# Patient Record
Sex: MALE | Race: ASIAN | NOT HISPANIC OR LATINO | Employment: OTHER
[De-identification: names, ages, dates, MRNs, and addresses within clinical notes are randomized per-mention and may not be internally consistent; named-entity substitution may affect disease eponyms.]

---

## 2018-10-10 ENCOUNTER — RECORDS - HEALTHEAST (OUTPATIENT)
Dept: ADMINISTRATIVE | Facility: OTHER | Age: 53
End: 2018-10-10

## 2018-10-11 ENCOUNTER — RECORDS - HEALTHEAST (OUTPATIENT)
Dept: ADMINISTRATIVE | Facility: OTHER | Age: 53
End: 2018-10-11

## 2018-10-18 ENCOUNTER — HOSPITAL ENCOUNTER (OUTPATIENT)
Dept: ULTRASOUND IMAGING | Facility: HOSPITAL | Age: 53
Discharge: HOME | End: 2018-10-18
Attending: INTERNAL MEDICINE

## 2018-10-18 DIAGNOSIS — N18.30 CHRONIC KIDNEY DISEASE, STAGE III (MODERATE) (H): ICD-10-CM

## 2018-11-19 ENCOUNTER — RECORDS - HEALTHEAST (OUTPATIENT)
Dept: ADMINISTRATIVE | Facility: OTHER | Age: 53
End: 2018-11-19

## 2018-11-27 ENCOUNTER — AMBULATORY - HEALTHEAST (OUTPATIENT)
Dept: ADMINISTRATIVE | Facility: CLINIC | Age: 53
End: 2018-11-27

## 2018-11-27 DIAGNOSIS — L30.9 DERMATITIS: ICD-10-CM

## 2018-12-04 ENCOUNTER — OFFICE VISIT - HEALTHEAST (OUTPATIENT)
Dept: ALLERGY | Facility: CLINIC | Age: 53
End: 2018-12-04

## 2018-12-04 DIAGNOSIS — L30.9 DERMATITIS: ICD-10-CM

## 2018-12-04 RX ORDER — AMLODIPINE BESYLATE 10 MG/1
10 TABLET ORAL DAILY
Status: SHIPPED | COMMUNITY
Start: 2018-12-04 | End: 2022-11-16

## 2018-12-04 RX ORDER — ATORVASTATIN CALCIUM 10 MG/1
10 TABLET, FILM COATED ORAL DAILY
Status: ON HOLD | COMMUNITY
Start: 2018-12-04 | End: 2021-12-06

## 2018-12-04 ASSESSMENT — MIFFLIN-ST. JEOR: SCORE: 1382.84

## 2018-12-06 ENCOUNTER — OFFICE VISIT - HEALTHEAST (OUTPATIENT)
Dept: ALLERGY | Facility: CLINIC | Age: 53
End: 2018-12-06

## 2018-12-10 ENCOUNTER — OFFICE VISIT - HEALTHEAST (OUTPATIENT)
Dept: ALLERGY | Facility: CLINIC | Age: 53
End: 2018-12-10

## 2018-12-10 DIAGNOSIS — L30.9 DERMATITIS: ICD-10-CM

## 2020-03-02 ENCOUNTER — TRANSFERRED RECORDS (OUTPATIENT)
Dept: HEALTH INFORMATION MANAGEMENT | Facility: CLINIC | Age: 55
End: 2020-03-02

## 2020-04-29 LAB
CHOLESTEROL (EXTERNAL): 117 MG/DL
CREATININE (EXTERNAL): 3.22 MG/DL (ref 0.7–1.33)
GFR ESTIMATED (EXTERNAL): 21 ML/MIN/1.73M2
GFR ESTIMATED (IF AFRICAN AMERICAN) (EXTERNAL): 24 ML/MIN/1.73M2
GLUCOSE (EXTERNAL): 104 MG/DL (ref 65–99)
HBA1C MFR BLD: 5.3 %
HDLC SERPL-MCNC: 37 MG/DL
LDL CHOLESTEROL CALCULATED (EXTERNAL): 61 MG/DL
NON HDL CHOLESTEROL (EXTERNAL): 80 MG/DL
POTASSIUM (EXTERNAL): 4.5 MMOL/L (ref 3.5–5.3)
TRIGLYCERIDES (EXTERNAL): 108 MG/DL

## 2020-06-18 LAB
CREATININE (EXTERNAL): 2.56 MG/DL (ref 0.7–1.33)
GFR ESTIMATED (EXTERNAL): 27 ML/MIN/1.73M2
GFR ESTIMATED (IF AFRICAN AMERICAN) (EXTERNAL): 31 ML/MIN/1.73M2
GLUCOSE (EXTERNAL): 128 MG/DL (ref 65–99)
POTASSIUM (EXTERNAL): 4.8 MMOL/L (ref 3.5–5.3)

## 2020-08-03 LAB
ALT SERPL-CCNC: 10 U/L (ref 9–46)
AST SERPL-CCNC: 16 U/L (ref 10–35)
CREATININE (EXTERNAL): 2.08 MG/DL (ref 0.7–1.33)
GFR ESTIMATED (EXTERNAL): 35 ML/MIN/1.73M2
GFR ESTIMATED (IF AFRICAN AMERICAN) (EXTERNAL): 40 ML/MIN/1.73M2
GLUCOSE (EXTERNAL): 157 MG/DL (ref 65–99)
POTASSIUM (EXTERNAL): 6.1 MMOL/L (ref 3.5–5.3)

## 2021-03-22 ENCOUNTER — RECORDS - HEALTHEAST (OUTPATIENT)
Dept: ADMINISTRATIVE | Facility: OTHER | Age: 56
End: 2021-03-22

## 2021-03-31 LAB
ALT SERPL-CCNC: 28 U/L (ref 9–46)
AST SERPL-CCNC: 27 U/L (ref 10–35)
CHOLESTEROL (EXTERNAL): 92 MG/DL
CREATININE (EXTERNAL): 2.95 MG/DL (ref 0.7–1.33)
GFR ESTIMATED (EXTERNAL): 23 ML/MIN/1.73M2
GFR ESTIMATED (IF AFRICAN AMERICAN) (EXTERNAL): 26 ML/MIN/1.73M2
GLUCOSE (EXTERNAL): 92 MG/DL (ref 65–99)
HBA1C MFR BLD: 5.5 %
HDLC SERPL-MCNC: 39 MG/DL
LDL CHOLESTEROL CALCULATED (EXTERNAL): 38 MG/DL
NON HDL CHOLESTEROL (EXTERNAL): 53 MG/DL
POTASSIUM (EXTERNAL): 6.5 MMOL/L (ref 3.5–5.3)
TRIGLYCERIDES (EXTERNAL): 69 MG/DL

## 2021-05-27 ENCOUNTER — MEDICAL CORRESPONDENCE (OUTPATIENT)
Dept: HEALTH INFORMATION MANAGEMENT | Facility: CLINIC | Age: 56
End: 2021-05-27

## 2021-06-02 VITALS — HEIGHT: 64 IN | WEIGHT: 141.5 LBS | BODY MASS INDEX: 24.16 KG/M2

## 2021-06-11 ENCOUNTER — COMMUNICATION - HEALTHEAST (OUTPATIENT)
Dept: SCHEDULING | Facility: CLINIC | Age: 56
End: 2021-06-11

## 2021-06-14 ENCOUNTER — AMBULATORY - HEALTHEAST (OUTPATIENT)
Dept: LAB | Facility: CLINIC | Age: 56
End: 2021-06-14

## 2021-06-14 ENCOUNTER — REFERRAL (OUTPATIENT)
Dept: TRANSPLANT | Facility: CLINIC | Age: 56
End: 2021-06-14

## 2021-06-14 DIAGNOSIS — Z01.818 PRE-TRANSPLANT EVALUATION FOR KIDNEY TRANSPLANT: ICD-10-CM

## 2021-06-14 DIAGNOSIS — E11.9 DIABETES MELLITUS, TYPE 2 (H): ICD-10-CM

## 2021-06-14 DIAGNOSIS — I10 ESSENTIAL HYPERTENSION: ICD-10-CM

## 2021-06-14 DIAGNOSIS — N18.9 CHRONIC KIDNEY DISEASE: Primary | ICD-10-CM

## 2021-06-14 DIAGNOSIS — N18.5 CHRONIC KIDNEY DISEASE, STAGE V (H): ICD-10-CM

## 2021-06-14 DIAGNOSIS — N18.6 END STAGE RENAL DISEASE (H): ICD-10-CM

## 2021-06-14 LAB
ANION GAP SERPL CALCULATED.3IONS-SCNC: 12 MMOL/L (ref 5–18)
BUN SERPL-MCNC: 62 MG/DL (ref 8–22)
CALCIUM SERPL-MCNC: 8 MG/DL (ref 8.5–10.5)
CHLORIDE BLD-SCNC: 94 MMOL/L (ref 98–107)
CO2 SERPL-SCNC: 26 MMOL/L (ref 22–31)
CREAT SERPL-MCNC: 4.24 MG/DL (ref 0.7–1.3)
GFR SERPL CREATININE-BSD FRML MDRD: 15 ML/MIN/1.73M2
GLUCOSE BLD-MCNC: 100 MG/DL (ref 70–125)
POTASSIUM BLD-SCNC: 4.4 MMOL/L (ref 3.5–5)
SODIUM SERPL-SCNC: 132 MMOL/L (ref 136–145)

## 2021-06-14 NOTE — LETTER
June 21, 2021    Modesto Barbosa  [address] [address]    Dear Modesto,    Thank you for your interest in the Transplant Center at [facility]. We look forward to being a part of your care team and assisting you through the transplant process.    As we discussed, your transplant coordinator is Lita Tirado (Kidney).  You may call your coordinator at any time with questions or concerns.  Your first scheduled call will be on June 28, 2021.  If this needs to change, call [phone].    Please complete the following.    1. Fill out and return the enclosed forms    Authorization for Electronic Communication    Authorization to Discuss Protected Health Information    Authorization for Release of Protected Health Information    Authorization for Care Everywhere Release of Information    2. Sign up for:    [employer], access to your electronic medical record (see enclosed pamphlet)    [employer], a transplant education website    You can use these tools to learn more about your transplant, communicate with your care team, and track your medical details      Sincerely,    Solid Organ Transplant  [facility]    cc: Referring Physician PCP

## 2021-06-16 VITALS — HEIGHT: 64 IN | BODY MASS INDEX: 23.56 KG/M2 | WEIGHT: 138 LBS

## 2021-06-16 PROBLEM — I10 ESSENTIAL HYPERTENSION: Status: ACTIVE | Noted: 2020-02-10

## 2021-06-16 PROBLEM — N17.9 ACUTE KIDNEY INJURY (H): Status: ACTIVE | Noted: 2021-06-10

## 2021-06-16 PROBLEM — E11.9 TYPE 2 DIABETES MELLITUS (H): Status: ACTIVE | Noted: 2020-02-10

## 2021-06-16 PROBLEM — R11.10 VOMITING: Status: ACTIVE | Noted: 2021-06-10

## 2021-06-16 PROBLEM — N18.4 STAGE 4 CHRONIC KIDNEY DISEASE (H): Status: ACTIVE | Noted: 2020-02-10

## 2021-06-16 SDOH — HEALTH STABILITY: MENTAL HEALTH: HOW OFTEN DO YOU HAVE A DRINK CONTAINING ALCOHOL?: NOT ASKED

## 2021-06-16 SDOH — HEALTH STABILITY: MENTAL HEALTH: HOW MANY STANDARD DRINKS CONTAINING ALCOHOL DO YOU HAVE ON A TYPICAL DAY?: NOT ASKED

## 2021-06-16 SDOH — HEALTH STABILITY: MENTAL HEALTH: HOW OFTEN DO YOU HAVE 6 OR MORE DRINKS ON ONE OCCASION?: NOT ASKED

## 2021-06-16 ASSESSMENT — MIFFLIN-ST. JEOR: SCORE: 1366.96

## 2021-06-16 NOTE — TELEPHONE ENCOUNTER
PCP: Santos Howard MD   Referring Provider: Russell Choe MD  Referring Diagnosis: CKD    Is patient under the age of 65? Y  Is patient diabetic? Y  Is patient on insulin? N  Was patient offered a pancreas transplant referral? N    Is patient in a group home/assisted living? N  Does patient have a guardian? N    Referral intake process completed.  Patient is aware that after financial approval is received, medical records will be requested.   Patient confirmed for a callback from transplant coordinator on June 28, 2021. (within 2 weeks)  Tentative evaluation date August 23, 2021. (within 4 weeks)    Confirmed coordinator will discuss evaluation process in more detail at the time of their call.   Patient is aware of the need to arrange age appropriate cancer screening, vaccinations, and dental care.  Reminded patient to complete questionnaire, complete medical records release, and review packet prior to evaluation visit .  Assessed patient for special needs (ie--wheelchair, assistance, guardian, and ):  wheel chair, Hmong    Patient instructed to call [phone] with questions.     Patient gave verbal consent during intake call to obtain medical records and documents outside of [facility]:  yes

## 2021-06-22 NOTE — PROGRESS NOTES
Assessment:    History of recurrent dermatitis.  Possible allergic contact component.    Plan:    We will do patch testing.  ____________________________________________________________________________     Patient comes in today for evaluation of itching rash.  It comes and goes over the past several years.  Recently has been flaring and he is having daily symptoms over the past 2 and half weeks.  Is often on his scalp, shoulders, back and legs.  It is itchy red bumps.  No desquamation.  No blistering.  No bruising.  Comes and goes slowly.  There is dry associated flaking with it.  No obvious trigger.  He does recall the onset having poison ivy but has persisted since then.  No previous history of similar rash.  He was seen by dermatology.  No medical records available today.  He was prescribed a topical steroid cream which did help out but has used it all.  Rash returns when not using it.    Review of symptoms:  As above, otherwise negative    Past medical history: Diabetes.  Diagnosed 2000.  Hypertension.  No other medical conditions noted.    Allergies: No known allergies to medications, latex, foods or hymenoptera venom    Family history: No known member of the family with allergy or asthma.    Social history: Currently lives in house with forced air heat and central air conditioning.  There is a basement present.  No cigarette smoking history.  Currently unemployed.  No recent changes in products or contact exposures known.    Medications: reviewed in chart    Physical Exam:  General:  Alert and in no apparent distress.  Eyes:  Sclera clear.  Ears: TMs translucent grey with bony landmarks visible. Nose: Pale, boggy mucosal membranes.  Throat: Pink, moist.  No lesions.  Neck: Supple.  No lymphadenopathy.  Lungs: CTA.  CV: Regular rate and rhythm. Extremities: Well perfused.  No clubbing or cyanosis. Skin: Diffusely dry skin.  Erythematous papular rash scalp extending onto shoulders and chest.

## 2021-06-22 NOTE — PROGRESS NOTES
Assessment:     History of recurrent dermatitis.  Possible allergic contact component.  Also consider prurigo nodularis     Plan:     Initial reading of patch testing is negative.  Follow-up in 4 days for final reading of test and counseling regarding results.  ____________________________________________________________________________     Patient comes in today for initial reading of patch testing.  He reports no significant change in his skin rash or itching.    Exam: Skin: Diffusely dry skin.  Erythema and scale seen patches throughout but especially on scalp shoulders and face.  Some nodules present.

## 2021-06-22 NOTE — PROGRESS NOTES
Assessment:     Negative patch testing.  Contact allergy is not likely.  Consider Prurigo Nodularis.  Consider onset of poison ivy followed by chronic itching.    Plan:      Return to dermatology.  Topical Triamcinolone 0.1% daily.    ____________________________________________________________________________     Patient comes in today for final reading of patch testing.  He has had a history of chronic itchy dermatitis.  Concern of possible contact allergy.  Since last seen no changes in his rash.  No significant itching from the patch test itself.    Physical Exam:  General:  Alert and Oriented.  Eyes:  Sclera clear.  Skin: Diffusely dry skin.  Erythematous patches with scale.  Some nodules present.  Rash is on scalp face trunk.    Clinic course: True test patch testing was completed.  All tests were negative.  15 min spent in direct contact with the patient apart from testing.  More than 50% in counseling and coordination of care.

## 2021-06-28 NOTE — TELEPHONE ENCOUNTER
"Reviewed pt's chart for pre-kidney transplant evaluation planning. Pt lives in [place], [place]. Referred to our center by Dr. Russell Choe, his nephrologist. Pt has CKD stage 3 due to hypertension and diabetes. GFR most recently 14 on 6/21/21.  Biopsy not completed. Pt is not on dialysis. Pt a type 2 diabetic. Other hx includes recent lyme's disease, hypertension, diabetes.  Heart hx: none per patient report.  Lung status: no history of infections or oxygen use. Surgical hx: no history of abdominal surgeries.  BMI 23 on 6/10/21 using height 5'3\" and weight 133#.  Discussed BMI requirement for transplant with patient: yes, appropriate for transplant. Health maintenance was discussed with patient, he plans to make appts for dental and colonoscopy Pt is not a smoker, does very occasionally consume alcohol, and refrains from recreational drugs. Pt is independent w/ ADLs.  Pt lives w/ his children and has their support following transplant. His children have offered to be living donors for him, but he is not interested in having them register.    I also introduced [employer] and asked pt to create an account and view pre-kidney transplant videos for review with me following evaluation. Confirmed STD 8/23. Informed pt they will hear from scheduling to arrange the evaluation. Smartset orders entered, chart routed to scheduling pool.       "

## 2021-07-05 PROBLEM — I10 ESSENTIAL HYPERTENSION: Status: ACTIVE | Noted: 2020-02-10

## 2021-07-08 ENCOUNTER — TELEPHONE (OUTPATIENT)
Dept: TRANSPLANT | Facility: CLINIC | Age: 56
End: 2021-07-08

## 2021-07-08 NOTE — TELEPHONE ENCOUNTER
Patient Call: General  Route to LPN    Reason for call: connect with pts daughter regarding evaluation     Call back needed? Yes    Return Call Needed  Same as documented in contacts section  When to return call?: Greater than one day: Route standard priority

## 2021-07-13 NOTE — TELEPHONE ENCOUNTER
Spoke with Yanick about health maintenance items and explained transplant evaluation. Encouraged patient to connect with PCP to schedule colonoscopy, or whichever clinic will take his insurance. She is in agreement with the plan and will call with further questions.

## 2021-08-17 ENCOUNTER — TRANSFERRED RECORDS (OUTPATIENT)
Dept: HEALTH INFORMATION MANAGEMENT | Facility: CLINIC | Age: 56
End: 2021-08-17

## 2021-08-19 ENCOUNTER — TELEPHONE (OUTPATIENT)
Dept: TRANSPLANT | Facility: CLINIC | Age: 56
End: 2021-08-19

## 2021-08-19 NOTE — TELEPHONE ENCOUNTER
Spoke with patient with assistance of [employer] .  Confirmed upcoming PKE appointments at [facility] on 8/23/21 starting at 0730. Instructed patient may eat breakfast, take regularly scheduled medications and be accompanied by 1 adult visitor. Patient states no Covid-19 symptoms and/or exposure within 14 days and will call to reschedule if anything changes before appointments.  Patient states his daughter has contact information for any further questions/concerns/need to reschedule.

## 2021-08-20 NOTE — PROGRESS NOTES
[facility] Solid Organ Transplant  Outpatient MNT: Kidney Transplant Evaluation    Current BMI: 25.68 (HT 64 in,  lbs/68 kg)  BMI is within recommendation of <35 for kidney transplant    Frailty Assessment -- Not Frail (1/5 points) scored for reduced        Time Spent: 30 minutes  Visit Type: Initial   Referring Physician: Corinna  Pt accompanied by: Eugenio  and his daughter     History of previous txp: none   Dialysis: not yet, but pt reports soon    Nutrition Assessment   advised pt to avoid potatoes, tomatoes, banana, and other veggies (cabbage, broccoli). Wife cooks at home. No added salt/salty sauces.     Appetite: good/baseline     Vitamins, Supplements, Pertinent Meds: calcium with vit D   Herbal Medicines/Supplements: none     Edema: yes DAMIEN    Weight hx: stable     Food Security: any concerns about having enough money to buy food or access to grocery stores? Worried about finances d/t being sick; cannot drive d/t poor eyesight. Wife and youngest son drive to get groceries. Has not applied for food stamps or assistance yet. Encouraged pt to ask SW for assistance resources.    Diet Recall  Breakfast Rice w/ beef/fish    Lunch    Dinner Similar to B   Snacks Depends on hunger level- rice eli    Beverages Water    Alcohol None    Dining out Once every 2-3 months      Physical Activity  Daily- elliptical for 30-40 min     Labs  6/14 K 4.4   Phos 5.7    Nutrition Diagnosis  No nutrition diagnosis identified at this time     Nutrition Intervention  Nutrition education provided:  Discussed sodium intake (low sodium foods and drinks, seasoning food without salt and tips for low sodium diet).  Reviewed K/Phos labs. Reviewed how protein needs will change when he starts dialysis.     Reviewed post txp diet guidelines in brief (will review in further detail post txp):  (1) Review of proper food safety measures d/t immunosuppressant therapy post-op and increased risk for food-borne  illness    (2) Avoid the following post txp d/t risk for rejection, unknown effects on the organs, and/or potential interactions with immunosuppressants:  - Herbal, Chinese, holistic, chiropractic, natural, alternative medicines and supplements  - Detoxes and cleanses  - Weight loss pills  - Protein powders or other products with extracts or herbs (ie green tea extract)    (3) Med regimen and possible side effects    Patient Understanding: Pt verbalized understanding of education provided.  Expected Engagement: Good  Follow-Up Plans: PRN     Nutrition Goals  No nutrition goals identified at this time     Claire Burns, RD, LD, CCTD

## 2021-08-23 ENCOUNTER — ANCILLARY PROCEDURE (OUTPATIENT)
Dept: CARDIOLOGY | Facility: CLINIC | Age: 56
End: 2021-08-23
Attending: PHYSICIAN ASSISTANT
Payer: MEDICARE

## 2021-08-23 ENCOUNTER — OFFICE VISIT (OUTPATIENT)
Dept: TRANSPLANT | Facility: CLINIC | Age: 56
End: 2021-08-23
Attending: PHYSICIAN ASSISTANT
Payer: MEDICARE

## 2021-08-23 ENCOUNTER — DOCUMENTATION ONLY (OUTPATIENT)
Dept: TRANSPLANT | Facility: CLINIC | Age: 56
End: 2021-08-23

## 2021-08-23 ENCOUNTER — LAB (OUTPATIENT)
Dept: LAB | Facility: CLINIC | Age: 56
End: 2021-08-23
Attending: PHYSICIAN ASSISTANT

## 2021-08-23 ENCOUNTER — ANCILLARY PROCEDURE (OUTPATIENT)
Dept: GENERAL RADIOLOGY | Facility: CLINIC | Age: 56
End: 2021-08-23
Attending: PHYSICIAN ASSISTANT
Payer: MEDICARE

## 2021-08-23 VITALS
HEART RATE: 63 BPM | OXYGEN SATURATION: 98 % | DIASTOLIC BLOOD PRESSURE: 66 MMHG | WEIGHT: 149.6 LBS | SYSTOLIC BLOOD PRESSURE: 154 MMHG | BODY MASS INDEX: 25.54 KG/M2 | HEIGHT: 64 IN

## 2021-08-23 DIAGNOSIS — E11.9 DIABETES MELLITUS, TYPE 2 (H): ICD-10-CM

## 2021-08-23 DIAGNOSIS — Z01.818 PRE-TRANSPLANT EVALUATION FOR KIDNEY TRANSPLANT: ICD-10-CM

## 2021-08-23 DIAGNOSIS — N18.9 CHRONIC KIDNEY DISEASE: ICD-10-CM

## 2021-08-23 DIAGNOSIS — N18.5 STAGE 5 CHRONIC KIDNEY DISEASE NOT ON CHRONIC DIALYSIS (H): Primary | ICD-10-CM

## 2021-08-23 DIAGNOSIS — I10 ESSENTIAL HYPERTENSION: ICD-10-CM

## 2021-08-23 DIAGNOSIS — N18.5 CHRONIC KIDNEY DISEASE, STAGE V (H): ICD-10-CM

## 2021-08-23 DIAGNOSIS — Z12.5 ENCOUNTER FOR SCREENING FOR MALIGNANT NEOPLASM OF PROSTATE: ICD-10-CM

## 2021-08-23 DIAGNOSIS — E87.20 METABOLIC ACIDOSIS: ICD-10-CM

## 2021-08-23 DIAGNOSIS — Z76.82 ORGAN TRANSPLANT CANDIDATE: Primary | ICD-10-CM

## 2021-08-23 DIAGNOSIS — D69.6 THROMBOCYTOPENIA (H): ICD-10-CM

## 2021-08-23 DIAGNOSIS — E11.69 TYPE 2 DIABETES MELLITUS WITH OTHER SPECIFIED COMPLICATION, WITHOUT LONG-TERM CURRENT USE OF INSULIN (H): ICD-10-CM

## 2021-08-23 DIAGNOSIS — Z01.818 ENCOUNTER FOR PRE-TRANSPLANT EVALUATION FOR KIDNEY TRANSPLANT: Primary | ICD-10-CM

## 2021-08-23 DIAGNOSIS — E11.21 TYPE 2 DIABETES MELLITUS WITH DIABETIC NEPHROPATHY, WITHOUT LONG-TERM CURRENT USE OF INSULIN (H): ICD-10-CM

## 2021-08-23 DIAGNOSIS — Z11.59 ENCOUNTER FOR SCREENING FOR OTHER VIRAL DISEASES: ICD-10-CM

## 2021-08-23 LAB
ABO/RH(D): NORMAL
ABO/RH(D): NORMAL
ALBUMIN SERPL-MCNC: 3.6 G/DL (ref 3.4–5)
ALBUMIN UR-MCNC: 100 MG/DL
ALP SERPL-CCNC: 45 U/L (ref 40–150)
ALT SERPL W P-5'-P-CCNC: 26 U/L (ref 0–70)
ANION GAP SERPL CALCULATED.3IONS-SCNC: 10 MMOL/L (ref 3–14)
ANTIBODY SCREEN: NEGATIVE
APPEARANCE UR: CLEAR
APTT PPP: 41 SECONDS (ref 22–38)
AST SERPL W P-5'-P-CCNC: 18 U/L (ref 0–45)
BASOPHILS # BLD AUTO: 0 10E3/UL (ref 0–0.2)
BASOPHILS NFR BLD AUTO: 0 %
BILIRUB SERPL-MCNC: 0.4 MG/DL (ref 0.2–1.3)
BILIRUB UR QL STRIP: NEGATIVE
BUN SERPL-MCNC: 81 MG/DL (ref 7–30)
CALCIUM SERPL-MCNC: 8.7 MG/DL (ref 8.5–10.1)
CHLORIDE BLD-SCNC: 108 MMOL/L (ref 94–109)
CO2 SERPL-SCNC: 19 MMOL/L (ref 20–32)
COLOR UR AUTO: ABNORMAL
CREAT SERPL-MCNC: 5.38 MG/DL (ref 0.66–1.25)
EOSINOPHIL # BLD AUTO: 0.2 10E3/UL (ref 0–0.7)
EOSINOPHIL NFR BLD AUTO: 4 %
ERYTHROCYTE [DISTWIDTH] IN BLOOD BY AUTOMATED COUNT: 14.6 % (ref 10–15)
FACTOR 2 INTERPRETATION: NORMAL
FACTOR V INTERPRETATION: NORMAL
GFR SERPL CREATININE-BSD FRML MDRD: 11 ML/MIN/1.73M2
GLUCOSE BLD-MCNC: 186 MG/DL (ref 70–99)
GLUCOSE UR STRIP-MCNC: 50 MG/DL
HBA1C MFR BLD: 5.6 % (ref 0–5.6)
HBV SURFACE AB SERPL IA-ACNC: 5.68 M[IU]/ML
HBV SURFACE AG SERPL QL IA: NONREACTIVE
HCT VFR BLD AUTO: 27.5 % (ref 40–53)
HCV AB SERPL QL IA: NONREACTIVE
HGB BLD-MCNC: 9.3 G/DL (ref 13.3–17.7)
HGB UR QL STRIP: NEGATIVE
HIV 1+2 AB+HIV1 P24 AG SERPL QL IA: NONREACTIVE
IMM GRANULOCYTES # BLD: 0 10E3/UL
IMM GRANULOCYTES NFR BLD: 0 %
INR PPP: 1.25 (ref 0.85–1.15)
KETONES UR STRIP-MCNC: NEGATIVE MG/DL
LAB DIRECTOR COMMENTS: NORMAL
LAB DIRECTOR DISCLAIMER: NORMAL
LAB DIRECTOR INTERPRETATION: NORMAL
LAB DIRECTOR METHODOLOGY: NORMAL
LAB DIRECTOR RESULTS: NORMAL
LEUKOCYTE ESTERASE UR QL STRIP: NEGATIVE
LVEF ECHO: NORMAL
LYMPHOCYTES # BLD AUTO: 0.4 10E3/UL (ref 0.8–5.3)
LYMPHOCYTES NFR BLD AUTO: 12 %
MCH RBC QN AUTO: 28.6 PG (ref 26.5–33)
MCHC RBC AUTO-ENTMCNC: 33.8 G/DL (ref 31.5–36.5)
MCV RBC AUTO: 85 FL (ref 78–100)
MDL NUMBER: NORMAL
MONOCYTES # BLD AUTO: 0.3 10E3/UL (ref 0–1.3)
MONOCYTES NFR BLD AUTO: 8 %
NEUTROPHILS # BLD AUTO: 2.7 10E3/UL (ref 1.6–8.3)
NEUTROPHILS NFR BLD AUTO: 76 %
NITRATE UR QL: NEGATIVE
NRBC # BLD AUTO: 0 10E3/UL
NRBC BLD AUTO-RTO: 0 /100
PH UR STRIP: 5 [PH] (ref 5–7)
PHOSPHATE SERPL-MCNC: 6.4 MG/DL (ref 2.5–4.5)
PLATELET # BLD AUTO: 59 10E3/UL (ref 150–450)
POTASSIUM BLD-SCNC: 4.9 MMOL/L (ref 3.4–5.3)
PROT SERPL-MCNC: 6.8 G/DL (ref 6.8–8.8)
PSA SERPL-MCNC: 0.66 UG/L (ref 0–4)
RBC # BLD AUTO: 3.25 10E6/UL (ref 4.4–5.9)
RBC URINE: 2 /HPF
SODIUM SERPL-SCNC: 137 MMOL/L (ref 133–144)
SP GR UR STRIP: 1.01 (ref 1–1.03)
SPECIMEN DESCRIPTION: NORMAL
SPECIMEN EXPIRATION DATE: NORMAL
SPECIMEN EXPIRATION DATE: NORMAL
T PALLIDUM AB SER QL: NONREACTIVE
UROBILINOGEN UR STRIP-MCNC: NORMAL MG/DL
WBC # BLD AUTO: 3.6 10E3/UL (ref 4–11)
WBC URINE: <1 /HPF

## 2021-08-23 PROCEDURE — 86481 TB AG RESPONSE T-CELL SUSP: CPT

## 2021-08-23 PROCEDURE — 86706 HEP B SURFACE ANTIBODY: CPT

## 2021-08-23 PROCEDURE — 85025 COMPLETE CBC W/AUTO DIFF WBC: CPT

## 2021-08-23 PROCEDURE — 81240 F2 GENE: CPT

## 2021-08-23 PROCEDURE — 85613 RUSSELL VIPER VENOM DILUTED: CPT

## 2021-08-23 PROCEDURE — 81001 URINALYSIS AUTO W/SCOPE: CPT

## 2021-08-23 PROCEDURE — 86833 HLA CLASS II HIGH DEFIN QUAL: CPT

## 2021-08-23 PROCEDURE — 85610 PROTHROMBIN TIME: CPT

## 2021-08-23 PROCEDURE — 86886 COOMBS TEST INDIRECT TITER: CPT

## 2021-08-23 PROCEDURE — 81378 HLA I & II TYPING HR: CPT

## 2021-08-23 PROCEDURE — 36415 COLL VENOUS BLD VENIPUNCTURE: CPT

## 2021-08-23 PROCEDURE — 99205 OFFICE O/P NEW HI 60 MIN: CPT

## 2021-08-23 PROCEDURE — 86665 EPSTEIN-BARR CAPSID VCA: CPT

## 2021-08-23 PROCEDURE — 86704 HEP B CORE ANTIBODY TOTAL: CPT

## 2021-08-23 PROCEDURE — 86832 HLA CLASS I HIGH DEFIN QUAL: CPT

## 2021-08-23 PROCEDURE — 86901 BLOOD TYPING SEROLOGIC RH(D): CPT

## 2021-08-23 PROCEDURE — 99204 OFFICE O/P NEW MOD 45 MIN: CPT | Performed by: TRANSPLANT SURGERY

## 2021-08-23 PROCEDURE — 85670 THROMBIN TIME PLASMA: CPT

## 2021-08-23 PROCEDURE — 87340 HEPATITIS B SURFACE AG IA: CPT

## 2021-08-23 PROCEDURE — G0103 PSA SCREENING: HCPCS

## 2021-08-23 PROCEDURE — 85730 THROMBOPLASTIN TIME PARTIAL: CPT

## 2021-08-23 PROCEDURE — 86787 VARICELLA-ZOSTER ANTIBODY: CPT

## 2021-08-23 PROCEDURE — 71046 X-RAY EXAM CHEST 2 VIEWS: CPT | Performed by: RADIOLOGY

## 2021-08-23 PROCEDURE — 83036 HEMOGLOBIN GLYCOSYLATED A1C: CPT

## 2021-08-23 PROCEDURE — 86900 BLOOD TYPING SEROLOGIC ABO: CPT | Mod: 91

## 2021-08-23 PROCEDURE — 86780 TREPONEMA PALLIDUM: CPT | Mod: GZ

## 2021-08-23 PROCEDURE — 93306 TTE W/DOPPLER COMPLETE: CPT | Performed by: INTERNAL MEDICINE

## 2021-08-23 PROCEDURE — 80053 COMPREHEN METABOLIC PANEL: CPT

## 2021-08-23 PROCEDURE — 86803 HEPATITIS C AB TEST: CPT

## 2021-08-23 PROCEDURE — 86147 CARDIOLIPIN ANTIBODY EA IG: CPT

## 2021-08-23 PROCEDURE — 81241 F5 GENE: CPT

## 2021-08-23 PROCEDURE — 86644 CMV ANTIBODY: CPT

## 2021-08-23 PROCEDURE — 84100 ASSAY OF PHOSPHORUS: CPT

## 2021-08-23 RX ORDER — CALCIUM CARBONATE 500(1250)
500 TABLET,CHEWABLE ORAL 4 TIMES DAILY
Status: ON HOLD | COMMUNITY
Start: 2021-08-18 | End: 2023-02-25

## 2021-08-23 RX ORDER — MINOXIDIL 2.5 MG/1
TABLET ORAL
COMMUNITY
Start: 2021-08-05 | End: 2021-12-02

## 2021-08-23 RX ORDER — SODIUM ZIRCONIUM CYCLOSILICATE 5 G/5G
POWDER, FOR SUSPENSION ORAL
COMMUNITY
Start: 2021-08-08 | End: 2021-12-02

## 2021-08-23 RX ORDER — CARVEDILOL 25 MG/1
25 TABLET ORAL
Status: ON HOLD | COMMUNITY
Start: 2020-05-11 | End: 2021-12-06

## 2021-08-23 RX ORDER — FUROSEMIDE 20 MG
20 TABLET ORAL DAILY
COMMUNITY
Start: 2021-08-05 | End: 2022-06-01

## 2021-08-23 ASSESSMENT — MIFFLIN-ST. JEOR: SCORE: 1419.58

## 2021-08-23 NOTE — PROGRESS NOTES
Kidney Transplant Referral - 6/14/2021   Patient completed AM appointments with all PKE providers.  Time and location of PM appointments reviewed with patient and daughter.   Instructed patient will next be contacted by Transplant Coordinator after Selection Committee Meeting.  Patient stated understanding.        Summary    Team s concerns/comments:   1) Cardiac risk assessment  2) PAD assessment  3) BPH  4) Elevated coags  5) Small pleural effusion  6) Health maintenance    Candidacy category: Yellow    Action/Plan:   1) EKG, Echo today. Cardiology consult  2) A/P CT non contrast, Ab/Il US  3) PVR today-0  4) Repeat tests, if elevated again refer to Hematology  5) PHOEBE to forward to PCP  6) 8/21 Colonoscopy pathology results pending,   Dental UTD    Expected Selection Meeting Discussion: 9/1/21

## 2021-08-23 NOTE — PROGRESS NOTES
TRANSPLANT NEPHROLOGY RECIPIENT EVALUATION NOTE    Assessment and Plan:  # Kidney Transplant Evaluation: Patient is a good candidate overall. Benefits of a living donor transplant were discussed.    # CKD from presumed diabetes mellitus type 2: GFRs 14-16. AVF placed with no overt uremic symptoms. When ready, he may benefit from a kidney transplant.  He is ABO-A and has no potential donors.    # DM Type 2: diet controlled since June 2021.    # Cardiac Risk: will need risk assessment given findings on ECHO today that included severe LVH with significant asymmetric septal hypertrophy (1.7 cm)   that may require cardiac MRI. He will also need coronary angiogram given 20+ years of diabetes.     # Abnormal coags: including slightly elevated INR and PPT. Will repeat and if elevated again will send to hematology.     # PAD Screening: will need non-con CT abdomen/pelvis and iliac US.     # Small pleural effusions bilaterally: will forward to primary nephrology.     # Health Maintenance: 8/2021 colonoscopy (path pending on one polyp): Up to date and Dental: Up to date (next appt scheduled).     Discussed the risks and benefits of a transplant, including the risk of surgery and immunosuppression medications.  Patient's overall evaluation will be discussed in the Transplant Program's regular meeting with a final recommendation on the patients suitability for transplant to be made at that time.    Pending completion of the full evaluation, patient presently appears to be enough of an acceptable kidney transplant recipient candidate to have any potential kidney donors start the evaluation process.  Patient was seen in conjunction with Dr. Patrick Phan as part of a shared visit.    PHYSICIAN ATTESTATION AND EVALUATION  Patient was seen by myself, Dr. Patrick Phan, in conjunction with STEPHEN Almonte CNP as part of a shared visit.    I personally reviewed the patient's past medical and surgical history, vital signs,  medications and pertinent laboratory results and radiology findings.  Present and past medical history, along with significant physical exam findings were all reviewed with PHOEBE.    Ayers findings:  Modesto Barbosa is a 55 year old year old male with CKD from diabetes mellitus type 2, who presents for kidney transplant evaluation.  Patient reports feeling okay overall with some medical complaints.    Key management decisions made by me and discussed with PHOEBE include the following, with full Assessment and Plan noted above by PHOEBE:  # Kidney Transplant Evaluation: Patient is a good candidate overall. Benefits of a living donor transplant were discussed.    # CKD from diabetes mellitus type 2: Patient has had progressive decline in kidney function, nearing need for renal replacement therapy and would benefit from a kidney transplant.  Preferably, a preemptive living donor kidney transplant.    # DM Type 2: Patient is diet controlled only, but does have end organ damage.    # Cardiac Risk: No known cardiac disease, but several cardiac risk factors.  Patient will require Cardiology evaluation prior to transplant and likely require a coronary angiogram due to long-standing diabetes.    # PAD Screening: Will obtain updated imaging for Transplant Surgery to review.    # Abnormal Coags: Slightly prolonged INR and PTT for unclear reason.  Recommend repeating and if still abnormal, would refer to Hematology for further evaluation.    # Small Pleural Effusions: Patient may need improved diuresis and will defer to primary Nephrologist to manage.    Patrick Phan MD    Evaluation:  Modesto Barbosa was seen in consultation at the request of Dr. Talha Weinstein for evaluation as a potential kidney transplant recipient.    Reason for Visit:  Modesto Barbosa is a 55 year old male with CKD from diabetes mellitus type 2, who presents for kidney transplant evaluation.    History of Present Illness:         Kidney Disease Hx:      Modesto DIAZ  Chelsey is a 55-year-old [facility] gentleman with history of CKD secondary to presumed type 2 diabetes. Long standing CKD that was reportedly followed by a PCP.  In mid 2018 serum creatinine levels were in the 2 range, progressed to greater than 3 in 4/2020 and for the past few months have been between 3.5-4.3 with correlating GFRs of 14-18.  Prior negative serological work-up including SPEP, UPEP and FLC . He denies uremic sxs but does complain of LE swelling. He has no potential donors.        Primary Nephrologist: Dr. Choe   H/o Kidney Stones: No       H/o Recurrent/Frequent UTI: No         Diabetic Hx: Type 2        Diagnosis Date: since 2001.        Medication History: started taking oral medications in 2005, which was stopped in June 2021. No prior insulin use.         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       Other: None         Viral Serology Status       CMV IgG Antibody: Unknown       EBV IgG Antibody: Unknown         Volume Status/Weight:        Volume status: Moderately hypervolemic       Weight:  Acceptable BMI       BMI: There is no height or weight on file to calculate BMI.         Functional Capacity/Frailty:        Stays active gardening and has no limitations to how far he can walk. Lives with wife and son and no services are coming to the house at this time.       Fatigue/Decreased Energy: [x] No [] Yes    Chest Pain or SOB with Exertion: [x] No [] Yes    Significant Weight Change: [x] No [] Yes    Nausea, Vomiting or Diarrhea: [x] No [] Yes     Fever, Sweats or Chills:  [x] No [] Yes    Leg Swelling [x] No [] Yes        History of Cancer: None    Other Significant Medical Issues:   -Mild bladder wall thickening: on 2018 renal US, resolved on 2021 US, no hematuria on recent UA.   - BPH: PVR 0 ml.   - Recent lyme's disease: completed treatment.        Potential Living Kidney Donors: No    Review of Systems:  A comprehensive review of systems was obtained and negative, except as noted in the HPI or PMH.    Past Medical History:   Medical record was reviewed and PMH was discussed with patient and noted below.  Past Medical History:   Diagnosis Date     Diabetes (H)     managed by PCP       Past Social History:   No past surgical history on file.  Personal history of bleeding or anesthesia problems: No    Family History:  No family history on file.    Personal History:   Social History     Socioeconomic History     Marital status: Legally      Spouse name: Not on file     Number of children: Not on file     Years of education: Not on file     Highest education level: Not on file   Occupational History     Not on file   Tobacco Use     Smoking status: Never Smoker     Smokeless tobacco: Never Used   Substance and Sexual Activity     Alcohol use: Not Currently     Drug use: Never     Sexual activity: Not on file   Other Topics Concern     Parent/sibling w/ CABG, MI or angioplasty before 65F 55M? Not Asked   Social History Narrative     Not on file     Social Determinants of Health     Financial Resource Strain:      Difficulty of Paying Living Expenses:    Food Insecurity:      Worried About Running Out of Food in the Last Year:      Ran Out of Food in the Last Year:    Transportation Needs:      Lack of Transportation (Medical):      Lack of Transportation (Non-Medical):    Physical Activity:      Days of Exercise per Week:      Minutes of Exercise per Session:    Stress:      Feeling of Stress :    Social Connections:      Frequency of Communication  with Friends and Family:      Frequency of Social Gatherings with Friends and Family:      Attends Jew Services:      Active Member of Clubs or Organizations:      Attends Club or Organization Meetings:      Marital Status:    Intimate Partner Violence:      Fear of Current or Ex-Partner:      Emotionally Abused:      Physically Abused:      Sexually Abused:        Allergies:  No Known Allergies    Medications:  Current Outpatient Medications   Medication Sig     amLODIPine (NORVASC) 10 MG tablet [AMLODIPINE (NORVASC) 10 MG TABLET] Take 10 mg by mouth daily.      aspirin 81 MG EC tablet [ASPIRIN 81 MG EC TABLET] Take 81 mg by mouth daily.     atorvastatin (LIPITOR) 10 MG tablet [ATORVASTATIN (LIPITOR) 10 MG TABLET] Take 10 mg by mouth daily.      calcitrioL (ROCALTROL) 0.25 MCG capsule [CALCITRIOL (ROCALTROL) 0.25 MCG CAPSULE] Take 0.25 mcg by mouth daily.      cloNIDine (CATAPRES-TTS) 0.3 mg/24 hr [CLONIDINE (CATAPRES-TTS) 0.3 MG/24 HR] Place 1 patch on the skin once a week.     hydrALAZINE (APRESOLINE) 100 MG tablet [HYDRALAZINE (APRESOLINE) 100 MG TABLET] Take 100 mg by mouth 3 (three) times a day.     metoprolol succinate (TOPROL-XL) 100 MG 24 hr tablet [METOPROLOL SUCCINATE (TOPROL-XL) 100 MG 24 HR TABLET] Take 100 mg by mouth daily.      sodium bicarbonate 650 MG tablet [SODIUM BICARBONATE 650 MG TABLET] Take 1 tablet (650 mg total) by mouth 3 (three) times a day. OTC product     No current facility-administered medications for this visit.       Vitals:  There were no vitals taken for this visit.    Exam:  GENERAL APPEARANCE: alert and no distress  HENT: mouth without ulcers or lesions. Fair dentition.   LYMPHATICS: no cervical or supraclavicular nodes  RESP: lungs clear to auscultation - no rales, rhonchi or wheezes  CV: regular rhythm, normal rate, no rub, no murmur  FEMORAL PULSES: 2+ bilaterally.   EDEMA: no LE edema bilaterally  ABDOMEN: soft, nondistended, nontender, bowel sounds normal  MS:  extremities normal - no gross deformities noted, no evidence of inflammation in joints, no muscle tenderness  SKIN: no rash    Results:   No results found for this or any previous visit (from the past 336 hour(s)).

## 2021-08-23 NOTE — LETTER
8/23/2021         RE: Modesto Barbosa  [address] [place] [address]        Dear Colleague,    Thank you for referring your patient, Modesto Barbosa, to the [facility]. Please see a copy of my visit note below.    1. No surgical contraindications for kidney transplantation.  With long-term history of DM and HTN, would look closely at/for PVD: doppler of carotid and iliac, non con CT of pelvis.  2. Cardiac eval due to severe HTN.  Query any need to assess for secondary HTN?   3. Routine infection/tumor eval  4. Platelets have been consistently low/normal. Get HBV/HCV serologies.  If goes way back, look at imaging of liver/fibroscan.  I spent 45+ min with chart review, with patient and daugther, discussing with nephrology    54 yo Hmong man (speaks little English) : daughter.  Not yet on dialysis.  On 6 drugs for hypertension.  Carries a long-term dx of type 2 DM (on oral agents)  Has had eye surg for retinopathy.    No smoking  NKA  Only surg: diabetic eye.     Kidney/surg  No prior kidney problems  Bladder: empties ok.  No infx, hematuria, stones, tumor.  PVR in clinic: empty bladder.  Arterial: no claudication, ulcers or infections  Venous: no DVT, unilateral edema.    There were no vitals taken for this visit.   Alert, responsive.    Abd: soft, nontender.  No incisions, no hernias.  Groin 2+ bilat pulses.  Ext: min edema.  Sym face and ext mobility    Current Outpatient Medications   Medication     amLODIPine (NORVASC) 10 MG tablet     aspirin 81 MG EC tablet     atorvastatin (LIPITOR) 10 MG tablet     calcitrioL (ROCALTROL) 0.25 MCG capsule     calcium carbonate 1250 (500 Ca) MG CHEW     carvedilol (COREG) 25 MG tablet     cloNIDine (CATAPRES-TTS) 0.3 mg/24 hr     furosemide (LASIX) 20 MG tablet     hydrALAZINE (APRESOLINE) 100 MG tablet     LOKELMA 5 g PACK packet     metoprolol succinate (TOPROL-XL) 100 MG 24 hr tablet     minoxidil (LONITEN) 2.5 MG tablet     sodium  bicarbonate 650 MG tablet     No current facility-administered medications for this visit.     Results for MARICRUZ CHAVES (MRN [id number]) as of 8/23/2021 12:30   Ref. Range 6/14/2021 10:40   Sodium Latest Ref Range: 136 - 145 mmol/L 132 (L)   Potassium Latest Ref Range: 3.5 - 5.0 mmol/L 4.4   Chloride Latest Ref Range: 98 - 107 mmol/L 94 (L)   Carbon Dioxide Latest Ref Range: 22 - 31 mmol/L 26   Urea Nitrogen Latest Ref Range: 8 - 22 mg/dL 62 (H)   Creatinine Latest Ref Range: 0.70 - 1.30 mg/dL 4.24 (H)   Results for MARICRUZ CHAVES (MRN [id number]) as of 8/23/2021 12:30   Ref. Range 6/10/2021 14:06   WBC Latest Ref Range: 4.0 - 11.0 thou/uL 11.0   Hemoglobin Latest Ref Range: 14.0 - 18.0 g/dL 10.6 (L)   Hematocrit Latest Ref Range: 40.0 - 54.0 % 29.4 (L)   Platelet Count Latest Ref Range: 140 - 440 thou/uL 100 (L)   Results for MARICRUZ CHAVES (MRN [id number]) as of 8/23/2021 12:30   Ref. Range 6/11/2021 07:28   Hemoglobin A1C Latest Ref Range: <=5.6 % 5.7 (H)       I had a long discussion with the patient regarding kidney transplantation which included but was not limited to the following points:    Kidney transplant evaluation process to assess whether (s)he can safely undergo a kidney transplant and take long-term immunosuppression.    The selection committee process was discussed.  The federal rules for cadaveric waiting list and blood type matching of the organ. The availability of living-related donor transplantation.  The types of donors: brain death donors, non-heart beating donors and living donor grafts  Extended criteria  Donors and the increased  risk of primary non-function using these extended criteria donors  [facility] high risk donors, risk of donor transmitted infections and donor transmitted malignancy  The kidney transplant operation and the associated risks and technical complications which can include intraoperative death, post operative death, primary non-function, bleeding requiring re-operations,  vascular and ureteral complications, bowel perforations, and intra abdominal abscess. Some of these complications may require a second operation.  The postoperative course and risk of postoperative complications which can include sepsis, MI, stroke, brain injury, pneumonia, pleural effusions, and renal dysfunction.  The current 1 year and 5 year graft and patient survivals.  The need for life long immunosuppressive therapy and the side effects of these medications, including the possibility of toxicity, opportunistic infections, risk of cancer including lymphoma, and the possibility of rejection even if the patient is taking the medication exactly as prescribed.  The need for compliance with medications and follow-up visits in the clinic and thereafter.  The patient and family understand these risks and wish to proceed to transplantation        Again, thank you for allowing me to participate in the care of your patient.        Sincerely,        VIDEO,

## 2021-08-23 NOTE — PROGRESS NOTES
1. No surgical contraindications for kidney transplantation.  With long-term history of DM and HTN, would look closely at/for PVD: doppler of carotid and iliac, non con CT of pelvis.  2. Cardiac eval due to severe HTN.  Query any need to assess for secondary HTN?   3. Routine infection/tumor eval  4. Platelets have been consistently low/normal. Get HBV/HCV serologies.  If goes way back, look at imaging of liver/fibroscan.  I spent 45+ min with chart review, with patient and daugther, discussing with nephrology    56 yo Eugenio man (speaks little English) : daughter.  Not yet on dialysis.  On 6 drugs for hypertension.  Carries a long-term dx of type 2 DM (on oral agents)  Has had eye surg for retinopathy.    No smoking  NKA  Only surg: diabetic eye.     Kidney/surg  No prior kidney problems  Bladder: empties ok.  No infx, hematuria, stones, tumor.  PVR in clinic: empty bladder.  Arterial: no claudication, ulcers or infections  Venous: no DVT, unilateral edema.    There were no vitals taken for this visit.   Alert, responsive.    Abd: soft, nontender.  No incisions, no hernias.  Groin 2+ bilat pulses.  Ext: min edema.  Sym face and ext mobility    Current Outpatient Medications   Medication     amLODIPine (NORVASC) 10 MG tablet     aspirin 81 MG EC tablet     atorvastatin (LIPITOR) 10 MG tablet     calcitrioL (ROCALTROL) 0.25 MCG capsule     calcium carbonate 1250 (500 Ca) MG CHEW     carvedilol (COREG) 25 MG tablet     cloNIDine (CATAPRES-TTS) 0.3 mg/24 hr     furosemide (LASIX) 20 MG tablet     hydrALAZINE (APRESOLINE) 100 MG tablet     LOKELMA 5 g PACK packet     metoprolol succinate (TOPROL-XL) 100 MG 24 hr tablet     minoxidil (LONITEN) 2.5 MG tablet     sodium bicarbonate 650 MG tablet     No current facility-administered medications for this visit.     Results for MARICRUZ CHAVES (MRN [id number]) as of 8/23/2021 12:30   Ref. Range 6/14/2021 10:40   Sodium Latest Ref Range: 136 - 145 mmol/L 132 (L)    Potassium Latest Ref Range: 3.5 - 5.0 mmol/L 4.4   Chloride Latest Ref Range: 98 - 107 mmol/L 94 (L)   Carbon Dioxide Latest Ref Range: 22 - 31 mmol/L 26   Urea Nitrogen Latest Ref Range: 8 - 22 mg/dL 62 (H)   Creatinine Latest Ref Range: 0.70 - 1.30 mg/dL 4.24 (H)   Results for MARICRUZ CHAVES (MRN [id number]) as of 8/23/2021 12:30   Ref. Range 6/10/2021 14:06   WBC Latest Ref Range: 4.0 - 11.0 thou/uL 11.0   Hemoglobin Latest Ref Range: 14.0 - 18.0 g/dL 10.6 (L)   Hematocrit Latest Ref Range: 40.0 - 54.0 % 29.4 (L)   Platelet Count Latest Ref Range: 140 - 440 thou/uL 100 (L)   Results for MARICRUZ CHAVES (MRN [id number]) as of 8/23/2021 12:30   Ref. Range 6/11/2021 07:28   Hemoglobin A1C Latest Ref Range: <=5.6 % 5.7 (H)       I had a long discussion with the patient regarding kidney transplantation which included but was not limited to the following points:    Kidney transplant evaluation process to assess whether (s)he can safely undergo a kidney transplant and take long-term immunosuppression.    The selection committee process was discussed.  The federal rules for cadaveric waiting list and blood type matching of the organ. The availability of living-related donor transplantation.  The types of donors: brain death donors, non-heart beating donors and living donor grafts  Extended criteria  Donors and the increased  risk of primary non-function using these extended criteria donors  The CDC high risk donors, risk of donor transmitted infections and donor transmitted malignancy  The kidney transplant operation and the associated risks and technical complications which can include intraoperative death, post operative death, primary non-function, bleeding requiring re-operations, vascular and ureteral complications, bowel perforations, and intra abdominal abscess. Some of these complications may require a second operation.  The postoperative course and risk of postoperative complications which can include sepsis, MI,  stroke, brain injury, pneumonia, pleural effusions, and renal dysfunction.  The current 1 year and 5 year graft and patient survivals.  The need for life long immunosuppressive therapy and the side effects of these medications, including the possibility of toxicity, opportunistic infections, risk of cancer including lymphoma, and the possibility of rejection even if the patient is taking the medication exactly as prescribed.  The need for compliance with medications and follow-up visits in the clinic and thereafter.  The patient and family understand these risks and wish to proceed to transplantation

## 2021-08-23 NOTE — PROGRESS NOTES
Psychosocial Assessment  Patient Name/ Age: Modesto Barbosa 55 year old   Medical Record #: [id number]  Duration of Interview:     40  min  Process:   Face-to-Face Interview                (counseling < 50%)   Present at Appointment: Modesto, daughter Yaakov and Eugenio  via [employer]OT        :ЕКАТЕРИНА Romero, [facility] Date:  August 23, 2021        Type of transplant: Kidney    Donor type:   Modesto indicated he does not know of any potential donors at this time.   Cadaver   Prior Transplants:    No Status of Transplant:       Current Living Situation    Location:   [address] [place] [address]  With Whom: Wife Khloe and son Marco (25)       Family/ Social Support:    Yaakov (37) lives in [place], [place].  Modesto and Khloe have four additional children: Edy (33) lives in [place], [place], Kaitlynn (31) [place], [place], Fay Howard (20) [place], [place] and Fay Hanley (27) [place], [place].  Modesto's mother, one brother and three sisters live in [place], [place].   Available, helpful   Committed relationship:  Modesto and Khloe have been  for 38 years.   Stable/supportive   Other supports:   Friends Available, helpful       Activities/ Functional Ability    Current level: Ambulatory, visually impaired and independent with ADL's     Transportation Other:  Modesto indicated he relies on his son and wife for all of his transportation needs.       Vocational/Employment/Financial     Employment   Disabled   Job Description      Income  Modesto indicated he has been on disability for two years due to vision issues.  He indicated his children set up his medication but remembers to take his medications on his own.   SSI   Insurance    At this time, patient can afford medication costs:  Yes  MA       Medical Status    Current mode of treatment for ESRD None   Complications - Diabetes was on oral medications prior to June but now is diet controlled. Eyes       Behavioral    Tobacco Use No Chemical Dependency No    Due to diabetes does not drink.      Psychiatric Impairment No    Reading ability Poor  Due to vision issues family assists with reading.  Education Level: GED Recent Legal History No    Coping Style/Strategies: Modesto indicated when under stress he will walk or find something to do.     Ability to Adhere to Complex Medical Regime: Yes     Adherence History:  Modesto indicated she will usually follow his physician's recommendations.        Education  _X_ Medicare  _X_ Rehabilitation  _X_ Donor issues  _X_ Community resources  _X_ Post discharge housing  _X_ Financial resources  _X_ Medical insurance options  _X_ Psych adjustment  _X_ Family adjustment  _X_ Health Care Directive - Provided Education and Declined Completing at this time.  Modesto indicated he is in agreement with his wife making his medical decisions for him if he is unable. Psychosocial Risks of Transplant Reviewed and Discussed:  _X_ Increased stress related to emotional,            family, social, employment or financial           situation  _X_ Affect on work and/or disability benefits  _X_ Affect on future life insurance  _X_ Transplant outcome expectations may           not be met  _X_ Mental Health Risks: anxiety,           depression, PTSD, guilt, grief and           chronic fatigue     Notable Items:   Unsure if Modesto will qualify for Medicare as he has not had employed since 2005.      Final Evaluation/Assessment   Patient seemed to process information well. Appeared well informed, motivated and able to follow post transplant requirements. Behavior was appropriate during interview. Has adequate income and insurance coverage. Adequate social support. No major contraindications noted for transplant.  At this time patient appears to understand the risks and benefits of transplant.      Recommendation  Acceptable    Selection Criteria Met:  Plan for support Yes   Chemical Dependence Yes   Smoking Yes   Mental Health Yes   Adequate Finances Yes    Signature: ЕКАТЕРИНА Romero, BARBIESW    Title: Clinical

## 2021-08-23 NOTE — LETTER
8/23/2021         RE: Modesto Barbosa  [address] [place] [address]        Dear Colleague,    Thank you for referring your patient, Modesto Barbosa, to the [facility]. Please see a copy of my visit note below.    TRANSPLANT NEPHROLOGY RECIPIENT EVALUATION NOTE    Assessment and Plan:  # Kidney Transplant Evaluation: Patient is a good candidate overall. Benefits of a living donor transplant were discussed.    # CKD from presumed diabetes mellitus type 2: GFRs 14-16. AVF placed with no overt uremic symptoms. When ready, he may benefit from a kidney transplant.  He is ABO-A and has no potential donors.    # DM Type 2: diet controlled since June 2021.    # Cardiac Risk: will need risk assessment given findings on ECHO today that included severe LVH with significant asymmetric septal hypertrophy (1.7 cm)   that may require cardiac MRI. He will also need coronary angiogram given 20+ years of diabetes.     # Abnormal coags: including slightly elevated INR and PPT. Will repeat and if elevated again will send to hematology.     # PAD Screening: will need non-con CT abdomen/pelvis and iliac US.     # Small pleural effusions bilaterally: will forward to primary nephrology.     # Health Maintenance: 8/2021 colonoscopy (path pending on one polyp): Up to date and Dental: Up to date (next appt scheduled).     Discussed the risks and benefits of a transplant, including the risk of surgery and immunosuppression medications.  Patient's overall evaluation will be discussed in the Transplant Program's regular meeting with a final recommendation on the patients suitability for transplant to be made at that time.    Pending completion of the full evaluation, patient presently appears to be enough of an acceptable kidney transplant recipient candidate to have any potential kidney donors start the evaluation process.  Patient was seen in conjunction with Dr. Patrick Phan as part of a shared visit.    PHYSICIAN  ATTESTATION AND EVALUATION  Patient was seen by myself, Dr. Patrick Phan, in conjunction with STEPHEN Almonte CNP as part of a shared visit.    I personally reviewed the patient's past medical and surgical history, vital signs, medications and pertinent laboratory results and radiology findings.  Present and past medical history, along with significant physical exam findings were all reviewed with PHOEBE.    Ayers findings:  Modesto Barbosa is a 55 year old year old male with CKD from diabetes mellitus type 2, who presents for kidney transplant evaluation.  Patient reports feeling okay overall with some medical complaints.    Key management decisions made by me and discussed with PHOEBE include the following, with full Assessment and Plan noted above by PHOEBE:  # Kidney Transplant Evaluation: Patient is a good candidate overall. Benefits of a living donor transplant were discussed.    # CKD from diabetes mellitus type 2: Patient has had progressive decline in kidney function, nearing need for renal replacement therapy and would benefit from a kidney transplant.  Preferably, a preemptive living donor kidney transplant.    # DM Type 2: Patient is diet controlled only, but does have end organ damage.    # Cardiac Risk: No known cardiac disease, but several cardiac risk factors.  Patient will require Cardiology evaluation prior to transplant and likely require a coronary angiogram due to long-standing diabetes.    # PAD Screening: Will obtain updated imaging for Transplant Surgery to review.    # Abnormal Coags: Slightly prolonged INR and PTT for unclear reason.  Recommend repeating and if still abnormal, would refer to Hematology for further evaluation.    # Small Pleural Effusions: Patient may need improved diuresis and will defer to primary Nephrologist to manage.    Patrick Phan MD    Evaluation:  Modesto Barbosa was seen in consultation at the request of Dr. Talha Weinstein for evaluation as a potential kidney  transplant recipient.    Reason for Visit:  Modesto Barbosa is a 55 year old male with CKD from diabetes mellitus type 2, who presents for kidney transplant evaluation.    History of Present Illness:         Kidney Disease Hx:      Modesto Barbosa is a 55-year-old ong gentleman with history of CKD secondary to presumed type 2 diabetes. Long standing CKD that was reportedly followed by a PCP.  In mid 2018 serum creatinine levels were in the 2 range, progressed to greater than 3 in 4/2020 and for the past few months have been between 3.5-4.3 with correlating GFRs of 14-18.  Prior negative serological work-up including SPEP, UPEP and FLC . He denies uremic sxs but does complain of LE swelling. He has no potential donors.        Primary Nephrologist: Dr. Choe   H/o Kidney Stones: No       H/o Recurrent/Frequent UTI: No         Diabetic Hx: Type 2        Diagnosis Date: since 2001.        Medication History: started taking oral medications in 2005, which was stopped in June 2021. No prior insulin use.         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       Other: None         Viral Serology Status       CMV IgG Antibody: Unknown       EBV IgG Antibody: Unknown         Volume Status/Weight:        Volume status: Moderately hypervolemic       Weight:  Acceptable BMI       BMI: There is no height or weight on file to calculate BMI.         Functional Capacity/Frailty:        Stays active gardening and has no limitations to how far he can walk. Lives with wife and son and no  services are coming to the house at this time.       Fatigue/Decreased Energy: [x] No [] Yes    Chest Pain or SOB with Exertion: [x] No [] Yes    Significant Weight Change: [x] No [] Yes    Nausea, Vomiting or Diarrhea: [x] No [] Yes    Fever, Sweats or Chills:  [x] No [] Yes    Leg Swelling [x] No [] Yes        History of Cancer: None    Other Significant Medical Issues:   -Mild bladder wall thickening: on 2018 renal US, resolved on 2021 US, no hematuria on recent UA.   - BPH: PVR 0 ml.   - Recent lyme's disease: completed treatment.        Potential Living Kidney Donors: No    Review of Systems:  A comprehensive review of systems was obtained and negative, except as noted in the HPI or PMH.    Past Medical History:   Medical record was reviewed and PMH was discussed with patient and noted below.  Past Medical History:   Diagnosis Date     Diabetes (H)     managed by PCP       Past Social History:   No past surgical history on file.  Personal history of bleeding or anesthesia problems: No    Family History:  No family history on file.    Personal History:   Social History     Socioeconomic History     Marital status: Legally      Spouse name: Not on file     Number of children: Not on file     Years of education: Not on file     Highest education level: Not on file   Occupational History     Not on file   Tobacco Use     Smoking status: Never Smoker     Smokeless tobacco: Never Used   Substance and Sexual Activity     Alcohol use: Not Currently     Drug use: Never     Sexual activity: Not on file   Other Topics Concern     Parent/sibling w/ CABG, MI or angioplasty before 65F 55M? Not Asked   Social History Narrative     Not on file     Social Determinants of Health     Financial Resource Strain:      Difficulty of Paying Living Expenses:    Food Insecurity:      Worried About Running Out of Food in the Last Year:      Ran Out of Food in the Last Year:    Transportation Needs:      Lack of Transportation  (Medical):      Lack of Transportation (Non-Medical):    Physical Activity:      Days of Exercise per Week:      Minutes of Exercise per Session:    Stress:      Feeling of Stress :    Social Connections:      Frequency of Communication with Friends and Family:      Frequency of Social Gatherings with Friends and Family:      Attends [employer] Services:      Active Member of Clubs or Organizations:      Attends Club or Organization Meetings:      Marital Status:    Intimate Partner Violence:      Fear of Current or Ex-Partner:      Emotionally Abused:      Physically Abused:      Sexually Abused:        Allergies:  No Known Allergies    Medications:  Current Outpatient Medications   Medication Sig     amLODIPine (NORVASC) 10 MG tablet [AMLODIPINE (NORVASC) 10 MG TABLET] Take 10 mg by mouth daily.      aspirin 81 MG EC tablet [ASPIRIN 81 MG EC TABLET] Take 81 mg by mouth daily.     atorvastatin (LIPITOR) 10 MG tablet [ATORVASTATIN (LIPITOR) 10 MG TABLET] Take 10 mg by mouth daily.      calcitrioL (ROCALTROL) 0.25 MCG capsule [CALCITRIOL (ROCALTROL) 0.25 MCG CAPSULE] Take 0.25 mcg by mouth daily.      cloNIDine (CATAPRES-TTS) 0.3 mg/24 hr [CLONIDINE (CATAPRES-TTS) 0.3 MG/24 HR] Place 1 patch on the skin once a week.     hydrALAZINE (APRESOLINE) 100 MG tablet [HYDRALAZINE (APRESOLINE) 100 MG TABLET] Take 100 mg by mouth 3 (three) times a day.     metoprolol succinate (TOPROL-XL) 100 MG 24 hr tablet [METOPROLOL SUCCINATE (TOPROL-XL) 100 MG 24 HR TABLET] Take 100 mg by mouth daily.      sodium bicarbonate 650 MG tablet [SODIUM BICARBONATE 650 MG TABLET] Take 1 tablet (650 mg total) by mouth 3 (three) times a day. OTC product     No current facility-administered medications for this visit.       Vitals:  There were no vitals taken for this visit.    Exam:  GENERAL APPEARANCE: alert and no distress  HENT: mouth without ulcers or lesions. Fair dentition.   LYMPHATICS: no cervical or supraclavicular nodes  RESP: lungs clear  to auscultation - no rales, rhonchi or wheezes  CV: regular rhythm, normal rate, no rub, no murmur  FEMORAL PULSES: 2+ bilaterally.   EDEMA: no LE edema bilaterally  ABDOMEN: soft, nondistended, nontender, bowel sounds normal  MS: extremities normal - no gross deformities noted, no evidence of inflammation in joints, no muscle tenderness  SKIN: no rash    Results:   No results found for this or any previous visit (from the past 336 hour(s)).          Again, thank you for allowing me to participate in the care of your patient.        Sincerely,        Patrick Phan MD

## 2021-08-24 LAB
CARDIOLIPIN IGG SER IA-ACNC: <2 GPL-U/ML
CARDIOLIPIN IGG SER IA-ACNC: NEGATIVE
CARDIOLIPIN IGM SER IA-ACNC: 5 MPL-U/ML
CARDIOLIPIN IGM SER IA-ACNC: NEGATIVE
CMV IGG SERPL IA-ACNC: 7 U/ML
CMV IGG SERPL IA-ACNC: ABNORMAL
DRVVT SCREEN RATIO: 1.17
EBV VCA IGG SER IA-ACNC: 153 U/ML
EBV VCA IGG SER IA-ACNC: POSITIVE
HBV CORE AB SERPL QL IA: REACTIVE
LA PPP-IMP: NEGATIVE
LUPUS INTERPRETATION: ABNORMAL
PLATELET NEUTRALIZATION: -12 SECONDS
PTT 1:2 MIX: 44 SECONDS (ref 31–45)
PTT RATIO: 1.53
THROMBIN TIME: 14.5 SECONDS (ref 13–19)
VZV IGG SER QL IA: 2206 INDEX
VZV IGG SER QL IA: POSITIVE
XXX BLOOD GROUP AB TITR SERPL: NORMAL {TITER}

## 2021-08-24 PROCEDURE — 85390 FIBRINOLYSINS SCREEN I&R: CPT | Mod: 26 | Performed by: PATHOLOGY

## 2021-08-25 LAB
ATRIAL RATE - MUSE: 63 BPM
DIASTOLIC BLOOD PRESSURE - MUSE: NORMAL MMHG
INTERPRETATION ECG - MUSE: NORMAL
P AXIS - MUSE: 1 DEGREES
PR INTERVAL - MUSE: 188 MS
QRS DURATION - MUSE: 74 MS
QT - MUSE: 422 MS
QTC - MUSE: 431 MS
QUANTIFERON MITOGEN: 9.99 IU/ML
QUANTIFERON NIL TUBE: 0.03 IU/ML
QUANTIFERON TB1 TUBE: 0.03 IU/ML
QUANTIFERON TB2 TUBE: 0.04
R AXIS - MUSE: -7 DEGREES
SYSTOLIC BLOOD PRESSURE - MUSE: NORMAL MMHG
T AXIS - MUSE: 46 DEGREES
VENTRICULAR RATE- MUSE: 63 BPM

## 2021-08-26 LAB
A*: NORMAL
A*LOCUS NMDP: NORMAL
A*LOCUS SEROLOGIC EQUIVALENT: 23
A*LOCUS: NORMAL
A*NMDP: NORMAL
A*SEROLOGIC EQUIVALENT: 24
ABTEST METHOD: NORMAL
B*: NORMAL
B*LOCUS SEROLOGIC EQUIVALENT: 13
B*LOCUS: NORMAL
B*SEROLOGIC EQUIVALENT: 50
BW-1: NORMAL
BW-2: NORMAL
C*: NORMAL
C*LOCUS SEROLOGIC EQUIVALENT: 10
C*LOCUS: NORMAL
C*SEROLOGIC EQUIVALENT: 6
DPA1*: NORMAL
DPA1*LOCUS: NORMAL
DPB1*: NORMAL
DPB1*LOCUS NMDP: NORMAL
DPB1*LOCUS: NORMAL
DPB1*NMDP: NORMAL
DQA1*: NORMAL
DQA1*LOCUS: NORMAL
DQB1*: NORMAL
DQB1*LOCUS SEROLOGIC EQUIVALENT: 2
DQB1*LOCUS: NORMAL
DQB1*SEROLOGIC EQUIVALENT: 6
DRB1*: NORMAL
DRB1*LOCUS SEROLOGIC EQUIVALENT: 17
DRB1*LOCUS: NORMAL
DRB1*SEROLOGIC EQUIVALENT: 15
DRB3*LOCUS SEROLOGIC EQUIVALENT: 52
DRB3*LOCUS: NORMAL
DRB5*: NORMAL
DRB5*SEROLOGIC EQUIVALENT: 51
DRSSO TEST METHOD: NORMAL
GAMMA INTERFERON BACKGROUND BLD IA-ACNC: 0.03 IU/ML
M TB IFN-G BLD-IMP: NEGATIVE
M TB IFN-G CD4+ BCKGRND COR BLD-ACNC: 9.96 IU/ML
MITOGEN IGNF BCKGRD COR BLD-ACNC: 0 IU/ML
MITOGEN IGNF BCKGRD COR BLD-ACNC: 0.01 IU/ML
PROTOCOL CUTOFF: NORMAL
SA 1 CELL: NORMAL
SA 1 TEST METHOD: NORMAL
SA 2 CELL: NORMAL
SA 2 TEST METHOD: NORMAL
SA1 HI RISK ABY: NORMAL
SA1 MOD RISK ABY: NORMAL
SA2 HI RISK ABY: NORMAL
SA2 MOD RISK ABY: NORMAL
UNACCEPTABLE ANTIGENS: NORMAL
UNOS CPRA: 0
ZZZSA 1  COMMENTS: NORMAL
ZZZSA 2 COMMENTS: NORMAL

## 2021-08-27 PROCEDURE — G0452 MOLECULAR PATHOLOGY INTERPR: HCPCS | Mod: 26 | Performed by: PATHOLOGY

## 2021-09-01 ENCOUNTER — COMMITTEE REVIEW (OUTPATIENT)
Dept: TRANSPLANT | Facility: CLINIC | Age: 56
End: 2021-09-01

## 2021-09-01 PROBLEM — R11.10 VOMITING: Status: RESOLVED | Noted: 2021-06-10 | Resolved: 2021-09-01

## 2021-09-01 NOTE — COMMITTEE REVIEW
Abdominal Committee Review Note     Evaluation Date:   Committee Review Date: 9/1/2021    Organ being evaluated for: Kidney    Transplant Phase: Referral  Transplant Status: Active    Transplant Coordinator: Lita Tirado  Transplant Surgeon:       Referring Physician: Russell Choe    Primary Diagnosis:   Secondary Diagnosis:     Committee Review Members:  Ruby Belle RD   Nephrology Rome Kim MD, Silas Merlos, APRN [facility], Patrick Phan MD   Pharmacy Guy Meza, [facility]    - Clinical Christie Ayala MSW, Deedee Linton, [facility]   Transplant Helen Jin, RN, Jania Joya, RN, Drake Barrow, RN, Grisel Calle, RN, Lita Tirado, RN, Gali Santiago RN   Transplant Surgery Jacqueline Moe MD, MD       Transplant Eligibility: Irreversible chronic kidney disease treated w/dialysis or expected need for dialysis    Committee Review Decision: Approved    Relative Contraindications:     Absolute Contraindications: None    Committee Chair Jacqueline Moe MD verbally attested to the committee's decision.    Committee Discussion Details: Reviewed pt's medical status and evaluation results to date.  Pt is determined to be a good candidate for kidney transplant. Dr. Phan recommends the following items be completed as part of the pt's evaluation:   1. Cardiac risk assessment with angiogram and cardiac MRI due to severe LVH asymmetrical hypertrophy found on ECHO  2. Repeat abnormal coagulation labs  3. Non-contrast abdominal/pelvic CT  4. Iliac and carotid ultrasound  5. List inactive    Will list the pt inactive status at this time.  Pt will be re-discussed once the above is complete to be considered for active status on the kidney transplant wait list.

## 2021-09-02 ENCOUNTER — TELEPHONE (OUTPATIENT)
Dept: TRANSPLANT | Facility: CLINIC | Age: 56
End: 2021-09-02

## 2021-09-02 NOTE — TELEPHONE ENCOUNTER
Left message with assistance of a [facility]  with direct line to call back.

## 2021-09-02 NOTE — LETTER
09/02/21    Nhbridger Marianoong  [address] [place] [address]    Dear [facility],    It was a pleasure to see you recently for consideration of kidney transplantation. Your pre-transplant evaluation results were reviewed at our Multidisciplinary Selection Committee. The committee is requesting the following items are completed before determining your candidacy:    1. Cardiology consult for risk assessment due to findings from echocardiogram in evaluation. The transplant team requests that you undergo an angiogram and a cardiac MRI.  2. Repeat INR and PTT labs drawn on evaluation day as they were abnormal. If they are abnormal after the recheck, you should see the hematology team.  3. Non-contrast abdominal/pelvic CT, iliac ultrasound and carotid ultrasound to assess your body habitus and plan where to put a kidney transplant in your body.    You have been approved to be listed INACTIVE on the transplant wait list for kidney transplant. This means you are gaining wait time but are not available for organ offers. Once the above are completed, your evaluation will be discussed in our  Multidisciplinary Selection Committee for consideration of ACTIVE status on the wait list. For any questions, please contact the Transplant Office at [phone], or call my direct line at [phone].      Sincerely,    Lita Tirado RN, BSN, CCTN  Solid Organ Transplant  [facility], [facility]  CC: Dr. Santos Howard-PCP; Dr. Russell Choe-Nephrology

## 2021-09-03 ENCOUNTER — DOCUMENTATION ONLY (OUTPATIENT)
Dept: TRANSPLANT | Facility: CLINIC | Age: 56
End: 2021-09-03

## 2021-09-23 LAB
ALT SERPL-CCNC: 16 U/L (ref 9–46)
AST SERPL-CCNC: 19 U/L (ref 10–35)
CHOLESTEROL (EXTERNAL): 115 MG/DL
CREATININE (EXTERNAL): 5.55 MG/DL (ref 0.7–1.33)
GFR ESTIMATED (EXTERNAL): 11 ML/MIN/1.73M2
GFR ESTIMATED (IF AFRICAN AMERICAN) (EXTERNAL): 12 ML/MIN/1.73M2
GLUCOSE (EXTERNAL): 133 MG/DL (ref 65–99)
HBA1C MFR BLD: 4.9 %
HDLC SERPL-MCNC: 46 MG/DL
LDL CHOLESTEROL CALCULATED (EXTERNAL): 54 MG/DL
NON HDL CHOLESTEROL (EXTERNAL): 69 MG/DL
POTASSIUM (EXTERNAL): 5.3 MMOL/L (ref 3.5–5.3)
TRIGLYCERIDES (EXTERNAL): 70 MG/DL

## 2021-10-01 ENCOUNTER — TELEPHONE (OUTPATIENT)
Dept: TRANSPLANT | Facility: CLINIC | Age: 56
End: 2021-10-01

## 2021-10-01 DIAGNOSIS — Z01.818 PRE-TRANSPLANT EVALUATION FOR KIDNEY TRANSPLANT: ICD-10-CM

## 2021-10-01 DIAGNOSIS — N18.4 STAGE 4 CHRONIC KIDNEY DISEASE (H): Primary | ICD-10-CM

## 2021-10-01 NOTE — TELEPHONE ENCOUNTER
Discussed transplant evaluation with patient's daughter. He has updated his colonoscopy and dental work. He has scheduled his imaging and cardiology appointments. Yanick attempted to make a lab appt but the labs were not ordered. RNCC entered lab orders and Yanick will call to schedule. Requested results from [place] and letter from dental office. We will connect after appts are finished on 10/11. Yanick is agreeable to this plan.

## 2021-10-03 ENCOUNTER — HOSPITAL ENCOUNTER (OUTPATIENT)
Dept: ULTRASOUND IMAGING | Facility: HOSPITAL | Age: 56
End: 2021-10-03
Attending: NURSE PRACTITIONER
Payer: MEDICARE

## 2021-10-03 ENCOUNTER — HOSPITAL ENCOUNTER (OUTPATIENT)
Dept: CT IMAGING | Facility: HOSPITAL | Age: 56
End: 2021-10-03
Attending: NURSE PRACTITIONER
Payer: MEDICARE

## 2021-10-03 DIAGNOSIS — I10 ESSENTIAL HYPERTENSION: ICD-10-CM

## 2021-10-03 DIAGNOSIS — E11.21 TYPE 2 DIABETES MELLITUS WITH DIABETIC NEPHROPATHY, WITHOUT LONG-TERM CURRENT USE OF INSULIN (H): ICD-10-CM

## 2021-10-03 DIAGNOSIS — Z01.818 PRE-TRANSPLANT EVALUATION FOR KIDNEY TRANSPLANT: ICD-10-CM

## 2021-10-03 DIAGNOSIS — N18.5 CHRONIC KIDNEY DISEASE, STAGE V (H): ICD-10-CM

## 2021-10-03 PROCEDURE — 74176 CT ABD & PELVIS W/O CONTRAST: CPT | Mod: MG

## 2021-10-03 PROCEDURE — 93978 VASCULAR STUDY: CPT

## 2021-10-05 ENCOUNTER — TEAM CONFERENCE (OUTPATIENT)
Dept: TRANSPLANT | Facility: CLINIC | Age: 56
End: 2021-10-05

## 2021-10-05 DIAGNOSIS — K76.89 LIVER DYSFUNCTION: ICD-10-CM

## 2021-10-05 DIAGNOSIS — Z01.818 PRE-TRANSPLANT EVALUATION FOR KIDNEY TRANSPLANT: ICD-10-CM

## 2021-10-05 DIAGNOSIS — N18.9 CHRONIC RENAL FAILURE: ICD-10-CM

## 2021-10-05 NOTE — TELEPHONE ENCOUNTER
Image Review Meeting    ATTENDEES: Dr. Zhou    IMAGES REVIEWED: CT scan from 10/3/21    DECISION: Targets are fine for transplant but with low platelets, splenomegaly, and INR elevation, patient should be cleared by hepatology.     INCIDENTALS: Yes: see above

## 2021-10-06 ENCOUNTER — LAB (OUTPATIENT)
Dept: LAB | Facility: CLINIC | Age: 56
End: 2021-10-06
Payer: MEDICARE

## 2021-10-06 DIAGNOSIS — Z01.818 PRE-TRANSPLANT EVALUATION FOR KIDNEY TRANSPLANT: ICD-10-CM

## 2021-10-06 DIAGNOSIS — N18.4 STAGE 4 CHRONIC KIDNEY DISEASE (H): ICD-10-CM

## 2021-10-06 LAB
APTT PPP: 35 SECONDS (ref 22–38)
INR PPP: 1.12 (ref 0.9–1.15)

## 2021-10-06 PROCEDURE — 85610 PROTHROMBIN TIME: CPT

## 2021-10-06 PROCEDURE — 36415 COLL VENOUS BLD VENIPUNCTURE: CPT

## 2021-10-06 PROCEDURE — 85730 THROMBOPLASTIN TIME PARTIAL: CPT

## 2021-10-11 ENCOUNTER — OFFICE VISIT (OUTPATIENT)
Dept: CARDIOLOGY | Facility: CLINIC | Age: 56
End: 2021-10-11
Attending: NURSE PRACTITIONER
Payer: MEDICARE

## 2021-10-11 VITALS
HEART RATE: 60 BPM | WEIGHT: 132.2 LBS | RESPIRATION RATE: 12 BRPM | HEIGHT: 64 IN | DIASTOLIC BLOOD PRESSURE: 60 MMHG | BODY MASS INDEX: 22.57 KG/M2 | SYSTOLIC BLOOD PRESSURE: 120 MMHG

## 2021-10-11 DIAGNOSIS — I42.2 HYPERTROPHIC CARDIOMYOPATHY (H): Primary | ICD-10-CM

## 2021-10-11 DIAGNOSIS — N18.5 CHRONIC KIDNEY DISEASE, STAGE V (H): ICD-10-CM

## 2021-10-11 DIAGNOSIS — E11.21 TYPE 2 DIABETES MELLITUS WITH DIABETIC NEPHROPATHY, WITHOUT LONG-TERM CURRENT USE OF INSULIN (H): ICD-10-CM

## 2021-10-11 PROCEDURE — 99205 OFFICE O/P NEW HI 60 MIN: CPT | Performed by: INTERNAL MEDICINE

## 2021-10-11 ASSESSMENT — MIFFLIN-ST. JEOR: SCORE: 1340.66

## 2021-10-11 NOTE — LETTER
10/11/2021    Santos Howard MD  [address] [address]    RE: Modesto Barbosa       Dear Colleague,    I had the pleasure of seeing Modesto Barbosa in the [facility].      HEART CARE ENCOUNTER CONSULTATON NOTE      [facility]  [phone]      Assessment/Recommendations   Assessment/Plan:  1.  Patient with end-stage renal disease who recently began peritoneal dialysis who was asked to see cardiology for a few reasons.  The first reason being a recent echocardiogram suggesting the possibility of hypertrophic cardiomyopathy.  I have reviewed the echocardiogram results and we discussed further evaluation.  We are going to plan a cardiac MRI without gadolinium underlying renal failure.  I will comment once the MRI results are available for review.  In addition I did have a brief email conversation with the transplant nurse practitioner who suggested that he will need coronary angiography as part of the work-up for the renal transplantation.  We will be in touch pending the results of the MRI.    2.  Hypertension.  Blood pressure today appears to be under good control.  He states that typically his blood pressures are in the 140-150 systolic range.  Did ask his son to clarify the current combination of medicines as both metoprolol and carvedilol are listed as chronic medications.  By report through review of the renal notes he did not tolerate ACE inhibitor in the past.    3.  Risk modification.  He is diabetic.  He is on atorvastatin 10 mg daily.  I cannot locate in the recent lipid panel in the chart records that I have available for me.  If this has not been completed he should have a follow-up lipid panel.  Last medical records to request this through his primary care provider.    1.  Cardiac MRI without gadolinium  2.  Holter monitor  3.  Pending the above results further discussion regarding coronary  angiography is needed for splint evaluation  4.  We will request a fasting lipids to his primary care provider if this has been completed.  5.  Follow-up in 3 to 4 months with additional recommendations pending the above.       History of Present Illness/Subjective    HPI: Modesto Barbosa is a 55 year old male who is referred after an abnormal echocardiogram as well as further cardiovascular evaluation related to renal transplantation.  He is seen with the help of the online .  He has a history of of chronic kidney disease secondary to presumptive diabetes or hypertension based on chart review.  I have reviewed notes both from the hospitalization a few months ago and the renal transplant service.  He tells me that he recently initiated peritoneal dialysis at home.  He specifically denies chest pain, shortness of breath, dizziness, palpitation, orthopnea or PND.  He denies a family history of hypertrophic cardiomyopathy or sudden death.  Echocardiogram was read through the [facility] which describes severe LVH with significant asymmetric septal hypertrophy measuring up to 1.7 cm with systolic anterior motion of the mitral valve without septal contact and without detectable resting LV outflow tract obstruction.  It was felt that the degree of LVH was felt to be out of proportion to the history of end-stage renal disease and hypertension and hypertrophic cardiomyopathy was suggested as a potential etiology.  Cardiac MRI was recommended without gadolinium.  Denies any knowledge of prior cardiac history or issues.    Cardiac risk factors are pertinent for hypertension, pertinent for diabetes, or lipidemia.  No history of echo or excessive alcohol.  He reports no sudden death in his family.  Father  in [facility] of uncertain reasons.  Mother is alive, sisters and brothers are alive without known cardiovascular issues or history.    Recent Echocardiogram Results:    Reading Physician Reading Date Result  Priority   Familia Monk MD  [phone] 2021         Narrative & Impression  [id number]  [id number]  [id number]  [id number]^KAYLEEN^MECHELLE^TRICIA     [facility]  Diagnostic and Treamtent-3rd Floor  [address] [place] [address]     Name: MARICRUZ CHAVES  MRN: [id number]  : 1965  Study Date: 2021 01:49 PM  Age: 55 yrs  Gender: Male  Patient Location: [facility]  Reason For Study: Chronic kidney disease, Chronic kidney disease, stage V (H)  Ordering Physician: Carson Michel MD  Referring Physician: MECHELLE BEYER  Performed By: MARIAH Alvarado     BSA: 1.7 m2  Height: 64 in  Weight: 149 lb  BP: 154/66 mmHg  ______________________________________________________________________________  Procedure  Echocardiogram with two-dimensional, color and spectral Doppler performed.  ______________________________________________________________________________  Interpretation Summary  Severe LVH with significant asymmetric septal hypertrophy, measuring up to 1.7  cm at the level of the basal anteroseptum and 1.5 cm at the level of the mid  mid-inferoseptum. There is systolic anterior motion of the mitral valve  without septal contact and without detectable resting LVOT obstruction.  The extent and degree of asymmetry of hypertrophy are somewhat atypical for  LVH related to chronic afterload from ESRD/hypertension and hypertrophic  cardiomyopathy should be considered. Recommend cardiac MRI (this can be done  without gadolinium if necessary.)  Mildly dilated aortic root and proximal ascending aorta when indexed to body  surface area, measuring 3.7 cm (2.2 cm/m2) and 3.7 cm (2.2 cm/m2)  respectively.  ______________________________________________________________________________  Left Ventricle  Global and regional left ventricular function is normal with an EF of 60-65%.  Left ventricular size is normal. Severe LVH with significant asymmetric  "septal  hypertrophy, measuring up to 1.7 cm at the level of the basal anteroseptum and  1.5 cm at the level of the mid mid-inferoseptum. There is systolic anterior  motion of the mitral valve without septal contact and without detectable  resting LVOT obstruction. Grade III or advanced diastolic dysfunction.     Right Ventricle  Right ventricular function, chamber size, wall motion, and thickness are  normal.     Atria  The right atria appears normal. Mild to moderate left atrial enlargement is  present.     Mitral Valve  Mild mitral insufficiency is present.     Aortic Valve  The aortic valve is tricuspid. Mild aortic valve calcification is present.     Tricuspid Valve  Trace tricuspid insufficiency is present. The right ventricular systolic  pressure is approximated at 33.9 mmHg plus the right atrial pressure.     Pulmonic Valve  The pulmonic valve is normal. Trace pulmonic insufficiency is present.     Vessels  The inferior vena cava was normal in size with preserved respiratory  variability. The pulmonary artery cannot be assessed. Mildly dilated aortic  root and proximal ascending aorta when indexed to body surface area, measuring  3.7 cm (2.2 cm/m2) and 3.7 cm (2.2 cm/m2) respectively.     Pericardium  No pericardial effusion is present.     Compared to Previous Study  There is no prior study for direct comparison.    August 23 March 2021 sinus rhythm essentially normal-appearing EKG.  2021 sinus rhythm with a QTC longer than the more recent EKG.       Physical Examination  Review of Systems   Vitals: /60 (BP Location: Right arm, Patient Position: Sitting, Cuff Size: Adult Small)   Pulse 60   Resp 12   Ht 1.626 m (5' 4\")   Wt 60 kg (132 lb 3.2 oz)   BMI 22.69 kg/m    BMI= Body mass index is 22.69 kg/m .  Wt Readings from Last 3 Encounters:   10/11/21 60 kg (132 lb 3.2 oz)   08/23/21 67.9 kg (149 lb 9.6 oz)   06/16/21 62.6 kg (138 lb)       General Appearance:   no distress, normal body habitus "   ENT/Mouth: membranes moist, no oral lesions or bleeding gums.      EYES:  no scleral icterus, normal conjunctivae   Neck: no carotid bruits or thyromegaly   Chest/Lungs:   lungs are clear to auscultation, no rales or wheezing,, equal chest wall expansion    Cardiovascular:   Regular. Normal first and second heart sounds with 3/6 systolic murmur, rubs, or gallops; the carotid, radial and posterior tibial pulses are intact, Jugular venous pressure within normal limits, mild edema bilaterally    Abdomen:  no organomegaly, masses, bruits, or tenderness; bowel sounds are present   Extremities: no cyanosis or clubbing   Skin: no xanthelasma, warm.    Neurologic: normal  bilateral, no tremors     Psychiatric: alert and oriented x3, calm        Please refer above for cardiac ROS details.        Medical History  Surgical History Family History Social History   Past Medical History:   Diagnosis Date     CKD (chronic kidney disease) stage 5, GFR less than 15 ml/min (H)      Dyslipidemia      Metabolic acidosis      Type 2 diabetes mellitus (H)      No past surgical history on file.  Family History   Problem Relation Age of Onset     Diabetes Type 2  Mother      Other - See Comments Daughter         granular cell tumor of thigh     Heart Disease Other         Social History     Socioeconomic History     Marital status: Patient Declined     Spouse name: Not on file     Number of children: Not on file     Years of education: Not on file     Highest education level: Not on file   Occupational History     Not on file   Tobacco Use     Smoking status: Never Smoker     Smokeless tobacco: Never Used   Substance and Sexual Activity     Alcohol use: Not Currently     Drug use: Never     Sexual activity: Not on file   Other Topics Concern     Parent/sibling w/ CABG, MI or angioplasty before 65F 55M? Not Asked   Social History Narrative     Not on file     Social Determinants of Health     Financial Resource Strain:      Difficulty  of Paying Living Expenses:    Food Insecurity:      Worried About Running Out of Food in the Last Year:      Ran Out of Food in the Last Year:    Transportation Needs:      Lack of Transportation (Medical):      Lack of Transportation (Non-Medical):    Physical Activity:      Days of Exercise per Week:      Minutes of Exercise per Session:    Stress:      Feeling of Stress :    Social Connections:      Frequency of Communication with Friends and Family:      Frequency of Social Gatherings with Friends and Family:      Attends Orthodoxy Services:      Active Member of Clubs or Organizations:      Attends Club or Organization Meetings:      Marital Status:    Intimate Partner Violence:      Fear of Current or Ex-Partner:      Emotionally Abused:      Physically Abused:      Sexually Abused:            Medications  Allergies   Current Outpatient Medications   Medication Sig Dispense Refill     amLODIPine (NORVASC) 10 MG tablet [AMLODIPINE (NORVASC) 10 MG TABLET] Take 10 mg by mouth daily.        aspirin 81 MG EC tablet [ASPIRIN 81 MG EC TABLET] Take 81 mg by mouth daily.  2     atorvastatin (LIPITOR) 10 MG tablet [ATORVASTATIN (LIPITOR) 10 MG TABLET] Take 10 mg by mouth daily.        cloNIDine (CATAPRES-TTS) 0.3 mg/24 hr [CLONIDINE (CATAPRES-TTS) 0.3 MG/24 HR] Place 1 patch on the skin once a week.       furosemide (LASIX) 20 MG tablet Take 20 mg by mouth daily       hydrALAZINE (APRESOLINE) 100 MG tablet [HYDRALAZINE (APRESOLINE) 100 MG TABLET] Take 100 mg by mouth 3 (three) times a day.       metoprolol succinate (TOPROL-XL) 100 MG 24 hr tablet [METOPROLOL SUCCINATE (TOPROL-XL) 100 MG 24 HR TABLET] Take 100 mg by mouth daily.        minoxidil (LONITEN) 2.5 MG tablet TAKE 1 TABLET BY MOUTH 1 TIME EACH DAY       calcitrioL (ROCALTROL) 0.25 MCG capsule [CALCITRIOL (ROCALTROL) 0.25 MCG CAPSULE] Take 0.25 mcg by mouth daily.        calcium carbonate 1250 (500 Ca) MG CHEW Take 500 mg by mouth       carvedilol (COREG) 25 MG  tablet Take 25 mg by mouth       LOKELMA 5 g PACK packet TAKE 5 G BY MOUTH 1 (ONE) TIME EACH DAY       sodium bicarbonate 650 MG tablet [SODIUM BICARBONATE 650 MG TABLET] Take 1 tablet (650 mg total) by mouth 3 (three) times a day. OTC product 90 tablet 1     No Known Allergies       Lab Results    Chemistry/lipid CBC Cardiac Enzymes/BNP/TSH/INR   No results for input(s): CHOL, HDL, LDL, TRIG, CHOLHDLRATIO in the last 81294 hours.  No results for input(s): LDL in the last 38761 hours.  Recent Labs   Lab Test 08/23/21  1243      POTASSIUM 4.9   CHLORIDE 108   CO2 19*   *   BUN 81*   CR 5.38*   GFRESTIMATED 11*   REYMUNDO 8.7     Recent Labs   Lab Test 08/23/21  1243 06/14/21  1040 06/11/21  1252   CR 5.38* 4.24* 3.54*     Recent Labs   Lab Test 08/23/21  1243 06/11/21  0728   A1C 5.6 5.7*          Recent Labs   Lab Test 08/23/21  1243   WBC 3.6*   HGB 9.3*   HCT 27.5*   MCV 85   PLT 59*     Recent Labs   Lab Test 08/23/21  1243 06/10/21  1406 08/12/20  1426   HGB 9.3* 10.6* 10.6*    Recent Labs   Lab Test 06/10/21  1406   TROPONINI 0.04     No results for input(s): BNP, NTBNPI, NTBNP in the last 04819 hours.  No results for input(s): TSH in the last 65168 hours.  Recent Labs   Lab Test 10/06/21  1508 08/23/21  1243 06/11/21  0728   INR 1.12 1.25* 1.27*        Alfred Katz MD                                        Thank you for allowing me to participate in the care of your patient.      Sincerely,     Alfred Katz MD     [facility]  cc:   Silas Merlos, APRN CNP  [address] [place] [address]

## 2021-10-11 NOTE — PROGRESS NOTES
HEART CARE ENCOUNTER CONSULTATON NOTE      [facility]  [phone]      Assessment/Recommendations   Assessment/Plan:  1.  Patient with end-stage renal disease who recently began peritoneal dialysis who was asked to see cardiology for a few reasons.  The first reason being a recent echocardiogram suggesting the possibility of hypertrophic cardiomyopathy.  I have reviewed the echocardiogram results and we discussed further evaluation.  We are going to plan a cardiac MRI without gadolinium underlying renal failure.  I will comment once the MRI results are available for review.  In addition I did have a brief email conversation with the transplant nurse practitioner who suggested that he will need coronary angiography as part of the work-up for the renal transplantation.  We will be in touch pending the results of the MRI.    2.  Hypertension.  Blood pressure today appears to be under good control.  He states that typically his blood pressures are in the 140-150 systolic range.  Did ask his son to clarify the current combination of medicines as both metoprolol and carvedilol are listed as chronic medications.  By report through review of the renal notes he did not tolerate ACE inhibitor in the past.    3.  Risk modification.  He is diabetic.  He is on atorvastatin 10 mg daily.  I cannot locate in the recent lipid panel in the chart records that I have available for me.  If this has not been completed he should have a follow-up lipid panel.  Last medical records to request this through his primary care provider.    1.  Cardiac MRI without gadolinium  2.  Holter monitor  3.  Pending the above results further discussion regarding coronary angiography is needed for splint evaluation  4.  We will request a fasting lipids to his primary care provider if this has been completed.  5.  Follow-up in 3 to 4 months with additional recommendations pending the above.       History of Present Illness/Subjective     HPI: Modesto Barbosa is a 55 year old male who is referred after an abnormal echocardiogram as well as further cardiovascular evaluation related to renal transplantation.  He is seen with the help of the online .  He has a history of of chronic kidney disease secondary to presumptive diabetes or hypertension based on chart review.  I have reviewed notes both from the hospitalization a few months ago and the renal transplant service.  He tells me that he recently initiated peritoneal dialysis at home.  He specifically denies chest pain, shortness of breath, dizziness, palpitation, orthopnea or PND.  He denies a family history of hypertrophic cardiomyopathy or sudden death.  Echocardiogram was read through the [facility] which describes severe LVH with significant asymmetric septal hypertrophy measuring up to 1.7 cm with systolic anterior motion of the mitral valve without septal contact and without detectable resting LV outflow tract obstruction.  It was felt that the degree of LVH was felt to be out of proportion to the history of end-stage renal disease and hypertension and hypertrophic cardiomyopathy was suggested as a potential etiology.  Cardiac MRI was recommended without gadolinium.  Denies any knowledge of prior cardiac history or issues.    Cardiac risk factors are pertinent for hypertension, pertinent for diabetes, or lipidemia.  No history of echo or excessive alcohol.  He reports no sudden death in his family.  Father  in [facility] of uncertain reasons.  Mother is alive, sisters and brothers are alive without known cardiovascular issues or history.    Recent Echocardiogram Results:    Reading Physician Reading Date Result Priority   Familia Monk MD  [phone] 2021         Narrative & Impression  [id number]  [id number]  [id number]  [id number]^KAYLEEN^MECHELLE^TRICIA     [facility]  Diagnostic and Treamtent-3rd Floor  [address] [place] [address]     Name: MARICRUZ CHAVES  MRN: [id number]  : 1965  Study Date: 2021 01:49 PM  Age: 55 yrs  Gender: Male  Patient Location: [facility]  Reason For Study: Chronic kidney disease, Chronic kidney disease, stage V (H)  Ordering Physician: Carson Michel MD  Referring Physician: MECHELLE BEYER  Performed By: MARIAH Alvarado     BSA: 1.7 m2  Height: 64 in  Weight: 149 lb  BP: 154/66 mmHg  ______________________________________________________________________________  Procedure  Echocardiogram with two-dimensional, color and spectral Doppler performed.  ______________________________________________________________________________  Interpretation Summary  Severe LVH with significant asymmetric septal hypertrophy, measuring up to 1.7  cm at the level of the basal anteroseptum and 1.5 cm at the level of the mid  mid-inferoseptum. There is systolic anterior motion of the mitral valve  without septal contact and without detectable resting LVOT obstruction.  The extent and degree of asymmetry of hypertrophy are somewhat atypical for  LVH related to chronic afterload from ESRD/hypertension and hypertrophic  cardiomyopathy should be considered. Recommend cardiac MRI (this can be done  without gadolinium if necessary.)  Mildly dilated aortic root and proximal ascending aorta when indexed to body  surface area, measuring 3.7 cm (2.2 cm/m2) and 3.7 cm (2.2 cm/m2)  respectively.  ______________________________________________________________________________  Left Ventricle  Global and regional left ventricular function is normal with an EF of 60-65%.  Left ventricular size is normal. Severe LVH with significant asymmetric septal  hypertrophy, measuring up to 1.7 cm at the level of the basal anteroseptum and  1.5 cm at the level of the mid mid-inferoseptum. There is systolic anterior  motion of the mitral valve without septal contact and without detectable  resting LVOT obstruction. Grade III  "or advanced diastolic dysfunction.     Right Ventricle  Right ventricular function, chamber size, wall motion, and thickness are  normal.     Atria  The right atria appears normal. Mild to moderate left atrial enlargement is  present.     Mitral Valve  Mild mitral insufficiency is present.     Aortic Valve  The aortic valve is tricuspid. Mild aortic valve calcification is present.     Tricuspid Valve  Trace tricuspid insufficiency is present. The right ventricular systolic  pressure is approximated at 33.9 mmHg plus the right atrial pressure.     Pulmonic Valve  The pulmonic valve is normal. Trace pulmonic insufficiency is present.     Vessels  The inferior vena cava was normal in size with preserved respiratory  variability. The pulmonary artery cannot be assessed. Mildly dilated aortic  root and proximal ascending aorta when indexed to body surface area, measuring  3.7 cm (2.2 cm/m2) and 3.7 cm (2.2 cm/m2) respectively.     Pericardium  No pericardial effusion is present.     Compared to Previous Study  There is no prior study for direct comparison.    August 23 March 2021 sinus rhythm essentially normal-appearing EKG.  2021 sinus rhythm with a QTC longer than the more recent EKG.       Physical Examination  Review of Systems   Vitals: /60 (BP Location: Right arm, Patient Position: Sitting, Cuff Size: Adult Small)   Pulse 60   Resp 12   Ht 1.626 m (5' 4\")   Wt 60 kg (132 lb 3.2 oz)   BMI 22.69 kg/m    BMI= Body mass index is 22.69 kg/m .  Wt Readings from Last 3 Encounters:   10/11/21 60 kg (132 lb 3.2 oz)   08/23/21 67.9 kg (149 lb 9.6 oz)   06/16/21 62.6 kg (138 lb)       General Appearance:   no distress, normal body habitus   ENT/Mouth: membranes moist, no oral lesions or bleeding gums.      EYES:  no scleral icterus, normal conjunctivae   Neck: no carotid bruits or thyromegaly   Chest/Lungs:   lungs are clear to auscultation, no rales or wheezing,, equal chest wall expansion    Cardiovascular:   " Regular. Normal first and second heart sounds with 3/6 systolic murmur, rubs, or gallops; the carotid, radial and posterior tibial pulses are intact, Jugular venous pressure within normal limits, mild edema bilaterally    Abdomen:  no organomegaly, masses, bruits, or tenderness; bowel sounds are present   Extremities: no cyanosis or clubbing   Skin: no xanthelasma, warm.    Neurologic: normal  bilateral, no tremors     Psychiatric: alert and oriented x3, calm        Please refer above for cardiac ROS details.        Medical History  Surgical History Family History Social History   Past Medical History:   Diagnosis Date     CKD (chronic kidney disease) stage 5, GFR less than 15 ml/min (H)      Dyslipidemia      Metabolic acidosis      Type 2 diabetes mellitus (H)      No past surgical history on file.  Family History   Problem Relation Age of Onset     Diabetes Type 2  Mother      Other - See Comments Daughter         granular cell tumor of thigh     Heart Disease Other         Social History     Socioeconomic History     Marital status: Patient Declined     Spouse name: Not on file     Number of children: Not on file     Years of education: Not on file     Highest education level: Not on file   Occupational History     Not on file   Tobacco Use     Smoking status: Never Smoker     Smokeless tobacco: Never Used   Substance and Sexual Activity     Alcohol use: Not Currently     Drug use: Never     Sexual activity: Not on file   Other Topics Concern     Parent/sibling w/ CABG, MI or angioplasty before 65F 55M? Not Asked   Social History Narrative     Not on file     Social Determinants of Health     Financial Resource Strain:      Difficulty of Paying Living Expenses:    Food Insecurity:      Worried About Running Out of Food in the Last Year:      Ran Out of Food in the Last Year:    Transportation Needs:      Lack of Transportation (Medical):      Lack of Transportation (Non-Medical):    Physical Activity:       Days of Exercise per Week:      Minutes of Exercise per Session:    Stress:      Feeling of Stress :    Social Connections:      Frequency of Communication with Friends and Family:      Frequency of Social Gatherings with Friends and Family:      Attends Synagogue Services:      Active Member of Clubs or Organizations:      Attends Club or Organization Meetings:      Marital Status:    Intimate Partner Violence:      Fear of Current or Ex-Partner:      Emotionally Abused:      Physically Abused:      Sexually Abused:            Medications  Allergies   Current Outpatient Medications   Medication Sig Dispense Refill     amLODIPine (NORVASC) 10 MG tablet [AMLODIPINE (NORVASC) 10 MG TABLET] Take 10 mg by mouth daily.        aspirin 81 MG EC tablet [ASPIRIN 81 MG EC TABLET] Take 81 mg by mouth daily.  2     atorvastatin (LIPITOR) 10 MG tablet [ATORVASTATIN (LIPITOR) 10 MG TABLET] Take 10 mg by mouth daily.        cloNIDine (CATAPRES-TTS) 0.3 mg/24 hr [CLONIDINE (CATAPRES-TTS) 0.3 MG/24 HR] Place 1 patch on the skin once a week.       furosemide (LASIX) 20 MG tablet Take 20 mg by mouth daily       hydrALAZINE (APRESOLINE) 100 MG tablet [HYDRALAZINE (APRESOLINE) 100 MG TABLET] Take 100 mg by mouth 3 (three) times a day.       metoprolol succinate (TOPROL-XL) 100 MG 24 hr tablet [METOPROLOL SUCCINATE (TOPROL-XL) 100 MG 24 HR TABLET] Take 100 mg by mouth daily.        minoxidil (LONITEN) 2.5 MG tablet TAKE 1 TABLET BY MOUTH 1 TIME EACH DAY       calcitrioL (ROCALTROL) 0.25 MCG capsule [CALCITRIOL (ROCALTROL) 0.25 MCG CAPSULE] Take 0.25 mcg by mouth daily.        calcium carbonate 1250 (500 Ca) MG CHEW Take 500 mg by mouth       carvedilol (COREG) 25 MG tablet Take 25 mg by mouth       LOKELMA 5 g PACK packet TAKE 5 G BY MOUTH 1 (ONE) TIME EACH DAY       sodium bicarbonate 650 MG tablet [SODIUM BICARBONATE 650 MG TABLET] Take 1 tablet (650 mg total) by mouth 3 (three) times a day. OTC product 90 tablet 1     No Known  Allergies       Lab Results    Chemistry/lipid CBC Cardiac Enzymes/BNP/TSH/INR   No results for input(s): CHOL, HDL, LDL, TRIG, CHOLHDLRATIO in the last 64798 hours.  No results for input(s): LDL in the last 29052 hours.  Recent Labs   Lab Test 08/23/21  1243      POTASSIUM 4.9   CHLORIDE 108   CO2 19*   *   BUN 81*   CR 5.38*   GFRESTIMATED 11*   REYMUNDO 8.7     Recent Labs   Lab Test 08/23/21  1243 06/14/21  1040 06/11/21  1252   CR 5.38* 4.24* 3.54*     Recent Labs   Lab Test 08/23/21  1243 06/11/21  0728   A1C 5.6 5.7*          Recent Labs   Lab Test 08/23/21  1243   WBC 3.6*   HGB 9.3*   HCT 27.5*   MCV 85   PLT 59*     Recent Labs   Lab Test 08/23/21  1243 06/10/21  1406 08/12/20  1426   HGB 9.3* 10.6* 10.6*    Recent Labs   Lab Test 06/10/21  1406   TROPONINI 0.04     No results for input(s): BNP, NTBNPI, NTBNP in the last 23545 hours.  No results for input(s): TSH in the last 89540 hours.  Recent Labs   Lab Test 10/06/21  1508 08/23/21  1243 06/11/21  0728   INR 1.12 1.25* 1.27*        Alfred Katz MD

## 2021-10-11 NOTE — PATIENT INSTRUCTIONS
Please check to see if you are taking both the Carvedilol and metoprolol and call my nurse Gloria at [phone].We are going to plan a heart MRI and a 24 hour monitor and will be in touch with results.

## 2021-10-12 ENCOUNTER — TELEPHONE (OUTPATIENT)
Dept: CARDIOLOGY | Facility: CLINIC | Age: 56
End: 2021-10-12

## 2021-10-12 ENCOUNTER — HOSPITAL ENCOUNTER (OUTPATIENT)
Dept: CARDIOLOGY | Facility: CLINIC | Age: 56
Discharge: HOME | End: 2021-10-12
Attending: INTERNAL MEDICINE | Admitting: INTERNAL MEDICINE
Payer: MEDICARE

## 2021-10-12 DIAGNOSIS — I42.2 HYPERTROPHIC CARDIOMYOPATHY (H): ICD-10-CM

## 2021-10-12 PROCEDURE — 93227 XTRNL ECG REC<48 HR R&I: CPT | Performed by: INTERNAL MEDICINE

## 2021-10-12 PROCEDURE — 93225 XTRNL ECG REC<48 HRS REC: CPT

## 2021-10-12 NOTE — TELEPHONE ENCOUNTER
Daughter Yanick called, LVM reporting pt has both carvedilol 25 mg bid, and metoprolol succinate 100 mg daily.    Upon further chart review - carvedilol was filled 6/15/21 for 30 day supply, and hasn't been filled since.  Metoprolol recently filled 9/14/21 for 90 day supply.    Dr Katz - any recommendations?  -[facility]

## 2021-10-12 NOTE — TELEPHONE ENCOUNTER
Can we investigate who prescribed both of them so that we can go to the source.  Thank you  Alfred Katz MD

## 2021-10-12 NOTE — TELEPHONE ENCOUNTER
I was just called to see if this was an intentional decision or an unintentional decision    Alfred Katz MD

## 2021-10-12 NOTE — TELEPHONE ENCOUNTER
Called [facility] - pt is only taking metoprolol succinate, 100 mg daily.

## 2021-10-17 ENCOUNTER — HEALTH MAINTENANCE LETTER (OUTPATIENT)
Age: 56
End: 2021-10-17

## 2021-10-29 ENCOUNTER — HOSPITAL ENCOUNTER (OUTPATIENT)
Dept: MRI IMAGING | Facility: HOSPITAL | Age: 56
End: 2021-10-29
Attending: INTERNAL MEDICINE
Payer: MEDICARE

## 2021-10-29 DIAGNOSIS — I42.2 HYPERTROPHIC CARDIOMYOPATHY (H): ICD-10-CM

## 2021-10-29 PROCEDURE — 75557 CARDIAC MRI FOR MORPH: CPT | Mod: 26 | Performed by: INTERNAL MEDICINE

## 2021-10-29 PROCEDURE — 999N000122 MR MYOCARDIUM  OVERREAD

## 2021-10-29 PROCEDURE — 75557 CARDIAC MRI FOR MORPH: CPT

## 2021-11-16 ENCOUNTER — TELEPHONE (OUTPATIENT)
Dept: CARDIOLOGY | Facility: CLINIC | Age: 56
End: 2021-11-16
Payer: MEDICARE

## 2021-11-16 NOTE — TELEPHONE ENCOUNTER
Daughter Yaakovjosr called - returning Dr Katz's phone call about MRI results and plan of care.  Best time to reach Yanick is in the morning, until noon.  Informed Yanick that Dr Katz is out of the office this week, but will be notified.  -heidi

## 2021-11-22 ENCOUNTER — TELEPHONE (OUTPATIENT)
Dept: CARDIOLOGY | Facility: CLINIC | Age: 56
End: 2021-11-22
Payer: MEDICARE

## 2021-11-22 DIAGNOSIS — I42.2 HYPERTROPHIC CARDIOMYOPATHY (H): Primary | ICD-10-CM

## 2021-11-22 NOTE — TELEPHONE ENCOUNTER
----- Message from Alfred Katz MD sent at 11/21/2021  8:31 PM CST -----  Gloria  Please let her daughter know that I been in touch with the kidney transplant team and they would like to proceed to coronary angiography.  We discussed this at length and I told her I be back in touch with her pending the discussion.  They therefore were aware of the plan.  Would like to proceed to coronary angiography with possible coronary intervention.  Thank you mdg

## 2021-11-24 DIAGNOSIS — Z11.59 ENCOUNTER FOR SCREENING FOR OTHER VIRAL DISEASES: ICD-10-CM

## 2021-11-24 NOTE — TELEPHONE ENCOUNTER
Spoke with daughter and she agrees that father/pt would go forward due to needing it for transplant list. Will place case request.

## 2021-11-26 ENCOUNTER — TELEPHONE (OUTPATIENT)
Dept: CARDIOLOGY | Facility: CLINIC | Age: 56
End: 2021-11-26
Payer: MEDICARE

## 2021-11-26 RX ORDER — GENTAMICIN SULFATE 1 MG/G
CREAM TOPICAL
COMMUNITY
Start: 2021-10-26 | End: 2022-06-01

## 2021-11-26 NOTE — TELEPHONE ENCOUNTER
----- Message from Kyler Cooper sent at 11/24/2021  1:07 PM CST -----  Regarding: RE: case request  [facility] POSS PCI WITH [facility]     7AM ADMIT ON 12/6    H&P ON 12/2 WITH [facility] - PT'S DTR PILOLINH SAID TO KEEP THIS Follow-up WITH [facility], SINCE THEY WANT TO USE IT FOR PT'S PRE-OP.    ALERTS: RENAL PROTOCOL,  PT AN DIABETIC     COVID TESTING ON 12/2 IN [facility]     THANKS!   -KYLER  ----- Message -----  From: Lizzy Duke RN  Sent: 11/24/2021  10:43 AM CST  To: Gloria Dunn, RN, Kyler Cooper  Subject: case request                                     Sent request but KALIN is a Patient with end-stage renal disease who recently began peritoneal dialysis going for transplant and is diabetic. He is not on any anticoag med. Gloria should pick this up Friday 11/26.

## 2021-12-02 ENCOUNTER — LAB (OUTPATIENT)
Dept: LAB | Facility: CLINIC | Age: 56
End: 2021-12-02
Attending: INTERNAL MEDICINE
Payer: MEDICARE

## 2021-12-02 ENCOUNTER — OFFICE VISIT (OUTPATIENT)
Dept: CARDIOLOGY | Facility: CLINIC | Age: 56
End: 2021-12-02
Payer: MEDICARE

## 2021-12-02 ENCOUNTER — PREP FOR PROCEDURE (OUTPATIENT)
Dept: CARDIOLOGY | Facility: CLINIC | Age: 56
End: 2021-12-02

## 2021-12-02 VITALS
HEART RATE: 64 BPM | RESPIRATION RATE: 16 BRPM | DIASTOLIC BLOOD PRESSURE: 60 MMHG | BODY MASS INDEX: 23.52 KG/M2 | WEIGHT: 137 LBS | SYSTOLIC BLOOD PRESSURE: 120 MMHG

## 2021-12-02 DIAGNOSIS — Z01.818 PRE-TRANSPLANT EVALUATION FOR KIDNEY TRANSPLANT: ICD-10-CM

## 2021-12-02 DIAGNOSIS — Z11.59 ENCOUNTER FOR SCREENING FOR OTHER VIRAL DISEASES: ICD-10-CM

## 2021-12-02 DIAGNOSIS — I42.1 IHSS (IDIOPATHIC HYPERTROPHIC SUBAORTIC STENOSIS) (H): Primary | ICD-10-CM

## 2021-12-02 DIAGNOSIS — N18.4 STAGE 4 CHRONIC KIDNEY DISEASE (H): Primary | ICD-10-CM

## 2021-12-02 PROCEDURE — U0003 INFECTIOUS AGENT DETECTION BY NUCLEIC ACID (DNA OR RNA); SEVERE ACUTE RESPIRATORY SYNDROME CORONAVIRUS 2 (SARS-COV-2) (CORONAVIRUS DISEASE [COVID-19]), AMPLIFIED PROBE TECHNIQUE, MAKING USE OF HIGH THROUGHPUT TECHNOLOGIES AS DESCRIBED BY CMS-2020-01-R: HCPCS

## 2021-12-02 PROCEDURE — U0005 INFEC AGEN DETEC AMPLI PROBE: HCPCS

## 2021-12-02 PROCEDURE — 99214 OFFICE O/P EST MOD 30 MIN: CPT | Performed by: INTERNAL MEDICINE

## 2021-12-02 RX ORDER — DIAZEPAM 5 MG
10 TABLET ORAL
Status: CANCELLED | OUTPATIENT
Start: 2021-12-02

## 2021-12-02 RX ORDER — DEXTROSE MONOHYDRATE 25 G/50ML
25-50 INJECTION, SOLUTION INTRAVENOUS
Status: CANCELLED | OUTPATIENT
Start: 2021-12-02

## 2021-12-02 RX ORDER — FENTANYL CITRATE 50 UG/ML
25 INJECTION, SOLUTION INTRAMUSCULAR; INTRAVENOUS
Status: CANCELLED | OUTPATIENT
Start: 2021-12-02

## 2021-12-02 RX ORDER — ASPIRIN 81 MG/1
243 TABLET, CHEWABLE ORAL ONCE
Status: CANCELLED | OUTPATIENT
Start: 2021-12-02

## 2021-12-02 RX ORDER — ASPIRIN 325 MG
325 TABLET ORAL ONCE
Status: CANCELLED | OUTPATIENT
Start: 2021-12-02 | End: 2021-12-02

## 2021-12-02 RX ORDER — NICOTINE POLACRILEX 4 MG
15-30 LOZENGE BUCCAL
Status: CANCELLED | OUTPATIENT
Start: 2021-12-02

## 2021-12-02 RX ORDER — LIDOCAINE 40 MG/G
CREAM TOPICAL
Status: CANCELLED | OUTPATIENT
Start: 2021-12-02

## 2021-12-02 RX ORDER — SODIUM CHLORIDE 9 MG/ML
INJECTION, SOLUTION INTRAVENOUS CONTINUOUS
Status: CANCELLED | OUTPATIENT
Start: 2021-12-02

## 2021-12-02 NOTE — PROGRESS NOTES
HEART CARE ENCOUNTER CONSULTATON NOTE      [facility]  [phone]      Assessment/Recommendations   Assessment/Plan:  1.  Asymmetric left ventricular septal hypertrophy.  The septum on MRI measures approximately 1.9 cm.  He does not report significant high risk features specifically no syncope or near syncope no history.  No family history of sudden death.  No high risk arrhythmia findings on Holter monitor.  Gadolinium was not utilized because of renal failure and therefore gadolinium assessment on MRI was not completed.  We discussed the importance of monitoring for symptoms.  Ideally would like to prevent dehydration but he is currently on peritoneal dialysis.  He is requiring a number of antihypertensive medications 1 of which includes carvedilol.  He is asked to monitor for symptoms and to update us.  He is asked to review with his family members that they should be screened for this condition.  I wrote this all down in the chart notes for him to take home and his son was aware of the recommendations as well.    2.  Coronary angiogram for screening prior to renal transplantation.  No specific cardiovascular symptoms.  As outlined below I did review the angiographic procedure in detail including the potential risks, alternatives and benefits.  He understands these recommendations and wishes to proceed.  This is tentatively scheduled for December 6, 2021.    3.  Risk modification.  He is on 10 mg of atorvastatin.  He is diabetic.  I cannot locate any lipid panel in the chart record.  I will request a lipid panel when he comes for angiography.    4.  Peritoneal dialysis follows with renal.  He does have some increased lower extremity edema but no shortness of breath orthopnea or PND.  His lungs are clear to auscultation today.  He reports that he has been in touch with nephrology and peritoneal dialysis parameters were reportedly adjusted.    1.  As outlined above.  2 coronary angiogram  with possible coronary intervention as requested by the renal transplant service.  3.  Monitor for increasing shortness of breath, syncope or near syncope.  4.  It is recommended that family members be screened for asymmetric septal hypertrophy.  I did discuss this and wrote this down for the patient and his son.  5.  Follow-up in 6 months or sooner if specific issues were to arise.       History of Present Illness/Subjective    HPI: Modesto Barbosa is a 55 year old male who is seen in follow-up.  We met in consultation October 2021 after he was referred from the renal transplant group for an abnormal echocardiogram.  His son provided help with interpretation.  As noted he has a history of chronic kidney disease presumptively related to diabetes and hypertension and has been on peritoneal dialysis.  He was noted to have a thickened heart at [facility] with severe LVH that was described as asymmetric measuring upwards of 1.7 cm at the septal level with systolic anterior motion of the mitral valve.  He denies chest discomfort, shortness of breath, orthopnea, PND, dizziness, palpitation, syncope or near syncope.  He denies a family history of sudden death or syncope.  I reviewed the cardiac MRI which we completed October 29 that demonstrated small LV cavity with hyperdynamic left ventricular function with moderate chordal Lalit.  Asymmetric anterior septal hypertrophy with a maximal diameter of 19 mm.  He is on carvedilol but also on other antihypertensive medications as it relates to his renal failure.  We talked about how typically we try to avoid being dehydrated with this condition.  I reviewed the Holter monitor results as well that we completed October 2021 with normal sinus rhythm with normal electrocardiographic intervals with an average heart rate of 75 bpm with rare ectopy.  Only 1 PVC was observed.    We also discussed the request for coronary angiography and possible coronary intervention per the renal  transplant team.  They state that because he has been diabetic for a longer period of time that they are recommending coronary angiogram before renal transplantation.  Today I reviewed the coronary angiogram in detail with him.  We discussed the risks benefits and alternatives.  He is in agreement and wishes to proceed.Discussed with the patient the risks and benefits of left heat catheterization with coronary angiogram including possible PCI. The risks include but are not limited to death, myocardial infarction, stroke, kidney dysfunction, vessel trauma, hemorrhage, need for emergency corrective surgery, allergy, and dysrhythmia.    ECG August 2021 normal sinus rhythm normal QTC improved from the prior EKG in 2021 where the heart rate was faster and the QTC was 482 ms.  Recent Echocardiogram Results:     EXAM: ULTRASOUND AORTA/IVC/ILIAC DUPLEX COMPLETE  LOCATION: [facility]  DATE/TIME: 10/03/2021, 9:05 AM     INDICATION: Chronic kidney disease, stage V (H), Pre-transplant evaluation for kidney transplant, Type 2 diabetes mellitus with diabetic nephropathy, without long-term current use of insulin (H), Essential hypertension  COMPARISON: CT from 10/03/2021.  TECHNIQUE: Transverse and longitudinal images of the aorta. Color flow and spectral Doppler with waveform analysis.     FINDINGS:     MEASUREMENTS:  Abdominal Aorta:   Distal: 1.3 cm AP   Right Common Iliac Artery: 0.7 cm AP  Left Common Iliac Artery: 0.7 cm AP      VELOCITIES:  Right common iliac artery proximally: 281  Right common iliac artery distally: 246-319  Right external iliac artery: 165-227  Left common iliac artery proximally: 132  Left common iliac artery distally: 242  Left external iliac artery : 65-90                                                                      CONCLUSION:  1.  No abdominal aorta or common iliac artery aneurysm.   2.  Brisk triphasic waveforms within the common iliac and external iliac arteries  bilaterally without evidence of a hemodynamically significant lesion.     [facility]  Diagnostic and Treamtent-3rd Floor  [address] [place] [address]     Name: MARICRUZ CHAVES  MRN: [id number]  : 1965  Study Date: 2021 01:49 PM  Age: 55 yrs  Gender: Male  Patient Location: [facility]  Reason For Study: Chronic kidney disease, Chronic kidney disease, stage V (H)  Ordering Physician: Carson Michel MD  Referring Physician: MECHELLE BEYER  Performed By: MARIAH Alvarado     BSA: 1.7 m2  Height: 64 in  Weight: 149 lb  BP: 154/66 mmHg  ______________________________________________________________________________  Procedure  Echocardiogram with two-dimensional, color and spectral Doppler performed.  ______________________________________________________________________________  Interpretation Summary  Severe LVH with significant asymmetric septal hypertrophy, measuring up to 1.7  cm at the level of the basal anteroseptum and 1.5 cm at the level of the mid  mid-inferoseptum. There is systolic anterior motion of the mitral valve  without septal contact and without detectable resting LVOT obstruction.  The extent and degree of asymmetry of hypertrophy are somewhat atypical for  LVH related to chronic afterload from ESRD/hypertension and hypertrophic  cardiomyopathy should be considered. Recommend cardiac MRI (this can be done  without gadolinium if necessary.)  Mildly dilated aortic root and proximal ascending aorta when indexed to body  surface area, measuring 3.7 cm (2.2 cm/m2) and 3.7 cm (2.2 cm/m2)  respectively.  ______________________________________________________________________________  Left Ventricle  Global and regional left ventricular function is normal with an EF of 60-65%.  Left ventricular size is normal. Severe LVH with significant asymmetric septal  hypertrophy, measuring up to 1.7 cm at the level of the basal anteroseptum and  1.5  cm at the level of the mid mid-inferoseptum. There is systolic anterior  motion of the mitral valve without septal contact and without detectable  resting LVOT obstruction. Grade III or advanced diastolic dysfunction.     Right Ventricle  Right ventricular function, chamber size, wall motion, and thickness are  normal.     Atria  The right atria appears normal. Mild to moderate left atrial enlargement is  present.     Mitral Valve  Mild mitral insufficiency is present.     Aortic Valve  The aortic valve is tricuspid. Mild aortic valve calcification is present.     Tricuspid Valve  Trace tricuspid insufficiency is present. The right ventricular systolic  pressure is approximated at 33.9 mmHg plus the right atrial pressure.     Pulmonic Valve  The pulmonic valve is normal. Trace pulmonic insufficiency is present.     Vessels  The inferior vena cava was normal in size with preserved respiratory  variability. The pulmonary artery cannot be assessed. Mildly dilated aortic  root and proximal ascending aorta when indexed to body surface area, measuring  3.7 cm (2.2 cm/m2) and 3.7 cm (2.2 cm/m2) respectively.     Pericardium  No pericardial effusion is present.     Compared to Previous Study  There is no prior study for direct comparison.    Recent Coronary Angiogram Results:  Indications    Hypertrophic cardiomyopathy (H) [I42.2 (ICD-10-CM)]       ECG Link     Holter Monitor Scan - Scan on 10/14/21 11:11 AM by       Scans on Order [id number]    Scan on 10/14/2021 11:11 AM          Conclusion    [facility]     HOLTER REPORT     Results:    Indication for study: Evaluate for cardiomyopathy    A 24-hour Holter monitor was applied October 12, 2021 with date of interpretation October 14, 2021    Baseline tracing showed sinus rhythm with normal electrocardiographic intervals.  Average heart rate 75 bpm range between 59-98 bpm    No bradycardia, no pauses    Very rare ectopy with only 1 PVC, 1 PAC    Patient activity  diary not returned    Conclusion    Normal monitor     stable heart rhythm pattern        Comment: The recording was for 23 hrs and 59 min.  The recording quality was adequate        Asymmetric anteroseptal hypertrophy with moderate LVOT outflow tract obstruction.      SUMMARY   ==========================================================================================================     Non-contrast Cardiac MRI     1.  Small left ventricular cavity related to hypertrophy with hyperdynamic left ventricular systolic  function.  There is asymmetric anteroseptal hypertrophy with a maximal diameter of 19 mm with evidence of  left ventricular outflow tract obstruction.  There is moderate chordal MINA. The quantified left ventricular  ejection fraction is 80.7%.    2.  Normal right ventricular size and systolic function.    3.  Aortic valve sclerosis without stenosis.  Mild aortic regurgitation.    4.  Mild mitral regurgitation.           5. Moderate left atrial enlargement.    ==========================================================================================================  REPORT FINDINGS:     LEFT VENTRICLE: LV wall thickness is normal. LV cavity size is normal. LV systolic function is normal. There is no LV  mass/thrombus. LV systolic function is normal. LV systolic function is normal. Quantitative LVEF 80.7 %.     RIGHT VENTRICLE: RV wall thickness is normal. RV cavity size is normal. RV systolic function is normal. There is no RV  mass/thrombus.     LA/RA SEPTUM: The atrial septum is intact. The atrial septum is intact. The atrial septum is intact.     LEFT ATRIUM: LA is moderately enlarged.     RIGHT ATRIUM: RA cavity size is normal.     PERICARDIUM: Pericardium is normal. There is a trivial pericardial effusion.     PLEURAL EFFUSION: There is no pleural effusion.     AORTIC VALVE: The aortic valve annulus is normal in size. Aortic valve is trileaflet. There is mild aortic regurgitation.  There is no aortic  stenosis. Aortic valve is mildly thickened. Peak aortic valve velocity 149.5 cm/sec.  Aortic regurgitant volume 4.9 ml. Aortic regurgitant fraction 6.6 %.     MITRAL VALVE: The mitral valve annulus is normal in size. Mitral valve leaflets are normal. There is mild mitral  regurgitation. There is no mitral stenosis.      TRICUSPID VALVE: The tricuspid valve annulus is normal in size. Tricuspid valve leaflets are normal. There is mild tricuspid  regurgitation. There is no tricuspid stenosis.      PULMONIC VALVE: The pulmonic valve annulus is normal in size. Pulmonic valve leaflets are normal. There is no pulmonic  regurgitation. There is no pulmonic stenosis.      AORTIC ROOT: The aortic root is normal.     CHEST:  Normal thoracic aortic size and its major branches.      ABDOMEN: Abdominal Ascites noted     Physical Examination  Review of Systems   Vitals: There were no vitals taken for this visit.  BMI= There is no height or weight on file to calculate BMI.  Wt Readings from Last 3 Encounters:   10/11/21 60 kg (132 lb 3.2 oz)   08/23/21 67.9 kg (149 lb 9.6 oz)   06/16/21 62.6 kg (138 lb)       General Appearance:   no distress, normal body habitus   ENT/Mouth: membranes moist, no oral lesions or bleeding gums.      EYES:  no scleral icterus, normal conjunctivae   Neck: no carotid bruits or thyromegaly   Chest/Lungs:   lungs are clear to auscultation, no rales or wheezing, , equal chest wall expansion    Cardiovascular:   Regular. Normal first and second heart sounds with 2/6 systolic murmur in the sash-like distribution.  The carotid, radial and posterior tibial pulses are intact, Jugular venous pressure does not appear obviously increased.,  1-2+ edema at the shins bilaterally right greater than left.    Abdomen:  no organomegaly, masses, bruits, or tenderness; bowel sounds are present   Extremities: no cyanosis or clubbing   Skin: no xanthelasma, warm.    Neurologic: normal  bilateral, no tremors      Psychiatric: alert and oriented x3, calm        Please refer above for cardiac ROS details.        Medical History  Surgical History Family History Social History   Past Medical History:   Diagnosis Date     CKD (chronic kidney disease) stage 5, GFR less than 15 ml/min (H)      Dyslipidemia      Metabolic acidosis      Type 2 diabetes mellitus (H)      No past surgical history on file.  Family History   Problem Relation Age of Onset     Diabetes Type 2  Mother      Other - See Comments Daughter         granular cell tumor of thigh     Heart Disease Other         Social History     Socioeconomic History     Marital status: Patient Declined     Spouse name: Not on file     Number of children: Not on file     Years of education: Not on file     Highest education level: Not on file   Occupational History     Not on file   Tobacco Use     Smoking status: Never Smoker     Smokeless tobacco: Never Used   Substance and Sexual Activity     Alcohol use: Not Currently     Drug use: Never     Sexual activity: Not on file   Other Topics Concern     Parent/sibling w/ CABG, MI or angioplasty before 65F 55M? Not Asked   Social History Narrative     Not on file     Social Determinants of Health     Financial Resource Strain: Not on file   Food Insecurity: Not on file   Transportation Needs: Not on file   Physical Activity: Not on file   Stress: Not on file   Social Connections: Not on file   Intimate Partner Violence: Not on file   Housing Stability: Not on file           Medications  Allergies   Current Outpatient Medications   Medication Sig Dispense Refill     amLODIPine (NORVASC) 10 MG tablet [AMLODIPINE (NORVASC) 10 MG TABLET] Take 10 mg by mouth daily.        aspirin 81 MG EC tablet [ASPIRIN 81 MG EC TABLET] Take 81 mg by mouth daily.  2     atorvastatin (LIPITOR) 10 MG tablet [ATORVASTATIN (LIPITOR) 10 MG TABLET] Take 10 mg by mouth daily.        calcitrioL (ROCALTROL) 0.25 MCG capsule [CALCITRIOL (ROCALTROL) 0.25 MCG  CAPSULE] Take 0.25 mcg by mouth daily.        calcium carbonate 1250 (500 Ca) MG CHEW Take 500 mg by mouth       carvedilol (COREG) 25 MG tablet Take 25 mg by mouth       cloNIDine (CATAPRES-TTS) 0.3 mg/24 hr [CLONIDINE (CATAPRES-TTS) 0.3 MG/24 HR] Place 1 patch on the skin once a week.       furosemide (LASIX) 20 MG tablet Take 20 mg by mouth daily       gentamicin (GARAMYCIN) 0.1 % external cream APPLY TO EXIT SITE ONCE A DAY       hydrALAZINE (APRESOLINE) 100 MG tablet [HYDRALAZINE (APRESOLINE) 100 MG TABLET] Take 100 mg by mouth 3 (three) times a day.       LOKELMA 5 g PACK packet TAKE 5 G BY MOUTH 1 (ONE) TIME EACH DAY       metoprolol succinate (TOPROL-XL) 100 MG 24 hr tablet [METOPROLOL SUCCINATE (TOPROL-XL) 100 MG 24 HR TABLET] Take 100 mg by mouth daily.        minoxidil (LONITEN) 2.5 MG tablet TAKE 1 TABLET BY MOUTH 1 TIME EACH DAY       sodium bicarbonate 650 MG tablet [SODIUM BICARBONATE 650 MG TABLET] Take 1 tablet (650 mg total) by mouth 3 (three) times a day. OTC product 90 tablet 1     No Known Allergies       Lab Results    Chemistry/lipid CBC Cardiac Enzymes/BNP/TSH/INR   No results for input(s): CHOL, HDL, LDL, TRIG, CHOLHDLRATIO in the last 81379 hours.  No results for input(s): LDL in the last 39599 hours.  Recent Labs   Lab Test 08/23/21  1243      POTASSIUM 4.9   CHLORIDE 108   CO2 19*   *   BUN 81*   CR 5.38*   GFRESTIMATED 11*   REYMUNDO 8.7     Recent Labs   Lab Test 08/23/21  1243 06/14/21  1040 06/11/21  1252   CR 5.38* 4.24* 3.54*     Recent Labs   Lab Test 08/23/21  1243 06/11/21  0728   A1C 5.6 5.7*          Recent Labs   Lab Test 08/23/21  1243   WBC 3.6*   HGB 9.3*   HCT 27.5*   MCV 85   PLT 59*     Recent Labs   Lab Test 08/23/21  1243 06/10/21  1406 08/12/20  1426   HGB 9.3* 10.6* 10.6*    Recent Labs   Lab Test 06/10/21  1406   TROPONINI 0.04     No results for input(s): BNP, NTBNPI, NTBNP in the last 55941 hours.  No results for input(s): TSH in the last 81767  hours.  Recent Labs   Lab Test 10/06/21  1508 08/23/21  1243 06/11/21  0728   INR 1.12 1.25* 1.27*        Alfred Katz MD

## 2021-12-02 NOTE — LETTER
12/2/2021    Santos Howard MD  [address] [address]    RE: Modesto Barbosa       Dear Colleague,    I had the pleasure of seeing Modesto Barbosa in the [facility].    HEART CARE ENCOUNTER CONSULTATON NOTE      [facility]  [phone]      Assessment/Recommendations   Assessment/Plan:  1.  Asymmetric left ventricular septal hypertrophy.  The septum on MRI measures approximately 1.9 cm.  He does not report significant high risk features specifically no syncope or near syncope no history.  No family history of sudden death.  No high risk arrhythmia findings on Holter monitor.  Gadolinium was not utilized because of renal failure and therefore gadolinium assessment on MRI was not completed.  We discussed the importance of monitoring for symptoms.  Ideally would like to prevent dehydration but he is currently on peritoneal dialysis.  He is requiring a number of antihypertensive medications 1 of which includes carvedilol.  He is asked to monitor for symptoms and to update us.  He is asked to review with his family members that they should be screened for this condition.  I wrote this all down in the chart notes for him to take home and his son was aware of the recommendations as well.    2.  Coronary angiogram for screening prior to renal transplantation.  No specific cardiovascular symptoms.  As outlined below I did review the angiographic procedure in detail including the potential risks, alternatives and benefits.  He understands these recommendations and wishes to proceed.  This is tentatively scheduled for December 6, 2021.    3.  Risk modification.  He is on 10 mg of atorvastatin.  He is diabetic.  I cannot locate any lipid panel in the chart record.  I will request a lipid panel when he comes for angiography.    4.  Peritoneal dialysis follows with renal.  He does have some increased lower extremity  edema but no shortness of breath orthopnea or PND.  His lungs are clear to auscultation today.  He reports that he has been in touch with nephrology and peritoneal dialysis parameters were reportedly adjusted.    1.  As outlined above.  2 coronary angiogram with possible coronary intervention as requested by the renal transplant service.  3.  Monitor for increasing shortness of breath, syncope or near syncope.  4.  It is recommended that family members be screened for asymmetric septal hypertrophy.  I did discuss this and wrote this down for the patient and his son.  5.  Follow-up in 6 months or sooner if specific issues were to arise.       History of Present Illness/Subjective    HPI: Modesto Barbosa is a 55 year old male who is seen in follow-up.  We met in consultation October 2021 after he was referred from the renal transplant group for an abnormal echocardiogram.  His son provided help with interpretation.  As noted he has a history of chronic kidney disease presumptively related to diabetes and hypertension and has been on peritoneal dialysis.  He was noted to have a thickened heart at [facility] with severe LVH that was described as asymmetric measuring upwards of 1.7 cm at the septal level with systolic anterior motion of the mitral valve.  He denies chest discomfort, shortness of breath, orthopnea, PND, dizziness, palpitation, syncope or near syncope.  He denies a family history of sudden death or syncope.  I reviewed the cardiac MRI which we completed October 29 that demonstrated small LV cavity with hyperdynamic left ventricular function with moderate chordal Lalit.  Asymmetric anterior septal hypertrophy with a maximal diameter of 19 mm.  He is on carvedilol but also on other antihypertensive medications as it relates to his renal failure.  We talked about how typically we try to avoid being dehydrated with this condition.  I reviewed the Holter monitor results as well that we completed October  2021 with normal sinus rhythm with normal electrocardiographic intervals with an average heart rate of 75 bpm with rare ectopy.  Only 1 PVC was observed.    We also discussed the request for coronary angiography and possible coronary intervention per the renal transplant team.  They state that because he has been diabetic for a longer period of time that they are recommending coronary angiogram before renal transplantation.  Today I reviewed the coronary angiogram in detail with him.  We discussed the risks benefits and alternatives.  He is in agreement and wishes to proceed.Discussed with the patient the risks and benefits of left heat catheterization with coronary angiogram including possible PCI. The risks include but are not limited to death, myocardial infarction, stroke, kidney dysfunction, vessel trauma, hemorrhage, need for emergency corrective surgery, allergy, and dysrhythmia.    ECG August 2021 normal sinus rhythm normal QTC improved from the prior EKG in 2021 where the heart rate was faster and the QTC was 482 ms.  Recent Echocardiogram Results:     EXAM: ULTRASOUND AORTA/IVC/ILIAC DUPLEX COMPLETE  LOCATION: [facility]  DATE/TIME: 10/03/2021, 9:05 AM     INDICATION: Chronic kidney disease, stage V (H), Pre-transplant evaluation for kidney transplant, Type 2 diabetes mellitus with diabetic nephropathy, without long-term current use of insulin (H), Essential hypertension  COMPARISON: CT from 10/03/2021.  TECHNIQUE: Transverse and longitudinal images of the aorta. Color flow and spectral Doppler with waveform analysis.     FINDINGS:     MEASUREMENTS:  Abdominal Aorta:   Distal: 1.3 cm AP   Right Common Iliac Artery: 0.7 cm AP  Left Common Iliac Artery: 0.7 cm AP      VELOCITIES:  Right common iliac artery proximally: 281  Right common iliac artery distally: 246-319  Right external iliac artery: 165-227  Left common iliac artery proximally: 132  Left common iliac artery distally:  242  Left external iliac artery : 65-90                                                                      CONCLUSION:  1.  No abdominal aorta or common iliac artery aneurysm.   2.  Brisk triphasic waveforms within the common iliac and external iliac arteries bilaterally without evidence of a hemodynamically significant lesion.     [facility]  Diagnostic and Treamtent-3rd Floor  [address] [place] [address]     Name: MARICRUZ CHAVES  MRN: [id number]  : 1965  Study Date: 2021 01:49 PM  Age: 55 yrs  Gender: Male  Patient Location: [facility]  Reason For Study: Chronic kidney disease, Chronic kidney disease, stage V (H)  Ordering Physician: Carson Michel MD  Referring Physician: MECHELLE BEYER  Performed By: MARIAH Alvarado     BSA: 1.7 m2  Height: 64 in  Weight: 149 lb  BP: 154/66 mmHg  ______________________________________________________________________________  Procedure  Echocardiogram with two-dimensional, color and spectral Doppler performed.  ______________________________________________________________________________  Interpretation Summary  Severe LVH with significant asymmetric septal hypertrophy, measuring up to 1.7  cm at the level of the basal anteroseptum and 1.5 cm at the level of the mid  mid-inferoseptum. There is systolic anterior motion of the mitral valve  without septal contact and without detectable resting LVOT obstruction.  The extent and degree of asymmetry of hypertrophy are somewhat atypical for  LVH related to chronic afterload from ESRD/hypertension and hypertrophic  cardiomyopathy should be considered. Recommend cardiac MRI (this can be done  without gadolinium if necessary.)  Mildly dilated aortic root and proximal ascending aorta when indexed to body  surface area, measuring 3.7 cm (2.2 cm/m2) and 3.7 cm (2.2  cm/m2)  respectively.  ______________________________________________________________________________  Left Ventricle  Global and regional left ventricular function is normal with an EF of 60-65%.  Left ventricular size is normal. Severe LVH with significant asymmetric septal  hypertrophy, measuring up to 1.7 cm at the level of the basal anteroseptum and  1.5 cm at the level of the mid mid-inferoseptum. There is systolic anterior  motion of the mitral valve without septal contact and without detectable  resting LVOT obstruction. Grade III or advanced diastolic dysfunction.     Right Ventricle  Right ventricular function, chamber size, wall motion, and thickness are  normal.     Atria  The right atria appears normal. Mild to moderate left atrial enlargement is  present.     Mitral Valve  Mild mitral insufficiency is present.     Aortic Valve  The aortic valve is tricuspid. Mild aortic valve calcification is present.     Tricuspid Valve  Trace tricuspid insufficiency is present. The right ventricular systolic  pressure is approximated at 33.9 mmHg plus the right atrial pressure.     Pulmonic Valve  The pulmonic valve is normal. Trace pulmonic insufficiency is present.     Vessels  The inferior vena cava was normal in size with preserved respiratory  variability. The pulmonary artery cannot be assessed. Mildly dilated aortic  root and proximal ascending aorta when indexed to body surface area, measuring  3.7 cm (2.2 cm/m2) and 3.7 cm (2.2 cm/m2) respectively.     Pericardium  No pericardial effusion is present.     Compared to Previous Study  There is no prior study for direct comparison.    Recent Coronary Angiogram Results:  Indications    Hypertrophic cardiomyopathy (H) [I42.2 (ICD-10-CM)]       ECG Link     Holter Monitor Scan - Scan on 10/14/21 11:11 AM by       Scans on Order [id number]    Scan on 10/14/2021 11:11 AM          Conclusion    [facility]     HOLTER REPORT     Results:    Indication for  study: Evaluate for cardiomyopathy    A 24-hour Holter monitor was applied October 12, 2021 with date of interpretation October 14, 2021    Baseline tracing showed sinus rhythm with normal electrocardiographic intervals.  Average heart rate 75 bpm range between 59-98 bpm    No bradycardia, no pauses    Very rare ectopy with only 1 PVC, 1 PAC    Patient activity diary not returned    Conclusion    Normal monitor     stable heart rhythm pattern        Comment: The recording was for 23 hrs and 59 min.  The recording quality was adequate        Asymmetric anteroseptal hypertrophy with moderate LVOT outflow tract obstruction.      SUMMARY   ==========================================================================================================     Non-contrast Cardiac MRI     1.  Small left ventricular cavity related to hypertrophy with hyperdynamic left ventricular systolic  function.  There is asymmetric anteroseptal hypertrophy with a maximal diameter of 19 mm with evidence of  left ventricular outflow tract obstruction.  There is moderate chordal MINA. The quantified left ventricular  ejection fraction is 80.7%.    2.  Normal right ventricular size and systolic function.    3.  Aortic valve sclerosis without stenosis.  Mild aortic regurgitation.    4.  Mild mitral regurgitation.           5. Moderate left atrial enlargement.    ==========================================================================================================  REPORT FINDINGS:     LEFT VENTRICLE: LV wall thickness is normal. LV cavity size is normal. LV systolic function is normal. There is no LV  mass/thrombus. LV systolic function is normal. LV systolic function is normal. Quantitative LVEF 80.7 %.     RIGHT VENTRICLE: RV wall thickness is normal. RV cavity size is normal. RV systolic function is normal. There is no RV  mass/thrombus.     LA/RA SEPTUM: The atrial septum is intact. The atrial septum is intact. The atrial septum is  intact.     LEFT ATRIUM: LA is moderately enlarged.     RIGHT ATRIUM: RA cavity size is normal.     PERICARDIUM: Pericardium is normal. There is a trivial pericardial effusion.     PLEURAL EFFUSION: There is no pleural effusion.     AORTIC VALVE: The aortic valve annulus is normal in size. Aortic valve is trileaflet. There is mild aortic regurgitation.  There is no aortic stenosis. Aortic valve is mildly thickened. Peak aortic valve velocity 149.5 cm/sec.  Aortic regurgitant volume 4.9 ml. Aortic regurgitant fraction 6.6 %.     MITRAL VALVE: The mitral valve annulus is normal in size. Mitral valve leaflets are normal. There is mild mitral  regurgitation. There is no mitral stenosis.      TRICUSPID VALVE: The tricuspid valve annulus is normal in size. Tricuspid valve leaflets are normal. There is mild tricuspid  regurgitation. There is no tricuspid stenosis.      PULMONIC VALVE: The pulmonic valve annulus is normal in size. Pulmonic valve leaflets are normal. There is no pulmonic  regurgitation. There is no pulmonic stenosis.      AORTIC ROOT: The aortic root is normal.     CHEST:  Normal thoracic aortic size and its major branches.      ABDOMEN: Abdominal Ascites noted     Physical Examination  Review of Systems   Vitals: There were no vitals taken for this visit.  BMI= There is no height or weight on file to calculate BMI.  Wt Readings from Last 3 Encounters:   10/11/21 60 kg (132 lb 3.2 oz)   08/23/21 67.9 kg (149 lb 9.6 oz)   06/16/21 62.6 kg (138 lb)       General Appearance:   no distress, normal body habitus   ENT/Mouth: membranes moist, no oral lesions or bleeding gums.      EYES:  no scleral icterus, normal conjunctivae   Neck: no carotid bruits or thyromegaly   Chest/Lungs:   lungs are clear to auscultation, no rales or wheezing, , equal chest wall expansion    Cardiovascular:   Regular. Normal first and second heart sounds with 2/6 systolic murmur in the sash-like distribution.  The carotid, radial and  posterior tibial pulses are intact, Jugular venous pressure does not appear obviously increased.,  1-2+ edema at the shins bilaterally right greater than left.    Abdomen:  no organomegaly, masses, bruits, or tenderness; bowel sounds are present   Extremities: no cyanosis or clubbing   Skin: no xanthelasma, warm.    Neurologic: normal  bilateral, no tremors     Psychiatric: alert and oriented x3, calm        Please refer above for cardiac ROS details.        Medical History  Surgical History Family History Social History   Past Medical History:   Diagnosis Date     CKD (chronic kidney disease) stage 5, GFR less than 15 ml/min (H)      Dyslipidemia      Metabolic acidosis      Type 2 diabetes mellitus (H)      No past surgical history on file.  Family History   Problem Relation Age of Onset     Diabetes Type 2  Mother      Other - See Comments Daughter         granular cell tumor of thigh     Heart Disease Other         Social History     Socioeconomic History     Marital status: Patient Declined     Spouse name: Not on file     Number of children: Not on file     Years of education: Not on file     Highest education level: Not on file   Occupational History     Not on file   Tobacco Use     Smoking status: Never Smoker     Smokeless tobacco: Never Used   Substance and Sexual Activity     Alcohol use: Not Currently     Drug use: Never     Sexual activity: Not on file   Other Topics Concern     Parent/sibling w/ CABG, MI or angioplasty before 65F 55M? Not Asked   Social History Narrative     Not on file     Social Determinants of Health     Financial Resource Strain: Not on file   Food Insecurity: Not on file   Transportation Needs: Not on file   Physical Activity: Not on file   Stress: Not on file   Social Connections: Not on file   Intimate Partner Violence: Not on file   Housing Stability: Not on file           Medications  Allergies   Current Outpatient Medications   Medication Sig Dispense Refill      amLODIPine (NORVASC) 10 MG tablet [AMLODIPINE (NORVASC) 10 MG TABLET] Take 10 mg by mouth daily.        aspirin 81 MG EC tablet [ASPIRIN 81 MG EC TABLET] Take 81 mg by mouth daily.  2     atorvastatin (LIPITOR) 10 MG tablet [ATORVASTATIN (LIPITOR) 10 MG TABLET] Take 10 mg by mouth daily.        calcitrioL (ROCALTROL) 0.25 MCG capsule [CALCITRIOL (ROCALTROL) 0.25 MCG CAPSULE] Take 0.25 mcg by mouth daily.        calcium carbonate 1250 (500 Ca) MG CHEW Take 500 mg by mouth       carvedilol (COREG) 25 MG tablet Take 25 mg by mouth       cloNIDine (CATAPRES-TTS) 0.3 mg/24 hr [CLONIDINE (CATAPRES-TTS) 0.3 MG/24 HR] Place 1 patch on the skin once a week.       furosemide (LASIX) 20 MG tablet Take 20 mg by mouth daily       gentamicin (GARAMYCIN) 0.1 % external cream APPLY TO EXIT SITE ONCE A DAY       hydrALAZINE (APRESOLINE) 100 MG tablet [HYDRALAZINE (APRESOLINE) 100 MG TABLET] Take 100 mg by mouth 3 (three) times a day.       LOKELMA 5 g PACK packet TAKE 5 G BY MOUTH 1 (ONE) TIME EACH DAY       metoprolol succinate (TOPROL-XL) 100 MG 24 hr tablet [METOPROLOL SUCCINATE (TOPROL-XL) 100 MG 24 HR TABLET] Take 100 mg by mouth daily.        minoxidil (LONITEN) 2.5 MG tablet TAKE 1 TABLET BY MOUTH 1 TIME EACH DAY       sodium bicarbonate 650 MG tablet [SODIUM BICARBONATE 650 MG TABLET] Take 1 tablet (650 mg total) by mouth 3 (three) times a day. OTC product 90 tablet 1     No Known Allergies       Lab Results    Chemistry/lipid CBC Cardiac Enzymes/BNP/TSH/INR   No results for input(s): CHOL, HDL, LDL, TRIG, CHOLHDLRATIO in the last 62798 hours.  No results for input(s): LDL in the last 07295 hours.  Recent Labs   Lab Test 08/23/21  1243      POTASSIUM 4.9   CHLORIDE 108   CO2 19*   *   BUN 81*   CR 5.38*   GFRESTIMATED 11*   REYMUNDO 8.7     Recent Labs   Lab Test 08/23/21  1243 06/14/21  1040 06/11/21  1252   CR 5.38* 4.24* 3.54*     Recent Labs   Lab Test 08/23/21  1243 06/11/21  0728   A1C 5.6 5.7*          Recent  Labs   Lab Test 08/23/21  1243   WBC 3.6*   HGB 9.3*   HCT 27.5*   MCV 85   PLT 59*     Recent Labs   Lab Test 08/23/21  1243 06/10/21  1406 08/12/20  1426   HGB 9.3* 10.6* 10.6*    Recent Labs   Lab Test 06/10/21  1406   TROPONINI 0.04     No results for input(s): BNP, NTBNPI, NTBNP in the last 37157 hours.  No results for input(s): TSH in the last 34255 hours.  Recent Labs   Lab Test 10/06/21  1508 08/23/21  1243 06/11/21  0728   INR 1.12 1.25* 1.27*            Alfred Katz MD   [facility]

## 2021-12-02 NOTE — TELEPHONE ENCOUNTER
Nhia C Chelsey  [address] [place] [address]  [phone] (home)     PCP:  Santos Howard  H&P completed by:  Dr Katz 12/2/21  Admit date 12/06/2021 Arrival time:  7:00 AM  Anticoagulation:  NA  Previous PCI: No  Bypass Grafts: No  Renal Issues: Yes - angio being done pre-transplant  Diabetic?: Yes - diet controlled  Device?: No    Ambulation status: independent    Reason for Visit:  Telephone call to discuss pre-procedure education in preparation for: Coronary Angiogram with Possible PCI    Procedure Prep:  EKG results obtained, dated: To be completed on day of cath lab procedure  Hemogram results obtained: To be completed on day of cath lab procedure  Basic Metabolic Panel results obtained: To be completed on day of cath lab procedure  Pertinent cardiac test results: 10/29/21 Cardiac MR  COVID-19 test results obtained: Completed within [place] 12/02/21 at [facility]    Patient Education    1. Your arrival time is 7:00 AM.  [place] [address] - Main Entrance of the Hospital  2. Please plan on being at the hospital all day.  3. At any time, emergencies and/or urgent cases may come up which could delay the start of your procedure.    COVID Testing Instructions  *Mandatory COVID Testing:   ALL Patients will need to complete a COVID test no sooner than 4 days prior to their procedure (regardless of vaccination status).      To schedule COVID testing Please call [phone]    If you want to complete this at an outside facility please call them to find out if they will have the results within the appropriate time frame and their fax number.  You will need to provide us with that information so we can send the order.    The facility completing the test will need to fax the results to [phone]    If you are running into and issues please call us.     Pre-procedure instructions  Patient instructed to not Eat or Drink after midnight.  Patient  instructed to shower the evening before or the morning of the procedure.  Patient instructed to arrange for transportation home following procedure from a responsible family member or friend. No driving for at least 24 hours.  Patient instructed to have a responsible adult with them for 24 hours post-procedure.  Post-procedure follow up process.  Conscious sedation discussed.    Pre-procedure medication instructions.  Continue medications as scheduled, with a small amount of water on the day of the procedure unless indicated. (NO Diabetic Medications or Blood Thinners)  Pt instructed not to consume Alcohol, Tobacco, Caffeine, or Carbonated beverages 12 hours prior to procedure.  Patient instructed to take 324 mg of Aspirin the morning of procedure: Yes  Other medication: instructed to only take amlodipine, carvedilol, and hydralazine a.m. of the procedure.              Patient states understanding of procedure and agrees to proceed.    *PATIENTS RECORDS AVAILABLE IN [place] UNLESS OTHERWISE INDICATED*      Patient Active Problem List   Diagnosis     Type 2 Diabetes Mellitus     Hyperlipidemia     Hypertension     Stage 4 chronic kidney disease (H)     Acute kidney injury (H)     Metabolic acidosis       Current Outpatient Medications   Medication Sig Dispense Refill     amLODIPine (NORVASC) 10 MG tablet [AMLODIPINE (NORVASC) 10 MG TABLET] Take 10 mg by mouth daily.        aspirin 81 MG EC tablet [ASPIRIN 81 MG EC TABLET] Take 81 mg by mouth daily.  2     atorvastatin (LIPITOR) 10 MG tablet [ATORVASTATIN (LIPITOR) 10 MG TABLET] Take 10 mg by mouth daily.        calcitrioL (ROCALTROL) 0.25 MCG capsule [CALCITRIOL (ROCALTROL) 0.25 MCG CAPSULE] Take 0.25 mcg by mouth daily.        calcium carbonate 1250 (500 Ca) MG CHEW Take 500 mg by mouth       carvedilol (COREG) 25 MG tablet Take 25 mg by mouth       cloNIDine (CATAPRES-TTS) 0.3 mg/24 hr [CLONIDINE (CATAPRES-TTS) 0.3 MG/24 HR] Place 1 patch on the skin once a week.        furosemide (LASIX) 20 MG tablet Take 20 mg by mouth daily       gentamicin (GARAMYCIN) 0.1 % external cream APPLY TO EXIT SITE ONCE A DAY       hydrALAZINE (APRESOLINE) 100 MG tablet [HYDRALAZINE (APRESOLINE) 100 MG TABLET] Take 100 mg by mouth 3 (three) times a day.         No Known Allergies    Gloria Dunn RN on 12/2/2021 at 4:19 PM

## 2021-12-02 NOTE — TELEPHONE ENCOUNTER
Education sent via [employer] message.  Daughter Yanick was update and will review.  -heidi

## 2021-12-02 NOTE — PATIENT INSTRUCTIONS
We are going to plan a heart angiogram.We discussed that the kidney transplant folks are requesting this procedure before listing you for the transplant.    We also discussed your heart muscle that is abnormally thick.The name of the condition is asymmetric septal hypertrophy.Please discuss with your family that they should be screened for this condition and should discuss with their doctor.Please call my nurse with any questions.Her name is Gloria and her number is [phone]    We will plan to follow up in 6 months.Please let me know about chest discomfort, dizziness, shortness of breath or fainting.

## 2021-12-03 LAB — SARS-COV-2 RNA RESP QL NAA+PROBE: NEGATIVE

## 2021-12-06 ENCOUNTER — HOSPITAL ENCOUNTER (OUTPATIENT)
Facility: HOSPITAL | Age: 56
Discharge: HOME | End: 2021-12-06
Attending: INTERNAL MEDICINE | Admitting: INTERNAL MEDICINE
Payer: MEDICARE

## 2021-12-06 VITALS
SYSTOLIC BLOOD PRESSURE: 137 MMHG | OXYGEN SATURATION: 99 % | TEMPERATURE: 97.5 F | WEIGHT: 129.1 LBS | HEIGHT: 64 IN | DIASTOLIC BLOOD PRESSURE: 69 MMHG | RESPIRATION RATE: 16 BRPM | HEART RATE: 77 BPM | BODY MASS INDEX: 22.04 KG/M2

## 2021-12-06 DIAGNOSIS — Z01.818 PRE-TRANSPLANT EVALUATION FOR KIDNEY TRANSPLANT: ICD-10-CM

## 2021-12-06 DIAGNOSIS — I42.2 HYPERTROPHIC CARDIOMYOPATHY (H): ICD-10-CM

## 2021-12-06 DIAGNOSIS — N18.4 STAGE 4 CHRONIC KIDNEY DISEASE (H): ICD-10-CM

## 2021-12-06 DIAGNOSIS — I10 ESSENTIAL HYPERTENSION: Primary | ICD-10-CM

## 2021-12-06 LAB
ABO/RH(D): NORMAL
ACT BLD: 377 SECONDS (ref 74–150)
ANION GAP SERPL CALCULATED.3IONS-SCNC: 11 MMOL/L (ref 5–18)
ANTIBODY SCREEN: NEGATIVE
ATRIAL RATE - MUSE: 77 BPM
ATRIAL RATE - MUSE: 81 BPM
BUN SERPL-MCNC: 54 MG/DL (ref 8–22)
CALCIUM SERPL-MCNC: 8.7 MG/DL (ref 8.5–10.5)
CHLORIDE BLD-SCNC: 97 MMOL/L (ref 98–107)
CHOLEST SERPL-MCNC: 181 MG/DL
CO2 SERPL-SCNC: 27 MMOL/L (ref 22–31)
CREAT SERPL-MCNC: 5.68 MG/DL (ref 0.7–1.3)
DIASTOLIC BLOOD PRESSURE - MUSE: NORMAL MMHG
DIASTOLIC BLOOD PRESSURE - MUSE: NORMAL MMHG
ERYTHROCYTE [DISTWIDTH] IN BLOOD BY AUTOMATED COUNT: 14.3 % (ref 10–15)
FASTING STATUS PATIENT QL REPORTED: YES
GFR SERPL CREATININE-BSD FRML MDRD: 10 ML/MIN/1.73M2
GLUCOSE BLD-MCNC: 293 MG/DL (ref 70–125)
HCT VFR BLD AUTO: 32.1 % (ref 40–53)
HDLC SERPL-MCNC: 53 MG/DL
HGB BLD-MCNC: 11.3 G/DL (ref 13.3–17.7)
INR PPP: 1.01 (ref 0.85–1.15)
INTERPRETATION ECG - MUSE: NORMAL
INTERPRETATION ECG - MUSE: NORMAL
LDLC SERPL CALC-MCNC: 93 MG/DL
MCH RBC QN AUTO: 28.9 PG (ref 26.5–33)
MCHC RBC AUTO-ENTMCNC: 35.2 G/DL (ref 31.5–36.5)
MCV RBC AUTO: 82 FL (ref 78–100)
P AXIS - MUSE: 29 DEGREES
P AXIS - MUSE: 29 DEGREES
PLATELET # BLD AUTO: 75 10E3/UL (ref 150–450)
POTASSIUM BLD-SCNC: 3.9 MMOL/L (ref 3.5–5)
PR INTERVAL - MUSE: 168 MS
PR INTERVAL - MUSE: 176 MS
QRS DURATION - MUSE: 84 MS
QRS DURATION - MUSE: 92 MS
QT - MUSE: 386 MS
QT - MUSE: 400 MS
QTC - MUSE: 448 MS
QTC - MUSE: 452 MS
R AXIS - MUSE: -10 DEGREES
R AXIS - MUSE: -12 DEGREES
RBC # BLD AUTO: 3.91 10E6/UL (ref 4.4–5.9)
SODIUM SERPL-SCNC: 135 MMOL/L (ref 136–145)
SPECIMEN EXPIRATION DATE: NORMAL
SYSTOLIC BLOOD PRESSURE - MUSE: NORMAL MMHG
SYSTOLIC BLOOD PRESSURE - MUSE: NORMAL MMHG
T AXIS - MUSE: 52 DEGREES
T AXIS - MUSE: 61 DEGREES
TRIGL SERPL-MCNC: 176 MG/DL
VENTRICULAR RATE- MUSE: 77 BPM
VENTRICULAR RATE- MUSE: 81 BPM
WBC # BLD AUTO: 8.2 10E3/UL (ref 4–11)

## 2021-12-06 PROCEDURE — 250N000013 HC RX MED GY IP 250 OP 250 PS 637: Performed by: INTERNAL MEDICINE

## 2021-12-06 PROCEDURE — 85610 PROTHROMBIN TIME: CPT | Performed by: INTERNAL MEDICINE

## 2021-12-06 PROCEDURE — C1769 GUIDE WIRE: HCPCS | Performed by: INTERNAL MEDICINE

## 2021-12-06 PROCEDURE — 36415 COLL VENOUS BLD VENIPUNCTURE: CPT | Performed by: INTERNAL MEDICINE

## 2021-12-06 PROCEDURE — 272N000001 HC OR GENERAL SUPPLY STERILE: Performed by: INTERNAL MEDICINE

## 2021-12-06 PROCEDURE — C1874 STENT, COATED/COV W/DEL SYS: HCPCS | Performed by: INTERNAL MEDICINE

## 2021-12-06 PROCEDURE — 255N000002 HC RX 255 OP 636: Performed by: INTERNAL MEDICINE

## 2021-12-06 PROCEDURE — 93010 ELECTROCARDIOGRAM REPORT: CPT | Mod: HOP | Performed by: INTERNAL MEDICINE

## 2021-12-06 PROCEDURE — C1887 CATHETER, GUIDING: HCPCS | Performed by: INTERNAL MEDICINE

## 2021-12-06 PROCEDURE — C1725 CATH, TRANSLUMIN NON-LASER: HCPCS | Performed by: INTERNAL MEDICINE

## 2021-12-06 PROCEDURE — 80061 LIPID PANEL: CPT | Performed by: INTERNAL MEDICINE

## 2021-12-06 PROCEDURE — 93458 L HRT ARTERY/VENTRICLE ANGIO: CPT | Mod: 26 | Performed by: INTERNAL MEDICINE

## 2021-12-06 PROCEDURE — 99152 MOD SED SAME PHYS/QHP 5/>YRS: CPT | Performed by: INTERNAL MEDICINE

## 2021-12-06 PROCEDURE — 93005 ELECTROCARDIOGRAM TRACING: CPT

## 2021-12-06 PROCEDURE — 250N000011 HC RX IP 250 OP 636: Performed by: INTERNAL MEDICINE

## 2021-12-06 PROCEDURE — 92928 PRQ TCAT PLMT NTRAC ST 1 LES: CPT | Mod: LC | Performed by: INTERNAL MEDICINE

## 2021-12-06 PROCEDURE — 80048 BASIC METABOLIC PNL TOTAL CA: CPT | Performed by: INTERNAL MEDICINE

## 2021-12-06 PROCEDURE — 93458 L HRT ARTERY/VENTRICLE ANGIO: CPT | Mod: XU | Performed by: INTERNAL MEDICINE

## 2021-12-06 PROCEDURE — 85347 COAGULATION TIME ACTIVATED: CPT

## 2021-12-06 PROCEDURE — C9600 PERC DRUG-EL COR STENT SING: HCPCS | Performed by: INTERNAL MEDICINE

## 2021-12-06 PROCEDURE — 999N000054 HC STATISTIC EKG NON-CHARGEABLE

## 2021-12-06 PROCEDURE — 258N000003 HC RX IP 258 OP 636: Performed by: INTERNAL MEDICINE

## 2021-12-06 PROCEDURE — 250N000009 HC RX 250: Performed by: INTERNAL MEDICINE

## 2021-12-06 PROCEDURE — 86900 BLOOD TYPING SEROLOGIC ABO: CPT | Performed by: INTERNAL MEDICINE

## 2021-12-06 PROCEDURE — C1894 INTRO/SHEATH, NON-LASER: HCPCS | Performed by: INTERNAL MEDICINE

## 2021-12-06 PROCEDURE — 93010 ELECTROCARDIOGRAM REPORT: CPT | Mod: HOP | Performed by: GENERAL ACUTE CARE HOSPITAL

## 2021-12-06 PROCEDURE — 85027 COMPLETE CBC AUTOMATED: CPT | Performed by: INTERNAL MEDICINE

## 2021-12-06 DEVICE — STENT CORONARY DES SYNERGY XD MR US 2.50X12MM H7493941812250: Type: IMPLANTABLE DEVICE | Status: FUNCTIONAL

## 2021-12-06 RX ORDER — NALOXONE HYDROCHLORIDE 0.4 MG/ML
0.4 INJECTION, SOLUTION INTRAMUSCULAR; INTRAVENOUS; SUBCUTANEOUS
Status: DISCONTINUED | OUTPATIENT
Start: 2021-12-06 | End: 2021-12-06 | Stop reason: HOSPADM

## 2021-12-06 RX ORDER — ASPIRIN 81 MG/1
81 TABLET, CHEWABLE ORAL ONCE
Status: DISCONTINUED | OUTPATIENT
Start: 2021-12-06 | End: 2021-12-06 | Stop reason: HOSPADM

## 2021-12-06 RX ORDER — HYDRALAZINE HYDROCHLORIDE 20 MG/ML
10 INJECTION INTRAMUSCULAR; INTRAVENOUS EVERY 4 HOURS PRN
Status: DISCONTINUED | OUTPATIENT
Start: 2021-12-06 | End: 2021-12-06 | Stop reason: HOSPADM

## 2021-12-06 RX ORDER — DEXTROSE MONOHYDRATE 25 G/50ML
25-50 INJECTION, SOLUTION INTRAVENOUS
Status: DISCONTINUED | OUTPATIENT
Start: 2021-12-06 | End: 2021-12-06 | Stop reason: HOSPADM

## 2021-12-06 RX ORDER — OXYCODONE HYDROCHLORIDE 5 MG/1
10 TABLET ORAL EVERY 4 HOURS PRN
Status: DISCONTINUED | OUTPATIENT
Start: 2021-12-06 | End: 2021-12-06 | Stop reason: HOSPADM

## 2021-12-06 RX ORDER — NITROGLYCERIN 0.4 MG/1
0.4 TABLET SUBLINGUAL EVERY 5 MIN PRN
Status: DISCONTINUED | OUTPATIENT
Start: 2021-12-06 | End: 2021-12-06 | Stop reason: HOSPADM

## 2021-12-06 RX ORDER — SODIUM CHLORIDE 9 MG/ML
INJECTION, SOLUTION INTRAVENOUS CONTINUOUS
Status: ACTIVE | OUTPATIENT
Start: 2021-12-06 | End: 2021-12-06

## 2021-12-06 RX ORDER — FLUMAZENIL 0.1 MG/ML
0.2 INJECTION, SOLUTION INTRAVENOUS
Status: DISCONTINUED | OUTPATIENT
Start: 2021-12-06 | End: 2021-12-06 | Stop reason: HOSPADM

## 2021-12-06 RX ORDER — ATROPINE SULFATE 0.1 MG/ML
0.5 INJECTION INTRAVENOUS
Status: DISCONTINUED | OUTPATIENT
Start: 2021-12-06 | End: 2021-12-06 | Stop reason: HOSPADM

## 2021-12-06 RX ORDER — NITROGLYCERIN 0.4 MG/1
TABLET SUBLINGUAL
Status: DISCONTINUED | OUTPATIENT
Start: 2021-12-06 | End: 2021-12-06 | Stop reason: HOSPADM

## 2021-12-06 RX ORDER — LIDOCAINE 40 MG/G
CREAM TOPICAL
Status: DISCONTINUED | OUTPATIENT
Start: 2021-12-06 | End: 2021-12-06 | Stop reason: HOSPADM

## 2021-12-06 RX ORDER — SODIUM CHLORIDE 9 MG/ML
INJECTION, SOLUTION INTRAVENOUS CONTINUOUS
Status: DISCONTINUED | OUTPATIENT
Start: 2021-12-06 | End: 2021-12-06 | Stop reason: HOSPADM

## 2021-12-06 RX ORDER — ATORVASTATIN CALCIUM 40 MG/1
40 TABLET, FILM COATED ORAL DAILY
Qty: 90 TABLET | Refills: 3 | Status: SHIPPED | OUTPATIENT
Start: 2021-12-06 | End: 2023-01-27

## 2021-12-06 RX ORDER — FENTANYL CITRATE 50 UG/ML
INJECTION, SOLUTION INTRAMUSCULAR; INTRAVENOUS
Status: DISCONTINUED | OUTPATIENT
Start: 2021-12-06 | End: 2021-12-06 | Stop reason: HOSPADM

## 2021-12-06 RX ORDER — CARVEDILOL 25 MG/1
37.5 TABLET ORAL 2 TIMES DAILY WITH MEALS
Qty: 270 TABLET | Refills: 3 | Status: SHIPPED | OUTPATIENT
Start: 2021-12-06 | End: 2021-12-16

## 2021-12-06 RX ORDER — OXYCODONE HYDROCHLORIDE 5 MG/1
5 TABLET ORAL EVERY 4 HOURS PRN
Status: DISCONTINUED | OUTPATIENT
Start: 2021-12-06 | End: 2021-12-06 | Stop reason: HOSPADM

## 2021-12-06 RX ORDER — NITROGLYCERIN 0.4 MG/1
TABLET SUBLINGUAL
Qty: 25 TABLET | Refills: 3 | Status: SHIPPED | OUTPATIENT
Start: 2021-12-06

## 2021-12-06 RX ORDER — ASPIRIN 81 MG/1
81 TABLET ORAL DAILY
Status: DISCONTINUED | OUTPATIENT
Start: 2021-12-07 | End: 2021-12-06 | Stop reason: HOSPADM

## 2021-12-06 RX ORDER — ASPIRIN 325 MG
325 TABLET ORAL ONCE
Status: COMPLETED | OUTPATIENT
Start: 2021-12-06 | End: 2021-12-06

## 2021-12-06 RX ORDER — ONDANSETRON 4 MG/1
4 TABLET, ORALLY DISINTEGRATING ORAL EVERY 6 HOURS PRN
Status: DISCONTINUED | OUTPATIENT
Start: 2021-12-06 | End: 2021-12-06 | Stop reason: HOSPADM

## 2021-12-06 RX ORDER — ASPIRIN 81 MG/1
243 TABLET, CHEWABLE ORAL ONCE
Status: COMPLETED | OUTPATIENT
Start: 2021-12-06 | End: 2021-12-06

## 2021-12-06 RX ORDER — FENTANYL CITRATE 50 UG/ML
25 INJECTION, SOLUTION INTRAMUSCULAR; INTRAVENOUS
Status: DISCONTINUED | OUTPATIENT
Start: 2021-12-06 | End: 2021-12-06 | Stop reason: HOSPADM

## 2021-12-06 RX ORDER — NALOXONE HYDROCHLORIDE 0.4 MG/ML
0.2 INJECTION, SOLUTION INTRAMUSCULAR; INTRAVENOUS; SUBCUTANEOUS
Status: DISCONTINUED | OUTPATIENT
Start: 2021-12-06 | End: 2021-12-06 | Stop reason: HOSPADM

## 2021-12-06 RX ORDER — IODIXANOL 320 MG/ML
INJECTION, SOLUTION INTRAVASCULAR
Status: DISCONTINUED | OUTPATIENT
Start: 2021-12-06 | End: 2021-12-06 | Stop reason: HOSPADM

## 2021-12-06 RX ORDER — DIAZEPAM 5 MG
10 TABLET ORAL
Status: COMPLETED | OUTPATIENT
Start: 2021-12-06 | End: 2021-12-06

## 2021-12-06 RX ORDER — NICOTINE POLACRILEX 4 MG
15-30 LOZENGE BUCCAL
Status: DISCONTINUED | OUTPATIENT
Start: 2021-12-06 | End: 2021-12-06 | Stop reason: HOSPADM

## 2021-12-06 RX ORDER — ACETAMINOPHEN 325 MG/1
650 TABLET ORAL EVERY 4 HOURS PRN
Status: DISCONTINUED | OUTPATIENT
Start: 2021-12-06 | End: 2021-12-06 | Stop reason: HOSPADM

## 2021-12-06 RX ORDER — HEPARIN SODIUM 1000 [USP'U]/ML
INJECTION, SOLUTION INTRAVENOUS; SUBCUTANEOUS
Status: DISCONTINUED | OUTPATIENT
Start: 2021-12-06 | End: 2021-12-06 | Stop reason: HOSPADM

## 2021-12-06 RX ORDER — METOPROLOL TARTRATE 1 MG/ML
5 INJECTION, SOLUTION INTRAVENOUS
Status: DISCONTINUED | OUTPATIENT
Start: 2021-12-06 | End: 2021-12-06 | Stop reason: HOSPADM

## 2021-12-06 RX ORDER — ONDANSETRON 2 MG/ML
4 INJECTION INTRAMUSCULAR; INTRAVENOUS EVERY 6 HOURS PRN
Status: DISCONTINUED | OUTPATIENT
Start: 2021-12-06 | End: 2021-12-06 | Stop reason: HOSPADM

## 2021-12-06 RX ADMIN — SODIUM CHLORIDE: 9 INJECTION, SOLUTION INTRAVENOUS at 07:59

## 2021-12-06 RX ADMIN — DIAZEPAM 10 MG: 5 TABLET ORAL at 09:52

## 2021-12-06 RX ADMIN — ASPIRIN 81 MG CHEWABLE TABLET 243 MG: 81 TABLET CHEWABLE at 08:27

## 2021-12-06 ASSESSMENT — EJECTION FRACTION: EF_VALUE: .26

## 2021-12-06 ASSESSMENT — MIFFLIN-ST. JEOR: SCORE: 1326.59

## 2021-12-06 NOTE — DISCHARGE INSTRUCTIONS

## 2021-12-06 NOTE — Clinical Note
The first balloon was inserted into the circumflex and marginal circumflex.Max pressure = 12 danilea. Total duration = 20 seconds.

## 2021-12-06 NOTE — Clinical Note
The first balloon was inserted into the circumflex and marginal circumflex.Max pressure = 12 daniela. Total duration = 31 seconds.

## 2021-12-06 NOTE — PLAN OF CARE
Pt's son brought in supplies for pts peritoneal dialysis.  Pt doing peritoneal dialysis with assistance as needed.       Laura Inman RN

## 2021-12-07 NOTE — PLAN OF CARE
Pts TR band removed. Reverse barbeau normal.  Area around site bruised. Instructed pt and his daughter on restrictions and what to di if he has bleeding. Pt up ambulated and discharged home with daughter.    Laura Inman RN

## 2021-12-12 ENCOUNTER — HEALTH MAINTENANCE LETTER (OUTPATIENT)
Age: 56
End: 2021-12-12

## 2021-12-16 ENCOUNTER — OFFICE VISIT (OUTPATIENT)
Dept: CARDIOLOGY | Facility: CLINIC | Age: 56
End: 2021-12-16
Payer: MEDICARE

## 2021-12-16 VITALS
SYSTOLIC BLOOD PRESSURE: 80 MMHG | BODY MASS INDEX: 21.85 KG/M2 | DIASTOLIC BLOOD PRESSURE: 40 MMHG | HEART RATE: 76 BPM | HEIGHT: 64 IN | WEIGHT: 128 LBS | RESPIRATION RATE: 12 BRPM

## 2021-12-16 DIAGNOSIS — N18.4 STAGE 4 CHRONIC KIDNEY DISEASE (H): ICD-10-CM

## 2021-12-16 DIAGNOSIS — E78.5 DYSLIPIDEMIA, GOAL LDL BELOW 70: ICD-10-CM

## 2021-12-16 DIAGNOSIS — I10 ESSENTIAL HYPERTENSION: ICD-10-CM

## 2021-12-16 DIAGNOSIS — I25.10 CORONARY ARTERY DISEASE INVOLVING NATIVE CORONARY ARTERY OF NATIVE HEART WITHOUT ANGINA PECTORIS: Primary | ICD-10-CM

## 2021-12-16 PROBLEM — N17.9 ACUTE KIDNEY INJURY (H): Status: RESOLVED | Noted: 2021-06-10 | Resolved: 2021-12-16

## 2021-12-16 PROCEDURE — 99215 OFFICE O/P EST HI 40 MIN: CPT | Performed by: NURSE PRACTITIONER

## 2021-12-16 RX ORDER — CARVEDILOL 25 MG/1
25 TABLET ORAL 2 TIMES DAILY WITH MEALS
Qty: 270 TABLET | Refills: 3 | Status: SHIPPED | OUTPATIENT
Start: 2021-12-16 | End: 2023-01-27

## 2021-12-16 ASSESSMENT — MIFFLIN-ST. JEOR: SCORE: 1321.6

## 2021-12-16 NOTE — PATIENT INSTRUCTIONS
Modesto Barbosa,    It was a pleasure to see you today at the [facility].     My recommendations after this visit include:    - Decrease Carvedilol 25 mg from 1.5 tablet twice a day to 1 tablet twice a day due to low BP    - Please make sure to take Carvedilol with food for slow absorption and prevent low BP    - Call us if your BP continues stay >130/80 pr <100/60.     - Please seek medical attention if you develop new onset of  chest pain or shortness of breath or similar symptoms you experienced prior to recent cardiac event    - Please get your lipid level test in 4 weeks - order placed. Make sure to fast for 8-12 hours prior to lab work. Okay to have plain water, black coffee or tea without creamer and sugar    - Cardiac rehab as scheduled    - Follow up with Dr. Katz as scheduled on 1/17/22    - Please call ELIDIA Herzog on [phone], if you have any questions or concerns    Melissa Reid CNP    Medication     o Take all your medications as prescribed  o Do not stop any medications without talking with a healthcare provider    Exercise      o Physical activity is important for overall health  o Set a goal of 150 minutes of exercise each week  o For example, 30 minutes of exercise 5 days each week.    o These 30 minutes can be broken into shorter periods of 15 minutes twice daily or 10 minutes three times daily  o Start any exercise program slowly and work towards the goal of 150 minutes each week  o For example, you may start with 10 minutes and plan to add a few minutes each week as you get stronger   o Examples of exercise include walking, swimming, or biking  o Remember to stretch and stay hydrated with exercise    Diet     o A heart healthy diet includes:  o A variety of fruits and vegetables  o Whole grains  o Low-fat dairy (fat-free, 1% fat, and low-fat)  o Lean meats and poultry without skin   o Fish (eat fish 2 times each week)  o Nuts  o Limit saturated fat to about 13 grams each  day (based on a 2000 calorie diet)  o Limit red meat  o Limit sugars (sweets and sugary beverages)  o Limit your portion sizes  o Do not add salt to your food when cooking or at the table  o Limit alcohol intake (no more than 1 drink each day for women or 2 drinks each day for men)    Weight Loss     o Work on losing weight with diet and exercise  o You BMI (body mass index) should be between 18.5-24.9  o This is a calculation of your weight and height  o Please ask your healthcare provider for your BMI    Manage Other Chronic Health Conditions     o Control cholesterol  o Eat a diet low in saturated fat  o Exercise   o Take a statin medication as prescribed  o Manage blood pressure  o Eat a diet low in sodium  o Exercise  o Reduce stress  o Lose weight   o Take blood pressure medications as prescribed  o Control blood sugars if diabetic  o Monitor sugars and carbohydrates in your diet  o Lose weight   o Take diabetes medications as prescribed  o Follow-up with your primary care provider to make sure your blood sugars are well controlled    Stress Reduction     o Find time each day to relax  o Reading, listening to music, yoga, meditation, exercise, spending time with friends and family, volunteering   o Get 6-8 hours of sleep each night    Smoking Cessation     o Smoking causes numerous health problems including coronary artery disease  o It is never too late to quit  o Set realistic goals for quitting  o Decrease the number of cigarettes used each week  o Use nicotine gum or patches to help you quit    Information from the American Heart Association.  Please visit their website at www.heart.org    Patient Education     Eating Heart-Healthy Foods  Eating has a big impact on your heart health. In fact, eating healthier can improve several of your heart risks at once. For instance, it helps you manage weight, cholesterol, and blood pressure. Here are ideas to help you make heart-healthy changes without giving up  all the foods and flavors you love.   Getting started    Talk with your healthcare provider about eating plans, such as the DASH or Mediterranean diet. You may also be referred to a dietitian.    Change a few things at a time. Give yourself time to get used to a few eating changes before adding more.    Work to create a tasty, healthy eating plan that you can stick to for the rest of your life.    Goals for healthy eating  Below are some tips to improve your eating habits:     Limit saturated fats and trans fats. Saturated fats raise your levels of cholesterol, so keep these fats to a minimum. They are found in foods such as fatty meats, whole milk, cheese, and palm and coconut oils. Avoid trans fats because they lower good cholesterol as well as raise bad cholesterol. Trans fats are most often found in processed foods, such as pastries, cookies, pies, muffins, fried foods, stick margarines, and shortening.    Reduce how much sodium (salt) you have. Eating too much salt may increase your blood pressure. Limit your sodium intake to 2,300 milligrams (mg) per day (the amount in 1 teaspoon of salt), or less if your healthcare provider recommends it. Dining out less often and eating fewer processed foods are two great ways to decrease the amount of salt you consume. At home, flavor your foods with other spices and herbs instead of salt.    Managing calories. A calorie is a unit of energy. Your body burns calories for fuel, but if you eat more calories than your body burns, the extras are stored as fat. Your healthcare provider can help you create a diet plan to manage your calories. This will likely include eating healthier foods and getting regular exercise. To help you track your progress, keep a diary to record what you eat and how often you exercise.  Choose the right foods  Aim to make these foods staples of your diet. If you have diabetes, you may have different recommendations than what is listed here:     Fruits  and vegetables provide plenty of nutrients without a lot of calories. At meals, fill half your plate with these foods. Choose between fresh, frozen, canned, or dried without added sauces, salt, or sugars. Split the other half of your plate between whole grains and lean protein.    Whole grains are high in fiber and rich in vitamins and nutrients. Good choices include whole wheat bread, pasta, oats, and brown rice. Make at least half of your grains whole grains.    Lean proteins give you nutrition with less fat. Good choices include fish, skinless chicken and turkey, and beans. Draining the fat from cooked ground meat is another way to reduce the amount of fat you eat.    Low-fat and nonfat dairy provide nutrients without a lot of fat. Try low-fat or nonfat milk, cheese, or yogurt.    Healthy fats can be good for you in small amounts. These are unsaturated fats, such as olive oil, nuts, and fish. Try to have at least 2 servings per week of fatty fish, such as salmon, sardines, mackerel, rainbow trout, and albacore tuna. These contain omega-3 fatty acids, which are good for your heart. Flaxseed and walnuts are other sources of heart-healthy fats.  More on heart-healthy eating  Read food labels  Healthy eating starts at the grocery store. Be sure to pay attention to food labels on packaged foods. Look for products that are high in fiber and protein, and low in saturated fat, added sugars, and sodium. Avoid products that contain trans fat. And pay close attention to serving size. For instance, if you plan to eat two servings, double all the numbers on the label.   Prepare food right  A key part of healthy cooking is cutting down on added fat, sugar and salt. Look on the internet for lower-fat, lower-sodium recipes without a lot of added sugars. Also try these tips:     Remove fat from meat and skin from poultry before cooking.    Skim fat from the surface of soups and sauces.    Broil, roast, boil, bake, steam, grill,  or microwave food without added fats.    Choose ingredients that spice up your food without adding calories, fat, sugar, or sodium. Try these items: horseradish, hot sauce, lemon, mustard, nonfat salad dressings, and vinegar. Small amounts of olive oil-based vinaigrettes are OK, too. For salt-free herbs and spices, try basil, cilantro, cinnamon, cumin, paprika, pepper, and rosemary.  [employer] last reviewed this educational content on 7/1/2020 2000-2021 [employer]. All rights reserved. This information is not intended as a substitute for professional medical care. Always follow your healthcare professional's instructions.

## 2021-12-16 NOTE — PROGRESS NOTES
Assessment/Recommendations   Assessment:    1.  Coronary artery disease: Coronary Angiogram on 12/6/21 showed 90% lesion in OM 3 which was successfully treated with SHAVONNE with no complications. Otherwise noted mild to moderate disease in LAD, RCA and  disease in LAD, and RCA and mild disease in circumflex.    On dual antiplatelet therapy with ASA 81 mg indefinitely and Ticagrelor (Brilinta) 90 mg twice a day for 12 months.  Patient denies any chest pain or angina except he has noted some shortness of breath every once in a while where he has to catch his breath.  This is likely due to Brilinta.  He states this is mild and he can tolerate.    Cardiac rehab has been scheduled.    2.  Dyslipidemia with LDL goal <70/Obesity with a BMI of: Modesto Barbosa is on high intensity statin therapy with Atorvastatin 40 mg daily. Most recent LDL is 93 and TGL is 176 in December, 2021.  Most recent AST/ALT are stable.    3.  Hypertension: His blood pressure is today is 108/50.  Recheck was 80/40.  He has been noticing some lightheadedness since recent stent placement.  His carvedilol dose was increased recently.  He has been taking carvedilol in empty stomach.    Currently on Amlodipine 10 mg daily, Clonidine 0.3 mg/24 hour patch once weekly, Hydralazine 100 mg 3 times a day, metoprolol succinate 100 mg daily, furosemide, .    4.  CKD stage IV: On peritoneal dialysis. Cr 5.68 .  He follows up with nephrology every month.    Plan:  - We discussed the importance of antiplatelet therapy and talking with his cardiologist prior to stopping these medications for any reason.      -Decrease carvedilol from 37.5 mg twice a day to 25 mg twice a day.    -Instructed to take carvedilol with food    - Encouraged to seek medical attention if recurrent chest pain or shortness of breath.    - Risk factor modification and lifestyle management topics were discussed including managing comorbidities, heart healthy diet, exercise and stress  reduction.      - Fasting lipid profile check in 4.  patient would like to have it done on the day he has an appointment with Dr. Katz.  Order placed.    - Cardiac rehab as scheduled on 12/22    - We discussed a diet low in saturated fat, weight loss, and exercise along with medication for better control of cholesterol.      -Patient was encouraged to call if worsening swelling in the right wrist.    Follow up with Dr. Katz as scheduled on 1/19 .      History of Present Illness/Subjective    Mr. Modesto Barbosa is a 55 year old male with a past medical history of hypertension, hyperlipidemia, DM II, CKD Stage IV, and new diagnosis of coronary artery disease  who is seen at [facility] for post coronary intervention follow up.     Patient saw Dr. Katz recently and was recommended coronary angiogram screening prior to renal transplant. His statin therapy dose was increased.  Patient essentially had normal holter study.    Today, Modesto  is here accompanied by his son.  They declined  service.  Son helped as an .  Patient also speaks little English.  He denies fatigue, shortness of breath, orthopnea, PND, palpitations, chest pain, abdominal fullness/bloating and lower extremity edema.  Every once in a while he has to catch his breath and has to breathe fast .  This is new to him since recent stent placement.  He also has been feeling some lightheadedness and dizziness.  His blood pressures running on the low side today.  He also states that he has been taking his carvedilol in empty stomach.    Holter Study done on 10/12/21   Conclusion  [facility]   HOLTER REPORT   Results:    Indication for study: Evaluate for cardiomyopathy    A 24-hour Holter monitor was applied October 12, 2021 with date of interpretation October 14, 2021    Baseline tracing showed sinus rhythm with normal electrocardiographic intervals.  Average heart rate 75 bpm range between 59-98  bpm    No bradycardia, no pauses    Very rare ectopy with only 1 PVC, 1 PAC    Patient activity diary not returned    Conclusion    Normal monitor    stable heart rhythm pattern    Coronary Angiogram done on 12/6/21: reviewed  Conclusion    Patient with ESRD on peritoneal dialysis, undergoing a transplant work up. He also has diabetes, uncontrolled hypertension and hyperlipidemia.    Normal left main.    The LAD has diffuse mild calcification with a mild proximal disease and moderate mid LAD disease. There is no significant diagonal disease.    The circumflex has a very small OM-2, a small OM-2 and a large branching OM-3 with mild disease proximally and a severe stenosis in the larger, more anterior branch. This was stented with a Synergy SHAVONNE with good angiographic results.    Moderate sized RCA with mild proximal disease and moderate disease in both the proximal PDA and right AV groove branch prior to the PL branches.    LV EDP 13 mmhg. LV-Ao pullback gradient 17 mmHg.   Recommendations  General Recommendations:  - Follow up visit with Nurse Practitioner in 1-2 weeks.   - Recommended follow up with doctor Dr. Katz in 6 weeks.   - Arterial sheath removed from radial artery with TR Band placement.   - Recommend cardiac rehabilitation.      Cardiac Stress test MRI done on 10/29/21: Reviewed  SUMMARY   Non-contrast Cardiac MRI  1.  Small left ventricular cavity related to hypertrophy with hyperdynamic left ventricular systolic  function.  There is asymmetric anteroseptal hypertrophy with a maximal diameter of 19 mm with evidence of  left ventricular outflow tract obstruction.  There is moderate chordal MINA. The quantified left ventricular  ejection fraction is 80.7%.    2.  Normal right ventricular size and systolic function.    3.  Aortic valve sclerosis without stenosis.  Mild aortic regurgitation.    4.  Mild mitral regurgitation.           5. Moderate left atrial enlargement.      Physical Examination Review of  "Systems   BP (!) 80/40   Pulse 76   Resp 12   Ht 1.626 m (5' 4\")   Wt 58.1 kg (128 lb)   BMI 21.97 kg/m    Body mass index is 21.97 kg/m .  Wt Readings from Last 3 Encounters:   12/16/21 58.1 kg (128 lb)   12/06/21 58.6 kg (129 lb 1.6 oz)   12/02/21 62.1 kg (137 lb)     General Appearance:   no distress, normal body habitus   ENT/Mouth: membranes moist, no oral lesions or bleeding gums.      EYES:  no scleral icterus, normal conjunctivae   Neck: no carotid bruits or thyromegaly   Chest/Lungs:   lungs are clear to auscultation, no rales or wheezing, equal chest wall expansion    Cardiovascular:   Heart rateRegular. Normal first and second heart sounds with no murmurs, rubs, or gallops; the carotid, radial and posterior tibial pulses are intact, Jugular venous pressure flat, no edema bilaterally    Abdomen:  no organomegaly, masses, bruits, or tenderness; bowel sounds are present   Extremities  Puncture Site: no cyanosis or clubbing  Right radial site is soft with intact except noted pea-sized lump-no pain.  Pulse palpable. Radial pulses and Pedal pulses intact and symmetrical.  CMS intact.   Skin: no xanthelasma, warm.    Neurologic: normal  bilateral, no tremors     Psychiatric: alert and oriented x3, calm                Negative unless noted in HPI     Medical History  Surgical History Family History Social History   Past Medical History:   Diagnosis Date     Acute kidney injury (H) 6/10/2021     CKD (chronic kidney disease) stage 5, GFR less than 15 ml/min (H)      Dyslipidemia      Metabolic acidosis      Type 2 diabetes mellitus (H)     Past Surgical History:   Procedure Laterality Date     CV CORONARY ANGIOGRAM N/A 12/6/2021    Procedure: Coronary Angiogram;  Surgeon: Cristela Banks MD;  Location: [facility] CATH LAB CV     CV LEFT HEART CATH N/A 12/6/2021    Procedure: Left Heart Cath;  Surgeon: Cristela Banks MD;  Location: [facility] CATH LAB CV     CV PCI N/A 12/6/2021    Procedure: Percutaneous " Coronary Intervention;  Surgeon: Cristela Banks MD;  Location: [facility] CV    Family History   Problem Relation Age of Onset     Diabetes Type 2  Mother      Other - See Comments Daughter         granular cell tumor of thigh     Heart Disease Other     Social History     Socioeconomic History     Marital status: Patient Declined     Spouse name: Not on file     Number of children: Not on file     Years of education: Not on file     Highest education level: Not on file   Occupational History     Not on file   Tobacco Use     Smoking status: Never Smoker     Smokeless tobacco: Never Used   Vaping Use     Vaping Use: Not on file   Substance and Sexual Activity     Alcohol use: Not Currently     Drug use: Never     Sexual activity: Not on file   Other Topics Concern     Parent/sibling w/ CABG, MI or angioplasty before 65F 55M? Not Asked   Social History Narrative     Not on file     Social Determinants of Health     Financial Resource Strain: Not on file   Food Insecurity: Not on file   Transportation Needs: Not on file   Physical Activity: Not on file   Stress: Not on file   Social Connections: Not on file   Intimate Partner Violence: Not on file   Housing Stability: Not on file          Medications  Allergies   Current Outpatient Medications   Medication Sig Dispense Refill     amLODIPine (NORVASC) 10 MG tablet [AMLODIPINE (NORVASC) 10 MG TABLET] Take 10 mg by mouth daily.        aspirin (ASA) 81 MG EC tablet Take 1 tablet (81 mg) by mouth daily Start tomorrow. 30 tablet 3     atorvastatin (LIPITOR) 40 MG tablet Take 1 tablet (40 mg) by mouth daily 90 tablet 3     calcitrioL (ROCALTROL) 0.25 MCG capsule [CALCITRIOL (ROCALTROL) 0.25 MCG CAPSULE] Take 0.25 mcg by mouth daily.        calcium carbonate 1250 (500 Ca) MG CHEW Take 500 mg by mouth       carvedilol (COREG) 25 MG tablet Take 1 tablet (25 mg) by mouth 2 times daily (with meals) 270 tablet 3     cloNIDine (CATAPRES-TTS) 0.3 mg/24 hr [CLONIDINE  "(CATAPRES-TTS) 0.3 MG/24 HR] Place 1 patch on the skin once a week.       furosemide (LASIX) 20 MG tablet Take 20 mg by mouth daily       hydrALAZINE (APRESOLINE) 100 MG tablet [HYDRALAZINE (APRESOLINE) 100 MG TABLET] Take 100 mg by mouth 3 (three) times a day.       ticagrelor (BRILINTA) 90 MG tablet Take 1 tablet (90 mg) by mouth 2 times daily Start tomorrow morning. 180 tablet 3     gentamicin (GARAMYCIN) 0.1 % external cream APPLY TO EXIT SITE ONCE A DAY (Patient not taking: Reported on 12/16/2021)       nitroGLYcerin (NITROSTAT) 0.4 MG sublingual tablet One tablet under the tongue every 5 minutes if needed for chest pain. May repeat every 5 minutes for a maximum of 3 doses in 15 minutes\" (Patient not taking: Reported on 12/16/2021) 25 tablet 3    No Known Allergies      Lab Results    Chemistry/lipid CBC Cardiac Enzymes/BNP/TSH/INR   Lab Results   Component Value Date    CHOL 181 12/06/2021    HDL 53 12/06/2021    TRIG 176 (H) 12/06/2021    BUN 54 (H) 12/06/2021     (L) 12/06/2021    CO2 27 12/06/2021    Lab Results   Component Value Date    WBC 8.2 12/06/2021    HGB 11.3 (L) 12/06/2021    HCT 32.1 (L) 12/06/2021    MCV 82 12/06/2021    PLT 75 (L) 12/06/2021    Lab Results   Component Value Date    TROPONINI 0.04 06/10/2021    INR 1.01 12/06/2021        40 minutes spent on the date of encounter doing chart review, review of test results, interpretation with above tests, patient visit, documentation and discussion with family.        This note has been dictated using voice recognition software. Any grammatical, typographical, or context distortions are unintentional and inherent to the software      "

## 2021-12-16 NOTE — LETTER
12/16/2021    Santos Howard MD  [address] [address]    RE: Modesto Barbosa       Dear Colleague,     I had the pleasure of seeing Modesto Barbosa in the [facility].          Assessment/Recommendations   Assessment:    1.  Coronary artery disease: Coronary Angiogram on 12/6/21 showed 90% lesion in OM 3 which was successfully treated with SHAVONNE with no complications. Otherwise noted mild to moderate disease in LAD, RCA and  disease in LAD, and RCA and mild disease in circumflex.    On dual antiplatelet therapy with ASA 81 mg indefinitely and Ticagrelor (Brilinta) 90 mg twice a day for 12 months.  Patient denies any chest pain or angina except he has noted some shortness of breath every once in a while where he has to catch his breath.  This is likely due to Brilinta.  He states this is mild and he can tolerate.    Cardiac rehab has been scheduled.    2.  Dyslipidemia with LDL goal <70/Obesity with a BMI of: Modesto Barbosa is on high intensity statin therapy with Atorvastatin 40 mg daily. Most recent LDL is 93 and TGL is 176 in December, 2021.  Most recent AST/ALT are stable.    3.  Hypertension: His blood pressure is today is 108/50.  Recheck was 80/40.  He has been noticing some lightheadedness since recent stent placement.  His carvedilol dose was increased recently.  He has been taking carvedilol in empty stomach.    Currently on Amlodipine 10 mg daily, Clonidine 0.3 mg/24 hour patch once weekly, Hydralazine 100 mg 3 times a day, metoprolol succinate 100 mg daily, furosemide, .    4.  CKD stage IV: On peritoneal dialysis. Cr 5.68 .  He follows up with nephrology every month.    Plan:  - We discussed the importance of antiplatelet therapy and talking with his cardiologist prior to stopping these medications for any reason.      -Decrease carvedilol from 37.5 mg twice a day to 25 mg twice a day.    -Instructed to take carvedilol with food    - Encouraged to seek  medical attention if recurrent chest pain or shortness of breath.    - Risk factor modification and lifestyle management topics were discussed including managing comorbidities, heart healthy diet, exercise and stress reduction.      - Fasting lipid profile check in 4.  patient would like to have it done on the day he has an appointment with Dr. Katz.  Order placed.    - Cardiac rehab as scheduled on 12/22    - We discussed a diet low in saturated fat, weight loss, and exercise along with medication for better control of cholesterol.      -Patient was encouraged to call if worsening swelling in the right wrist.    Follow up with Dr. Katz as scheduled on 1/19 .      History of Present Illness/Subjective    Mr. Modesto Barbosa is a 55 year old male with a past medical history of hypertension, hyperlipidemia, DM II, CKD Stage IV, and new diagnosis of coronary artery disease  who is seen at [facility] for post coronary intervention follow up.     Patient saw Dr. Katz recently and was recommended coronary angiogram screening prior to renal transplant. His statin therapy dose was increased.  Patient essentially had normal holter study.    Today, Modesto  is here accompanied by his son.  They declined  service.  Son helped as an .  Patient also speaks little English.  He denies fatigue, shortness of breath, orthopnea, PND, palpitations, chest pain, abdominal fullness/bloating and lower extremity edema.  Every once in a while he has to catch his breath and has to breathe fast .  This is new to him since recent stent placement.  He also has been feeling some lightheadedness and dizziness.  His blood pressures running on the low side today.  He also states that he has been taking his carvedilol in empty stomach.    Holter Study done on 10/12/21   Conclusion  [facility]   HOLTER REPORT   Results:    Indication for study: Evaluate for cardiomyopathy    A 24-hour  Holter monitor was applied October 12, 2021 with date of interpretation October 14, 2021    Baseline tracing showed sinus rhythm with normal electrocardiographic intervals.  Average heart rate 75 bpm range between 59-98 bpm    No bradycardia, no pauses    Very rare ectopy with only 1 PVC, 1 PAC    Patient activity diary not returned    Conclusion    Normal monitor    stable heart rhythm pattern    Coronary Angiogram done on 12/6/21: reviewed  Conclusion    Patient with ESRD on peritoneal dialysis, undergoing a transplant work up. He also has diabetes, uncontrolled hypertension and hyperlipidemia.    Normal left main.    The LAD has diffuse mild calcification with a mild proximal disease and moderate mid LAD disease. There is no significant diagonal disease.    The circumflex has a very small OM-2, a small OM-2 and a large branching OM-3 with mild disease proximally and a severe stenosis in the larger, more anterior branch. This was stented with a Synergy SHAVONNE with good angiographic results.    Moderate sized RCA with mild proximal disease and moderate disease in both the proximal PDA and right AV groove branch prior to the PL branches.    LV EDP 13 mmhg. LV-Ao pullback gradient 17 mmHg.   Recommendations  General Recommendations:  - Follow up visit with Nurse Practitioner in 1-2 weeks.   - Recommended follow up with doctor Dr. Katz in 6 weeks.   - Arterial sheath removed from radial artery with TR Band placement.   - Recommend cardiac rehabilitation.      Cardiac Stress test MRI done on 10/29/21: Reviewed  SUMMARY   Non-contrast Cardiac MRI  1.  Small left ventricular cavity related to hypertrophy with hyperdynamic left ventricular systolic  function.  There is asymmetric anteroseptal hypertrophy with a maximal diameter of 19 mm with evidence of  left ventricular outflow tract obstruction.  There is moderate chordal MINA. The quantified left ventricular  ejection fraction is 80.7%.    2.  Normal right ventricular size  "and systolic function.    3.  Aortic valve sclerosis without stenosis.  Mild aortic regurgitation.    4.  Mild mitral regurgitation.           5. Moderate left atrial enlargement.      Physical Examination Review of Systems   BP (!) 80/40   Pulse 76   Resp 12   Ht 1.626 m (5' 4\")   Wt 58.1 kg (128 lb)   BMI 21.97 kg/m    Body mass index is 21.97 kg/m .  Wt Readings from Last 3 Encounters:   12/16/21 58.1 kg (128 lb)   12/06/21 58.6 kg (129 lb 1.6 oz)   12/02/21 62.1 kg (137 lb)     General Appearance:   no distress, normal body habitus   ENT/Mouth: membranes moist, no oral lesions or bleeding gums.      EYES:  no scleral icterus, normal conjunctivae   Neck: no carotid bruits or thyromegaly   Chest/Lungs:   lungs are clear to auscultation, no rales or wheezing, equal chest wall expansion    Cardiovascular:   Heart rateRegular. Normal first and second heart sounds with no murmurs, rubs, or gallops; the carotid, radial and posterior tibial pulses are intact, Jugular venous pressure flat, no edema bilaterally    Abdomen:  no organomegaly, masses, bruits, or tenderness; bowel sounds are present   Extremities  Puncture Site: no cyanosis or clubbing  Right radial site is soft with intact except noted pea-sized lump-no pain.  Pulse palpable. Radial pulses and Pedal pulses intact and symmetrical.  CMS intact.   Skin: no xanthelasma, warm.    Neurologic: normal  bilateral, no tremors     Psychiatric: alert and oriented x3, calm                Negative unless noted in HPI     Medical History  Surgical History Family History Social History   Past Medical History:   Diagnosis Date     Acute kidney injury (H) 6/10/2021     CKD (chronic kidney disease) stage 5, GFR less than 15 ml/min (H)      Dyslipidemia      Metabolic acidosis      Type 2 diabetes mellitus (H)     Past Surgical History:   Procedure Laterality Date     CV CORONARY ANGIOGRAM N/A 12/6/2021    Procedure: Coronary Angiogram;  Surgeon: Cristela Banks MD;  " Location: [facility] CATH LAB CV     CV LEFT HEART CATH N/A 12/6/2021    Procedure: Left Heart Cath;  Surgeon: Cristela Banks MD;  Location: [facility] CATH LAB CV     CV PCI N/A 12/6/2021    Procedure: Percutaneous Coronary Intervention;  Surgeon: Cristela Banks MD;  Location: [facility] CATH LAB CV    Family History   Problem Relation Age of Onset     Diabetes Type 2  Mother      Other - See Comments Daughter         granular cell tumor of thigh     Heart Disease Other     Social History     Socioeconomic History     Marital status: Patient Declined     Spouse name: Not on file     Number of children: Not on file     Years of education: Not on file     Highest education level: Not on file   Occupational History     Not on file   Tobacco Use     Smoking status: Never Smoker     Smokeless tobacco: Never Used   Vaping Use     Vaping Use: Not on file   Substance and Sexual Activity     Alcohol use: Not Currently     Drug use: Never     Sexual activity: Not on file   Other Topics Concern     Parent/sibling w/ CABG, MI or angioplasty before 65F 55M? Not Asked   Social History Narrative     Not on file     Social Determinants of Health     Financial Resource Strain: Not on file   Food Insecurity: Not on file   Transportation Needs: Not on file   Physical Activity: Not on file   Stress: Not on file   Social Connections: Not on file   Intimate Partner Violence: Not on file   Housing Stability: Not on file          Medications  Allergies   Current Outpatient Medications   Medication Sig Dispense Refill     amLODIPine (NORVASC) 10 MG tablet [AMLODIPINE (NORVASC) 10 MG TABLET] Take 10 mg by mouth daily.        aspirin (ASA) 81 MG EC tablet Take 1 tablet (81 mg) by mouth daily Start tomorrow. 30 tablet 3     atorvastatin (LIPITOR) 40 MG tablet Take 1 tablet (40 mg) by mouth daily 90 tablet 3     calcitrioL (ROCALTROL) 0.25 MCG capsule [CALCITRIOL (ROCALTROL) 0.25 MCG CAPSULE] Take 0.25 mcg by mouth daily.        calcium  "carbonate 1250 (500 Ca) MG CHEW Take 500 mg by mouth       carvedilol (COREG) 25 MG tablet Take 1 tablet (25 mg) by mouth 2 times daily (with meals) 270 tablet 3     cloNIDine (CATAPRES-TTS) 0.3 mg/24 hr [CLONIDINE (CATAPRES-TTS) 0.3 MG/24 HR] Place 1 patch on the skin once a week.       furosemide (LASIX) 20 MG tablet Take 20 mg by mouth daily       hydrALAZINE (APRESOLINE) 100 MG tablet [HYDRALAZINE (APRESOLINE) 100 MG TABLET] Take 100 mg by mouth 3 (three) times a day.       ticagrelor (BRILINTA) 90 MG tablet Take 1 tablet (90 mg) by mouth 2 times daily Start tomorrow morning. 180 tablet 3     gentamicin (GARAMYCIN) 0.1 % external cream APPLY TO EXIT SITE ONCE A DAY (Patient not taking: Reported on 12/16/2021)       nitroGLYcerin (NITROSTAT) 0.4 MG sublingual tablet One tablet under the tongue every 5 minutes if needed for chest pain. May repeat every 5 minutes for a maximum of 3 doses in 15 minutes\" (Patient not taking: Reported on 12/16/2021) 25 tablet 3    No Known Allergies      Lab Results    Chemistry/lipid CBC Cardiac Enzymes/BNP/TSH/INR   Lab Results   Component Value Date    CHOL 181 12/06/2021    HDL 53 12/06/2021    TRIG 176 (H) 12/06/2021    BUN 54 (H) 12/06/2021     (L) 12/06/2021    CO2 27 12/06/2021    Lab Results   Component Value Date    WBC 8.2 12/06/2021    HGB 11.3 (L) 12/06/2021    HCT 32.1 (L) 12/06/2021    MCV 82 12/06/2021    PLT 75 (L) 12/06/2021    Lab Results   Component Value Date    TROPONINI 0.04 06/10/2021    INR 1.01 12/06/2021        40 minutes spent on the date of encounter doing chart review, review of test results, interpretation with above tests, patient visit, documentation and discussion with family.        This note has been dictated using voice recognition software. Any grammatical, typographical, or context distortions are unintentional and inherent to the software          Thank you for allowing me to participate in the care of your patient.      Sincerely, "     STEPHEN Yuan CNP     [facility]  cc:   No referring provider defined for this encounter.

## 2021-12-22 ENCOUNTER — HOSPITAL ENCOUNTER (OUTPATIENT)
Dept: CARDIAC REHAB | Facility: HOSPITAL | Age: 56
End: 2021-12-22
Attending: INTERNAL MEDICINE
Payer: MEDICARE

## 2021-12-22 DIAGNOSIS — Z01.818 PRE-TRANSPLANT EVALUATION FOR KIDNEY TRANSPLANT: ICD-10-CM

## 2021-12-22 DIAGNOSIS — I10 ESSENTIAL HYPERTENSION: ICD-10-CM

## 2021-12-22 PROCEDURE — 93798 PHYS/QHP OP CAR RHAB W/ECG: CPT

## 2021-12-22 PROCEDURE — 93797 PHYS/QHP OP CAR RHAB WO ECG: CPT | Mod: XU

## 2021-12-28 ENCOUNTER — HOSPITAL ENCOUNTER (OUTPATIENT)
Dept: CARDIAC REHAB | Facility: HOSPITAL | Age: 56
End: 2021-12-28
Attending: INTERNAL MEDICINE
Payer: MEDICARE

## 2021-12-28 PROCEDURE — 93798 PHYS/QHP OP CAR RHAB W/ECG: CPT

## 2021-12-30 ENCOUNTER — HOSPITAL ENCOUNTER (OUTPATIENT)
Dept: CARDIAC REHAB | Facility: HOSPITAL | Age: 56
End: 2021-12-30
Attending: INTERNAL MEDICINE
Payer: MEDICARE

## 2021-12-30 PROCEDURE — 93798 PHYS/QHP OP CAR RHAB W/ECG: CPT

## 2022-01-04 ENCOUNTER — HOSPITAL ENCOUNTER (OUTPATIENT)
Dept: CARDIAC REHAB | Facility: HOSPITAL | Age: 57
End: 2022-01-04
Attending: INTERNAL MEDICINE
Payer: MEDICARE

## 2022-01-04 PROCEDURE — 93798 PHYS/QHP OP CAR RHAB W/ECG: CPT

## 2022-01-06 ENCOUNTER — HOSPITAL ENCOUNTER (OUTPATIENT)
Dept: CARDIAC REHAB | Facility: HOSPITAL | Age: 57
End: 2022-01-06
Attending: INTERNAL MEDICINE
Payer: MEDICARE

## 2022-01-11 ENCOUNTER — HOSPITAL ENCOUNTER (OUTPATIENT)
Dept: CARDIAC REHAB | Facility: HOSPITAL | Age: 57
End: 2022-01-11
Attending: INTERNAL MEDICINE
Payer: MEDICARE

## 2022-01-11 PROCEDURE — 93798 PHYS/QHP OP CAR RHAB W/ECG: CPT

## 2022-01-19 ENCOUNTER — OFFICE VISIT (OUTPATIENT)
Dept: CARDIOLOGY | Facility: CLINIC | Age: 57
End: 2022-01-19
Attending: INTERNAL MEDICINE
Payer: MEDICARE

## 2022-01-19 VITALS
RESPIRATION RATE: 16 BRPM | WEIGHT: 133 LBS | SYSTOLIC BLOOD PRESSURE: 120 MMHG | OXYGEN SATURATION: 99 % | BODY MASS INDEX: 22.83 KG/M2 | HEART RATE: 69 BPM | DIASTOLIC BLOOD PRESSURE: 48 MMHG

## 2022-01-19 DIAGNOSIS — I25.10 CORONARY ARTERY DISEASE INVOLVING NATIVE CORONARY ARTERY OF NATIVE HEART WITHOUT ANGINA PECTORIS: Primary | ICD-10-CM

## 2022-01-19 DIAGNOSIS — I42.1 IHSS (IDIOPATHIC HYPERTROPHIC SUBAORTIC STENOSIS) (H): ICD-10-CM

## 2022-01-19 LAB
ALT SERPL W P-5'-P-CCNC: 34 U/L (ref 0–45)
AST SERPL W P-5'-P-CCNC: 23 U/L (ref 0–40)
CHOLEST SERPL-MCNC: 138 MG/DL
FASTING STATUS PATIENT QL REPORTED: YES
HDLC SERPL-MCNC: 37 MG/DL
LDLC SERPL CALC-MCNC: 56 MG/DL
TRIGL SERPL-MCNC: 224 MG/DL

## 2022-01-19 PROCEDURE — 84460 ALANINE AMINO (ALT) (SGPT): CPT | Performed by: INTERNAL MEDICINE

## 2022-01-19 PROCEDURE — 99214 OFFICE O/P EST MOD 30 MIN: CPT | Performed by: INTERNAL MEDICINE

## 2022-01-19 PROCEDURE — 84450 TRANSFERASE (AST) (SGOT): CPT | Performed by: INTERNAL MEDICINE

## 2022-01-19 PROCEDURE — 80061 LIPID PANEL: CPT | Performed by: INTERNAL MEDICINE

## 2022-01-19 PROCEDURE — 36415 COLL VENOUS BLD VENIPUNCTURE: CPT | Performed by: INTERNAL MEDICINE

## 2022-01-19 NOTE — PROGRESS NOTES
HEART CARE ENCOUNTER CONSULTATON NOTE      [facility]  [phone]      Assessment/Recommendations   Assessment/Plan:  1.  Asymmetric left ventricular septal hypertrophy.  No specific cardiovascular symptoms of syncope or near syncope or significant shortness of breath.  I again made he and his son aware of recommendations for screening of all first-degree family members.  He does not have any major risk factors for sudden cardiac death.  He has had no prior or sustained ventricular arrhythmias with a recent Holter monitor as described.  He has no family history of close relatives with sudden cardiac death due to cardiomyopathy.  He denies syncope or near syncope.  His left ventricular septum measures 19 mm underneath the criteria for massive LV hypertrophy of 30 mmHg.  LV function reported to be normal and no apical aneurysm.  Ongoing medical management and close monitoring is suggested.  He is on carvedilol 25 mg twice daily.  He is also on amlodipine, clonidine patch and hydralazine for blood pressure control.  Ideally would try to taper the amlodipine given the hypertrophic cardiomyopathy.  Today's blood pressure is 120/48.  Clinically doing well and therefore did not make any changes to the medication regimen.  Would be cautious with use of nitroglycerin and has not had any anginal chest discomfort.    2.  Coronary artery disease.  No complaints of anginal chest discomfort either prior to procedure or postprocedure.  This was requested as part of the renal transplant work-up.  We discussed the findings today intervention to an obtuse marginal branch.  We discussed the recommendations for aspirin and ticagrelor.  He has not noted any significant shortness of breath but will monitor.  I have reviewed notes from cardiac rehab.    3.  Risk modification.  He is now on atorvastatin 40 mg daily with the dose increased.  Repeat lipid panel today demonstrates an LDL down to 56 from 93  previous.    1.  We will continue with the current combination of medications avoiding hypotension.  2.  Patient to follow-up in 3 to 4 months and to notify us with any symptoms of dizziness, syncope or near syncope.  3.  We will request most recent nephrology notes and further correspondence with nephrology as well.       History of Present Illness/Subjective    HPI: Modesto Barbosa is a 56 year old male who is seen in follow-up.  He reports that he has been feeling well.  He denies specific complaints of chest pain, shortness of breath, dizziness or lightheadedness.  Coronary angiography was requested prerenal transplantation at the request of the renal transplant service.  He was initially evaluated for hypertrophic changes on his echocardiogram.  He underwent cardiac MRI that does suggest asymmetric anterior septal hypertrophy with a maximal diameter of 19 mmHg with chordal Lalit and ejection fraction of 80%.  Normal left ventricular cavity with hyperdynamic left ventricular systolic function.  He does not endorse any significant complaints of exercise intolerance, dizziness, syncope or near syncope.  He is on peritoneal dialysis at home.    Recent Echocardiogram Results:    Electronically signed by Steven Argueta 2021-Oct-29 16:56:36     VITALS   ==========================================================================================================     HEIGHT: 64.02 in    (162.60 cm)  WEIGHT: 132.21 lbs    (59.97 kgs)  BSA: 1.64 m^2     FINAL IMPRESSION   ==========================================================================================================     Asymmetric anteroseptal hypertrophy with moderate LVOT outflow tract obstruction.      SUMMARY   ==========================================================================================================     Non-contrast Cardiac MRI     1.  Small left ventricular cavity related to hypertrophy with hyperdynamic left ventricular systolic  function.   There is asymmetric anteroseptal hypertrophy with a maximal diameter of 19 mm with evidence of  left ventricular outflow tract obstruction.  There is moderate chordal MINA. The quantified left ventricular  ejection fraction is 80.7%.    2.  Normal right ventricular size and systolic function.    3.  Aortic valve sclerosis without stenosis.  Mild aortic regurgitation.    4.  Mild mitral regurgitation.           5. Moderate left atrial enlargement.    ==========================================================================================================  REPORT FINDINGS:     LEFT VENTRICLE: LV wall thickness is normal. LV cavity size is normal. LV systolic function is normal. There is no LV  mass/thrombus. LV systolic function is normal. LV systolic function is normal. Quantitative LVEF 80.7 %.     RIGHT VENTRICLE: RV wall thickness is normal. RV cavity size is normal. RV systolic function is normal. There is no RV  mass/thrombus.     LA/RA SEPTUM: The atrial septum is intact. The atrial septum is intact. The atrial septum is intact.     LEFT ATRIUM: LA is moderately enlarged.     RIGHT ATRIUM: RA cavity size is normal.     PERICARDIUM: Pericardium is normal. There is a trivial pericardial effusion.     PLEURAL EFFUSION: There is no pleural effusion.     AORTIC VALVE: The aortic valve annulus is normal in size. Aortic valve is trileaflet. There is mild aortic regurgitation.  There is no aortic stenosis. Aortic valve is mildly thickened. Peak aortic valve velocity 149.5 cm/sec.  Aortic regurgitant volume 4.9 ml. Aortic regurgitant fraction 6.6 %.     MITRAL VALVE: The mitral valve annulus is normal in size. Mitral valve leaflets are normal. There is mild mitral  regurgitation. There is no mitral stenosis.      TRICUSPID VALVE: The tricuspid valve annulus is normal in size. Tricuspid valve leaflets are normal. There is mild tricuspid  regurgitation. There is no tricuspid stenosis.      PULMONIC VALVE: The pulmonic  valve annulus is normal in size. Pulmonic valve leaflets are normal. There is no pulmonic  regurgitation. There is no pulmonic stenosis.      AORTIC ROOT: The aortic root is normal.     CHEST:  Normal thoracic aortic size and its major branches.      ABDOMEN: Abdominal Ascites noted    Holter Monitor Scan - Scan on 10/14/21 11:11 AM by       Scans on Order [id number]    Scan on 10/14/2021 11:11 AM        Conclusion    [facility]     HOLTER REPORT     Results:    Indication for study: Evaluate for cardiomyopathy    A 24-hour Holter monitor was applied October 12, 2021 with date of interpretation October 14, 2021    Baseline tracing showed sinus rhythm with normal electrocardiographic intervals.  Average heart rate 75 bpm range between 59-98 bpm    No bradycardia, no pauses    Very rare ectopy with only 1 PVC, 1 PAC    Patient activity diary not returned    Conclusion    Normal monitor     stable heart rhythm pattern        Comment: The recording was for 23 hrs and 59 min.  The recording quality was adequate.       Recent Coronary Angiogram Results:    Procedures     Coronary Angiogram  Percutaneous Coronary Intervention  Left Heart Cath     Indications     Hypertrophic cardiomyopathy (H) [I42.2 (ICD-10-CM)]  ESRD (end stage renal disease) on dialysis (H) [N18.6, Z99.2 (ICD-10-CM)]  Type 2 diabetes mellitus with other circulatory complication, unspecified whether long term insulin use (H) [E11.59 (ICD-10-CM)]  Benign essential hypertension [I10 (ICD-10-CM)]  Mixed hyperlipidemia [E78.2 (ICD-10-CM)]     Pre Procedure Diagnosis        suspected CADpre-operative evaluation       Conclusion       Patient with ESRD on peritoneal dialysis, undergoing a transplant work up. He also has diabetes, uncontrolled hypertension and hyperlipidemia.    Normal left main.    The LAD has diffuse mild calcification with a mild proximal disease and moderate mid LAD disease. There is no significant diagonal disease.    The  circumflex has a very small OM-2, a small OM-2 and a large branching OM-3 with mild disease proximally and a severe stenosis in the larger, more anterior branch. This was stented with a Synergy SHAVONNE with good angiographic results.    Moderate sized RCA with mild proximal disease and moderate disease in both the proximal PDA and right AV groove branch prior to the PL branches.    LV EDP 13 mmhg. LV-Ao pullback gradient 17 mmHg.             Recommendations     General Recommendations:  - Follow up visit with Nurse Practitioner in 1-2 weeks.   - Recommended follow up with doctor Dr. Katz in 6 weeks.   - Arterial sheath removed from radial artery with TR Band placement.   - Recommend cardiac rehabilitation.     Hypertrophic cardiomyopathy (H) [I42.2 (ICD-10-CM)]       ECG Link     Holter Monitor Scan - Scan on 10/14/21 11:11 AM by       Scans on Order [id number]    Scan on 10/14/2021 11:11 AM          Conclusion    [facility]     HOLTER REPORT     Results:    Indication for study: Evaluate for cardiomyopathy    A 24-hour Holter monitor was applied October 12, 2021 with date of interpretation October 14, 2021    Baseline tracing showed sinus rhythm with normal electrocardiographic intervals.  Average heart rate 75 bpm range between 59-98 bpm    No bradycardia, no pauses    Very rare ectopy with only 1 PVC, 1 PAC    Patient activity diary not returned    Conclusion    Normal monitor     stable heart rhythm pattern        Comment: The recording was for 23 hrs and 59 min.  The recording quality was adequate.              Physical Examination  Review of Systems   Vitals: There were no vitals taken for this visit.  BMI= There is no height or weight on file to calculate BMI.  Wt Readings from Last 3 Encounters:   12/16/21 58.1 kg (128 lb)   12/06/21 58.6 kg (129 lb 1.6 oz)   12/02/21 62.1 kg (137 lb)       General Appearance:   no distress, normal body habitus   ENT/Mouth: membranes moist, no oral lesions or bleeding  gums.      EYES:  no scleral icterus, normal conjunctivae   Neck: no carotid bruits or thyromegaly   Chest/Lungs:   lungs are clear to auscultation, no rales or wheezing, , equal chest wall expansion    Cardiovascular:   Regular. Normal first and second heart sounds with 2/6 systolic murmur, rubs, or gallops; the carotid, radial and posterior tibial pulses are intact, Jugular venous pressure within normal limits, no significant edema bilaterally    Abdomen:  no organomegaly, masses, bruits, or tenderness; bowel sounds are present   Extremities: no cyanosis or clubbing   Skin: no xanthelasma, warm.    Neurologic: normal  bilateral, no tremors     Psychiatric: alert and oriented x3, calm        Please refer above for cardiac ROS details.        Medical History  Surgical History Family History Social History   Past Medical History:   Diagnosis Date     Acute kidney injury (H) 6/10/2021     CKD (chronic kidney disease) stage 5, GFR less than 15 ml/min (H)      Dyslipidemia      Metabolic acidosis      Type 2 diabetes mellitus (H)      Past Surgical History:   Procedure Laterality Date     CV CORONARY ANGIOGRAM N/A 12/6/2021    Procedure: Coronary Angiogram;  Surgeon: Cristela Banks MD;  Location: [facility] CV     CV LEFT HEART CATH N/A 12/6/2021    Procedure: Left Heart Cath;  Surgeon: Cristela Banks MD;  Location: [facility] CV     CV PCI N/A 12/6/2021    Procedure: Percutaneous Coronary Intervention;  Surgeon: Cristela Banks MD;  Location: [facility] CV     Family History   Problem Relation Age of Onset     Diabetes Type 2  Mother      Other - See Comments Daughter         granular cell tumor of thigh     Heart Disease Other         Social History     Socioeconomic History     Marital status: Patient Declined     Spouse name: Not on file     Number of children: Not on file     Years of education: Not on file     Highest education level: Not on file   Occupational History     Not on file    Tobacco Use     Smoking status: Never Smoker     Smokeless tobacco: Never Used   Vaping Use     Vaping Use: Not on file   Substance and Sexual Activity     Alcohol use: Not Currently     Drug use: Never     Sexual activity: Not on file   Other Topics Concern     Parent/sibling w/ CABG, MI or angioplasty before 65F 55M? Not Asked   Social History Narrative     Not on file     Social Determinants of Health     Financial Resource Strain: Not on file   Food Insecurity: Not on file   Transportation Needs: Not on file   Physical Activity: Not on file   Stress: Not on file   Social Connections: Not on file   Intimate Partner Violence: Not on file   Housing Stability: Not on file           Medications  Allergies   Current Outpatient Medications   Medication Sig Dispense Refill     amLODIPine (NORVASC) 10 MG tablet [AMLODIPINE (NORVASC) 10 MG TABLET] Take 10 mg by mouth daily.        aspirin (ASA) 81 MG EC tablet Take 1 tablet (81 mg) by mouth daily Start tomorrow. 30 tablet 3     atorvastatin (LIPITOR) 40 MG tablet Take 1 tablet (40 mg) by mouth daily 90 tablet 3     calcitrioL (ROCALTROL) 0.25 MCG capsule [CALCITRIOL (ROCALTROL) 0.25 MCG CAPSULE] Take 0.25 mcg by mouth daily.        calcium carbonate 1250 (500 Ca) MG CHEW Take 500 mg by mouth       carvedilol (COREG) 25 MG tablet Take 1 tablet (25 mg) by mouth 2 times daily (with meals) 270 tablet 3     cloNIDine (CATAPRES-TTS) 0.3 mg/24 hr [CLONIDINE (CATAPRES-TTS) 0.3 MG/24 HR] Place 1 patch on the skin once a week.       furosemide (LASIX) 20 MG tablet Take 20 mg by mouth daily       gentamicin (GARAMYCIN) 0.1 % external cream APPLY TO EXIT SITE ONCE A DAY (Patient not taking: Reported on 12/16/2021)       hydrALAZINE (APRESOLINE) 100 MG tablet [HYDRALAZINE (APRESOLINE) 100 MG TABLET] Take 100 mg by mouth 3 (three) times a day.       nitroGLYcerin (NITROSTAT) 0.4 MG sublingual tablet One tablet under the tongue every 5 minutes if needed for chest pain. May repeat  "every 5 minutes for a maximum of 3 doses in 15 minutes\" (Patient not taking: Reported on 12/16/2021) 25 tablet 3     ticagrelor (BRILINTA) 90 MG tablet Take 1 tablet (90 mg) by mouth 2 times daily Start tomorrow morning. 180 tablet 3     No Known Allergies       Lab Results    Chemistry/lipid CBC Cardiac Enzymes/BNP/TSH/INR   Recent Labs   Lab Test 12/06/21  0734   CHOL 181   HDL 53   LDL 93   TRIG 176*     Recent Labs   Lab Test 12/06/21  0734   LDL 93     Recent Labs   Lab Test 12/06/21  0734   *   POTASSIUM 3.9   CHLORIDE 97*   CO2 27   *   BUN 54*   CR 5.68*   GFRESTIMATED 10*   REYMUNDO 8.7     Recent Labs   Lab Test 12/06/21  0734 08/23/21  1243 06/14/21  1040   CR 5.68* 5.38* 4.24*     Recent Labs   Lab Test 08/23/21  1243 06/11/21  0728   A1C 5.6 5.7*          Recent Labs   Lab Test 12/06/21  0734   WBC 8.2   HGB 11.3*   HCT 32.1*   MCV 82   PLT 75*     Recent Labs   Lab Test 12/06/21  0734 08/23/21  1243 06/10/21  1406   HGB 11.3* 9.3* 10.6*    Recent Labs   Lab Test 06/10/21  1406   TROPONINI 0.04     No results for input(s): BNP, NTBNPI, NTBNP in the last 30013 hours.  No results for input(s): TSH in the last 72665 hours.  Recent Labs   Lab Test 12/06/21  0734 10/06/21  1508 08/23/21  1243   INR 1.01 1.12 1.25*        Alfred Katz MD                                    "

## 2022-01-19 NOTE — PATIENT INSTRUCTIONS
Please call my nurse Gloria with any heart related questions.Her number is [phone].Please return to see the kidney transplant physicians.Please dont stop any medications without calling us.If you feel more short of breath or dizzy.

## 2022-01-19 NOTE — LETTER
1/19/2022    Santos Howard MD  [address] [address]    RE: Nhbridger Barbosa       Dear Colleague,     I had the pleasure of seeing Nhbridger Barbosa in the [facility].    HEART CARE ENCOUNTER CONSULTATON NOTE      [facility]NA [facility]  [phone]      Assessment/Recommendations   Assessment/Plan:  1.  Asymmetric left ventricular septal hypertrophy.  No specific cardiovascular symptoms of syncope or near syncope or significant shortness of breath.  I again made he and his son aware of recommendations for screening of all first-degree family members.  He does not have any major risk factors for sudden cardiac death.  He has had no prior or sustained ventricular arrhythmias with a recent Holter monitor as described.  He has no family history of close relatives with sudden cardiac death due to cardiomyopathy.  He denies syncope or near syncope.  His left ventricular septum measures 19 mm underneath the criteria for massive LV hypertrophy of 30 mmHg.  LV function reported to be normal and no apical aneurysm.  Ongoing medical management and close monitoring is suggested.  He is on carvedilol 25 mg twice daily.  He is also on amlodipine, clonidine patch and hydralazine for blood pressure control.  Ideally would try to taper the amlodipine given the hypertrophic cardiomyopathy.  Today's blood pressure is 120/48.  Clinically doing well and therefore did not make any changes to the medication regimen.  Would be cautious with use of nitroglycerin and has not had any anginal chest discomfort.    2.  Coronary artery disease.  No complaints of anginal chest discomfort either prior to procedure or postprocedure.  This was requested as part of the renal transplant work-up.  We discussed the findings today intervention to an obtuse marginal branch.  We discussed the recommendations for aspirin and ticagrelor.  He has not noted any significant shortness of breath but  will monitor.  I have reviewed notes from cardiac rehab.    3.  Risk modification.  He is now on atorvastatin 40 mg daily with the dose increased.  Repeat lipid panel today demonstrates an LDL down to 56 from 93 previous.    1.  We will continue with the current combination of medications avoiding hypotension.  2.  Patient to follow-up in 3 to 4 months and to notify us with any symptoms of dizziness, syncope or near syncope.  3.  We will request most recent nephrology notes and further correspondence with nephrology as well.       History of Present Illness/Subjective    HPI: Modesto Barbosa is a 56 year old male who is seen in follow-up.  He reports that he has been feeling well.  He denies specific complaints of chest pain, shortness of breath, dizziness or lightheadedness.  Coronary angiography was requested prerenal transplantation at the request of the renal transplant service.  He was initially evaluated for hypertrophic changes on his echocardiogram.  He underwent cardiac MRI that does suggest asymmetric anterior septal hypertrophy with a maximal diameter of 19 mmHg with chordal Lalit and ejection fraction of 80%.  Normal left ventricular cavity with hyperdynamic left ventricular systolic function.  He does not endorse any significant complaints of exercise intolerance, dizziness, syncope or near syncope.  He is on peritoneal dialysis at home.    Recent Echocardiogram Results:    Electronically signed by Steven Argueta 2021-Oct-29 16:56:36     VITALS   ==========================================================================================================     HEIGHT: 64.02 in    (162.60 cm)  WEIGHT: 132.21 lbs    (59.97 kgs)  BSA: 1.64 m^2     FINAL IMPRESSION   ==========================================================================================================     Asymmetric anteroseptal hypertrophy with moderate LVOT outflow tract obstruction.      SUMMARY    ==========================================================================================================     Non-contrast Cardiac MRI     1.  Small left ventricular cavity related to hypertrophy with hyperdynamic left ventricular systolic  function.  There is asymmetric anteroseptal hypertrophy with a maximal diameter of 19 mm with evidence of  left ventricular outflow tract obstruction.  There is moderate chordal MINA. The quantified left ventricular  ejection fraction is 80.7%.    2.  Normal right ventricular size and systolic function.    3.  Aortic valve sclerosis without stenosis.  Mild aortic regurgitation.    4.  Mild mitral regurgitation.           5. Moderate left atrial enlargement.    ==========================================================================================================  REPORT FINDINGS:     LEFT VENTRICLE: LV wall thickness is normal. LV cavity size is normal. LV systolic function is normal. There is no LV  mass/thrombus. LV systolic function is normal. LV systolic function is normal. Quantitative LVEF 80.7 %.     RIGHT VENTRICLE: RV wall thickness is normal. RV cavity size is normal. RV systolic function is normal. There is no RV  mass/thrombus.     LA/RA SEPTUM: The atrial septum is intact. The atrial septum is intact. The atrial septum is intact.     LEFT ATRIUM: LA is moderately enlarged.     RIGHT ATRIUM: RA cavity size is normal.     PERICARDIUM: Pericardium is normal. There is a trivial pericardial effusion.     PLEURAL EFFUSION: There is no pleural effusion.     AORTIC VALVE: The aortic valve annulus is normal in size. Aortic valve is trileaflet. There is mild aortic regurgitation.  There is no aortic stenosis. Aortic valve is mildly thickened. Peak aortic valve velocity 149.5 cm/sec.  Aortic regurgitant volume 4.9 ml. Aortic regurgitant fraction 6.6 %.     MITRAL VALVE: The mitral valve annulus is normal in size. Mitral valve leaflets are normal. There is mild  mitral  regurgitation. There is no mitral stenosis.      TRICUSPID VALVE: The tricuspid valve annulus is normal in size. Tricuspid valve leaflets are normal. There is mild tricuspid  regurgitation. There is no tricuspid stenosis.      PULMONIC VALVE: The pulmonic valve annulus is normal in size. Pulmonic valve leaflets are normal. There is no pulmonic  regurgitation. There is no pulmonic stenosis.      AORTIC ROOT: The aortic root is normal.     CHEST:  Normal thoracic aortic size and its major branches.      ABDOMEN: Abdominal Ascites noted    Holter Monitor Scan - Scan on 10/14/21 11:11 AM by       Scans on Order [id number]    Scan on 10/14/2021 11:11 AM        Conclusion    [facility], later [facility]     HOLTER REPORT     Results:    Indication for study: Evaluate for cardiomyopathy    A 24-hour Holter monitor was applied October 12, 2021 with date of interpretation October 14, 2021    Baseline tracing showed sinus rhythm with normal electrocardiographic intervals.  Average heart rate 75 bpm range between 59-98 bpm    No bradycardia, no pauses    Very rare ectopy with only 1 PVC, 1 PAC    Patient activity diary not returned    Conclusion    Normal monitor     stable heart rhythm pattern        Comment: The recording was for 23 hrs and 59 min.  The recording quality was adequate.       Recent Coronary Angiogram Results:    Procedures     Coronary Angiogram  Percutaneous Coronary Intervention  Left Heart Cath     Indications     Hypertrophic cardiomyopathy (H) [I42.2 (ICD-10-CM)]  ESRD (end stage renal disease) on dialysis (H) [N18.6, Z99.2 (ICD-10-CM)]  Type 2 diabetes mellitus with other circulatory complication, unspecified whether long term insulin use (H) [E11.59 (ICD-10-CM)]  Benign essential hypertension [I10 (ICD-10-CM)]  Mixed hyperlipidemia [E78.2 (ICD-10-CM)]     Pre Procedure Diagnosis        suspected CADpre-operative evaluation       Conclusion       Patient with ESRD on peritoneal dialysis, undergoing a  transplant work up. He also has diabetes, uncontrolled hypertension and hyperlipidemia.    Normal left main.    The LAD has diffuse mild calcification with a mild proximal disease and moderate mid LAD disease. There is no significant diagonal disease.    The circumflex has a very small OM-2, a small OM-2 and a large branching OM-3 with mild disease proximally and a severe stenosis in the larger, more anterior branch. This was stented with a Synergy SHAVONNE with good angiographic results.    Moderate sized RCA with mild proximal disease and moderate disease in both the proximal PDA and right AV groove branch prior to the PL branches.    LV EDP 13 mmhg. LV-Ao pullback gradient 17 mmHg.             Recommendations     General Recommendations:  - Follow up visit with Nurse Practitioner in 1-2 weeks.   - Recommended follow up with doctor Dr. Katz in 6 weeks.   - Arterial sheath removed from radial artery with TR Band placement.   - Recommend cardiac rehabilitation.     Hypertrophic cardiomyopathy (H) [I42.2 (ICD-10-CM)]       ECG Link     Holter Monitor Scan - Scan on 10/14/21 11:11 AM by       Scans on Order [id number]    Scan on 10/14/2021 11:11 AM          Conclusion    Former[facility], later [facility]     HOLTER REPORT     Results:    Indication for study: Evaluate for cardiomyopathy    A 24-hour Holter monitor was applied October 12, 2021 with date of interpretation October 14, 2021    Baseline tracing showed sinus rhythm with normal electrocardiographic intervals.  Average heart rate 75 bpm range between 59-98 bpm    No bradycardia, no pauses    Very rare ectopy with only 1 PVC, 1 PAC    Patient activity diary not returned    Conclusion    Normal monitor     stable heart rhythm pattern        Comment: The recording was for 23 hrs and 59 min.  The recording quality was adequate.              Physical Examination  Review of Systems   Vitals: There were no vitals taken for this visit.  BMI= There is no height or weight on file to  calculate BMI.  Wt Readings from Last 3 Encounters:   12/16/21 58.1 kg (128 lb)   12/06/21 58.6 kg (129 lb 1.6 oz)   12/02/21 62.1 kg (137 lb)       General Appearance:   no distress, normal body habitus   ENT/Mouth: membranes moist, no oral lesions or bleeding gums.      EYES:  no scleral icterus, normal conjunctivae   Neck: no carotid bruits or thyromegaly   Chest/Lungs:   lungs are clear to auscultation, no rales or wheezing, , equal chest wall expansion    Cardiovascular:   Regular. Normal first and second heart sounds with 2/6 systolic murmur, rubs, or gallops; the carotid, radial and posterior tibial pulses are intact, Jugular venous pressure within normal limits, no significant edema bilaterally    Abdomen:  no organomegaly, masses, bruits, or tenderness; bowel sounds are present   Extremities: no cyanosis or clubbing   Skin: no xanthelasma, warm.    Neurologic: normal  bilateral, no tremors     Psychiatric: alert and oriented x3, calm        Please refer above for cardiac ROS details.        Medical History  Surgical History Family History Social History   Past Medical History:   Diagnosis Date     Acute kidney injury (H) 6/10/2021     CKD (chronic kidney disease) stage 5, GFR less than 15 ml/min (H)      Dyslipidemia      Metabolic acidosis      Type 2 diabetes mellitus (H)      Past Surgical History:   Procedure Laterality Date     CV CORONARY ANGIOGRAM N/A 12/6/2021    Procedure: Coronary Angiogram;  Surgeon: Cristela Banks MD;  Location: [facility] CV     CV LEFT HEART CATH N/A 12/6/2021    Procedure: Left Heart Cath;  Surgeon: Cristela Banks MD;  Location: [facility] CV     CV PCI N/A 12/6/2021    Procedure: Percutaneous Coronary Intervention;  Surgeon: Cristela Banks MD;  Location: [facility] CATH LAB CV     Family History   Problem Relation Age of Onset     Diabetes Type 2  Mother      Other - See Comments Daughter         granular cell tumor of thigh     Heart Disease Other          Social History     Socioeconomic History     Marital status: Patient Declined     Spouse name: Not on file     Number of children: Not on file     Years of education: Not on file     Highest education level: Not on file   Occupational History     Not on file   Tobacco Use     Smoking status: Never Smoker     Smokeless tobacco: Never Used   Vaping Use     Vaping Use: Not on file   Substance and Sexual Activity     Alcohol use: Not Currently     Drug use: Never     Sexual activity: Not on file   Other Topics Concern     Parent/sibling w/ CABG, MI or angioplasty before 65F 55M? Not Asked   Social History Narrative     Not on file     Social Determinants of Health     Financial Resource Strain: Not on file   Food Insecurity: Not on file   Transportation Needs: Not on file   Physical Activity: Not on file   Stress: Not on file   Social Connections: Not on file   Intimate Partner Violence: Not on file   Housing Stability: Not on file           Medications  Allergies   Current Outpatient Medications   Medication Sig Dispense Refill     amLODIPine (NORVASC) 10 MG tablet [AMLODIPINE (NORVASC) 10 MG TABLET] Take 10 mg by mouth daily.        aspirin (ASA) 81 MG EC tablet Take 1 tablet (81 mg) by mouth daily Start tomorrow. 30 tablet 3     atorvastatin (LIPITOR) 40 MG tablet Take 1 tablet (40 mg) by mouth daily 90 tablet 3     calcitrioL (ROCALTROL) 0.25 MCG capsule [CALCITRIOL (ROCALTROL) 0.25 MCG CAPSULE] Take 0.25 mcg by mouth daily.        calcium carbonate 1250 (500 Ca) MG CHEW Take 500 mg by mouth       carvedilol (COREG) 25 MG tablet Take 1 tablet (25 mg) by mouth 2 times daily (with meals) 270 tablet 3     cloNIDine (CATAPRES-TTS) 0.3 mg/24 hr [CLONIDINE (CATAPRES-TTS) 0.3 MG/24 HR] Place 1 patch on the skin once a week.       furosemide (LASIX) 20 MG tablet Take 20 mg by mouth daily       gentamicin (GARAMYCIN) 0.1 % external cream APPLY TO EXIT SITE ONCE A DAY (Patient not taking: Reported on 12/16/2021)        "hydrALAZINE (APRESOLINE) 100 MG tablet [HYDRALAZINE (APRESOLINE) 100 MG TABLET] Take 100 mg by mouth 3 (three) times a day.       nitroGLYcerin (NITROSTAT) 0.4 MG sublingual tablet One tablet under the tongue every 5 minutes if needed for chest pain. May repeat every 5 minutes for a maximum of 3 doses in 15 minutes\" (Patient not taking: Reported on 12/16/2021) 25 tablet 3     ticagrelor (BRILINTA) 90 MG tablet Take 1 tablet (90 mg) by mouth 2 times daily Start tomorrow morning. 180 tablet 3     No Known Allergies       Lab Results    Chemistry/lipid CBC Cardiac Enzymes/BNP/TSH/INR   Recent Labs   Lab Test 12/06/21  0734   CHOL 181   HDL 53   LDL 93   TRIG 176*     Recent Labs   Lab Test 12/06/21  0734   LDL 93     Recent Labs   Lab Test 12/06/21  0734   *   POTASSIUM 3.9   CHLORIDE 97*   CO2 27   *   BUN 54*   CR 5.68*   GFRESTIMATED 10*   REYMUNDO 8.7     Recent Labs   Lab Test 12/06/21  0734 08/23/21  1243 06/14/21  1040   CR 5.68* 5.38* 4.24*     Recent Labs   Lab Test 08/23/21  1243 06/11/21  0728   A1C 5.6 5.7*          Recent Labs   Lab Test 12/06/21  0734   WBC 8.2   HGB 11.3*   HCT 32.1*   MCV 82   PLT 75*     Recent Labs   Lab Test 12/06/21  0734 08/23/21  1243 06/10/21  1406   HGB 11.3* 9.3* 10.6*    Recent Labs   Lab Test 06/10/21  1406   TROPONINI 0.04     No results for input(s): BNP, NTBNPI, NTBNP in the last 86260 hours.  No results for input(s): TSH in the last 52816 hours.  Recent Labs   Lab Test 12/06/21  0734 10/06/21  1508 08/23/21  1243   INR 1.01 1.12 1.25*        Alfred Katz MD    Thank you for allowing me to participate in the care of your patient.    Sincerely,   Alfred Katz MD   [facility]  cc:   Alfred Katz MD  [address] [place] [address] [address]  [place] [address]        "

## 2022-02-04 ENCOUNTER — TELEPHONE (OUTPATIENT)
Dept: CARDIOLOGY | Facility: CLINIC | Age: 57
End: 2022-02-04
Payer: MEDICARE

## 2022-02-04 NOTE — TELEPHONE ENCOUNTER
Daughter LVM - asking what to do with Brilinta if pt needs teeth extracted.  Return call to daughter - no connection.     Post PCI  12/6/21  Medications:  - Continue dual antiplatelet therapy for 12 month(s).   - Continue high dose statin therapy indefinitely.   - Risk factor management for atherosclerosis.   - Recommend other therapy increased dosage of coreg for hypertension    Will reach out again Monday.  -heidi

## 2022-02-07 NOTE — TELEPHONE ENCOUNTER
Discussed with daughter Luz Marina - informed Luz Marina our preference is for pt to continue on Brilinta, but they should discuss with dental team doing extraction.  Luz Marina verbalized understanding and said it sounds like pt will continue Brilinta with no concerns from dentist.  -heidi

## 2022-04-03 ENCOUNTER — HEALTH MAINTENANCE LETTER (OUTPATIENT)
Age: 57
End: 2022-04-03

## 2022-04-14 ENCOUNTER — TELEPHONE (OUTPATIENT)
Dept: TRANSPLANT | Facility: CLINIC | Age: 57
End: 2022-04-14
Payer: MEDICARE

## 2022-05-06 ENCOUNTER — TRANSFERRED RECORDS (OUTPATIENT)
Dept: HEALTH INFORMATION MANAGEMENT | Facility: CLINIC | Age: 57
End: 2022-05-06
Payer: MEDICARE

## 2022-05-09 ENCOUNTER — MEDICAL CORRESPONDENCE (OUTPATIENT)
Dept: HEALTH INFORMATION MANAGEMENT | Facility: CLINIC | Age: 57
End: 2022-05-09
Payer: MEDICARE

## 2022-05-17 ENCOUNTER — DOCUMENTATION ONLY (OUTPATIENT)
Dept: ONCOLOGY | Facility: CLINIC | Age: 57
End: 2022-05-17
Payer: MEDICARE

## 2022-05-17 ENCOUNTER — TRANSCRIBE ORDERS (OUTPATIENT)
Dept: OTHER | Age: 57
End: 2022-05-17
Payer: MEDICARE

## 2022-05-17 DIAGNOSIS — D69.6 THROMBOCYTOPENIA (H): Primary | ICD-10-CM

## 2022-05-17 DIAGNOSIS — R16.1 SPLENOMEGALY: ICD-10-CM

## 2022-05-17 NOTE — PROGRESS NOTES
Action May 17, 2022 10:55 AM ABT   Action Taken Records request sent to [place]    1:11 PM  Records from [facility] received and sent to HIM for upload

## 2022-05-20 ENCOUNTER — PATIENT OUTREACH (OUTPATIENT)
Dept: ONCOLOGY | Facility: CLINIC | Age: 57
End: 2022-05-20
Payer: MEDICARE

## 2022-05-20 NOTE — PROGRESS NOTES
Referral received for benign heme services, see below.    Referral reason: thrombocytopenia, anemia    Current abnormal labs: Labs available in Media tab    Preferred location per patient or referral: N/A    Outreach: Call not placed to patient regarding referral.    Plan: Internal Referral: No additional work-up needed, scheduling instructions updated, referral transferred to NPS for completion.

## 2022-05-26 NOTE — PROGRESS NOTES
RECORDS STATUS - ALL OTHER DIAGNOSIS      RECORDS RECEIVED FROM: [facility]   DATE RECEIVED: 6/2/2022   NOTES STATUS DETAILS   OFFICE NOTE from referring provider Complete Epic   Thuy Ribeiro APRN CNP   [facility]  Complete Records are in EPIC   DISCHARGE SUMMARY from hospital     DISCHARGE REPORT from the ER     OPERATIVE REPORT Complete Colonoscopy 8/17/2021   MEDICATION LIST Complete [facility]   CLINICAL TRIAL TREATMENTS TO DATE     LABS     PATHOLOGY REPORTS     ANYTHING RELATED TO DIAGNOSIS Complete Labs last updated on 5/6/2022    GENONOMIC TESTING     TYPE:     IMAGING (NEED IMAGES & REPORT)     CT SCANS Complete CT Abdomen Pelvis 10/3/2021   MRI     MAMMO     ULTRASOUND Complete US Renal Complete 6/10/2021   PET

## 2022-06-01 ENCOUNTER — OFFICE VISIT (OUTPATIENT)
Dept: CARDIOLOGY | Facility: CLINIC | Age: 57
End: 2022-06-01
Payer: MEDICARE

## 2022-06-01 VITALS
DIASTOLIC BLOOD PRESSURE: 64 MMHG | HEIGHT: 64 IN | RESPIRATION RATE: 16 BRPM | HEART RATE: 66 BPM | BODY MASS INDEX: 23.22 KG/M2 | SYSTOLIC BLOOD PRESSURE: 116 MMHG | WEIGHT: 136 LBS

## 2022-06-01 DIAGNOSIS — I42.1 IHSS (IDIOPATHIC HYPERTROPHIC SUBAORTIC STENOSIS) (H): Primary | ICD-10-CM

## 2022-06-01 PROCEDURE — 99214 OFFICE O/P EST MOD 30 MIN: CPT | Performed by: INTERNAL MEDICINE

## 2022-06-01 NOTE — LETTER
6/1/2022    Santos Howard MD  [address] [address]    RE: Modesto Barbosa       Dear Colleague,     I had the pleasure of seeing Nhbridger Barbosa in the [facility].    HEART CARE ENCOUNTER CONSULTATON NOTE      JOHANNA [facility]  [phone]      Assessment/Recommendations   Assessment/Plan:  1.  Asymmetric left ventricular hypertrophy.  Clinically stable.  We have discussed in the past and again today the importance of screening all first-degree relatives.  He does not appear to have major risk factors for sudden cardiac death.  No prior history of sustained ventricular arrhythmias with a recent Holter monitor as outlined below.  No family history of close relatives with sudden cardiac death due to cardiomyopathy.  His left ventricular septum measures 19 mmHg per MRI criteria.  LV function reported to be normal without apical aneurysm.  He is on multiple agents for blood pressure control but no previous documented resting left ventricular outflow tract gradient.  Holter demonstrated very rare ectopy.    2.  Coronary artery disease.  Coronary angiogram as part of renal transplant work-up as outlined.  He underwent intervention to the larger obtuse marginal branch with successful stenting with synergy stent.  Moderate disease in the LAD and moderate disease in the right coronary artery and major branches.  He is tolerating the combination of Brilinta and aspirin.  He does have a history of thrombocytopenia with recent evaluation at [facility].  I have requested a repeat report from laboratory studies either from nephrology or [facility] or primary care physician to determine whether there is been any recent CBC with platelets.  As noted he is on Brilinta.  Ideally would keep him on the combination of aspirin and Brilinta for 1 year but want to determine the most recent laboratory studies.    3.  Dyslipidemia.  On 40 mg of atorvastatin.  Most  recent lipids are from January 2022 at which time total cholesterol is 138, triglycerides 224 with an LDL of 56.    4.  Hypertension.  End-stage renal disease on peritoneal dialysis.  Blood pressure today is good.  I have requested notes from Dr. Boland from nephrology.  Reportedly he has recently come off the clonidine patch.  Patient does monitor his blood pressure and will monitor and update us if blood pressure significantly increase and I asked to be in touch with me if blood pressures are consistently higher than 140/90.    Plan as outlined above  Echocardiogram per the request from [facility] to follow-up on RV systolic function which was normal based on MRI October 2021  Follow-up 4 to 5 months.       History of Present Illness/Subjective    HPI: Modesto Barbosa is a 56 year old male who is seen in follow up.  Coronary angiography was requested prerenal transplantation at the request of the renal transplant service.  He was initially evaluated for hypertrophic changes on his echocardiogram.  He underwent cardiac MRI that does suggest asymmetric anterior septal hypertrophy with a maximal diameter of 19 mmHg with chordal juju and ejection fraction of 80% without detectable outflow tract obstruction.    He is accompanied by his son today who acts as the .  He reports that from a cardiovascular standpoint he feels well.  He specifically denies chest pain, orthopnea, or PND.  He has been exercising with an elliptical on a regular basis.  He does endorse some mild lightheadedness on occasion upon standing but no significant history of syncope or near syncope or palpitations.  He reports that last Friday he saw his renal physician and clonidine patch was discontinued.  I have requested these notes but do not yet have.  In addition he recently saw a provider at [facility] who requested a follow-up echocardiogram.  The question was related to RV function.  He had an MRI of his heart back in October that  demonstrated normal RV systolic function.  He was being evaluated for splenomegaly and thrombocytopenia.  I do not have the blood work that was obtained during that visit.  In December 2021 his hemoglobin was 11.3 and platelet count 75,000.  He is on Brilinta.    Recent Echocardiogram Results:  Gender: Male  Patient Location: [facility]  Reason For Study: Chronic kidney disease, Chronic kidney disease, stage V (H)  Ordering Physician: Carson Michel MD  Referring Physician: MECHELLE BEYER  Performed By: MARIAH Alvarado     BSA: 1.7 m2  Height: 64 in  Weight: 149 lb  BP: 154/66 mmHg  ______________________________________________________________________________  Procedure  Echocardiogram with two-dimensional, color and spectral Doppler performed.  ______________________________________________________________________________  Interpretation Summary  Severe LVH with significant asymmetric septal hypertrophy, measuring up to 1.7  cm at the level of the basal anteroseptum and 1.5 cm at the level of the mid  mid-inferoseptum. There is systolic anterior motion of the mitral valve  without septal contact and without detectable resting LVOT obstruction.  The extent and degree of asymmetry of hypertrophy are somewhat atypical for  LVH related to chronic afterload from ESRD/hypertension and hypertrophic  cardiomyopathy should be considered. Recommend cardiac MRI (this can be done  without gadolinium if necessary.)  Mildly dilated aortic root and proximal ascending aorta when indexed to body  surface area, measuring 3.7 cm (2.2 cm/m2) and 3.7 cm (2.2 cm/m2)  respectively.  ______________________________________________________________________________  Left Ventricle  Global and regional left ventricular function is normal with an EF of 60-65%.  Left ventricular size is normal. Severe LVH with significant asymmetric septal  hypertrophy, measuring up to 1.7 cm at the level of the basal anteroseptum and  1.5 cm at the level of  the mid mid-inferoseptum. There is systolic anterior  motion of the mitral valve without septal contact and without detectable  resting LVOT obstruction. Grade III or advanced diastolic dysfunction.     Right Ventricle  Right ventricular function, chamber size, wall motion, and thickness are  normal.     Atria  The right atria appears normal. Mild to moderate left atrial enlargement is  present.     Mitral Valve  Mild mitral insufficiency is present.     Aortic Valve  The aortic valve is tricuspid. Mild aortic valve calcification is present.     Tricuspid Valve  Trace tricuspid insufficiency is present. The right ventricular systolic  pressure is approximated at 33.9 mmHg plus the right atrial pressure.     Pulmonic Valve  The pulmonic valve is normal. Trace pulmonic insufficiency is present.     Vessels  The inferior vena cava was normal in size with preserved respiratory  variability. The pulmonary artery cannot be assessed. Mildly dilated aortic  root and proximal ascending aorta when indexed to body surface area, measuring  3.7 cm (2.2 cm/m2) and 3.7 cm (2.2 cm/m2) respectively.     Pericardium  No pericardial effusion is present.   ==========================================================================================================     HEIGHT: 64.02 in    (162.60 cm)  WEIGHT: 132.21 lbs    (59.97 kgs)  BSA: 1.64 m^2     FINAL IMPRESSION   ==========================================================================================================     Asymmetric anteroseptal hypertrophy with moderate LVOT outflow tract obstruction.      SUMMARY   ==========================================================================================================     Non-contrast Cardiac MRI     1.  Small left ventricular cavity related to hypertrophy with hyperdynamic left ventricular systolic  function.  There is asymmetric anteroseptal hypertrophy with a maximal diameter of 19 mm with evidence of  left ventricular  outflow tract obstruction.  There is moderate chordal MINA. The quantified left ventricular  ejection fraction is 80.7%.    2.  Normal right ventricular size and systolic function.    3.  Aortic valve sclerosis without stenosis.  Mild aortic regurgitation.    4.  Mild mitral regurgitation.           5. Moderate left atrial enlargement.    ==========================================================================================================  REPORT FINDINGS:     LEFT VENTRICLE: LV wall thickness is normal. LV cavity size is normal. LV systolic function is normal. There is no LV  mass/thrombus. LV systolic function is normal. LV systolic function is normal. Quantitative LVEF 80.7 %.     RIGHT VENTRICLE: RV wall thickness is normal. RV cavity size is normal. RV systolic function is normal. There is no RV  mass/thrombus.     LA/RA SEPTUM: The atrial septum is intact. The atrial septum is intact. The atrial septum is intact.     LEFT ATRIUM: LA is moderately enlarged.     RIGHT ATRIUM: RA cavity size is normal.     PERICARDIUM: Pericardium is normal. There is a trivial pericardial effusion.     PLEURAL EFFUSION: There is no pleural effusion.     AORTIC VALVE: The aortic valve annulus is normal in size. Aortic valve is trileaflet. There is mild aortic regurgitation.  There is no aortic stenosis. Aortic valve is mildly thickened. Peak aortic valve velocity 149.5 cm/sec.  Aortic regurgitant volume 4.9 ml. Aortic regurgitant fraction 6.6 %.     MITRAL VALVE: The mitral valve annulus is normal in size. Mitral valve leaflets are normal. There is mild mitral  regurgitation. There is no mitral stenosis.      TRICUSPID VALVE: The tricuspid valve annulus is normal in size. Tricuspid valve leaflets are normal. There is mild tricuspid  regurgitation. There is no tricuspid stenosis.      PULMONIC VALVE: The pulmonic valve annulus is normal in size. Pulmonic valve leaflets are normal. There is no pulmonic  regurgitation. There is  no pulmonic stenosis.      AORTIC ROOT: The aortic root is normal.     CHEST:  Normal thoracic aortic size and its major branches.      ABDOMEN: Abdominal Ascites noted     Recent Coronary Angiogram Results:    Procedures     Coronary Angiogram  Percutaneous Coronary Intervention  Left Heart Cath     Indications     Hypertrophic cardiomyopathy (H) [I42.2 (ICD-10-CM)]  ESRD (end stage renal disease) on dialysis (H) [N18.6, Z99.2 (ICD-10-CM)]  Type 2 diabetes mellitus with other circulatory complication, unspecified whether long term insulin use (H) [E11.59 (ICD-10-CM)]  Benign essential hypertension [I10 (ICD-10-CM)]  Mixed hyperlipidemia [E78.2 (ICD-10-CM)]     Pre Procedure Diagnosis        suspected CADpre-operative evaluation       Conclusion       Patient with ESRD on peritoneal dialysis, undergoing a transplant work up. He also has diabetes, uncontrolled hypertension and hyperlipidemia.    Normal left main.    The LAD has diffuse mild calcification with a mild proximal disease and moderate mid LAD disease. There is no significant diagonal disease.    The circumflex has a very small OM-2, a small OM-2 and a large branching OM-3 with mild disease proximally and a severe stenosis in the larger, more anterior branch. This was stented with a Synergy SHAVONNE with good angiographic results.    Moderate sized RCA with mild proximal disease and moderate disease in both the proximal PDA and right AV groove branch prior to the PL branches.    LV EDP 13 mmhg. LV-Ao pullback gradient 17 mmHg.        Physical Examination  Review of Systems   Vitals: 116/64, weight 136 pounds, heart rate of 60s and regular  Wt Readings from Last 3 Encounters:   01/19/22 60.3 kg (133 lb)   12/16/21 58.1 kg (128 lb)   12/06/21 58.6 kg (129 lb 1.6 oz)       General Appearance:   no distress, normal body habitus   ENT/Mouth: membranes moist, no oral lesions or bleeding gums.      EYES:  no scleral icterus, normal conjunctivae   Neck: no carotid  bruits or thyromegaly   Chest/Lungs:   lungs are clear to auscultation, no rales or wheezing, , equal chest wall expansion    Cardiovascular:   Regular. Normal first and second heart sounds with soft systolic  murmur, rubs, S4; the carotid, radial and posterior tibial pulses are intact, Jugular venous pressure within normal limits, trace edema bilaterally    Abdomen:  no organomegaly, masses, bruits, or tenderness; bowel sounds are present   Extremities: no cyanosis or clubbing   Skin: no xanthelasma, warm.    Neurologic: , no tremors     Psychiatric: alert and oriented x3, calm        Please refer above for cardiac ROS details.        Medical History  Surgical History Family History Social History   Past Medical History:   Diagnosis Date     Acute kidney injury (H) 6/10/2021     CKD (chronic kidney disease) stage 5, GFR less than 15 ml/min (H)      Dyslipidemia      Metabolic acidosis      Type 2 diabetes mellitus (H)      Past Surgical History:   Procedure Laterality Date     CV CORONARY ANGIOGRAM N/A 12/6/2021    Procedure: Coronary Angiogram;  Surgeon: Cristela Banks MD;  Location: [facility] LAB CV     CV LEFT HEART CATH N/A 12/6/2021    Procedure: Left Heart Cath;  Surgeon: Cristela Banks MD;  Location: [facility] CV     CV PCI N/A 12/6/2021    Procedure: Percutaneous Coronary Intervention;  Surgeon: Cristela Banks MD;  Location: [facility] CATH LAB CV     Family History   Problem Relation Age of Onset     Diabetes Type 2  Mother      Other - See Comments Daughter         granular cell tumor of thigh     Heart Disease Other         Social History     Socioeconomic History     Marital status: Patient Declined     Spouse name: Not on file     Number of children: Not on file     Years of education: Not on file     Highest education level: Not on file   Occupational History     Not on file   Tobacco Use     Smoking status: Never Smoker     Smokeless tobacco: Never Used   Vaping Use     Vaping Use:  "Not on file   Substance and Sexual Activity     Alcohol use: Not Currently     Drug use: Never     Sexual activity: Not on file   Other Topics Concern     Parent/sibling w/ CABG, MI or angioplasty before 65F 55M? Not Asked   Social History Narrative     Not on file     Social Determinants of Health     Financial Resource Strain: Not on file   Food Insecurity: Not on file   Transportation Needs: Not on file   Physical Activity: Not on file   Stress: Not on file   Social Connections: Not on file   Intimate Partner Violence: Not on file   Housing Stability: Not on file           Medications  Allergies   Current Outpatient Medications   Medication Sig Dispense Refill     amLODIPine (NORVASC) 10 MG tablet [AMLODIPINE (NORVASC) 10 MG TABLET] Take 10 mg by mouth daily.        aspirin (ASA) 81 MG EC tablet Take 1 tablet (81 mg) by mouth daily Start tomorrow. 30 tablet 3     atorvastatin (LIPITOR) 40 MG tablet Take 1 tablet (40 mg) by mouth daily 90 tablet 3     calcitrioL (ROCALTROL) 0.25 MCG capsule [CALCITRIOL (ROCALTROL) 0.25 MCG CAPSULE] Take 0.25 mcg by mouth daily.        calcium carbonate 1250 (500 Ca) MG CHEW Take 500 mg by mouth       carvedilol (COREG) 25 MG tablet Take 1 tablet (25 mg) by mouth 2 times daily (with meals) 270 tablet 3     cloNIDine (CATAPRES-TTS) 0.3 mg/24 hr [CLONIDINE (CATAPRES-TTS) 0.3 MG/24 HR] Place 1 patch on the skin once a week.       furosemide (LASIX) 20 MG tablet Take 20 mg by mouth daily       gentamicin (GARAMYCIN) 0.1 % external cream APPLY TO EXIT SITE ONCE A DAY       hydrALAZINE (APRESOLINE) 100 MG tablet [HYDRALAZINE (APRESOLINE) 100 MG TABLET] Take 100 mg by mouth 3 (three) times a day.       nitroGLYcerin (NITROSTAT) 0.4 MG sublingual tablet One tablet under the tongue every 5 minutes if needed for chest pain. May repeat every 5 minutes for a maximum of 3 doses in 15 minutes\" 25 tablet 3     ticagrelor (BRILINTA) 90 MG tablet Take 1 tablet (90 mg) by mouth 2 times daily Start " tomorrow morning. 180 tablet 3     No Known Allergies       Lab Results    Chemistry/lipid CBC Cardiac Enzymes/BNP/TSH/INR   Recent Labs   Lab Test 01/19/22  1144   CHOL 138   HDL 37*   LDL 56   TRIG 224*     Recent Labs   Lab Test 01/19/22  1144 12/06/21  0734   LDL 56 93     Recent Labs   Lab Test 12/06/21  0734   *   POTASSIUM 3.9   CHLORIDE 97*   CO2 27   *   BUN 54*   CR 5.68*   GFRESTIMATED 10*   REYMUNDO 8.7     Recent Labs   Lab Test 12/06/21  0734 08/23/21  1243 06/14/21  1040   CR 5.68* 5.38* 4.24*     Recent Labs   Lab Test 08/23/21  1243 06/11/21  0728   A1C 5.6 5.7*          Recent Labs   Lab Test 12/06/21  0734   WBC 8.2   HGB 11.3*   HCT 32.1*   MCV 82   PLT 75*     Recent Labs   Lab Test 12/06/21  0734 08/23/21  1243 06/10/21  1406   HGB 11.3* 9.3* 10.6*    Recent Labs   Lab Test 06/10/21  1406   TROPONINI 0.04     No results for input(s): BNP, NTBNPI, NTBNP in the last 58264 hours.  No results for input(s): TSH in the last 16721 hours.  Recent Labs   Lab Test 12/06/21  0734 10/06/21  1508 08/23/21  1243   INR 1.01 1.12 1.25*        Alfred Katz MD                Thank you for allowing me to participate in the care of your patient.      Sincerely,     Alfred Katz MD     [facility]  cc:   Alfred Katz MD  [address]  [address]  [place] [address]

## 2022-06-01 NOTE — PROGRESS NOTES
HEART CARE ENCOUNTER CONSULTATON NOTE      [facility]  [phone]      Assessment/Recommendations   Assessment/Plan:  1.  Asymmetric left ventricular hypertrophy.  Clinically stable.  We have discussed in the past and again today the importance of screening all first-degree relatives.  He does not appear to have major risk factors for sudden cardiac death.  No prior history of sustained ventricular arrhythmias with a recent Holter monitor as outlined below.  No family history of close relatives with sudden cardiac death due to cardiomyopathy.  His left ventricular septum measures 19 mmHg per MRI criteria.  LV function reported to be normal without apical aneurysm.  He is on multiple agents for blood pressure control but no previous documented resting left ventricular outflow tract gradient.  Holter demonstrated very rare ectopy.    2.  Coronary artery disease.  Coronary angiogram as part of renal transplant work-up as outlined.  He underwent intervention to the larger obtuse marginal branch with successful stenting with synergy stent.  Moderate disease in the LAD and moderate disease in the right coronary artery and major branches.  He is tolerating the combination of Brilinta and aspirin.  He does have a history of thrombocytopenia with recent evaluation at [facility].  I have requested a repeat report from laboratory studies either from nephrology or [facility] or primary care physician to determine whether there is been any recent CBC with platelets.  As noted he is on Brilinta.  Ideally would keep him on the combination of aspirin and Brilinta for 1 year but want to determine the most recent laboratory studies.    3.  Dyslipidemia.  On 40 mg of atorvastatin.  Most recent lipids are from January 2022 at which time total cholesterol is 138, triglycerides 224 with an LDL of 56.    4.  Hypertension.  End-stage renal disease on peritoneal dialysis.  Blood pressure today is good.  I have  requested notes from Dr. Boland from nephrology.  Reportedly he has recently come off the clonidine patch.  Patient does monitor his blood pressure and will monitor and update us if blood pressure significantly increase and I asked to be in touch with me if blood pressures are consistently higher than 140/90.    Plan as outlined above  Echocardiogram per the request from [facility] to follow-up on RV systolic function which was normal based on MRI October 2021  Follow-up 4 to 5 months.       History of Present Illness/Subjective    HPI: Modesto Barbosa is a 56 year old male who is seen in follow up.  Coronary angiography was requested prerenal transplantation at the request of the renal transplant service.  He was initially evaluated for hypertrophic changes on his echocardiogram.  He underwent cardiac MRI that does suggest asymmetric anterior septal hypertrophy with a maximal diameter of 19 mmHg with chordal juju and ejection fraction of 80% without detectable outflow tract obstruction.    He is accompanied by his son today who acts as the .  He reports that from a cardiovascular standpoint he feels well.  He specifically denies chest pain, orthopnea, or PND.  He has been exercising with an elliptical on a regular basis.  He does endorse some mild lightheadedness on occasion upon standing but no significant history of syncope or near syncope or palpitations.  He reports that last Friday he saw his renal physician and clonidine patch was discontinued.  I have requested these notes but do not yet have.  In addition he recently saw a provider at [facility] who requested a follow-up echocardiogram.  The question was related to RV function.  He had an MRI of his heart back in October that demonstrated normal RV systolic function.  He was being evaluated for splenomegaly and thrombocytopenia.  I do not have the blood work that was obtained during that visit.  In December 2021 his hemoglobin was 11.3 and  platelet count 75,000.  He is on Brilinta.    Recent Echocardiogram Results:  Gender: Male  Patient Location: [facility]  Reason For Study: Chronic kidney disease, Chronic kidney disease, stage V (H)  Ordering Physician: Carson Michel MD  Referring Physician: MECHELLE BEYER  Performed By: MARIAH Alvarado     BSA: 1.7 m2  Height: 64 in  Weight: 149 lb  BP: 154/66 mmHg  ______________________________________________________________________________  Procedure  Echocardiogram with two-dimensional, color and spectral Doppler performed.  ______________________________________________________________________________  Interpretation Summary  Severe LVH with significant asymmetric septal hypertrophy, measuring up to 1.7  cm at the level of the basal anteroseptum and 1.5 cm at the level of the mid  mid-inferoseptum. There is systolic anterior motion of the mitral valve  without septal contact and without detectable resting LVOT obstruction.  The extent and degree of asymmetry of hypertrophy are somewhat atypical for  LVH related to chronic afterload from ESRD/hypertension and hypertrophic  cardiomyopathy should be considered. Recommend cardiac MRI (this can be done  without gadolinium if necessary.)  Mildly dilated aortic root and proximal ascending aorta when indexed to body  surface area, measuring 3.7 cm (2.2 cm/m2) and 3.7 cm (2.2 cm/m2)  respectively.  ______________________________________________________________________________  Left Ventricle  Global and regional left ventricular function is normal with an EF of 60-65%.  Left ventricular size is normal. Severe LVH with significant asymmetric septal  hypertrophy, measuring up to 1.7 cm at the level of the basal anteroseptum and  1.5 cm at the level of the mid mid-inferoseptum. There is systolic anterior  motion of the mitral valve without septal contact and without detectable  resting LVOT obstruction. Grade III or advanced diastolic dysfunction.     Right  Ventricle  Right ventricular function, chamber size, wall motion, and thickness are  normal.     Atria  The right atria appears normal. Mild to moderate left atrial enlargement is  present.     Mitral Valve  Mild mitral insufficiency is present.     Aortic Valve  The aortic valve is tricuspid. Mild aortic valve calcification is present.     Tricuspid Valve  Trace tricuspid insufficiency is present. The right ventricular systolic  pressure is approximated at 33.9 mmHg plus the right atrial pressure.     Pulmonic Valve  The pulmonic valve is normal. Trace pulmonic insufficiency is present.     Vessels  The inferior vena cava was normal in size with preserved respiratory  variability. The pulmonary artery cannot be assessed. Mildly dilated aortic  root and proximal ascending aorta when indexed to body surface area, measuring  3.7 cm (2.2 cm/m2) and 3.7 cm (2.2 cm/m2) respectively.     Pericardium  No pericardial effusion is present.   ==========================================================================================================     HEIGHT: 64.02 in    (162.60 cm)  WEIGHT: 132.21 lbs    (59.97 kgs)  BSA: 1.64 m^2     FINAL IMPRESSION   ==========================================================================================================     Asymmetric anteroseptal hypertrophy with moderate LVOT outflow tract obstruction.      SUMMARY   ==========================================================================================================     Non-contrast Cardiac MRI     1.  Small left ventricular cavity related to hypertrophy with hyperdynamic left ventricular systolic  function.  There is asymmetric anteroseptal hypertrophy with a maximal diameter of 19 mm with evidence of  left ventricular outflow tract obstruction.  There is moderate chordal MINA. The quantified left ventricular  ejection fraction is 80.7%.    2.  Normal right ventricular size and systolic function.    3.  Aortic valve sclerosis  without stenosis.  Mild aortic regurgitation.    4.  Mild mitral regurgitation.           5. Moderate left atrial enlargement.    ==========================================================================================================  REPORT FINDINGS:     LEFT VENTRICLE: LV wall thickness is normal. LV cavity size is normal. LV systolic function is normal. There is no LV  mass/thrombus. LV systolic function is normal. LV systolic function is normal. Quantitative LVEF 80.7 %.     RIGHT VENTRICLE: RV wall thickness is normal. RV cavity size is normal. RV systolic function is normal. There is no RV  mass/thrombus.     LA/RA SEPTUM: The atrial septum is intact. The atrial septum is intact. The atrial septum is intact.     LEFT ATRIUM: LA is moderately enlarged.     RIGHT ATRIUM: RA cavity size is normal.     PERICARDIUM: Pericardium is normal. There is a trivial pericardial effusion.     PLEURAL EFFUSION: There is no pleural effusion.     AORTIC VALVE: The aortic valve annulus is normal in size. Aortic valve is trileaflet. There is mild aortic regurgitation.  There is no aortic stenosis. Aortic valve is mildly thickened. Peak aortic valve velocity 149.5 cm/sec.  Aortic regurgitant volume 4.9 ml. Aortic regurgitant fraction 6.6 %.     MITRAL VALVE: The mitral valve annulus is normal in size. Mitral valve leaflets are normal. There is mild mitral  regurgitation. There is no mitral stenosis.      TRICUSPID VALVE: The tricuspid valve annulus is normal in size. Tricuspid valve leaflets are normal. There is mild tricuspid  regurgitation. There is no tricuspid stenosis.      PULMONIC VALVE: The pulmonic valve annulus is normal in size. Pulmonic valve leaflets are normal. There is no pulmonic  regurgitation. There is no pulmonic stenosis.      AORTIC ROOT: The aortic root is normal.     CHEST:  Normal thoracic aortic size and its major branches.      ABDOMEN: Abdominal Ascites noted     Recent Coronary Angiogram  Results:    Procedures     Coronary Angiogram  Percutaneous Coronary Intervention  Left Heart Cath     Indications     Hypertrophic cardiomyopathy (H) [I42.2 (ICD-10-CM)]  ESRD (end stage renal disease) on dialysis (H) [N18.6, Z99.2 (ICD-10-CM)]  Type 2 diabetes mellitus with other circulatory complication, unspecified whether long term insulin use (H) [E11.59 (ICD-10-CM)]  Benign essential hypertension [I10 (ICD-10-CM)]  Mixed hyperlipidemia [E78.2 (ICD-10-CM)]     Pre Procedure Diagnosis        suspected CADpre-operative evaluation       Conclusion       Patient with ESRD on peritoneal dialysis, undergoing a transplant work up. He also has diabetes, uncontrolled hypertension and hyperlipidemia.    Normal left main.    The LAD has diffuse mild calcification with a mild proximal disease and moderate mid LAD disease. There is no significant diagonal disease.    The circumflex has a very small OM-2, a small OM-2 and a large branching OM-3 with mild disease proximally and a severe stenosis in the larger, more anterior branch. This was stented with a Synergy SHAVONNE with good angiographic results.    Moderate sized RCA with mild proximal disease and moderate disease in both the proximal PDA and right AV groove branch prior to the PL branches.    LV EDP 13 mmhg. LV-Ao pullback gradient 17 mmHg.        Physical Examination  Review of Systems   Vitals: 116/64, weight 136 pounds, heart rate of 60s and regular  Wt Readings from Last 3 Encounters:   01/19/22 60.3 kg (133 lb)   12/16/21 58.1 kg (128 lb)   12/06/21 58.6 kg (129 lb 1.6 oz)       General Appearance:   no distress, normal body habitus   ENT/Mouth: membranes moist, no oral lesions or bleeding gums.      EYES:  no scleral icterus, normal conjunctivae   Neck: no carotid bruits or thyromegaly   Chest/Lungs:   lungs are clear to auscultation, no rales or wheezing, , equal chest wall expansion    Cardiovascular:   Regular. Normal first and second heart sounds with soft  systolic  murmur, rubs, S4; the carotid, radial and posterior tibial pulses are intact, Jugular venous pressure within normal limits, trace edema bilaterally    Abdomen:  no organomegaly, masses, bruits, or tenderness; bowel sounds are present   Extremities: no cyanosis or clubbing   Skin: no xanthelasma, warm.    Neurologic: , no tremors     Psychiatric: alert and oriented x3, calm        Please refer above for cardiac ROS details.        Medical History  Surgical History Family History Social History   Past Medical History:   Diagnosis Date     Acute kidney injury (H) 6/10/2021     CKD (chronic kidney disease) stage 5, GFR less than 15 ml/min (H)      Dyslipidemia      Metabolic acidosis      Type 2 diabetes mellitus (H)      Past Surgical History:   Procedure Laterality Date     CV CORONARY ANGIOGRAM N/A 12/6/2021    Procedure: Coronary Angiogram;  Surgeon: Cristela Banks MD;  Location: [facility]     CV LEFT HEART CATH N/A 12/6/2021    Procedure: Left Heart Cath;  Surgeon: Cristela Banks MD;  Location: [facility]     CV PCI N/A 12/6/2021    Procedure: Percutaneous Coronary Intervention;  Surgeon: Cristela Banks MD;  Location: [facility]     Family History   Problem Relation Age of Onset     Diabetes Type 2  Mother      Other - See Comments Daughter         granular cell tumor of thigh     Heart Disease Other         Social History     Socioeconomic History     Marital status: Patient Declined     Spouse name: Not on file     Number of children: Not on file     Years of education: Not on file     Highest education level: Not on file   Occupational History     Not on file   Tobacco Use     Smoking status: Never Smoker     Smokeless tobacco: Never Used   Vaping Use     Vaping Use: Not on file   Substance and Sexual Activity     Alcohol use: Not Currently     Drug use: Never     Sexual activity: Not on file   Other Topics Concern     Parent/sibling w/ CABG, MI or angioplasty  "before 65F 55M? Not Asked   Social History Narrative     Not on file     Social Determinants of Health     Financial Resource Strain: Not on file   Food Insecurity: Not on file   Transportation Needs: Not on file   Physical Activity: Not on file   Stress: Not on file   Social Connections: Not on file   Intimate Partner Violence: Not on file   Housing Stability: Not on file           Medications  Allergies   Current Outpatient Medications   Medication Sig Dispense Refill     amLODIPine (NORVASC) 10 MG tablet [AMLODIPINE (NORVASC) 10 MG TABLET] Take 10 mg by mouth daily.        aspirin (ASA) 81 MG EC tablet Take 1 tablet (81 mg) by mouth daily Start tomorrow. 30 tablet 3     atorvastatin (LIPITOR) 40 MG tablet Take 1 tablet (40 mg) by mouth daily 90 tablet 3     calcitrioL (ROCALTROL) 0.25 MCG capsule [CALCITRIOL (ROCALTROL) 0.25 MCG CAPSULE] Take 0.25 mcg by mouth daily.        calcium carbonate 1250 (500 Ca) MG CHEW Take 500 mg by mouth       carvedilol (COREG) 25 MG tablet Take 1 tablet (25 mg) by mouth 2 times daily (with meals) 270 tablet 3     cloNIDine (CATAPRES-TTS) 0.3 mg/24 hr [CLONIDINE (CATAPRES-TTS) 0.3 MG/24 HR] Place 1 patch on the skin once a week.       furosemide (LASIX) 20 MG tablet Take 20 mg by mouth daily       gentamicin (GARAMYCIN) 0.1 % external cream APPLY TO EXIT SITE ONCE A DAY       hydrALAZINE (APRESOLINE) 100 MG tablet [HYDRALAZINE (APRESOLINE) 100 MG TABLET] Take 100 mg by mouth 3 (three) times a day.       nitroGLYcerin (NITROSTAT) 0.4 MG sublingual tablet One tablet under the tongue every 5 minutes if needed for chest pain. May repeat every 5 minutes for a maximum of 3 doses in 15 minutes\" 25 tablet 3     ticagrelor (BRILINTA) 90 MG tablet Take 1 tablet (90 mg) by mouth 2 times daily Start tomorrow morning. 180 tablet 3     No Known Allergies       Lab Results    Chemistry/lipid CBC Cardiac Enzymes/BNP/TSH/INR   Recent Labs   Lab Test 01/19/22  1144   CHOL 138   HDL 37*   LDL 56 "   TRIG 224*     Recent Labs   Lab Test 01/19/22  1144 12/06/21  0734   LDL 56 93     Recent Labs   Lab Test 12/06/21  0734   *   POTASSIUM 3.9   CHLORIDE 97*   CO2 27   *   BUN 54*   CR 5.68*   GFRESTIMATED 10*   REYMUNDO 8.7     Recent Labs   Lab Test 12/06/21  0734 08/23/21  1243 06/14/21  1040   CR 5.68* 5.38* 4.24*     Recent Labs   Lab Test 08/23/21  1243 06/11/21  0728   A1C 5.6 5.7*          Recent Labs   Lab Test 12/06/21  0734   WBC 8.2   HGB 11.3*   HCT 32.1*   MCV 82   PLT 75*     Recent Labs   Lab Test 12/06/21  0734 08/23/21  1243 06/10/21  1406   HGB 11.3* 9.3* 10.6*    Recent Labs   Lab Test 06/10/21  1406   TROPONINI 0.04     No results for input(s): BNP, NTBNPI, NTBNP in the last 32419 hours.  No results for input(s): TSH in the last 15934 hours.  Recent Labs   Lab Test 12/06/21  0734 10/06/21  1508 08/23/21  1243   INR 1.01 1.12 1.25*        Alfred Katz MD

## 2022-06-01 NOTE — PATIENT INSTRUCTIONS
We are going to plan a heart ultrasound.Please monitor and report blood pressure consistently higher than 140/90.I will be in touch with the test results.My nurse is Gloria and her number is [phone].Please remember that all your relatives including children and grandchildren should be screened for a thickened heart muscle.If questions please have them call my nurse.Please call with any bleeding concerns or any heart questions.

## 2022-06-02 ENCOUNTER — LAB (OUTPATIENT)
Dept: INFUSION THERAPY | Facility: HOSPITAL | Age: 57
End: 2022-06-02
Attending: NURSE PRACTITIONER
Payer: MEDICARE

## 2022-06-02 ENCOUNTER — PRE VISIT (OUTPATIENT)
Dept: ONCOLOGY | Facility: HOSPITAL | Age: 57
End: 2022-06-02

## 2022-06-02 ENCOUNTER — ONCOLOGY VISIT (OUTPATIENT)
Dept: ONCOLOGY | Facility: HOSPITAL | Age: 57
End: 2022-06-02
Attending: NURSE PRACTITIONER
Payer: MEDICARE

## 2022-06-02 VITALS
OXYGEN SATURATION: 97 % | WEIGHT: 137.6 LBS | HEART RATE: 75 BPM | SYSTOLIC BLOOD PRESSURE: 149 MMHG | DIASTOLIC BLOOD PRESSURE: 66 MMHG | RESPIRATION RATE: 24 BRPM | BODY MASS INDEX: 23.49 KG/M2 | TEMPERATURE: 98.7 F | HEIGHT: 64 IN

## 2022-06-02 DIAGNOSIS — R16.1 SPLENOMEGALY: ICD-10-CM

## 2022-06-02 DIAGNOSIS — D69.6 THROMBOCYTOPENIA (H): ICD-10-CM

## 2022-06-02 LAB
ERYTHROCYTE [DISTWIDTH] IN BLOOD BY AUTOMATED COUNT: 14.2 % (ref 10–15)
HCT VFR BLD AUTO: 28.5 % (ref 40–53)
HGB BLD-MCNC: 9.9 G/DL (ref 13.3–17.7)
MCH RBC QN AUTO: 29.9 PG (ref 26.5–33)
MCHC RBC AUTO-ENTMCNC: 34.7 G/DL (ref 31.5–36.5)
MCV RBC AUTO: 86 FL (ref 78–100)
PLATELET # BLD AUTO: 49 10E3/UL (ref 150–450)
RBC # BLD AUTO: 3.31 10E6/UL (ref 4.4–5.9)
WBC # BLD AUTO: 6 10E3/UL (ref 4–11)

## 2022-06-02 PROCEDURE — G0463 HOSPITAL OUTPT CLINIC VISIT: HCPCS

## 2022-06-02 PROCEDURE — 99205 OFFICE O/P NEW HI 60 MIN: CPT | Performed by: INTERNAL MEDICINE

## 2022-06-02 PROCEDURE — 36415 COLL VENOUS BLD VENIPUNCTURE: CPT | Performed by: INTERNAL MEDICINE

## 2022-06-02 PROCEDURE — 85027 COMPLETE CBC AUTOMATED: CPT | Performed by: INTERNAL MEDICINE

## 2022-06-02 RX ORDER — DOCUSATE SODIUM 100 MG/1
100 CAPSULE, LIQUID FILLED ORAL 2 TIMES DAILY PRN
Status: ON HOLD | COMMUNITY
End: 2023-02-25

## 2022-06-02 RX ORDER — ACETAMINOPHEN 500 MG
500 TABLET ORAL EVERY 6 HOURS PRN
Status: ON HOLD | COMMUNITY
End: 2023-02-25

## 2022-06-02 ASSESSMENT — PAIN SCALES - GENERAL: PAINLEVEL: NO PAIN (0)

## 2022-06-02 NOTE — LETTER
"    6/2/2022         RE: Modesto Barbosa  [address] [place] [address]        Dear Colleague,    Thank you for referring your patient, Modesto Barbosa, to the [facility] [place]. Please see a copy of my visit note below.    Oncology Rooming Note    June 2, 2022 1:03 PM   Modesto Barbosa is a 56 year old male who presents for:    Chief Complaint   Patient presents with     Hematology     New Patient - Thrombocytopenia, Splenomegaly      Initial Vitals: BP (!) 149/66 (BP Location: Left arm, Patient Position: Sitting, Cuff Size: Adult Regular)   Pulse 75   Temp 98.7  F (37.1  C) (Oral)   Resp 24   Ht 1.619 m (5' 3.75\")   Wt 62.4 kg (137 lb 9.6 oz)   SpO2 97%   BMI 23.80 kg/m   Estimated body mass index is 23.8 kg/m  as calculated from the following:    Height as of this encounter: 1.619 m (5' 3.75\").    Weight as of this encounter: 62.4 kg (137 lb 9.6 oz). Body surface area is 1.68 meters squared.  No Pain (0) Comment: Data Unavailable   No LMP for male patient.  Allergies reviewed: Yes  Medications reviewed: Yes    Medications: Medication refills not needed today.  Pharmacy name entered into [employer]: [id number] - [place], [place] - [address]    Clinical concerns: New Patient - Thrombocytopenia, Splenomegaly. Daughter Yanick is here today.    ZHEN Artis    DATE:  6/2/2022   TIME OF RECEIPT FROM LAB:  1420  LAB TEST:  Plt  LAB VALUE:  49  RESULTS GIVEN WITH READ-BACK TO (PROVIDER):     LADARIUS ZAPATA MD  VIDEO,   TIME LAB VALUE REPORTED TO PROVIDER:   1421 - pt was seen in clinic today by Dr Milana Mckeon, EL[facility]       [facility] Hematology and Oncology Consult Note    Patient: Modesto Barbosa  MRN: [id number]  Date of Service: Jun 2, 2022           Reason for consultation      Problem List Items Addressed This Visit        Immune    Splenomegaly    Relevant Orders    CBC with platelets (Completed)       Hematologic    Thrombocytopenia (H) "    Relevant Orders    CBC with platelets (Completed)            Assessment / number of problems addressed      1.  A very pleasant 56 year old gentleman with thrombocytopenia.  This thrombocytopenia appears to be secondary to peripheral consumption.  This could be ITP as well.  This is at least 2-1/2 3 years of duration.  Really has not changed a whole lot in that period of time.  His platelet count waxes and wanes between 40,000-90,000.  No other dysplasia seen on the peripheral blood count.  2.  Anemia which is normocytic normochromic in nature.  This is most likely anemia of chronic disease secondary to his renal insufficiency and diabetes.  3.  Kidney failure on peritoneal dialysis.  4.  History of coronary disease status post stent.  He is currently on aspirin and Brilinta.  5.  History of hepatitis B infection evidenced by positive hepatitis core antibodies.  6.  Splenomegaly.  Most likely secondary to past hepatitis infections from which he may have recovered completely by now.  He also has known positive test for Lyme's disease as well as Wale-Barr virus.  That can also result in some degree of splenomegaly and that can stay that way for a while.  7.  Language barrier.  Needed independent historian his daughter for help with his history.    Plan and medical decision making      1.  At this time I do not see any contraindication for him to proceed with renal transplant.  The immunosuppression that he will get during that process will most likely improve his platelet count.  2.  Splenomegaly is chronic has not changed much in fact has gotten better in size since 2012 so again not a contraindication to his undergoing renal transplant.  3.  Follow-up with me on as-needed basis.    Clinical/pathological stage      Cancer Staging  No matching staging information was found for the patient.    History of present illness      Mr. Modesto Barbosa is a 56 year old gentleman of [facility] origin who has been referred to me  for evaluation of his thrombocytopenia as well as splenomegaly.  Patient is getting considered for renal transplant.  As a part of the work-up he was noted to have thrombocytopenia as well as splenomegaly.  Therefore he was asked to come and see me..  He is also been seen by hepatology.  Hepatology has done work-up and have not found anything wrong with his liver.    He does have a history of hepatitis B however.  His lab work-up did reveal that he had positive hepatitis core antibodies.  This indicates past infection with hepatitis B.  That could have caused some degree of splenomegaly.  I also reviewed his clinical data that did show that his spleen was enlarged even more back in 2012.    His platelet count is relatively newer issue diagnosed only back in 2019.  He never had any blood testing before that so he does not know if he had thrombocytopenia prior to that or not.  This has never been a problem however.  He is currently on Brilinta and aspirin and has not had any bleeding issues.  More than likely this indicates some sort of consumptive thrombocytopenia issue.  He does not have any coagulopathy per se.    He also has a history of renal insufficiency secondary to diabetes and hypertension.  He is now on peritoneal dialysis since last fall.  He seems to be managing it well.    Detailed review of systems      A 14 point review of systems was obtained.  Positive findings noted in the history.  Rest of the review of system is otherwise negative.      Past medical/surgical/social/family history        Past Medical History:   Diagnosis Date     Acute kidney injury (H) 6/10/2021     CKD (chronic kidney disease) stage 5, GFR less than 15 ml/min (H)      Dyslipidemia      Metabolic acidosis      Type 2 diabetes mellitus (H)      Past Surgical History:   Procedure Laterality Date     CV CORONARY ANGIOGRAM N/A 12/6/2021    Procedure: Coronary Angiogram;  Surgeon: Cristela Banks MD;  Location: [facility]  "    CV LEFT HEART CATH N/A 12/6/2021    Procedure: Left Heart Cath;  Surgeon: Cristela Banks MD;  Location: [facility] CATH LAB CV     CV PCI N/A 12/6/2021    Procedure: Percutaneous Coronary Intervention;  Surgeon: Cristela Banks MD;  Location: [facility] CATH LAB CV     Family History   Problem Relation Age of Onset     Diabetes Type 2  Mother      Other - See Comments Daughter         granular cell tumor of thigh     Heart Disease Other      Social History     Socioeconomic History     Marital status: Patient Declined     Spouse name: None     Number of children: None     Years of education: None     Highest education level: None   Tobacco Use     Smoking status: Never Smoker     Smokeless tobacco: Never Used   Substance and Sexual Activity     Alcohol use: Not Currently     Drug use: Never     Allergies      No Known Allergies      Physical exam        BP (!) 149/66 (BP Location: Left arm, Patient Position: Sitting, Cuff Size: Adult Regular)   Pulse 75   Temp 98.7  F (37.1  C) (Oral)   Resp 24   Ht 1.619 m (5' 3.75\")   Wt 62.4 kg (137 lb 9.6 oz)   SpO2 97%   BMI 23.80 kg/m        GENERAL: Alert and oriented to time place and person. Seated comfortably. In no distress.    HEAD: Atraumatic and normocephalic.    EYES: DANIELA, EOMI. No pallor. No icterus.    Oral cavity: no mucosal lesion or tonsillar enlargement.    NECK: supple. JVP normal.No thyroid enlargement.    LYMPH NODES: No palpable, cervical, axillary or inguinal lymphadenopathy.    CHEST: clear to auscultation bilaterally. Symmetrical breath movements bilaterally.    CVS: S1 and S2 are Regular rate and rhythm. No murmur or gallop or rub heard. No peripheral edema.    ABDOMEN: Soft. Not tender. Not distended. No palpable hepatomegaly or splenomegaly. No other mass palpable. Bowel sounds heard.  He does have a peritoneal catheter in place.    EXTREMITIES: Warm.  Minimal arthritic changes.    NEUROLOGICAL: Alert awake oriented.  Otherwise intact.  " Cranial nerves appears to be preserved.    SKIN: no rash, or bruising or purpura.      Laboratory data      Recent Results (from the past 168 hour(s))   CBC with platelets   Result Value Ref Range    WBC Count 6.0 4.0 - 11.0 10e3/uL    RBC Count 3.31 (L) 4.40 - 5.90 10e6/uL    Hemoglobin 9.9 (L) 13.3 - 17.7 g/dL    Hematocrit 28.5 (L) 40.0 - 53.0 %    MCV 86 78 - 100 fL    MCH 29.9 26.5 - 33.0 pg    MCHC 34.7 31.5 - 36.5 g/dL    RDW 14.2 10.0 - 15.0 %    Platelet Count 49 (LL) 150 - 450 10e3/uL       Imaging results        No results found.    This note has been dictated using voice recognition software. Any grammatical or context distortions are unintentional and inherent to the software    Total time spent was 60 minutes on the date of service.    Signed by: Darryl Cortés MD, MD        Again, thank you for allowing me to participate in the care of your patient.        Sincerely,        Darryl Cortés MD, MD

## 2022-06-02 NOTE — PROGRESS NOTES
"Oncology Rooming Note    June 2, 2022 1:03 PM   Modesto Barbosa is a 56 year old male who presents for:    Chief Complaint   Patient presents with     Hematology     New Patient - Thrombocytopenia, Splenomegaly      Initial Vitals: BP (!) 149/66 (BP Location: Left arm, Patient Position: Sitting, Cuff Size: Adult Regular)   Pulse 75   Temp 98.7  F (37.1  C) (Oral)   Resp 24   Ht 1.619 m (5' 3.75\")   Wt 62.4 kg (137 lb 9.6 oz)   SpO2 97%   BMI 23.80 kg/m   Estimated body mass index is 23.8 kg/m  as calculated from the following:    Height as of this encounter: 1.619 m (5' 3.75\").    Weight as of this encounter: 62.4 kg (137 lb 9.6 oz). Body surface area is 1.68 meters squared.  No Pain (0) Comment: Data Unavailable   No LMP for male patient.  Allergies reviewed: Yes  Medications reviewed: Yes    Medications: Medication refills not needed today.  Pharmacy name entered into [employer]: [id number] - [place], [place] - [address] [place] [address] [facility]    Clinical concerns: New Patient - Thrombocytopenia, Splenomegaly. Daughter Yanick is here today.    Martha Marlow, CMA          '  "

## 2022-06-03 NOTE — PROGRESS NOTES
[facility] Hematology and Oncology Consult Note    Patient: Modesto Barbosa  MRN: [id number]  Date of Service: Jun 2, 2022           Reason for consultation      Problem List Items Addressed This Visit        Immune    Splenomegaly    Relevant Orders    CBC with platelets (Completed)       Hematologic    Thrombocytopenia (H)    Relevant Orders    CBC with platelets (Completed)            Assessment / number of problems addressed      1.  A very pleasant 56 year old gentleman with thrombocytopenia.  This thrombocytopenia appears to be secondary to peripheral consumption.  This could be ITP as well.  This is at least 2-1/2 3 years of duration.  Really has not changed a whole lot in that period of time.  His platelet count waxes and wanes between 40,000-90,000.  No other dysplasia seen on the peripheral blood count.  2.  Anemia which is normocytic normochromic in nature.  This is most likely anemia of chronic disease secondary to his renal insufficiency and diabetes.  3.  Kidney failure on peritoneal dialysis.  4.  History of coronary disease status post stent.  He is currently on aspirin and Brilinta.  5.  History of hepatitis B infection evidenced by positive hepatitis core antibodies.  6.  Splenomegaly.  Most likely secondary to past hepatitis infections from which he may have recovered completely by now.  He also has known positive test for Lyme's disease as well as Wale-Barr virus.  That can also result in some degree of splenomegaly and that can stay that way for a while.  7.  Language barrier.  Needed independent historian his daughter for help with his history.    Plan and medical decision making      1.  At this time I do not see any contraindication for him to proceed with renal transplant.  The immunosuppression that he will get during that process will most likely improve his platelet count.  2.  Splenomegaly is chronic has not changed much in fact has gotten better in size since 2012 so again not a  contraindication to his undergoing renal transplant.  3.  Follow-up with me on as-needed basis.    Clinical/pathological stage      Cancer Staging  No matching staging information was found for the patient.    History of present illness      Mr. Modesto Barbosa is a 56 year old gentleman of [facility] origin who has been referred to me for evaluation of his thrombocytopenia as well as splenomegaly.  Patient is getting considered for renal transplant.  As a part of the work-up he was noted to have thrombocytopenia as well as splenomegaly.  Therefore he was asked to come and see me..  He is also been seen by hepatology.  Hepatology has done work-up and have not found anything wrong with his liver.    He does have a history of hepatitis B however.  His lab work-up did reveal that he had positive hepatitis core antibodies.  This indicates past infection with hepatitis B.  That could have caused some degree of splenomegaly.  I also reviewed his clinical data that did show that his spleen was enlarged even more back in 2012.    His platelet count is relatively newer issue diagnosed only back in 2019.  He never had any blood testing before that so he does not know if he had thrombocytopenia prior to that or not.  This has never been a problem however.  He is currently on Brilinta and aspirin and has not had any bleeding issues.  More than likely this indicates some sort of consumptive thrombocytopenia issue.  He does not have any coagulopathy per se.    He also has a history of renal insufficiency secondary to diabetes and hypertension.  He is now on peritoneal dialysis since last fall.  He seems to be managing it well.    Detailed review of systems      A 14 point review of systems was obtained.  Positive findings noted in the history.  Rest of the review of system is otherwise negative.      Past medical/surgical/social/family history        Past Medical History:   Diagnosis Date     Acute kidney injury (H) 6/10/2021     CKD  "(chronic kidney disease) stage 5, GFR less than 15 ml/min (H)      Dyslipidemia      Metabolic acidosis      Type 2 diabetes mellitus (H)      Past Surgical History:   Procedure Laterality Date     CV CORONARY ANGIOGRAM N/A 12/6/2021    Procedure: Coronary Angiogram;  Surgeon: Cristela Banks MD;  Location: [facility] CATH LAB CV     CV LEFT HEART CATH N/A 12/6/2021    Procedure: Left Heart Cath;  Surgeon: Cristela Banks MD;  Location: [facility] CATH LAB CV     CV PCI N/A 12/6/2021    Procedure: Percutaneous Coronary Intervention;  Surgeon: Cristela Banks MD;  Location: [facility] CATH LAB CV     Family History   Problem Relation Age of Onset     Diabetes Type 2  Mother      Other - See Comments Daughter         granular cell tumor of thigh     Heart Disease Other      Social History     Socioeconomic History     Marital status: Patient Declined     Spouse name: None     Number of children: None     Years of education: None     Highest education level: None   Tobacco Use     Smoking status: Never Smoker     Smokeless tobacco: Never Used   Substance and Sexual Activity     Alcohol use: Not Currently     Drug use: Never     Allergies      No Known Allergies      Physical exam        BP (!) 149/66 (BP Location: Left arm, Patient Position: Sitting, Cuff Size: Adult Regular)   Pulse 75   Temp 98.7  F (37.1  C) (Oral)   Resp 24   Ht 1.619 m (5' 3.75\")   Wt 62.4 kg (137 lb 9.6 oz)   SpO2 97%   BMI 23.80 kg/m        GENERAL: Alert and oriented to time place and person. Seated comfortably. In no distress.    HEAD: Atraumatic and normocephalic.    EYES: DANIELA, EOMI. No pallor. No icterus.    Oral cavity: no mucosal lesion or tonsillar enlargement.    NECK: supple. JVP normal.No thyroid enlargement.    LYMPH NODES: No palpable, cervical, axillary or inguinal lymphadenopathy.    CHEST: clear to auscultation bilaterally. Symmetrical breath movements bilaterally.    CVS: S1 and S2 are Regular rate and rhythm. No murmur or " gallop or rub heard. No peripheral edema.    ABDOMEN: Soft. Not tender. Not distended. No palpable hepatomegaly or splenomegaly. No other mass palpable. Bowel sounds heard.  He does have a peritoneal catheter in place.    EXTREMITIES: Warm.  Minimal arthritic changes.    NEUROLOGICAL: Alert awake oriented.  Otherwise intact.  Cranial nerves appears to be preserved.    SKIN: no rash, or bruising or purpura.      Laboratory data      Recent Results (from the past 168 hour(s))   CBC with platelets   Result Value Ref Range    WBC Count 6.0 4.0 - 11.0 10e3/uL    RBC Count 3.31 (L) 4.40 - 5.90 10e6/uL    Hemoglobin 9.9 (L) 13.3 - 17.7 g/dL    Hematocrit 28.5 (L) 40.0 - 53.0 %    MCV 86 78 - 100 fL    MCH 29.9 26.5 - 33.0 pg    MCHC 34.7 31.5 - 36.5 g/dL    RDW 14.2 10.0 - 15.0 %    Platelet Count 49 (LL) 150 - 450 10e3/uL       Imaging results        No results found.    This note has been dictated using voice recognition software. Any grammatical or context distortions are unintentional and inherent to the software    Total time spent was 60 minutes on the date of service.    Signed by: Darryl Cortés MD, MD

## 2022-06-27 ENCOUNTER — HOSPITAL ENCOUNTER (OUTPATIENT)
Dept: CARDIOLOGY | Facility: HOSPITAL | Age: 57
Discharge: HOME | End: 2022-06-27
Attending: INTERNAL MEDICINE | Admitting: INTERNAL MEDICINE
Payer: MEDICARE

## 2022-06-27 DIAGNOSIS — I42.1 IHSS (IDIOPATHIC HYPERTROPHIC SUBAORTIC STENOSIS) (H): ICD-10-CM

## 2022-06-27 LAB — LVEF ECHO: NORMAL

## 2022-06-27 PROCEDURE — 93306 TTE W/DOPPLER COMPLETE: CPT

## 2022-06-27 PROCEDURE — 93306 TTE W/DOPPLER COMPLETE: CPT | Mod: 26 | Performed by: INTERNAL MEDICINE

## 2022-07-24 ENCOUNTER — HEALTH MAINTENANCE LETTER (OUTPATIENT)
Age: 57
End: 2022-07-24

## 2022-08-11 ENCOUNTER — TRANSFERRED RECORDS (OUTPATIENT)
Dept: HEALTH INFORMATION MANAGEMENT | Facility: CLINIC | Age: 57
End: 2022-08-11

## 2022-08-11 LAB
ALT SERPL-CCNC: 36 U/L (ref 9–46)
AST SERPL-CCNC: 23 U/L (ref 10–35)
CHOLESTEROL (EXTERNAL): 128 MG/DL
CREATININE (EXTERNAL): 4.67 MG/DL (ref 0.7–1.3)
GFR ESTIMATED (EXTERNAL): 14 ML/MIN/1.73M2
GLUCOSE (EXTERNAL): 340 MG/DL (ref 65–99)
HBA1C MFR BLD: 12 %
HDLC SERPL-MCNC: 47 MG/DL
LDL CHOLESTEROL CALCULATED (EXTERNAL): 53 MG/DL
NON HDL CHOLESTEROL (EXTERNAL): 81 MG/DL
POTASSIUM (EXTERNAL): 4.6 MMOL/L (ref 3.5–5.3)
TRIGLYCERIDES (EXTERNAL): 221 MG/DL

## 2022-10-02 ENCOUNTER — HEALTH MAINTENANCE LETTER (OUTPATIENT)
Age: 57
End: 2022-10-02

## 2022-10-03 ENCOUNTER — TRANSFERRED RECORDS (OUTPATIENT)
Dept: HEALTH INFORMATION MANAGEMENT | Facility: CLINIC | Age: 57
End: 2022-10-03

## 2022-11-16 ENCOUNTER — HOSPITAL ENCOUNTER (OUTPATIENT)
Facility: HOSPITAL | Age: 57
Setting detail: OBSERVATION
Discharge: HOME | End: 2022-11-18
Attending: EMERGENCY MEDICINE | Admitting: STUDENT IN AN ORGANIZED HEALTH CARE EDUCATION/TRAINING PROGRAM
Payer: MEDICARE

## 2022-11-16 ENCOUNTER — APPOINTMENT (OUTPATIENT)
Dept: RADIOLOGY | Facility: HOSPITAL | Age: 57
End: 2022-11-16
Attending: EMERGENCY MEDICINE
Payer: MEDICARE

## 2022-11-16 DIAGNOSIS — I21.4 NSTEMI (NON-ST ELEVATED MYOCARDIAL INFARCTION) (H): ICD-10-CM

## 2022-11-16 DIAGNOSIS — A41.9 SYSTEMIC INFECTION (H): ICD-10-CM

## 2022-11-16 DIAGNOSIS — E87.6 HYPOKALEMIA: ICD-10-CM

## 2022-11-16 DIAGNOSIS — E87.1 HYPONATREMIA: ICD-10-CM

## 2022-11-16 DIAGNOSIS — I95.9 HYPOTENSION, UNSPECIFIED HYPOTENSION TYPE: ICD-10-CM

## 2022-11-16 DIAGNOSIS — I10 ESSENTIAL HYPERTENSION: Primary | ICD-10-CM

## 2022-11-16 DIAGNOSIS — N19 RENAL FAILURE, UNSPECIFIED CHRONICITY: ICD-10-CM

## 2022-11-16 LAB
ALBUMIN SERPL BCG-MCNC: 3.4 G/DL (ref 3.5–5.2)
ALP SERPL-CCNC: 68 U/L (ref 40–129)
ALT SERPL W P-5'-P-CCNC: 16 U/L (ref 10–50)
ANION GAP SERPL CALCULATED.3IONS-SCNC: 19 MMOL/L (ref 7–15)
AST SERPL W P-5'-P-CCNC: 16 U/L (ref 10–50)
BASOPHILS # BLD MANUAL: 0.1 10E3/UL (ref 0–0.2)
BASOPHILS NFR BLD MANUAL: 1 %
BILIRUB DIRECT SERPL-MCNC: <0.2 MG/DL (ref 0–0.3)
BILIRUB SERPL-MCNC: 0.6 MG/DL
BUN SERPL-MCNC: 49.8 MG/DL (ref 6–20)
CALCIUM SERPL-MCNC: 7.8 MG/DL (ref 8.6–10)
CHLORIDE SERPL-SCNC: 85 MMOL/L (ref 98–107)
CREAT SERPL-MCNC: 9.44 MG/DL (ref 0.67–1.17)
CRP SERPL-MCNC: 72.8 MG/L
DACRYOCYTES BLD QL SMEAR: SLIGHT
DEPRECATED HCO3 PLAS-SCNC: 23 MMOL/L (ref 22–29)
ELLIPTOCYTES BLD QL SMEAR: SLIGHT
EOSINOPHIL # BLD MANUAL: 0.1 10E3/UL (ref 0–0.7)
EOSINOPHIL NFR BLD MANUAL: 1 %
ERYTHROCYTE [DISTWIDTH] IN BLOOD BY AUTOMATED COUNT: 14.6 % (ref 10–15)
FLUAV RNA SPEC QL NAA+PROBE: NEGATIVE
FLUBV RNA RESP QL NAA+PROBE: NEGATIVE
GFR SERPL CREATININE-BSD FRML MDRD: 6 ML/MIN/1.73M2
GLUCOSE SERPL-MCNC: 242 MG/DL (ref 70–99)
HCT VFR BLD AUTO: 36.9 % (ref 40–53)
HGB BLD-MCNC: 13 G/DL (ref 13.3–17.7)
HOLD SPECIMEN: NORMAL
LACTATE SERPL-SCNC: 2 MMOL/L (ref 0.7–2)
LYMPHOCYTES # BLD MANUAL: 1 10E3/UL (ref 0.8–5.3)
LYMPHOCYTES NFR BLD MANUAL: 10 %
MAGNESIUM SERPL-MCNC: 2 MG/DL (ref 1.7–2.3)
MCH RBC QN AUTO: 29.2 PG (ref 26.5–33)
MCHC RBC AUTO-ENTMCNC: 35.2 G/DL (ref 31.5–36.5)
MCV RBC AUTO: 83 FL (ref 78–100)
METAMYELOCYTES # BLD MANUAL: 0.2 10E3/UL
METAMYELOCYTES NFR BLD MANUAL: 2 %
MONOCYTES # BLD MANUAL: 1 10E3/UL (ref 0–1.3)
MONOCYTES NFR BLD MANUAL: 10 %
NEUTROPHILS # BLD MANUAL: 7.6 10E3/UL (ref 1.6–8.3)
NEUTROPHILS NFR BLD MANUAL: 76 %
PLAT MORPH BLD: ABNORMAL
PLATELET # BLD AUTO: 120 10E3/UL (ref 150–450)
POLYCHROMASIA BLD QL SMEAR: SLIGHT
POTASSIUM SERPL-SCNC: 3.1 MMOL/L (ref 3.4–5.3)
PROT SERPL-MCNC: 6.5 G/DL (ref 6.4–8.3)
RBC # BLD AUTO: 4.45 10E6/UL (ref 4.4–5.9)
RBC MORPH BLD: ABNORMAL
RSV RNA SPEC NAA+PROBE: NEGATIVE
SARS-COV-2 RNA RESP QL NAA+PROBE: NEGATIVE
SODIUM SERPL-SCNC: 127 MMOL/L (ref 136–145)
TROPONIN T SERPL HS-MCNC: 149 NG/L
TROPONIN T SERPL HS-MCNC: 171 NG/L
WBC # BLD AUTO: 10 10E3/UL (ref 4–11)

## 2022-11-16 PROCEDURE — 258N000003 HC RX IP 258 OP 636: Performed by: EMERGENCY MEDICINE

## 2022-11-16 PROCEDURE — 85041 AUTOMATED RBC COUNT: CPT | Performed by: EMERGENCY MEDICINE

## 2022-11-16 PROCEDURE — 85007 BL SMEAR W/DIFF WBC COUNT: CPT | Performed by: EMERGENCY MEDICINE

## 2022-11-16 PROCEDURE — 120N000001 HC R&B MED SURG/OB

## 2022-11-16 PROCEDURE — 36415 COLL VENOUS BLD VENIPUNCTURE: CPT | Performed by: INTERNAL MEDICINE

## 2022-11-16 PROCEDURE — C9803 HOPD COVID-19 SPEC COLLECT: HCPCS

## 2022-11-16 PROCEDURE — 93005 ELECTROCARDIOGRAM TRACING: CPT | Performed by: EMERGENCY MEDICINE

## 2022-11-16 PROCEDURE — 84484 ASSAY OF TROPONIN QUANT: CPT | Performed by: EMERGENCY MEDICINE

## 2022-11-16 PROCEDURE — 96361 HYDRATE IV INFUSION ADD-ON: CPT

## 2022-11-16 PROCEDURE — 84484 ASSAY OF TROPONIN QUANT: CPT | Performed by: INTERNAL MEDICINE

## 2022-11-16 PROCEDURE — 80053 COMPREHEN METABOLIC PANEL: CPT | Performed by: EMERGENCY MEDICINE

## 2022-11-16 PROCEDURE — 86140 C-REACTIVE PROTEIN: CPT | Performed by: EMERGENCY MEDICINE

## 2022-11-16 PROCEDURE — 83735 ASSAY OF MAGNESIUM: CPT | Performed by: EMERGENCY MEDICINE

## 2022-11-16 PROCEDURE — 96360 HYDRATION IV INFUSION INIT: CPT

## 2022-11-16 PROCEDURE — 99285 EMERGENCY DEPT VISIT HI MDM: CPT | Mod: 25

## 2022-11-16 PROCEDURE — 87637 SARSCOV2&INF A&B&RSV AMP PRB: CPT | Performed by: EMERGENCY MEDICINE

## 2022-11-16 PROCEDURE — 82248 BILIRUBIN DIRECT: CPT | Performed by: EMERGENCY MEDICINE

## 2022-11-16 PROCEDURE — 83605 ASSAY OF LACTIC ACID: CPT | Performed by: EMERGENCY MEDICINE

## 2022-11-16 PROCEDURE — 36415 COLL VENOUS BLD VENIPUNCTURE: CPT | Performed by: EMERGENCY MEDICINE

## 2022-11-16 PROCEDURE — 99222 1ST HOSP IP/OBS MODERATE 55: CPT | Mod: AI | Performed by: INTERNAL MEDICINE

## 2022-11-16 PROCEDURE — 85027 COMPLETE CBC AUTOMATED: CPT | Performed by: EMERGENCY MEDICINE

## 2022-11-16 PROCEDURE — 71045 X-RAY EXAM CHEST 1 VIEW: CPT

## 2022-11-16 RX ORDER — DOCUSATE SODIUM 100 MG/1
100 CAPSULE, LIQUID FILLED ORAL 2 TIMES DAILY PRN
Status: DISCONTINUED | OUTPATIENT
Start: 2022-11-16 | End: 2022-11-18 | Stop reason: HOSPADM

## 2022-11-16 RX ORDER — ACETAMINOPHEN 500 MG
500 TABLET ORAL EVERY 6 HOURS PRN
Status: DISCONTINUED | OUTPATIENT
Start: 2022-11-16 | End: 2022-11-18 | Stop reason: HOSPADM

## 2022-11-16 RX ORDER — CALCITRIOL 0.25 UG/1
0.5 CAPSULE, LIQUID FILLED ORAL DAILY
Status: DISCONTINUED | OUTPATIENT
Start: 2022-11-17 | End: 2022-11-18 | Stop reason: HOSPADM

## 2022-11-16 RX ORDER — CARVEDILOL 12.5 MG/1
25 TABLET ORAL 2 TIMES DAILY WITH MEALS
Status: DISCONTINUED | OUTPATIENT
Start: 2022-11-16 | End: 2022-11-16

## 2022-11-16 RX ORDER — NITROGLYCERIN 0.4 MG/1
0.4 TABLET SUBLINGUAL EVERY 5 MIN PRN
Status: DISCONTINUED | OUTPATIENT
Start: 2022-11-16 | End: 2022-11-18 | Stop reason: HOSPADM

## 2022-11-16 RX ORDER — TRAZODONE HYDROCHLORIDE 50 MG/1
50 TABLET, FILM COATED ORAL
COMMUNITY
End: 2023-02-10

## 2022-11-16 RX ORDER — CALCIUM CARBONATE 500(1250)
500 TABLET ORAL DAILY
Status: DISCONTINUED | OUTPATIENT
Start: 2022-11-17 | End: 2022-11-18 | Stop reason: HOSPADM

## 2022-11-16 RX ORDER — CARVEDILOL 12.5 MG/1
25 TABLET ORAL 2 TIMES DAILY WITH MEALS
Status: DISCONTINUED | OUTPATIENT
Start: 2022-11-17 | End: 2022-11-18 | Stop reason: HOSPADM

## 2022-11-16 RX ORDER — INSULIN ASPART 100 [IU]/ML
10 INJECTION, SOLUTION INTRAVENOUS; SUBCUTANEOUS
COMMUNITY
End: 2023-02-10

## 2022-11-16 RX ORDER — HEPARIN SODIUM 5000 [USP'U]/.5ML
5000 INJECTION, SOLUTION INTRAVENOUS; SUBCUTANEOUS EVERY 12 HOURS
Status: DISCONTINUED | OUTPATIENT
Start: 2022-11-17 | End: 2022-11-18 | Stop reason: HOSPADM

## 2022-11-16 RX ORDER — NICOTINE POLACRILEX 4 MG
15-30 LOZENGE BUCCAL
Status: DISCONTINUED | OUTPATIENT
Start: 2022-11-16 | End: 2022-11-18

## 2022-11-16 RX ORDER — TRAZODONE HYDROCHLORIDE 50 MG/1
50 TABLET, FILM COATED ORAL AT BEDTIME
Status: DISCONTINUED | OUTPATIENT
Start: 2022-11-16 | End: 2022-11-18 | Stop reason: HOSPADM

## 2022-11-16 RX ORDER — DEXTROSE MONOHYDRATE 25 G/50ML
25-50 INJECTION, SOLUTION INTRAVENOUS
Status: DISCONTINUED | OUTPATIENT
Start: 2022-11-16 | End: 2022-11-18

## 2022-11-16 RX ORDER — ATORVASTATIN CALCIUM 40 MG/1
40 TABLET, FILM COATED ORAL DAILY
Status: DISCONTINUED | OUTPATIENT
Start: 2022-11-16 | End: 2022-11-18 | Stop reason: HOSPADM

## 2022-11-16 RX ORDER — ASPIRIN 81 MG/1
81 TABLET, CHEWABLE ORAL DAILY
Status: DISCONTINUED | OUTPATIENT
Start: 2022-11-17 | End: 2022-11-18

## 2022-11-16 RX ORDER — CALCITRIOL 0.25 UG/1
0.5 CAPSULE, LIQUID FILLED ORAL DAILY
Status: ON HOLD | COMMUNITY
End: 2023-02-25

## 2022-11-16 RX ORDER — PROCHLORPERAZINE MALEATE 10 MG
10 TABLET ORAL EVERY 6 HOURS PRN
Status: DISCONTINUED | OUTPATIENT
Start: 2022-11-16 | End: 2022-11-18 | Stop reason: HOSPADM

## 2022-11-16 RX ORDER — PROCHLORPERAZINE 25 MG
25 SUPPOSITORY, RECTAL RECTAL EVERY 12 HOURS PRN
Status: DISCONTINUED | OUTPATIENT
Start: 2022-11-16 | End: 2022-11-18 | Stop reason: HOSPADM

## 2022-11-16 RX ORDER — LIDOCAINE 40 MG/G
CREAM TOPICAL
Status: DISCONTINUED | OUTPATIENT
Start: 2022-11-16 | End: 2022-11-18 | Stop reason: HOSPADM

## 2022-11-16 RX ORDER — HYDRALAZINE HYDROCHLORIDE 50 MG/1
50 TABLET, FILM COATED ORAL 3 TIMES DAILY
Status: DISCONTINUED | OUTPATIENT
Start: 2022-11-17 | End: 2022-11-18 | Stop reason: HOSPADM

## 2022-11-16 RX ADMIN — SODIUM CHLORIDE 500 ML: 9 INJECTION, SOLUTION INTRAVENOUS at 19:12

## 2022-11-16 RX ADMIN — SODIUM CHLORIDE 500 ML: 9 INJECTION, SOLUTION INTRAVENOUS at 21:27

## 2022-11-16 ASSESSMENT — ENCOUNTER SYMPTOMS
FEVER: 0
APPETITE CHANGE: 1
VOMITING: 1
LIGHT-HEADEDNESS: 1
NAUSEA: 1
UNEXPECTED WEIGHT CHANGE: 1
COUGH: 1

## 2022-11-16 ASSESSMENT — ACTIVITIES OF DAILY LIVING (ADL)
ADLS_ACUITY_SCORE: 35
DEPENDENT_IADLS:: TRANSPORTATION;CLEANING;COOKING;LAUNDRY;SHOPPING;MEAL PREPARATION
ADLS_ACUITY_SCORE: 35
ADLS_ACUITY_SCORE: 35

## 2022-11-16 NOTE — ED TRIAGE NOTES
Pt arrives with lightheadedness, cough, weakness. Dialysis patient - last run today. Hypotensive in triage. Has lost at least 10 pounds in the last five days.

## 2022-11-17 ENCOUNTER — APPOINTMENT (OUTPATIENT)
Dept: CARDIOLOGY | Facility: HOSPITAL | Age: 57
End: 2022-11-17
Attending: INTERNAL MEDICINE
Payer: MEDICARE

## 2022-11-17 LAB
ALBUMIN SERPL BCG-MCNC: 2.6 G/DL (ref 3.5–5.2)
ANION GAP SERPL CALCULATED.3IONS-SCNC: 15 MMOL/L (ref 7–15)
APTT PPP: 42 SECONDS (ref 22–38)
BASOPHILS # BLD MANUAL: 0 10E3/UL (ref 0–0.2)
BASOPHILS NFR BLD MANUAL: 0 %
BUN SERPL-MCNC: 53.1 MG/DL (ref 6–20)
CALCIUM SERPL-MCNC: 7 MG/DL (ref 8.6–10)
CHLORIDE SERPL-SCNC: 91 MMOL/L (ref 98–107)
CHOLEST SERPL-MCNC: 122 MG/DL
CREAT SERPL-MCNC: 9.2 MG/DL (ref 0.67–1.17)
DEPRECATED HCO3 PLAS-SCNC: 23 MMOL/L (ref 22–29)
EOSINOPHIL # BLD MANUAL: 0.2 10E3/UL (ref 0–0.7)
EOSINOPHIL NFR BLD MANUAL: 3 %
ERYTHROCYTE [DISTWIDTH] IN BLOOD BY AUTOMATED COUNT: 14.5 % (ref 10–15)
GFR SERPL CREATININE-BSD FRML MDRD: 6 ML/MIN/1.73M2
GLUCOSE BLDC GLUCOMTR-MCNC: 122 MG/DL (ref 70–99)
GLUCOSE BLDC GLUCOMTR-MCNC: 123 MG/DL (ref 70–99)
GLUCOSE BLDC GLUCOMTR-MCNC: 126 MG/DL (ref 70–99)
GLUCOSE BLDC GLUCOMTR-MCNC: 141 MG/DL (ref 70–99)
GLUCOSE BLDC GLUCOMTR-MCNC: 160 MG/DL (ref 70–99)
GLUCOSE BLDC GLUCOMTR-MCNC: 200 MG/DL (ref 70–99)
GLUCOSE SERPL-MCNC: 113 MG/DL (ref 70–99)
HCT VFR BLD AUTO: 29.3 % (ref 40–53)
HDLC SERPL-MCNC: 22 MG/DL
HGB BLD-MCNC: 10.4 G/DL (ref 13.3–17.7)
INR PPP: 1.16 (ref 0.85–1.15)
LDLC SERPL CALC-MCNC: 53 MG/DL
LVEF ECHO: NORMAL
LYMPHOCYTES # BLD MANUAL: 1.2 10E3/UL (ref 0.8–5.3)
LYMPHOCYTES NFR BLD MANUAL: 17 %
MCH RBC QN AUTO: 29.7 PG (ref 26.5–33)
MCHC RBC AUTO-ENTMCNC: 35.5 G/DL (ref 31.5–36.5)
MCV RBC AUTO: 84 FL (ref 78–100)
MONOCYTES # BLD MANUAL: 0.1 10E3/UL (ref 0–1.3)
MONOCYTES NFR BLD MANUAL: 1 %
NEUTROPHILS # BLD MANUAL: 5.6 10E3/UL (ref 1.6–8.3)
NEUTROPHILS NFR BLD MANUAL: 79 %
NONHDLC SERPL-MCNC: 100 MG/DL
PHOSPHATE SERPL-MCNC: 7.9 MG/DL (ref 2.5–4.5)
PLAT MORPH BLD: NORMAL
PLATELET # BLD AUTO: 85 10E3/UL (ref 150–450)
POTASSIUM SERPL-SCNC: 3.3 MMOL/L (ref 3.4–5.3)
RBC # BLD AUTO: 3.5 10E6/UL (ref 4.4–5.9)
RBC MORPH BLD: NORMAL
SODIUM SERPL-SCNC: 129 MMOL/L (ref 136–145)
TRIGL SERPL-MCNC: 234 MG/DL
WBC # BLD AUTO: 7.1 10E3/UL (ref 4–11)

## 2022-11-17 PROCEDURE — 96372 THER/PROPH/DIAG INJ SC/IM: CPT | Mod: XU | Performed by: INTERNAL MEDICINE

## 2022-11-17 PROCEDURE — G0378 HOSPITAL OBSERVATION PER HR: HCPCS

## 2022-11-17 PROCEDURE — 85007 BL SMEAR W/DIFF WBC COUNT: CPT | Performed by: INTERNAL MEDICINE

## 2022-11-17 PROCEDURE — 99222 1ST HOSP IP/OBS MODERATE 55: CPT | Mod: 25 | Performed by: GENERAL ACUTE CARE HOSPITAL

## 2022-11-17 PROCEDURE — 80061 LIPID PANEL: CPT | Performed by: INTERNAL MEDICINE

## 2022-11-17 PROCEDURE — 250N000013 HC RX MED GY IP 250 OP 250 PS 637: Performed by: INTERNAL MEDICINE

## 2022-11-17 PROCEDURE — 85610 PROTHROMBIN TIME: CPT | Performed by: INTERNAL MEDICINE

## 2022-11-17 PROCEDURE — 93306 TTE W/DOPPLER COMPLETE: CPT | Mod: 26 | Performed by: INTERNAL MEDICINE

## 2022-11-17 PROCEDURE — 99225 PR SUBSEQUENT OBSERVATION CARE,LEVEL II: CPT | Performed by: STUDENT IN AN ORGANIZED HEALTH CARE EDUCATION/TRAINING PROGRAM

## 2022-11-17 PROCEDURE — 250N000012 HC RX MED GY IP 250 OP 636 PS 637: Performed by: INTERNAL MEDICINE

## 2022-11-17 PROCEDURE — 255N000002 HC RX 255 OP 636: Performed by: STUDENT IN AN ORGANIZED HEALTH CARE EDUCATION/TRAINING PROGRAM

## 2022-11-17 PROCEDURE — 85730 THROMBOPLASTIN TIME PARTIAL: CPT | Performed by: INTERNAL MEDICINE

## 2022-11-17 PROCEDURE — 85027 COMPLETE CBC AUTOMATED: CPT | Performed by: INTERNAL MEDICINE

## 2022-11-17 PROCEDURE — 36415 COLL VENOUS BLD VENIPUNCTURE: CPT | Performed by: INTERNAL MEDICINE

## 2022-11-17 PROCEDURE — 250N000011 HC RX IP 250 OP 636: Performed by: INTERNAL MEDICINE

## 2022-11-17 PROCEDURE — 82962 GLUCOSE BLOOD TEST: CPT

## 2022-11-17 PROCEDURE — 80069 RENAL FUNCTION PANEL: CPT | Performed by: INTERNAL MEDICINE

## 2022-11-17 PROCEDURE — 258N000003 HC RX IP 258 OP 636: Performed by: STUDENT IN AN ORGANIZED HEALTH CARE EDUCATION/TRAINING PROGRAM

## 2022-11-17 PROCEDURE — 250N000013 HC RX MED GY IP 250 OP 250 PS 637: Performed by: STUDENT IN AN ORGANIZED HEALTH CARE EDUCATION/TRAINING PROGRAM

## 2022-11-17 RX ORDER — POTASSIUM CHLORIDE 1500 MG/1
40 TABLET, EXTENDED RELEASE ORAL ONCE
Status: COMPLETED | OUTPATIENT
Start: 2022-11-17 | End: 2022-11-17

## 2022-11-17 RX ORDER — GENTAMICIN SULFATE 1 MG/G
CREAM TOPICAL DAILY PRN
Status: DISCONTINUED | OUTPATIENT
Start: 2022-11-17 | End: 2022-11-18 | Stop reason: HOSPADM

## 2022-11-17 RX ADMIN — TRAZODONE HYDROCHLORIDE 50 MG: 50 TABLET ORAL at 01:01

## 2022-11-17 RX ADMIN — POTASSIUM CHLORIDE 40 MEQ: 1500 TABLET, EXTENDED RELEASE ORAL at 13:29

## 2022-11-17 RX ADMIN — ASPIRIN 81 MG CHEWABLE TABLET 81 MG: 81 TABLET CHEWABLE at 08:06

## 2022-11-17 RX ADMIN — ATORVASTATIN CALCIUM 40 MG: 40 TABLET, FILM COATED ORAL at 08:06

## 2022-11-17 RX ADMIN — HEPARIN SODIUM 5000 UNITS: 5000 INJECTION, SOLUTION INTRAVENOUS; SUBCUTANEOUS at 08:05

## 2022-11-17 RX ADMIN — HYDRALAZINE HYDROCHLORIDE 50 MG: 50 TABLET, FILM COATED ORAL at 08:06

## 2022-11-17 RX ADMIN — TICAGRELOR 90 MG: 90 TABLET ORAL at 00:59

## 2022-11-17 RX ADMIN — TICAGRELOR 90 MG: 90 TABLET ORAL at 08:49

## 2022-11-17 RX ADMIN — HYDRALAZINE HYDROCHLORIDE 50 MG: 50 TABLET, FILM COATED ORAL at 22:08

## 2022-11-17 RX ADMIN — HYDRALAZINE HYDROCHLORIDE 50 MG: 50 TABLET, FILM COATED ORAL at 13:03

## 2022-11-17 RX ADMIN — CALCITRIOL CAPSULES 0.25 MCG 0.5 MCG: 0.25 CAPSULE ORAL at 08:49

## 2022-11-17 RX ADMIN — TRAZODONE HYDROCHLORIDE 50 MG: 50 TABLET ORAL at 20:41

## 2022-11-17 RX ADMIN — CARVEDILOL 25 MG: 12.5 TABLET, FILM COATED ORAL at 20:41

## 2022-11-17 RX ADMIN — Medication 500 MG: at 08:49

## 2022-11-17 RX ADMIN — CARVEDILOL 25 MG: 12.5 TABLET, FILM COATED ORAL at 01:02

## 2022-11-17 RX ADMIN — INSULIN ASPART 1 UNITS: 100 INJECTION, SOLUTION INTRAVENOUS; SUBCUTANEOUS at 01:11

## 2022-11-17 RX ADMIN — ATORVASTATIN CALCIUM 40 MG: 40 TABLET, FILM COATED ORAL at 00:58

## 2022-11-17 RX ADMIN — PERFLUTREN 3 ML: 6.52 INJECTION, SUSPENSION INTRAVENOUS at 12:50

## 2022-11-17 RX ADMIN — HEPARIN SODIUM 5000 UNITS: 5000 INJECTION, SOLUTION INTRAVENOUS; SUBCUTANEOUS at 21:34

## 2022-11-17 RX ADMIN — TICAGRELOR 90 MG: 90 TABLET ORAL at 20:47

## 2022-11-17 RX ADMIN — SODIUM CHLORIDE 500 ML: 9 INJECTION, SOLUTION INTRAVENOUS at 08:05

## 2022-11-17 ASSESSMENT — ACTIVITIES OF DAILY LIVING (ADL)
ADLS_ACUITY_SCORE: 35

## 2022-11-17 NOTE — PROGRESS NOTES
Care Management Follow Up    Length of Stay (days): 1    Expected Discharge Date: 11/18/2022       Concerns to be Addressed:   Monitoring labs (CRP 72.8, Troponin 149, sodium 127). NPO for cardiology workup.   Patient plan of care discussed at interdisciplinary rounds: Yes    Anticipated Discharge Disposition:  Discharge goal is home pending response to treatment, medical needs and mobility closer to discharge.      Anticipated Discharge Services:  To be determined.   Anticipated Discharge DME:  No new DME anticipated    Patient/family educated on Medicare website which has current facility and service quality ratings:  NA  Education Provided on the Discharge Plan:  Per team  Patient/Family in Agreement with the Plan:  yes    Referrals Placed by CM/SW:  None  Private pay costs discussed: Not applicable     Additional Information:  Patient's primary language is Hmong. He lives in a house with his family and is independent with most activities of daily living at baseline with the support of his family. He does not drive but family provides any necessary assist with all transportation. He does Home Peritoneal Dialysis 4x/day under the supervision of [facility]. Family to transport at discharge.    Lita Castro RN

## 2022-11-17 NOTE — PHARMACY-ADMISSION MEDICATION HISTORY
"Pharmacy Note - Admission Medication History    Pertinent Provider Information: None     ______________________________________________________________________    Prior To Admission (PTA) med list completed and updated in EMR.       PTA Med List   Medication Sig Last Dose     acetaminophen (TYLENOL) 500 MG tablet Take 500 mg by mouth every 6 hours as needed for mild pain      aspirin (ASA) 81 MG EC tablet Take 1 tablet (81 mg) by mouth daily Start tomorrow. 11/15/2022     atorvastatin (LIPITOR) 40 MG tablet Take 1 tablet (40 mg) by mouth daily 11/15/2022     calcitRIOL (ROCALTROL) 0.25 MCG capsule Take 0.5 mcg by mouth daily 11/15/2022     calcium carbonate 1250 (500 Ca) MG CHEW Take 500 mg by mouth      carvedilol (COREG) 25 MG tablet Take 1 tablet (25 mg) by mouth 2 times daily (with meals) 11/15/2022     docusate sodium (COLACE) 100 MG capsule Take 100 mg by mouth 2 times daily as needed      hydrALAZINE (APRESOLINE) 100 MG tablet Take  mg by mouth 3 times daily 11/15/2022     insulin aspart (NOVOLOG FLEXPEN) 100 UNIT/ML pen Inject 10 Units Subcutaneous 3 times daily (with meals) Past Week     insulin detemir (LEVEMIR PEN) 100 UNIT/ML pen Inject 20 Units Subcutaneous At Bedtime Past Week     nitroGLYcerin (NITROSTAT) 0.4 MG sublingual tablet One tablet under the tongue every 5 minutes if needed for chest pain. May repeat every 5 minutes for a maximum of 3 doses in 15 minutes\"      ticagrelor (BRILINTA) 90 MG tablet Take 1 tablet (90 mg) by mouth 2 times daily Start tomorrow morning. 11/15/2022     traZODone (DESYREL) 50 MG tablet Take 50 mg by mouth At Bedtime 11/15/2022       Information source(s): Patient, Family member and CareEverywhere/SureScripts  Method of interview communication: in-person    Summary of Changes to PTA Med List  New: Trazodone, Novolog, Levemir, calcitriol  Discontinued: Amlodipine  Changed: None    Patient was asked about OTC/herbal products specifically.  PTA med list reflects " this.    In the past week, patient estimated taking medication this percent of the time:  greater than 90%.    Allergies were reviewed, assessed, and updated with the patient.      Patient does not use any multi-dose medications prior to admission.    The information provided in this note is only as accurate as the sources available at the time of the update(s).    Thank you for the opportunity to participate in the care of this patient.    Jeanine Calderon formerly [facility]  11/16/2022 10:17 PM

## 2022-11-17 NOTE — UTILIZATION REVIEW
"Admission Status; Secondary Review Determination     Under the authority of the Utilization Management Committee, the utilization review process indicated a secondary review on Modesto Barbosa.  The review outcome is based on review of the medical records, discussions with staff, and applying clinical experience noted on the date of the review.     (x) Observation Status Appropriate - This patient does not meet hospital inpatient criteria and is placed in observation status. If this patient's primary payer is Medicare and was admitted as an inpatient, Condition Code 44 should be used and patient status changed to \"observation\".     RATIONALE FOR DETERMINATION   54 yr old male with Stage V renal disease on PD, DM2, HTN, CAD who presented with weakness.  Evaluation for infection negative.  Troponin elevation but on cardiology consultation, unclear if related to demand from hypotension in setting of ESRD.  Did have a drop in BP and med adjustments appropriate.  Echo pending however at this time cardiology and nephology ok with discharge.  If ECHO has marked abnormalities with then recommended inpatient evaluation further by cardiology, consider back to inpatient.    The severity of illness, intensity of service provided, expected LOS and risk for adverse outcome make the care appropriate for further observation; however, doesn't meet criteria for hospital inpatient admission. Dr Crespo notified of this determination and agrees with downgrade of status.      The information on this document is developed by the utilization review team in order for the business office to ensure compliance.  This only denotes the appropriateness of proper admission status and does not reflect the quality of care rendered.         The definitions of Inpatient Status and Observation Status used in making the determination above are those provided in the CMS Coverage Manual, Chapter 1 and Chapter 6, section 70.4.      Sincerely,  Amarilys MCGILL" MD Kaitlin  Utilization Review  Physician Advisor  [facility]

## 2022-11-17 NOTE — PLAN OF CARE
"  Problem: Plan of Care - These are the overarching goals to be used throughout the patient stay.    Goal: Plan of Care Review  Description: The Plan of Care Review/Shift note should be completed every shift.  The Outcome Evaluation is a brief statement about your assessment that the patient is improving, declining, or no change.  This information will be displayed automatically on your shift note.  Outcome: Progressing  Goal: Patient-Specific Goal (Individualized)  Description: You can add care plan individualizations to a care plan. Examples of Individualization might be:  \"Parent requests to be called daily at 9am for status\", \"I have a hard time hearing out of my right ear\", or \"Do not touch me to wake me up as it startles me\".  Outcome: Progressing  Goal: Absence of Hospital-Acquired Illness or Injury  Outcome: Progressing  Intervention: Identify and Manage Fall Risk  Recent Flowsheet Documentation  Taken 11/17/2022 0800 by Steffany Che RN  Safety Promotion/Fall Prevention: activity supervised  Intervention: Prevent Skin Injury  Recent Flowsheet Documentation  Taken 11/17/2022 0800 by Steffany Che RN  Body Position: position changed independently  Goal: Optimal Comfort and Wellbeing  Outcome: Progressing  Goal: Readiness for Transition of Care  Outcome: Progressing   Goal Outcome Evaluation:               Pt is alert and oriented x4. Pt is med complaint.pt denies pain. Pt's v/s is table. Pt received iv bolus for the low bp this morning. No complain. Continue to monitor pt.         "

## 2022-11-17 NOTE — CONSULTS
We have been asked to see Modesto Barbosa at [facility] by Dr. Carrie Nunes for evaluation of elevated troponin.    Impression and Plan     Assessment:  1. Elevated troponin. This may be chronically elevated in the setting of end stage renal disease with a possible demand-mediated component from hypotension. Clinical presentation is not consistent with an acute coronary syndrome.  2. Coronary artery disease status post drug-eluting stenting to the 3rd obtuse marginal artery on 12/6/2021. He denies angina.  3. Hypertrophic cardiomyopathy with obstruction. He seems asymptomatic from this and overall has a low-risk phenotype, with no evidence of ventricular arrhythmias and a maximum septal wall thickness of on 19 mm.  4. End stage renal disease suspected to be due to diabetic nephropathy currently on peritoneal dialysis.  5. Essential hypertension.  6. Hyperlipidemia.    Plan:    Limited transthoracic echocardiogram to assess for any new regional wall motion abnormalities    Otherwise no additional inpatient cardiac workup is needed    Continue current medications including dual antiplatelet therapy with aspirin 81 mg daily and ticagrelor 90 mg twice daily through 12/6/2022    Carvedilol 25 mg twice daily, but this can be reduced if needed if he has further hypotension    We will sign off at this time. Please do not hesitate to contact us with additional questions or concerns.    Primary Cardiologist: Dr. Alfred Katz    Clinically Significant Risk Factors Present on Admission     # Hypokalemia: Lowest K = 3.1 mmol/L (Ref range: 3.4-5.3) in last 2 days, will replace as needed  # Hyponatremia: Lowest Na = 127 mmol/L (Ref range: 136-145) in last 2 days, will monitor as appropriate     # Anion Gap Metabolic Acidosis: Highest Anion Gap = 19 mmol/L (Ref range: 7-15) in last 2 days, will monitor and treat as appropriate  # Hypoalbuminemia: Lowest albumin = 2.6 g/dL (Ref range: 3.5-5.2) at 11/17/2022  9:16 AM,  will monitor as appropriate   # Coagulation Defect: INR = 1.16 (Ref range: 0.85 - 1.15) and/or PTT = 42 Seconds (Ref range: 22 - 38 Seconds), will monitor for bleeding  # Drug Induced Platelet Defect: home medication list includes an antiplatelet medication     Nephrology: ESRD on dialysis      History of Present Illness      Mr. Modesto Barbosa is a 56 year old male with ESRD presumed due to diabetic nephropathy on PD, CAD s/p SHAVONNE to the 3 on 12/6/2021, HOCM, IDDM2, HTN, and HLD admitted with weakness and hypotension. He says he contracted influenza about a week ago and has not felt right since    He denies any chest pain/pressure/tightness, shortness of breath at rest or with exertion, light headedness/dizziness, pre-syncope, syncope, lower extremity swelling, palpitations, paroxysmal nocturnal dyspnea (PND), or orthopnea.    High-sensitivity troponin was mildly elevated 171 -> 149. ECG showed LVH. CXR showed no acute pathology.    Review of Systems:  Further review of systems is otherwise negative/noncontributory (based on review of medical record (admission H&P) and 13 point review of systems reviewed. Pertinent positives noted).    Cardiac Diagnostics     ECG 11/16/2022 (personally reviewed and interpreted): SR, LVH with repolarization abnormality    Telemetry (personally reviewed): SR, no arrhythmias    Holter monitor 10/12/2021 (report reviewed):    Indication for study: Evaluate for cardiomyopathy    A 24-hour Holter monitor was applied October 12, 2021 with date of interpretation October 14, 2021    Baseline tracing showed sinus rhythm with normal electrocardiographic intervals.  Average heart rate 75 bpm range between 59-98 bpm    No bradycardia, no pauses    Very rare ectopy with only 1 PVC, 1 PAC    Patient activity diary not returned    Conclusion    Normal monitor    Stable heart rhythm pattern    Echocardiogram 6/27/2022 (results reviewed):  Left ventricular function is normal.The ejection fraction is  60-65%.  The echo findings are consistent with left ventricular outflow obstruction.  Peak rest LVOT gradient 40 mm Hg , increased to 51 with Valsalva.  There is systolic anterior motion of the mitral valve.  There is mild (1+) mitral regurgitation.  The left atrium is mild to moderately dilated.  Right ventricle systolic pressure estimate normal  Asymetrical LV hypertrophy suggestive of HC.    Cardiac MRI 10/29/2021 (report reviewed):  1.  Small left ventricular cavity related to hypertrophy with hyperdynamic left ventricular systolic function.  There is asymmetric anteroseptal hypertrophy with a maximal diameter of 19 mm with evidence of left ventricular outflow tract obstruction.  There is moderate chordal MINA. The quantified left ventricular ejection fraction is 80.7%.    2.  Normal right ventricular size and systolic function.    3.  Aortic valve sclerosis without stenosis.  Mild aortic regurgitation.    4.  Mild mitral regurgitation.           5.  Moderate left atrial enlargement.     Cardiac Cath 12/6/2021 (results reviewed):    Normal left main.    The LAD has diffuse mild calcification with a mild proximal disease and moderate mid LAD disease. There is no significant diagonal disease.    The circumflex has a very small OM-2, a small OM-2 and a large branching OM-3 with mild disease proximally and a severe stenosis in the larger, more anterior branch. This was stented with a Synergy SHAVONNE with good angiographic results.    Moderate sized RCA with mild proximal disease and moderate disease in both the proximal PDA and right AV groove branch prior to the PL branches.    LV EDP 13 mmhg. LV-Ao pullback gradient 17 mmHg.    Medical History  Surgical History Family History Social History   Past Medical History:   Diagnosis Date     Acute kidney injury (H) 6/10/2021     CKD (chronic kidney disease) stage 5, GFR less than 15 ml/min (H)      Dyslipidemia      Metabolic acidosis      Type 2 diabetes mellitus (H)       Past Surgical History:   Procedure Laterality Date     CV CORONARY ANGIOGRAM N/A 12/6/2021    Procedure: Coronary Angiogram;  Surgeon: Cristela Banks MD;  Location: [facility] CATH LAB CV     CV LEFT HEART CATH N/A 12/6/2021    Procedure: Left Heart Cath;  Surgeon: Cristela Banks MD;  Location: [facility] LAB CV     CV PCI N/A 12/6/2021    Procedure: Percutaneous Coronary Intervention;  Surgeon: Cristela Banks MD;  Location: [facility] CATH LAB CV     Family History   Problem Relation Age of Onset     Diabetes Type 2  Mother      Other - See Comments Daughter         granular cell tumor of thigh     Heart Disease Other            Social History     Socioeconomic History     Marital status: Patient Declined     Spouse name: Not on file     Number of children: Not on file     Years of education: Not on file     Highest education level: Not on file   Occupational History     Not on file   Tobacco Use     Smoking status: Never     Smokeless tobacco: Never   Vaping Use     Vaping Use: Not on file   Substance and Sexual Activity     Alcohol use: Not Currently     Drug use: Never     Sexual activity: Not on file   Other Topics Concern     Parent/sibling w/ CABG, MI or angioplasty before 65F 55M? Not Asked   Social History Narrative     Not on file     Social Determinants of Health     Financial Resource Strain: Not on file   Food Insecurity: Not on file   Transportation Needs: Not on file   Physical Activity: Not on file   Stress: Not on file   Social Connections: Not on file   Intimate Partner Violence: Not on file   Housing Stability: Not on file             Physical Examination   VITALS: /55   Pulse 78   Temp 98.1  F (36.7  C) (Oral)   Resp 20   Wt 56.2 kg (124 lb)   SpO2 95%   BMI 21.45 kg/m    BMI: Body mass index is 21.45 kg/m .  Wt Readings from Last 3 Encounters:   11/16/22 56.2 kg (124 lb)   06/02/22 62.4 kg (137 lb 9.6 oz)   06/01/22 61.7 kg (136 lb)     Intake/Output Summary (Last 24 hours) at  11/17/2022 1228  Last data filed at 11/17/2022 0300  Gross per 24 hour   Intake 0 ml   Output --   Net 0 ml       General Appearance:  Alert, cooperative, no distress, appears stated age    Head:  Normocephalic, without obvious abnormality, atraumatic   Eyes:  PERRL, conjunctiva/corneas clear, EOM's intact   Ears:  Normal external ear canals bilaterally   Nose: Nares normal, septum midline, no drainage   Throat: Lips, mucosa, and tongue normal; teeth and gums normal   Neck: Supple, symmetrical, trachea midline, no adenopathy, no carotid bruit or JVD   Back:   Symmetric, no abnormal curvature, ROM normal   Lungs:   Clear to auscultation bilaterally, respirations unlabored   Chest Wall:  No tenderness or deformity   Heart:  Regular rate and rhythm, S1, S2 normal,no murmur, rub or gallop   Abdomen:   Soft, non-tender, bowel sounds active all four quadrants,  no masses, no organomegaly   Extremities: Extremities normal, atraumatic, no cyanosis or edema   Skin: Skin color, texture, turgor normal, no rashes or lesions   Psychiatric: Normal affect, pleasant and cooperative   Neurologic: Alert and oriented X 3, Moves all 4 extremities            Imaging      CXR 11/17/2021 (report reviewed):  No pneumothorax or pleural effusion. No focal consolidation. Low lung volumes. Cardiac silhouette within normal limits. Chronic right rib fractures.    Abdominal ultrasound 10/3/2021 (report reviewed):  1.  No abdominal aorta or common iliac artery aneurysm.   2.  Brisk triphasic waveforms within the common iliac and external iliac arteries bilaterally without evidence of a hemodynamically significant lesion.     Lab Results    Chemistry/lipid CBC Cardiac Enzymes/BNP/TSH/INR   Recent Labs   Lab Test 01/19/22  1144   CHOL 138   HDL 37*   LDL 56   TRIG 224*     Recent Labs   Lab Test 01/19/22  1144 12/06/21  0734   LDL 56 93     Recent Labs   Lab Test 11/17/22  1113 11/17/22  0916   NA  --  129*   POTASSIUM  --  3.3*   CHLORIDE  --  91*    CO2  --  23   * 113*   BUN  --  53.1*   CR  --  9.20*   GFRESTIMATED  --  6*   REYMUNDO  --  7.0*     Recent Labs   Lab Test 11/17/22  0916 11/16/22  1908 12/06/21  0734   CR 9.20* 9.44* 5.68*     Recent Labs   Lab Test 08/23/21  1243 06/11/21  0728   A1C 5.6 5.7*          Recent Labs   Lab Test 11/17/22  0916   WBC 7.1   HGB 10.4*   HCT 29.3*   MCV 84   PLT 85*     Recent Labs   Lab Test 11/17/22  0916 11/16/22  1908 06/02/22  1337   HGB 10.4* 13.0* 9.9*    Recent Labs   Lab Test 06/10/21  1406   TROPONINI 0.04       Recent Labs   Lab Test 11/17/22  0916 12/06/21  0734 10/06/21  1508   INR 1.16* 1.01 1.12           Current Inpatient Scheduled Medications   Scheduled Meds:    aspirin  81 mg Oral Daily     atorvastatin  40 mg Oral Daily     calcitRIOL  0.5 mcg Oral Daily     calcium carbonate 500 mg (elemental)  500 mg Oral Daily     carvedilol  25 mg Oral BID w/meals     heparin ANTICOAGULANT  5,000 Units Subcutaneous Q12H     hydrALAZINE  50 mg Oral TID     insulin aspart  1-4 Units Subcutaneous Q4H KELSEY     sodium chloride (PF)  3 mL Intracatheter Q8H     ticagrelor  90 mg Oral BID     traZODone  50 mg Oral At Bedtime       Current Outpatient Medications   Medication Sig Dispense Refill     acetaminophen (TYLENOL) 500 MG tablet Take 500 mg by mouth every 6 hours as needed for mild pain       aspirin (ASA) 81 MG EC tablet Take 1 tablet (81 mg) by mouth daily Start tomorrow. 30 tablet 3     atorvastatin (LIPITOR) 40 MG tablet Take 1 tablet (40 mg) by mouth daily 90 tablet 3     calcitRIOL (ROCALTROL) 0.25 MCG capsule Take 0.5 mcg by mouth daily       calcium carbonate 1250 (500 Ca) MG CHEW Take 500 mg by mouth       carvedilol (COREG) 25 MG tablet Take 1 tablet (25 mg) by mouth 2 times daily (with meals) 270 tablet 3     docusate sodium (COLACE) 100 MG capsule Take 100 mg by mouth 2 times daily as needed       hydrALAZINE (APRESOLINE) 100 MG tablet Take  mg by mouth 3 times daily       insulin aspart  "(NOVOLOG FLEXPEN) 100 UNIT/ML pen Inject 10 Units Subcutaneous 3 times daily (with meals)       insulin detemir (LEVEMIR PEN) 100 UNIT/ML pen Inject 20 Units Subcutaneous At Bedtime       nitroGLYcerin (NITROSTAT) 0.4 MG sublingual tablet One tablet under the tongue every 5 minutes if needed for chest pain. May repeat every 5 minutes for a maximum of 3 doses in 15 minutes\" 25 tablet 3     ticagrelor (BRILINTA) 90 MG tablet Take 1 tablet (90 mg) by mouth 2 times daily Start tomorrow morning. 180 tablet 3     traZODone (DESYREL) 50 MG tablet Take 50 mg by mouth At Bedtime            Medications Prior to Admission   Prior to Admission medications    Medication Sig Start Date End Date Taking? Authorizing Provider   acetaminophen (TYLENOL) 500 MG tablet Take 500 mg by mouth every 6 hours as needed for mild pain   Yes Reported, Patient   aspirin (ASA) 81 MG EC tablet Take 1 tablet (81 mg) by mouth daily Start tomorrow. 12/7/21  Yes Cristela Banks MD   atorvastatin (LIPITOR) 40 MG tablet Take 1 tablet (40 mg) by mouth daily 12/6/21 12/6/22 Yes Cristela Banks MD   calcitRIOL (ROCALTROL) 0.25 MCG capsule Take 0.5 mcg by mouth daily   Yes Unknown, Entered By History   calcium carbonate 1250 (500 Ca) MG CHEW Take 500 mg by mouth 8/18/21  Yes Reported, Patient   carvedilol (COREG) 25 MG tablet Take 1 tablet (25 mg) by mouth 2 times daily (with meals) 12/16/21  Yes Melissa Reid APRN CNP   docusate sodium (COLACE) 100 MG capsule Take 100 mg by mouth 2 times daily as needed   Yes Reported, Patient   hydrALAZINE (APRESOLINE) 100 MG tablet Take  mg by mouth 3 times daily 5/27/21  Yes Provider, Historical   insulin aspart (NOVOLOG FLEXPEN) 100 UNIT/ML pen Inject 10 Units Subcutaneous 3 times daily (with meals)   Yes Unknown, Entered By History   insulin detemir (LEVEMIR PEN) 100 UNIT/ML pen Inject 20 Units Subcutaneous At Bedtime   Yes Unknown, Entered By History   nitroGLYcerin (NITROSTAT) 0.4 MG sublingual tablet One " "tablet under the tongue every 5 minutes if needed for chest pain. May repeat every 5 minutes for a maximum of 3 doses in 15 minutes\" 12/6/21  Yes Cristela Banks MD   ticagrelor (BRILINTA) 90 MG tablet Take 1 tablet (90 mg) by mouth 2 times daily Start tomorrow morning. 12/7/21  Yes Cristela Banks MD   traZODone (DESYREL) 50 MG tablet Take 50 mg by mouth At Bedtime   Yes Unknown, Entered By History          Pepe Phillips MD [facility]  Non-Invasive Cardiologist  [facility]  Pager [phone]  "

## 2022-11-17 NOTE — H&P
[facility]    History and Physical - Hospitalist Service       Date of Admission:  11/16/2022    Assessment & Plan      Modesto Brabosa is admitted on 11/16/2022. He is a 54-year-old man with PMH of CKD stage V on peritoneal dialysis, DMII, hypertension and CAD who presented with generalized body weakness.  Initial investigation for possible infectious etiology grossly negative however initial troponin showed elevated troponin level with nonspecific changes on the EKG [T wave abnormality].    Patient is being admitted for further evaluation of elevated troponin level to rule out etiology-demand ischemia versus CKD versus NSTEMI.    #Elevated troponin to rule out etiology + CAD  Complete serial cardiac enzymes and cardiac monitoring  DAPT resumed + Nitroglycerin as needed for ischemic chest pain  Resume other home CAD medications   TTE to evaluate LVEF   NPO after MN + Cardiology evaluation for further recommendations     #Generalized body weakness to rule out etiology  Initial sepsis work-up/infectious etiology results appreciated  Follow-up urinalysis and possible urine culture to rule out possible UTI  Fall precautions + serial blood pressure monitoring in view of reported hypotension  Monitor for signs of evolving infection    # CKD stage V on peritoneal dialysis + DMII    Nephrology evaluation for resumption of peritoneal hemodialysis  Electrolyte monitoring and replacement  Monitor I/O   Long-acting insulin presently on hold in view of poor appetite   SSI regimen for glycemic control     # Hypertension   Cautious reintroduction of antihypertensive medication with reported hypotensive episodes  Will resume home PTA antihypertensive medications as BP tolerates    #Prolonged QT interval  Continue cardiac monitoring as above  Electrolyte monitoring and replacement       Diet: NPO for Medical/Clinical Reasons Except for: Meds, Ice ChipsCardiac diabetic diet  DVT Prophylaxis: Heparin SQ  [place]  Catheter: Not present  Central Lines: None  Cardiac Monitoring: ACTIVE order. Indication: AMI (NSTEMI/ STEMI) (48 hours)  Code Status: Full Code  Full     Clinically Significant Risk Factors Present on Admission        # Hypokalemia: Lowest K = 3.1 mmol/L (Ref range: 3.4-5.3) in last 2 days, will replace as needed  # Hyponatremia: Lowest Na = 127 mmol/L (Ref range: 136-145) in last 2 days, will monitor as appropriate  # Hypocalcemia: Lowest Ca = 7.8 mg/dL (Ref range: 8.5 - 10.1 mg/dL) in last 2 days, will monitor and replace as appropriate    # Anion Gap Metabolic Acidosis: Highest Anion Gap = 19 mmol/L (Ref range: 7-15) in last 2 days, will monitor and treat as appropriate  # Hypoalbuminemia: Lowest albumin = 3.4 g/dL (Ref range: 3.5-5.2) at 11/16/2022  7:08 PM, will monitor as appropriate   # Drug Induced Platelet Defect: home medication list includes an antiplatelet medication   # Hypertension: home medication list includes antihypertensive(s)             Disposition Plan      Expected Discharge Date: 11/18/2022      Destination: home with family          The patient's care was discussed with the Bedside Nurse and EDMD.    Carrie Nunes MD  Hospitalist Service  [facility]  Securely message with the Vocera Web Console (learn more here)  Text page via [facility] Paging/Directory         ______________________________________________________________________    Chief Complaint   Generalized weakness    History is obtained from the patient    History of Present Illness   Modesto Barbosa is a 56 year old man with PMH of CKD stage V on peritoneal dialysis, DMII, hypertension and CAD who presented with generalized body weakness of several days duration.  Generalized body weakness started 4 days ago.  It is associated with nausea and 2 episodes of vomiting.  Patient also endorses associated dry cough with no fever, chest pain, palpitation, shortness of breath, neck pain, jaw pain, or abdominal pain.   Family at home noted associated low blood pressures with systolic blood pressure running between 60s and 90 with generalized body weakness.  Patient also endorses associated decrease in urination and weight loss of 10 pounds in the last week.    Family members are noted to have had nonspecific flu symptoms.  He also denies associated abdominal pain, increased abdominal girth, prior history of peritonitis or difficulty performing his peritoneal dialysis.  Last peritoneal dialysis was this afternoon prior to presentation.  Review of other system grossly at baseline.      Review of Systems    The 10 point Review of Systems is negative other than noted in the HPI or here.      Past Medical History    I have reviewed this patient's medical history and updated it with pertinent information if needed.   Past Medical History:   Diagnosis Date     Acute kidney injury (H) 6/10/2021     CKD (chronic kidney disease) stage 5, GFR less than 15 ml/min (H)      Dyslipidemia      Metabolic acidosis      Type 2 diabetes mellitus (H)        Past Surgical History   I have reviewed this patient's surgical history and updated it with pertinent information if needed.  Past Surgical History:   Procedure Laterality Date     CV CORONARY ANGIOGRAM N/A 12/6/2021    Procedure: Coronary Angiogram;  Surgeon: Cristela Banks MD;  Location: [facility] CV     CV LEFT HEART CATH N/A 12/6/2021    Procedure: Left Heart Cath;  Surgeon: Cristela Banks MD;  Location: [facility] CV     CV PCI N/A 12/6/2021    Procedure: Percutaneous Coronary Intervention;  Surgeon: Cristela Banks MD;  Location: [facility] CV       Social History   I have reviewed this patient's social history and updated it with pertinent information if needed.  Social History     Tobacco Use     Smoking status: Never     Smokeless tobacco: Never   Substance Use Topics     Alcohol use: Not Currently     Drug use: Never       Family History   I have reviewed this  "patient's family history and updated it with pertinent information if needed.  Family History   Problem Relation Age of Onset     Diabetes Type 2  Mother      Other - See Comments Daughter         granular cell tumor of thigh     Heart Disease Other        Prior to Admission Medications   Prior to Admission Medications   Prescriptions Last Dose Informant Patient Reported? Taking?   acetaminophen (TYLENOL) 500 MG tablet   Yes Yes   Sig: Take 500 mg by mouth every 6 hours as needed for mild pain   aspirin (ASA) 81 MG EC tablet 11/15/2022  No Yes   Sig: Take 1 tablet (81 mg) by mouth daily Start tomorrow.   atorvastatin (LIPITOR) 40 MG tablet 11/15/2022  No Yes   Sig: Take 1 tablet (40 mg) by mouth daily   calcitRIOL (ROCALTROL) 0.25 MCG capsule 11/15/2022  Yes Yes   Sig: Take 0.5 mcg by mouth daily   calcium carbonate 1250 (500 Ca) MG CHEW   Yes Yes   Sig: Take 500 mg by mouth   carvedilol (COREG) 25 MG tablet 11/15/2022  No Yes   Sig: Take 1 tablet (25 mg) by mouth 2 times daily (with meals)   docusate sodium (COLACE) 100 MG capsule   Yes Yes   Sig: Take 100 mg by mouth 2 times daily as needed   hydrALAZINE (APRESOLINE) 100 MG tablet 11/15/2022  Yes Yes   Sig: Take  mg by mouth 3 times daily   insulin aspart (NOVOLOG FLEXPEN) 100 UNIT/ML pen Past Week  Yes Yes   Sig: Inject 10 Units Subcutaneous 3 times daily (with meals)   insulin detemir (LEVEMIR PEN) 100 UNIT/ML pen Past Week  Yes Yes   Sig: Inject 20 Units Subcutaneous At Bedtime   nitroGLYcerin (NITROSTAT) 0.4 MG sublingual tablet   No Yes   Sig: One tablet under the tongue every 5 minutes if needed for chest pain. May repeat every 5 minutes for a maximum of 3 doses in 15 minutes\"   ticagrelor (BRILINTA) 90 MG tablet 11/15/2022  No Yes   Sig: Take 1 tablet (90 mg) by mouth 2 times daily Start tomorrow morning.   traZODone (DESYREL) 50 MG tablet 11/15/2022  Yes Yes   Sig: Take 50 mg by mouth At Bedtime      Facility-Administered Medications: None "     Allergies   No Known Allergies    Physical Exam   Vital Signs: Temp: 98.2  F (36.8  C) Temp src: Temporal BP: (!) 149/87 Pulse: 80   Resp: 17 SpO2: 100 % O2 Device: None (Room air)    Weight: 124 lbs 0 oz    Constitutional: awake, alert, cooperative, no apparent distress, and appears stated age and mildly dehydrated, anicteric, afebrile, acyanotic, not pale  Respiratory: Adequate air entry in most lung zones with no increased work of breathing  Cardiovascular: S1-S2 heard  GI: Soft, nontender, BS +  Skin: no bruising or bleeding and normal skin color, texture, turgor  Musculoskeletal: There is no redness, warmth, or swelling of the joints.  Full range of motion noted.  Motor strength is 5 out of 5 all extremities bilaterally.  Tone is normal.  Neurologic: Awake, alert, oriented to name, place and time.  Cranial nerves II-XII are grossly intact; no new focal neurological deficits appreciated  Neuropsychiatric: General: normal, calm and normal eye contact  Level of consciousness: alert / normal  Affect: normal    Data   Data reviewed today: I reviewed all medications, new labs and imaging results over the last 24 hours. I personally reviewed the EKG tracing showing Sinus rhythm with nonspecific T wave abnormalities.    Recent Labs   Lab 11/16/22  1908   WBC 10.0   HGB 13.0*   MCV 83   *   *   POTASSIUM 3.1*   CHLORIDE 85*   CO2 23   BUN 49.8*   CR 9.44*   ANIONGAP 19*   REYMUNDO 7.8*   *   ALBUMIN 3.4*   PROTTOTAL 6.5   BILITOTAL 0.6   ALKPHOS 68   ALT 16   AST 16     10.0    \    13.0 (L)    /    120 (L)   N 76    L N/A    127 (L)    85 (L)    49.8 (H) /   ------------------------------------ 242 (H)   ALT 16   AST 16   AP 68   ALB 3.4 (L)   Ca 7.8 (L)  3.1 (L)    23    9.44 (H) \    % RETIC N/A    LDH N/A  Troponin N/A    BNP N/A    CK N/A  INR N/A   PTT N/A    D-dimer N/A    Fibrinogen N/A    Antithrombin N/A  Ferritin N/A  CRP 72.80 (H)    IL-6 N/A  Recent Results (from the past 24 hour(s))    XR Chest Port 1 View    Narrative    EXAM: XR CHEST PORT 1 VIEW  LOCATION: [facility]  DATE/TIME: 11/16/2022 8:01 PM    INDICATION: cough, recent low blood pressure episode  COMPARISON: 6/10/2021      Impression    IMPRESSION: No pneumothorax or pleural effusion. No focal consolidation. Low lung volumes. Cardiac silhouette within normal limits. Chronic right rib fractures.

## 2022-11-17 NOTE — CONSULTS
"Care Management Initial Consult    General Information  Assessment completed with: Patient, Nhia  Type of CM/SW Visit: Initial Assessment    Primary Care Provider verified and updated as needed: Yes   Readmission within the last 30 days: no previous admission in last 30 days      Reason for Consult: discharge planning  Advance Care Planning: Advance Care Planning Reviewed: no concerns identified          Communication Assessment  Patient's communication style: spoken language (non-English) (speaks Hmong)             Cognitive  Cognitive/Neuro/Behavioral:                        Living Environment:   People in home: child(gilbert), adult     Current living Arrangements: house      Able to return to prior arrangements: yes       Family/Social Support:  Care provided by: self  Provides care for: no one     Children          Description of Support System: Supportive, Involved    Support Assessment: Adequate family and caregiver support, Adequate social supports, Patient communicates needs well met    Current Resources:   Patient receiving home care services: No     Community Resources: Dialysis Services, DME (\"Peritoneal Dialysis 4 runs a day\")  Equipment currently used at home: none  Supplies currently used at home: Other (\"Peritoneal Dialysis supplies, Blood Pressure cuff, glasses\")    Employment/Financial:  Employment Status: disabled        Financial Concerns:     Referral to Financial Worker: No       Lifestyle & Psychosocial Needs:  Social Determinants of Health     Tobacco Use: Low Risk      Smoking Tobacco Use: Never     Smokeless Tobacco Use: Never     Passive Exposure: Not on file   Alcohol Use: Not on file   Financial Resource Strain: Not on file   Food Insecurity: Not on file   Transportation Needs: Not on file   Physical Activity: Not on file   Stress: Not on file   Social Connections: Not on file   Intimate Partner Violence: Not on file   Depression: Not at risk     PHQ-2 Score: 0   Housing Stability: Not on " "file       Functional Status:  Prior to admission patient needed assistance:   Dependent ADLs:: Independent, Ambulation-no assistive device  Dependent IADLs:: Transportation, Cleaning, Cooking, Laundry, Shopping, Meal Preparation (\"family helps with needs\")       Mental Health Status:          Chemical Dependency Status:                Values/Beliefs:  Spiritual, Cultural Beliefs, Oriental orthodox Practices, Values that affect care:                 Additional Information:  Modesto lives in a house with family. He is independent with most ADLs at baseline and family can help PRN. He does not drive and family helps with all transportation.     He does Home Peritoneal Dialysis 4x/day.    Family to transport at discharge.    Rachele Corley RN      "

## 2022-11-17 NOTE — ED PROVIDER NOTES
----- Message from Yue Saucedo sent at 3/27/2019  2:54 PM CDT -----  Contact: SELF  PT HAS APPT ON 4/16/19 WHICH WAS SET UP TO DISCUSS HAVING AN EGD.  PLUS,SHE WANTS TO KNOW ABOUT THE COLONOSCOPY THAT DR BLANCHARD WANTS HER TO HAVE.  SHE STARTED HER HCV MEDS ON 3/25/19 AND HAS A F/U APPT ON 4/25/19.  SHOULD SHE KEEP THE 4/16/19 APPT?  WHAT IS DR WOO RECOMMENDATION?   Emergency Department Encounter      NAME: Modesto Barbosa  AGE: 56 year old male  YOB: 1965  MRN: [id number]  EVALUATION DATE & TIME: 2022  6:31 PM    PCP: No Ref-Primary, Physician    ED PROVIDER: Logan Vilchis M.D.      Chief Complaint   Patient presents with     Dizziness         FINAL IMPRESSION:  1. Hypotension, unspecified hypotension type    2. NSTEMI (non-ST elevated myocardial infarction) (H)        MEDICAL DECISION MAKIN:15 PM I met with the patient, obtained history, performed an initial exam, and discussed options and plan for diagnostics and treatment here in the ED.   8:15 PM Troponin 174.  8:36 PM I rechecked and updated the patient with results and plan for admission.   8:53 PM I discussed the patient with Dr. Nunes from the hospitalist service who agrees to admit the patient.     This patient has a 56-year-old  male who has chronic renal failure on peritoneal dialysis, diabetes, hypertension and coronary artery disease who presents with weakness.  The patient says that he has been doing the peritoneal dialysis for the the past year.  He says that he does it 4 times a day.  Over the past week he has developed some upper respiratory illness symptoms with a dry cough, weakness, lightheadedness, decreased appetite as well as nausea and vomiting.  He threw up twice yesterday.  He monitors his blood pressure when he does his peritoneal dialysis and on  his systolic blood pressure was 70 and then earlier today it was in the 60s.  In between there were there were multiple readings where his systolic blood pressure was in the 100s.  He does produce urine and typically goes 3 or 4 times a day but lately he is only gone once a day.  His urine looks otherwise unremarkable.  He says that his wife is dealing with influenza now and that 2 grandchildren visited and both of them have RSV.  He denied having any abdominal pain, fever or diarrhea.  The patient's lab work  was significant for the elevated BUN and creatinine, low sodium and low potassium.  An EKG was done which showed some inverted T waves laterally.  Patient denied any chest pain or shortness of breath.  I was called by the lab for his elevated troponin of 174.  I paged cardiology but they have not called back.  I admitted the patient to the hospitalist service for continued treatment and evaluation for his NSTEMI.  As he did not have any chest pain I have held the nitroglycerin.  I have also not started any heparin as I wanted to discuss this with cardiology before beginning it.  The hospitalist service was comfortable taking over at this point and will make a decision regarding the heparin.    Pertinent Labs & Imaging studies reviewed. (See chart for details)    Medical Decision Making    History:    Supplemental history from: Family Member    External Record(s) reviewed: Outpatient Record (Details documented in chart).    Work Up:    Chart documentation includes differential considered and any EKGs or imaging interpreted by provider.    In additional to work up documented, I considered the following work up: See chart documentation, if applicable.    External consultation:    Discussion of management with another provider: See chart documentation, if applicable    Discussed with radiology regarding test interpretation: N/A    Complicating factors:    Care impacted by chronic illness: Chronic Kidney Disease and Diabetes, Hypertension,     Care affected by social determinants of health: N/A    Disposition considerations: Admit.        The importance of close follow up was discussed. We reviewed warning signs and symptoms, and I instructed Mr. Barbosa to return to the emergency department immediately if he develops any new or worsening symptoms. I provided additional verbal discharge instructions. Mr. Barbosa expressed understanding and agreement with this plan of care, his questions were answered, and he was discharged  in stable condition.       Critical Care     Performed by: Dr Logan Vilchis  Authorized by: Dr Logan Vilchis  Total critical care time: 35 minutes  Critical care was necessary to treat or prevent imminent or life-threatening deterioration of the following conditions: NSTEMI, elevated troponin  Critical care was time spent personally by me on the following activities: development of treatment plan with patient or surrogate, discussions with consultants, examination of patient, evaluation of patient's response to treatment, obtaining history from patient or surrogate, ordering and performing treatments and interventions, ordering and review of laboratory studies, ordering and review of radiographic studies, re-evaluation of patient's condition and monitoring for potential decompensation.  Critical care time was exclusive of separately billable procedures and treating other patients.          MEDICATIONS GIVEN IN THE EMERGENCY:  Medications   lidocaine 1 % 0.1-1 mL (has no administration in time range)   lidocaine (LMX4) cream (has no administration in time range)   sodium chloride (PF) 0.9% PF flush 3 mL (has no administration in time range)   sodium chloride (PF) 0.9% PF flush 3 mL (has no administration in time range)   melatonin tablet 1 mg (has no administration in time range)   medication instruction (has no administration in time range)   aspirin (ASA) chewable tablet 81 mg (has no administration in time range)   HOLD: nitroGLYcerin IF (has no administration in time range)   heparin ANTICOAGULANT injection 5,000 Units (has no administration in time range)   prochlorperazine (COMPAZINE) injection 10 mg (has no administration in time range)     Or   prochlorperazine (COMPAZINE) tablet 10 mg (has no administration in time range)     Or   prochlorperazine (COMPAZINE) suppository 25 mg (has no administration in time range)   nitroGLYcerin (NITROSTAT) sublingual tablet 0.4 mg (has no administration in time range)    Continuing beta blocker from home medication list OR beta blocker order already placed during this visit (has no administration in time range)   Reason ACE/ARB/ARNI order not selected (has no administration in time range)   atorvastatin (LIPITOR) tablet 40 mg (has no administration in time range)   acetaminophen (TYLENOL) tablet 500 mg (has no administration in time range)   calcitRIOL (ROCALTROL) capsule 0.5 mcg (has no administration in time range)   calcium carbonate 500 mg (elemental) (OSCAL) tablet 500 mg (has no administration in time range)   carvedilol (COREG) tablet 25 mg (has no administration in time range)   docusate sodium (COLACE) capsule 100 mg (has no administration in time range)   hydrALAZINE (APRESOLINE) tablet 50 mg (has no administration in time range)   ticagrelor (BRILINTA) tablet 90 mg (90 mg Oral Not Given 11/16/22 2336)   traZODone (DESYREL) tablet 50 mg (has no administration in time range)   glucose gel 15-30 g (has no administration in time range)     Or   dextrose 50 % injection 25-50 mL (has no administration in time range)     Or   glucagon injection 1 mg (has no administration in time range)   insulin aspart (NovoLOG) injection (RAPID ACTING) (has no administration in time range)   0.9% sodium chloride BOLUS (0 mLs Intravenous Stopped 11/16/22 2104)   0.9% sodium chloride BOLUS (0 mLs Intravenous Stopped 11/16/22 2210)       NEW PRESCRIPTIONS STARTED AT TODAY'S ER VISIT:  New Prescriptions    No medications on file          =================================================================    HPI    Patient information was obtained from: patient     Use of :  Yes (In Person - Family) - Language: Eugenio Barbosa is a 56 year old male with a past medical history of CKD5 on peritoneal dialysis, DM2, HTN, CAD who presents for evaluation of generalized weakness.    Patient presents with one week of ongoing generalized weakness, decreased appetites, nausea, vomiting.  Had 2 episodes of vomiting yesterday. He gets lightheaded when he stands up so he has been laying around most of the days. Patient checks his blood pressures 4 times daily and note they have been ranging from 60's - 90's systolic. He had a read of 67/50's yesterday and 69/50's today. These blood pressure measurements are all taken while sitting upright.     Patient is on peritoneal dialysis and does 4 runs daily. He manages all of his dialysis runs at home. He still makes urine and typically urinates 3-4 times a day but for the last few days he has only been urinating ~once per day. Also notes 10 lb weight loss in the past week and a dry cough. Denies fever and leg swelling. No other complaints or concerns expressed at this time.      REVIEW OF SYSTEMS   Review of Systems   Constitutional: Positive for appetite change and unexpected weight change. Negative for fever.        Positive for generalized weakness.   Respiratory: Positive for cough.    Cardiovascular: Negative for leg swelling.   Gastrointestinal: Positive for nausea and vomiting.   Neurological: Positive for light-headedness.   All other systems reviewed and are negative.       PAST MEDICAL HISTORY:  Past Medical History:   Diagnosis Date     Acute kidney injury (H) 6/10/2021     CKD (chronic kidney disease) stage 5, GFR less than 15 ml/min (H)      Dyslipidemia      Metabolic acidosis      Type 2 diabetes mellitus (H)        PAST SURGICAL HISTORY:  Past Surgical History:   Procedure Laterality Date     CV CORONARY ANGIOGRAM N/A 12/6/2021    Procedure: Coronary Angiogram;  Surgeon: Cristela Banks MD;  Location: [facility] CV     CV LEFT HEART CATH N/A 12/6/2021    Procedure: Left Heart Cath;  Surgeon: Cristela Banks MD;  Location: [facility] CV     CV PCI N/A 12/6/2021    Procedure: Percutaneous Coronary Intervention;  Surgeon: Cristela Banks MD;  Location: [facility] CV       CURRENT MEDICATIONS:      Current  Facility-Administered Medications:      acetaminophen (TYLENOL) tablet 500 mg, 500 mg, Oral, Q6H PRN, Carrie Nunes MD     aspirin (ASA) chewable tablet 81 mg, 81 mg, Oral, Daily, Carrie Nunes MD     atorvastatin (LIPITOR) tablet 40 mg, 40 mg, Oral, Daily, Carrie Nunes MD     calcitRIOL (ROCALTROL) capsule 0.5 mcg, 0.5 mcg, Oral, Daily, Carrie Nunes MD     calcium carbonate 500 mg (elemental) (OSCAL) tablet 500 mg, 500 mg, Oral, Daily, Carrie Nunes MD     carvedilol (COREG) tablet 25 mg, 25 mg, Oral, BID w/meals, Carrie Nunes MD     Continuing beta blocker from home medication list OR beta blocker order already placed during this visit, , Does not apply, DOES NOT GO TO MAR, Carrie Nunes MD     glucose gel 15-30 g, 15-30 g, Oral, Q15 Min PRN **OR** dextrose 50 % injection 25-50 mL, 25-50 mL, Intravenous, Q15 Min PRN **OR** glucagon injection 1 mg, 1 mg, Subcutaneous, Q15 Min PRN, Carrie Nunes MD     docusate sodium (COLACE) capsule 100 mg, 100 mg, Oral, BID PRN, Carrie Nunes MD     heparin ANTICOAGULANT injection 5,000 Units, 5,000 Units, Subcutaneous, Q12H, Carrie Nunes MD     HOLD: nitroGLYcerin IF, , Does not apply, HOLD, Carrie Nunes MD     hydrALAZINE (APRESOLINE) tablet 50 mg, 50 mg, Oral, TID, Carrie Nunes MD     insulin aspart (NovoLOG) injection (RAPID ACTING), 1-4 Units, Subcutaneous, Q4H KELSEY, Carrie Nnues MD     lidocaine (LMX4) cream, , Topical, Q1H PRN, Carrie Nunes MD     lidocaine 1 % 0.1-1 mL, 0.1-1 mL, Other, Q1H PRN, Carrie Nunes MD     medication instruction, , Does not apply, Continuous PRN, Carrie Nunes MD     melatonin tablet 1 mg, 1 mg, Oral, At Bedtime PRN, Carrie Nunes MD     nitroGLYcerin (NITROSTAT) sublingual tablet 0.4 mg, 0.4 mg, Sublingual, Q5 Min PRN, Carrie Nunes MD     prochlorperazine (COMPAZINE) injection 10 mg, 10 mg, Intravenous, Q6H PRN **OR**  "prochlorperazine (COMPAZINE) tablet 10 mg, 10 mg, Oral, Q6H PRN **OR** prochlorperazine (COMPAZINE) suppository 25 mg, 25 mg, Rectal, Q12H PRN, Carrie Nunes MD     Reason ACE/ARB/ARNI order not selected, , Other, DOES NOT GO TO MAR, Carrie Nunes MD     sodium chloride (PF) 0.9% PF flush 3 mL, 3 mL, Intracatheter, Q8H, Carrie Nunes MD     sodium chloride (PF) 0.9% PF flush 3 mL, 3 mL, Intracatheter, q1 min prn, Carrie Nunes MD     ticagrelor (BRILINTA) tablet 90 mg, 90 mg, Oral, BID, Carrie Nunes MD     traZODone (DESYREL) tablet 50 mg, 50 mg, Oral, At Bedtime, Carrie Nunes MD    Current Outpatient Medications:      acetaminophen (TYLENOL) 500 MG tablet, Take 500 mg by mouth every 6 hours as needed for mild pain, Disp: , Rfl:      aspirin (ASA) 81 MG EC tablet, Take 1 tablet (81 mg) by mouth daily Start tomorrow., Disp: 30 tablet, Rfl: 3     atorvastatin (LIPITOR) 40 MG tablet, Take 1 tablet (40 mg) by mouth daily, Disp: 90 tablet, Rfl: 3     calcitRIOL (ROCALTROL) 0.25 MCG capsule, Take 0.5 mcg by mouth daily, Disp: , Rfl:      calcium carbonate 1250 (500 Ca) MG CHEW, Take 500 mg by mouth, Disp: , Rfl:      carvedilol (COREG) 25 MG tablet, Take 1 tablet (25 mg) by mouth 2 times daily (with meals), Disp: 270 tablet, Rfl: 3     docusate sodium (COLACE) 100 MG capsule, Take 100 mg by mouth 2 times daily as needed, Disp: , Rfl:      hydrALAZINE (APRESOLINE) 100 MG tablet, Take  mg by mouth 3 times daily, Disp: , Rfl:      insulin aspart (NOVOLOG FLEXPEN) 100 UNIT/ML pen, Inject 10 Units Subcutaneous 3 times daily (with meals), Disp: , Rfl:      insulin detemir (LEVEMIR PEN) 100 UNIT/ML pen, Inject 20 Units Subcutaneous At Bedtime, Disp: , Rfl:      nitroGLYcerin (NITROSTAT) 0.4 MG sublingual tablet, One tablet under the tongue every 5 minutes if needed for chest pain. May repeat every 5 minutes for a maximum of 3 doses in 15 minutes\", Disp: 25 tablet, Rfl: 3     ticagrelor " (BRILINTA) 90 MG tablet, Take 1 tablet (90 mg) by mouth 2 times daily Start tomorrow morning., Disp: 180 tablet, Rfl: 3     traZODone (DESYREL) 50 MG tablet, Take 50 mg by mouth At Bedtime, Disp: , Rfl:     ALLERGIES:  No Known Allergies    FAMILY HISTORY:  Family History   Problem Relation Age of Onset     Diabetes Type 2  Mother      Other - See Comments Daughter         granular cell tumor of thigh     Heart Disease Other        SOCIAL HISTORY:   Social History     Socioeconomic History     Marital status: Patient Declined   Tobacco Use     Smoking status: Never     Smokeless tobacco: Never   Substance and Sexual Activity     Alcohol use: Not Currently     Drug use: Never       PHYSICAL EXAM:    Vitals: BP (!) 149/87   Pulse 80   Temp 98.2  F (36.8  C) (Temporal)   Resp 17   Wt 56.2 kg (124 lb)   SpO2 100%   BMI 21.45 kg/m     Constitutional: Well developed, well nourished. Fatigued appearing  male.   HEAD:Normocephalic, atraumatic,   Eyes: PERRLA, EOM intact, conjunctiva clear, no discharge  ENT: Tacky mucous membranes, nose normal.   Neck- Supple, gross ROM intact.  No JVD.  No palpable nodes.  Pulmonary: Clear to auscultation bilaterally, no respiratory distress, no wheezing, speaks full sentences easily.  Chest: No chest wall tenderness  Cardiovascular: Normal heart rate, regular rhythm, no murmurs. No lower extremity edema, 2+ DP pulses.   GI: Soft, no tenderness to deep palpation in all quadrants, not distended, no masses.  No hepatosplenomegaly. Peritoneal dialysis catheter in place  Musculoskeletal: Moving all 4 extremities intentionally and without pain. No obvious deformity. No calf tenderness or edema.   Back: No CVA tenderness  Skin: Warm, dry, no rash. Poor skin turgor.   Neurologic: Alert & oriented x 3, speech clear, moving all extremities spontaneously   Psychiatric: Affect normal, cooperative.     LAB:  All pertinent labs reviewed and interpreted.  Labs Ordered and Resulted from Time  of ED Arrival to Time of ED Departure   CRP INFLAMMATION - Abnormal       Result Value    CRP Inflammation 72.80 (*)    BASIC METABOLIC PANEL - Abnormal    Sodium 127 (*)     Potassium 3.1 (*)     Chloride 85 (*)     Carbon Dioxide (CO2) 23      Anion Gap 19 (*)     Urea Nitrogen 49.8 (*)     Creatinine 9.44 (*)     Calcium 7.8 (*)     Glucose 242 (*)     GFR Estimate 6 (*)    TROPONIN T, HIGH SENSITIVITY - Abnormal    Troponin T, High Sensitivity 171 (*)    CBC WITH PLATELETS AND DIFFERENTIAL - Abnormal    WBC Count 10.0      RBC Count 4.45      Hemoglobin 13.0 (*)     Hematocrit 36.9 (*)     MCV 83      MCH 29.2      MCHC 35.2      RDW 14.6      Platelet Count 120 (*)    HEPATIC FUNCTION PANEL - Abnormal    Protein Total 6.5      Albumin 3.4 (*)     Bilirubin Total 0.6      Alkaline Phosphatase 68      AST 16      ALT 16      Bilirubin Direct <0.20     DIFFERENTIAL - Abnormal    % Neutrophils 76      % Lymphocytes 10      % Monocytes 10      % Eosinophils 1      % Basophils 1      % Metamyelocytes 2      Absolute Neutrophils 7.6      Absolute Lymphocytes 1.0      Absolute Monocytes 1.0      Absolute Eosinophils 0.1      Absolute Basophils 0.1      Absolute Metamyelocytes 0.2 (*)     RBC Morphology Confirmed RBC Indices      Platelet Assessment        Value: Automated Count Confirmed. Platelet morphology is normal.    Elliptocytes Slight (*)     Polychromasia Slight (*)     Teardrop Cells Slight (*)    TROPONIN T, HIGH SENSITIVITY - Abnormal    Troponin T, High Sensitivity 149 (*)    INFLUENZA A/B & SARS-COV2 PCR MULTIPLEX - Normal    Influenza A PCR Negative      Influenza B PCR Negative      RSV PCR Negative      SARS CoV2 PCR Negative     LACTIC ACID WHOLE BLOOD - Normal    Lactic Acid 2.0     MAGNESIUM - Normal    Magnesium 2.0     ROUTINE UA WITH MICROSCOPIC REFLEX TO CULTURE   GLUCOSE MONITOR NURSING POCT   RENAL PANEL   PARTIAL THROMBOPLASTIN TIME   INR   LIPID REFLEX TO DIRECT LDL PANEL   GLUCOSE MONITOR  NURSING POCT       RADIOLOGY:  XR Chest Port 1 View   Final Result   IMPRESSION: No pneumothorax or pleural effusion. No focal consolidation. Low lung volumes. Cardiac silhouette within normal limits. Chronic right rib fractures.      Echocardiogram Complete    (Results Pending)       EKG:   Performed at: 18:59 on 11/16/2022   Impression: Sinus rhythm, T wave abnormality (consider lateral ischemia), prolonged QT  Rate: 90 bpm  Rhythm: Sinus  QRS Interval: 90 ms  QTc Interval: 501 ms  Comparison: Compared to EKG of 12/6/21, criteria for septal infarct are no longer present and T wave inversion is now evident in lateral leads.   I have independently reviewed and interpreted the EKG(s) documented above.     PROCEDURES:   Procedures       I, Domenic Reeves, am serving as a scribe to document services personally performed by Dr. Logan Vilchis based on my observation and the provider's statements to me. I, Logan Vilchis M.D. attest that Domenic Reeves is acting in a scribe capacity, has observed my performance of the services and has documented them in accordance with my direction.      Logan Vilchis M.D.  Emergency Medicine  [facility] EMERGENCY DEPARTMENT  [facility] [facility] [id number] [phone]  Dept: [phone]     Logan Vilchis MD  11/17/22 0056

## 2022-11-17 NOTE — CONSULTS
RENAL CONSULTATION:     Date of Consultation:  11/17/2022    Requesting Physician: Dr. Nunes  Reason for Consult:  ESRD on CAPD    Assessment/ Recommendations:  1.  End-stage renal disease due to diabetes mellitus, hypertension.  Currently on CAPD 4 times daily with Dr. Boland at the [facility] dialysis clinic.  He has recently been alternating 1.5% and 2.5% Desferal's exchanges with signs of dehydration and hypotension on admission which may have been contributing.   -Okay to discharge from renal standpoint and he can resume his outpatient dialysis this evening but only use 1.5% dextrose which was discussed with patient via telephone .   -If he remains overnight we will set up cycler tonight and using all 1.5, 2 L exchanges.    2.  Elevated troponin: Cardiology evaluation including echocardiogram to look for wall motion abnormalities however seems to be stress related and patient currently comfortable.  Anticipate discharge if no further evaluation as cardiology has signed off.    3.  Chronic hypertension with relative hypotension on admission due to dehydration.  Patient on Coreg 25 mg p.o. twice daily as an outpatient.  Advised him to only use 1.5% dextrose for now given likely dehydration on admission    4.  Hypokalemia: Likely due to his poor intake as well as ongoing PD which is more common than with hemodialysis.  KCl 40 mEq p.o. x1 given    5.  Hyponatremia: Mild and due to his dialysis.  Limit fluid intake to 1.5 L/day at home.  We will trend as an outpatient.    6. Type 2 diabetes mellitus: Chronically on Levemir 20 units subcu at bedtime.  Defer management to hospital medicine.    Okay to discharge from renal standpoint but if he does stay overnight we will set up cycler tonight.  If a cycler is used,  He CANNOT TRANSFER ROOMS out of his current room once the machine is set up until completion of his dialysis treatment.      This document is created with the help of Dragon dictation  system. All grammatical errors are unintentional.     Justin Joy MD  [facility]  [phone] (off)       History of present illness:  Modesto is a 56-year-old gentleman with a history of type 2 diabetes mellitus, hypertension, end-stage renal disease currently on continuous ambulatory peritoneal dialysis (CAPD).  For the past several days prior to admission he had been noticing cold-like symptoms with scratchy throat, cough, poor intake of food and liquid as well as nausea.  He felt progressively weak as well and came to the emergency department for the evaluation of this. In the ER he was found to be hypotensive in the 70s over 50s and subsequently given saline.  He had an elevated troponin that was thought to be stress related per cardiology consultation.  We were consulted to provide ongoing PD support.  As I see the patient today he feels that his symptoms have completely resolved and he feels back to normal.  At home he had been using alternating 1.5% and 2.5% dextrose that contributed to increased ultrafiltration in the setting of poor oral intake and may have contributed to his hypotension.  We discussed switching to only 1.5% dextrose for now and following closely with his PCP clinic.  He feels he can go home and resume his PD tonight.  Discussed setting up a cycler however he remains overnight for efficiency purposes.  Telephone  used for visit today.      Past Medical History:   Diagnosis Date     Acute kidney injury (H) 6/10/2021     CKD (chronic kidney disease) stage 5, GFR less than 15 ml/min (H)      Dyslipidemia      Metabolic acidosis      Type 2 diabetes mellitus (H)          Current Facility-Administered Medications:      acetaminophen (TYLENOL) tablet 500 mg, 500 mg, Oral, Q6H PRN, Carrie Nunes MD     aspirin (ASA) chewable tablet 81 mg, 81 mg, Oral, Daily, Carrie Nunes MD, 81 mg at 11/17/22 0806     atorvastatin (LIPITOR) tablet 40 mg, 40 mg,  Oral, Daily, Carrie Nunes MD, 40 mg at 11/17/22 0806     calcitRIOL (ROCALTROL) capsule 0.5 mcg, 0.5 mcg, Oral, Daily, Carrie Nunes MD, 0.5 mcg at 11/17/22 0849     calcium carbonate 500 mg (elemental) (OSCAL) tablet 500 mg, 500 mg, Oral, Daily, Carrie Nunes MD, 500 mg at 11/17/22 0849     carvedilol (COREG) tablet 25 mg, 25 mg, Oral, BID w/meals, Carrie Nunes MD     Continuing beta blocker from home medication list OR beta blocker order already placed during this visit, , Does not apply, DOES NOT GO TO MARManju Opeyemi O, MD     glucose gel 15-30 g, 15-30 g, Oral, Q15 Min PRN **OR** dextrose 50 % injection 25-50 mL, 25-50 mL, Intravenous, Q15 Min PRN **OR** glucagon injection 1 mg, 1 mg, Subcutaneous, Q15 Min PRN, Carrie Nunes MD     docusate sodium (COLACE) capsule 100 mg, 100 mg, Oral, BID PRN, Carrie Nunes MD     heparin ANTICOAGULANT injection 5,000 Units, 5,000 Units, Subcutaneous, Q12H, Carrie Nunes MD, 5,000 Units at 11/17/22 0805     HOLD: nitroGLYcerin IF, , Does not apply, HOLD, Carrie Nunes MD     hydrALAZINE (APRESOLINE) tablet 50 mg, 50 mg, Oral, TID, Carrie Nunes MD, 50 mg at 11/17/22 1303     insulin aspart (NovoLOG) injection (RAPID ACTING), 1-4 Units, Subcutaneous, Q4H KELSEY, Carrie Nunes MD, 1 Units at 11/17/22 0111     lidocaine (LMX4) cream, , Topical, Q1H PRN, Carrie Nunes MD     lidocaine 1 % 0.1-1 mL, 0.1-1 mL, Other, Q1H PRN, Carrie Nunes MD     medication instruction, , Does not apply, Continuous PRN, Carrie Nunes MD     melatonin tablet 1 mg, 1 mg, Oral, At Bedtime PRN, Carrie Nunes MD     nitroGLYcerin (NITROSTAT) sublingual tablet 0.4 mg, 0.4 mg, Sublingual, Q5 Min PRN, Carrie Nunes MD     prochlorperazine (COMPAZINE) injection 10 mg, 10 mg, Intravenous, Q6H PRN **OR** prochlorperazine (COMPAZINE) tablet 10 mg, 10 mg, Oral, Q6H PRN **OR** prochlorperazine (COMPAZINE) suppository 25  "mg, 25 mg, Rectal, Q12H PRN, Carrie Nunes MD     Reason ACE/ARB/ARNI order not selected, , Other, DOES NOT GO TO MAR, Carrie Nunes MD     sodium chloride (PF) 0.9% PF flush 3 mL, 3 mL, Intracatheter, Q8H, Carrie Nunes MD, 3 mL at 11/17/22 1303     sodium chloride (PF) 0.9% PF flush 3 mL, 3 mL, Intracatheter, q1 min prn, Carrie Nunes MD     ticagrelor (BRILINTA) tablet 90 mg, 90 mg, Oral, BID, Carrie Nunes MD, 90 mg at 11/17/22 0849     traZODone (DESYREL) tablet 50 mg, 50 mg, Oral, At Bedtime, Carrie Nunes MD, 50 mg at 11/17/22 0101    Current Outpatient Medications:      acetaminophen (TYLENOL) 500 MG tablet, Take 500 mg by mouth every 6 hours as needed for mild pain, Disp: , Rfl:      aspirin (ASA) 81 MG EC tablet, Take 1 tablet (81 mg) by mouth daily Start tomorrow., Disp: 30 tablet, Rfl: 3     atorvastatin (LIPITOR) 40 MG tablet, Take 1 tablet (40 mg) by mouth daily, Disp: 90 tablet, Rfl: 3     calcitRIOL (ROCALTROL) 0.25 MCG capsule, Take 0.5 mcg by mouth daily, Disp: , Rfl:      calcium carbonate 1250 (500 Ca) MG CHEW, Take 500 mg by mouth, Disp: , Rfl:      carvedilol (COREG) 25 MG tablet, Take 1 tablet (25 mg) by mouth 2 times daily (with meals), Disp: 270 tablet, Rfl: 3     docusate sodium (COLACE) 100 MG capsule, Take 100 mg by mouth 2 times daily as needed, Disp: , Rfl:      hydrALAZINE (APRESOLINE) 100 MG tablet, Take  mg by mouth 3 times daily, Disp: , Rfl:      insulin aspart (NOVOLOG FLEXPEN) 100 UNIT/ML pen, Inject 10 Units Subcutaneous 3 times daily (with meals), Disp: , Rfl:      insulin detemir (LEVEMIR PEN) 100 UNIT/ML pen, Inject 20 Units Subcutaneous At Bedtime, Disp: , Rfl:      nitroGLYcerin (NITROSTAT) 0.4 MG sublingual tablet, One tablet under the tongue every 5 minutes if needed for chest pain. May repeat every 5 minutes for a maximum of 3 doses in 15 minutes\", Disp: 25 tablet, Rfl: 3     ticagrelor (BRILINTA) 90 MG tablet, Take 1 tablet (90 " mg) by mouth 2 times daily Start tomorrow morning., Disp: 180 tablet, Rfl: 3     traZODone (DESYREL) 50 MG tablet, Take 50 mg by mouth At Bedtime, Disp: , Rfl:     No Known Allergies    Social History     Socioeconomic History     Marital status: Patient Declined   Tobacco Use     Smoking status: Never     Smokeless tobacco: Never   Substance and Sexual Activity     Alcohol use: Not Currently     Drug use: Never       Family History   Problem Relation Age of Onset     Diabetes Type 2  Mother      Other - See Comments Daughter         granular cell tumor of thigh     Heart Disease Other        Review of Systems: Cough, cold symptoms, nausea are all resolved. Feels stronger. No urinary complaints. No cloudy fluid or pain with PD. The remainder of 10 point review of systems is negative except as noted in HPI above.     /55   Pulse 78   Temp 98.1  F (36.7  C) (Oral)   Resp 20   Wt 56.2 kg (124 lb)   SpO2 95%   BMI 21.45 kg/m      Intake/Output Summary (Last 24 hours) at 11/17/2022 1406  Last data filed at 11/17/2022 0300  Gross per 24 hour   Intake 0 ml   Output --   Net 0 ml     Physical Exam:   GENERAL: calm and comfortable, alert  EYES: No scleral icterus, conjunctiva clear  ENT: Hearing normal, Oral mucosa moist  RESP: Clear to auscultation bilaterally with no respiratory distress, normal effort.  CV: RRR, no murmurs. No leg edema.    GI: Active BS, Soft, NT/ND, no masses or HSM. PD catheter site clean  Musculoskeletal: Normal muscle bulk/ tone; No gross joint abnormalities  SKIN: No rash, warm/ dry  PSYCH:  Appropriate mood and affect  Lymph: No cervical/ inguinal adenopathy    LABS:  Recent Labs   Lab 11/17/22 0916 11/16/22 1908   * 127*   POTASSIUM 3.3* 3.1*   CHLORIDE 91* 85*   CO2 23 23   BUN 53.1* 49.8*   CR 9.20* 9.44*   GFRESTIMATED 6* 6*   REYMUNDO 7.0* 7.8*   MAG  --  2.0   ALBUMIN 2.6* 3.4*       Recent Labs   Lab 11/17/22  0916 11/16/22 1908   WBC 7.1 10.0   HGB 10.4* 13.0*   HCT 29.3*  36.9*   MCV 84 83   PLT 85* 120*     EXAM: XR CHEST PORT 1 VIEW  LOCATION: [facility]  DATE/TIME: 11/16/2022 8:01 PM     INDICATION: cough, recent low blood pressure episode  COMPARISON: 6/10/2021                                                                      IMPRESSION: No pneumothorax or pleural effusion. No focal consolidation. Low lung volumes. Cardiac silhouette within normal limits. Chronic right rib fractures.      All lab data was reviewed at 2:06 PM

## 2022-11-17 NOTE — PLAN OF CARE
Problem: Plan of Care - These are the overarching goals to be used throughout the patient stay.    Goal: Optimal Comfort and Wellbeing  Outcome: Progressing     Pt comfortable overnight, A/O 4x, no pain reported.  Soft BP's, Tele: NSR.  No output overnight. NPO Diet.   Makes needs known.

## 2022-11-18 ENCOUNTER — APPOINTMENT (OUTPATIENT)
Dept: OCCUPATIONAL THERAPY | Facility: HOSPITAL | Age: 57
End: 2022-11-18
Attending: INTERNAL MEDICINE
Payer: MEDICARE

## 2022-11-18 VITALS
HEIGHT: 64 IN | TEMPERATURE: 98 F | HEART RATE: 71 BPM | RESPIRATION RATE: 20 BRPM | BODY MASS INDEX: 22.21 KG/M2 | OXYGEN SATURATION: 96 % | DIASTOLIC BLOOD PRESSURE: 57 MMHG | SYSTOLIC BLOOD PRESSURE: 112 MMHG | WEIGHT: 130.1 LBS

## 2022-11-18 LAB
ALBUMIN UR-MCNC: 50 MG/DL
ANION GAP SERPL CALCULATED.3IONS-SCNC: 15 MMOL/L (ref 7–15)
APPEARANCE UR: ABNORMAL
BACTERIA #/AREA URNS HPF: ABNORMAL /HPF
BILIRUB UR QL STRIP: NEGATIVE
BUN SERPL-MCNC: 60 MG/DL (ref 6–20)
CALCIUM SERPL-MCNC: 6.9 MG/DL (ref 8.6–10)
CHLORIDE SERPL-SCNC: 90 MMOL/L (ref 98–107)
COLOR UR AUTO: ABNORMAL
CREAT SERPL-MCNC: 10.67 MG/DL (ref 0.67–1.17)
DEPRECATED HCO3 PLAS-SCNC: 21 MMOL/L (ref 22–29)
ERYTHROCYTE [DISTWIDTH] IN BLOOD BY AUTOMATED COUNT: 14.4 % (ref 10–15)
GFR SERPL CREATININE-BSD FRML MDRD: 5 ML/MIN/1.73M2
GLUCOSE BLDC GLUCOMTR-MCNC: 170 MG/DL (ref 70–99)
GLUCOSE BLDC GLUCOMTR-MCNC: 190 MG/DL (ref 70–99)
GLUCOSE SERPL-MCNC: 117 MG/DL (ref 70–99)
GLUCOSE UR STRIP-MCNC: 50 MG/DL
HCT VFR BLD AUTO: 27.5 % (ref 40–53)
HGB BLD-MCNC: 9.4 G/DL (ref 13.3–17.7)
HGB UR QL STRIP: NEGATIVE
KETONES UR STRIP-MCNC: NEGATIVE MG/DL
LEUKOCYTE ESTERASE UR QL STRIP: NEGATIVE
MCH RBC QN AUTO: 28.4 PG (ref 26.5–33)
MCHC RBC AUTO-ENTMCNC: 34.2 G/DL (ref 31.5–36.5)
MCV RBC AUTO: 83 FL (ref 78–100)
NITRATE UR QL: NEGATIVE
PH UR STRIP: 5 [PH] (ref 5–7)
PLATELET # BLD AUTO: 81 10E3/UL (ref 150–450)
POTASSIUM SERPL-SCNC: 3.3 MMOL/L (ref 3.4–5.3)
RBC # BLD AUTO: 3.31 10E6/UL (ref 4.4–5.9)
RBC URINE: 0 /HPF
SODIUM SERPL-SCNC: 126 MMOL/L (ref 136–145)
SP GR UR STRIP: 1.01 (ref 1–1.03)
SQUAMOUS EPITHELIAL: <1 /HPF
UROBILINOGEN UR STRIP-MCNC: <2 MG/DL
WBC # BLD AUTO: 6 10E3/UL (ref 4–11)
WBC URINE: 3 /HPF

## 2022-11-18 PROCEDURE — 97165 OT EVAL LOW COMPLEX 30 MIN: CPT | Mod: GO

## 2022-11-18 PROCEDURE — G0378 HOSPITAL OBSERVATION PER HR: HCPCS

## 2022-11-18 PROCEDURE — 85027 COMPLETE CBC AUTOMATED: CPT | Performed by: STUDENT IN AN ORGANIZED HEALTH CARE EDUCATION/TRAINING PROGRAM

## 2022-11-18 PROCEDURE — 250N000011 HC RX IP 250 OP 636: Performed by: INTERNAL MEDICINE

## 2022-11-18 PROCEDURE — 80048 BASIC METABOLIC PNL TOTAL CA: CPT | Performed by: STUDENT IN AN ORGANIZED HEALTH CARE EDUCATION/TRAINING PROGRAM

## 2022-11-18 PROCEDURE — 96372 THER/PROPH/DIAG INJ SC/IM: CPT | Performed by: INTERNAL MEDICINE

## 2022-11-18 PROCEDURE — 81001 URINALYSIS AUTO W/SCOPE: CPT | Performed by: STUDENT IN AN ORGANIZED HEALTH CARE EDUCATION/TRAINING PROGRAM

## 2022-11-18 PROCEDURE — 96372 THER/PROPH/DIAG INJ SC/IM: CPT

## 2022-11-18 PROCEDURE — 97110 THERAPEUTIC EXERCISES: CPT | Mod: GO

## 2022-11-18 PROCEDURE — 99217 PR OBSERVATION CARE DISCHARGE: CPT | Performed by: STUDENT IN AN ORGANIZED HEALTH CARE EDUCATION/TRAINING PROGRAM

## 2022-11-18 PROCEDURE — 82962 GLUCOSE BLOOD TEST: CPT

## 2022-11-18 PROCEDURE — 36415 COLL VENOUS BLD VENIPUNCTURE: CPT | Performed by: STUDENT IN AN ORGANIZED HEALTH CARE EDUCATION/TRAINING PROGRAM

## 2022-11-18 PROCEDURE — 250N000013 HC RX MED GY IP 250 OP 250 PS 637: Performed by: INTERNAL MEDICINE

## 2022-11-18 RX ORDER — NICOTINE POLACRILEX 4 MG
15-30 LOZENGE BUCCAL
Status: DISCONTINUED | OUTPATIENT
Start: 2022-11-18 | End: 2022-11-18 | Stop reason: HOSPADM

## 2022-11-18 RX ORDER — POTASSIUM CHLORIDE 750 MG/1
10 TABLET, EXTENDED RELEASE ORAL DAILY
Qty: 30 TABLET | Refills: 11 | Status: SHIPPED | OUTPATIENT
Start: 2022-11-18 | End: 2023-01-16

## 2022-11-18 RX ORDER — POTASSIUM CHLORIDE 750 MG/1
10 TABLET, EXTENDED RELEASE ORAL DAILY
Qty: 30 TABLET | Refills: 11 | Status: SHIPPED | OUTPATIENT
Start: 2022-11-18 | End: 2023-02-09

## 2022-11-18 RX ORDER — NITROGLYCERIN 0.4 MG/1
0.4 TABLET SUBLINGUAL EVERY 5 MIN PRN
Status: DISCONTINUED | OUTPATIENT
Start: 2022-11-18 | End: 2022-11-18

## 2022-11-18 RX ORDER — CEFDINIR 300 MG/1
300 CAPSULE ORAL DAILY
Qty: 7 CAPSULE | Refills: 0 | Status: SHIPPED | OUTPATIENT
Start: 2022-11-18 | End: 2022-11-25

## 2022-11-18 RX ORDER — ASPIRIN 81 MG/1
81 TABLET ORAL DAILY
Status: DISCONTINUED | OUTPATIENT
Start: 2022-11-18 | End: 2022-11-18

## 2022-11-18 RX ORDER — HYDRALAZINE HYDROCHLORIDE 50 MG/1
25 TABLET, FILM COATED ORAL 3 TIMES DAILY
Qty: 45 TABLET | Refills: 0 | Status: ON HOLD | OUTPATIENT
Start: 2022-11-18 | End: 2023-02-22

## 2022-11-18 RX ORDER — POTASSIUM CHLORIDE 750 MG/1
10 TABLET, EXTENDED RELEASE ORAL DAILY
Status: DISCONTINUED | OUTPATIENT
Start: 2022-11-18 | End: 2022-11-18 | Stop reason: HOSPADM

## 2022-11-18 RX ORDER — DEXTROSE MONOHYDRATE 25 G/50ML
25-50 INJECTION, SOLUTION INTRAVENOUS
Status: DISCONTINUED | OUTPATIENT
Start: 2022-11-18 | End: 2022-11-18 | Stop reason: HOSPADM

## 2022-11-18 RX ORDER — ASPIRIN 81 MG/1
81 TABLET ORAL DAILY
Status: DISCONTINUED | OUTPATIENT
Start: 2022-11-19 | End: 2022-11-18 | Stop reason: HOSPADM

## 2022-11-18 RX ORDER — ATORVASTATIN CALCIUM 40 MG/1
40 TABLET, FILM COATED ORAL DAILY
Status: DISCONTINUED | OUTPATIENT
Start: 2022-11-18 | End: 2022-11-18

## 2022-11-18 RX ADMIN — Medication 500 MG: at 08:13

## 2022-11-18 RX ADMIN — ASPIRIN 81 MG CHEWABLE TABLET 81 MG: 81 TABLET CHEWABLE at 08:12

## 2022-11-18 RX ADMIN — TICAGRELOR 90 MG: 90 TABLET ORAL at 08:12

## 2022-11-18 RX ADMIN — HYDRALAZINE HYDROCHLORIDE 50 MG: 50 TABLET, FILM COATED ORAL at 08:12

## 2022-11-18 RX ADMIN — HEPARIN SODIUM 5000 UNITS: 5000 INJECTION, SOLUTION INTRAVENOUS; SUBCUTANEOUS at 08:14

## 2022-11-18 RX ADMIN — ATORVASTATIN CALCIUM 40 MG: 40 TABLET, FILM COATED ORAL at 08:12

## 2022-11-18 RX ADMIN — CARVEDILOL 25 MG: 12.5 TABLET, FILM COATED ORAL at 08:12

## 2022-11-18 RX ADMIN — CALCITRIOL CAPSULES 0.25 MCG 0.5 MCG: 0.25 CAPSULE ORAL at 08:13

## 2022-11-18 ASSESSMENT — ACTIVITIES OF DAILY LIVING (ADL)
ADLS_ACUITY_SCORE: 35

## 2022-11-18 NOTE — PLAN OF CARE
"  Problem: Plan of Care - These are the overarching goals to be used throughout the patient stay.    Goal: Plan of Care Review  Description: The Plan of Care Review/Shift note should be completed every shift.  The Outcome Evaluation is a brief statement about your assessment that the patient is improving, declining, or no change.  This information will be displayed automatically on your shift note.  Outcome: Met  Goal: Patient-Specific Goal (Individualized)  Description: You can add care plan individualizations to a care plan. Examples of Individualization might be:  \"Parent requests to be called daily at 9am for status\", \"I have a hard time hearing out of my right ear\", or \"Do not touch me to wake me up as it startles me\".  Outcome: Met  Goal: Absence of Hospital-Acquired Illness or Injury  Outcome: Met  Intervention: Identify and Manage Fall Risk  Recent Flowsheet Documentation  Taken 11/18/2022 0805 by Rozina Schilling RN  Safety Promotion/Fall Prevention:   assistive device/personal items within reach   activity supervised  Goal: Optimal Comfort and Wellbeing  Outcome: Met  Goal: Readiness for Transition of Care  Outcome: Met   Goal Outcome Evaluation:       Paper work reviewed with patient. Family to transport Home.                  "

## 2022-11-18 NOTE — PROGRESS NOTES
BP only 113 systolical when giving medications  even in through was 166 earlier . Gave coreg and will recheck in hour before giving hydralazine.

## 2022-11-18 NOTE — PROGRESS NOTES
Progress Note    Date of admission: 11/16/2022    Assessment/Plan  Modesto Barbosa is a 54-year-old man with PMH of CKD stage V on peritoneal dialysis, DMII, hypertension and CAD who presented with generalized body weakness.  Initial investigation for possible infectious etiology grossly negative however initial troponin showed elevated troponin level with nonspecific changes on the EKG [T wave abnormality].     Elevated troponin to rule out etiology + CAD  DAPT resumed + Nitroglycerin as needed for ischemic chest pain  Resume other home CAD medications   TTE with estimated left ventricular ejection fraction of 60 to 60%.    Cardiology consult appreciated.  Now has signed off.  -Recommending to consider decreasing dose of carvedilol if patient is having recurrent hypotension.     Generalized body weakness   Initial sepsis work-up/infectious etiology results appreciated  Urinalysis and possible urine culture to rule out possible UTI  Fall precautions + serial blood pressure monitoring in view of reported hypotension  Monitor for signs of evolving infection     CKD stage V on peritoneal dialysis + DMII    Nephrology evaluation for resumption of peritoneal hemodialysis  Electrolyte monitoring and replacement  Monitor I/O   Long-acting insulin presently on hold in view of poor appetite   SSI regimen for glycemic control  Nephrology consultation appreciated.  Now has signed off      Hypertension   Cautious reintroduction of antihypertensive medication with reported hypotensive episodes  Will resume home PTA antihypertensive medications as BP tolerates     Prolonged QT interval  Continue cardiac monitoring as above  Electrolyte monitoring and replacement    Hyponatremia  Chronic, continue to monitor    Hypokalemia  -Replace per protocol, continue peritoneal dialysis and electrolyte monitoring    DVT PPX : heparin    Barriers to discharge: Stability of blood pressure, and work-up of generalized body weakness    Anticipated  Discharge date  : Likely tomorrow    Subjective  Patient assessment was done using [employer] interpretation via telephone.  No distress noted.  Management plan discussed with the patient and he expressed understanding.    Objective  Vital signs in last 24 hours  Temp:  [97.8  F (36.6  C)-98.1  F (36.7  C)] 98  F (36.7  C)  Pulse:  [70-94] 73  Resp:  [9-52] 23  BP: ()/(48-89) 116/64  SpO2:  [94 %-100 %] 98 %    Input and Output in 24 hrs     Intake/Output Summary (Last 24 hours) at 11/17/2022 1820  Last data filed at 11/17/2022 1500  Gross per 24 hour   Intake 350 ml   Output --   Net 350 ml       Physical Exam:  GEN: Alert and oriented. Not in acute distress  HEENT: Mucous membrane- moist and pink  CHEST: Clear to auscultation bilaterally  HEART: S1S2 regular. No murmurs, rubs or gallops  ABDOMEN: Soft. Non-tender, non-distended. No organomegaly.  Bowel sounds- active.  Peritoneal dialysis catheter in place  Extremities: No pedal oedema  CNS: No focal neurological deficit. No involuntary movements  SKIN: No skin rash, no cyanosis or clubbing      Pertinent Labs   Lab Results: Personally Reviewed    Recent Results (from the past 24 hour(s))   Extra Blue Top Tube    Collection Time: 11/16/22  7:08 PM   Result Value Ref Range    Hold Specimen JIC    Extra Red Top Tube    Collection Time: 11/16/22  7:08 PM   Result Value Ref Range    Hold Specimen JIC    Extra Green Top (Lithium Heparin) Tube    Collection Time: 11/16/22  7:08 PM   Result Value Ref Range    Hold Specimen JIC    Extra Purple Top Tube    Collection Time: 11/16/22  7:08 PM   Result Value Ref Range    Hold Specimen JIC    CRP inflammation    Collection Time: 11/16/22  7:08 PM   Result Value Ref Range    CRP Inflammation 72.80 (H) <5.00 mg/L   Basic metabolic panel    Collection Time: 11/16/22  7:08 PM   Result Value Ref Range    Sodium 127 (L) 136 - 145 mmol/L    Potassium 3.1 (L) 3.4 - 5.3 mmol/L    Chloride 85 (L) 98 - 107 mmol/L    Carbon Dioxide (CO2)  23 22 - 29 mmol/L    Anion Gap 19 (H) 7 - 15 mmol/L    Urea Nitrogen 49.8 (H) 6.0 - 20.0 mg/dL    Creatinine 9.44 (H) 0.67 - 1.17 mg/dL    Calcium 7.8 (L) 8.6 - 10.0 mg/dL    Glucose 242 (H) 70 - 99 mg/dL    GFR Estimate 6 (L) >60 mL/min/1.73m2   Lactic acid whole blood    Collection Time: 11/16/22  7:08 PM   Result Value Ref Range    Lactic Acid 2.0 0.7 - 2.0 mmol/L   Troponin T, High Sensitivity    Collection Time: 11/16/22  7:08 PM   Result Value Ref Range    Troponin T, High Sensitivity 171 (HH) <=22 ng/L   Magnesium    Collection Time: 11/16/22  7:08 PM   Result Value Ref Range    Magnesium 2.0 1.7 - 2.3 mg/dL   CBC with platelets and differential    Collection Time: 11/16/22  7:08 PM   Result Value Ref Range    WBC Count 10.0 4.0 - 11.0 10e3/uL    RBC Count 4.45 4.40 - 5.90 10e6/uL    Hemoglobin 13.0 (L) 13.3 - 17.7 g/dL    Hematocrit 36.9 (L) 40.0 - 53.0 %    MCV 83 78 - 100 fL    MCH 29.2 26.5 - 33.0 pg    MCHC 35.2 31.5 - 36.5 g/dL    RDW 14.6 10.0 - 15.0 %    Platelet Count 120 (L) 150 - 450 10e3/uL   Hepatic function panel    Collection Time: 11/16/22  7:08 PM   Result Value Ref Range    Protein Total 6.5 6.4 - 8.3 g/dL    Albumin 3.4 (L) 3.5 - 5.2 g/dL    Bilirubin Total 0.6 <=1.2 mg/dL    Alkaline Phosphatase 68 40 - 129 U/L    AST 16 10 - 50 U/L    ALT 16 10 - 50 U/L    Bilirubin Direct <0.20 0.00 - 0.30 mg/dL   Manual Differential    Collection Time: 11/16/22  7:08 PM   Result Value Ref Range    % Neutrophils 76 %    % Lymphocytes 10 %    % Monocytes 10 %    % Eosinophils 1 %    % Basophils 1 %    % Metamyelocytes 2 %    Absolute Neutrophils 7.6 1.6 - 8.3 10e3/uL    Absolute Lymphocytes 1.0 0.8 - 5.3 10e3/uL    Absolute Monocytes 1.0 0.0 - 1.3 10e3/uL    Absolute Eosinophils 0.1 0.0 - 0.7 10e3/uL    Absolute Basophils 0.1 0.0 - 0.2 10e3/uL    Absolute Metamyelocytes 0.2 (H) <=0.0 10e3/uL    RBC Morphology Confirmed RBC Indices     Platelet Assessment  Automated Count Confirmed. Platelet morphology is  normal.     Automated Count Confirmed. Platelet morphology is normal.    Elliptocytes Slight (A) None Seen    Polychromasia Slight (A) None Seen    Teardrop Cells Slight (A) None Seen   Symptomatic; Yes; 11/16/2022 Influenza A/B & SARS-CoV2 (COVID-19) Virus PCR Multiplex Nasopharyngeal    Collection Time: 11/16/22  7:09 PM    Specimen: Nasopharyngeal; Swab   Result Value Ref Range    Influenza A PCR Negative Negative    Influenza B PCR Negative Negative    RSV PCR Negative Negative    SARS CoV2 PCR Negative Negative   Troponin T, High Sensitivity    Collection Time: 11/16/22 11:15 PM   Result Value Ref Range    Troponin T, High Sensitivity 149 (HH) <=22 ng/L   Glucose by meter    Collection Time: 11/17/22  1:10 AM   Result Value Ref Range    GLUCOSE BY METER POCT 160 (H) 70 - 99 mg/dL   Glucose by meter    Collection Time: 11/17/22  6:10 AM   Result Value Ref Range    GLUCOSE BY METER POCT 141 (H) 70 - 99 mg/dL   Glucose by meter    Collection Time: 11/17/22  8:23 AM   Result Value Ref Range    GLUCOSE BY METER POCT 122 (H) 70 - 99 mg/dL   Renal panel    Collection Time: 11/17/22  9:16 AM   Result Value Ref Range    Sodium 129 (L) 136 - 145 mmol/L    Potassium 3.3 (L) 3.4 - 5.3 mmol/L    Chloride 91 (L) 98 - 107 mmol/L    Carbon Dioxide (CO2) 23 22 - 29 mmol/L    Anion Gap 15 7 - 15 mmol/L    Glucose 113 (H) 70 - 99 mg/dL    Urea Nitrogen 53.1 (H) 6.0 - 20.0 mg/dL    Creatinine 9.20 (H) 0.67 - 1.17 mg/dL    GFR Estimate 6 (L) >60 mL/min/1.73m2    Calcium 7.0 (L) 8.6 - 10.0 mg/dL    Albumin 2.6 (L) 3.5 - 5.2 g/dL    Phosphorus 7.9 (H) 2.5 - 4.5 mg/dL   Partial thromboplastin time    Collection Time: 11/17/22  9:16 AM   Result Value Ref Range    aPTT 42 (H) 22 - 38 Seconds   INR    Collection Time: 11/17/22  9:16 AM   Result Value Ref Range    INR 1.16 (H) 0.85 - 1.15   Lipid panel reflex to direct LDL    Collection Time: 11/17/22  9:16 AM   Result Value Ref Range    Cholesterol 122 <200 mg/dL    Triglycerides 234 (H)  <150 mg/dL    Direct Measure HDL 22 (L) >=40 mg/dL    LDL Cholesterol Calculated 53 <=100 mg/dL    Non HDL Cholesterol 100 <130 mg/dL   CBC with platelets and differential    Collection Time: 11/17/22  9:16 AM   Result Value Ref Range    WBC Count 7.1 4.0 - 11.0 10e3/uL    RBC Count 3.50 (L) 4.40 - 5.90 10e6/uL    Hemoglobin 10.4 (L) 13.3 - 17.7 g/dL    Hematocrit 29.3 (L) 40.0 - 53.0 %    MCV 84 78 - 100 fL    MCH 29.7 26.5 - 33.0 pg    MCHC 35.5 31.5 - 36.5 g/dL    RDW 14.5 10.0 - 15.0 %    Platelet Count 85 (L) 150 - 450 10e3/uL   Manual Differential    Collection Time: 11/17/22  9:16 AM   Result Value Ref Range    % Neutrophils 79 %    % Lymphocytes 17 %    % Monocytes 1 %    % Eosinophils 3 %    % Basophils 0 %    Absolute Neutrophils 5.6 1.6 - 8.3 10e3/uL    Absolute Lymphocytes 1.2 0.8 - 5.3 10e3/uL    Absolute Monocytes 0.1 0.0 - 1.3 10e3/uL    Absolute Eosinophils 0.2 0.0 - 0.7 10e3/uL    Absolute Basophils 0.0 0.0 - 0.2 10e3/uL    RBC Morphology Confirmed RBC Indices     Platelet Assessment  Automated Count Confirmed. Platelet morphology is normal.     Automated Count Confirmed. Platelet morphology is normal.   Glucose by meter    Collection Time: 11/17/22 11:13 AM   Result Value Ref Range    GLUCOSE BY METER POCT 123 (H) 70 - 99 mg/dL   Echocardiogram Complete    Collection Time: 11/17/22  1:00 PM   Result Value Ref Range    LVEF  60-65%    Glucose by meter    Collection Time: 11/17/22  4:04 PM   Result Value Ref Range    GLUCOSE BY METER POCT 126 (H) 70 - 99 mg/dL       Pertinent Radiology   Radiology Results reviewed      EKG Results reviewed       Advanced Care Planning      Harjinder Castellanos MD   [facility]

## 2022-11-18 NOTE — PLAN OF CARE
Cardiac Rehab Discharge Summary    Reason for therapy discharge:    Discharged home.    Progress towards therapy goal(s). See goals on Care Plan in Epic electronic health record for goal details.  Goals not fully met d/t earlier then anticipated discharge home (discharged prior to pm session).    Therapy recommendation(s):    None @ this time.

## 2022-11-18 NOTE — PROGRESS NOTES
RENAL PROGRESS NOTE - [facility]        CC: f/u ESRD    Subjective: feels better today and planning to discharge. Low bp earlier, now improved.  Assessment/ Recommendations:  1.  End-stage renal disease due to diabetes mellitus, hypertension.  Currently on CAPD 4 times daily with Dr. Boland at the [facility] dialysis clinic.  He has recently been alternating 1.5% and 2.5% Desferal's exchanges with signs of dehydration and hypotension on admission which may have been contributing.              -Okay to discharge from renal standpoint and he can resume his outpatient dialysis this evening but only use 1.5% dextrose which Dr Joy discussed with patient via telephone .              -If he remains overnight we will set up cycler tonight and using all 1.5, 2 L exchanges.     2.  Elevated troponin: Cardiology evaluation including echocardiogram to look for wall motion abnormalities however seems to be stress related and patient currently comfortable.  no plan for further evaluation as cardiology has signed off.     3.  Chronic hypertension with relative hypotension here.    Advised him to only use 1.5% dextrose for now given likely dehydration on admission  -would cut back on hydralazine dose if ongoing     4.  Hypokalemia: Likely due to his poor intake as well as ongoing PD which is more common than with hemodialysis.  KCl 40 mEq p.o. x1 given yest  -will give 10 meq/day, script done     5.  Hyponatremia: Mild and due to his dialysis.  Limit fluid intake to 1.5 L/day at home.  We will trend as an outpatient.  -resume PD tonight     6. Type 2 diabetes mellitus: Chronically on Levemir 20 units subcu at bedtime.  Defer management to hospital medicine.     Okay to discharge from renal standpoint but if he does stay overnight we will set up cycler tonight.      Yolis Bliss MD  [facility]  [phone]    Objective    PHYSICAL EXAM  /57 (BP Location: Left arm)   Pulse 71   " Temp 98  F (36.7  C) (Oral)   Resp 20   Ht 1.626 m (5' 4\")   Wt 59 kg (130 lb 1.6 oz)   SpO2 96%   BMI 22.33 kg/m    I/O last 3 completed shifts:  In: 650 [P.O.:650]  Out: 40 [Urine:40]  Wt Readings from Last 3 Encounters:   11/18/22 59 kg (130 lb 1.6 oz)   06/02/22 62.4 kg (137 lb 9.6 oz)   06/01/22 61.7 kg (136 lb)       GENERAL: nad in bed  HEENT:normocephalic, atraumatic  CARDIOVASCULAR: rr, no rub,  Trace edema  PULMONARY:cta ant. No cyanosis  GASTROINTESTINAL: soft, nt/nd  MSK: diffuse muscle atrophy, warm  NEURO: Alert, no gross focal findings  PSYCHIATRIC: Adequate mood and interaction  SKIN: Pale, no jaundice, no rash.    LABORATORIES  Recent Labs   Lab 11/18/22  0508 11/17/22  0916 11/16/22  1908   WBC 6.0 7.1 10.0   HGB 9.4* 10.4* 13.0*   HCT 27.5* 29.3* 36.9*   PLT 81* 85* 120*     Recent Labs   Lab 11/18/22  0508 11/17/22  0916 11/16/22  1908   * 129* 127*   CO2 21* 23 23   BUN 60.0* 53.1* 49.8*   ALKPHOS  --   --  68   ALT  --   --  16   AST  --   --  16         MEDICATIONS    [START ON 11/19/2022] aspirin  81 mg Oral Daily     atorvastatin  40 mg Oral Daily     calcitRIOL  0.5 mcg Oral Daily     calcium carbonate 500 mg (elemental)  500 mg Oral Daily     carvedilol  25 mg Oral BID w/meals     heparin ANTICOAGULANT  5,000 Units Subcutaneous Q12H     hydrALAZINE  50 mg Oral TID     insulin aspart  1-7 Units Subcutaneous TID AC     insulin aspart  1-5 Units Subcutaneous At Bedtime     sodium chloride (PF)  3 mL Intracatheter Q8H     ticagrelor  90 mg Oral BID     traZODone  50 mg Oral At Bedtime         Yolis Bliss MD  [facility]  [phone]      "

## 2022-11-18 NOTE — DISCHARGE SUMMARY
[facility] MEDICINE  DISCHARGE SUMMARY     Primary Care Physician: No Ref-Primary, Physician  Admission Date: 11/16/2022   Discharge Provider: Harjinder Castellanos MD Discharge Date: 11/18/2022   Diet:   Active Diet and Nourishment Order   Procedures     Renal Diet (dialysis)     Diet       Code Status: Full Code   Activity: DCACTIVITY: Activity as tolerated        Condition at Discharge: Good     REASON FOR PRESENTATION(See Admission Note for Details)   Generalized body weakness    PRINCIPAL & ACTIVE DISCHARGE DIAGNOSES     Principal Problem:    NSTEMI (non-ST elevated myocardial infarction) (H)  Active Problems:    Hypotension, unspecified hypotension type      PENDING LABS     Unresulted Labs Ordered in the Past 30 Days of this Admission     No orders found from 10/17/2022 to 11/17/2022.            PROCEDURES ( this hospitalization only)          RECOMMENDATIONS TO OUTPATIENT PROVIDER FOR F/U VISIT     Follow-up Appointments     Follow-up and recommended labs and tests       Follow up with primary care provider, Physician No Ref-Primary, within 7   days for hospital follow- up.  The following labs/tests are recommended:   cbc, bmp in a week.                 DISPOSITION     Home    SUMMARY OF HOSPITAL COURSE:    Modesto Barbosa is a 54-year-old man with PMH of CKD stage V on peritoneal dialysis, DMII, hypertension and CAD who presented with generalized body weakness.  Initial investigation for possible infectious etiology grossly negative however initial troponin showed elevated troponin level with nonspecific changes on the EKG [T wave abnormality].   Elevated troponin to rule out etiology + CAD  DAPT resumed + Nitroglycerin as needed for ischemic chest pain  Resume other home CAD medications   TTE with estimated left ventricular ejection fraction of 60 to 60%.    Cardiology consult appreciated.  Now has signed off.  -Recommending to consider decreasing dose of carvedilol if  patient is having recurrent hypotension.   Generalized body weakness   Initial sepsis work-up/infectious etiology results appreciated  Urinalysis and possible urine culture to rule out possible UTI  Fall precautions + serial blood pressure monitoring in view of reported hypotension  No clear sign of infection has been found during observation time.  CKD stage V on peritoneal dialysis + DMII    Nephrology evaluation for resumption of peritoneal hemodialysis  Electrolyte monitoring and replacement  Monitor I/O   Long-acting insulin presently on hold in view of poor appetite   SSI regimen for glycemic control  Nephrology consultation appreciated.  Now has signed off    Hypertension   Cautious reintroduction of antihypertensive medication with reported hypotensive episodes  Will resume home PTA antihypertensive medications as BP tolerates   Prolonged QT interval  Continue cardiac monitoring as above  Electrolyte monitoring and replacement   Hyponatremia  Chronic, continue to monitor   Hypokalemia  -Replace per protocol, continue peritoneal dialysis and electrolyte    Since clinically stable enough for discharge.  Cleared by cardiology and nephrology.  Patient will follow with his primary care provider and nephrology as an outpatient.      Discharge Medications with Med changes:     Current Discharge Medication List      START taking these medications    Details   cefdinir (OMNICEF) 300 MG capsule Take 1 capsule (300 mg) by mouth daily for 7 days  Qty: 7 capsule, Refills: 0    Associated Diagnoses: Systemic infection (H)         CONTINUE these medications which have CHANGED    Details   hydrALAZINE (APRESOLINE) 50 MG tablet Take 0.5 tablets (25 mg) by mouth 3 times daily for 30 days  Qty: 45 tablet, Refills: 0    Associated Diagnoses: Essential hypertension         CONTINUE these medications which have NOT CHANGED    Details   acetaminophen (TYLENOL) 500 MG tablet Take 500 mg by mouth every 6 hours as needed for mild  "pain      aspirin (ASA) 81 MG EC tablet Take 1 tablet (81 mg) by mouth daily Start tomorrow.  Qty: 30 tablet, Refills: 3    Associated Diagnoses: Pre-transplant evaluation for kidney transplant; Essential hypertension      atorvastatin (LIPITOR) 40 MG tablet Take 1 tablet (40 mg) by mouth daily  Qty: 90 tablet, Refills: 3    Associated Diagnoses: Pre-transplant evaluation for kidney transplant; Essential hypertension      calcitRIOL (ROCALTROL) 0.25 MCG capsule Take 0.5 mcg by mouth daily      calcium carbonate 1250 (500 Ca) MG CHEW Take 500 mg by mouth      carvedilol (COREG) 25 MG tablet Take 1 tablet (25 mg) by mouth 2 times daily (with meals)  Qty: 270 tablet, Refills: 3    Comments: Dose decreased to 25 mg twice a day on 12/16 due to low BP  Associated Diagnoses: Essential hypertension      docusate sodium (COLACE) 100 MG capsule Take 100 mg by mouth 2 times daily as needed      insulin aspart (NOVOLOG FLEXPEN) 100 UNIT/ML pen Inject 10 Units Subcutaneous 3 times daily (with meals)      insulin detemir (LEVEMIR PEN) 100 UNIT/ML pen Inject 20 Units Subcutaneous At Bedtime      nitroGLYcerin (NITROSTAT) 0.4 MG sublingual tablet One tablet under the tongue every 5 minutes if needed for chest pain. May repeat every 5 minutes for a maximum of 3 doses in 15 minutes\"  Qty: 25 tablet, Refills: 3    Associated Diagnoses: Pre-transplant evaluation for kidney transplant; Essential hypertension      ticagrelor (BRILINTA) 90 MG tablet Take 1 tablet (90 mg) by mouth 2 times daily Start tomorrow morning.  Qty: 180 tablet, Refills: 3    Associated Diagnoses: Pre-transplant evaluation for kidney transplant; Essential hypertension      traZODone (DESYREL) 50 MG tablet Take 50 mg by mouth At Bedtime                   Rationale for medication changes:              Consults       CARE MANAGEMENT / SOCIAL WORK IP CONSULT  CARDIAC REHAB IP CONSULT  CARDIOLOGY IP CONSULT  NEPHROLOGY IP CONSULT  SMOKING CESSATION PROGRAM IP " CONSULT    Immunizations given this encounter     Most Recent Immunizations   Administered Date(s) Administered     COVID-19,PF,Moderna 04/14/2021           Anticoagulation Information      Recent INR results:   Recent Labs   Lab 11/17/22  0916   INR 1.16*     Warfarin doses (if applicable) or name of other anticoagulant:       SIGNIFICANT IMAGING FINDINGS     Results for orders placed or performed during the hospital encounter of 11/16/22   XR Chest Port 1 View    Impression    IMPRESSION: No pneumothorax or pleural effusion. No focal consolidation. Low lung volumes. Cardiac silhouette within normal limits. Chronic right rib fractures.   Echocardiogram Complete   Result Value Ref Range    LVEF  60-65%        SIGNIFICANT LABORATORY FINDINGS     Most Recent 3 CBC's:Recent Labs   Lab Test 11/18/22  0508 11/17/22  0916 11/16/22  1908   WBC 6.0 7.1 10.0   HGB 9.4* 10.4* 13.0*   MCV 83 84 83   PLT 81* 85* 120*     Most Recent 3 BMP's:Recent Labs   Lab Test 11/18/22  1203 11/18/22  0756 11/18/22  0508 11/17/22  1113 11/17/22  0916 11/17/22  0110 11/16/22  1908   NA  --   --  126*  --  129*  --  127*   POTASSIUM  --   --  3.3*  --  3.3*  --  3.1*   CHLORIDE  --   --  90*  --  91*  --  85*   CO2  --   --  21*  --  23  --  23   BUN  --   --  60.0*  --  53.1*  --  49.8*   CR  --   --  10.67*  --  9.20*  --  9.44*   ANIONGAP  --   --  15  --  15  --  19*   REYMUNDO  --   --  6.9*  --  7.0*  --  7.8*   * 170* 117*   < > 113*   < > 242*    < > = values in this interval not displayed.     Most Recent 2 LFT's:Recent Labs   Lab Test 11/16/22  1908 01/19/22  1144 08/23/21  1243   AST 16 23 18   ALT 16 34 26   ALKPHOS 68  --  45   BILITOTAL 0.6  --  0.4     Most Recent 3 INR's:Recent Labs   Lab Test 11/17/22  0916 12/06/21  0734 10/06/21  1508   INR 1.16* 1.01 1.12     Most Recent INR's and Anticoagulation Dosing History:  Anticoagulation Dose History     Recent Dosing and Labs Latest Ref Rng & Units 6/11/2021 8/23/2021 10/6/2021  12/6/2021 11/17/2022    INR 0.85 - 1.15 1.27(H) 1.25(H) 1.12 1.01 1.16(H)        Most Recent 3 Creatinines:Recent Labs   Lab Test 11/18/22  0508 11/17/22  0916 11/16/22  1908   CR 10.67* 9.20* 9.44*     Most Recent 3 Hemoglobins:Recent Labs   Lab Test 11/18/22  0508 11/17/22  0916 11/16/22  1908   HGB 9.4* 10.4* 13.0*     Most Recent 3 Troponin's:No lab results found.  Most Recent 3 BNP's:No lab results found.        Discharge Orders        Reason for your hospital stay    hypotension     Follow-up and recommended labs and tests     Follow up with primary care provider, Physician No Ref-Primary, within 7 days for hospital follow- up.  The following labs/tests are recommended: cbc, bmp in a week.     Activity    Your activity upon discharge: activity as tolerated     Diet    Follow this diet upon discharge: Orders Placed This Encounter      Renal Diet (dialysis)       Examination   Physical Exam   Temp:  [97.7  F (36.5  C)-98.4  F (36.9  C)] 98  F (36.7  C)  Pulse:  [66-88] 71  Resp:  [12-45] 20  BP: ()/(50-79) 112/57  SpO2:  [95 %-99 %] 96 %  Wt Readings from Last 1 Encounters:   11/18/22 59 kg (130 lb 1.6 oz)       GEN: Alert and oriented. Not in acute distress  HEENT: Mucous membrane- moist and pink  CHEST: Clear to auscultation bilaterally  HEART: S1S2 regular. No murmurs, rubs or gallops  ABDOMEN: Soft. Non-tender, non-distended. No organomegaly.  Bowel sounds- active.  Peritoneal dialysis catheter in place  Extremities: No pedal oedema  CNS: No focal neurological deficit. No involuntary movements  SKIN: No skin rash, no cyanosis or clubbing      Please see EMR for more detailed significant labs, imaging, consultant notes etc.    Harjinder SALAS MD, personally saw the patient today and spent greater than 30 minutes discharging this patient.    Harjinder Castellanos MD  [facility]    CC:No Ref-Primary, Physician

## 2022-11-18 NOTE — PROGRESS NOTES
RE evening  BP I did give hydralazine late as at first was only 113/66  recheck of BP showed it went back up, to 169/79.   Pt. Aware we want a urine sample, but has just voided on evenings when I brought cup in there.

## 2022-11-18 NOTE — PROGRESS NOTES
"Occupational Therapy/Cardiac Rehab     11/18/22 0900   Appointment Info   Signing Clinician's Name / Credentials (OT) Jeanineameya Hernández OTR/L       Present yes  (pt requesting ; used language line)   Language Hmong   Living Environment   People in Home spouse   Current Living Arrangements house   Home Accessibility stairs to enter home;stairs within home   Number of Stairs, Main Entrance 2   Stair Railings, Main Entrance none   Number of Stairs, Within Home, Primary six   Stair Railings, Within Home, Primary railing on right side (ascending)   Self-Care   Usual Activity Tolerance good   Current Activity Tolerance fair  (low BP limiting further activity-pt denied symptoms)   Equipment Currently Used at Home none   Activity/Exercise/Self-Care Comment prior to admit, independent w/ADLs & amb w/o AD.   General Information   Onset of Illness/Injury or Date of Surgery 11/16/22   Referring Physician Carrie Nunes   Patient/Family Therapy Goal Statement (OT) none stated   Existing Precautions/Restrictions fall   General Observations and Info Per chart:  \"54-year-old man with PMH of CKD stage V on peritoneal dialysis, DMII, hypertension and CAD who presented with generalized body weakness.  Initial investigation for possible infectious etiology grossly negative however initial troponin showed elevated troponin level with nonspecific changes on the EKG.\"   Pain Assessment   Patient Currently in Pain No   Posture   Posture not impaired   Range of Motion Comprehensive   General Range of Motion no range of motion deficits identified   Strength Comprehensive (MMT)   General Manual Muscle Testing (MMT) Assessment no strength deficits identified   Bed Mobility   Comment (Bed Mobility) independent   Transfers   Transfer Comments SBA/I-only SBA d/t low BP (denies symptoms)   Balance   Balance Comments SBA amb w/o AD-10 steps in room.   Activities of Daily Living   BADL Assessment/Intervention lower " body dressing   Lower Body Dressing Assessment/Training   Comment, (Lower Body Dressing) independent w/donning socks   Clinical Impression   Criteria for Skilled Therapeutic Interventions Met (OT) Yes, treatment indicated   OT Diagnosis decreased activity tolerance   OT Problem List-Impairments impacting ADL activity tolerance impaired   Assessment of Occupational Performance 1-3 Performance Deficits   Planned Therapy Interventions (OT) progressive activity/exercise;home program guidelines   Clinical Decision Making Complexity (OT) low complexity   Anticipated Equipment Needs Upon Discharge (OT)   (none @ this time)   Risk & Benefits of therapy have been explained evaluation/treatment results reviewed;care plan/treatment goals reviewed;risks/benefits reviewed;current/potential barriers reviewed;participants voiced agreement with care plan;patient   OT Total Evaluation Time   OT Eval, Low Complexity Minutes (79181) 10   OT Goals   Therapy Frequency (OT) 2 times/day   OT Predicted Duration/Target Date for Goal Attainment 11/19/22   OT Goals Cardiac Phase 1   OT: Perform aerobic activity with stable cardiovascular response continuous;10 minutes   OT: Functional/aerobic ambulation tolerance with stable cardiovascular response in order to return to home and community environment Supervision/SBA;Greater than 300 feet   OT: Navigation of stairs simulating home set up with stable cardiovascular response in order to return to home and community environment Supervision/SBA;6 stairs   Interventions   Interventions Quick Adds Cardiac Rehab;Therapeutic Procedures/Exercise   Therapeutic Procedures/Exercise   Therapeutic Procedure: strength, endurance, ROM, flexibillity minutes (34175) 10   Symptoms Noted During/After Treatment none  (denies symptoms-updated pt's nurse; will wait on further amb & stairs; check status later today.)   Treatment Detail/Skilled Intervention see amb   Treatment Time Includes (CR Only) Monitoring of  vital signs (see vital signs flowsheet for details)   Ambulation   Workload standing up to 5 minutes & amb in room ~40 ft   Symptoms Denies symptoms   Cardiovascular Response Hypotension at rest  (still low BP with act.)   Exercise Details SBA amb in room-with BP recheck (notified pt's nurse)   Vital Signs Details pre act:  sitting:  HR 75, 98% on RA, BP 79/50. Standing & light amb in room:  HR 77, BP 83/51.   OT Discharge Planning   OT Plan hypotensive but asymptomatic-longer walks & stairs   OT Discharge Recommendation (DC Rec) home with assist   OT Rationale for DC Rec home w/family assist as provided PRN prior; pt has spouse @ home & states he is never home alone.   OT Brief overview of current status activity limited by hypotension-SBA mob, independent trfs & bed mob.   Total Session Time   Timed Code Treatment Minutes 10   Total Session Time (sum of timed and untimed services) 20

## 2022-11-18 NOTE — PROGRESS NOTES
PRIMARY DIAGNOSIS: GENERALIZED WEAKNESS actual hypo BP and needs edwige Dialysis     OUTPATIENT/OBSERVATION GOALS TO BE MET BEFORE DISCHARGE  1. Orthostatic performed: no                2. Tolerating PO medications: Yes    3. Return to near baseline physical activity: Yes    4. Cleared for discharge by consultants (if involved): No    Discharge Planner Nurse   Safe discharge environment identified: Yes  Barriers to discharge: Yes       Entered by: Rosalina Quezada RN 11/18/2022 12:05 AM     Please review provider order for any additional goals.   Nurse to notify provider when observation goals have been met and patient is ready for discharge.

## 2022-11-18 NOTE — PROGRESS NOTES
[facility]      OUTPATIENT OCCUPATIONAL THERAPY  EVALUATION  PLAN OF TREATMENT FOR OUTPATIENT REHABILITATION  (COMPLETE FOR INITIAL CLAIMS ONLY)  Patient's Last Name, First Name, M.I.  YOB: 1965  Modesto Barbosa  JOE                          Provider's Name  [facility] Medical Record No.  [id number]                               Onset Date:  11/16/22   Start of Care Date:        Type:     ___PT   _X_OT   ___SLP Medical Diagnosis:                           OT Diagnosis:  decreased activity tolerance   Visits from SOC:  1   _________________________________________________________________________________  Plan of Treatment/Functional Goals    Planned Interventions: progressive activity/exercise, home program guidelines   Goals: See Occupational Therapy Goals on Care Plan in [facility] electronic health record.    Therapy Frequency: 2 times/day  Predicted Duration of Therapy Intervention: 11/19/22  _________________________________________________________________________________    I CERTIFY THE NEED FOR THESE SERVICES FURNISHED UNDER        THIS PLAN OF TREATMENT AND WHILE UNDER MY CARE     (Physician co-signature of this document indicates review and certification of the therapy plan).              Certification date from: (P) 11/18/22,      Referring Physician: Carrie Nunes            Initial Assessment        See Occupational Therapy evaluation dated   in Epic electronic health record.

## 2022-11-18 NOTE — PROGRESS NOTES
Patient /57 this morning, gave medications. After working with therapy, blood pressure was soft. Denies dizziness and asymptomatic. MD updated. Will re-check BP in about half hour to ensure it doesn't drop further.    BP recheck was 109/59. MD updated. Plan for discharge today

## 2022-11-18 NOTE — PROGRESS NOTES
Chart reviewed including vitals and labs.  No acute, emergency indications for PD to be done w/ cycler tonight. PD will be planned to be set up tomorrow evening if patient remains an inpatient.  Dialysis RN ashley.   LD Reyes MD

## 2022-12-08 ENCOUNTER — DOCUMENTATION ONLY (OUTPATIENT)
Dept: TRANSPLANT | Facility: CLINIC | Age: 57
End: 2022-12-08

## 2022-12-09 ENCOUNTER — DOCUMENTATION ONLY (OUTPATIENT)
Dept: TRANSPLANT | Facility: CLINIC | Age: 57
End: 2022-12-09

## 2022-12-09 ENCOUNTER — TELEPHONE (OUTPATIENT)
Dept: TRANSPLANT | Facility: CLINIC | Age: 57
End: 2022-12-09

## 2022-12-09 DIAGNOSIS — Z76.82 ORGAN TRANSPLANT CANDIDATE: ICD-10-CM

## 2022-12-09 DIAGNOSIS — N18.6 ESRD (END STAGE RENAL DISEASE) (H): ICD-10-CM

## 2022-12-09 DIAGNOSIS — Z12.5 PROSTATE CANCER SCREENING: ICD-10-CM

## 2022-12-09 NOTE — TELEPHONE ENCOUNTER
Called pt to introduce myself as RNCC. Pt just needs update PRA/PSA and cards clearance to move forward with active status consideration. Will send message to Dr Katz and pt to make appt for lab. Will follow up once everything is complete. Pt verbalized understanding of information and has no further questions. Encouraged to reach out if questions arise.

## 2022-12-19 ENCOUNTER — LAB (OUTPATIENT)
Dept: LAB | Facility: CLINIC | Age: 57
End: 2022-12-19
Payer: MEDICARE

## 2022-12-19 DIAGNOSIS — N18.6 ESRD (END STAGE RENAL DISEASE) (H): ICD-10-CM

## 2022-12-19 DIAGNOSIS — Z76.82 ORGAN TRANSPLANT CANDIDATE: ICD-10-CM

## 2022-12-19 DIAGNOSIS — Z12.5 PROSTATE CANCER SCREENING: ICD-10-CM

## 2022-12-19 LAB — PSA SERPL-MCNC: 1.13 NG/ML (ref 0–3.5)

## 2022-12-19 PROCEDURE — 86832 HLA CLASS I HIGH DEFIN QUAL: CPT

## 2022-12-19 PROCEDURE — G0103 PSA SCREENING: HCPCS

## 2022-12-19 PROCEDURE — 36415 COLL VENOUS BLD VENIPUNCTURE: CPT

## 2022-12-19 PROCEDURE — 86833 HLA CLASS II HIGH DEFIN QUAL: CPT

## 2022-12-21 LAB
PROTOCOL CUTOFF: NORMAL
SA 1 CELL: NORMAL
SA 1 TEST METHOD: NORMAL
SA 2 CELL: NORMAL
SA 2 TEST METHOD: NORMAL
SA1 HI RISK ABY: NORMAL
SA1 MOD RISK ABY: NORMAL
SA2 HI RISK ABY: NORMAL
SA2 MOD RISK ABY: NORMAL
UNACCEPTABLE ANTIGENS: NORMAL
UNOS CPRA: 0
ZZZSA 1  COMMENTS: NORMAL
ZZZSA 2 COMMENTS: NORMAL

## 2023-01-16 ENCOUNTER — OFFICE VISIT (OUTPATIENT)
Dept: CARDIOLOGY | Facility: CLINIC | Age: 58
End: 2023-01-16
Attending: INTERNAL MEDICINE
Payer: COMMERCIAL

## 2023-01-16 VITALS
OXYGEN SATURATION: 97 % | SYSTOLIC BLOOD PRESSURE: 158 MMHG | HEART RATE: 69 BPM | WEIGHT: 135 LBS | RESPIRATION RATE: 16 BRPM | DIASTOLIC BLOOD PRESSURE: 66 MMHG | HEIGHT: 64 IN | BODY MASS INDEX: 23.05 KG/M2

## 2023-01-16 DIAGNOSIS — I25.10 CORONARY ARTERY DISEASE DUE TO LIPID RICH PLAQUE: Primary | ICD-10-CM

## 2023-01-16 DIAGNOSIS — I42.1 IHSS (IDIOPATHIC HYPERTROPHIC SUBAORTIC STENOSIS) (H): ICD-10-CM

## 2023-01-16 DIAGNOSIS — I25.83 CORONARY ARTERY DISEASE DUE TO LIPID RICH PLAQUE: Primary | ICD-10-CM

## 2023-01-16 PROCEDURE — 99214 OFFICE O/P EST MOD 30 MIN: CPT | Performed by: INTERNAL MEDICINE

## 2023-01-16 NOTE — LETTER
1/16/2023    Physician No Ref-Primary  No address on file    RE: Nhia JOE Chelsey       Dear Colleague,     I had the pleasure of seeing Modesto Barbosa in the [facility].    HEART CARE ENCOUNTER CONSULTATON NOTE      [facility]  [phone]      Assessment/Recommendations   Assessment/Plan:  1.  Asymmetric left ventricular hypertrophy.  Clinically without symptoms and stable.  Previously discussed the importance of screening all first-degree relatives.  He does not appear to have high risk features for cardiovascular sudden death.  No prior history of sustained ventricular arrhythmias with a Holter monitor 1 year ago approximately.  He will continue to monitor for symptoms.    2.  Coronary artery disease.  Coronary angiogram was pursued as part of a renal transplant work-up November 2021.  He was found to have significant stenosis of a larger obtuse marginal branch with successful stenting with a Synergy stent.  He is now 1 year out.  He had moderate disease in the LAD and moderate disease in the right coronary artery and major branches.  He is on Brilinta and aspirin and we are now at a point where we can consider discontinuation of Brilinta.  Plan for an exercise stress echocardiogram given the hospitalization November 2022 and the upcoming requirement for listing for renal transplantation.    3.  Dyslipidemia on 40 mg of atorvastatin.  Most recent lipids are from November at which time total cholesterol is 122, LDL 53.    4.  Listing for renal transplantation.  Pending the stress echocardiogram would recommend that we discontinue Brilinta and then he can be cleared from a cardiovascular standpoint and will comment once the results are available.    5.  Hypertension.  Blood pressures to trend high at times and at times low.  This is being monitored by his nephrology team.    Plan exercise stress echocardiogram with further decisions regarding Brilinta pending.  Follow-up 6  months         History of Present Illness/Subjective    HPI: Modesto Barbosa is a 57 year old male who is seen in follow-up.  Chart records indicate the patient was in hospital in November.  He was seen by my colleague Dr. Phillips.  Elevated troponin was felt to be chronic and related to end-stage renal disease with possible demand ischemia in the setting of hypotension.  Chart notes indicate that there was less concern about acute coronary syndrome.  Patient has undergone prior drug-eluting stent to an obtuse marginal branch in December 2021.  He is not describing angina.  He does have a history of hypertrophic cardiomyopathy.  Low risk phenotype with no evidence of ventricular arrhythmias and maximal septal wall thickness of 19 mm.  He has end-stage renal disease felt likely related to diabetic neuropathy.  Echocardiogram completed November 2022 reveals normal ejection fraction of 60 to 65%.  Basal septal hypertrophy with mild resting LV outflow tract gradient.  Cardiac cath was completed in November 2021 which revealed mild calcification of the left anterior descending with moderate mid LAD disease and no significant diagonal disease.  Circumflex was described as having a small OM 2 and a large branching OM 3 with mild disease proximally but severe stenosis in the large more anterior branch.  This was stented with a drug-eluting stent.  Moderate size RCA with mild proximal disease and moderate disease in the proximal PDA.  Patient did have a cardiac MRI in October 2021 that demonstrated asymmetric anterior septal hypertrophy with hyperdynamic left ventricular systolic function and moderate chordal systolic anterior motion.  Holter monitor in October 2021 reported rare ectopy..  Lipid results from 2 months ago finds a total cholesterol 122, triglycerides 234 with an LDL of 53.    His son access the  per his request.  Patient denies any chest pain, shortness of breath, dizziness, fainting, syncope or near  syncope.  He denies any awareness of palpitations.  He does monitor his blood pressure regularly and typically is in the 1 40-1 50 systolic range when he takes his blood pressure before his medications.  He has close correspondence with the nephrology team relating to blood pressures.  He has had some lower blood pressures in the past that resulted in some lightheadedness.  He states that this is improved although he will get some occasional lightheadedness.    Recent Echocardiogram Results:  Echocardiogram Complete  Order: [id number]   Status: Edited Result - FINAL      Visible to patient: Yes (seen)      Next appt: 2023 at 08:20 AM in Cardiology (Alfred Katz MD)      0 Result Notes  Details    Reading Physician Reading Date Result Priority   Jose C Noonan MD  [phone] 2022      Narrative & Impression  [id number]  [id number]  [id number]  [id number]^MARCELLE^[place]ELANI^[place] [address]     Name: MARICRUZ CHAVES  MRN: [id number]  : 1965  Study Date: 2022 12:23 PM  Age: 56 yrs  Gender: Male  Patient Location: [facility]  Reason For Study: Syncope  Ordering Physician: JELANI IBANEZ  Performed By: LUCIE     BSA: 1.6 m2  Height: 63 in  Weight: 124 lb  HR: 75  BP: 97/55 mmHg  ______________________________________________________________________________  Procedure  Complete Portable Echo Adult. Definity (NDC #71228-433) given intravenously.  ______________________________________________________________________________  Interpretation Summary     Left ventricular size, wall motion and function are normal. The ejection  fraction is 60-65%.  There is systolic anterior motion of the chordal apparatus.  Basal septal hyperthrophy with mild resting LVOT gradient c/w HOCM.  Normal right ventricle size and systolic function.  There is mild (1+) mitral regurgitation.  ______________________________________________________________________________  Left  Ventricle  Left ventricular size, wall motion and function are normal. The ejection  fraction is 60-65%. Basal septal hyperthrophy with mild resting LVOT gradient  c/w HOCM. Left ventricular diastolic function is normal. No regional wall  motion abnormalities noted.     Right Ventricle  Normal right ventricle size and systolic function.     Atria  The left atrium is mildly dilated. Right atrial size is normal. There is no  color Doppler evidence of an atrial shunt.     Mitral Valve  Mitral valve leaflets appear normal. There is systolic anterior motion of the  chordal apparatus. There is mild (1+) mitral regurgitation.     Tricuspid Valve  Tricuspid valve leaflets appear normal. Right ventricle systolic pressure  estimate normal. There is trace to mild tricuspid regurgitation.     Aortic Valve  Aortic valve leaflets appear normal. There is no evidence of aortic stenosis  or clinically significant aortic regurgitation.     Pulmonic Valve  The pulmonic valve is not well seen, but is grossly normal. This degree of  valvular regurgitation is within normal limits.     Vessels  The aorta root is normal. Normal size ascending aorta. IVC diameter <2.1 cm  collapsing >50% with sniff suggests a normal RA pressure of 3 mmHg.     Pericardium  There is no pericardial effusion.         Recent Coronary Angiogram Results:  Cristela Banks MD (Primary) Alfred Katz MD Garr, Michael D, MD     Procedures    Panel 1    Primary Surgeon:  Cristela Banks MD   Procedure:  Coronary Angiogram    Percutaneous Coronary Intervention    Left Heart Cath        Indications    Hypertrophic cardiomyopathy (H) I42.2 (ICD-10-CM)   ESRD (end stage renal disease) on dialysis (H) N18.6, Z99.2 (ICD-10-CM)   Type 2 diabetes mellitus with other circulatory complication, unspecified whether long term insulin use (H) E11.59 (ICD-10-CM)   Benign essential hypertension I10 (ICD-10-CM)   Mixed hyperlipidemia E78.2 (ICD-10-CM)     Pre Procedure  Diagnosis    suspected CADpre-operative evaluation       Conclusion      Patient with ESRD on peritoneal dialysis, undergoing a transplant work up. He also has diabetes, uncontrolled hypertension and hyperlipidemia.    Normal left main.    The LAD has diffuse mild calcification with a mild proximal disease and moderate mid LAD disease. There is no significant diagonal disease.    The circumflex has a very small OM-2, a small OM-2 and a large branching OM-3 with mild disease proximally and a severe stenosis in the larger, more anterior branch. This was stented with a Synergy SHAVONNE with good angiographic results.    Moderate sized RCA with mild proximal disease and moderate disease in both the proximal PDA and right AV groove branch prior to the PL branches.    LV EDP 13 mmhg. LV-Ao pullback gradient 17 mmHg.         Recommendations    General Recommendations:  - Follow up visit with Nurse Practitioner in 1-2 weeks.   - Recommended follow up with doctor Dr. Katz in 6 weeks.   - Arterial sheath removed from radial artery with TR Band placement.   - Recommend cardiac rehabilitation.        HEIGHT: 64.02 in    (162.60 cm)  WEIGHT: 132.21 lbs    (59.97 kgs)  BSA: 1.64 m^2     FINAL IMPRESSION   ==========================================================================================================     Asymmetric anteroseptal hypertrophy with moderate LVOT outflow tract obstruction.      SUMMARY   ==========================================================================================================     Non-contrast Cardiac MRI     1.  Small left ventricular cavity related to hypertrophy with hyperdynamic left ventricular systolic  function.  There is asymmetric anteroseptal hypertrophy with a maximal diameter of 19 mm with evidence of  left ventricular outflow tract obstruction.  There is moderate chordal MINA. The quantified left ventricular  ejection fraction is 80.7%.    2.  Normal right ventricular size and systolic  function.    3.  Aortic valve sclerosis without stenosis.  Mild aortic regurgitation.    4.  Mild mitral regurgitation.           5. Moderate left atrial enlargement.    ==========================================================================================================  REPORT FINDINGS:     LEFT VENTRICLE: LV wall thickness is normal. LV cavity size is normal. LV systolic function is normal. There is no LV  mass/thrombus. LV systolic function is normal. LV systolic function is normal. Quantitative LVEF 80.7 %.     RIGHT VENTRICLE: RV wall thickness is normal. RV cavity size is normal. RV systolic function is normal. There is no RV  mass/thrombus.     LA/RA SEPTUM: The atrial septum is intact. The atrial septum is intact. The atrial septum is intact.     LEFT ATRIUM: LA is moderately enlarged.     RIGHT ATRIUM: RA cavity size is normal.     PERICARDIUM: Pericardium is normal. There is a trivial pericardial effusion.     PLEURAL EFFUSION: There is no pleural effusion.     AORTIC VALVE: The aortic valve annulus is normal in size. Aortic valve is trileaflet. There is mild aortic regurgitation.  There is no aortic stenosis. Aortic valve is mildly thickened. Peak aortic valve velocity 149.5 cm/sec.  Aortic regurgitant volume 4.9 ml. Aortic regurgitant fraction 6.6 %.     MITRAL VALVE: The mitral valve annulus is normal in size. Mitral valve leaflets are normal. There is mild mitral  regurgitation. There is no mitral stenosis.      TRICUSPID VALVE: The tricuspid valve annulus is normal in size. Tricuspid valve leaflets are normal. There is mild tricuspid  regurgitation. There is no tricuspid stenosis.      PULMONIC VALVE: The pulmonic valve annulus is normal in size. Pulmonic valve leaflets are normal. There is no pulmonic  regurgitation. There is no pulmonic stenosis.      AORTIC ROOT: The aortic root is normal.     CHEST:  Normal thoracic aortic size and its major branches.      ABDOMEN: Abdominal Ascites  noted      Scan on 10/14/2021 11:11 AM        Conclusion    [facility]     HOLTER REPORT     Results:    Indication for study: Evaluate for cardiomyopathy    A 24-hour Holter monitor was applied October 12, 2021 with date of interpretation October 14, 2021    Baseline tracing showed sinus rhythm with normal electrocardiographic intervals.  Average heart rate 75 bpm range between 59-98 bpm    No bradycardia, no pauses    Very rare ectopy with only 1 PVC, 1 PAC    Patient activity diary not returned    Conclusion    Normal monitor     stable heart rhythm pattern        Comment: The recording was for 23 hrs and 59 min.  The recording quality was adequate.     INDICATION: Chronic kidney disease, stage V (H), Pre-transplant evaluation for kidney transplant, Type 2 diabetes mellitus with diabetic nephropathy, without long-term current use of insulin (H), Essential hypertension  COMPARISON: CT from 10/03/2021.  TECHNIQUE: Transverse and longitudinal images of the aorta. Color flow and spectral Doppler with waveform analysis.     FINDINGS:     MEASUREMENTS:  Abdominal Aorta:   Distal: 1.3 cm AP   Right Common Iliac Artery: 0.7 cm AP  Left Common Iliac Artery: 0.7 cm AP      VELOCITIES:  Right common iliac artery proximally: 281  Right common iliac artery distally: 246-319  Right external iliac artery: 165-227  Left common iliac artery proximally: 132  Left common iliac artery distally: 242  Left external iliac artery : 65-90                                                                      CONCLUSION:  1.  No abdominal aorta or common iliac artery aneurysm.   2.  Brisk triphasic waveforms within the common iliac and external iliac arteries bilaterally without evidence of a hemodynamically significant lesion.     Physical Examination  Review of Systems   Vitals: 158/66, repeat 150/64, weight 135 pounds, heart rate of 69 and regular.  Wt Readings from Last 3 Encounters:   11/18/22 59 kg (130 lb 1.6 oz)    06/02/22 62.4 kg (137 lb 9.6 oz)   06/01/22 61.7 kg (136 lb)       General Appearance:   no distress, normal body habitus   ENT/Mouth:  Wearing a facemask.      EYES:  no scleral icterus, normal conjunctivae   Neck: no carotid bruits or thyromegaly   Chest/Lungs:   lungs are clear to auscultation, no rales or wheezing,, equal chest wall expansion    Cardiovascular:   Regular. Normal first and second heart sounds with no murmurs, rubs, S4; the carotid, radial and posterior tibial pulses are intact, Jugular venous pressure within normal limits, trace ankle edema bilaterally    Abdomen:  no bruits, or tenderness; bowel sounds are present   Extremities: no cyanosis or clubbing   Skin: no xanthelasma, warm.    Neurologic: l, no tremors     Psychiatric: alert and oriented x3, calm        Please refer above for cardiac ROS details.        Medical History  Surgical History Family History Social History   Past Medical History:   Diagnosis Date     Acute kidney injury (H) 6/10/2021     CKD (chronic kidney disease) stage 5, GFR less than 15 ml/min (H)      Dyslipidemia      Metabolic acidosis      Type 2 diabetes mellitus (H)      Past Surgical History:   Procedure Laterality Date     CV CORONARY ANGIOGRAM N/A 12/6/2021    Procedure: Coronary Angiogram;  Surgeon: Cristela Banks MD;  Location: [facility] CV     CV LEFT HEART CATH N/A 12/6/2021    Procedure: Left Heart Cath;  Surgeon: Cristela Banks MD;  Location: [facility] CV     CV PCI N/A 12/6/2021    Procedure: Percutaneous Coronary Intervention;  Surgeon: Cristela Banks MD;  Location: [facility] CATH LAB CV     Family History   Problem Relation Age of Onset     Diabetes Type 2  Mother      Other - See Comments Daughter         granular cell tumor of thigh     Heart Disease Other         Social History     Socioeconomic History     Marital status: Patient Declined     Spouse name: Not on file     Number of children: Not on file     Years of education:  Not on file     Highest education level: Not on file   Occupational History     Not on file   Tobacco Use     Smoking status: Never     Smokeless tobacco: Never   Vaping Use     Vaping Use: Not on file   Substance and Sexual Activity     Alcohol use: Not Currently     Drug use: Never     Sexual activity: Not on file   Other Topics Concern     Parent/sibling w/ CABG, MI or angioplasty before 65F 55M? Not Asked   Social History Narrative     Not on file     Social Determinants of Health     Financial Resource Strain: Not on file   Food Insecurity: Not on file   Transportation Needs: Not on file   Physical Activity: Not on file   Stress: Not on file   Social Connections: Not on file   Intimate Partner Violence: Not on file   Housing Stability: Not on file           Medications  Allergies   Current Outpatient Medications   Medication Sig Dispense Refill     acetaminophen (TYLENOL) 500 MG tablet Take 500 mg by mouth every 6 hours as needed for mild pain       aspirin (ASA) 81 MG EC tablet Take 1 tablet (81 mg) by mouth daily Start tomorrow. 30 tablet 3     atorvastatin (LIPITOR) 40 MG tablet Take 1 tablet (40 mg) by mouth daily 90 tablet 3     calcitRIOL (ROCALTROL) 0.25 MCG capsule Take 0.5 mcg by mouth daily       calcium carbonate 1250 (500 Ca) MG CHEW Take 500 mg by mouth daily       carvedilol (COREG) 25 MG tablet Take 1 tablet (25 mg) by mouth 2 times daily (with meals) 270 tablet 3     docusate sodium (COLACE) 100 MG capsule Take 100 mg by mouth 2 times daily as needed       hydrALAZINE (APRESOLINE) 50 MG tablet Take 0.5 tablets (25 mg) by mouth 3 times daily for 30 days 45 tablet 0     insulin aspart (NOVOLOG FLEXPEN) 100 UNIT/ML pen Inject 10 Units Subcutaneous 3 times daily (with meals)       insulin detemir (LEVEMIR PEN) 100 UNIT/ML pen Inject 20 Units Subcutaneous At Bedtime       nitroGLYcerin (NITROSTAT) 0.4 MG sublingual tablet One tablet under the tongue every 5 minutes if needed for chest pain. May  "repeat every 5 minutes for a maximum of 3 doses in 15 minutes\" 25 tablet 3     potassium chloride ER (KLOR-CON M) 10 MEQ CR tablet Take 1 tablet (10 mEq) by mouth daily for 360 days 30 tablet 11     potassium chloride ER (KLOR-CON M) 10 MEQ CR tablet Take 1 tablet (10 mEq) by mouth daily for 360 days 30 tablet 11     ticagrelor (BRILINTA) 90 MG tablet Take 1 tablet (90 mg) by mouth 2 times daily Start tomorrow morning. 180 tablet 3     traZODone (DESYREL) 50 MG tablet Take 50 mg by mouth At Bedtime       No Known Allergies       Lab Results    Chemistry/lipid CBC Cardiac Enzymes/BNP/TSH/INR   Recent Labs   Lab Test 11/17/22  0916   CHOL 122   HDL 22*   LDL 53   TRIG 234*     Recent Labs   Lab Test 11/17/22  0916 01/19/22  1144 12/06/21  0734   LDL 53 56 93     Recent Labs   Lab Test 11/18/22  1203 11/18/22  0756 11/18/22  0508   NA  --   --  126*   POTASSIUM  --   --  3.3*   CHLORIDE  --   --  90*   CO2  --   --  21*   *   < > 117*   BUN  --   --  60.0*   CR  --   --  10.67*   GFRESTIMATED  --   --  5*   REYMUNDO  --   --  6.9*    < > = values in this interval not displayed.     Recent Labs   Lab Test 11/18/22  0508 11/17/22  0916 11/16/22  1908   CR 10.67* 9.20* 9.44*     Recent Labs   Lab Test 08/23/21  1243 06/11/21  0728   A1C 5.6 5.7*          Recent Labs   Lab Test 11/18/22  0508   WBC 6.0   HGB 9.4*   HCT 27.5*   MCV 83   PLT 81*     Recent Labs   Lab Test 11/18/22  0508 11/17/22  0916 11/16/22  1908   HGB 9.4* 10.4* 13.0*    Recent Labs   Lab Test 06/10/21  1406   TROPONINI 0.04     No results for input(s): BNP, NTBNPI, NTBNP in the last 28271 hours.  No results for input(s): TSH in the last 98561 hours.  Recent Labs   Lab Test 11/17/22  0916 12/06/21  0734 10/06/21  1508   INR 1.16* 1.01 1.12        Alfred Katz MD                                        Thank you for allowing me to participate in the care of your patient.      Sincerely,     Alfred Katz MD     [facility]" [facility]  cc:   Alfred Katz MD  [address]  [address]  [place] [address]

## 2023-01-16 NOTE — PROGRESS NOTES
HEART CARE ENCOUNTER CONSULTATON NOTE      [facility]  [phone]      Assessment/Recommendations   Assessment/Plan:  1.  Asymmetric left ventricular hypertrophy.  Clinically without symptoms and stable.  Previously discussed the importance of screening all first-degree relatives.  He does not appear to have high risk features for cardiovascular sudden death.  No prior history of sustained ventricular arrhythmias with a Holter monitor 1 year ago approximately.  He will continue to monitor for symptoms.    2.  Coronary artery disease.  Coronary angiogram was pursued as part of a renal transplant work-up November 2021.  He was found to have significant stenosis of a larger obtuse marginal branch with successful stenting with a Synergy stent.  He is now 1 year out.  He had moderate disease in the LAD and moderate disease in the right coronary artery and major branches.  He is on Brilinta and aspirin and we are now at a point where we can consider discontinuation of Brilinta.  Plan for an exercise stress echocardiogram given the hospitalization November 2022 and the upcoming requirement for listing for renal transplantation.    3.  Dyslipidemia on 40 mg of atorvastatin.  Most recent lipids are from November at which time total cholesterol is 122, LDL 53.    4.  Listing for renal transplantation.  Pending the stress echocardiogram would recommend that we discontinue Brilinta and then he can be cleared from a cardiovascular standpoint and will comment once the results are available.    5.  Hypertension.  Blood pressures to trend high at times and at times low.  This is being monitored by his nephrology team.    Plan exercise stress echocardiogram with further decisions regarding Brilinta pending.  Follow-up 6 months         History of Present Illness/Subjective    HPI: Modesto Barbosa is a 57 year old male who is seen in follow-up.  Chart records indicate the patient was in hospital in November.  He  was seen by my colleague Dr. Phillips.  Elevated troponin was felt to be chronic and related to end-stage renal disease with possible demand ischemia in the setting of hypotension.  Chart notes indicate that there was less concern about acute coronary syndrome.  Patient has undergone prior drug-eluting stent to an obtuse marginal branch in December 2021.  He is not describing angina.  He does have a history of hypertrophic cardiomyopathy.  Low risk phenotype with no evidence of ventricular arrhythmias and maximal septal wall thickness of 19 mm.  He has end-stage renal disease felt likely related to diabetic neuropathy.  Echocardiogram completed November 2022 reveals normal ejection fraction of 60 to 65%.  Basal septal hypertrophy with mild resting LV outflow tract gradient.  Cardiac cath was completed in November 2021 which revealed mild calcification of the left anterior descending with moderate mid LAD disease and no significant diagonal disease.  Circumflex was described as having a small OM 2 and a large branching OM 3 with mild disease proximally but severe stenosis in the large more anterior branch.  This was stented with a drug-eluting stent.  Moderate size RCA with mild proximal disease and moderate disease in the proximal PDA.  Patient did have a cardiac MRI in October 2021 that demonstrated asymmetric anterior septal hypertrophy with hyperdynamic left ventricular systolic function and moderate chordal systolic anterior motion.  Holter monitor in October 2021 reported rare ectopy..  Lipid results from 2 months ago finds a total cholesterol 122, triglycerides 234 with an LDL of 53.    His son access the  per his request.  Patient denies any chest pain, shortness of breath, dizziness, fainting, syncope or near syncope.  He denies any awareness of palpitations.  He does monitor his blood pressure regularly and typically is in the 1 40-1 50 systolic range when he takes his blood pressure before his  medications.  He has close correspondence with the nephrology team relating to blood pressures.  He has had some lower blood pressures in the past that resulted in some lightheadedness.  He states that this is improved although he will get some occasional lightheadedness.    Recent Echocardiogram Results:  Echocardiogram Complete  Order: [id number]   Status: Edited Result - FINAL      Visible to patient: Yes (seen)      Next appt: 2023 at 08:20 AM in Cardiology (Alfred Katz MD)      0 Result Notes  Details    Reading Physician Reading Date Result Priority   Jose C Noonan MD  [phone] 2022      Narrative & Impression  [id number]  [id number]  [id number]  [id number]^MARCELLE^JELANI^[place] [address]     Name: MARICRUZ CHAVES  MRN: [id number]  : 1965  Study Date: 2022 12:23 PM  Age: 56 yrs  Gender: Male  Patient Location: [facility]  Reason For Study: Syncope  Ordering Physician: JELANI IBANEZ  Performed By: LUCIE     BSA: 1.6 m2  Height: 63 in  Weight: 124 lb  HR: 75  BP: 97/55 mmHg  ______________________________________________________________________________  Procedure  Complete Portable Echo Adult. Definity (NDC #46119-995) given intravenously.  ______________________________________________________________________________  Interpretation Summary     Left ventricular size, wall motion and function are normal. The ejection  fraction is 60-65%.  There is systolic anterior motion of the chordal apparatus.  Basal septal hyperthrophy with mild resting LVOT gradient c/w HOCM.  Normal right ventricle size and systolic function.  There is mild (1+) mitral regurgitation.  ______________________________________________________________________________  Left Ventricle  Left ventricular size, wall motion and function are normal. The ejection  fraction is 60-65%. Basal septal hyperthrophy with mild resting LVOT gradient  c/w HOCM. Left ventricular diastolic  function is normal. No regional wall  motion abnormalities noted.     Right Ventricle  Normal right ventricle size and systolic function.     Atria  The left atrium is mildly dilated. Right atrial size is normal. There is no  color Doppler evidence of an atrial shunt.     Mitral Valve  Mitral valve leaflets appear normal. There is systolic anterior motion of the  chordal apparatus. There is mild (1+) mitral regurgitation.     Tricuspid Valve  Tricuspid valve leaflets appear normal. Right ventricle systolic pressure  estimate normal. There is trace to mild tricuspid regurgitation.     Aortic Valve  Aortic valve leaflets appear normal. There is no evidence of aortic stenosis  or clinically significant aortic regurgitation.     Pulmonic Valve  The pulmonic valve is not well seen, but is grossly normal. This degree of  valvular regurgitation is within normal limits.     Vessels  The aorta root is normal. Normal size ascending aorta. IVC diameter <2.1 cm  collapsing >50% with sniff suggests a normal RA pressure of 3 mmHg.     Pericardium  There is no pericardial effusion.         Recent Coronary Angiogram Results:  Cristela Banks MD (Primary) Alfred Katz MD Garr, Michael D, MD     Procedures    Panel 1    Primary Surgeon:  Cristela Banks MD   Procedure:  Coronary Angiogram    Percutaneous Coronary Intervention    Left Heart Cath        Indications    Hypertrophic cardiomyopathy (H) I42.2 (ICD-10-CM)   ESRD (end stage renal disease) on dialysis (H) N18.6, Z99.2 (ICD-10-CM)   Type 2 diabetes mellitus with other circulatory complication, unspecified whether long term insulin use (H) E11.59 (ICD-10-CM)   Benign essential hypertension I10 (ICD-10-CM)   Mixed hyperlipidemia E78.2 (ICD-10-CM)     Pre Procedure Diagnosis    suspected CADpre-operative evaluation       Conclusion      Patient with ESRD on peritoneal dialysis, undergoing a transplant work up. He also has diabetes, uncontrolled hypertension and  hyperlipidemia.    Normal left main.    The LAD has diffuse mild calcification with a mild proximal disease and moderate mid LAD disease. There is no significant diagonal disease.    The circumflex has a very small OM-2, a small OM-2 and a large branching OM-3 with mild disease proximally and a severe stenosis in the larger, more anterior branch. This was stented with a Synergy SHAVONNE with good angiographic results.    Moderate sized RCA with mild proximal disease and moderate disease in both the proximal PDA and right AV groove branch prior to the PL branches.    LV EDP 13 mmhg. LV-Ao pullback gradient 17 mmHg.         Recommendations    General Recommendations:  - Follow up visit with Nurse Practitioner in 1-2 weeks.   - Recommended follow up with doctor Dr. Katz in 6 weeks.   - Arterial sheath removed from radial artery with TR Band placement.   - Recommend cardiac rehabilitation.        HEIGHT: 64.02 in    (162.60 cm)  WEIGHT: 132.21 lbs    (59.97 kgs)  BSA: 1.64 m^2     FINAL IMPRESSION   ==========================================================================================================     Asymmetric anteroseptal hypertrophy with moderate LVOT outflow tract obstruction.      SUMMARY   ==========================================================================================================     Non-contrast Cardiac MRI     1.  Small left ventricular cavity related to hypertrophy with hyperdynamic left ventricular systolic  function.  There is asymmetric anteroseptal hypertrophy with a maximal diameter of 19 mm with evidence of  left ventricular outflow tract obstruction.  There is moderate chordal MINA. The quantified left ventricular  ejection fraction is 80.7%.    2.  Normal right ventricular size and systolic function.    3.  Aortic valve sclerosis without stenosis.  Mild aortic regurgitation.    4.  Mild mitral regurgitation.           5. Moderate left atrial enlargement.     ==========================================================================================================  REPORT FINDINGS:     LEFT VENTRICLE: LV wall thickness is normal. LV cavity size is normal. LV systolic function is normal. There is no LV  mass/thrombus. LV systolic function is normal. LV systolic function is normal. Quantitative LVEF 80.7 %.     RIGHT VENTRICLE: RV wall thickness is normal. RV cavity size is normal. RV systolic function is normal. There is no RV  mass/thrombus.     LA/RA SEPTUM: The atrial septum is intact. The atrial septum is intact. The atrial septum is intact.     LEFT ATRIUM: LA is moderately enlarged.     RIGHT ATRIUM: RA cavity size is normal.     PERICARDIUM: Pericardium is normal. There is a trivial pericardial effusion.     PLEURAL EFFUSION: There is no pleural effusion.     AORTIC VALVE: The aortic valve annulus is normal in size. Aortic valve is trileaflet. There is mild aortic regurgitation.  There is no aortic stenosis. Aortic valve is mildly thickened. Peak aortic valve velocity 149.5 cm/sec.  Aortic regurgitant volume 4.9 ml. Aortic regurgitant fraction 6.6 %.     MITRAL VALVE: The mitral valve annulus is normal in size. Mitral valve leaflets are normal. There is mild mitral  regurgitation. There is no mitral stenosis.      TRICUSPID VALVE: The tricuspid valve annulus is normal in size. Tricuspid valve leaflets are normal. There is mild tricuspid  regurgitation. There is no tricuspid stenosis.      PULMONIC VALVE: The pulmonic valve annulus is normal in size. Pulmonic valve leaflets are normal. There is no pulmonic  regurgitation. There is no pulmonic stenosis.      AORTIC ROOT: The aortic root is normal.     CHEST:  Normal thoracic aortic size and its major branches.      ABDOMEN: Abdominal Ascites noted      Scan on 10/14/2021 11:11 AM        Conclusion    [facility]     HOLTER REPORT     Results:    Indication for study: Evaluate for cardiomyopathy    A 24-hour  Holter monitor was applied October 12, 2021 with date of interpretation October 14, 2021    Baseline tracing showed sinus rhythm with normal electrocardiographic intervals.  Average heart rate 75 bpm range between 59-98 bpm    No bradycardia, no pauses    Very rare ectopy with only 1 PVC, 1 PAC    Patient activity diary not returned    Conclusion    Normal monitor     stable heart rhythm pattern        Comment: The recording was for 23 hrs and 59 min.  The recording quality was adequate.     INDICATION: Chronic kidney disease, stage V (H), Pre-transplant evaluation for kidney transplant, Type 2 diabetes mellitus with diabetic nephropathy, without long-term current use of insulin (H), Essential hypertension  COMPARISON: CT from 10/03/2021.  TECHNIQUE: Transverse and longitudinal images of the aorta. Color flow and spectral Doppler with waveform analysis.     FINDINGS:     MEASUREMENTS:  Abdominal Aorta:   Distal: 1.3 cm AP   Right Common Iliac Artery: 0.7 cm AP  Left Common Iliac Artery: 0.7 cm AP      VELOCITIES:  Right common iliac artery proximally: 281  Right common iliac artery distally: 246-319  Right external iliac artery: 165-227  Left common iliac artery proximally: 132  Left common iliac artery distally: 242  Left external iliac artery : 65-90                                                                      CONCLUSION:  1.  No abdominal aorta or common iliac artery aneurysm.   2.  Brisk triphasic waveforms within the common iliac and external iliac arteries bilaterally without evidence of a hemodynamically significant lesion.     Physical Examination  Review of Systems   Vitals: 158/66, repeat 150/64, weight 135 pounds, heart rate of 69 and regular.  Wt Readings from Last 3 Encounters:   11/18/22 59 kg (130 lb 1.6 oz)   06/02/22 62.4 kg (137 lb 9.6 oz)   06/01/22 61.7 kg (136 lb)       General Appearance:   no distress, normal body habitus   ENT/Mouth:  Wearing a facemask.      EYES:  no scleral  icterus, normal conjunctivae   Neck: no carotid bruits or thyromegaly   Chest/Lungs:   lungs are clear to auscultation, no rales or wheezing,, equal chest wall expansion    Cardiovascular:   Regular. Normal first and second heart sounds with no murmurs, rubs, S4; the carotid, radial and posterior tibial pulses are intact, Jugular venous pressure within normal limits, trace ankle edema bilaterally    Abdomen:  no bruits, or tenderness; bowel sounds are present   Extremities: no cyanosis or clubbing   Skin: no xanthelasma, warm.    Neurologic: l, no tremors     Psychiatric: alert and oriented x3, calm        Please refer above for cardiac ROS details.        Medical History  Surgical History Family History Social History   Past Medical History:   Diagnosis Date     Acute kidney injury (H) 6/10/2021     CKD (chronic kidney disease) stage 5, GFR less than 15 ml/min (H)      Dyslipidemia      Metabolic acidosis      Type 2 diabetes mellitus (H)      Past Surgical History:   Procedure Laterality Date     CV CORONARY ANGIOGRAM N/A 12/6/2021    Procedure: Coronary Angiogram;  Surgeon: Cristela Banks MD;  Location: [facility] LAB CV     CV LEFT HEART CATH N/A 12/6/2021    Procedure: Left Heart Cath;  Surgeon: Cristela Banks MD;  Location: [facility] CV     CV PCI N/A 12/6/2021    Procedure: Percutaneous Coronary Intervention;  Surgeon: Cristela Banks MD;  Location: [facility] CATH LAB CV     Family History   Problem Relation Age of Onset     Diabetes Type 2  Mother      Other - See Comments Daughter         granular cell tumor of thigh     Heart Disease Other         Social History     Socioeconomic History     Marital status: Patient Declined     Spouse name: Not on file     Number of children: Not on file     Years of education: Not on file     Highest education level: Not on file   Occupational History     Not on file   Tobacco Use     Smoking status: Never     Smokeless tobacco: Never   Vaping Use      "Vaping Use: Not on file   Substance and Sexual Activity     Alcohol use: Not Currently     Drug use: Never     Sexual activity: Not on file   Other Topics Concern     Parent/sibling w/ CABG, MI or angioplasty before 65F 55M? Not Asked   Social History Narrative     Not on file     Social Determinants of Health     Financial Resource Strain: Not on file   Food Insecurity: Not on file   Transportation Needs: Not on file   Physical Activity: Not on file   Stress: Not on file   Social Connections: Not on file   Intimate Partner Violence: Not on file   Housing Stability: Not on file           Medications  Allergies   Current Outpatient Medications   Medication Sig Dispense Refill     acetaminophen (TYLENOL) 500 MG tablet Take 500 mg by mouth every 6 hours as needed for mild pain       aspirin (ASA) 81 MG EC tablet Take 1 tablet (81 mg) by mouth daily Start tomorrow. 30 tablet 3     atorvastatin (LIPITOR) 40 MG tablet Take 1 tablet (40 mg) by mouth daily 90 tablet 3     calcitRIOL (ROCALTROL) 0.25 MCG capsule Take 0.5 mcg by mouth daily       calcium carbonate 1250 (500 Ca) MG CHEW Take 500 mg by mouth daily       carvedilol (COREG) 25 MG tablet Take 1 tablet (25 mg) by mouth 2 times daily (with meals) 270 tablet 3     docusate sodium (COLACE) 100 MG capsule Take 100 mg by mouth 2 times daily as needed       hydrALAZINE (APRESOLINE) 50 MG tablet Take 0.5 tablets (25 mg) by mouth 3 times daily for 30 days 45 tablet 0     insulin aspart (NOVOLOG FLEXPEN) 100 UNIT/ML pen Inject 10 Units Subcutaneous 3 times daily (with meals)       insulin detemir (LEVEMIR PEN) 100 UNIT/ML pen Inject 20 Units Subcutaneous At Bedtime       nitroGLYcerin (NITROSTAT) 0.4 MG sublingual tablet One tablet under the tongue every 5 minutes if needed for chest pain. May repeat every 5 minutes for a maximum of 3 doses in 15 minutes\" 25 tablet 3     potassium chloride ER (KLOR-CON M) 10 MEQ CR tablet Take 1 tablet (10 mEq) by mouth daily for 360 days " 30 tablet 11     potassium chloride ER (KLOR-CON M) 10 MEQ CR tablet Take 1 tablet (10 mEq) by mouth daily for 360 days 30 tablet 11     ticagrelor (BRILINTA) 90 MG tablet Take 1 tablet (90 mg) by mouth 2 times daily Start tomorrow morning. 180 tablet 3     traZODone (DESYREL) 50 MG tablet Take 50 mg by mouth At Bedtime       No Known Allergies       Lab Results    Chemistry/lipid CBC Cardiac Enzymes/BNP/TSH/INR   Recent Labs   Lab Test 11/17/22  0916   CHOL 122   HDL 22*   LDL 53   TRIG 234*     Recent Labs   Lab Test 11/17/22  0916 01/19/22  1144 12/06/21  0734   LDL 53 56 93     Recent Labs   Lab Test 11/18/22  1203 11/18/22  0756 11/18/22  0508   NA  --   --  126*   POTASSIUM  --   --  3.3*   CHLORIDE  --   --  90*   CO2  --   --  21*   *   < > 117*   BUN  --   --  60.0*   CR  --   --  10.67*   GFRESTIMATED  --   --  5*   REYMUNDO  --   --  6.9*    < > = values in this interval not displayed.     Recent Labs   Lab Test 11/18/22  0508 11/17/22  0916 11/16/22  1908   CR 10.67* 9.20* 9.44*     Recent Labs   Lab Test 08/23/21  1243 06/11/21  0728   A1C 5.6 5.7*          Recent Labs   Lab Test 11/18/22  0508   WBC 6.0   HGB 9.4*   HCT 27.5*   MCV 83   PLT 81*     Recent Labs   Lab Test 11/18/22  0508 11/17/22  0916 11/16/22  1908   HGB 9.4* 10.4* 13.0*    Recent Labs   Lab Test 06/10/21  1406   TROPONINI 0.04     No results for input(s): BNP, NTBNPI, NTBNP in the last 79490 hours.  No results for input(s): TSH in the last 94243 hours.  Recent Labs   Lab Test 11/17/22  0916 12/06/21  0734 10/06/21  1508   INR 1.16* 1.01 1.12        Alfred Katz MD

## 2023-01-16 NOTE — PATIENT INSTRUCTIONS
We are going to plan an exercise stress echocardiogram and I will plan to contact you and we can discuss stopping the Brilinta.Please call my nurse Gloria with any heart related questions.Her number is [phone]

## 2023-01-16 NOTE — LETTER
Date:January 17, 2023      Provider requested that no letter be sent. Do not send.       [facility]

## 2023-01-25 ENCOUNTER — HOSPITAL ENCOUNTER (OUTPATIENT)
Dept: CARDIOLOGY | Facility: HOSPITAL | Age: 58
Discharge: HOME | End: 2023-01-25
Attending: INTERNAL MEDICINE | Admitting: INTERNAL MEDICINE
Payer: COMMERCIAL

## 2023-01-25 DIAGNOSIS — I25.10 CORONARY ARTERY DISEASE DUE TO LIPID RICH PLAQUE: ICD-10-CM

## 2023-01-25 DIAGNOSIS — I25.83 CORONARY ARTERY DISEASE DUE TO LIPID RICH PLAQUE: ICD-10-CM

## 2023-01-25 PROCEDURE — 93321 DOPPLER ECHO F-UP/LMTD STD: CPT | Mod: 26 | Performed by: INTERNAL MEDICINE

## 2023-01-25 PROCEDURE — 93352 ADMIN ECG CONTRAST AGENT: CPT | Performed by: INTERNAL MEDICINE

## 2023-01-25 PROCEDURE — 93325 DOPPLER ECHO COLOR FLOW MAPG: CPT | Mod: 26 | Performed by: INTERNAL MEDICINE

## 2023-01-25 PROCEDURE — 255N000002 HC RX 255 OP 636: Performed by: INTERNAL MEDICINE

## 2023-01-25 PROCEDURE — 93018 CV STRESS TEST I&R ONLY: CPT | Performed by: INTERNAL MEDICINE

## 2023-01-25 PROCEDURE — 93321 DOPPLER ECHO F-UP/LMTD STD: CPT | Mod: TC

## 2023-01-25 PROCEDURE — 93016 CV STRESS TEST SUPVJ ONLY: CPT | Performed by: INTERNAL MEDICINE

## 2023-01-25 PROCEDURE — 93350 STRESS TTE ONLY: CPT | Mod: 26 | Performed by: INTERNAL MEDICINE

## 2023-01-25 RX ADMIN — PERFLUTREN 5 ML: 6.52 INJECTION, SUSPENSION INTRAVENOUS at 15:00

## 2023-01-27 ENCOUNTER — TELEPHONE (OUTPATIENT)
Dept: CARDIOLOGY | Facility: CLINIC | Age: 58
End: 2023-01-27
Payer: COMMERCIAL

## 2023-01-27 DIAGNOSIS — Z01.818 PRE-TRANSPLANT EVALUATION FOR KIDNEY TRANSPLANT: ICD-10-CM

## 2023-01-27 DIAGNOSIS — I10 ESSENTIAL HYPERTENSION: ICD-10-CM

## 2023-01-27 RX ORDER — ATORVASTATIN CALCIUM 40 MG/1
TABLET, FILM COATED ORAL
Qty: 90 TABLET | Refills: 3 | Status: ON HOLD | OUTPATIENT
Start: 2023-01-27 | End: 2023-02-25

## 2023-01-27 RX ORDER — CARVEDILOL 25 MG/1
TABLET ORAL
Qty: 270 TABLET | Refills: 3 | Status: ON HOLD | OUTPATIENT
Start: 2023-01-27 | End: 2023-02-25

## 2023-01-27 NOTE — TELEPHONE ENCOUNTER
Daughter called to find out results of stress echo and if pt ok to move to transplant list.    Return call to daughter.  Informed daughter that Dr Katz is currently out of the office and I will get back to her on Monday.  Daughter verbalized understanding and had no other questions.  -heidi

## 2023-02-01 ENCOUNTER — TELEPHONE (OUTPATIENT)
Dept: TRANSPLANT | Facility: CLINIC | Age: 58
End: 2023-02-01
Payer: COMMERCIAL

## 2023-02-01 ENCOUNTER — COMMITTEE REVIEW (OUTPATIENT)
Dept: TRANSPLANT | Facility: CLINIC | Age: 58
End: 2023-02-01
Payer: COMMERCIAL

## 2023-02-01 NOTE — TELEPHONE ENCOUNTER
Called pt regarding committee this afternoon. Pt was approved for active status on the transplant list. Will require insurance clearance prior to activating. Will follow up with pt once clearance is acquired. Pt verbalized understanding of information and has no further questions. Encouraged to reach out if questions arise.

## 2023-02-01 NOTE — COMMITTEE REVIEW
Abdominal Committee Review Note     Evaluation Date: 2021  Committee Review Date: 2023    Organ being evaluated for: Kidney    Transplant Phase: Waitlist  Transplant Status: Inactive    Transplant Coordinator: Ritu Fagan  Transplant Surgeon:       Referring Physician: Russell Choe    Primary Diagnosis: Diabetes Mellitus - Type II  Secondary Diagnosis:     Committee Review Members:  Nephrology Silas Merlos, APRN CNP, Paula Patel MD, Patrick Phan MD   Pharmacy Guy Meza, [facility]    - Clinical Deedee Linton, [facility]   Transplant Helen Jin, RN, Jania Joya, RN, Anna Jeong, RN, Drake Barrow, RN, Grisel Calle, RN, Lita Tirado, RN, Gali Santiago, RN   Transplant Surgery Jacqueline Moe MD, MD       Transplant Eligibility: Irreversible chronic kidney disease treated w/dialysis or expected need for dialysis    Committee Review Decision: Make Active      Committee Chair Jacqueline Moe MD verbally attested to the committee's decision.    Committee Discussion Details: Reviewed the followin. Cards-  had severe LVH with significant septal hypertrophy on ECHO- Dr Katz saw him 2023- asymmetric left ventricular hypertrophy the pt is clinically without symptoms and stable- does not appear to have high risk features for cardiovascular sudden death. Pt did wear holter monitor a year ago and did not have any sustained ventricular arrythmias. 2. CAD- angiogram was done 2021 and found to have significant stenosis of a larger obtuse marginal branch with stent placed. He also had moderate disease in the LAD and right coronary artery and major branches. He's been on brillinta and aspirin for a year. He did have an admission in 2022 for generalized body weakness workup was negative but he did have an elevated troponin level with T wave abnormality on EKG. Cardiology was consulted they completed an ECHO to look for wall motion abnormalities.  Echocardiogram completed November 2022 reveals normal ejection fraction of 60 to 65%.  Basal septal hypertrophy with mild resting LV outflow tract gradient. Cards seemed to think that the trop is chronically elevated in th esetting of ESRD with a possible demand-mediated component from hypotension. He repeated an exercise stress echo which Dr Katz reviewed and is OK with taking him off Brillinta once his bottle runs out and that he can be cleared for active status. They plan to follow up in 6 months with pt. Spoke with pt and he is off Brillinta now as his bottle has ran out.     2. Thrombocytopenia- sent to hematology and appears to be  secondary to peripheral consumption or could be ITP and has been ongoing for 2-3 years and has remained the same throughout. Platelet counts wax/wane 40-90,000. No other dysplasia was seen on peripheral blood count. Anemia is normocytic and most likely due to ESRD, Splenomegaly was most likely secondary to past hepatitis infections which he has most likely has recovered completely by now. He also has known positive tests for lymes and EBV so that can result in splenomegaly.  Since this is a chronic problem and has improved in size since 2012 Dr Cortés said that there is no contraindication for him to proceed with renal transplant. The immunosuppression that he will get during the process will most likely improve platelet count.      3. PAD- will need non-con CT a/p and iliac US -- imaging reviewed with surgery vessels suitable      4. Health maintenance is up to date     Committee determined pt is cleared for active status on the kidney transplant waiting list.

## 2023-02-01 NOTE — TELEPHONE ENCOUNTER
Called patient to inform them of status change to ACTIVE  on 23. Status change letter and PRA orders to be mailed. Patient is in agreement with listing ACTIVE status.      Discussed:   - Pt is eligible for  donor offers and pt can receive calls 24 hours a day, 7 days a week, and on call staff need to be able to reach pt or one of emergency contacts within 30 minutes or they might skip over to next recipient on list.   -Please make sure your phone is charged at all times and your voicemail is not full   -Your transplant on call coordinator will give you directions about when and where you will need to come to the hospital for transplant  -The transplant coordinator will call you to discuss your current health status, the offer, and plans to move forward.  Some organ offer calls will require you to come to the hospital immediately; some may not be as time sensitive.  It may be possible for you to come to the hospital and, for various reasons, the transplant could be cancelled.  This is often called a  dry run .  - Reviewed the right to accept or decline offer, without penalty  - Expected length of surgical procedure is about 3-4 hours, expected inpatient length of stay post transplant is 3-4 days, and potential to stay locally post transplant. Offered resources for lodging in the area  - Verified contact information as documented in Epic. Confirmed emergency contact information  -Instructed patient about importance of having PRAs drawn every 3 months via: ([facility]). Encouraged them to set reminder in their preferred calendar to stay on top of their quarterly labs  -Reminded pt to stay up on health maintenance and to call with any updates on their health status, insurance or contact information.   -Reminded pt to inform RNCC as soon as possible if they test positive for COVID.     -Provided name and contact information for additional questions or concerns.  Pt expressed excellent understanding of all  and was in good agreement with the plan.

## 2023-02-02 ENCOUNTER — DOCUMENTATION ONLY (OUTPATIENT)
Dept: TRANSPLANT | Facility: CLINIC | Age: 58
End: 2023-02-02

## 2023-02-02 DIAGNOSIS — Z76.82 ORGAN TRANSPLANT CANDIDATE: ICD-10-CM

## 2023-02-02 DIAGNOSIS — N18.6 ESRD (END STAGE RENAL DISEASE) (H): ICD-10-CM

## 2023-02-09 ENCOUNTER — LAB (OUTPATIENT)
Dept: LAB | Facility: CLINIC | Age: 58
End: 2023-02-09

## 2023-02-09 ENCOUNTER — OFFICE VISIT (OUTPATIENT)
Dept: FAMILY MEDICINE | Facility: CLINIC | Age: 58
End: 2023-02-09
Payer: COMMERCIAL

## 2023-02-09 VITALS
OXYGEN SATURATION: 98 % | SYSTOLIC BLOOD PRESSURE: 132 MMHG | BODY MASS INDEX: 23.21 KG/M2 | DIASTOLIC BLOOD PRESSURE: 62 MMHG | WEIGHT: 131 LBS | TEMPERATURE: 97.5 F | HEART RATE: 74 BPM | HEIGHT: 63 IN

## 2023-02-09 DIAGNOSIS — Z99.2 ESRD (END STAGE RENAL DISEASE) ON DIALYSIS (H): ICD-10-CM

## 2023-02-09 DIAGNOSIS — Z79.4 OTHER SPECIFIED DIABETES MELLITUS WITH OTHER SPECIFIED COMPLICATION, WITH LONG-TERM CURRENT USE OF INSULIN (H): ICD-10-CM

## 2023-02-09 DIAGNOSIS — E13.69 OTHER SPECIFIED DIABETES MELLITUS WITH OTHER SPECIFIED COMPLICATION, WITH LONG-TERM CURRENT USE OF INSULIN (H): ICD-10-CM

## 2023-02-09 DIAGNOSIS — N18.6 ESRD (END STAGE RENAL DISEASE) (H): ICD-10-CM

## 2023-02-09 DIAGNOSIS — R21 RASH: Primary | ICD-10-CM

## 2023-02-09 DIAGNOSIS — N18.6 ESRD (END STAGE RENAL DISEASE) ON DIALYSIS (H): ICD-10-CM

## 2023-02-09 DIAGNOSIS — Z76.82 ORGAN TRANSPLANT CANDIDATE: ICD-10-CM

## 2023-02-09 LAB
ANION GAP SERPL CALCULATED.3IONS-SCNC: 18 MMOL/L (ref 7–15)
BUN SERPL-MCNC: 48.5 MG/DL (ref 6–20)
CALCIUM SERPL-MCNC: 8.6 MG/DL (ref 8.6–10)
CHLORIDE SERPL-SCNC: 91 MMOL/L (ref 98–107)
CREAT SERPL-MCNC: 6.08 MG/DL (ref 0.67–1.17)
CREAT UR-MCNC: 142 MG/DL
DEPRECATED HCO3 PLAS-SCNC: 23 MMOL/L (ref 22–29)
GFR SERPL CREATININE-BSD FRML MDRD: 10 ML/MIN/1.73M2
GLUCOSE SERPL-MCNC: 425 MG/DL (ref 70–99)
HBA1C MFR BLD: 6.9 % (ref 0–5.6)
MICROALBUMIN UR-MCNC: 318 MG/L
MICROALBUMIN/CREAT UR: 223.94 MG/G CR (ref 0–17)
POTASSIUM SERPL-SCNC: 3.7 MMOL/L (ref 3.4–5.3)
PTH-INTACT SERPL-MCNC: 31 PG/ML (ref 15–65)
SODIUM SERPL-SCNC: 132 MMOL/L (ref 136–145)

## 2023-02-09 PROCEDURE — 80048 BASIC METABOLIC PNL TOTAL CA: CPT | Performed by: FAMILY MEDICINE

## 2023-02-09 PROCEDURE — 82570 ASSAY OF URINE CREATININE: CPT | Performed by: FAMILY MEDICINE

## 2023-02-09 PROCEDURE — 83036 HEMOGLOBIN GLYCOSYLATED A1C: CPT | Performed by: FAMILY MEDICINE

## 2023-02-09 PROCEDURE — 99000 SPECIMEN HANDLING OFFICE-LAB: CPT | Performed by: FAMILY MEDICINE

## 2023-02-09 PROCEDURE — 99204 OFFICE O/P NEW MOD 45 MIN: CPT | Performed by: FAMILY MEDICINE

## 2023-02-09 PROCEDURE — 82043 UR ALBUMIN QUANTITATIVE: CPT | Performed by: FAMILY MEDICINE

## 2023-02-09 PROCEDURE — 86337 INSULIN ANTIBODIES: CPT | Mod: 90 | Performed by: FAMILY MEDICINE

## 2023-02-09 PROCEDURE — 86341 ISLET CELL ANTIBODY: CPT | Mod: 90 | Performed by: FAMILY MEDICINE

## 2023-02-09 PROCEDURE — 83970 ASSAY OF PARATHORMONE: CPT | Performed by: FAMILY MEDICINE

## 2023-02-09 PROCEDURE — 36415 COLL VENOUS BLD VENIPUNCTURE: CPT | Performed by: FAMILY MEDICINE

## 2023-02-09 PROCEDURE — 86832 HLA CLASS I HIGH DEFIN QUAL: CPT | Performed by: SURGERY

## 2023-02-09 PROCEDURE — 86833 HLA CLASS II HIGH DEFIN QUAL: CPT | Performed by: SURGERY

## 2023-02-09 RX ORDER — LORATADINE 10 MG/1
10 TABLET ORAL DAILY PRN
Qty: 90 TABLET | Refills: 3 | Status: SHIPPED | OUTPATIENT
Start: 2023-02-09

## 2023-02-09 RX ORDER — TRIAMCINOLONE ACETONIDE 1 MG/G
CREAM TOPICAL 2 TIMES DAILY
Qty: 45 G | Refills: 3 | Status: SHIPPED | OUTPATIENT
Start: 2023-02-09 | End: 2023-03-03

## 2023-02-09 NOTE — PROGRESS NOTES
OFFICE VISIT    Assessment/Plan:     Patient Instructions:    -Monitor for any triggers of the rash.   -Continue to see the kidney specialist as scheduled.   -Continue to see the heart specialist as scheduled.     -Dr. Cooper has referred you to see the diabetes educator. If you do not hear from the specialist to schedule an appointment within a week's time from today, please call the [facility] and speak with the specialty  to help you schedule the appointment to see the specialist.  Depending on the specialist availability, it may be a number of weeks prior to your scheduled appointment.  -Check your blood sugars when you wake up, before lunch and dinner, and before bedtime. Write down the blood sugars and bring these with you to the next appointment.   -Please take your medications as prescribed.  -Check the blood sugars as recommended to help monitor how your blood sugars are doing.  -Be sure to stay well-hydrated by drinking water each day to help your kidneys.  -Maintaining good blood sugar control will help your kidneys stay strong for longer.  -Be sure to complete an eye exam once a year to make sure your eyes are healthy.  -Check your feet a few times a week to monitor for any cuts/wounds or any changes.  If any concerning findings are noted, please return to clinic for reevaluation.  -If you feel unwell (such as dizziness, sweatiness, nausea, fast heartbeat, confused, etc.), be sure to check your blood sugar as it may be low (below 70 mg/dL).  If a low blood sugars noted, please eat or drink something sugary and repeat the blood sugar test in about 30 minutes.  Repeat until the blood sugar is above 70 mg/dL.  If blood sugars are persistently low, please seek immediate medical attention.      Please seek immediate medical attention (go to the emergency room or urgent care) for the following reasons: worsening symptoms, or any concerning changes.    Please return to clinic in 1 month for follow  up of diabetes, or sooner as needed.      [place] was seen today for establish care.  Diagnoses and all orders for this visit:    Rash: Reports to occur sporadically and can affect multiple parts of the body.  On exam today, the rash appears most consistent with a contact dermatitis or food allergy type rash.  With that distribution, these diagnoses seem most consistent.  No blistering or signs of secondary infection noted.  No oral lesions are present.  It was reported that patient had responded to Claritin and topical medications in the past.  No obvious triggers have been identified, though patient was instructed to monitor closely and see if there are certain foods or ingredients that trigger the symptoms.  Soaps/shampoos have been changed at home, though the rash is still recurring.  Patient has been stable on his current medications and has had no obvious issues that he can tell.  Provider offered referral to allergist for further testing, though they declined at this time.  -     loratadine (CLARITIN) 10 MG tablet; Take 1 tablet (10 mg) by mouth daily as needed for allergies (itchy/rash)  -     triamcinolone (KENALOG) 0.1 % external cream; Apply topically 2 times daily As needed for itching/rash.    Other specified diabetes mellitus with other specified complication, with long-term current use of insulin (H): Patient is being treated with insulin at this time.  Continue on the insulin medication.  No previous records indicate that patient had been tested for type 1 diabetes, so check labs as below.  Referral placed to diabetic educator.  Provider did review basic information regarding carbohydrate counting and food choices.  Diabetes booklet, carbohydrate counting pamphlet were given to patient today.  Discussed about being consistent with diet.  Discussed checking blood sugars 4 times daily and recording the values in the glucose logs given to the patient.  Further management pending results of blood sugar  readings.  -     HEMOGLOBIN A1C; Future  -     AMB Adult Diabetes Educator Referral; Future  -     Insulin antibody; Future  -     Glutamic acid decarboxylase antibody; Future  -     Islet Antigen (IA-2) Autoantibody, Serum; Future  -     insulin aspart (NOVOLOG FLEXPEN) 100 UNIT/ML pen; Inject 10 Units Subcutaneous 3 times daily (with meals)  -     insulin detemir (LEVEMIR PEN) 100 UNIT/ML pen; Inject 20 Units Subcutaneous At Bedtime    ESRD (end stage renal disease) on dialysis (H): Patient follows with Dr. Boland and completes at home peritoneal dialysis.  Recheck labs as below.  Patient to continue at home renal dialysis.  He is on the renal transplant waitlist at this time.  Reviewed conservative management and general recommendations to keep himself healthy in preparation for renal transplant.  Patient will continue to follow with the transplant team at [facility].  -     Parathyroid Hormone Intact; Future  -     Albumin Random Urine Quantitative with Creat Ratio; Future  -     BASIC METABOLIC PANEL; Future          The diagnoses, treatment options, risk, benefits, and recommendations were reviewed with patient/guardian.  Questions were answered to patient's/guardian satisfaction.  Red flag signs were reviewed.  Patient/guardian is in agreement with above plan.      Subjective: 57 year old male with history of diabetes mellitus type 2, CAD with history of NSTEMI, hypertension, dyslipidemia, CKD stage IV on kidney transplant waitlist, thrombocytopenia, splenomegaly who presents to clinic for the following complaints:   Patient presents with:  Establish Care    Answers for HPI/ROS submitted by the patient on 2/9/2023  Do you check your blood pressure regularly outside of the clinic?: Yes  Are your blood pressures ever more than 140 on the top number (systolic) OR more than 90 on the bottom number (diastolic)? (For example, greater than 140/90): Yes  Are you following a low salt diet?: Yes    Frequency of  checking blood sugars:: three times daily  What time of day are you checking your blood sugars : before meals, at bedtime  Have you had any blood sugars above 200?: Yes  Have you had any blood sugars below 70?: No  Hypoglycemia symptoms:: shakiness, dizziness, weakness  Diabetic concerns:: none  Paraesthesia present:: none of these symptoms  Have you had a diabetic eye exam within the last year?: No    Do you take any over the counter pain medicine?  : Yes  What over the counter medicine are you taking for your pain?  : Tylenol when needed  How often do you take this medicine?: a few times a month    Itchy rash: Noted on the right arm and the scalp today.  The rash had been noted a long time ago and has been off and on.  Every few months, the rash would come on.  There will be red bumps that are really itchy.  This rash can be located all over the body, extremities, and scalp/head.  He had a biopsy in the past and was given some medications and that helped for a few months and then it comes back. He wants something that will make the symptoms go away permanently. Daughter states that patient was recommended to take claritin. He had been prescribed creams as well.  This combination of medications seem to be helpful to make the symptoms go away, though the rash should recur. No obvious triggers.  Patient states that this started after he was exposed to poison ivy.  Patient was noted to have Lyme's disease at one point, and was treated. Discussed conservative management and avoidance of any unprescribed medications (such as over-the-counter medications or traditional/herbal medications).  Patient endorses that he does not use any traditional/herbal medications.  He also does not drink alcohol.    Seen by dermatology ([facility] in [place]) in the past.  TALHA signed.  Daughter shows provider a picture of the pathology results which include diagnosis of: Spongiotic dermatitis with superficial  periascular lymphoeosinophilic inflammation (suggestive of dermal hypersensitivity type reaction, such as to allergic contactant, drug, or arthropod bite-infestation.     Patient had changed lotions, shampoos, and detergents and nothing seem to be particularly helpful.      Diabetes:  Hemoglobin A1C   Date Value Ref Range Status   02/09/2023 6.9 (H) 0.0 - 5.6 % Final     Comment:     Normal <5.7%   Prediabetes 5.7-6.4%    Diabetes 6.5% or higher     Note: Adopted from ADA consensus guidelines.   08/23/2021 5.6 0.0 - 5.6 % Final     Comment:     Normal <5.7%   Prediabetes 5.7-6.4%    Diabetes 6.5% or higher     Note: Adopted from ADA consensus guidelines.   06/11/2021 5.7 (H) <=5.6 % Final     Comment:     Prediabetes:   HBA1c       5.7 to 6.4%        Diabetes:        HBA1c        >=6.5%   Patients with Hgb F >5%, total bilirubin >10.0 mg/dL, abnormal red cell turnover, severe renal or hepatic disease or malignancy should not have this A1C method used to diagnose or monitor diabetes.              Had been diagnosed with diabetes in the past, though was able to stop medications for a while until 09/2022 when the sugars are elevated again and patient was restarted on insulin medication. Blood sugar was 250 this morning. He keeps a recrod of this at home. He has not seen a diabetes educator.     Due to the kidney issues and dialysis, he tracks his BP pretty well.  He does peritoneal dialysis at home also with the nephrologist regularly.  He sees the nephrologist about once a month.    No issues with medications.     Patient is currently taking the following medications:  Diabetes Medication(s)     Insulin       insulin aspart (NOVOLOG FLEXPEN) 100 UNIT/ML pen    Inject 10 Units Subcutaneous 3 times daily (with meals)     insulin detemir (LEVEMIR PEN) 100 UNIT/ML pen    Inject 20 Units Subcutaneous At Bedtime          Statin medication: taking as prescribed  ASA medication: taking as prescribed.  Patient is following  "with cardiology.    Reviewed recommendations for patient to complete annual eye exam.  He has not seen an eye specialist in a while.  Daughter will call to schedule with the eye specialist    Last Comprehensive Metabolic Panel:  Sodium   Date Value Ref Range Status   02/09/2023 132 (L) 136 - 145 mmol/L Final     Potassium   Date Value Ref Range Status   02/09/2023 3.7 3.4 - 5.3 mmol/L Final   12/06/2021 3.9 3.5 - 5.0 mmol/L Final     Chloride   Date Value Ref Range Status   02/09/2023 91 (L) 98 - 107 mmol/L Final   12/06/2021 97 (L) 98 - 107 mmol/L Final     Carbon Dioxide (CO2)   Date Value Ref Range Status   02/09/2023 23 22 - 29 mmol/L Final   12/06/2021 27 22 - 31 mmol/L Final     Anion Gap   Date Value Ref Range Status   02/09/2023 18 (H) 7 - 15 mmol/L Final   12/06/2021 11 5 - 18 mmol/L Final     Glucose   Date Value Ref Range Status   02/09/2023 425 (H) 70 - 99 mg/dL Final   12/06/2021 293 (H) 70 - 125 mg/dL Final     GLUCOSE BY METER POCT   Date Value Ref Range Status   11/18/2022 190 (H) 70 - 99 mg/dL Final     Urea Nitrogen   Date Value Ref Range Status   02/09/2023 48.5 (H) 6.0 - 20.0 mg/dL Final   12/06/2021 54 (H) 8 - 22 mg/dL Final     Creatinine   Date Value Ref Range Status   02/09/2023 6.08 (H) 0.67 - 1.17 mg/dL Final     GFR Estimate   Date Value Ref Range Status   02/09/2023 10 (L) >60 mL/min/1.73m2 Final     Comment:     eGFR calculated using 2021 CKD-EPI equation.   06/14/2021 15 (L) >60 mL/min/1.73m2 Final     Calcium   Date Value Ref Range Status   02/09/2023 8.6 8.6 - 10.0 mg/dL Final         The 10 point review of system is negative except as stated in the HPI.    Allergies were reviewed and updated.    Objective:   /62 (BP Location: Left arm, Patient Position: Sitting, Cuff Size: Adult Regular)   Pulse 74   Temp 97.5  F (36.4  C) (Oral)   Ht 1.612 m (5' 3.48\")   Wt 59.4 kg (131 lb)   SpO2 98%   BMI 22.85 kg/m    General: Active, alert, nontoxic-appearing.  No acute " distress.  HEENT: Normocephalic, atraumatic.  Pupils are equal and round.  Sclera is clear.  Normal external ears. Nares patent.  Moist mucous membranes.  No oral lesions present.  Cardiac: RRR.  S1, S2 present.  No murmurs, rubs, or gallops.  Respiratory/chest: Clear to auscultation bilaterally.  No wheezes, rales, rhonchi.  Breathing is not labored.  No accessory muscle usage.  Abdomen: Bandages noted on the left lower abdomen region (peritoneal dialysis site).  Abdomen is soft, nondistended, nontender.  No masses or organomegaly noted.  No guarding or rebound tenderness appreciated.  Extremities: Voluntary movements intact.  Integumentary: Noted on the ventral proximal forearm and scattered on the head are multiple papular lesions that are mildly erythematous and somewhat raised skin tone and color.  No vesicular/pustular lesions.  Nontender.  No increased warmth.  No evidence of excoriation noted.  Some skin dryness noted.  Otherwise, no concerning rash or skin changes appreciated.        Pal Cooper MD  [facility]  2/10/2023  8:58 AM            Current Outpatient Medications   Medication     acetaminophen (TYLENOL) 500 MG tablet     aspirin (ASA) 81 MG EC tablet     atorvastatin (LIPITOR) 40 MG tablet     calcitRIOL (ROCALTROL) 0.25 MCG capsule     calcium carbonate 1250 (500 Ca) MG CHEW     carvedilol (COREG) 25 MG tablet     docusate sodium (COLACE) 100 MG capsule     insulin aspart (NOVOLOG FLEXPEN) 100 UNIT/ML pen     insulin detemir (LEVEMIR PEN) 100 UNIT/ML pen     loratadine (CLARITIN) 10 MG tablet     nitroGLYcerin (NITROSTAT) 0.4 MG sublingual tablet     triamcinolone (KENALOG) 0.1 % external cream     hydrALAZINE (APRESOLINE) 50 MG tablet     No current facility-administered medications for this visit.       No Known Allergies    Patient Active Problem List    Diagnosis Date Noted     NSTEMI (non-ST elevated myocardial infarction) (H) 11/16/2022     Priority: Medium      Hypotension, unspecified hypotension type 11/16/2022     Priority: Medium     Thrombocytopenia (H) 06/02/2022     Priority: Medium     Splenomegaly 06/02/2022     Priority: Medium     Coronary artery disease involving native coronary artery of native heart without angina pectoris 12/16/2021     Priority: Medium     Metabolic acidosis      Priority: Medium     Hypertension      Priority: Medium     Created by Conversion  Replacement Utility updated for latest IMO load         Stage 4 chronic kidney disease (H) 02/10/2020     Priority: Medium     Type 2 Diabetes Mellitus      Priority: Medium     Created by Conversion         Dyslipidemia, goal LDL below 70      Priority: Medium     Created by Conversion           Family History   Problem Relation Age of Onset     Diabetes Type 2  Mother      Other - See Comments Daughter         granular cell tumor of thigh     Heart Disease Other        Past Surgical History:   Procedure Laterality Date     CV CORONARY ANGIOGRAM N/A 12/6/2021    Procedure: Coronary Angiogram;  Surgeon: Cristela Banks MD;  Location: [facility] CATH LAB CV     CV LEFT HEART CATH N/A 12/6/2021    Procedure: Left Heart Cath;  Surgeon: Cristela Banks MD;  Location: [facility] CATH LAB CV     CV PCI N/A 12/6/2021    Procedure: Percutaneous Coronary Intervention;  Surgeon: Cristela Banks MD;  Location: [facility] CATH LAB CV        Social History     Socioeconomic History     Marital status: Patient Declined     Spouse name: Not on file     Number of children: Not on file     Years of education: Not on file     Highest education level: Not on file   Occupational History     Not on file   Tobacco Use     Smoking status: Never     Passive exposure: Never     Smokeless tobacco: Never   Vaping Use     Vaping Use: Never used   Substance and Sexual Activity     Alcohol use: Not Currently     Drug use: Never     Sexual activity: Not on file   Other Topics Concern     Parent/sibling w/ CABG, MI or angioplasty  before 65F 55M? Not Asked   Social History Narrative     Not on file     Social Determinants of Health     Financial Resource Strain: Not on file   Food Insecurity: Not on file   Transportation Needs: Not on file   Physical Activity: Not on file   Stress: Not on file   Social Connections: Not on file   Intimate Partner Violence: Not on file   Housing Stability: Not on file

## 2023-02-10 PROBLEM — Z99.2 ESRD (END STAGE RENAL DISEASE) ON DIALYSIS (H): Status: ACTIVE | Noted: 2020-02-10

## 2023-02-10 PROBLEM — N18.6 ESRD (END STAGE RENAL DISEASE) ON DIALYSIS (H): Status: ACTIVE | Noted: 2020-02-10

## 2023-02-10 PROBLEM — R21 RASH: Status: ACTIVE | Noted: 2023-02-10

## 2023-02-10 RX ORDER — INSULIN ASPART 100 [IU]/ML
10 INJECTION, SOLUTION INTRAVENOUS; SUBCUTANEOUS
Qty: 30 ML | Refills: 3 | Status: ON HOLD | OUTPATIENT
Start: 2023-02-10 | End: 2023-02-25

## 2023-02-11 ENCOUNTER — HEALTH MAINTENANCE LETTER (OUTPATIENT)
Age: 58
End: 2023-02-11

## 2023-02-13 LAB
GAD65 AB SER IA-ACNC: <5 IU/ML
INSULIN AB SER IA-ACNC: <0.4 U/ML

## 2023-02-14 DIAGNOSIS — N18.6 ESRD (END STAGE RENAL DISEASE) (H): ICD-10-CM

## 2023-02-14 DIAGNOSIS — Z76.82 AWAITING ORGAN TRANSPLANT: Primary | ICD-10-CM

## 2023-02-14 DIAGNOSIS — R73.03 PRE-DIABETES: ICD-10-CM

## 2023-02-14 LAB — ISLET CELL512 AB SER IA-ACNC: <5.4 U/ML

## 2023-02-15 ENCOUNTER — ORGAN (OUTPATIENT)
Dept: TRANSPLANT | Facility: CLINIC | Age: 58
End: 2023-02-15
Payer: COMMERCIAL

## 2023-02-15 ENCOUNTER — COMMITTEE REVIEW (OUTPATIENT)
Dept: TRANSPLANT | Facility: CLINIC | Age: 58
End: 2023-02-15
Payer: COMMERCIAL

## 2023-02-15 LAB
ABO/RH(D): NORMAL
ANTIBODY SCREEN: NEGATIVE
SPECIMEN EXPIRATION DATE: NORMAL

## 2023-02-15 NOTE — TELEPHONE ENCOUNTER
Coordinator called pt to go over instructions for upcoming transplant on 2/21/23.    Pt will have pre-op appointments, initial COVID testing and final crossmatch on 2/16/23, and will have CXR and final COVID screen on 2/20/23. Recommended 14 day pre-op self isolation and COVID precautions reviewed.      Reviewed OR check in time at 1130 and OR start time of 1400 . Explained that times are subject to change and will keep them updated with changes.    Reviewed general components of inpatient stay and post-transplant routines, including frequent outpatient appointments x1-2 weeks. Reviewed no driving x 2 weeks and lifting restrictions >10lbs x 6 weeks.    Confirmed PFR has been notified of transplant.    Nephrologist and dialysis were notified of pre-op appointments and transplant dates.      Vaccinations including recommendation for no vaccinations for 6 weeks prior to transplant reviewed. Pt has nothad any vaccinations within the past 6 weeks.     Reviewed that pt has no upcoming procedures, no recent blood transitions, no medications changes, denies dialysis changes.     Pt has received COVID vaccine    Medication list reviewed, pt is not on steroids, blood thinners or hormone replacements.      Current visitor policy reviewed.     Pt verbalized understanding and denied questions.

## 2023-02-16 ENCOUNTER — LAB (OUTPATIENT)
Dept: LAB | Facility: CLINIC | Age: 58
End: 2023-02-16
Attending: TRANSPLANT SURGERY
Payer: COMMERCIAL

## 2023-02-16 ENCOUNTER — OFFICE VISIT (OUTPATIENT)
Dept: TRANSPLANT | Facility: CLINIC | Age: 58
End: 2023-02-16
Attending: TRANSPLANT SURGERY
Payer: COMMERCIAL

## 2023-02-16 VITALS
BODY MASS INDEX: 22.93 KG/M2 | HEIGHT: 64 IN | DIASTOLIC BLOOD PRESSURE: 64 MMHG | OXYGEN SATURATION: 98 % | SYSTOLIC BLOOD PRESSURE: 125 MMHG | WEIGHT: 134.3 LBS | HEART RATE: 72 BPM

## 2023-02-16 DIAGNOSIS — E11.21 TYPE 2 DIABETES MELLITUS WITH DIABETIC NEPHROPATHY, WITHOUT LONG-TERM CURRENT USE OF INSULIN (H): ICD-10-CM

## 2023-02-16 DIAGNOSIS — Z76.82 AWAITING ORGAN TRANSPLANT: ICD-10-CM

## 2023-02-16 DIAGNOSIS — R73.03 PRE-DIABETES: ICD-10-CM

## 2023-02-16 DIAGNOSIS — Z01.818 PRE-TRANSPLANT EVALUATION FOR KIDNEY TRANSPLANT: Primary | ICD-10-CM

## 2023-02-16 DIAGNOSIS — N18.6 ESRD (END STAGE RENAL DISEASE) (H): ICD-10-CM

## 2023-02-16 DIAGNOSIS — Z76.82 ORGAN TRANSPLANT CANDIDATE: ICD-10-CM

## 2023-02-16 DIAGNOSIS — I10 ESSENTIAL HYPERTENSION: ICD-10-CM

## 2023-02-16 DIAGNOSIS — Z99.2 PERITONEAL DIALYSIS STATUS (H): ICD-10-CM

## 2023-02-16 DIAGNOSIS — D69.6 THROMBOCYTOPENIA (H): ICD-10-CM

## 2023-02-16 LAB
ALBUMIN SERPL BCG-MCNC: 3.3 G/DL (ref 3.5–5.2)
ALBUMIN UR-MCNC: 30 MG/DL
ALP SERPL-CCNC: 66 U/L (ref 40–129)
ALT SERPL W P-5'-P-CCNC: 18 U/L (ref 10–50)
AMORPH CRY #/AREA URNS HPF: ABNORMAL /HPF
ANION GAP SERPL CALCULATED.3IONS-SCNC: 12 MMOL/L (ref 7–15)
APPEARANCE UR: CLEAR
AST SERPL W P-5'-P-CCNC: 18 U/L (ref 10–50)
BILIRUB SERPL-MCNC: 0.2 MG/DL
BILIRUB UR QL STRIP: NEGATIVE
BUN SERPL-MCNC: 53.2 MG/DL (ref 6–20)
CALCIUM SERPL-MCNC: 8.8 MG/DL (ref 8.6–10)
CHLORIDE SERPL-SCNC: 92 MMOL/L (ref 98–107)
COLOR UR AUTO: ABNORMAL
CREAT SERPL-MCNC: 5.99 MG/DL (ref 0.67–1.17)
DEPRECATED CALCIDIOL+CALCIFEROL SERPL-MC: 5 UG/L (ref 20–75)
DEPRECATED HCO3 PLAS-SCNC: 27 MMOL/L (ref 22–29)
ERYTHROCYTE [DISTWIDTH] IN BLOOD BY AUTOMATED COUNT: 15.2 % (ref 10–15)
FERRITIN SERPL-MCNC: 847 NG/ML (ref 31–409)
GFR SERPL CREATININE-BSD FRML MDRD: 10 ML/MIN/1.73M2
GLUCOSE SERPL-MCNC: 284 MG/DL (ref 70–99)
GLUCOSE UR STRIP-MCNC: 150 MG/DL
HBA1C MFR BLD: 7.1 %
HCT VFR BLD AUTO: 29.8 % (ref 40–53)
HGB BLD-MCNC: 10.4 G/DL (ref 13.3–17.7)
HGB UR QL STRIP: NEGATIVE
HYALINE CASTS: 3 /LPF
INR PPP: 1.08 (ref 0.85–1.15)
IRON BINDING CAPACITY (ROCHE): 174 UG/DL (ref 240–430)
IRON SATN MFR SERPL: 21 % (ref 15–46)
IRON SERPL-MCNC: 37 UG/DL (ref 61–157)
KETONES UR STRIP-MCNC: NEGATIVE MG/DL
LEUKOCYTE ESTERASE UR QL STRIP: NEGATIVE
MCH RBC QN AUTO: 29.2 PG (ref 26.5–33)
MCHC RBC AUTO-ENTMCNC: 34.9 G/DL (ref 31.5–36.5)
MCV RBC AUTO: 84 FL (ref 78–100)
MUCOUS THREADS #/AREA URNS LPF: PRESENT /LPF
NITRATE UR QL: NEGATIVE
PH UR STRIP: 5 [PH] (ref 5–7)
PLATELET # BLD AUTO: 86 10E3/UL (ref 150–450)
POTASSIUM SERPL-SCNC: 3.1 MMOL/L (ref 3.4–5.3)
PROT SERPL-MCNC: 6.1 G/DL (ref 6.4–8.3)
PTH-INTACT SERPL-MCNC: 232 PG/ML (ref 15–65)
RBC # BLD AUTO: 3.56 10E6/UL (ref 4.4–5.9)
RBC URINE: 1 /HPF
SARS-COV-2 RNA RESP QL NAA+PROBE: NEGATIVE
SODIUM SERPL-SCNC: 131 MMOL/L (ref 136–145)
SP GR UR STRIP: 1.02 (ref 1–1.03)
SQUAMOUS EPITHELIAL: <1 /HPF
UROBILINOGEN UR STRIP-MCNC: NORMAL MG/DL
WBC # BLD AUTO: 6.7 10E3/UL (ref 4–11)
WBC URINE: 2 /HPF

## 2023-02-16 PROCEDURE — 86850 RBC ANTIBODY SCREEN: CPT | Mod: 90 | Performed by: PATHOLOGY

## 2023-02-16 PROCEDURE — 81001 URINALYSIS AUTO W/SCOPE: CPT | Performed by: PATHOLOGY

## 2023-02-16 PROCEDURE — 86900 BLOOD TYPING SEROLOGIC ABO: CPT | Mod: 90 | Performed by: PATHOLOGY

## 2023-02-16 PROCEDURE — 87086 URINE CULTURE/COLONY COUNT: CPT | Mod: 90 | Performed by: PATHOLOGY

## 2023-02-16 PROCEDURE — 83970 ASSAY OF PARATHORMONE: CPT | Performed by: PATHOLOGY

## 2023-02-16 PROCEDURE — 83540 ASSAY OF IRON: CPT | Performed by: PATHOLOGY

## 2023-02-16 PROCEDURE — G0463 HOSPITAL OUTPT CLINIC VISIT: HCPCS | Performed by: TRANSPLANT SURGERY

## 2023-02-16 PROCEDURE — 82728 ASSAY OF FERRITIN: CPT | Mod: 90 | Performed by: PATHOLOGY

## 2023-02-16 PROCEDURE — 99000 SPECIMEN HANDLING OFFICE-LAB: CPT | Performed by: PATHOLOGY

## 2023-02-16 PROCEDURE — 86825 HLA X-MATH NON-CYTOTOXIC: CPT | Performed by: PATHOLOGY

## 2023-02-16 PROCEDURE — U0003 INFECTIOUS AGENT DETECTION BY NUCLEIC ACID (DNA OR RNA); SEVERE ACUTE RESPIRATORY SYNDROME CORONAVIRUS 2 (SARS-COV-2) (CORONAVIRUS DISEASE [COVID-19]), AMPLIFIED PROBE TECHNIQUE, MAKING USE OF HIGH THROUGHPUT TECHNOLOGIES AS DESCRIBED BY CMS-2020-01-R: HCPCS | Mod: 90 | Performed by: PATHOLOGY

## 2023-02-16 PROCEDURE — 82306 VITAMIN D 25 HYDROXY: CPT | Mod: 90 | Performed by: PATHOLOGY

## 2023-02-16 PROCEDURE — 83550 IRON BINDING TEST: CPT | Performed by: PATHOLOGY

## 2023-02-16 PROCEDURE — 86665 EPSTEIN-BARR CAPSID VCA: CPT | Mod: 90 | Performed by: PATHOLOGY

## 2023-02-16 PROCEDURE — 80053 COMPREHEN METABOLIC PANEL: CPT | Performed by: PATHOLOGY

## 2023-02-16 PROCEDURE — 86832 HLA CLASS I HIGH DEFIN QUAL: CPT | Performed by: PATHOLOGY

## 2023-02-16 PROCEDURE — 83036 HEMOGLOBIN GLYCOSYLATED A1C: CPT | Mod: 90 | Performed by: PATHOLOGY

## 2023-02-16 PROCEDURE — 86833 HLA CLASS II HIGH DEFIN QUAL: CPT | Performed by: PATHOLOGY

## 2023-02-16 PROCEDURE — U0005 INFEC AGEN DETEC AMPLI PROBE: HCPCS | Mod: 90 | Performed by: PATHOLOGY

## 2023-02-16 PROCEDURE — 36415 COLL VENOUS BLD VENIPUNCTURE: CPT | Performed by: PATHOLOGY

## 2023-02-16 PROCEDURE — 86645 CMV ANTIBODY IGM: CPT | Mod: 90 | Performed by: PATHOLOGY

## 2023-02-16 PROCEDURE — 99215 OFFICE O/P EST HI 40 MIN: CPT | Performed by: TRANSPLANT SURGERY

## 2023-02-16 PROCEDURE — 85027 COMPLETE CBC AUTOMATED: CPT | Performed by: PATHOLOGY

## 2023-02-16 PROCEDURE — 86644 CMV ANTIBODY: CPT | Mod: 90 | Performed by: PATHOLOGY

## 2023-02-16 PROCEDURE — 85610 PROTHROMBIN TIME: CPT | Performed by: PATHOLOGY

## 2023-02-16 PROCEDURE — 86901 BLOOD TYPING SEROLOGIC RH(D): CPT | Mod: 90 | Performed by: PATHOLOGY

## 2023-02-16 NOTE — PROGRESS NOTES
Transplant Surgery H&P:                           HPI:      Mr. Barbosa is a 57 year old male who comes to clinic today for preop prior to planned living donor kidney transplantation. The patient was previously reviewed by the multidisciplinary selection committee and found to be medically and psychosocially appropriate for kidney transplantation. Mr. Barbosa has Chronic renal failure due to diabetes mellitus type 2.     The patient is on dialysis.      If on dialysis, modality: PD, via PD cath  Dialysis days: Monday, Tuesday, Wednesday, Thursday, Friday, Saturday, Sunday                 YES  NO   Chronic anticoagulation  [x]      [] Indication: baby asa   Recurrent infections  []      [x]  Type:                  Bladder dysfunction  [x]      [] Cause:  Claudication   []      [x] Distance:    Previous Amputation  []      [x] Cause:       Health events since transplant evaluation: None    Special considerations:  PONV: no  Protestant: No    MEDICAL HISTORY:      Patient Active Problem List    Diagnosis Date Noted     Rash 02/10/2023     Priority: Medium     NSTEMI (non-ST elevated myocardial infarction) (H) 11/16/2022     Priority: Medium     Hypotension, unspecified hypotension type 11/16/2022     Priority: Medium     Thrombocytopenia (H) 06/02/2022     Priority: Medium     Splenomegaly 06/02/2022     Priority: Medium     Coronary artery disease involving native coronary artery of native heart without angina pectoris 12/16/2021     Priority: Medium     Metabolic acidosis      Priority: Medium     Hypertension      Priority: Medium     Created by Conversion  Replacement Utility updated for latest IMO load         ESRD (end stage renal disease) on dialysis (H) 02/10/2020     Priority: Medium     Type 2 Diabetes Mellitus      Priority: Medium     Created by Conversion         Dyslipidemia, goal LDL below 70      Priority: Medium     Created by Conversion          Past Medical History:   Diagnosis Date     Acute  kidney injury (H) 6/10/2021     CKD (chronic kidney disease) stage 5, GFR less than 15 ml/min (H)      Dyslipidemia      Metabolic acidosis      Type 2 diabetes mellitus (H)      Past Surgical History:   Procedure Laterality Date     CV CORONARY ANGIOGRAM N/A 12/6/2021    Procedure: Coronary Angiogram;  Surgeon: Cristela Banks MD;  Location: [facility] LAB CV     CV LEFT HEART CATH N/A 12/6/2021    Procedure: Left Heart Cath;  Surgeon: Cristlea Banks MD;  Location: [facility] CV     CV PCI N/A 12/6/2021    Procedure: Percutaneous Coronary Intervention;  Surgeon: Cristela Banks MD;  Location: [facility] CATH LAB CV     Current Outpatient Medications   Medication Sig Dispense Refill     acetaminophen (TYLENOL) 500 MG tablet Take 500 mg by mouth every 6 hours as needed for mild pain       aspirin (ASA) 81 MG EC tablet Take 1 tablet (81 mg) by mouth daily Start tomorrow. 30 tablet 3     atorvastatin (LIPITOR) 40 MG tablet TAKE 1 TABLET BY MOUTH EVERY DAY 90 tablet 3     calcitRIOL (ROCALTROL) 0.25 MCG capsule Take 0.5 mcg by mouth daily       calcium carbonate 1250 (500 Ca) MG CHEW Take 500 mg by mouth 4 times daily       carvedilol (COREG) 25 MG tablet TAKE 1 AND 1/2 TABLETS BY MOUTH TWICE DAILY WITH MEALS (Patient taking differently: 25 mg 2 times daily (with meals)) 270 tablet 3     docusate sodium (COLACE) 100 MG capsule Take 100 mg by mouth 2 times daily as needed       hydrALAZINE (APRESOLINE) 50 MG tablet Take 0.5 tablets (25 mg) by mouth 3 times daily for 30 days 45 tablet 0     insulin aspart (NOVOLOG FLEXPEN) 100 UNIT/ML pen Inject 10 Units Subcutaneous 3 times daily (with meals) 30 mL 3     insulin detemir (LEVEMIR PEN) 100 UNIT/ML pen Inject 20 Units Subcutaneous At Bedtime 18 mL 3     loratadine (CLARITIN) 10 MG tablet Take 1 tablet (10 mg) by mouth daily as needed for allergies (itchy/rash) 90 tablet 3     nitroGLYcerin (NITROSTAT) 0.4 MG sublingual tablet One tablet under the tongue every 5  "minutes if needed for chest pain. May repeat every 5 minutes for a maximum of 3 doses in 15 minutes\" 25 tablet 3     triamcinolone (KENALOG) 0.1 % external cream Apply topically 2 times daily As needed for itching/rash. 45 g 3     OTC products: None, except as noted above  No Known Allergies   Social History     Tobacco Use     Smoking status: Never     Passive exposure: Never     Smokeless tobacco: Never   Substance Use Topics     Alcohol use: Not Currently     OR  Social History     Socioeconomic History     Marital status: Patient Declined     Spouse name: Not on file     Number of children: Not on file     Years of education: Not on file     Highest education level: Not on file   Occupational History     Not on file   Tobacco Use     Smoking status: Never     Passive exposure: Never     Smokeless tobacco: Never   Vaping Use     Vaping Use: Never used   Substance and Sexual Activity     Alcohol use: Not Currently     Drug use: Never     Sexual activity: Not on file   Other Topics Concern     Parent/sibling w/ CABG, MI or angioplasty before 65F 55M? Not Asked   Social History Narrative     Not on file     Social Determinants of Health     Financial Resource Strain: Not on file   Food Insecurity: Not on file   Transportation Needs: Not on file   Physical Activity: Not on file   Stress: Not on file   Social Connections: Not on file   Intimate Partner Violence: Not on file   Housing Stability: Not on file     History   Drug Use Unknown     Family History   Problem Relation Age of Onset     Diabetes Type 2  Mother      Other - See Comments Daughter         granular cell tumor of thigh     Heart Disease Other        REVIEW OF SYSTEMS:        CONSTITUTIONAL: NEGATIVE for fever, chills, change in weight  INTEGUMENTARY/SKIN: NEGATIVE for worrisome rashes, moles or lesions  EYES: NEGATIVE for vision changes or irritation  ENT/MOUTH: NEGATIVE for ear, mouth and throat problems  RESP: NEGATIVE for significant cough or " SOB  CV: NEGATIVE for chest pain, palpitations or peripheral edema  GI: NEGATIVE for nausea, abdominal pain, heartburn, or change in bowel habits  : NEGATIVE for frequency, dysuria, or hematuria  MUSCULOSKELETAL: NEGATIVE for significant arthralgias or myalgia  NEURO: NEGATIVE for weakness, dizziness or paresthesias  ENDOCRINE: NEGATIVE for temperature intolerance, skin/hair changes  HEME: NEGATIVE for bleeding problems  PSYCHIATRIC: NEGATIVE for changes in mood or affect    EXAM:                       Pulse:  [72] 72  BP: (125)/(64) 125/64  SpO2:  [98 %] 98 %    GENERAL APPEARANCE: healthy, alert and no distress     EYES: EOMI,  PERRL     HENT: ear canals and TM's normal and nose and mouth without ulcers or lesions     NECK: no adenopathy, no asymmetry, masses, or scars and thyroid normal to palpation     RESP: lungs clear to auscultation - no rales, rhonchi or wheezes     CV: murmur noted on exam      ABDOMEN:  soft, nontender, no HSM or masses and bowel sounds normal     MS: extremities normal- no gross deformities noted, no evidence of inflammation in joints, FROM in all extremities.     SKIN: no suspicious lesions or rashes     NEURO: Normal strength and tone, sensory exam grossly normal, mentation intact and speech normal     PSYCH: mentation appears normal. and affect normal/bright     LYMPHATICS: No cervical adenopathy      DIAGNOSTICS:                EKG: Sinus rhythm   Minimal voltage criteria for LVH, may be normal variant ( R in aVL )   Inferior infarct , age undetermined   Abnormal ECG   When compared with ECG of 16-NOV-2022 18:59,   No significant change was found   Chest XRay  Labs Resulted Today:   Results for orders placed or performed in visit on 02/16/23   EKG 12-lead complete w/read - Clinics     Status: None (Preliminary result)   Result Value Ref Range    Systolic Blood Pressure  mmHg    Diastolic Blood Pressure  mmHg    Ventricular Rate 73 BPM    Atrial Rate 73 BPM    PA Interval 168 ms     QRS Duration 92 ms     ms    QTc 469 ms    P Axis 26 degrees    R AXIS -12 degrees    T Axis 62 degrees    Interpretation ECG       Sinus rhythm  Minimal voltage criteria for LVH, may be normal variant ( R in aVL )  Inferior infarct , age undetermined  Abnormal ECG  When compared with ECG of 16-NOV-2022 18:59,  No significant change was found       Labs Drawn and in Process:   Unresulted Labs Ordered in the Past 30 Days of this Admission     No orders found from 1/17/2023 to 2/17/2023.        Recent Labs   Lab Test 02/09/23  1459 11/18/22  0508 11/17/22  0916 06/02/22  1337 12/06/21  0734 10/06/21  1508 08/23/21  1243   HGB  --  9.4* 10.4*   < > 11.3*  --  9.3*   PLT  --  81* 85*   < > 75*  --  59*   INR  --   --  1.16*  --  1.01   < > 1.25*   * 126* 129*   < > 135*  --  137   POTASSIUM 3.7 3.3* 3.3*   < > 3.9  --  4.9   CR 6.08* 10.67* 9.20*   < > 5.68*  --  5.38*   A1C 6.9*  --   --   --   --   --  5.6    < > = values in this interval not displayed.     [facility] CPRA   Date Value Ref Range Status   12/19/2022 0  Final     A POS    ASSESSMENT:                 Mr. Barbosa is a 57 year old male who is appropriate for elective living donor kidney transplantation with the following pertinent issues:    1. Labs reviewed. Findings requiring additional evaluation before surgery: No  2. EKG (2/16/2023):Sinus rhythm   Minimal voltage criteria for LVH, may be normal variant ( R in aVL )   Inferior infarct , age undetermined   Abnormal ECG   When compared with ECG of 16-NOV-2022 18:59,   No significant change was found   3. ECHO (1/25/2023); Interpretation Summary  1. Normal stress echocardiogram without evidence of stress induced ischemia.  2. Normal resting LV systolic performance with an ejection fraction of 55-60%.  There is normal improvement in left ventricular systolic performance with a  peak ejection fraction of 70-75%.  3. No ECG evidence of ischemia.  4. No anginal chest pain reported with exercise.  5.  Normal functional capacity for age.  6. Hypertensive response to exercise  7. Baseline EKG shows LVH with systolic anterior motion of the mitral valve  with acceleration of flow through LVOT     Electrocardiogram: Baseline ECG reveals normal sinus rhythm without evidence  of prior myocardial injury. With exercise, there are no significant ECG  changes to suggest ischemia.     Baseline echocardiogram: Technically adequate images were obtained in the  standard quad-screen format. LV systolic performance is normal with a visually  estimated ejection fraction of 55-60%. There is normal regional wall motion.     Postexercise echocardiogram: Technically adequate images were obtained  immediately post exercise in the standard quad-screen format. LV systolic  performance normally improves with a peak ejection fraction of 70-75%. There  are no stress induced regional wall motion abnormalities.  4. ABO= A POS  5. Paired Exchange case: Yes  6. Outstanding issues: Final ABO, Cross match,     PLAN:                 1. Consent: Done  2. Outstanding issues: Final ABO, Cross match,       Signed Electronically by: Ritu Bonilla NP

## 2023-02-16 NOTE — COMMITTEE REVIEW
Abdominal Patient Discussion Note Transplant Coordinator: Ritu Fagan  Transplant Surgeon:       Referring Physician: Russell Choe    Committee Review Members:  Cardiology Dennise Negrete, ELIDIA   Dietitian, Registered Claire Burns, NAMRATA   Nephrology Rome Kim MD, Silas Merlos, APRN CNP, Patrick Phan MD   Nurse Marine Lau, ELIDIA   Pharmacist Tiffany Jensen, [facility]    - Clinical Anna Boland, [facility]   Transplant Tamia Christopher PA-C, Helen Jin, ELIDIA, Cora Byrd, RN, aJnia Joya, RN, Anna Jeong, RN, Drake Barrow, RN, Grisel Calle, ELIDIA, Lita Tirado RN   Transplant Surgery Ramon Saleh MD       Additional Discussion Notes and Findings: Reviewed pt's surgical case with committee       Scheduled for fast track [facility] 2/21/23 with Dr Weinstein     Reviewed cards history and thrombocytopenia (see 2/1/23 committee note). Will recheck platelets with preop labs and review.     Committee has no concerns with pt moving forward with transplant at this time.

## 2023-02-16 NOTE — LETTER
2/16/2023         RE: Modesto Barbosa  [address] [place] [address]        Dear Colleague,    Thank you for referring your patient, Modesto Barbosa, to the [facility] TRANSPL[facility]. Please see a copy of my visit note below.    Transplant Surgery H&P:                           HPI:      Mr. Barbosa is a 57 year old male who comes to clinic today for preop prior to planned living donor kidney transplantation. The patient was previously reviewed by the multidisciplinary selection committee and found to be medically and psychosocially appropriate for kidney transplantation. Mr. Barbosa has Chronic renal failure due to diabetes mellitus type 2.     The patient is on dialysis.      If on dialysis, modality: PD, via PD cath  Dialysis days: Monday, Tuesday, Wednesday, Thursday, Friday, Saturday, Sunday                 YES  NO   Chronic anticoagulation  [x]      [] Indication: baby asa   Recurrent infections  []      [x]  Type:                  Bladder dysfunction  [x]      [] Cause:  Claudication   []      [x] Distance:    Previous Amputation  []      [x] Cause:       Health events since transplant evaluation: None    Special considerations:  PONV: no  Congregational: No    MEDICAL HISTORY:      Patient Active Problem List    Diagnosis Date Noted     Rash 02/10/2023     Priority: Medium     NSTEMI (non-ST elevated myocardial infarction) (H) 11/16/2022     Priority: Medium     Hypotension, unspecified hypotension type 11/16/2022     Priority: Medium     Thrombocytopenia (H) 06/02/2022     Priority: Medium     Splenomegaly 06/02/2022     Priority: Medium     Coronary artery disease involving native coronary artery of native heart without angina pectoris 12/16/2021     Priority: Medium     Metabolic acidosis      Priority: Medium     Hypertension      Priority: Medium     Created by Conversion  Replacement Utility updated for latest IMO load         ESRD (end stage renal disease) on dialysis (H) 02/10/2020      Priority: Medium     Type 2 Diabetes Mellitus      Priority: Medium     Created by Conversion         Dyslipidemia, goal LDL below 70      Priority: Medium     Created by Conversion          Past Medical History:   Diagnosis Date     Acute kidney injury (H) 6/10/2021     CKD (chronic kidney disease) stage 5, GFR less than 15 ml/min (H)      Dyslipidemia      Metabolic acidosis      Type 2 diabetes mellitus (H)      Past Surgical History:   Procedure Laterality Date     CV CORONARY ANGIOGRAM N/A 12/6/2021    Procedure: Coronary Angiogram;  Surgeon: Cristela Banks MD;  Location: [facility] CATH LAB CV     CV LEFT HEART CATH N/A 12/6/2021    Procedure: Left Heart Cath;  Surgeon: Cristela Banks MD;  Location: [facility] LAB CV     CV PCI N/A 12/6/2021    Procedure: Percutaneous Coronary Intervention;  Surgeon: Cristela Banks MD;  Location: [facility] LAB CV     Current Outpatient Medications   Medication Sig Dispense Refill     acetaminophen (TYLENOL) 500 MG tablet Take 500 mg by mouth every 6 hours as needed for mild pain       aspirin (ASA) 81 MG EC tablet Take 1 tablet (81 mg) by mouth daily Start tomorrow. 30 tablet 3     atorvastatin (LIPITOR) 40 MG tablet TAKE 1 TABLET BY MOUTH EVERY DAY 90 tablet 3     calcitRIOL (ROCALTROL) 0.25 MCG capsule Take 0.5 mcg by mouth daily       calcium carbonate 1250 (500 Ca) MG CHEW Take 500 mg by mouth 4 times daily       carvedilol (COREG) 25 MG tablet TAKE 1 AND 1/2 TABLETS BY MOUTH TWICE DAILY WITH MEALS (Patient taking differently: 25 mg 2 times daily (with meals)) 270 tablet 3     docusate sodium (COLACE) 100 MG capsule Take 100 mg by mouth 2 times daily as needed       hydrALAZINE (APRESOLINE) 50 MG tablet Take 0.5 tablets (25 mg) by mouth 3 times daily for 30 days 45 tablet 0     insulin aspart (NOVOLOG FLEXPEN) 100 UNIT/ML pen Inject 10 Units Subcutaneous 3 times daily (with meals) 30 mL 3     insulin detemir (LEVEMIR PEN) 100 UNIT/ML pen Inject 20 Units  "Subcutaneous At Bedtime 18 mL 3     loratadine (CLARITIN) 10 MG tablet Take 1 tablet (10 mg) by mouth daily as needed for allergies (itchy/rash) 90 tablet 3     nitroGLYcerin (NITROSTAT) 0.4 MG sublingual tablet One tablet under the tongue every 5 minutes if needed for chest pain. May repeat every 5 minutes for a maximum of 3 doses in 15 minutes\" 25 tablet 3     triamcinolone (KENALOG) 0.1 % external cream Apply topically 2 times daily As needed for itching/rash. 45 g 3     OTC products: None, except as noted above  No Known Allergies   Social History     Tobacco Use     Smoking status: Never     Passive exposure: Never     Smokeless tobacco: Never   Substance Use Topics     Alcohol use: Not Currently     OR  Social History     Socioeconomic History     Marital status: Patient Declined     Spouse name: Not on file     Number of children: Not on file     Years of education: Not on file     Highest education level: Not on file   Occupational History     Not on file   Tobacco Use     Smoking status: Never     Passive exposure: Never     Smokeless tobacco: Never   Vaping Use     Vaping Use: Never used   Substance and Sexual Activity     Alcohol use: Not Currently     Drug use: Never     Sexual activity: Not on file   Other Topics Concern     Parent/sibling w/ CABG, MI or angioplasty before 65F 55M? Not Asked   Social History Narrative     Not on file     Social Determinants of Health     Financial Resource Strain: Not on file   Food Insecurity: Not on file   Transportation Needs: Not on file   Physical Activity: Not on file   Stress: Not on file   Social Connections: Not on file   Intimate Partner Violence: Not on file   Housing Stability: Not on file     History   Drug Use Unknown     Family History   Problem Relation Age of Onset     Diabetes Type 2  Mother      Other - See Comments Daughter         granular cell tumor of thigh     Heart Disease Other        REVIEW OF SYSTEMS:        CONSTITUTIONAL: NEGATIVE for " fever, chills, change in weight  INTEGUMENTARY/SKIN: NEGATIVE for worrisome rashes, moles or lesions  EYES: NEGATIVE for vision changes or irritation  ENT/MOUTH: NEGATIVE for ear, mouth and throat problems  RESP: NEGATIVE for significant cough or SOB  CV: NEGATIVE for chest pain, palpitations or peripheral edema  GI: NEGATIVE for nausea, abdominal pain, heartburn, or change in bowel habits  : NEGATIVE for frequency, dysuria, or hematuria  MUSCULOSKELETAL: NEGATIVE for significant arthralgias or myalgia  NEURO: NEGATIVE for weakness, dizziness or paresthesias  ENDOCRINE: NEGATIVE for temperature intolerance, skin/hair changes  HEME: NEGATIVE for bleeding problems  PSYCHIATRIC: NEGATIVE for changes in mood or affect    EXAM:                       Pulse:  [72] 72  BP: (125)/(64) 125/64  SpO2:  [98 %] 98 %    GENERAL APPEARANCE: healthy, alert and no distress     EYES: EOMI,  PERRL     HENT: ear canals and TM's normal and nose and mouth without ulcers or lesions     NECK: no adenopathy, no asymmetry, masses, or scars and thyroid normal to palpation     RESP: lungs clear to auscultation - no rales, rhonchi or wheezes     CV: murmur noted on exam      ABDOMEN:  soft, nontender, no HSM or masses and bowel sounds normal     MS: extremities normal- no gross deformities noted, no evidence of inflammation in joints, FROM in all extremities.     SKIN: no suspicious lesions or rashes     NEURO: Normal strength and tone, sensory exam grossly normal, mentation intact and speech normal     PSYCH: mentation appears normal. and affect normal/bright     LYMPHATICS: No cervical adenopathy      DIAGNOSTICS:                EKG: Sinus rhythm   Minimal voltage criteria for LVH, may be normal variant ( R in aVL )   Inferior infarct , age undetermined   Abnormal ECG   When compared with ECG of 16-NOV-2022 18:59,   No significant change was found   Chest XRay  Labs Resulted Today:   Results for orders placed or performed in visit on  02/16/23   EKG 12-lead complete w/read - Clinics     Status: None (Preliminary result)   Result Value Ref Range    Systolic Blood Pressure  mmHg    Diastolic Blood Pressure  mmHg    Ventricular Rate 73 BPM    Atrial Rate 73 BPM    SD Interval 168 ms    QRS Duration 92 ms     ms    QTc 469 ms    P Axis 26 degrees    R AXIS -12 degrees    T Axis 62 degrees    Interpretation ECG       Sinus rhythm  Minimal voltage criteria for LVH, may be normal variant ( R in aVL )  Inferior infarct , age undetermined  Abnormal ECG  When compared with ECG of 16-NOV-2022 18:59,  No significant change was found       Labs Drawn and in Process:   Unresulted Labs Ordered in the Past 30 Days of this Admission     No orders found from 1/17/2023 to 2/17/2023.        Recent Labs   Lab Test 02/09/23  1459 11/18/22  0508 11/17/22  0916 06/02/22  1337 12/06/21  0734 10/06/21  1508 08/23/21  1243   HGB  --  9.4* 10.4*   < > 11.3*  --  9.3*   PLT  --  81* 85*   < > 75*  --  59*   INR  --   --  1.16*  --  1.01   < > 1.25*   * 126* 129*   < > 135*  --  137   POTASSIUM 3.7 3.3* 3.3*   < > 3.9  --  4.9   CR 6.08* 10.67* 9.20*   < > 5.68*  --  5.38*   A1C 6.9*  --   --   --   --   --  5.6    < > = values in this interval not displayed.     OS CPRA   Date Value Ref Range Status   12/19/2022 0  Final     A POS    ASSESSMENT:                 Mr. Barbosa is a 57 year old male who is appropriate for elective living donor kidney transplantation with the following pertinent issues:    1. Labs reviewed. Findings requiring additional evaluation before surgery: No  2. EKG (2/16/2023):Sinus rhythm   Minimal voltage criteria for LVH, may be normal variant ( R in aVL )   Inferior infarct , age undetermined   Abnormal ECG   When compared with ECG of 16-NOV-2022 18:59,   No significant change was found   3. ECHO (1/25/2023); Interpretation Summary  1. Normal stress echocardiogram without evidence of stress induced ischemia.  2. Normal resting LV systolic  "performance with an ejection fraction of 55-60%.  There is normal improvement in left ventricular systolic performance with a  peak ejection fraction of 70-75%.  3. No ECG evidence of ischemia.  4. No anginal chest pain reported with exercise.  5. Normal functional capacity for age.  6. Hypertensive response to exercise  7. Baseline EKG shows LVH with systolic anterior motion of the mitral valve  with acceleration of flow through LVOT     Electrocardiogram: Baseline ECG reveals normal sinus rhythm without evidence  of prior myocardial injury. With exercise, there are no significant ECG  changes to suggest ischemia.     Baseline echocardiogram: Technically adequate images were obtained in the  standard quad-screen format. LV systolic performance is normal with a visually  estimated ejection fraction of 55-60%. There is normal regional wall motion.     Postexercise echocardiogram: Technically adequate images were obtained  immediately post exercise in the standard quad-screen format. LV systolic  performance normally improves with a peak ejection fraction of 70-75%. There  are no stress induced regional wall motion abnormalities.  4. ABO= A POS  5. Paired Exchange case: Yes  6. Outstanding issues: Final ABO, Cross match,     PLAN:                 1. Consent: Done  2. Outstanding issues: Final ABO, Cross match,       Signed Electronically by: Ritu Bonilla NP      Saw Mr Chelsey for pre-op LDKT next week.  Has had no intervening medical urgencies.  See ms Irene H&P.  Makes ok urine, no vasc issues in legs    /64   Pulse 72   Ht 1.626 m (5' 4\")   Wt 60.9 kg (134 lb 4.8 oz)   SpO2 98%   BMI 23.05 kg/m    abd soft.  tenckhoff in L lower abd.  Skin: ok    Pulses no     Current Outpatient Medications   Medication     acetaminophen (TYLENOL) 500 MG tablet     aspirin (ASA) 81 MG EC tablet     atorvastatin (LIPITOR) 40 MG tablet     calcitRIOL (ROCALTROL) 0.25 MCG capsule     calcium carbonate 1250 (500 Ca) " MG CHEW     carvedilol (COREG) 25 MG tablet     docusate sodium (COLACE) 100 MG capsule     hydrALAZINE (APRESOLINE) 50 MG tablet     insulin aspart (NOVOLOG FLEXPEN) 100 UNIT/ML pen     insulin detemir (LEVEMIR PEN) 100 UNIT/ML pen     loratadine (CLARITIN) 10 MG tablet     nitroGLYcerin (NITROSTAT) 0.4 MG sublingual tablet     triamcinolone (KENALOG) 0.1 % external cream     No current facility-administered medications for this visit.       Discussed procedure through  and daughter. Operation site, time, hospitalization, need for meds.  Will likely take out PD cath if kidney functioning.    Ok to proceed with txp next week, pending final xm which is running now,    30 min:  5 min prep, 25 min f2f, 10 min doc        Again, thank you for allowing me to participate in the care of your patient.        Sincerely,        Talha Weinstein MD

## 2023-02-16 NOTE — LETTER
Date:February 17, 2023      Patient was self referred, no letter generated. Do not send.        [facility] Health Information

## 2023-02-16 NOTE — PROGRESS NOTES
"Saw Mr Chelsey for pre-op LDKT next week.  Has had no intervening medical urgencies.  See ms Irene H&P.  Makes ok urine, no vasc issues in legs    /64   Pulse 72   Ht 1.626 m (5' 4\")   Wt 60.9 kg (134 lb 4.8 oz)   SpO2 98%   BMI 23.05 kg/m    abd soft.  tenckhoff in L lower abd.  Skin: ok    Pulses no     Current Outpatient Medications   Medication     acetaminophen (TYLENOL) 500 MG tablet     aspirin (ASA) 81 MG EC tablet     atorvastatin (LIPITOR) 40 MG tablet     calcitRIOL (ROCALTROL) 0.25 MCG capsule     calcium carbonate 1250 (500 Ca) MG CHEW     carvedilol (COREG) 25 MG tablet     docusate sodium (COLACE) 100 MG capsule     hydrALAZINE (APRESOLINE) 50 MG tablet     insulin aspart (NOVOLOG FLEXPEN) 100 UNIT/ML pen     insulin detemir (LEVEMIR PEN) 100 UNIT/ML pen     loratadine (CLARITIN) 10 MG tablet     nitroGLYcerin (NITROSTAT) 0.4 MG sublingual tablet     triamcinolone (KENALOG) 0.1 % external cream     No current facility-administered medications for this visit.       Discussed procedure through  and daughter. Operation site, time, hospitalization, need for meds.  Will likely take out PD cath if kidney functioning.    Ok to proceed with txp next week, pending final xm which is running now,    30 min:  5 min prep, 25 min f2f, 10 min doc    "

## 2023-02-16 NOTE — PATIENT INSTRUCTIONS
Stop Baby asa now    The day before take your medications as directed but cut down levemir to 10units.     The am of surgery take 25mg of carvedilol     DO NOT TAKE ANY OTHER MEDICATIONS THE AM OF SURGERY

## 2023-02-17 LAB
ATRIAL RATE - MUSE: 73 BPM
CMV IGG SERPL IA-ACNC: 7.7 U/ML
CMV IGG SERPL IA-ACNC: ABNORMAL
CMV IGM SERPL IA-ACNC: <8 AU/ML
CMV IGM SERPL IA-ACNC: NEGATIVE
DIASTOLIC BLOOD PRESSURE - MUSE: NORMAL MMHG
EBV VCA IGG SER IA-ACNC: 300 U/ML
EBV VCA IGG SER IA-ACNC: POSITIVE
EBV VCA IGM SER IA-ACNC: <10 U/ML
EBV VCA IGM SER IA-ACNC: NORMAL
INTERPRETATION ECG - MUSE: NORMAL
P AXIS - MUSE: 26 DEGREES
PR INTERVAL - MUSE: 168 MS
QRS DURATION - MUSE: 92 MS
QT - MUSE: 426 MS
QTC - MUSE: 469 MS
R AXIS - MUSE: -12 DEGREES
SYSTOLIC BLOOD PRESSURE - MUSE: NORMAL MMHG
T AXIS - MUSE: 62 DEGREES
VENTRICULAR RATE- MUSE: 73 BPM

## 2023-02-18 LAB — BACTERIA UR CULT: NO GROWTH

## 2023-02-19 LAB
PROTOCOL CUTOFF: NORMAL
PROTOCOL CUTOFF: NORMAL
SA 1 CELL: NORMAL
SA 1 CELL: NORMAL
SA 1 TEST METHOD: NORMAL
SA 1 TEST METHOD: NORMAL
SA 2 CELL: NORMAL
SA 2 CELL: NORMAL
SA 2 TEST METHOD: NORMAL
SA 2 TEST METHOD: NORMAL
SA1 HI RISK ABY: NORMAL
SA1 HI RISK ABY: NORMAL
SA1 MOD RISK ABY: NORMAL
SA1 MOD RISK ABY: NORMAL
SA2 HI RISK ABY: NORMAL
SA2 HI RISK ABY: NORMAL
SA2 MOD RISK ABY: NORMAL
SA2 MOD RISK ABY: NORMAL
UNACCEPTABLE ANTIGENS: NORMAL
UNACCEPTABLE ANTIGENS: NORMAL
UNOS CPRA: 27
UNOS CPRA: 27
ZZZSA 1  COMMENTS: NORMAL
ZZZSA 1  COMMENTS: NORMAL
ZZZSA 2 COMMENTS: NORMAL
ZZZSA 2 COMMENTS: NORMAL

## 2023-02-20 ENCOUNTER — ANCILLARY PROCEDURE (OUTPATIENT)
Dept: GENERAL RADIOLOGY | Facility: CLINIC | Age: 58
End: 2023-02-20
Attending: TRANSPLANT SURGERY
Payer: COMMERCIAL

## 2023-02-20 ENCOUNTER — TELEPHONE (OUTPATIENT)
Dept: TRANSPLANT | Facility: CLINIC | Age: 58
End: 2023-02-20

## 2023-02-20 ENCOUNTER — LAB (OUTPATIENT)
Dept: LAB | Facility: CLINIC | Age: 58
End: 2023-02-20
Attending: TRANSPLANT SURGERY
Payer: COMMERCIAL

## 2023-02-20 ENCOUNTER — ANESTHESIA EVENT (OUTPATIENT)
Dept: SURGERY | Facility: CLINIC | Age: 58
DRG: 652 | End: 2023-02-20
Payer: COMMERCIAL

## 2023-02-20 DIAGNOSIS — R73.03 PRE-DIABETES: ICD-10-CM

## 2023-02-20 DIAGNOSIS — Z76.82 AWAITING ORGAN TRANSPLANT: ICD-10-CM

## 2023-02-20 DIAGNOSIS — N18.6 ESRD (END STAGE RENAL DISEASE) (H): ICD-10-CM

## 2023-02-20 DIAGNOSIS — Z76.82 ORGAN TRANSPLANT CANDIDATE: ICD-10-CM

## 2023-02-20 LAB
CELL TYPE ALLO: NORMAL
CELL TYPE AUTO: NORMAL
CHANNELSHIFTALLOB1: -35
CHANNELSHIFTALLOB2: -17
CHANNELSHIFTALLOT1: -40
CHANNELSHIFTALLOT2: -30
CHANNELSHIFTAUTOB1: -51
CHANNELSHIFTAUTOB2: -26
CHANNELSHIFTAUTOT1: -21
CHANNELSHIFTAUTOT2: -10
CROSSMATCHDATEALLO: NORMAL
CROSSMATCHDATEAUTO: NORMAL
DONOR ALLO: NORMAL
DONOR AUTO: NORMAL
DONORCELLDATE ALLO: NORMAL
DONORCELLDATE AUTO: NORMAL
POS CUT OFF ALLO B: >93
POS CUT OFF ALLO T: >79
POS CUT OFF AUTO B: >93
POS CUT OFF AUTO T: >79
RESULT ALLO B1: NORMAL
RESULT ALLO B2: NORMAL
RESULT ALLO T1: NORMAL
RESULT ALLO T2: NORMAL
RESULT AUTO B1: NORMAL
RESULT AUTO B2: NORMAL
RESULT AUTO T1: NORMAL
RESULT AUTO T2: NORMAL
SARS-COV-2 RNA RESP QL NAA+PROBE: NEGATIVE
SERUM DATE ALLO B1: NORMAL
SERUM DATE ALLO B2: NORMAL
SERUM DATE ALLO T1: NORMAL
SERUM DATE ALLO T2: NORMAL
SERUM DATE AUTO B1: NORMAL
SERUM DATE AUTO B2: NORMAL
SERUM DATE AUTO T1: NORMAL
SERUM DATE AUTO T2: NORMAL
TESTMETHODALLO: NORMAL
TESTMETHODAUTO: NORMAL
TREATMENT ALLO B1: NORMAL
TREATMENT ALLO B2: NORMAL
TREATMENT ALLO T1: NORMAL
TREATMENT ALLO T2: NORMAL
TREATMENT AUTO B1: NORMAL
TREATMENT AUTO B2: NORMAL
TREATMENT AUTO T1: NORMAL
TREATMENT AUTO T2: NORMAL
ZZZCOMMENT ALLOB2: NORMAL

## 2023-02-20 PROCEDURE — U0003 INFECTIOUS AGENT DETECTION BY NUCLEIC ACID (DNA OR RNA); SEVERE ACUTE RESPIRATORY SYNDROME CORONAVIRUS 2 (SARS-COV-2) (CORONAVIRUS DISEASE [COVID-19]), AMPLIFIED PROBE TECHNIQUE, MAKING USE OF HIGH THROUGHPUT TECHNOLOGIES AS DESCRIBED BY CMS-2020-01-R: HCPCS | Mod: 90 | Performed by: PATHOLOGY

## 2023-02-20 PROCEDURE — 71046 X-RAY EXAM CHEST 2 VIEWS: CPT | Mod: GC | Performed by: RADIOLOGY

## 2023-02-20 PROCEDURE — U0005 INFEC AGEN DETEC AMPLI PROBE: HCPCS | Mod: 90 | Performed by: PATHOLOGY

## 2023-02-20 PROCEDURE — 99000 SPECIMEN HANDLING OFFICE-LAB: CPT | Performed by: PATHOLOGY

## 2023-02-20 NOTE — TELEPHONE ENCOUNTER
Waitlist Wrap Up Call    LDKT scheduled for tomorrow 2/21/23  Reminded pt that check in time is at 1130    Final XM reviewed Yes    COVID STATUS: Negative Date Reviewed: 2/20/23    CHEST XRAY REVIEWED: YES. Date-2/20/23 .    PREOP APPTS REVIEWED: Yes    Questions/concerns addressed with pt. Explained that pt will have post coordinator assigned to them post transplant. Reminded pt that if they would like to send letter to donor to contact [facility] when ready. Pt verbalized understanding of information and has no further questions. Encouraged to reach out if questions arise.

## 2023-02-21 ENCOUNTER — DOCUMENTATION ONLY (OUTPATIENT)
Dept: TRANSPLANT | Facility: CLINIC | Age: 58
End: 2023-02-21

## 2023-02-21 ENCOUNTER — APPOINTMENT (OUTPATIENT)
Dept: ULTRASOUND IMAGING | Facility: CLINIC | Age: 58
DRG: 652 | End: 2023-02-21
Attending: STUDENT IN AN ORGANIZED HEALTH CARE EDUCATION/TRAINING PROGRAM
Payer: COMMERCIAL

## 2023-02-21 ENCOUNTER — APPOINTMENT (OUTPATIENT)
Dept: GENERAL RADIOLOGY | Facility: CLINIC | Age: 58
DRG: 652 | End: 2023-02-21
Attending: STUDENT IN AN ORGANIZED HEALTH CARE EDUCATION/TRAINING PROGRAM
Payer: COMMERCIAL

## 2023-02-21 ENCOUNTER — APPOINTMENT (OUTPATIENT)
Dept: GENERAL RADIOLOGY | Facility: CLINIC | Age: 58
DRG: 652 | End: 2023-02-21
Attending: TRANSPLANT SURGERY
Payer: COMMERCIAL

## 2023-02-21 ENCOUNTER — ANESTHESIA (OUTPATIENT)
Dept: SURGERY | Facility: CLINIC | Age: 58
DRG: 652 | End: 2023-02-21
Payer: COMMERCIAL

## 2023-02-21 ENCOUNTER — HOSPITAL ENCOUNTER (INPATIENT)
Facility: CLINIC | Age: 58
LOS: 5 days | Discharge: HOME HEALTH | DRG: 652 | End: 2023-02-26
Attending: TRANSPLANT SURGERY | Admitting: TRANSPLANT SURGERY
Payer: COMMERCIAL

## 2023-02-21 DIAGNOSIS — Z76.82 AWAITING ORGAN TRANSPLANT: ICD-10-CM

## 2023-02-21 DIAGNOSIS — D84.9 IMMUNOSUPPRESSION (H): ICD-10-CM

## 2023-02-21 DIAGNOSIS — E11.22 TYPE 2 DIABETES MELLITUS WITH DIABETIC CHRONIC KIDNEY DISEASE, UNSPECIFIED CKD STAGE, UNSPECIFIED WHETHER LONG TERM INSULIN USE (H): ICD-10-CM

## 2023-02-21 DIAGNOSIS — N18.6 ESRD (END STAGE RENAL DISEASE) (H): ICD-10-CM

## 2023-02-21 DIAGNOSIS — R73.03 PRE-DIABETES: ICD-10-CM

## 2023-02-21 DIAGNOSIS — Z94.0 KIDNEY REPLACED BY TRANSPLANT: Primary | ICD-10-CM

## 2023-02-21 DIAGNOSIS — I10 ESSENTIAL HYPERTENSION: ICD-10-CM

## 2023-02-21 LAB
ABO/RH(D): NORMAL
ANION GAP SERPL CALCULATED.3IONS-SCNC: 18 MMOL/L (ref 7–15)
ANION GAP SERPL CALCULATED.3IONS-SCNC: 22 MMOL/L (ref 7–15)
ANTIBODY SCREEN: NEGATIVE
BASE EXCESS BLDA CALC-SCNC: -4 MMOL/L (ref -9.6–2)
BASE EXCESS BLDV CALC-SCNC: -7 MMOL/L (ref -8.1–1.9)
BASE EXCESS BLDV CALC-SCNC: -7.8 MMOL/L (ref -8.1–1.9)
BASE EXCESS BLDV CALC-SCNC: -8.3 MMOL/L (ref -8.1–1.9)
BASE EXCESS BLDV CALC-SCNC: -9.2 MMOL/L (ref -8.1–1.9)
BLD PROD TYP BPU: NORMAL
BLD PROD TYP BPU: NORMAL
BLOOD COMPONENT TYPE: NORMAL
BLOOD COMPONENT TYPE: NORMAL
BUN SERPL-MCNC: 70.3 MG/DL (ref 6–20)
BUN SERPL-MCNC: 82.1 MG/DL (ref 6–20)
CA-I BLD-MCNC: 3.8 MG/DL (ref 4.4–5.2)
CA-I BLD-MCNC: 3.9 MG/DL (ref 4.4–5.2)
CA-I BLD-MCNC: 3.9 MG/DL (ref 4.4–5.2)
CA-I BLD-MCNC: 4.1 MG/DL (ref 4.4–5.2)
CA-I BLD-MCNC: 4.3 MG/DL (ref 4.4–5.2)
CALCIUM SERPL-MCNC: 6.9 MG/DL (ref 8.6–10)
CALCIUM SERPL-MCNC: 8 MG/DL (ref 8.6–10)
CHLORIDE SERPL-SCNC: 91 MMOL/L (ref 98–107)
CHLORIDE SERPL-SCNC: 96 MMOL/L (ref 98–107)
CODING SYSTEM: NORMAL
CODING SYSTEM: NORMAL
CREAT SERPL-MCNC: 5.08 MG/DL (ref 0.67–1.17)
CREAT SERPL-MCNC: 6.92 MG/DL (ref 0.67–1.17)
CROSSMATCH: NORMAL
CROSSMATCH: NORMAL
DEPRECATED HCO3 PLAS-SCNC: 18 MMOL/L (ref 22–29)
DEPRECATED HCO3 PLAS-SCNC: 18 MMOL/L (ref 22–29)
ERYTHROCYTE [DISTWIDTH] IN BLOOD BY AUTOMATED COUNT: 15.3 % (ref 10–15)
ERYTHROCYTE [DISTWIDTH] IN BLOOD BY AUTOMATED COUNT: 15.5 % (ref 10–15)
ERYTHROCYTE [DISTWIDTH] IN BLOOD BY AUTOMATED COUNT: 15.6 % (ref 10–15)
GFR SERPL CREATININE-BSD FRML MDRD: 12 ML/MIN/1.73M2
GFR SERPL CREATININE-BSD FRML MDRD: 9 ML/MIN/1.73M2
GLUCOSE BLD-MCNC: 146 MG/DL (ref 70–99)
GLUCOSE BLD-MCNC: 159 MG/DL (ref 70–99)
GLUCOSE BLD-MCNC: 169 MG/DL (ref 70–99)
GLUCOSE BLD-MCNC: 186 MG/DL (ref 70–99)
GLUCOSE BLD-MCNC: 199 MG/DL (ref 70–99)
GLUCOSE BLDC GLUCOMTR-MCNC: 175 MG/DL (ref 70–99)
GLUCOSE BLDC GLUCOMTR-MCNC: 240 MG/DL (ref 70–99)
GLUCOSE BLDC GLUCOMTR-MCNC: 249 MG/DL (ref 70–99)
GLUCOSE BLDC GLUCOMTR-MCNC: 305 MG/DL (ref 70–99)
GLUCOSE SERPL-MCNC: 176 MG/DL (ref 70–99)
GLUCOSE SERPL-MCNC: 288 MG/DL (ref 70–99)
HBA1C MFR BLD: 6.8 %
HCO3 BLDA-SCNC: 20 MMOL/L (ref 21–28)
HCO3 BLDV-SCNC: 17 MMOL/L (ref 21–28)
HCO3 BLDV-SCNC: 18 MMOL/L (ref 21–28)
HCO3 BLDV-SCNC: 19 MMOL/L (ref 21–28)
HCO3 BLDV-SCNC: 19 MMOL/L (ref 21–28)
HCT VFR BLD AUTO: 24.3 % (ref 40–53)
HCT VFR BLD AUTO: 25.3 % (ref 40–53)
HCT VFR BLD AUTO: 27.8 % (ref 40–53)
HGB BLD-MCNC: 6.2 G/DL (ref 13.3–17.7)
HGB BLD-MCNC: 6.6 G/DL (ref 13.3–17.7)
HGB BLD-MCNC: 7.9 G/DL (ref 13.3–17.7)
HGB BLD-MCNC: 8 G/DL (ref 13.3–17.7)
HGB BLD-MCNC: 8.3 G/DL (ref 13.3–17.7)
HGB BLD-MCNC: 8.4 G/DL (ref 13.3–17.7)
HGB BLD-MCNC: 8.7 G/DL (ref 13.3–17.7)
HGB BLD-MCNC: 9.9 G/DL (ref 13.3–17.7)
ISSUE DATE AND TIME: NORMAL
ISSUE DATE AND TIME: NORMAL
LACTATE BLD-SCNC: 3.1 MMOL/L
LACTATE BLD-SCNC: 3.4 MMOL/L
LACTATE BLD-SCNC: 3.5 MMOL/L
LACTATE BLD-SCNC: 3.8 MMOL/L
LACTATE BLD-SCNC: 4 MMOL/L
MAGNESIUM SERPL-MCNC: 2.5 MG/DL (ref 1.7–2.3)
MCH RBC QN AUTO: 29.5 PG (ref 26.5–33)
MCH RBC QN AUTO: 29.9 PG (ref 26.5–33)
MCH RBC QN AUTO: 30 PG (ref 26.5–33)
MCHC RBC AUTO-ENTMCNC: 34.4 G/DL (ref 31.5–36.5)
MCHC RBC AUTO-ENTMCNC: 34.6 G/DL (ref 31.5–36.5)
MCHC RBC AUTO-ENTMCNC: 35.6 G/DL (ref 31.5–36.5)
MCV RBC AUTO: 83 FL (ref 78–100)
MCV RBC AUTO: 87 FL (ref 78–100)
MCV RBC AUTO: 87 FL (ref 78–100)
O2/TOTAL GAS SETTING VFR VENT: 38 %
O2/TOTAL GAS SETTING VFR VENT: 38 %
O2/TOTAL GAS SETTING VFR VENT: 44 %
O2/TOTAL GAS SETTING VFR VENT: 50 %
O2/TOTAL GAS SETTING VFR VENT: 81 %
PCO2 BLDA: 32 MM HG (ref 35–45)
PCO2 BLDV: 37 MM HG (ref 40–50)
PCO2 BLDV: 39 MM HG (ref 40–50)
PCO2 BLDV: 40 MM HG (ref 40–50)
PCO2 BLDV: 44 MM HG (ref 40–50)
PH BLDA: 7.41 [PH] (ref 7.35–7.45)
PH BLDV: 7.24 [PH] (ref 7.32–7.43)
PH BLDV: 7.27 [PH] (ref 7.32–7.43)
PH BLDV: 7.27 [PH] (ref 7.32–7.43)
PH BLDV: 7.29 [PH] (ref 7.32–7.43)
PHOSPHATE SERPL-MCNC: 4.8 MG/DL (ref 2.5–4.5)
PLAT MORPH BLD: ABNORMAL
PLATELET # BLD AUTO: 54 10E3/UL (ref 150–450)
PLATELET # BLD AUTO: 60 10E3/UL (ref 150–450)
PLATELET # BLD AUTO: 94 10E3/UL (ref 150–450)
PO2 BLDA: 226 MM HG (ref 80–105)
PO2 BLDV: 39 MM HG (ref 25–47)
PO2 BLDV: 42 MM HG (ref 25–47)
PO2 BLDV: 44 MM HG (ref 25–47)
PO2 BLDV: 44 MM HG (ref 25–47)
POLYCHROMASIA BLD QL SMEAR: SLIGHT
POTASSIUM BLD-SCNC: 3 MMOL/L (ref 3.5–5)
POTASSIUM BLD-SCNC: 3.1 MMOL/L (ref 3.5–5)
POTASSIUM BLD-SCNC: 3.1 MMOL/L (ref 3.5–5)
POTASSIUM BLD-SCNC: 3.2 MMOL/L (ref 3.5–5)
POTASSIUM BLD-SCNC: 3.3 MMOL/L (ref 3.5–5)
POTASSIUM SERPL-SCNC: 3.4 MMOL/L (ref 3.4–5.3)
POTASSIUM SERPL-SCNC: 3.7 MMOL/L (ref 3.4–5.3)
RBC # BLD AUTO: 2.81 10E6/UL (ref 4.4–5.9)
RBC # BLD AUTO: 2.9 10E6/UL (ref 4.4–5.9)
RBC # BLD AUTO: 3.36 10E6/UL (ref 4.4–5.9)
RBC MORPH BLD: ABNORMAL
SODIUM BLD-SCNC: 126 MMOL/L (ref 133–144)
SODIUM BLD-SCNC: 127 MMOL/L (ref 133–144)
SODIUM BLD-SCNC: 128 MMOL/L (ref 133–144)
SODIUM BLD-SCNC: 129 MMOL/L (ref 133–144)
SODIUM BLD-SCNC: 130 MMOL/L (ref 133–144)
SODIUM SERPL-SCNC: 131 MMOL/L (ref 136–145)
SODIUM SERPL-SCNC: 132 MMOL/L (ref 136–145)
SPECIMEN EXPIRATION DATE: NORMAL
UNIT ABO/RH: NORMAL
UNIT ABO/RH: NORMAL
UNIT NUMBER: NORMAL
UNIT NUMBER: NORMAL
UNIT STATUS: NORMAL
UNIT STATUS: NORMAL
UNIT TYPE ISBT: 6200
UNIT TYPE ISBT: 6200
WBC # BLD AUTO: 11.8 10E3/UL (ref 4–11)
WBC # BLD AUTO: 5.1 10E3/UL (ref 4–11)
WBC # BLD AUTO: 8.1 10E3/UL (ref 4–11)

## 2023-02-21 PROCEDURE — 50325 PREP DONOR RENAL GRAFT: CPT | Mod: RT | Performed by: TRANSPLANT SURGERY

## 2023-02-21 PROCEDURE — 811N000001 HC ACQUISITION KIDNEY LIVING DONOR

## 2023-02-21 PROCEDURE — 80048 BASIC METABOLIC PNL TOTAL CA: CPT | Performed by: ANESTHESIOLOGY

## 2023-02-21 PROCEDURE — 85027 COMPLETE CBC AUTOMATED: CPT

## 2023-02-21 PROCEDURE — 360N000077 HC SURGERY LEVEL 4, PER MIN: Performed by: TRANSPLANT SURGERY

## 2023-02-21 PROCEDURE — 86705 HEP B CORE ANTIBODY IGM: CPT | Performed by: TRANSPLANT SURGERY

## 2023-02-21 PROCEDURE — 250N000009 HC RX 250: Performed by: NURSE PRACTITIONER

## 2023-02-21 PROCEDURE — C2617 STENT, NON-COR, TEM W/O DEL: HCPCS | Performed by: TRANSPLANT SURGERY

## 2023-02-21 PROCEDURE — 250N000011 HC RX IP 250 OP 636: Performed by: TRANSPLANT SURGERY

## 2023-02-21 PROCEDURE — 82330 ASSAY OF CALCIUM: CPT

## 2023-02-21 PROCEDURE — 258N000003 HC RX IP 258 OP 636

## 2023-02-21 PROCEDURE — 250N000009 HC RX 250: Performed by: ANESTHESIOLOGY

## 2023-02-21 PROCEDURE — 84132 ASSAY OF SERUM POTASSIUM: CPT | Performed by: STUDENT IN AN ORGANIZED HEALTH CARE EDUCATION/TRAINING PROGRAM

## 2023-02-21 PROCEDURE — 36415 COLL VENOUS BLD VENIPUNCTURE: CPT | Performed by: NURSE PRACTITIONER

## 2023-02-21 PROCEDURE — 0TY10Z0 TRANSPLANTATION OF LEFT KIDNEY, ALLOGENEIC, OPEN APPROACH: ICD-10-PCS | Performed by: STUDENT IN AN ORGANIZED HEALTH CARE EDUCATION/TRAINING PROGRAM

## 2023-02-21 PROCEDURE — 85027 COMPLETE CBC AUTOMATED: CPT | Performed by: ANESTHESIOLOGY

## 2023-02-21 PROCEDURE — 258N000003 HC RX IP 258 OP 636: Performed by: TRANSPLANT SURGERY

## 2023-02-21 PROCEDURE — 86790 VIRUS ANTIBODY NOS: CPT | Performed by: NURSE PRACTITIONER

## 2023-02-21 PROCEDURE — 258N000003 HC RX IP 258 OP 636: Performed by: STUDENT IN AN ORGANIZED HEALTH CARE EDUCATION/TRAINING PROGRAM

## 2023-02-21 PROCEDURE — 710N000010 HC RECOVERY PHASE 1, LEVEL 2, PER MIN: Performed by: TRANSPLANT SURGERY

## 2023-02-21 PROCEDURE — 84132 ASSAY OF SERUM POTASSIUM: CPT

## 2023-02-21 PROCEDURE — 83036 HEMOGLOBIN GLYCOSYLATED A1C: CPT | Performed by: STUDENT IN AN ORGANIZED HEALTH CARE EDUCATION/TRAINING PROGRAM

## 2023-02-21 PROCEDURE — 999N000063 XR ABDOMEN PORT 1 VIEW

## 2023-02-21 PROCEDURE — 76776 US EXAM K TRANSPL W/DOPPLER: CPT | Mod: 26 | Performed by: RADIOLOGY

## 2023-02-21 PROCEDURE — 250N000011 HC RX IP 250 OP 636: Performed by: ANESTHESIOLOGY

## 2023-02-21 PROCEDURE — 87535 HIV-1 PROBE&REVERSE TRNSCRPJ: CPT | Performed by: NURSE PRACTITIONER

## 2023-02-21 PROCEDURE — 84295 ASSAY OF SERUM SODIUM: CPT

## 2023-02-21 PROCEDURE — 250N000011 HC RX IP 250 OP 636: Performed by: STUDENT IN AN ORGANIZED HEALTH CARE EDUCATION/TRAINING PROGRAM

## 2023-02-21 PROCEDURE — 86850 RBC ANTIBODY SCREEN: CPT | Performed by: NURSE PRACTITIONER

## 2023-02-21 PROCEDURE — 87389 HIV-1 AG W/HIV-1&-2 AB AG IA: CPT | Performed by: NURSE PRACTITIONER

## 2023-02-21 PROCEDURE — 86923 COMPATIBILITY TEST ELECTRIC: CPT

## 2023-02-21 PROCEDURE — 370N000017 HC ANESTHESIA TECHNICAL FEE, PER MIN: Performed by: TRANSPLANT SURGERY

## 2023-02-21 PROCEDURE — P9016 RBC LEUKOCYTES REDUCED: HCPCS

## 2023-02-21 PROCEDURE — 258N000003 HC RX IP 258 OP 636: Performed by: NURSE PRACTITIONER

## 2023-02-21 PROCEDURE — 87340 HEPATITIS B SURFACE AG IA: CPT | Performed by: NURSE PRACTITIONER

## 2023-02-21 PROCEDURE — 50360 RNL ALTRNSPLJ W/O RCP NFRCT: CPT | Mod: RT | Performed by: TRANSPLANT SURGERY

## 2023-02-21 PROCEDURE — 74018 RADEX ABDOMEN 1 VIEW: CPT | Mod: 26 | Performed by: RADIOLOGY

## 2023-02-21 PROCEDURE — 85027 COMPLETE CBC AUTOMATED: CPT | Performed by: STUDENT IN AN ORGANIZED HEALTH CARE EDUCATION/TRAINING PROGRAM

## 2023-02-21 PROCEDURE — 86704 HEP B CORE ANTIBODY TOTAL: CPT | Performed by: NURSE PRACTITIONER

## 2023-02-21 PROCEDURE — 272N000002 HC OR SUPPLY OTHER OPNP: Performed by: TRANSPLANT SURGERY

## 2023-02-21 PROCEDURE — 999N000065 XR CHEST PORT 1 VIEW

## 2023-02-21 PROCEDURE — 250N000025 HC SEVOFLURANE, PER MIN: Performed by: TRANSPLANT SURGERY

## 2023-02-21 PROCEDURE — 87521 HEPATITIS C PROBE&RVRS TRNSC: CPT | Performed by: NURSE PRACTITIONER

## 2023-02-21 PROCEDURE — 86706 HEP B SURFACE ANTIBODY: CPT | Performed by: NURSE PRACTITIONER

## 2023-02-21 PROCEDURE — 83735 ASSAY OF MAGNESIUM: CPT | Performed by: STUDENT IN AN ORGANIZED HEALTH CARE EDUCATION/TRAINING PROGRAM

## 2023-02-21 PROCEDURE — 87516 HEPATITIS B DNA AMP PROBE: CPT | Performed by: NURSE PRACTITIONER

## 2023-02-21 PROCEDURE — 71045 X-RAY EXAM CHEST 1 VIEW: CPT | Mod: 26 | Performed by: RADIOLOGY

## 2023-02-21 PROCEDURE — 120N000011 HC R&B TRANSPLANT UMMC

## 2023-02-21 PROCEDURE — 272N000001 HC OR GENERAL SUPPLY STERILE: Performed by: TRANSPLANT SURGERY

## 2023-02-21 PROCEDURE — 49422 REMOVE TUNNELED IP CATH: CPT | Performed by: TRANSPLANT SURGERY

## 2023-02-21 PROCEDURE — 250N000011 HC RX IP 250 OP 636: Performed by: NURSE PRACTITIONER

## 2023-02-21 PROCEDURE — 250N000009 HC RX 250: Performed by: STUDENT IN AN ORGANIZED HEALTH CARE EDUCATION/TRAINING PROGRAM

## 2023-02-21 PROCEDURE — 76776 US EXAM K TRANSPL W/DOPPLER: CPT

## 2023-02-21 PROCEDURE — 258N000003 HC RX IP 258 OP 636: Performed by: ANESTHESIOLOGY

## 2023-02-21 PROCEDURE — 250N000013 HC RX MED GY IP 250 OP 250 PS 637: Performed by: ANESTHESIOLOGY

## 2023-02-21 PROCEDURE — 999N000141 HC STATISTIC PRE-PROCEDURE NURSING ASSESSMENT: Performed by: TRANSPLANT SURGERY

## 2023-02-21 PROCEDURE — 86803 HEPATITIS C AB TEST: CPT | Performed by: NURSE PRACTITIONER

## 2023-02-21 PROCEDURE — 84100 ASSAY OF PHOSPHORUS: CPT | Performed by: STUDENT IN AN ORGANIZED HEALTH CARE EDUCATION/TRAINING PROGRAM

## 2023-02-21 PROCEDURE — 86900 BLOOD TYPING SEROLOGIC ABO: CPT | Performed by: NURSE PRACTITIONER

## 2023-02-21 DEVICE — SOF-FLEX DOUBLE PIGTAIL URETERAL STENT SET
Type: IMPLANTABLE DEVICE | Site: ABDOMEN | Status: NON-FUNCTIONAL
Brand: SOF-FLEX
Removed: 2023-03-28

## 2023-02-21 RX ORDER — SODIUM CHLORIDE, SODIUM LACTATE, POTASSIUM CHLORIDE, CALCIUM CHLORIDE 600; 310; 30; 20 MG/100ML; MG/100ML; MG/100ML; MG/100ML
INJECTION, SOLUTION INTRAVENOUS CONTINUOUS
Status: DISCONTINUED | OUTPATIENT
Start: 2023-02-21 | End: 2023-02-21 | Stop reason: HOSPADM

## 2023-02-21 RX ORDER — CALCIUM GLUCONATE 20 MG/ML
1 INJECTION, SOLUTION INTRAVENOUS ONCE
Status: COMPLETED | OUTPATIENT
Start: 2023-02-21 | End: 2023-02-22

## 2023-02-21 RX ORDER — DIPHENHYDRAMINE HCL 25 MG
25 CAPSULE ORAL EVERY 6 HOURS PRN
Status: DISCONTINUED | OUTPATIENT
Start: 2023-02-21 | End: 2023-02-21 | Stop reason: HOSPADM

## 2023-02-21 RX ORDER — CEFUROXIME SODIUM 1.5 G/16ML
1.5 INJECTION, POWDER, FOR SOLUTION INTRAVENOUS SEE ADMIN INSTRUCTIONS
Status: DISCONTINUED | OUTPATIENT
Start: 2023-02-21 | End: 2023-02-21 | Stop reason: HOSPADM

## 2023-02-21 RX ORDER — SODIUM CHLORIDE 450 MG/100ML
INJECTION, SOLUTION INTRAVENOUS CONTINUOUS PRN
Status: DISCONTINUED | OUTPATIENT
Start: 2023-02-21 | End: 2023-02-22

## 2023-02-21 RX ORDER — DIPHENHYDRAMINE HYDROCHLORIDE 50 MG/ML
25 INJECTION INTRAMUSCULAR; INTRAVENOUS EVERY 6 HOURS PRN
Status: DISCONTINUED | OUTPATIENT
Start: 2023-02-21 | End: 2023-02-21 | Stop reason: HOSPADM

## 2023-02-21 RX ORDER — LIDOCAINE 40 MG/G
CREAM TOPICAL
Status: DISCONTINUED | OUTPATIENT
Start: 2023-02-21 | End: 2023-02-21 | Stop reason: HOSPADM

## 2023-02-21 RX ORDER — SODIUM CHLORIDE 9 MG/ML
1000 INJECTION, SOLUTION INTRAVENOUS CONTINUOUS PRN
Status: DISCONTINUED | OUTPATIENT
Start: 2023-02-21 | End: 2023-02-22

## 2023-02-21 RX ORDER — NICOTINE POLACRILEX 4 MG
15-30 LOZENGE BUCCAL
Status: DISCONTINUED | OUTPATIENT
Start: 2023-02-21 | End: 2023-02-21 | Stop reason: HOSPADM

## 2023-02-21 RX ORDER — HYDROMORPHONE HCL IN WATER/PF 6 MG/30 ML
0.2 PATIENT CONTROLLED ANALGESIA SYRINGE INTRAVENOUS EVERY 5 MIN PRN
Status: DISCONTINUED | OUTPATIENT
Start: 2023-02-21 | End: 2023-02-21 | Stop reason: HOSPADM

## 2023-02-21 RX ORDER — HEPARIN SODIUM 1000 [USP'U]/ML
INJECTION, SOLUTION INTRAVENOUS; SUBCUTANEOUS PRN
Status: DISCONTINUED | OUTPATIENT
Start: 2023-02-21 | End: 2023-02-21

## 2023-02-21 RX ORDER — TRAZODONE HYDROCHLORIDE 50 MG/1
50 TABLET, FILM COATED ORAL AT BEDTIME
COMMUNITY
End: 2023-03-03

## 2023-02-21 RX ORDER — LIDOCAINE HYDROCHLORIDE 10 MG/ML
INJECTION, SOLUTION INFILTRATION; PERINEURAL
Status: COMPLETED | OUTPATIENT
Start: 2023-02-21 | End: 2023-02-21

## 2023-02-21 RX ORDER — CALCIUM CHLORIDE 100 MG/ML
INJECTION INTRAVENOUS; INTRAVENTRICULAR PRN
Status: DISCONTINUED | OUTPATIENT
Start: 2023-02-21 | End: 2023-02-21

## 2023-02-21 RX ORDER — DEXTROSE MONOHYDRATE 25 G/50ML
25-50 INJECTION, SOLUTION INTRAVENOUS
Status: DISCONTINUED | OUTPATIENT
Start: 2023-02-21 | End: 2023-02-21 | Stop reason: HOSPADM

## 2023-02-21 RX ORDER — FENTANYL CITRATE 50 UG/ML
50 INJECTION, SOLUTION INTRAMUSCULAR; INTRAVENOUS EVERY 5 MIN PRN
Status: DISCONTINUED | OUTPATIENT
Start: 2023-02-21 | End: 2023-02-21 | Stop reason: HOSPADM

## 2023-02-21 RX ORDER — FENTANYL CITRATE 50 UG/ML
INJECTION, SOLUTION INTRAMUSCULAR; INTRAVENOUS PRN
Status: DISCONTINUED | OUTPATIENT
Start: 2023-02-21 | End: 2023-02-21

## 2023-02-21 RX ORDER — DEXTROSE, SODIUM CHLORIDE, SODIUM LACTATE, POTASSIUM CHLORIDE, AND CALCIUM CHLORIDE 5; .6; .31; .03; .02 G/100ML; G/100ML; G/100ML; G/100ML; G/100ML
INJECTION, SOLUTION INTRAVENOUS CONTINUOUS
Status: DISCONTINUED | OUTPATIENT
Start: 2023-02-21 | End: 2023-02-22

## 2023-02-21 RX ORDER — HYDRALAZINE HYDROCHLORIDE 20 MG/ML
2.5-5 INJECTION INTRAMUSCULAR; INTRAVENOUS EVERY 10 MIN PRN
Status: DISCONTINUED | OUTPATIENT
Start: 2023-02-21 | End: 2023-02-21 | Stop reason: HOSPADM

## 2023-02-21 RX ORDER — HYDROMORPHONE HCL IN WATER/PF 6 MG/30 ML
0.4 PATIENT CONTROLLED ANALGESIA SYRINGE INTRAVENOUS EVERY 5 MIN PRN
Status: DISCONTINUED | OUTPATIENT
Start: 2023-02-21 | End: 2023-02-21 | Stop reason: HOSPADM

## 2023-02-21 RX ORDER — PROPOFOL 10 MG/ML
INJECTION, EMULSION INTRAVENOUS PRN
Status: DISCONTINUED | OUTPATIENT
Start: 2023-02-21 | End: 2023-02-21

## 2023-02-21 RX ORDER — FENTANYL CITRATE 50 UG/ML
25 INJECTION, SOLUTION INTRAMUSCULAR; INTRAVENOUS EVERY 5 MIN PRN
Status: DISCONTINUED | OUTPATIENT
Start: 2023-02-21 | End: 2023-02-21 | Stop reason: HOSPADM

## 2023-02-21 RX ORDER — HALOPERIDOL 5 MG/ML
1 INJECTION INTRAMUSCULAR
Status: DISCONTINUED | OUTPATIENT
Start: 2023-02-21 | End: 2023-02-21 | Stop reason: HOSPADM

## 2023-02-21 RX ORDER — ACETAMINOPHEN 325 MG/1
975 TABLET ORAL ONCE
Status: COMPLETED | OUTPATIENT
Start: 2023-02-21 | End: 2023-02-21

## 2023-02-21 RX ORDER — MEPERIDINE HYDROCHLORIDE 25 MG/ML
12.5 INJECTION INTRAMUSCULAR; INTRAVENOUS; SUBCUTANEOUS EVERY 5 MIN PRN
Status: DISCONTINUED | OUTPATIENT
Start: 2023-02-21 | End: 2023-02-21 | Stop reason: HOSPADM

## 2023-02-21 RX ORDER — FUROSEMIDE 10 MG/ML
INJECTION INTRAMUSCULAR; INTRAVENOUS PRN
Status: DISCONTINUED | OUTPATIENT
Start: 2023-02-21 | End: 2023-02-21

## 2023-02-21 RX ORDER — DEXTROSE MONOHYDRATE 100 MG/ML
INJECTION, SOLUTION INTRAVENOUS CONTINUOUS PRN
Status: DISCONTINUED | OUTPATIENT
Start: 2023-02-21 | End: 2023-02-21 | Stop reason: HOSPADM

## 2023-02-21 RX ORDER — ONDANSETRON 4 MG/1
4 TABLET, ORALLY DISINTEGRATING ORAL EVERY 30 MIN PRN
Status: DISCONTINUED | OUTPATIENT
Start: 2023-02-21 | End: 2023-02-21 | Stop reason: HOSPADM

## 2023-02-21 RX ORDER — SODIUM CHLORIDE, SODIUM LACTATE, POTASSIUM CHLORIDE, CALCIUM CHLORIDE 600; 310; 30; 20 MG/100ML; MG/100ML; MG/100ML; MG/100ML
INJECTION, SOLUTION INTRAVENOUS CONTINUOUS PRN
Status: DISCONTINUED | OUTPATIENT
Start: 2023-02-21 | End: 2023-02-21

## 2023-02-21 RX ORDER — NITROGLYCERIN 10 MG/100ML
INJECTION INTRAVENOUS PRN
Status: DISCONTINUED | OUTPATIENT
Start: 2023-02-21 | End: 2023-02-21

## 2023-02-21 RX ORDER — CEFUROXIME SODIUM 1.5 G/16ML
1.5 INJECTION, POWDER, FOR SOLUTION INTRAVENOUS ONCE
Status: COMPLETED | OUTPATIENT
Start: 2023-02-21 | End: 2023-02-21

## 2023-02-21 RX ORDER — MANNITOL 250 MG/ML
INJECTION, SOLUTION INTRAVENOUS PRN
Status: DISCONTINUED | OUTPATIENT
Start: 2023-02-21 | End: 2023-02-21

## 2023-02-21 RX ORDER — ONDANSETRON 2 MG/ML
4 INJECTION INTRAMUSCULAR; INTRAVENOUS EVERY 30 MIN PRN
Status: DISCONTINUED | OUTPATIENT
Start: 2023-02-21 | End: 2023-02-21 | Stop reason: HOSPADM

## 2023-02-21 RX ORDER — ONDANSETRON 2 MG/ML
INJECTION INTRAMUSCULAR; INTRAVENOUS PRN
Status: DISCONTINUED | OUTPATIENT
Start: 2023-02-21 | End: 2023-02-21

## 2023-02-21 RX ORDER — LABETALOL HYDROCHLORIDE 5 MG/ML
10 INJECTION, SOLUTION INTRAVENOUS
Status: DISCONTINUED | OUTPATIENT
Start: 2023-02-21 | End: 2023-02-21 | Stop reason: HOSPADM

## 2023-02-21 RX ORDER — DIAZEPAM 10 MG/2ML
2.5 INJECTION, SOLUTION INTRAMUSCULAR; INTRAVENOUS
Status: DISCONTINUED | OUTPATIENT
Start: 2023-02-21 | End: 2023-02-21 | Stop reason: HOSPADM

## 2023-02-21 RX ORDER — SODIUM CHLORIDE, SODIUM GLUCONATE, SODIUM ACETATE, POTASSIUM CHLORIDE AND MAGNESIUM CHLORIDE 526; 502; 368; 37; 30 MG/100ML; MG/100ML; MG/100ML; MG/100ML; MG/100ML
INJECTION, SOLUTION INTRAVENOUS CONTINUOUS PRN
Status: DISCONTINUED | OUTPATIENT
Start: 2023-02-21 | End: 2023-02-21

## 2023-02-21 RX ADMIN — MIDAZOLAM 1 MG: 1 INJECTION INTRAMUSCULAR; INTRAVENOUS at 14:34

## 2023-02-21 RX ADMIN — PHENYLEPHRINE HYDROCHLORIDE 50 MCG: 10 INJECTION INTRAVENOUS at 15:01

## 2023-02-21 RX ADMIN — SUGAMMADEX 200 MG: 100 INJECTION, SOLUTION INTRAVENOUS at 20:19

## 2023-02-21 RX ADMIN — PHENYLEPHRINE HYDROCHLORIDE 100 MCG: 10 INJECTION INTRAVENOUS at 19:11

## 2023-02-21 RX ADMIN — PHENYLEPHRINE HYDROCHLORIDE 100 MCG: 10 INJECTION INTRAVENOUS at 18:57

## 2023-02-21 RX ADMIN — SODIUM CHLORIDE, SODIUM LACTATE, POTASSIUM CHLORIDE, CALCIUM CHLORIDE AND DEXTROSE MONOHYDRATE: 5; 600; 310; 30; 20 INJECTION, SOLUTION INTRAVENOUS at 20:48

## 2023-02-21 RX ADMIN — MANNITOL 25 G: 12.5 INJECTION, SOLUTION INTRAVENOUS at 17:40

## 2023-02-21 RX ADMIN — PHENYLEPHRINE HYDROCHLORIDE 100 MCG: 10 INJECTION INTRAVENOUS at 18:41

## 2023-02-21 RX ADMIN — FENTANYL CITRATE 100 MCG: 50 INJECTION, SOLUTION INTRAMUSCULAR; INTRAVENOUS at 15:05

## 2023-02-21 RX ADMIN — ACETAMINOPHEN 975 MG: 325 TABLET, FILM COATED ORAL at 13:04

## 2023-02-21 RX ADMIN — SODIUM CHLORIDE 500 ML: 9 INJECTION, SOLUTION INTRAVENOUS at 22:41

## 2023-02-21 RX ADMIN — ANTI-THYMOCYTE GLOBULIN (RABBIT) 125 MG: 5 INJECTION, POWDER, LYOPHILIZED, FOR SOLUTION INTRAVENOUS at 16:03

## 2023-02-21 RX ADMIN — METHYLPREDNISOLONE SODIUM SUCCINATE 500 MG: 500 INJECTION INTRAMUSCULAR; INTRAVENOUS at 15:48

## 2023-02-21 RX ADMIN — PHENYLEPHRINE HYDROCHLORIDE 100 MCG: 10 INJECTION INTRAVENOUS at 18:28

## 2023-02-21 RX ADMIN — SODIUM CHLORIDE, POTASSIUM CHLORIDE, SODIUM LACTATE AND CALCIUM CHLORIDE: 600; 310; 30; 20 INJECTION, SOLUTION INTRAVENOUS at 15:50

## 2023-02-21 RX ADMIN — PHENYLEPHRINE HYDROCHLORIDE 100 MCG: 10 INJECTION INTRAVENOUS at 19:05

## 2023-02-21 RX ADMIN — PHENYLEPHRINE HYDROCHLORIDE 200 MCG: 10 INJECTION INTRAVENOUS at 19:12

## 2023-02-21 RX ADMIN — SODIUM CHLORIDE 1000 ML: 9 INJECTION, SOLUTION INTRAVENOUS at 21:00

## 2023-02-21 RX ADMIN — SODIUM CHLORIDE, SODIUM GLUCONATE, SODIUM ACETATE, POTASSIUM CHLORIDE AND MAGNESIUM CHLORIDE: 526; 502; 368; 37; 30 INJECTION, SOLUTION INTRAVENOUS at 16:18

## 2023-02-21 RX ADMIN — PHENYLEPHRINE HYDROCHLORIDE 100 MCG: 10 INJECTION INTRAVENOUS at 19:03

## 2023-02-21 RX ADMIN — Medication 80 MG: at 15:02

## 2023-02-21 RX ADMIN — PROPOFOL 30 MG: 10 INJECTION, EMULSION INTRAVENOUS at 19:44

## 2023-02-21 RX ADMIN — PHENYLEPHRINE HYDROCHLORIDE 100 MCG: 10 INJECTION INTRAVENOUS at 18:43

## 2023-02-21 RX ADMIN — FENTANYL CITRATE 100 MCG: 50 INJECTION, SOLUTION INTRAMUSCULAR; INTRAVENOUS at 15:01

## 2023-02-21 RX ADMIN — PHENYLEPHRINE HYDROCHLORIDE 150 MCG: 10 INJECTION INTRAVENOUS at 19:15

## 2023-02-21 RX ADMIN — MIDAZOLAM 1 MG: 1 INJECTION INTRAMUSCULAR; INTRAVENOUS at 14:49

## 2023-02-21 RX ADMIN — CALCIUM CHLORIDE 500 MG: 100 INJECTION INTRAVENOUS; INTRAVENTRICULAR at 17:46

## 2023-02-21 RX ADMIN — FENTANYL CITRATE 50 MCG: 50 INJECTION, SOLUTION INTRAMUSCULAR; INTRAVENOUS at 20:45

## 2023-02-21 RX ADMIN — SODIUM CHLORIDE, SODIUM GLUCONATE, SODIUM ACETATE, POTASSIUM CHLORIDE AND MAGNESIUM CHLORIDE: 526; 502; 368; 37; 30 INJECTION, SOLUTION INTRAVENOUS at 14:22

## 2023-02-21 RX ADMIN — PHENYLEPHRINE HYDROCHLORIDE 100 MCG: 10 INJECTION INTRAVENOUS at 19:54

## 2023-02-21 RX ADMIN — PHENYLEPHRINE HYDROCHLORIDE 150 MCG: 10 INJECTION INTRAVENOUS at 18:12

## 2023-02-21 RX ADMIN — CEFUROXIME 1.5 G: 1.5 INJECTION, POWDER, FOR SOLUTION INTRAVENOUS at 15:00

## 2023-02-21 RX ADMIN — CALCIUM CHLORIDE 500 MG: 100 INJECTION INTRAVENOUS; INTRAVENTRICULAR at 17:48

## 2023-02-21 RX ADMIN — LIDOCAINE HYDROCHLORIDE 3 ML: 10 INJECTION, SOLUTION INFILTRATION; PERINEURAL at 14:44

## 2023-02-21 RX ADMIN — PHENYLEPHRINE HYDROCHLORIDE 100 MCG: 10 INJECTION INTRAVENOUS at 18:22

## 2023-02-21 RX ADMIN — CEFUROXIME 1.5 G: 1.5 INJECTION, POWDER, FOR SOLUTION INTRAVENOUS at 18:59

## 2023-02-21 RX ADMIN — HUMAN INSULIN 2.5 UNITS/HR: 100 INJECTION, SOLUTION SUBCUTANEOUS at 21:47

## 2023-02-21 RX ADMIN — Medication 1.5 UNITS/KG/HR: at 16:14

## 2023-02-21 RX ADMIN — ONDANSETRON 4 MG: 2 INJECTION INTRAMUSCULAR; INTRAVENOUS at 19:31

## 2023-02-21 RX ADMIN — PROPOFOL 130 MG: 10 INJECTION, EMULSION INTRAVENOUS at 15:01

## 2023-02-21 RX ADMIN — PROPOFOL 20 MG: 10 INJECTION, EMULSION INTRAVENOUS at 20:11

## 2023-02-21 RX ADMIN — PHENYLEPHRINE HYDROCHLORIDE 100 MCG: 10 INJECTION INTRAVENOUS at 18:36

## 2023-02-21 RX ADMIN — PHENYLEPHRINE HYDROCHLORIDE 200 MCG: 10 INJECTION INTRAVENOUS at 19:30

## 2023-02-21 RX ADMIN — FENTANYL CITRATE 50 MCG: 50 INJECTION, SOLUTION INTRAMUSCULAR; INTRAVENOUS at 21:00

## 2023-02-21 RX ADMIN — HYDROMORPHONE HYDROCHLORIDE 0.5 MG: 1 INJECTION, SOLUTION INTRAMUSCULAR; INTRAVENOUS; SUBCUTANEOUS at 18:25

## 2023-02-21 RX ADMIN — NITROGLYCERIN 50 MCG: 10 INJECTION INTRAVENOUS at 15:09

## 2023-02-21 RX ADMIN — PHENYLEPHRINE HYDROCHLORIDE 100 MCG: 10 INJECTION INTRAVENOUS at 18:32

## 2023-02-21 RX ADMIN — FUROSEMIDE 40 MG: 10 INJECTION, SOLUTION INTRAVENOUS at 17:40

## 2023-02-21 RX ADMIN — HEPARIN SODIUM 2000 UNITS: 1000 INJECTION INTRAVENOUS; SUBCUTANEOUS at 17:05

## 2023-02-21 RX ADMIN — PHENYLEPHRINE HYDROCHLORIDE 100 MCG: 10 INJECTION INTRAVENOUS at 18:45

## 2023-02-21 RX ADMIN — MYCOPHENOLATE MOFETIL 1000 MG: 500 INJECTION, POWDER, LYOPHILIZED, FOR SOLUTION INTRAVENOUS at 16:03

## 2023-02-21 RX ADMIN — FENTANYL CITRATE 50 MCG: 50 INJECTION, SOLUTION INTRAMUSCULAR; INTRAVENOUS at 14:42

## 2023-02-21 ASSESSMENT — ACTIVITIES OF DAILY LIVING (ADL)
ADLS_ACUITY_SCORE: 22
ADLS_ACUITY_SCORE: 22
ADLS_ACUITY_SCORE: 20
ADLS_ACUITY_SCORE: 22

## 2023-02-21 NOTE — ANESTHESIA PROCEDURE NOTES
Perioperative DEZ Procedure Note    Staff -        Anesthesiologist:  Elie Dias MD       Performed By: anesthesiologist  Preanesthesia Checklist:  Patient identified, IV assessed, risks and benefits discussed, monitors and equipment assessed, procedure being performed at surgeon's request and anesthesia consent obtained.    DEZ Probe Insertion  Probe Number: [id number]  Probe Status PRE Insertion: NO obvious damage  Probe type:  Adult 3D  Bite block used:   Soft and Oral Airway  Insertion Technique: Jaw Lift  Insertion complications: None obvious  Billing Report:A DEZ report is NOT being generated.  Probe Status POST Removal: NO obvious damage

## 2023-02-21 NOTE — ANESTHESIA PREPROCEDURE EVALUATION
Anesthesia Pre-Procedure Evaluation    Patient: Modesto Barbosa   MRN: [id number] : 1965        Procedure : Procedure(s):  Kidney Transplant Non-Directed Recipient          Past Medical History:   Diagnosis Date     Acute kidney injury (H) 6/10/2021     CKD (chronic kidney disease) stage 5, GFR less than 15 ml/min (H)      Dyslipidemia      Metabolic acidosis      Type 2 diabetes mellitus (H)       Past Surgical History:   Procedure Laterality Date     CV CORONARY ANGIOGRAM N/A 2021    Procedure: Coronary Angiogram;  Surgeon: Cristela Banks MD;  Location: [facility] CATH LAB CV     CV LEFT HEART CATH N/A 2021    Procedure: Left Heart Cath;  Surgeon: Cristela Banks MD;  Location: [facility] CATH LAB CV     CV PCI N/A 2021    Procedure: Percutaneous Coronary Intervention;  Surgeon: Cristela Banks MD;  Location: [facility] CATH LAB CV      No Known Allergies   Social History     Tobacco Use     Smoking status: Never     Passive exposure: Never     Smokeless tobacco: Never   Substance Use Topics     Alcohol use: Not Currently      Wt Readings from Last 1 Encounters:   23 58.9 kg (129 lb 13.6 oz)        Anesthesia Evaluation   Pt has had prior anesthetic.     No history of anesthetic complications       ROS/MED HX  ENT/Pulmonary:  - neg pulmonary ROS     Neurologic:       Cardiovascular: Comment: HOCM    Non-contrast Cardiac MRI     1.  Small left ventricular cavity related to hypertrophy with hyperdynamic left ventricular systolic  function.  There is asymmetric anteroseptal hypertrophy with a maximal diameter of 19 mm with evidence of  left ventricular outflow tract obstruction.  There is moderate chordal MINA. The quantified left ventricular  ejection fraction is 80.7%.    2.  Normal right ventricular size and systolic function.    3.  Aortic valve sclerosis without stenosis.  Mild aortic regurgitation.    4.  Mild mitral regurgitation.           5. Moderate left atrial enlargement.     ==========================================================================================================  REPORT FINDINGS:     LEFT VENTRICLE: LV wall thickness is normal. LV cavity size is normal. LV systolic function is normal. There is no LV  mass/thrombus. LV systolic function is normal. LV systolic function is normal. Quantitative LVEF 80.7 %.     RIGHT VENTRICLE: RV wall thickness is normal. RV cavity size is normal. RV systolic function is normal. There is no RV  mass/thrombus.     LA/RA SEPTUM: The atrial septum is intact. The atrial septum is intact. The atrial septum is intact.     LEFT ATRIUM: LA is moderately enlarged.     RIGHT ATRIUM: RA cavity size is normal.     PERICARDIUM: Pericardium is normal. There is a trivial pericardial effusion.     PLEURAL EFFUSION: There is no pleural effusion.     AORTIC VALVE: The aortic valve annulus is normal in size. Aortic valve is trileaflet. There is mild aortic regurgitation.  There is no aortic stenosis. Aortic valve is mildly thickened. Peak aortic valve velocity 149.5 cm/sec.  Aortic regurgitant volume 4.9 ml. Aortic regurgitant fraction 6.6 %.     MITRAL VALVE: The mitral valve annulus is normal in size. Mitral valve leaflets are normal. There is mild mitral  regurgitation. There is no mitral stenosis.      TRICUSPID VALVE: The tricuspid valve annulus is normal in size. Tricuspid valve leaflets are normal. There is mild tricuspid  regurgitation. There is no tricuspid stenosis.      PULMONIC VALVE: The pulmonic valve annulus is normal in size. Pulmonic valve leaflets are normal. There is no pulmonic  regurgitation. There is no pulmonic stenosis.      AORTIC ROOT: The aortic root is normal.     CHEST:  Normal thoracic aortic size and its major branches.      ABDOMEN: Abdominal Ascites noted    [facility] 12/2021:  Mild diffuse LAD  OM3 -> SHAVONNE  Mild RCA disease    (+) Dyslipidemia hypertension-----    METS/Exercise Tolerance: 4 - Raking leaves, gardening     Hematologic:       Musculoskeletal:       GI/Hepatic:       Renal/Genitourinary:     (+) renal disease, type: ESRD, Pt requires dialysis, type: Peritoneal dialysis,  (-) History of transplant   Endo:     (+) type II DM, Using insulin,     Psychiatric/Substance Use:       Infectious Disease:       Malignancy:       Other:            Physical Exam    Airway        Mallampati: II   TM distance: > 3 FB   Neck ROM: full   Mouth opening: > 3 cm    Respiratory Devices and Support         Dental         B=Bridge, C=Chipped, L=Loose, M=Missing    Cardiovascular          Rhythm and rate: regular and normal     Pulmonary           breath sounds clear to auscultation           OUTSIDE LABS:  CBC:   Lab Results   Component Value Date    WBC 11.8 (H) 02/21/2023    WBC 6.7 02/16/2023    HGB 9.9 (L) 02/21/2023    HGB 10.4 (L) 02/16/2023    HCT 27.8 (L) 02/21/2023    HCT 29.8 (L) 02/16/2023    PLT 94 (L) 02/21/2023    PLT 86 (L) 02/16/2023     BMP:   Lab Results   Component Value Date     (L) 02/21/2023     (L) 02/16/2023    POTASSIUM 3.4 02/21/2023    POTASSIUM 3.1 (L) 02/16/2023    CHLORIDE 91 (L) 02/21/2023    CHLORIDE 92 (L) 02/16/2023    CO2 18 (L) 02/21/2023    CO2 27 02/16/2023    BUN 82.1 (H) 02/21/2023    BUN 53.2 (H) 02/16/2023    CR 6.92 (H) 02/21/2023    CR 5.99 (H) 02/16/2023     (H) 02/21/2023     (H) 02/21/2023     COAGS:   Lab Results   Component Value Date    PTT 42 (H) 11/17/2022    INR 1.08 02/16/2023     POC: No results found for: BGM, HCG, HCGS  HEPATIC:   Lab Results   Component Value Date    ALBUMIN 3.3 (L) 02/16/2023    PROTTOTAL 6.1 (L) 02/16/2023    ALT 18 02/16/2023    AST 18 02/16/2023    ALKPHOS 66 02/16/2023    BILITOTAL 0.2 02/16/2023     OTHER:   Lab Results   Component Value Date    LACT 2.0 11/16/2022    A1C 7.1 (H) 02/16/2023    REYMUNDO 8.0 (L) 02/21/2023    PHOS 7.9 (H) 11/17/2022    MAG 2.0 11/16/2022    LIPASE 124 (H) 06/10/2021       Anesthesia Plan    ASA Status:  3    NPO Status:  NPO Appropriate    Anesthesia Type: General.     - Airway: ETT         Techniques and Equipment:     - Lines/Monitors: 2nd IV, Arterial Line, Central Line, DEZ            DEZ Absolute Contra-indication: NONE     Consents    Anesthesia Plan(s) and associated risks, benefits, and realistic alternatives discussed. Questions answered and patient/representative(s) expressed understanding.    - Discussed:     - Discussed with:  Patient      - Extended Intubation/Ventilatory Support Discussed: No.      - Patient is DNR/DNI Status: No    Use of blood products discussed: Yes.     - Discussed with: Patient.     - Consented: consented to blood products            Reason for refusal: other.     Postoperative Care            Comments:                Elie Dais MD

## 2023-02-21 NOTE — ANESTHESIA PROCEDURE NOTES
Arterial Line Procedure Note    Pre-Procedure   Staff -        Anesthesiologist:  Elie Dias MD       Performed By: anesthesiologist       Location: OR       Pre-Anesthestic Checklist: patient identified, IV checked, risks and benefits discussed, informed consent, monitors and equipment checked, pre-op evaluation and at physician/surgeon's request  Timeout:       Correct Patient: Yes        Correct Procedure: Yes        Correct Site: Yes        Correct Position: Yes   Line Placement:   This line was placed Pre Induction starting at 2/21/2023 2:44 PM and ending at 2/21/2023 2:59 PM  Procedure   Procedure: arterial line, new line and elective       Laterality: left       Insertion Site: radial.  Sterile Prep        Standard elements of sterile barrier followed       Skin prep: Chloraprep  Insertion/Injection        Technique: ultrasound guided        1. Ultrasound was used to evaluate the access site.       2. Artery evaluated via ultrasound for patency/adequacy.       3. Using real-time ultrasound the needle/catheter was observed entering the artery/vein.       Catheter Type/Size: 20 G, 1.75 in/4.5 cm quick cath (integral wire)  Narrative         Secured by: suture       Tegaderm dressing used.       Complications: None apparent,        Arterial waveform: Yes    Comments:  Attempt x1. First attempt with ultrasound. Positive blood return but unable to pass wire or advance catheter. Pressure placed for 5 minutes. Pressure dressing placed    Second attempt with ultrasound. Positive blood return, able to thread catheter after advancing needle. Good pulsatile blood flow return.

## 2023-02-21 NOTE — PROGRESS NOTES
Blood sugar at 1235 was 305. Called Dr. Dias no new orders at this time, they will readdress the blood sugar and insulin after the surgery begins.

## 2023-02-21 NOTE — ANESTHESIA PROCEDURE NOTES
Airway       Patient location during procedure: OR       Procedure Start/Stop Times: 2/21/2023 3:12 PM  Staff -        Other Anesthesia Staff: Elysia Machado       Performed By: SRNA  Consent for Airway        Urgency: elective  Indications and Patient Condition       Indications for airway management: edwige-procedural       Induction type:intravenous       Mask difficulty assessment: 2 - vent by mask + OA or adjuvant +/- NMBA    Final Airway Details       Final airway type: endotracheal airway       Successful airway: ETT - single and Oral  Endotracheal Airway Details        ETT size (mm): 7.5       Cuffed: yes       Successful intubation technique: direct laryngoscopy       DL Blade Type: MAC 3       Grade View of Cords: 2       Adjucts: stylet       Position: Right       Measured from: gums/teeth       Secured at (cm): 22    Post intubation assessment        Placement verified by: capnometry, equal breath sounds and chest rise        Number of attempts at approach: 1       Secured with: silk tape       Ease of procedure: easy       Dentition: Unchanged    Medication(s) Administered   Medication Administration Time: 2/21/2023 3:12 PM

## 2023-02-21 NOTE — ANESTHESIA PROCEDURE NOTES
Central Line/PA Catheter Placement    Pre-Procedure   Staff -        Anesthesiologist:  Elie Dias MD       Performed By: anesthesiologist       Location: OR       Pre-Anesthestic Checklist: patient identified, IV checked, site marked, risks and benefits discussed, informed consent, monitors and equipment checked, pre-op evaluation and at physician/surgeon's request  Timeout:       Correct Patient: Yes        Correct Procedure: Yes        Correct Site: Yes        Correct Position: Yes        Correct Laterality: Yes   Line Placement:   This line was placed Post Induction    Procedure   Procedure: central line       Laterality: right       Insertion Site: internal jugular.       Patient Position: Trendelenburg  Sterile Prep        All elements of maximal sterile barrier technique followed       Patient Prep/Sterile Barriers: draped, hand hygiene, gloves , hat , mask , draped, gown, sterile gel and probe cover       Skin prep: Chloraprep  Insertion/Injection        Technique: ultrasound guided and Seldinger Technique        1. Ultrasound was used to evaluate the access site.       2. Vein evaluated via ultrasound for patency/adequacy.       3. Using real-time ultrasound the needle/catheter was observed entering the artery/vein.       Type: PA/CVC with Introducer       Catheter Size: 7 Fr       Catheter Length: 20       Number of Lumens: triple lumen  Narrative         Secured by: suture       Tegaderm and Biopatch dressing used.       Complications: None apparent,        blood aspirated from all lumens,        All lumens flushed: Yes       Verification method: Placement to be verified post-op   Comments:  Placement verified by manometry and by ultrasound of the wire during procedure

## 2023-02-22 ENCOUNTER — APPOINTMENT (OUTPATIENT)
Dept: INTERPRETER SERVICES | Facility: CLINIC | Age: 58
DRG: 652 | End: 2023-02-22
Attending: TRANSPLANT SURGERY
Payer: COMMERCIAL

## 2023-02-22 ENCOUNTER — DOCUMENTATION ONLY (OUTPATIENT)
Dept: TRANSPLANT | Facility: CLINIC | Age: 58
End: 2023-02-22
Payer: COMMERCIAL

## 2023-02-22 LAB
ANION GAP SERPL CALCULATED.3IONS-SCNC: 13 MMOL/L (ref 7–15)
BASOPHILS # BLD AUTO: 0 10E3/UL (ref 0–0.2)
BASOPHILS NFR BLD AUTO: 0 %
BUN SERPL-MCNC: 52.9 MG/DL (ref 6–20)
CA-I BLD-MCNC: 4.1 MG/DL (ref 4.4–5.2)
CALCIUM SERPL-MCNC: 7 MG/DL (ref 8.6–10)
CHLORIDE SERPL-SCNC: 106 MMOL/L (ref 98–107)
CMV DNA SPEC NAA+PROBE-ACNC: NOT DETECTED IU/ML
CREAT SERPL-MCNC: 3.03 MG/DL (ref 0.67–1.17)
DEPRECATED HCO3 PLAS-SCNC: 17 MMOL/L (ref 22–29)
DONOR IDENTIFICATION: NORMAL
DSA COMMENTS: NORMAL
DSA PRESENT: NO
DSA TEST METHOD: NORMAL
EOSINOPHIL # BLD AUTO: 0 10E3/UL (ref 0–0.7)
EOSINOPHIL NFR BLD AUTO: 0 %
ERYTHROCYTE [DISTWIDTH] IN BLOOD BY AUTOMATED COUNT: 15.4 % (ref 10–15)
GFR SERPL CREATININE-BSD FRML MDRD: 23 ML/MIN/1.73M2
GLUCOSE BLDC GLUCOMTR-MCNC: 114 MG/DL (ref 70–99)
GLUCOSE BLDC GLUCOMTR-MCNC: 119 MG/DL (ref 70–99)
GLUCOSE BLDC GLUCOMTR-MCNC: 123 MG/DL (ref 70–99)
GLUCOSE BLDC GLUCOMTR-MCNC: 128 MG/DL (ref 70–99)
GLUCOSE BLDC GLUCOMTR-MCNC: 128 MG/DL (ref 70–99)
GLUCOSE BLDC GLUCOMTR-MCNC: 129 MG/DL (ref 70–99)
GLUCOSE BLDC GLUCOMTR-MCNC: 130 MG/DL (ref 70–99)
GLUCOSE BLDC GLUCOMTR-MCNC: 137 MG/DL (ref 70–99)
GLUCOSE BLDC GLUCOMTR-MCNC: 139 MG/DL (ref 70–99)
GLUCOSE BLDC GLUCOMTR-MCNC: 139 MG/DL (ref 70–99)
GLUCOSE BLDC GLUCOMTR-MCNC: 151 MG/DL (ref 70–99)
GLUCOSE BLDC GLUCOMTR-MCNC: 153 MG/DL (ref 70–99)
GLUCOSE BLDC GLUCOMTR-MCNC: 161 MG/DL (ref 70–99)
GLUCOSE BLDC GLUCOMTR-MCNC: 167 MG/DL (ref 70–99)
GLUCOSE BLDC GLUCOMTR-MCNC: 174 MG/DL (ref 70–99)
GLUCOSE BLDC GLUCOMTR-MCNC: 196 MG/DL (ref 70–99)
GLUCOSE BLDC GLUCOMTR-MCNC: 295 MG/DL (ref 70–99)
GLUCOSE BLDC GLUCOMTR-MCNC: 322 MG/DL (ref 70–99)
GLUCOSE BLDC GLUCOMTR-MCNC: 95 MG/DL (ref 70–99)
GLUCOSE SERPL-MCNC: 135 MG/DL (ref 70–99)
HBA1C MFR BLD: 6.8 %
HBV CORE AB SERPL QL IA: REACTIVE
HBV SURFACE AB SERPL IA-ACNC: 4.52 M[IU]/ML
HBV SURFACE AB SERPL IA-ACNC: NONREACTIVE M[IU]/ML
HBV SURFACE AG SERPL QL IA: NONREACTIVE
HCT VFR BLD AUTO: 24.8 % (ref 40–53)
HCV AB SERPL QL IA: NONREACTIVE
HGB BLD-MCNC: 7.1 G/DL (ref 13.3–17.7)
HGB BLD-MCNC: 7.6 G/DL (ref 13.3–17.7)
HGB BLD-MCNC: 7.7 G/DL (ref 13.3–17.7)
HGB BLD-MCNC: 8.2 G/DL (ref 13.3–17.7)
HGB BLD-MCNC: 8.3 G/DL (ref 13.3–17.7)
HGB BLD-MCNC: 8.4 G/DL (ref 13.3–17.7)
HIV 1+2 AB+HIV1 P24 AG SERPL QL IA: NONREACTIVE
IMM GRANULOCYTES # BLD: 0.4 10E3/UL
IMM GRANULOCYTES NFR BLD: 3 %
LYMPHOCYTES # BLD AUTO: 0 10E3/UL (ref 0.8–5.3)
LYMPHOCYTES NFR BLD AUTO: 0 %
MAGNESIUM SERPL-MCNC: 1.7 MG/DL (ref 1.7–2.3)
MCH RBC QN AUTO: 29.1 PG (ref 26.5–33)
MCHC RBC AUTO-ENTMCNC: 33.5 G/DL (ref 31.5–36.5)
MCV RBC AUTO: 87 FL (ref 78–100)
MONOCYTES # BLD AUTO: 0.3 10E3/UL (ref 0–1.3)
MONOCYTES NFR BLD AUTO: 2 %
NEUTROPHILS # BLD AUTO: 14.6 10E3/UL (ref 1.6–8.3)
NEUTROPHILS NFR BLD AUTO: 95 %
NRBC # BLD AUTO: 0.1 10E3/UL
NRBC BLD AUTO-RTO: 1 /100
ORGAN: NORMAL
PHOSPHATE SERPL-MCNC: 3.7 MG/DL (ref 2.5–4.5)
PLATELET # BLD AUTO: 87 10E3/UL (ref 150–450)
POTASSIUM SERPL-SCNC: 3 MMOL/L (ref 3.4–5.3)
POTASSIUM SERPL-SCNC: 3.3 MMOL/L (ref 3.4–5.3)
POTASSIUM SERPL-SCNC: 3.6 MMOL/L (ref 3.4–5.3)
POTASSIUM SERPL-SCNC: 3.8 MMOL/L (ref 3.4–5.3)
RBC # BLD AUTO: 2.85 10E6/UL (ref 4.4–5.9)
SODIUM SERPL-SCNC: 136 MMOL/L (ref 136–145)
WBC # BLD AUTO: 15.3 10E3/UL (ref 4–11)

## 2023-02-22 PROCEDURE — 82330 ASSAY OF CALCIUM: CPT | Performed by: STUDENT IN AN ORGANIZED HEALTH CARE EDUCATION/TRAINING PROGRAM

## 2023-02-22 PROCEDURE — 36592 COLLECT BLOOD FROM PICC: CPT | Performed by: STUDENT IN AN ORGANIZED HEALTH CARE EDUCATION/TRAINING PROGRAM

## 2023-02-22 PROCEDURE — 250N000009 HC RX 250

## 2023-02-22 PROCEDURE — 258N000003 HC RX IP 258 OP 636: Performed by: PHYSICIAN ASSISTANT

## 2023-02-22 PROCEDURE — 250N000012 HC RX MED GY IP 250 OP 636 PS 637: Performed by: STUDENT IN AN ORGANIZED HEALTH CARE EDUCATION/TRAINING PROGRAM

## 2023-02-22 PROCEDURE — 80048 BASIC METABOLIC PNL TOTAL CA: CPT | Performed by: STUDENT IN AN ORGANIZED HEALTH CARE EDUCATION/TRAINING PROGRAM

## 2023-02-22 PROCEDURE — 99223 1ST HOSP IP/OBS HIGH 75: CPT | Mod: 24

## 2023-02-22 PROCEDURE — 85018 HEMOGLOBIN: CPT | Performed by: STUDENT IN AN ORGANIZED HEALTH CARE EDUCATION/TRAINING PROGRAM

## 2023-02-22 PROCEDURE — 258N000003 HC RX IP 258 OP 636: Performed by: STUDENT IN AN ORGANIZED HEALTH CARE EDUCATION/TRAINING PROGRAM

## 2023-02-22 PROCEDURE — 250N000013 HC RX MED GY IP 250 OP 250 PS 637: Performed by: STUDENT IN AN ORGANIZED HEALTH CARE EDUCATION/TRAINING PROGRAM

## 2023-02-22 PROCEDURE — 85025 COMPLETE CBC W/AUTO DIFF WBC: CPT | Performed by: STUDENT IN AN ORGANIZED HEALTH CARE EDUCATION/TRAINING PROGRAM

## 2023-02-22 PROCEDURE — 258N000003 HC RX IP 258 OP 636

## 2023-02-22 PROCEDURE — 999N000157 HC STATISTIC RCP TIME EA 10 MIN

## 2023-02-22 PROCEDURE — 84100 ASSAY OF PHOSPHORUS: CPT | Performed by: STUDENT IN AN ORGANIZED HEALTH CARE EDUCATION/TRAINING PROGRAM

## 2023-02-22 PROCEDURE — 120N000011 HC R&B TRANSPLANT UMMC

## 2023-02-22 PROCEDURE — 250N000011 HC RX IP 250 OP 636: Performed by: PHYSICIAN ASSISTANT

## 2023-02-22 PROCEDURE — 250N000013 HC RX MED GY IP 250 OP 250 PS 637: Performed by: PHYSICIAN ASSISTANT

## 2023-02-22 PROCEDURE — 99222 1ST HOSP IP/OBS MODERATE 55: CPT | Mod: GC | Performed by: INTERNAL MEDICINE

## 2023-02-22 PROCEDURE — 83036 HEMOGLOBIN GLYCOSYLATED A1C: CPT

## 2023-02-22 PROCEDURE — 83735 ASSAY OF MAGNESIUM: CPT | Performed by: STUDENT IN AN ORGANIZED HEALTH CARE EDUCATION/TRAINING PROGRAM

## 2023-02-22 PROCEDURE — 250N000011 HC RX IP 250 OP 636

## 2023-02-22 PROCEDURE — 250N000011 HC RX IP 250 OP 636: Performed by: STUDENT IN AN ORGANIZED HEALTH CARE EDUCATION/TRAINING PROGRAM

## 2023-02-22 PROCEDURE — 36415 COLL VENOUS BLD VENIPUNCTURE: CPT | Performed by: STUDENT IN AN ORGANIZED HEALTH CARE EDUCATION/TRAINING PROGRAM

## 2023-02-22 RX ORDER — DEXTROSE MONOHYDRATE 100 MG/ML
INJECTION, SOLUTION INTRAVENOUS CONTINUOUS PRN
Status: DISCONTINUED | OUTPATIENT
Start: 2023-02-22 | End: 2023-02-26 | Stop reason: HOSPADM

## 2023-02-22 RX ORDER — MYCOPHENOLATE MOFETIL 250 MG/1
750 CAPSULE ORAL
Status: DISCONTINUED | OUTPATIENT
Start: 2023-02-22 | End: 2023-02-26 | Stop reason: HOSPADM

## 2023-02-22 RX ORDER — SODIUM BICARBONATE 650 MG/1
650 TABLET ORAL 2 TIMES DAILY
Status: DISCONTINUED | OUTPATIENT
Start: 2023-02-22 | End: 2023-02-26 | Stop reason: HOSPADM

## 2023-02-22 RX ORDER — POTASSIUM CHLORIDE 7.45 MG/ML
10 INJECTION INTRAVENOUS
Status: DISCONTINUED | OUTPATIENT
Start: 2023-02-22 | End: 2023-02-22

## 2023-02-22 RX ORDER — POTASSIUM CHLORIDE 750 MG/1
20 TABLET, EXTENDED RELEASE ORAL ONCE
Status: COMPLETED | OUTPATIENT
Start: 2023-02-22 | End: 2023-02-22

## 2023-02-22 RX ORDER — NICOTINE POLACRILEX 4 MG
15-30 LOZENGE BUCCAL
Status: DISCONTINUED | OUTPATIENT
Start: 2023-02-22 | End: 2023-02-22

## 2023-02-22 RX ORDER — NICOTINE POLACRILEX 4 MG
15-30 LOZENGE BUCCAL
Status: DISCONTINUED | OUTPATIENT
Start: 2023-02-22 | End: 2023-02-26 | Stop reason: HOSPADM

## 2023-02-22 RX ORDER — OXYCODONE HYDROCHLORIDE 5 MG/1
5 TABLET ORAL EVERY 4 HOURS PRN
Status: DISCONTINUED | OUTPATIENT
Start: 2023-02-22 | End: 2023-02-26 | Stop reason: HOSPADM

## 2023-02-22 RX ORDER — HYDROMORPHONE HCL IN WATER/PF 6 MG/30 ML
0.2 PATIENT CONTROLLED ANALGESIA SYRINGE INTRAVENOUS
Status: DISCONTINUED | OUTPATIENT
Start: 2023-02-22 | End: 2023-02-23

## 2023-02-22 RX ORDER — DEXTROSE MONOHYDRATE 25 G/50ML
25-50 INJECTION, SOLUTION INTRAVENOUS
Status: DISCONTINUED | OUTPATIENT
Start: 2023-02-22 | End: 2023-02-26 | Stop reason: HOSPADM

## 2023-02-22 RX ORDER — VITAMIN B COMPLEX
50 TABLET ORAL DAILY
Status: DISCONTINUED | OUTPATIENT
Start: 2023-02-22 | End: 2023-02-26 | Stop reason: HOSPADM

## 2023-02-22 RX ORDER — ASPIRIN 81 MG/1
81 TABLET ORAL DAILY
Status: DISCONTINUED | OUTPATIENT
Start: 2023-02-22 | End: 2023-02-26 | Stop reason: HOSPADM

## 2023-02-22 RX ORDER — NALOXONE HYDROCHLORIDE 0.4 MG/ML
0.2 INJECTION, SOLUTION INTRAMUSCULAR; INTRAVENOUS; SUBCUTANEOUS
Status: DISCONTINUED | OUTPATIENT
Start: 2023-02-22 | End: 2023-02-26 | Stop reason: HOSPADM

## 2023-02-22 RX ORDER — VALGANCICLOVIR 450 MG/1
450 TABLET, FILM COATED ORAL
Status: DISCONTINUED | OUTPATIENT
Start: 2023-02-23 | End: 2023-02-23

## 2023-02-22 RX ORDER — SODIUM CHLORIDE 9 MG/ML
INJECTION, SOLUTION INTRAVENOUS CONTINUOUS
Status: DISCONTINUED | OUTPATIENT
Start: 2023-02-22 | End: 2023-02-23

## 2023-02-22 RX ORDER — PREDNISONE 20 MG/1
60 TABLET ORAL DAILY
Status: COMPLETED | OUTPATIENT
Start: 2023-02-24 | End: 2023-02-25

## 2023-02-22 RX ORDER — DEXTROSE MONOHYDRATE 25 G/50ML
25-50 INJECTION, SOLUTION INTRAVENOUS
Status: DISCONTINUED | OUTPATIENT
Start: 2023-02-22 | End: 2023-02-22

## 2023-02-22 RX ORDER — ONDANSETRON 2 MG/ML
4 INJECTION INTRAMUSCULAR; INTRAVENOUS EVERY 6 HOURS PRN
Status: DISCONTINUED | OUTPATIENT
Start: 2023-02-22 | End: 2023-02-24

## 2023-02-22 RX ORDER — POTASSIUM CHLORIDE 7.45 MG/ML
10 INJECTION INTRAVENOUS ONCE
Status: COMPLETED | OUTPATIENT
Start: 2023-02-22 | End: 2023-02-22

## 2023-02-22 RX ORDER — METHYLPREDNISOLONE SODIUM SUCCINATE 125 MG/2ML
100 INJECTION, POWDER, LYOPHILIZED, FOR SOLUTION INTRAMUSCULAR; INTRAVENOUS ONCE
Status: COMPLETED | OUTPATIENT
Start: 2023-02-23 | End: 2023-02-23

## 2023-02-22 RX ORDER — ONDANSETRON 4 MG/1
4 TABLET, ORALLY DISINTEGRATING ORAL EVERY 6 HOURS PRN
Status: DISCONTINUED | OUTPATIENT
Start: 2023-02-22 | End: 2023-02-26 | Stop reason: HOSPADM

## 2023-02-22 RX ORDER — TACROLIMUS 1 MG/1
2 CAPSULE ORAL
Status: DISCONTINUED | OUTPATIENT
Start: 2023-02-22 | End: 2023-02-24

## 2023-02-22 RX ORDER — SULFAMETHOXAZOLE AND TRIMETHOPRIM 400; 80 MG/1; MG/1
1 TABLET ORAL
Status: DISCONTINUED | OUTPATIENT
Start: 2023-02-22 | End: 2023-02-23

## 2023-02-22 RX ORDER — ACETAMINOPHEN 325 MG/1
650 TABLET ORAL EVERY 4 HOURS PRN
Status: DISCONTINUED | OUTPATIENT
Start: 2023-02-24 | End: 2023-02-26 | Stop reason: HOSPADM

## 2023-02-22 RX ORDER — BISACODYL 10 MG
10 SUPPOSITORY, RECTAL RECTAL DAILY PRN
Status: DISCONTINUED | OUTPATIENT
Start: 2023-02-22 | End: 2023-02-26 | Stop reason: HOSPADM

## 2023-02-22 RX ORDER — PREDNISONE 20 MG/1
20 TABLET ORAL DAILY
Status: DISCONTINUED | OUTPATIENT
Start: 2023-02-28 | End: 2023-02-26 | Stop reason: HOSPADM

## 2023-02-22 RX ORDER — PROCHLORPERAZINE MALEATE 5 MG
10 TABLET ORAL EVERY 6 HOURS PRN
Status: DISCONTINUED | OUTPATIENT
Start: 2023-02-22 | End: 2023-02-26 | Stop reason: HOSPADM

## 2023-02-22 RX ORDER — POLYETHYLENE GLYCOL 3350 17 G/17G
17 POWDER, FOR SOLUTION ORAL DAILY
Status: DISCONTINUED | OUTPATIENT
Start: 2023-02-22 | End: 2023-02-26 | Stop reason: HOSPADM

## 2023-02-22 RX ORDER — ATORVASTATIN CALCIUM 20 MG/1
20 TABLET, FILM COATED ORAL DAILY
Status: DISCONTINUED | OUTPATIENT
Start: 2023-02-23 | End: 2023-02-26 | Stop reason: HOSPADM

## 2023-02-22 RX ORDER — HYDROMORPHONE HCL IN WATER/PF 6 MG/30 ML
0.4 PATIENT CONTROLLED ANALGESIA SYRINGE INTRAVENOUS
Status: DISCONTINUED | OUTPATIENT
Start: 2023-02-22 | End: 2023-02-23

## 2023-02-22 RX ORDER — PREDNISONE 20 MG/1
40 TABLET ORAL DAILY
Status: DISCONTINUED | OUTPATIENT
Start: 2023-02-26 | End: 2023-02-26 | Stop reason: HOSPADM

## 2023-02-22 RX ORDER — PREDNISONE 5 MG/1
5 TABLET ORAL DAILY
Status: DISCONTINUED | OUTPATIENT
Start: 2023-03-21 | End: 2023-02-26 | Stop reason: HOSPADM

## 2023-02-22 RX ORDER — AMOXICILLIN 250 MG
1 CAPSULE ORAL 2 TIMES DAILY
Status: DISCONTINUED | OUTPATIENT
Start: 2023-02-22 | End: 2023-02-26 | Stop reason: HOSPADM

## 2023-02-22 RX ORDER — ATORVASTATIN CALCIUM 10 MG/1
10 TABLET, FILM COATED ORAL DAILY
Status: DISCONTINUED | OUTPATIENT
Start: 2023-02-22 | End: 2023-02-22

## 2023-02-22 RX ORDER — NALOXONE HYDROCHLORIDE 0.4 MG/ML
0.4 INJECTION, SOLUTION INTRAMUSCULAR; INTRAVENOUS; SUBCUTANEOUS
Status: DISCONTINUED | OUTPATIENT
Start: 2023-02-22 | End: 2023-02-26 | Stop reason: HOSPADM

## 2023-02-22 RX ORDER — MAGNESIUM OXIDE 400 MG/1
400 TABLET ORAL
Status: DISCONTINUED | OUTPATIENT
Start: 2023-02-23 | End: 2023-02-26 | Stop reason: HOSPADM

## 2023-02-22 RX ORDER — CALCIUM CARBONATE 500 MG/1
1000 TABLET, CHEWABLE ORAL 2 TIMES DAILY
Status: DISCONTINUED | OUTPATIENT
Start: 2023-02-22 | End: 2023-02-26 | Stop reason: HOSPADM

## 2023-02-22 RX ORDER — HYDRALAZINE HYDROCHLORIDE 100 MG/1
50 TABLET, FILM COATED ORAL 3 TIMES DAILY
Status: ON HOLD | COMMUNITY
End: 2023-02-25

## 2023-02-22 RX ORDER — ATORVASTATIN CALCIUM 40 MG/1
40 TABLET, FILM COATED ORAL DAILY
Status: DISCONTINUED | OUTPATIENT
Start: 2023-02-22 | End: 2023-02-22

## 2023-02-22 RX ORDER — OXYCODONE HYDROCHLORIDE 10 MG/1
10 TABLET ORAL EVERY 4 HOURS PRN
Status: DISCONTINUED | OUTPATIENT
Start: 2023-02-22 | End: 2023-02-23

## 2023-02-22 RX ORDER — PREDNISONE 10 MG/1
10 TABLET ORAL DAILY
Status: DISCONTINUED | OUTPATIENT
Start: 2023-03-14 | End: 2023-02-26 | Stop reason: HOSPADM

## 2023-02-22 RX ORDER — ACETAMINOPHEN 325 MG/1
975 TABLET ORAL EVERY 8 HOURS
Status: COMPLETED | OUTPATIENT
Start: 2023-02-22 | End: 2023-02-24

## 2023-02-22 RX ADMIN — ACETAMINOPHEN 975 MG: 325 TABLET ORAL at 17:05

## 2023-02-22 RX ADMIN — CALCIUM GLUCONATE 1 G: 20 INJECTION, SOLUTION INTRAVENOUS at 00:27

## 2023-02-22 RX ADMIN — HUMAN INSULIN 1 UNITS/HR: 100 INJECTION, SOLUTION SUBCUTANEOUS at 10:18

## 2023-02-22 RX ADMIN — TACROLIMUS 2 MG: 1 CAPSULE ORAL at 08:08

## 2023-02-22 RX ADMIN — ACETAMINOPHEN 975 MG: 325 TABLET ORAL at 01:05

## 2023-02-22 RX ADMIN — POLYETHYLENE GLYCOL 3350 17 G: 17 POWDER, FOR SOLUTION ORAL at 08:07

## 2023-02-22 RX ADMIN — SENNOSIDES AND DOCUSATE SODIUM 1 TABLET: 50; 8.6 TABLET ORAL at 19:28

## 2023-02-22 RX ADMIN — SODIUM CHLORIDE 1000 ML: 9 INJECTION, SOLUTION INTRAVENOUS at 00:27

## 2023-02-22 RX ADMIN — SODIUM CHLORIDE, SODIUM LACTATE, POTASSIUM CHLORIDE, CALCIUM CHLORIDE AND DEXTROSE MONOHYDRATE: 5; 600; 310; 30; 20 INJECTION, SOLUTION INTRAVENOUS at 06:09

## 2023-02-22 RX ADMIN — SULFAMETHOXAZOLE AND TRIMETHOPRIM 1 TABLET: 400; 80 TABLET ORAL at 08:08

## 2023-02-22 RX ADMIN — Medication 50 MCG: at 15:39

## 2023-02-22 RX ADMIN — ATORVASTATIN CALCIUM 40 MG: 40 TABLET, FILM COATED ORAL at 08:07

## 2023-02-22 RX ADMIN — TACROLIMUS 2 MG: 1 CAPSULE ORAL at 17:32

## 2023-02-22 RX ADMIN — MYCOPHENOLATE MOFETIL 750 MG: 250 CAPSULE ORAL at 08:07

## 2023-02-22 RX ADMIN — POTASSIUM CHLORIDE 10 MEQ: 7.46 INJECTION, SOLUTION INTRAVENOUS at 03:51

## 2023-02-22 RX ADMIN — ACETAMINOPHEN 975 MG: 325 TABLET ORAL at 08:08

## 2023-02-22 RX ADMIN — SENNOSIDES AND DOCUSATE SODIUM 1 TABLET: 50; 8.6 TABLET ORAL at 08:07

## 2023-02-22 RX ADMIN — MYCOPHENOLATE MOFETIL 750 MG: 250 CAPSULE ORAL at 17:33

## 2023-02-22 RX ADMIN — SODIUM CHLORIDE 20 MG: 9 INJECTION, SOLUTION INTRAVENOUS at 13:43

## 2023-02-22 RX ADMIN — POTASSIUM CHLORIDE 20 MEQ: 750 TABLET, EXTENDED RELEASE ORAL at 11:08

## 2023-02-22 RX ADMIN — SODIUM CHLORIDE: 9 INJECTION, SOLUTION INTRAVENOUS at 15:13

## 2023-02-22 RX ADMIN — METHYLPREDNISOLONE SODIUM SUCCINATE 250 MG: 125 INJECTION, POWDER, LYOPHILIZED, FOR SOLUTION INTRAMUSCULAR; INTRAVENOUS at 13:04

## 2023-02-22 RX ADMIN — CALCIUM CARBONATE (ANTACID) CHEW TAB 500 MG 1000 MG: 500 CHEW TAB at 19:28

## 2023-02-22 RX ADMIN — POTASSIUM CHLORIDE 20 MEQ: 750 TABLET, EXTENDED RELEASE ORAL at 15:38

## 2023-02-22 RX ADMIN — SODIUM BICARBONATE 650 MG: 650 TABLET ORAL at 11:08

## 2023-02-22 RX ADMIN — INSULIN GLARGINE 30 UNITS: 100 INJECTION, SOLUTION SUBCUTANEOUS at 13:43

## 2023-02-22 RX ADMIN — HUMAN INSULIN 9 UNITS/HR: 100 INJECTION, SOLUTION SUBCUTANEOUS at 00:17

## 2023-02-22 RX ADMIN — SODIUM BICARBONATE 650 MG: 650 TABLET ORAL at 19:28

## 2023-02-22 RX ADMIN — ASPIRIN 81 MG: 81 TABLET ORAL at 10:18

## 2023-02-22 ASSESSMENT — ACTIVITIES OF DAILY LIVING (ADL)
ADLS_ACUITY_SCORE: 28
DEPENDENT_IADLS:: INDEPENDENT
ADLS_ACUITY_SCORE: 28
ADLS_ACUITY_SCORE: 28
ADLS_ACUITY_SCORE: 22
ADLS_ACUITY_SCORE: 26
ADLS_ACUITY_SCORE: 26
ADLS_ACUITY_SCORE: 28
ADLS_ACUITY_SCORE: 26

## 2023-02-22 NOTE — CONSULTS
[facility]  Transplant Nephrology Consult  Date of Admission:  2/21/2023  Today's Date: 02/22/2023  Requesting physician: Talha Weinstein MD    Recommendations:  - Agree with intermediate risk induction given PRA 27. Basiliximab today  - Recommend sodium bicarbonate 650 mg PO twice daily.  - Start 2000 units cholecalciferol PO daily  - Tums 1000 mg PO twice daily between meals.  - Check hepatitis B DNA level three months post transplant given Hep B Core Ab+ and hep B sAb+  -Continue to replete K      Assessment & Plan   # LDKT: Trend down   - Baseline Creatinine: ~ TBD   - Proteinuria: Not checked post transplant   - Date DSA Last Checked: Feb/2023      Latest DSA: No DSA at time of transplant   - BK Viremia: Not checked post transplant   - Kidney Tx Biopsy: No    -Double J stent placed at time of kidney transplant. Remove 4-6 weeks post transplant.   -PD catheter removed at time of transplant   -3d mcgovern    # Immunosuppression: Tacrolimus immediate release (goal 8-10), Mycophenolate mofetil (dose 750 mg every 12 hours) and Prednisone (dose taper)    - Intermediate risk induction   - Changes: No    # Infection Prophylaxis:   - PJP: Sulfa/TMP (Bactrim)  - CMV: Valganciclovir (Valcyte) x3m    # Hypertension: Borderline control;  Goal BP: < 150/90   - Volume status: Euvolemic  EDW ~ TBD   - Changes: No    # Diabetes: Controlled (HbA1c <7%) Last HbA1c: 6.8% (2/22/23)   - Management as per primary team.    # Anemia in Chronic Renal Disease: Hgb: Trend down, received PRBC on 2/21      NERY: No   - Iron studies: Replete    # Mineral Bone Disorder:   - Secondary renal hyperparathyroidism; PTH level: Mildly elevated (151-300 pg/ml)        On treatment: None  - Vitamin D; level: Low        On supplement: No. Start cholecalciferol 2000 units PO daily.  - Calcium; level: Low         On supplement: No . Start Tums 1000 mg PO between meals.  - Phosphorus; level: Normal        On  supplement: No    # Electrolytes:   - Potassium; level: Low        On supplement: No, continue repletion  - Magnesium; level: Normal        On supplement: No  - Bicarbonate; level: Low        On supplement: No. Start sodium bicarbonate 650 mg PO twice daily.    # Thrombocytopenia:   -Seen previously by hematology. Thought to have peripheral consumption vs ITP    -Baseline platelets 40-90k    # Hepatitis B: Core antibody positive. Check Hepatitis B DNA level three months post transplant.    # Asymmetric Left Ventricular Hypertrophy: Stable. No history of sustained ventricular arrhythmias.    # CAD: s/p PCI with stent 11/2021 with negative stress echo 1/2023.  Previously on Brilinta and aspirin. Brillinta stopped 1/2023    # Hyperlipidemia: On atorvastatin 20 mg daily. Most recent cholesterol in November 2022 was 122, triglycerides 234, HDL 22, and LDL 53.    # Transplant History:  Etiology of Kidney Failure: Diabetes mellitus type 2  Tx: LDKT  Transplant: 2/21/2023 (Kidney)  Crossmatch at time of Tx: negative  DSA at time of Tx: No  Significant changes in immunosuppression: None  Significant transplant-related complications: None    Recommendations were communicated to the primary team verbally.    Seen and discussed with STEPHEN Roy CNP  Pager: [phone]      Physician Attestation     I saw and evaluated Modesto Barbosa as part of a shared APRN/PA visit.     I personally reviewed the vital signs, medications, labs and imaging.    I personally performed the substantive portion of the medical decision making for this visit - please see the PHOEBE's documentation for full details.    Key management decisions made by me and carried out under my direction:   I recommend continuing to replete potassium and checking every 4 hours to make sure it is appropriately repleted.  Start bicarb 650 mg twice daily, continue intermediate intensity induction.  Check hepatitis B DNA quant at 3 months post transplant.   3-day Odell, stent out 4 to 6 weeks post transplant.  Start Tums 1 g twice daily in between meals, start cholecalciferol 2000 units daily.      I personally performed the substantive portion of the history for this visit - please see the PHOEBE's documentation for full details.  Key additional history findings made by me: 57-year-old male with a past medical history of type 2 diabetes, ESRD on peritoneal dialysis, coronary artery disease status post drug-eluting stent to the obtuse marginal 11/2021 with negative stress echo 1/2023, stopped Brilinta 1/2023, thrombocytopenia thought due to peripheral consumption or ITP stable around 40-90,000, positive hep B core antibody with positive hep B surface antibody, CMV recipient positive now status post living donor kidney transplant with stent and PD catheter removal in 2/21/2023, CPRA 27%, negative DSA, negative crossmatch, had hypotension in the PACU but has been normotensive post transplant.  Transplant nephrology was consulted for assistance with management.  Patiently currently feels well and he expresses gratitude for having a transplant.  The patient has been hypokalemic and is receiving potassium repletion.    Rome Kim MD  Date of Service (when I saw the patient): 02/22/23      REASON FOR CONSULT   Kidney Transplant    History of Present Illness   Modesto Barbosa is a 57 year old male with a history of ESRD, presumed due to DM2, who was previously on PD and received a LDKT yesterday 2/21/22.  Other significant PMH include CAD s/p PCI with SHAVONNE, NSTEMI, HTN, and HLD.  No DSA. PRA 27%. Agree with intermediate risk induction. Both donor and recipient were CMV IgG positive.    Patient reports he is feeling good and expresses gratitude to everyone involved in his care. No SOB on RA.  Denies N/V. Pt reports he is passing gas, but has not had a BM yet.    Review of Systems    The 10 point Review of Systems is negative other than noted in the HPI or here.     Past Medical  History    I have reviewed this patient's medical history and updated it with pertinent information if needed.   Past Medical History:   Diagnosis Date     Acute kidney injury (H) 6/10/2021     CKD (chronic kidney disease) stage 5, GFR less than 15 ml/min (H)      Dyslipidemia      Metabolic acidosis      Type 2 diabetes mellitus (H)        Past Surgical History   I have reviewed this patient's surgical history and updated it with pertinent information if needed.  Past Surgical History:   Procedure Laterality Date     BENCH KIDNEY  2/21/2023    Procedure: Bench kidney;  Surgeon: Talha Weinstein MD;  Location: [facility] OR     CV CORONARY ANGIOGRAM N/A 12/6/2021    Procedure: Coronary Angiogram;  Surgeon: Cristeal Banks MD;  Location: [facility] CATH LAB CV     CV LEFT HEART CATH N/A 12/6/2021    Procedure: Left Heart Cath;  Surgeon: Cristela Banks MD;  Location: [facility] LAB CV     CV PCI N/A 12/6/2021    Procedure: Percutaneous Coronary Intervention;  Surgeon: Cristela Banks MD;  Location: [facility] CATH LAB CV     REMOVE CATHETER PERITONEAL N/A 2/21/2023    Procedure: Remove catheter peritoneal;  Surgeon: Talha Weinstein MD;  Location: [facility] OR       Family History   I have reviewed this patient's family history and updated it with pertinent information if needed.   Family History   Problem Relation Age of Onset     Diabetes Type 2  Mother      Other - See Comments Daughter         granular cell tumor of thigh     Heart Disease Other      Social History   I have reviewed this patient's social history and updated it with pertinent information if needed. Modesto Barbosa  reports that he has never smoked. He has never been exposed to tobacco smoke. He has never used smokeless tobacco. He reports that he does not currently use alcohol. He reports that he does not use drugs.    Allergies   No Known Allergies  Prior to Admission Medications     acetaminophen  975 mg Oral Q8H     atorvastatin  40 mg Oral Daily      "[START ON 2023] magnesium oxide  400 mg Oral Daily with lunch     mycophenolate  750 mg Oral BID IS     polyethylene glycol  17 g Oral Daily     senna-docusate  1 tablet Oral BID     sodium chloride (PF)  10 mL Intracatheter Q8H     sulfamethoxazole-trimethoprim  1 tablet Oral Once per day on      tacrolimus  2 mg Oral BID IS     [START ON 2023] valGANciclovir  450 mg Oral Once per day on Mon Thu       dextrose       dextrose 5% lactated ringers 100 mL/hr at 23 0900     insulin regular 2 Units/hr (23 0914)     sodium chloride       sodium chloride 25 mL/hr at 23 0900       Physical Exam   Temp  Av  F (36.7  C)  Min: 97.2  F (36.2  C)  Max: 98.2  F (36.8  C)  Arterial Line BP  Min: 77/38  Max: 274/258  Arterial Line MAP (mmHg)  Av.7 mmHg  Min: 51 mmHg  Max: 264 mmHg      Pulse  Av.1  Min: 62  Max: 78 Resp  Av.8  Min: 7  Max: 20  SpO2  Av.4 %  Min: 95 %  Max: 100 %    CVP (mmHg): 8 mmHgBP 135/69   Pulse 73   Temp 97.6  F (36.4  C) (Oral)   Resp 16   Ht 1.626 m (5' 4\")   Wt 58.9 kg (129 lb 13.6 oz)   SpO2 99%   BMI 22.29 kg/m     Date 23 07 - 2359   Shift 9283-5844 9146-6778 9615-8208 24 Hour Total   INTAKE   I.V. 9   9   Shift Total(mL/kg) 9(0.15)   9(0.15)   OUTPUT   Urine 400   400   Shift Total(mL/kg) 400(6.79)   400(6.79)   Weight (kg) 58.9 58.9 58.9 58.9      Admit Weight: 58.9 kg (129 lb 13.6 oz)     GENERAL APPEARANCE: alert and no distress  HENT: mouth without ulcers or lesions  RESP: lungs clear to auscultation - no rales, rhonchi or wheezes  CV: regular rhythm, normal rate, no rub, no murmur  EDEMA: no LE edema bilaterally  ABDOMEN: soft, nondistended, appropriately tender, bowel sounds normal  MS: extremities normal - no gross deformities noted, no evidence of inflammation in joints, no muscle tenderness  SKIN: no rash, surgical incision healing by primary intention.  DIALYSIS ACCESS:  None    Data   CMP  Recent Labs "   Lab 02/22/23  0908 02/22/23  0759 02/22/23  0700 02/22/23  0625 02/22/23  0300 02/22/23  0203 02/21/23 2047 02/21/23 2003 02/21/23  1942 02/21/23  1920 02/21/23  1609 02/21/23  1300 02/21/23  1227 02/16/23  1226   NA  --   --   --  136  --   --   --  128* 132* 127*   < > 131*  --  131*   POTASSIUM  --   --   --  3.3*  3.3*  --  3.0*  --  3.2* 3.7 3.3*   < > 3.4  --  3.1*   CHLORIDE  --   --   --  106  --   --   --   --  96*  --   --  91*  --  92*   CO2  --   --   --  17*  --   --   --   --  18*  --   --  18*  --  27   ANIONGAP  --   --   --  13  --   --   --   --  18*  --   --  22*  --  12   * 130* 128* 135*   < > 167*   < > 146* 176* 169*   < > 288*   < > 284*   BUN  --   --   --  52.9*  --   --   --   --  70.3*  --   --  82.1*  --  53.2*   CR  --   --   --  3.03*  --   --   --   --  5.08*  --   --  6.92*  --  5.99*   GFRESTIMATED  --   --   --  23*  --   --   --   --  12*  --   --  9*  --  10*   REYMUNDO  --   --   --  7.0*  --   --   --   --  6.9*  --   --  8.0*  --  8.8   MAG  --   --   --  1.7  --   --   --   --  2.5*  --   --   --   --   --    PHOS  --   --   --  3.7  --   --   --   --  4.8*  --   --   --   --   --    PROTTOTAL  --   --   --   --   --   --   --   --   --   --   --   --   --  6.1*   ALBUMIN  --   --   --   --   --   --   --   --   --   --   --   --   --  3.3*   BILITOTAL  --   --   --   --   --   --   --   --   --   --   --   --   --  0.2   ALKPHOS  --   --   --   --   --   --   --   --   --   --   --   --   --  66   AST  --   --   --   --   --   --   --   --   --   --   --   --   --  18   ALT  --   --   --   --   --   --   --   --   --   --   --   --   --  18    < > = values in this interval not displayed.     CBC  Recent Labs   Lab 02/22/23  0625 02/22/23  0203 02/21/23  2241 02/21/23 2003 02/21/23  1942 02/21/23  1609 02/21/23  1300   HGB 8.3* 8.2* 8.4* 8.0* 8.7*   < > 9.9*   WBC 15.3*  --  8.1  --  5.1  --  11.8*   RBC 2.85*  --  2.81*  --  2.90*  --  3.36*   HCT 24.8*  --  24.3*   --  25.3*  --  27.8*   MCV 87  --  87  --  87  --  83   MCH 29.1  --  29.9  --  30.0  --  29.5   MCHC 33.5  --  34.6  --  34.4  --  35.6   RDW 15.4*  --  15.3*  --  15.5*  --  15.6*   PLT 87*  --  60*  --  54*  --  94*    < > = values in this interval not displayed.     INR  Recent Labs   Lab 02/16/23  1226   INR 1.08     ABG  Recent Labs   Lab 02/21/23 2003 02/21/23  1920 02/21/23  1847 02/21/23  1759 02/21/23  1609   PH  --   --   --   --  7.41   PCO2  --   --   --   --  32*   PO2  --   --   --   --  226*   HCO3  --   --   --   --  20*   O2PER 81.0 38.0 38.0 44.0 50.0      Urine Studies  Recent Labs   Lab Test 02/16/23  1248 11/18/22  0547 08/23/21  1243 06/10/21  1630 08/12/20  1508   COLOR Light Yellow Light Yellow Straw Colorless Yellow   APPEARANCE Clear Turbid* Clear Clear Clear   URINEGLC 150* 50* 50* Negative Negative   URINEBILI Negative Negative Negative Negative Negative   URINEKETONE Negative Negative Negative Negative Negative   SG 1.017 1.010 1.010 1.006 1.010   UBLD Negative Negative Negative Negative Negative   URINEPH 5.0 5.0 5.0 6.0 6.0   PROTEIN 30* 50* 100* 100 mg/dL* 300 mg/dL*   UROBILINOGEN  --   --   --  <2.0 mg/dL <2.0 E.U./dL   NITRITE Negative Negative Negative Negative Negative   LEUKEST Negative Negative Negative Negative Negative   RBCU 1 0 2 <1 0-2   WBCU 2 3 <1 0 0-5     No lab results found.  PTH  Recent Labs   Lab Test 02/16/23 1226 02/09/23  1459   PTHI 232* 31     Iron Studies  Recent Labs   Lab Test 02/16/23  1226 06/11/21  0728   IRON 37* 22*   *  --    IRONSAT 21  --    MATEO 847* 301*       IMAGING:  All imaging studies reviewed by me.

## 2023-02-22 NOTE — BRIEF OP NOTE
[facility]    Brief Operative Note    Pre-operative diagnosis: Awaiting organ transplant [Z76.82]  Pre-diabetes [R73.03]  ESRD (end stage renal disease) (H) [N18.6]  Post-operative diagnosis Same as pre-operative diagnosis    Procedure: Procedure(s):  Kidney Transplant Non-Directed Recipient, EXPLANTATION OF PERITONEAL DIALYSIS CATHETER  Surgeon: Surgeon(s) and Role:     * Talha Weinstein MD - Primary     * Davian Jaime MD - Resident - Assisting     * Rome Pedro MD - Fellow - Assisting     * Jaime Saavedra MD - Fellow - Assisting  Anesthesia: General   Estimated Blood Loss: 200 ml    Drains: None  Specimens: * No specimens in log *  Findings:   kidney transplant; in right iliac fossa; stent placed.  Complications: None.  Implants:   Implant Name Type Inv. Item Serial No.  Lot No. LRB No. Used Action   STENT URETERAL DBL PIGTAIL INLAY [id number] - [id number] Stent STENT URETERAL DBL PIGTAIL INLAY [id number]  [facility] [id number] N/A 1 Implanted

## 2023-02-22 NOTE — ANESTHESIA POSTPROCEDURE EVALUATION
Patient: Modesto Barbosa    Procedure: Procedure(s):  Kidney Transplant Non-Directed Recipient, EXPLANTATION OF PERITONEAL DIALYSIS CATHETER       Anesthesia Type:  General    Note:  Disposition: Inpatient   Postop Pain Control: Uneventful            Sign Out: Well controlled pain   PONV: No   Neuro/Psych: Uneventful            Sign Out: Acceptable/Baseline neuro status   Airway/Respiratory: Uneventful            Sign Out: Acceptable/Baseline resp. status   CV/Hemodynamics: Uneventful            Sign Out: Acceptable CV status; No obvious hypovolemia; No obvious fluid overload   Other NRE: NONE   DID A NON-ROUTINE EVENT OCCUR? No           Last vitals:  Vitals Value Taken Time   /60 02/21/23 2250   Temp 36.8  C (98.2  F) 02/21/23 2200   Pulse 73 02/21/23 2253   Resp 13 02/21/23 2253   SpO2 95 % 02/21/23 2253   Vitals shown include unvalidated device data.    Electronically Signed By: Kevin Vaughn MD  February 21, 2023  10:54 PM

## 2023-02-22 NOTE — ANESTHESIA CARE TRANSFER NOTE
Patient: Modesto Barbosa    Procedure: Procedure(s):  Kidney Transplant Non-Directed Recipient, EXPLANTATION OF PERITONEAL DIALYSIS CATHETER       Diagnosis: Awaiting organ transplant [Z76.82]  Pre-diabetes [R73.03]  ESRD (end stage renal disease) (H) [N18.6]  Diagnosis Additional Information: No value filed.    Anesthesia Type:   General     Note:    Oropharynx: oropharynx clear of all foreign objects  Level of Consciousness: drowsy  Oxygen Supplementation: face mask  Level of Supplemental Oxygen (L/min / FiO2): 8  Independent Airway: airway patency satisfactory and stable  Dentition: dentition unchanged  Vital Signs Stable: post-procedure vital signs reviewed and stable  Report to RN Given: handoff report given  Patient transferred to: PACU    Handoff Report: Identifed the Patient, Identified the Reponsible Provider, Reviewed the pertinent medical history, Discussed the surgical course, Reviewed Intra-OP anesthesia mangement and issues during anesthesia, Set expectations for post-procedure period and Allowed opportunity for questions and acknowledgement of understanding      Vitals:  Vitals Value Taken Time   /59 02/21/23 2035   Temp     Pulse 60 02/21/23 2043   Resp 0 02/21/23 2043   SpO2 100 % 02/21/23 2043   Vitals shown include unvalidated device data.    Electronically Signed By: Nadeem Booker MD  February 21, 2023  8:45 PM

## 2023-02-22 NOTE — PHARMACY-TRANSPLANT NOTE
Adult Kidney Transplant Post Operative Note    57 year old male s/p living donor kidney transplant on 2/21/2023 for diabetes mellitus.      Planned immunosuppression regimen per kidney transplant Intermediate Risk protocol:  INDUCTION:  2/21: Thymoglobulin 125mg (2mg/kg), methylprednisolone 500mg  2/22: Basiliximab 20mg, methylprednisolone 250mg  2/23: Methylprednisolone 100mg  2/24: Start of prednisone taper  2/26: Basiliximab 20mg    MAINTENANCE:  mycophenolate and tacrolimus with goal trough levels of 8-10 mcg/L for first 6 months post-transplant.    Opportunistic pathogen prophylaxis includes: trimethoprim/sulfamethoxazole and valganciclovir.    Patient is not enrolled in medication study.    Pharmacy will monitor for medication interactions and immunosuppression levels in conjunction with the team. Medication therapy needs for discharge planning will continue to be addressed throughout the current admission via multidisciplinary rounds and order review.  Pharmacy will make recommendations as appropriate.    Analia Meadows, MauD, BCPS

## 2023-02-22 NOTE — PROGRESS NOTES
US read by surgery team. Patient having low BPs in PACU; orders for 500cc bolus of NS and re-draw hgb.

## 2023-02-22 NOTE — PLAN OF CARE
"BP (!) 166/76   Pulse 72   Temp 98.1  F (36.7  C) (Oral)   Resp 14   Ht 1.626 m (5' 4\")   Wt 58.9 kg (129 lb 13.6 oz)   SpO2 96%   BMI 22.29 kg/m        3812-1528  Reason for Admission: Pt. transferred from PACU around 2330 s/p DD Kidney transplant with stent placement on 2/21. Pt. oriented to call light & unit routines, 2 RN skin check done.   Neuro: Pt. alert & Ox4  Behavior: Pt. calm & cooperative with cares.   Activity: Bedrest  Vital: Pt. hypotensive 80's/50's, CVP 2-3 shortly after arrival from PACU & surgery cross-cover paged, assessed & ordered NS 1L bolus & administered. BPs improved & CVP: 4-6 post bolus, AOVSS on RA. Capno. scores WNL. This am, BP: 166/76, AOVSS on RA.   LDAs: Right internal jugular with D5LR at 100cc/hour, NS at TKO with insulin gtt, CVP.  Left PIV SL  BG: q 1 hour. Range 151-322. Insulin gtt per algorithm 2.  Cardiac: On tele., NSR  Respiratory: LS clear  GI/: Odell with 150-275 cc/hour with hourly replacement per protocol. No stools since prior to surgery. Urine clear pink tinged.  Skin: Incision with liquid bandage & CUBA. PD site covered with serosang. drainage & Primapore changed. Soft bulge at incision site marked, MD aware.   Pain/Nausea: Abdominal pain relieved with sched. Tylenol. Pt. denies nausea.   Diet: Clear liquids  Labs/Imaging: K+ 3.0 & replaced with KCL 10meq IV x1 per order. Hgb & K+ q 4hours.  Education: Med card & lab book created.    Plan: Continue to follow POC & notify with change in status.                           "

## 2023-02-22 NOTE — PHARMACY-ADMISSION MEDICATION HISTORY
Admission Medication History Completed by Pharmacy    See [facility] Admission Navigator for allergy information, preferred outpatient pharmacy, prior to admission medications and immunization status.     Medication History Sources:     Patient, SureScripts    Changes made to PTA medication list (reason):    Added: None    Deleted: None    Changed: Hydralazine to 50mg TID    Additional Information:    None    Prior to Admission medications    Medication Sig Last Dose Taking? Auth Provider Long Term End Date   acetaminophen (TYLENOL) 500 MG tablet Take 500 mg by mouth every 6 hours as needed for mild pain Past Month Yes Reported, Patient     aspirin (ASA) 81 MG EC tablet Take 1 tablet (81 mg) by mouth daily Start tomorrow. 2/15/2023 Yes Cristela Banks MD     atorvastatin (LIPITOR) 40 MG tablet TAKE 1 TABLET BY MOUTH EVERY DAY 2/20/2023 at 0900 Yes Alfred Katz MD Yes    calcitRIOL (ROCALTROL) 0.25 MCG capsule Take 0.5 mcg by mouth daily 2/20/2023 at 0900 Yes Unknown, Entered By History     calcium carbonate 1250 (500 Ca) MG CHEW Take 500 mg by mouth 4 times daily 2/20/2023 at 0900 Yes Reported, Patient     carvedilol (COREG) 25 MG tablet TAKE 1 AND 1/2 TABLETS BY MOUTH TWICE DAILY WITH MEALS  Patient taking differently: 25 mg 2 times daily (with meals) 2/21/2023 at 1030 Yes Alfred Katz MD Yes    docusate sodium (COLACE) 100 MG capsule Take 100 mg by mouth 2 times daily as needed Past Month Yes Reported, Patient     hydrALAZINE (APRESOLINE) 100 MG tablet Take 50 mg by mouth 3 times daily  Yes Unknown, Entered By History Yes    insulin aspart (NOVOLOG FLEXPEN) 100 UNIT/ML pen Inject 10 Units Subcutaneous 3 times daily (with meals) 2/20/2023 at 2000 Yes Pal Cooper MD Yes    insulin detemir (LEVEMIR PEN) 100 UNIT/ML pen Inject 20 Units Subcutaneous At Bedtime 2/20/2023 at 2300 Yes Pal Cooper MD Yes    loratadine (CLARITIN) 10 MG tablet Take 1 tablet (10 mg) by mouth daily as needed for allergies  "(itchy/rash)  Yes Pal Cooper MD     nitroGLYcerin (NITROSTAT) 0.4 MG sublingual tablet One tablet under the tongue every 5 minutes if needed for chest pain. May repeat every 5 minutes for a maximum of 3 doses in 15 minutes\" Unknown Yes Cristela Banks MD     traZODone (DESYREL) 50 MG tablet Take 50 mg by mouth At Bedtime Past Month Yes Reported, Patient No    triamcinolone (KENALOG) 0.1 % external cream Apply topically 2 times daily As needed for itching/rash. 2/19/2023  Pal Cooper MD         Date completed: 02/22/23    Medication history completed by: Analia Meadows [facility]            "

## 2023-02-22 NOTE — PROGRESS NOTES
"Post Op Check    02/21/2023    Modesto Barbosa is a 57 year old male with h/o ESRD on PD now POD#0 S/p Kidney Transplant Non-Directed Recipient, EXPLANTATION OF PERITONEAL DIALYSIS CATHETER     At bedside @ PACU pt is hypotensive, 80s/40s, reports pain 3/10. Denies SOB, chest pain, or dizziness. Denies nausea/vomiting. Had UOP ~100 mL/hr.     Post-op US showed patent arterial anastomosis     BP (!) 84/52   Pulse 67   Temp 97.2  F (36.2  C) (Core)   Resp 10   Ht 1.626 m (5' 4\")   Wt 58.9 kg (129 lb 13.6 oz)   SpO2 97%   BMI 22.29 kg/m      Intake/Output Summary (Last 24 hours) at 2/21/2023 2152  Last data filed at 2/21/2023 2100  Gross per 24 hour   Intake 4275 ml   Output 935 ml   Net 3340 ml     Lab Results   Component Value Date    WBC 8.1 02/21/2023    HGB 8.4 (L) 02/21/2023    HCT 24.3 (L) 02/21/2023    PLT 60 (L) 02/21/2023     (L) 02/21/2023    POTASSIUM 3.2 (L) 02/21/2023    CHLORIDE 96 (L) 02/21/2023    CO2 18 (L) 02/21/2023    BUN 70.3 (H) 02/21/2023    CR 5.08 (H) 02/21/2023     (H) 02/21/2023    AST 18 02/16/2023    ALT 18 02/16/2023    ALKPHOS 66 02/16/2023    BILITOTAL 0.2 02/16/2023    INR 1.08 02/16/2023       PE:    Gen: A&O x3, NAD  Chest: breathing non-labored, Spo2 97% on RA   Abdomen: soft, non-tender, non-distended, with  Incision: RLQ - clean, dry, intact, soft bulge at the incision site                LLQ: Covered with dressing, with serosagne dressing through the wet to dry dressing  Extremities: warm and well perfused    A/P:    A 57 years old male patient with h/o ESRD, now POD#0 S/p LKDT, @ PACU patient is hypotensive 80s/40s, HR 71 ( Patient on Co-reg), with soft pulge @ the incision site, CBC recheck showed slowly drifting Hgb. Also Ionized Ca was low.     Plan:     - Give 500 mL NS bolus.   - Give 1 g calcium gluconate  - Reji bulge site  - Hgb & K+ check q4hrs   - [facility] UOP     Transplant fellow Dr. Rome Pedro was updated, pending response.     Brandi Ribeiro, " MD  Surgery resident, PGY-1  [facility]   Pager [phone]

## 2023-02-22 NOTE — PROGRESS NOTES
"CLINICAL NUTRITION SERVICES - ASSESSMENT NOTE     Nutrition Prescription      Malnutrition Status:    Patient does not meet two of the established criteria necessary for diagnosing malnutrition but is at risk for malnutrition    Recommendations already ordered by Registered Dietitian (RD):  Trial Ensure Clear with lunch meal    Future/Additional Recommendations:  Minimize diet restrictions as able d/t high calorie/protein needs post-transplant.  Oral supplements as needed to help meet nutritional needs.     High protein food choices with meals to help meet high needs post-transplant over the next 6-8 weeks.     Heart-healthy diet (low saturated fat, low sodium, high fiber) and food safety precautions long term due to immunosuppression regimen post-transplant         REASON FOR ASSESSMENT  Modesto Barbosa is a 57 year old male seen by the dietitian for MD Order- Assess & Educate post-op SOT    NUTRITION HISTORY  He has been eating twice per day, and typical meals include chicken, fish or beef with rice and boiled vegetables.  He notes his appetite/intake have been very reduced in the last 1 year since starting PD d/t feeling bloated with fluid.  He notes that his portion sizes have been quite small, the size of a boiled egg.  His doctor recently suggested eating meals more frequently and trying a protein juice drink.      He generally takes a set dose of insulin with his meals.     CURRENT NUTRITION ORDERS  Diet: ACE-Clear Liquid  IVF with D5 @ 100 ml/hr    Intake/Tolerance: He has not eaten anything yet thus far.     LABS  K+ 3.3 (low)  Phos 3.7 (normal)    MEDICATIONS  Insulin gtt  Miralax  Senokot BID  Mycophenolate BID  Tacrolimus BID    ANTHROPOMETRICS  Height: 162.6 cm (5' 4\")  Most Recent Weight: 58.9 kg (129 lb 13.6 oz)    IBW: 59.1 kg  BMI: Normal BMI  Weight History:   Wt Readings from Last 15 Encounters:   02/21/23 58.9 kg (129 lb 13.6 oz)   02/16/23 60.9 kg (134 lb 4.8 oz)   02/09/23 59.4 kg (131 lb) "   01/16/23 61.2 kg (135 lb)   11/18/22 59 kg (130 lb 1.6 oz)   06/02/22 62.4 kg (137 lb 9.6 oz)   06/01/22 61.7 kg (136 lb)   01/19/22 60.3 kg (133 lb)   12/16/21 58.1 kg (128 lb)   12/06/21 58.6 kg (129 lb 1.6 oz)   12/02/21 62.1 kg (137 lb)   10/11/21 60 kg (132 lb 3.2 oz)   08/23/21 67.9 kg (149 lb 9.6 oz)   06/16/21 62.6 kg (138 lb)   12/04/18 64.2 kg (141 lb 8 oz)   Dosing Weight: 59 kg (actual wt)    ASSESSED NUTRITION NEEDS:  Estimated Energy Needs: 2977-6082 kcals (30-35 Kcal/Kg)  Justification: increased needs post-op SOT  Estimated Protein Needs:  grams protein (1.3-2 gm/Kg)  Justification: increased needs post op SOT  Estimated Fluid Needs: per MD pending fluid status and adequate UOP    PHYSICAL FINDINGS  See malnutrition section below.    MALNUTRITION  % Intake: </=50% for >/= 1 month (severe)  % Weight Loss: None noted (likely masked by fluid retention)  Subcutaneous Fat Loss: None observed  Muscle Loss: None observed  Fluid Accumulation/Edema: None noted  Malnutrition Diagnosis: Patient does not meet two of the established criteria necessary for diagnosing malnutrition but is at risk for malnutrition    NUTRITION DIAGNOSIS:  Food and nutrition-related knowledge deficit r/t length of time since previous post-transplant education AEB patient verbal report, review of chart record, and MD consult for nutrition education.     INTERVENTIONS  Implementation  Nutrition Education:  1. Provided instruction (with use of Ipad ) on post-transplant diet with discussion regarding protein sources and high protein needs in acute post-tx phase.  Reviewed recommendations to follow low fat/low sodium diet long term and discussed heart healthy diet tips.  Discussed monitoring of K+/Phos lab values with possible need for adjustment of these in the diet as necessary. Reviewed need for food safety precautions to prevent food borne illness.    Encouraged smaller/more frequent consumption of meat/fish.   Discussed possible need to reduce rice/vegetable portions to increase protein intake/meat serving.  Hopefully with stopping PD his appetite/fullness will improve.     2. Provided & reviewed handout: Post-transplant diet guidelines provided to patient's family. Patient receptive to information provided. Expected diet compliance is good.     Medical food supplement therapy    Goals  1. Patient will verbalize understanding of 3 important aspects of post-transplant diet guidelines.   2. PO intake >50% meals TID.    Monitoring/Evaluation  Progress toward goals will be monitored and evaluated per protocol.    Enedina Still, MS, RD, LD, CCTD, CNSC  7A/Obs unit pager [phone]  Weekend pager [phone]

## 2023-02-22 NOTE — PROGRESS NOTES
Admitted/transferred from: PACU  Time of arrival on unit 2330  2 RN full  skin assessment completed by  Claire STONE & EDER NICHOLSON  Skin assessment finding: issues found RLQ incision with bulge, PD site leaking, skin discoloration on bilat. shins.   Interventions/actions: skin interventions Primapore changed over PD site, protective Mepilex over sacrum     Will continue to monitor.

## 2023-02-22 NOTE — PROGRESS NOTES
Transplant Surgery  Inpatient Daily Progress Note  02/22/2023    Assessment & Plan: 57 year old male with PMH significant for ESKD 2/2 DMII on PD, CAD s/p NSTEMI, HTN. Now s/p LDKT on 2/21/23 with Dr. Weinstein.     Graft function:  LDKT: 2/21/23. POD 1. Cr 5.1 from 6.9 pre-transplant. Good UOP.   Immunosuppression management: PRA 27%, intermediate risk protocol.   Thymo 125 mg intra-op  Solu-medrol taper per protocol   mg BID  Tac 2 mg BID, Goal 8-10.   Complexity of management:Medium. Contributing factors: thrombocytopenia, anemia and induction  Hematology: HGB stable. Resume ASA 81 mg daily.   Cardiorespiratory: Stable on room air  HTN: Hypotensive overnight, SBP now 140-150. PTA Coreg 25 mg BID and Hydralazine 50 mg TID.    GI/Nutrition: Advance diet as tolerated. Bowel regimen.   Endocrine:   DMII: HGBA1C 6.8%. On insulin PTA. Currently on insulin gtt with steroids. Consult endocrinology for insulin management and discharge planning with steroid taper.   Fluid/Electrolytes:   MIVF: D5NS@100cc/hour with urine replacements, change to NS@75cc/hour.   Hypokalemia: K 3.3, receive 30mEq this AM, recheck 3.3 will give additional 20 mEq.   : Odell to remain due to new surgical anastomosis  Infectious disease: Bactrim and Valcyte ppx.   Leukocytosis: likely 2/2 steroids, monitor.   Prophylaxis: DVT, fall, GI, fungal  Disposition: 7A, plan to discharge on POD 3 unless needing to stay longer for safe insulin planning per endo    PHOEBE/Fellow/Resident Provider: Chely Mccann PA-C [id number]    Faculty: aTlha Weinstein M.D.  _________________________________________________________________  Transplant History: Admitted 2/21/2023 for kidney transplant  2/21/2023 (Kidney), Postoperative day: 1     Interval History: History is obtained from the patient  Overnight events: No complaints. Pain well controlled, no nausea.     ROS:   A 10-point review of systems was negative except as noted above.    Meds:   acetaminophen  975 mg  "Oral Q8H    aspirin  81 mg Oral Daily    [START ON 2/23/2023] atorvastatin  20 mg Oral Daily    [START ON 2/26/2023] basiliximab (SIMULECT) infusion  20 mg Intravenous Once    insulin aspart   Subcutaneous TID w/meals    insulin aspart  1-10 Units Subcutaneous TID AC    insulin aspart  1-7 Units Subcutaneous At Bedtime    insulin glargine  30 Units Subcutaneous Daily    [START ON 2/23/2023] magnesium oxide  400 mg Oral Daily with lunch    [START ON 2/23/2023] methylPREDNISolone  100 mg Intravenous Once    mycophenolate  750 mg Oral BID IS    polyethylene glycol  17 g Oral Daily    [START ON 2/24/2023] predniSONE  60 mg Oral Daily    Followed by    [START ON 2/26/2023] predniSONE  40 mg Oral Daily    Followed by    [START ON 2/28/2023] predniSONE  20 mg Oral Daily    Followed by    [START ON 3/7/2023] predniSONE  15 mg Oral Daily    Followed by    [START ON 3/14/2023] predniSONE  10 mg Oral Daily    Followed by    [START ON 3/21/2023] predniSONE  5 mg Oral Daily    senna-docusate  1 tablet Oral BID    sodium bicarbonate  650 mg Oral BID    sodium chloride (PF)  10 mL Intracatheter Q8H    sulfamethoxazole-trimethoprim  1 tablet Oral Once per day on Mon Wed Fri    tacrolimus  2 mg Oral BID IS    [START ON 2/23/2023] valGANciclovir  450 mg Oral Once per day on Mon Thu       Physical Exam:     Admit Weight: 58.9 kg (129 lb 13.6 oz)    Current vitals:   BP (!) 143/67   Pulse 77   Temp 97.6  F (36.4  C) (Oral)   Resp 18   Ht 1.626 m (5' 4\")   Wt 58.9 kg (129 lb 13.6 oz)   SpO2 97%   BMI 22.29 kg/m      CVP (mmHg): 7 mmHg    Vital sign ranges:    Temp:  [97.2  F (36.2  C)-98.2  F (36.8  C)] 97.6  F (36.4  C)  Pulse:  [62-82] 77  Resp:  [7-20] 18  BP: ()/(48-84) 143/67  MAP:  [51 mmHg-264 mmHg] 64 mmHg  Arterial Line BP: ()/() 107/44  SpO2:  [95 %-100 %] 97 %  Patient Vitals for the past 24 hrs:   BP Temp Temp src Pulse Resp SpO2   02/22/23 1400 (!) 143/67 97.6  F (36.4  C) Oral 77 18 97 %   02/22/23 " 1300 (!) 142/72 -- -- 81 17 97 %   02/22/23 1200 (!) 148/84 -- -- 78 18 99 %   02/22/23 1100 (!) 143/68 -- -- 82 16 98 %   02/22/23 1000 (!) 151/81 -- -- 75 17 99 %   02/22/23 0900 135/69 -- -- 73 16 --   02/22/23 0800 (!) 151/74 97.6  F (36.4  C) Oral 73 15 99 %   02/22/23 0700 (!) 166/76 -- -- 72 14 96 %   02/22/23 0600 133/69 -- -- 71 18 97 %   02/22/23 0500 139/65 -- -- 73 14 96 %   02/22/23 0400 129/68 98.1  F (36.7  C) Oral 74 14 96 %   02/22/23 0300 109/72 -- -- 76 14 98 %   02/22/23 0230 105/58 -- -- 74 16 99 %   02/22/23 0200 126/62 -- -- 75 14 97 %   02/22/23 0130 137/63 -- -- 77 15 96 %   02/22/23 0100 136/63 -- -- 74 16 96 %   02/22/23 0045 122/59 -- -- 75 16 96 %   02/22/23 0039 115/58 -- -- 74 -- --   02/22/23 0030 95/55 98.2  F (36.8  C) Oral 77 16 95 %   02/22/23 0015 97/55 -- -- 76 -- --   02/22/23 0000 (!) 85/56 -- -- 75 13 --   02/21/23 2350 (!) 83/52 -- -- 77 11 98 %   02/21/23 2345 (!) 87/53 -- -- 78 13 --   02/21/23 2330 93/60 -- -- 77 13 96 %   02/21/23 2315 97/59 -- -- 75 12 97 %   02/21/23 2300 109/55 98.2  F (36.8  C) -- 73 11 97 %   02/21/23 2245 95/53 -- -- 72 12 96 %   02/21/23 2230 98/69 -- -- 69 20 96 %   02/21/23 2215 (!) 84/50 -- -- 68 11 97 %   02/21/23 2200 (!) 84/48 98.2  F (36.8  C) Core 69 13 97 %   02/21/23 2145 (!) 82/53 -- -- 68 12 97 %   02/21/23 2130 (!) 84/52 -- -- 67 10 97 %   02/21/23 2115 (!) 86/54 -- -- 65 (!) 8 100 %   02/21/23 2100 105/59 -- -- 63 11 100 %   02/21/23 2045 110/61 -- -- 62 (!) 7 100 %   02/21/23 2035 110/59 97.2  F (36.2  C) Core 62 20 100 %     General Appearance: in no apparent distress.   Skin: normal  Heart: regular rate and rhythm  Lungs: NLB on room air  Abdomen: The abdomen is rounded, and  non-tender, generalized. he is not tender over the kidney graft. The wound is Healing well, well approximated, without signs of infection and no drainage.  : mcgovern is present.   Extremities: edema: absent.   Neurologic: awake, alert and oriented. Tremor  absent..     Data:   [facility]  Recent Labs   Lab 02/22/23  1357 02/22/23  1301 02/22/23  1205 02/22/23  1127 02/22/23  0700 02/22/23  0625 02/21/23 2047 02/21/23 2003 02/21/23 1942 02/21/23  1227 02/16/23  1226   NA  --   --   --   --   --  136  --  128* 132*   < > 131*   POTASSIUM  --   --   --  3.3*  --  3.3*  3.3*   < > 3.2* 3.7   < > 3.1*   CHLORIDE  --   --   --   --   --  106  --   --  96*   < > 92*   CO2  --   --   --   --   --  17*  --   --  18*   < > 27   * 123*   < >  --    < > 135*   < > 146* 176*   < > 284*   BUN  --   --   --   --   --  52.9*  --   --  70.3*   < > 53.2*   CR  --   --   --   --   --  3.03*  --   --  5.08*   < > 5.99*   GFRESTIMATED  --   --   --   --   --  23*  --   --  12*   < > 10*   REYMUNDO  --   --   --   --   --  7.0*  --   --  6.9*   < > 8.8   ICAW  --   --   --  4.1*  --   --   --  3.8*  --    < >  --    MAG  --   --   --   --   --  1.7  --   --  2.5*  --   --    PHOS  --   --   --   --   --  3.7  --   --  4.8*  --   --    ALBUMIN  --   --   --   --   --   --   --   --   --   --  3.3*   BILITOTAL  --   --   --   --   --   --   --   --   --   --  0.2   ALKPHOS  --   --   --   --   --   --   --   --   --   --  66   AST  --   --   --   --   --   --   --   --   --   --  18   ALT  --   --   --   --   --   --   --   --   --   --  18    < > = values in this interval not displayed.     CBC  Recent Labs   Lab 02/22/23  1406 02/22/23  1127 02/22/23  0625 02/22/23  0203 02/21/23  2241   HGB 7.7* 8.4* 8.3* 8.2* 8.4*   WBC  --   --  15.3*  --  8.1   PLT  --   --  87*  --  60*   A1C  --   --   --  6.8*  --      COAGS  Recent Labs   Lab 02/16/23  1226   INR 1.08      Urinalysis  Recent Labs   Lab Test 02/16/23  1248 11/18/22  0547   COLOR Light Yellow Light Yellow   APPEARANCE Clear Turbid*   URINEGLC 150* 50*   URINEBILI Negative Negative   URINEKETONE Negative Negative   SG 1.017 1.010   UBLD Negative Negative   URINEPH 5.0 5.0   PROTEIN 30* 50*   NITRITE Negative Negative   LEUKEST Negative  Negative   RBCU 1 0   WBCU 2 3     Virology:  Hepatitis C Antibody   Date Value Ref Range Status   02/21/2023 Nonreactive Nonreactive Final   Attestation:    The patient has been seen with team and evaluated by me.   Vital signs, labs, medications and orders were reviewed.   When obtained, diagnostic images were reviewed by me and interpreted as above.    The care plan was discussed with the multidisciplinary team and I agree with the findings and plan in this note, with any differences recorded in blue.    Immunosuppressive medication management was reviewed and adjusted as reflected in the note and orders.     .  \

## 2023-02-22 NOTE — CONSULTS
Inpatient Diabetes Consult    Chief Complaint for transplant      Assessment and plan:  Modesto Barbosa is a 57 year old male with PMH significant for ESKD 2/2 DMII on PD, CAD s/p NSTEMI, HTN. Now s/p LDKT on 2/21/23     On steroids:  Solumedrol 500mg 2/21/23  Solumedrol 250mg 2/22/23  Solumedrol 100mg 2/23/23  Prednisone 60mg from 2/24/23      Type 2 DM    -Switch insulin drip to subcutaneous regimen  - Give lantus 30 units now with 2hrs overlap  - Start Novolog 1per 10gram CHO meals/snacks  - Start Novolog high correction scale  - BG checks AC, HS, 0200  - Hypoglycemia protocol      Patient is seen , examined and discussed with Dr.Araki Lakisha Howe MD  Endocrinology fellow  Page number: [phone]    Attending tie-in note  I saw the patient with endocrine fellow Dr. Howe and directly examined patient and discussed. Agree above note and plan.       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 Now s/p LDKT on 2/21/23     History obtained using [employer]     Recent Labs   Lab 02/22/23  1005 02/22/23  0908 02/22/23  0759 02/22/23  0700 02/22/23  0625 02/22/23  0600   * 139* 130* 128* 135* 161*         Diabetes Type:  Type 2 DM  Diabetes Duration: long term  Usual Diabetes Regimen:   BG checks - 2-3 times daily   Ranging from 100-200s, some lows in 80s    Medications:   - Detemir 20 units daily  - Novolog 10 units twice daily with 2 meals  Daughter administers his insulin    Ability to [place] Prescribed Regimen: Daughter administers his insulin  Diabetes Control:   Lab Results   Component Value Date    A1C 6.8 02/22/2023    A1C 6.8 02/21/2023    A1C 7.1 02/16/2023    A1C 6.9 02/09/2023    A1C 5.6  "08/23/2021     Diabetes Complications: ESRD  History of DKA: no  Able to Detect Hypoglycemia: yes  Usual Diabetes Care Provider: PCP  Factors Impacting Glucose Control: steroids    Current Inpatient DM regimen:  Insulin drip    S/p methylprednisone 500mg iv yesterday  Solumedrol 250mg today  Solumedrol 100mg in AM  Then switch to prednisone 60mg, 2/24/23 taper down q2 days    Review of Systems  10 point ROS completed with pertinent positives and negatives noted in the HPI    Past medical, family and social histories are reviewed and updated.    Past Medical History  Past Medical History:   Diagnosis Date    Acute kidney injury (H) 6/10/2021    CKD (chronic kidney disease) stage 5, GFR less than 15 ml/min (H)     Dyslipidemia     Metabolic acidosis     Type 2 diabetes mellitus (H)        Family History  Family History   Problem Relation Age of Onset    Diabetes Type 2  Mother     Other - See Comments Daughter         granular cell tumor of thigh    Heart Disease Other        Social History  Social History     Socioeconomic History    Marital status: Patient Declined     Spouse name: None    Number of children: None    Years of education: None    Highest education level: None   Tobacco Use    Smoking status: Never     Passive exposure: Never    Smokeless tobacco: Never   Vaping Use    Vaping Use: Never used   Substance and Sexual Activity    Alcohol use: Not Currently    Drug use: Never         Physical Examination:  Vital signs:  Temp: 97.6  F (36.4  C) Temp src: Oral BP: (!) 143/68 Pulse: 82   Resp: 16 SpO2: 98 % O2 Device: None (Room air) Oxygen Delivery: 6 LPM Height: 162.6 cm (5' 4\") Weight: 58.9 kg (129 lb 13.6 oz)  Estimated body mass index is 22.29 kg/m  as calculated from the following:    Height as of this encounter: 1.626 m (5' 4\").    Weight as of this encounter: 58.9 kg (129 lb 13.6 oz).  [unfilled]      Laboratory  Recent Labs   Lab Test 02/22/23  1005 02/22/23  0908 02/22/23  0700 02/22/23  0625 " 02/22/23  0300 02/22/23  0203 02/21/23 2047 02/21/23 2003 02/21/23 1942   NA  --   --   --  136  --   --   --  128* 132*   POTASSIUM  --   --   --  3.3*  3.3*  --  3.0*  --  3.2* 3.7   CHLORIDE  --   --   --  106  --   --   --   --  96*   CO2  --   --   --  17*  --   --   --   --  18*   ANIONGAP  --   --   --  13  --   --   --   --  18*   * 139*   < > 135*   < > 167*   < > 146* 176*   BUN  --   --   --  52.9*  --   --   --   --  70.3*   CR  --   --   --  3.03*  --   --   --   --  5.08*   REYMUNDO  --   --   --  7.0*  --   --   --   --  6.9*    < > = values in this interval not displayed.     CBC RESULTS:   Recent Labs   Lab Test 02/22/23  0625   WBC 15.3*   RBC 2.85*   HGB 8.3*   HCT 24.8*   MCV 87   MCH 29.1   MCHC 33.5   RDW 15.4*   PLT 87*       Liver Function Studies -   Recent Labs   Lab Test 02/16/23  1226   PROTTOTAL 6.1*   ALBUMIN 3.3*   BILITOTAL 0.2   ALKPHOS 66   AST 18   ALT 18       Active Medications  Current Facility-Administered Medications   Medication    [START ON 2/24/2023] acetaminophen (TYLENOL) tablet 650 mg    acetaminophen (TYLENOL) tablet 975 mg    aspirin EC tablet 81 mg    [START ON 2/23/2023] atorvastatin (LIPITOR) tablet 20 mg    basiliximab (SIMULECT) 20 mg in sodium chloride 0.9 % 50 mL infusion    [START ON 2/26/2023] basiliximab (SIMULECT) 20 mg in sodium chloride 0.9 % 50 mL infusion    bisacodyl (DULCOLAX) suppository 10 mg    dextrose 10% infusion    dextrose 5% in lactated ringers infusion    glucose gel 15-30 g    Or    dextrose 50 % injection 25-50 mL    Or    glucagon injection 1 mg    glucose gel 15-30 g    Or    dextrose 50 % injection 25-50 mL    Or    glucagon injection 1 mg    HYDROmorphone (DILAUDID) injection 0.2 mg    Or    HYDROmorphone (DILAUDID) injection 0.4 mg    insulin 1 unit/mL in saline (NovoLIN, HumuLIN Regular) drip - ADULT IV Infusion    insulin aspart (NovoLOG) injection (RAPID ACTING)    insulin aspart (NovoLOG) injection (RAPID ACTING)    insulin  aspart (NovoLOG) injection (RAPID ACTING)    insulin aspart (NovoLOG) injection (RAPID ACTING)    insulin glargine (LANTUS PEN) injection 30 Units    magnesium hydroxide (MILK OF MAGNESIA) suspension 30 mL    [START ON 2/23/2023] magnesium oxide (MAG-OX) tablet 400 mg    Med Instruction - Transition from IV Insulin Infusion to Sub-Q Insulin    methylPREDNISolone sodium succinate (solu-MEDROL) 250 mg in sodium chloride 0.9 % 59 mL intermittent infusion    [START ON 2/23/2023] methylPREDNISolone sodium succinate (solu-MEDROL) injection 100 mg    mycophenolate (GENERIC EQUIVALENT) capsule 750 mg    naloxone (NARCAN) injection 0.2 mg    Or    naloxone (NARCAN) injection 0.4 mg    Or    naloxone (NARCAN) injection 0.2 mg    Or    naloxone (NARCAN) injection 0.4 mg    ondansetron (ZOFRAN ODT) ODT tab 4 mg    Or    ondansetron (ZOFRAN) injection 4 mg    oxyCODONE (ROXICODONE) tablet 5 mg    Or    oxyCODONE IR (ROXICODONE) tablet 10 mg    polyethylene glycol (MIRALAX) Packet 17 g    [START ON 2/24/2023] predniSONE (DELTASONE) tablet 60 mg    Followed by    [START ON 2/26/2023] predniSONE (DELTASONE) tablet 40 mg    Followed by    [START ON 2/28/2023] predniSONE (DELTASONE) tablet 20 mg    Followed by    [START ON 3/7/2023] predniSONE (DELTASONE) tablet 15 mg    Followed by    [START ON 3/14/2023] predniSONE (DELTASONE) tablet 10 mg    Followed by    [START ON 3/21/2023] predniSONE (DELTASONE) tablet 5 mg    prochlorperazine (COMPAZINE) injection 10 mg    Or    prochlorperazine (COMPAZINE) tablet 10 mg    senna-docusate (SENOKOT-S/PERICOLACE) 8.6-50 MG per tablet 1 tablet    sodium bicarbonate tablet 650 mg    sodium chloride (PF) 0.9% PF flush 10 mL    sodium chloride (PF) 0.9% PF flush 10-20 mL    sodium chloride 0.45% infusion    sodium chloride 0.9% infusion    sulfamethoxazole-trimethoprim (BACTRIM) 400-80 MG per tablet 1 tablet    tacrolimus (GENERIC EQUIVALENT) capsule 2 mg    [START ON 2/23/2023] valGANciclovir  (VALCYTE) tablet 450 mg     No current outpatient medications on file.       Current Diet  Orders Placed This Encounter      Advance Diet as Tolerated: Clear Liquid Diet

## 2023-02-22 NOTE — PROVIDER NOTIFICATION
Surgery cross-cover paged: YULIA Barbosa on 7A, Kidney  Pt. came up from PACU at 2330. BPs: 80's/50's, RR: 11-13. B & insulin gtt now dc'd. Can you please reorder insulin gtt & does pt. need fluid bolus?  Thanks,  Claire #[id number]    MD ordered NS 1L bolus & reordered insulin gtt.

## 2023-02-22 NOTE — PROGRESS NOTES
Handoff information     Type of transplant: LDKT  Date of transplant: 2/21/23  Direct/non-direct/PEP- WL End chain through PEP  Transplant history: Tx naive   Outstanding items for patient: None  Pertinent history: CKD from DMII, thrombocytopenia, PAD  Barriers to post transplant care: None

## 2023-02-22 NOTE — PROVIDER NOTIFICATION
Surgery cross-cover paged: YULIA Barbosa on 7A, Kidney  Pt's K+3.0 at 2am. Do you want to order replacement with KCL 10meq or 20meq IV x1?    Thanks, Claire #[id number]    MD ordered KCL 10meq IV x1.

## 2023-02-22 NOTE — DISCHARGE INSTRUCTIONS
Diet recommendations post-transplant: High protein diet x 8 weeks.  Heart healthy dietary habits long term (low saturated/trans fat, low sodium). Practice food safety precautions. See nutrition handout and food safety booklet for more information.       Endocrine orders ON DISCHARGE:  Novolog 5 units TID with meals all the time + levemir taper dose as per prednisone     While on   Prednisone 40mg daily - levemir 30 units  Prednisone 20mg daily - levemir 28 units  Prednisone 15mg daily - levemir 26 units  Prednisone 10mg daily - levemir 24 units  Prednisone 5mg daily - levemir 20 units  Off steroids - levemir 20 units daily

## 2023-02-22 NOTE — PROGRESS NOTES
Patient removed from [facility] waitlist after living donor left kidney transplant. UNOS ID [id number]  .    Donor Has Risk Criteria for Transmission of HIV/HCV/HBV: no  Recipient Notified of Risk Criteria: michelle Villalobos RN

## 2023-02-22 NOTE — CONSULTS
Care Management Initial Consult    General Information  Assessment completed with: Patient, VM-chart review,    Type of CM/SW Visit: Initial Assessment    Primary Care Provider verified and updated as needed: Yes   Readmission within the last 30 days:        Reason for Consult: emotional/coping/adjustment concerns, discharge planning  Advance Care Planning: Advance Care Planning Reviewed: no concerns identified, questions answered, concerns discussed          Communication Assessment  Patient's communication style: spoken language (non-English)    Hearing Difficulty or Deaf: no   Wear Glasses or Blind: yes    Cognitive  Cognitive/Neuro/Behavioral: WDL  Level of Consciousness: alert  Arousal Level: opens eyes spontaneously  Orientation: oriented x 4  Mood/Behavior: calm, cooperative  Best Language: 0 - No aphasia  Speech: clear, spontaneous, logical    Living Environment:   People in home: spouse, child(gilbert), adult     Current living Arrangements:        Able to return to prior arrangements: yes       Family/Social Support:  Care provided by: self, spouse/significant other  Provides care for: no one  Marital Status:   , Children          Description of Support System: Supportive, Involved    Support Assessment: Adequate social supports    Current Resources:   Patient receiving home care services:       Community Resources:    Equipment currently used at home: none  Supplies currently used at home:      Employment/Financial:  Employment Status: disabled        Financial Concerns:     Referral to Financial Worker: No       Lifestyle & Psychosocial Needs:  Social Determinants of Health     Tobacco Use: Low Risk      Smoking Tobacco Use: Never     Smokeless Tobacco Use: Never     Passive Exposure: Never   Alcohol Use: Not on file   Financial Resource Strain: Not on file   Food Insecurity: Not on file   Transportation Needs: Not on file   Physical Activity: Not on file   Stress: Not on file   Social  Connections: Not on file   Intimate Partner Violence: Not on file   Depression: Not at risk     PHQ-2 Score: 1   Housing Stability: Not on file       Functional Status:  Prior to admission patient needed assistance:   Dependent ADLs:: Independent  Dependent IADLs:: Independent  Assesssment of Functional Status: At functional baseline    Mental Health Status:  Mental Health Status: Current Concern       Chemical Dependency Status:  Chemical Dependency Status: Current Concern             Values/Beliefs:  Spiritual, Cultural Beliefs, Worship Practices, Values that affect care:             hmong speaking     Patient underwent Kidney transplant.  Met with patient to update psychosocial assessment and provide brief education about SW role while inpatient, as well as expectations/requirements and follow up needs post-transplant. SW also provided education about need for compliance with transplant medications, and explained ESRD Medicare benefits and medication coverage under Medicare part B.  Medicare 2728 forms completed and signed by patient.    Initial social work evaluation: 8/23/2021     Patient's post transplant caregivers: Khloe (spouse)   If not local, plans for short term stay post transplant:  NA     Financial concerns/resources provided: none identified. Nhia is on SSDI.    Insurance and Medication: OT*LIAISON (NHIA) IS A (KIDNEY) TRANSPLANT PATIENT  (DATE OF TRANSPLANT: (DATE: 2/21/2023) )  HEALTH BENEFIT: (UCARE DUAL) MEDICARE PART B & PART D DUAL PLAN  ID# [id number] GRP# [id number] (EFFECTIVE (DATE: 1/1/2023) )  PROCESSING INFO: ID# [id number] GRP# [id number] PCN# DE BIN# [id number] (EFFECTIVE (DATE: 1/1/2023) )  (DO NOT BILL TO ORIGINAL MEDICARE ID# [id number]  (PART A EFFECTIVE (DATE: 1/1/2021) , PART B EFFECTIVE (DATE: 1/1/2021) )  PHARMACY BENEFIT: (UCARE DUAL) PART D  PROCESSING INFO: ID# [id number] GRP# [id number] PCN# DE BIN# [id number] (EFFECTIVE (DATE: 1/1/2023)    NO DEDUCTIBLE OR MAX OUT-OF-POCKET DUE TO LOW-INCOME  SUBSIDY  COPAY STRUCTURE:  $ (1.45) FOR GENERIC  $ (4.30) FOR BRAND   TEST CLAIM SPECIALTY #28  MYCOPHENOLATE 250mg (#240/30DS)=$1.45  PROGRAF 1mg (#180/30DS)=$4.30  TAROLIMUS 1mg (#60/30DS)=$1.45  CYCLOSPORINE 100mg (#60/30DS)=$1.45  VALGANCICLOVIR 450mg (#60/30DS)=$1.45  VALACYCLOVIR 1gm (#90=30DS) =$1.45  TEST CLAIM DISCHARGE #27 (18 YEARS AND OLDER):  MYCOPHENOLATE 250mg (#240/30DS)=$1.45  PROGRAF 1mg (#180/30DS)=$4.30  TACROLIMUS 1mg (#60/30DS)=$1.45  CYCLOSPORINE 100mg (#60/30DS)=$1.45  VALGANCICLOVIR 450mg (#60/30DS)=$1.45  VALACYCLOVIR 1gm (#90=30DS) =$1.45    Mental and chemical health services needed: No concerns identified     Education provided by MARQUEZ: Social Work role inpatient setting, availability of support groups, parking information and psychosocial support.     Assessment and recommendations and plan:  MARQUEZ met with Modesto at the bedside. He reports that he feels very grateful for this transplant and opportunity. Modesto is originally from [place] but has lived in the [place] for a very long time. Modesto lives with his Wife Khloe and son Marco (27).Modesto and Khloe have four additional children: Edy (33) lives in [place], [place], Kaitlynn (31) [place], [place], Fay Howard (20) [place], [place] and Fay Hanley (27) [place], [place].  Modesto's mother, one brother and three sisters live in [place], [place].    Modesto indicated he has been on disability for two years due to vision issues. 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 His spouse is post transplant support. MARQUEZ will continue to follow for psychosocial support, resources and advocate on behalf of the patient.    Anna Boland   [facility]  Outpatient Kidney/Pancreas/Auto Islet Transplant Program   [address]  [place] [address]  [email or web address]  [email or web address]  Office: [phone] I Fax: [phone]    URBANO Tracy

## 2023-02-23 ENCOUNTER — APPOINTMENT (OUTPATIENT)
Dept: INTERPRETER SERVICES | Facility: CLINIC | Age: 58
DRG: 652 | End: 2023-02-23
Attending: TRANSPLANT SURGERY
Payer: COMMERCIAL

## 2023-02-23 LAB
ANION GAP SERPL CALCULATED.3IONS-SCNC: 10 MMOL/L (ref 7–15)
BASOPHILS # BLD AUTO: 0 10E3/UL (ref 0–0.2)
BASOPHILS NFR BLD AUTO: 0 %
BUN SERPL-MCNC: 28.2 MG/DL (ref 6–20)
CA-I BLD-MCNC: 4.2 MG/DL (ref 4.4–5.2)
CALCIUM SERPL-MCNC: 7.2 MG/DL (ref 8.6–10)
CHLORIDE SERPL-SCNC: 106 MMOL/L (ref 98–107)
CREAT SERPL-MCNC: 1.12 MG/DL (ref 0.67–1.17)
DEPRECATED HCO3 PLAS-SCNC: 18 MMOL/L (ref 22–29)
EOSINOPHIL # BLD AUTO: 0 10E3/UL (ref 0–0.7)
EOSINOPHIL NFR BLD AUTO: 0 %
ERYTHROCYTE [DISTWIDTH] IN BLOOD BY AUTOMATED COUNT: 15.3 % (ref 10–15)
GFR SERPL CREATININE-BSD FRML MDRD: 76 ML/MIN/1.73M2
GLUCOSE BLDC GLUCOMTR-MCNC: 246 MG/DL (ref 70–99)
GLUCOSE BLDC GLUCOMTR-MCNC: 250 MG/DL (ref 70–99)
GLUCOSE BLDC GLUCOMTR-MCNC: 271 MG/DL (ref 70–99)
GLUCOSE BLDC GLUCOMTR-MCNC: 275 MG/DL (ref 70–99)
GLUCOSE BLDC GLUCOMTR-MCNC: 280 MG/DL (ref 70–99)
GLUCOSE SERPL-MCNC: 213 MG/DL (ref 70–99)
HBV CORE IGM SERPL QL IA: NONREACTIVE
HBV DNA SERPL QL NAA+PROBE: NORMAL
HCT VFR BLD AUTO: 22.9 % (ref 40–53)
HCV RNA SERPL QL NAA+PROBE: NORMAL
HGB BLD-MCNC: 7.7 G/DL (ref 13.3–17.7)
HIV1+2 RNA SERPL QL NAA+PROBE: NORMAL
IMM GRANULOCYTES # BLD: 0.2 10E3/UL
IMM GRANULOCYTES NFR BLD: 2 %
LYMPHOCYTES # BLD AUTO: 0.1 10E3/UL (ref 0.8–5.3)
LYMPHOCYTES NFR BLD AUTO: 0 %
MAGNESIUM SERPL-MCNC: 1.9 MG/DL (ref 1.7–2.3)
MCH RBC QN AUTO: 29.1 PG (ref 26.5–33)
MCHC RBC AUTO-ENTMCNC: 33.6 G/DL (ref 31.5–36.5)
MCV RBC AUTO: 86 FL (ref 78–100)
MONOCYTES # BLD AUTO: 0.4 10E3/UL (ref 0–1.3)
MONOCYTES NFR BLD AUTO: 3 %
NEUTROPHILS # BLD AUTO: 10.7 10E3/UL (ref 1.6–8.3)
NEUTROPHILS NFR BLD AUTO: 95 %
NRBC # BLD AUTO: 0.1 10E3/UL
NRBC BLD AUTO-RTO: 1 /100
PHOSPHATE SERPL-MCNC: 2.9 MG/DL (ref 2.5–4.5)
PLATELET # BLD AUTO: 62 10E3/UL (ref 150–450)
POTASSIUM SERPL-SCNC: 4 MMOL/L (ref 3.4–5.3)
RBC # BLD AUTO: 2.65 10E6/UL (ref 4.4–5.9)
SODIUM SERPL-SCNC: 134 MMOL/L (ref 136–145)
WBC # BLD AUTO: 11.4 10E3/UL (ref 4–11)

## 2023-02-23 PROCEDURE — 999N000157 HC STATISTIC RCP TIME EA 10 MIN

## 2023-02-23 PROCEDURE — 85025 COMPLETE CBC W/AUTO DIFF WBC: CPT | Performed by: STUDENT IN AN ORGANIZED HEALTH CARE EDUCATION/TRAINING PROGRAM

## 2023-02-23 PROCEDURE — 250N000011 HC RX IP 250 OP 636: Performed by: PHYSICIAN ASSISTANT

## 2023-02-23 PROCEDURE — 250N000012 HC RX MED GY IP 250 OP 636 PS 637: Performed by: STUDENT IN AN ORGANIZED HEALTH CARE EDUCATION/TRAINING PROGRAM

## 2023-02-23 PROCEDURE — 80048 BASIC METABOLIC PNL TOTAL CA: CPT | Performed by: STUDENT IN AN ORGANIZED HEALTH CARE EDUCATION/TRAINING PROGRAM

## 2023-02-23 PROCEDURE — 99232 SBSQ HOSP IP/OBS MODERATE 35: CPT | Performed by: INTERNAL MEDICINE

## 2023-02-23 PROCEDURE — 250N000013 HC RX MED GY IP 250 OP 250 PS 637: Performed by: PHYSICIAN ASSISTANT

## 2023-02-23 PROCEDURE — 250N000013 HC RX MED GY IP 250 OP 250 PS 637: Performed by: STUDENT IN AN ORGANIZED HEALTH CARE EDUCATION/TRAINING PROGRAM

## 2023-02-23 PROCEDURE — 99233 SBSQ HOSP IP/OBS HIGH 50: CPT | Mod: 24 | Performed by: INTERNAL MEDICINE

## 2023-02-23 PROCEDURE — 258N000003 HC RX IP 258 OP 636: Performed by: PHYSICIAN ASSISTANT

## 2023-02-23 PROCEDURE — 82330 ASSAY OF CALCIUM: CPT | Performed by: NURSE PRACTITIONER

## 2023-02-23 PROCEDURE — 36592 COLLECT BLOOD FROM PICC: CPT | Performed by: NURSE PRACTITIONER

## 2023-02-23 PROCEDURE — 83735 ASSAY OF MAGNESIUM: CPT | Performed by: STUDENT IN AN ORGANIZED HEALTH CARE EDUCATION/TRAINING PROGRAM

## 2023-02-23 PROCEDURE — 250N000013 HC RX MED GY IP 250 OP 250 PS 637: Performed by: INTERNAL MEDICINE

## 2023-02-23 PROCEDURE — 84100 ASSAY OF PHOSPHORUS: CPT | Performed by: STUDENT IN AN ORGANIZED HEALTH CARE EDUCATION/TRAINING PROGRAM

## 2023-02-23 PROCEDURE — 120N000011 HC R&B TRANSPLANT UMMC

## 2023-02-23 RX ORDER — CARVEDILOL 12.5 MG/1
12.5 TABLET ORAL 2 TIMES DAILY WITH MEALS
Status: DISCONTINUED | OUTPATIENT
Start: 2023-02-23 | End: 2023-02-24

## 2023-02-23 RX ORDER — SULFAMETHOXAZOLE AND TRIMETHOPRIM 400; 80 MG/1; MG/1
1 TABLET ORAL DAILY
Status: DISCONTINUED | OUTPATIENT
Start: 2023-02-24 | End: 2023-02-26 | Stop reason: HOSPADM

## 2023-02-23 RX ORDER — CARVEDILOL 6.25 MG/1
6.25 TABLET ORAL ONCE
Status: COMPLETED | OUTPATIENT
Start: 2023-02-23 | End: 2023-02-23

## 2023-02-23 RX ORDER — CARVEDILOL 6.25 MG/1
6.25 TABLET ORAL 2 TIMES DAILY WITH MEALS
Status: DISCONTINUED | OUTPATIENT
Start: 2023-02-23 | End: 2023-02-23

## 2023-02-23 RX ORDER — VALGANCICLOVIR 450 MG/1
900 TABLET, FILM COATED ORAL DAILY
Status: DISCONTINUED | OUTPATIENT
Start: 2023-02-24 | End: 2023-02-26 | Stop reason: HOSPADM

## 2023-02-23 RX ADMIN — Medication 50 MCG: at 07:37

## 2023-02-23 RX ADMIN — INSULIN ASPART 6 UNITS: 100 INJECTION, SOLUTION INTRAVENOUS; SUBCUTANEOUS at 12:56

## 2023-02-23 RX ADMIN — SODIUM BICARBONATE 650 MG: 650 TABLET ORAL at 20:39

## 2023-02-23 RX ADMIN — METHYLPREDNISOLONE SODIUM SUCCINATE 100 MG: 125 INJECTION, POWDER, FOR SOLUTION INTRAMUSCULAR; INTRAVENOUS at 07:40

## 2023-02-23 RX ADMIN — SODIUM CHLORIDE: 9 INJECTION, SOLUTION INTRAVENOUS at 08:28

## 2023-02-23 RX ADMIN — MAGNESIUM OXIDE TAB 400 MG (241.3 MG ELEMENTAL MG) 400 MG: 400 (241.3 MG) TAB at 12:57

## 2023-02-23 RX ADMIN — ACETAMINOPHEN 975 MG: 325 TABLET ORAL at 01:37

## 2023-02-23 RX ADMIN — CALCIUM CARBONATE (ANTACID) CHEW TAB 500 MG 1000 MG: 500 CHEW TAB at 07:38

## 2023-02-23 RX ADMIN — CARVEDILOL 6.25 MG: 6.25 TABLET, FILM COATED ORAL at 07:48

## 2023-02-23 RX ADMIN — MYCOPHENOLATE MOFETIL 750 MG: 250 CAPSULE ORAL at 07:38

## 2023-02-23 RX ADMIN — SODIUM BICARBONATE 650 MG: 650 TABLET ORAL at 07:37

## 2023-02-23 RX ADMIN — CARVEDILOL 12.5 MG: 12.5 TABLET, FILM COATED ORAL at 17:46

## 2023-02-23 RX ADMIN — ACETAMINOPHEN 975 MG: 325 TABLET ORAL at 16:21

## 2023-02-23 RX ADMIN — CARVEDILOL 6.25 MG: 6.25 TABLET, FILM COATED ORAL at 10:30

## 2023-02-23 RX ADMIN — INSULIN ASPART 5 UNITS: 100 INJECTION, SOLUTION INTRAVENOUS; SUBCUTANEOUS at 19:41

## 2023-02-23 RX ADMIN — SENNOSIDES AND DOCUSATE SODIUM 1 TABLET: 50; 8.6 TABLET ORAL at 07:46

## 2023-02-23 RX ADMIN — CALCIUM CARBONATE (ANTACID) CHEW TAB 500 MG 1000 MG: 500 CHEW TAB at 20:39

## 2023-02-23 RX ADMIN — MYCOPHENOLATE MOFETIL 750 MG: 250 CAPSULE ORAL at 17:45

## 2023-02-23 RX ADMIN — INSULIN ASPART 6 UNITS: 100 INJECTION, SOLUTION INTRAVENOUS; SUBCUTANEOUS at 20:39

## 2023-02-23 RX ADMIN — VALGANCICLOVIR HYDROCHLORIDE 450 MG: 450 TABLET ORAL at 08:00

## 2023-02-23 RX ADMIN — TACROLIMUS 2 MG: 1 CAPSULE ORAL at 07:37

## 2023-02-23 RX ADMIN — ACETAMINOPHEN 975 MG: 325 TABLET ORAL at 08:00

## 2023-02-23 RX ADMIN — SENNOSIDES AND DOCUSATE SODIUM 1 TABLET: 50; 8.6 TABLET ORAL at 20:39

## 2023-02-23 RX ADMIN — POLYETHYLENE GLYCOL 3350 17 G: 17 POWDER, FOR SOLUTION ORAL at 07:38

## 2023-02-23 RX ADMIN — ATORVASTATIN CALCIUM 20 MG: 20 TABLET, FILM COATED ORAL at 07:37

## 2023-02-23 RX ADMIN — INSULIN ASPART 10 UNITS: 100 INJECTION, SOLUTION INTRAVENOUS; SUBCUTANEOUS at 13:19

## 2023-02-23 RX ADMIN — INSULIN ASPART 5 UNITS: 100 INJECTION, SOLUTION INTRAVENOUS; SUBCUTANEOUS at 07:55

## 2023-02-23 RX ADMIN — TACROLIMUS 2 MG: 1 CAPSULE ORAL at 17:45

## 2023-02-23 RX ADMIN — ASPIRIN 81 MG: 81 TABLET ORAL at 07:37

## 2023-02-23 ASSESSMENT — ACTIVITIES OF DAILY LIVING (ADL)
ADLS_ACUITY_SCORE: 30
ADLS_ACUITY_SCORE: 28
ADLS_ACUITY_SCORE: 30
ADLS_ACUITY_SCORE: 28
ADLS_ACUITY_SCORE: 30
ADLS_ACUITY_SCORE: 28

## 2023-02-23 NOTE — PLAN OF CARE
Patient slept between cares until 3am, then was awake in room. Denies all pain, continues to take scheduled tylenol.    Denies nausea or other discomforts.   I=O fluid replacements discontinued at 2330. Good urine output throughout night.     Spot check BG done at 0400 and was 247. When patient heard the value, he remembered he had just taken some sips of his Ensure on the bedside table.

## 2023-02-23 NOTE — PROGRESS NOTES
Transplant Surgery  Inpatient Daily Progress Note  02/23/2023    Assessment & Plan: Modesto Barbosa is a 57 year old male with PMH significant for ESKD 2/2 DM2 on PD, CAD s/p NSTEMI, HTN. Now s/p LDKT on 2/21/23 with Dr. Weinstein.     Graft function:  S/p LDKT 2/21/23: POD#2. Cr 1.1 (down from 3). Good UOP. Post op US patent.    Immunosuppression management:   Induction: via intermediate risk protocol (cPRA 27%) with Thymo 125 mg intra-op, basiliximab, and steroid pulse with taper per protocol.  Maintenance:  - mg BID  -Tac 2 mg BID, Goal 8-10.     Neuro:  Acute post op pain: Controlled on current regimen. Stop IV dilaudid. Continue scheduled Tylenol. PRN oxycodone available (not using).    Hematology:   Leukocytosis: WBC 11.4 (from 15), likely r/t steroids. Monitor.   Anemia of chronic disease/Acute blood loss: HGB stable 7-8.   Thrombocytopenia: Present prior to admission. Seen by hematology, likely consumption vs ITP. PLT 62, monitor.    Cardiorespiratory:   Hx CAD s/p STEMI; HLD: Resume ASA 81 mg daily and statin.   HTN: PTA Coreg 25 mg BID and Hydralazine 50 mg TID.    's overnight. Start Coreg 12.5 mg BID.    GI/Nutrition: Tolerating regular diet. Continue bowel regimen.     Endocrine:   DMII; Steroid induced hyperglycemia: HGBA1C 6.8%. On insulin PTA. Currently on insulin gtt with steroids. Endocrinology consulted for insulin management and discharge planning with steroid taper. Off insulin gtt today.    Fluid/Electrolytes:   Hypocalcemia: iCa 4.2, continue Tums 1000 mg BID.  Hypomagnesemia: Continue Mag-Ox 400 mg daily.  Metabolic acidosis: Continue sodium bicarb 650 mg BID.     : Odell to remain due to new surgical anastomosis x 3 days    Infectious disease:   Hep B Core Ab +: Check Hep B DNA 3 months post-transplant.     Prophylaxis: DVT (Mechanical), fall, PCP (Bactrim), viral (Valcyte)    Disposition: 7A, plan to discharge home with daughter tomorrow    PHOEBE/Fellow/Resident Provider: Enedina  "Baron, NP [id number]    Faculty: Talha Weinstein M.D.  _________________________________________________________________  Transplant History: Admitted 2/21/2023 for kidney transplant  2/21/2023 (Kidney), Postoperative day: 2     Interval History: History is obtained from the patient  Overnight events: No acute events overnight. Pain is \"ok.\" Denies nausea. Tolerating regular diet. Passing gas and had one BM. Up and OOB.     ROS:   A 10-point review of systems was negative except as noted above.    Meds:   acetaminophen  975 mg Oral Q8H    aspirin  81 mg Oral Daily    atorvastatin  20 mg Oral Daily    [START ON 2/26/2023] basiliximab (SIMULECT) infusion  20 mg Intravenous Once    calcium carbonate  1,000 mg Oral BID    carvedilol  12.5 mg Oral BID w/meals    insulin aspart   Subcutaneous TID w/meals    insulin aspart  1-10 Units Subcutaneous TID AC    insulin aspart  1-7 Units Subcutaneous At Bedtime    insulin glargine  36 Units Subcutaneous QAM AC    magnesium oxide  400 mg Oral Daily with lunch    mycophenolate  750 mg Oral BID IS    polyethylene glycol  17 g Oral Daily    [START ON 2/24/2023] predniSONE  60 mg Oral Daily    Followed by    [START ON 2/26/2023] predniSONE  40 mg Oral Daily    Followed by    [START ON 2/28/2023] predniSONE  20 mg Oral Daily    Followed by    [START ON 3/7/2023] predniSONE  15 mg Oral Daily    Followed by    [START ON 3/14/2023] predniSONE  10 mg Oral Daily    Followed by    [START ON 3/21/2023] predniSONE  5 mg Oral Daily    senna-docusate  1 tablet Oral BID    sodium bicarbonate  650 mg Oral BID    sodium chloride (PF)  10 mL Intracatheter Q8H    [START ON 2/24/2023] sulfamethoxazole-trimethoprim  1 tablet Oral Daily    tacrolimus  2 mg Oral BID IS    [START ON 2/24/2023] valGANciclovir  900 mg Oral Daily    cholecalciferol  50 mcg Oral Daily       Physical Exam:     Admit Weight: 58.9 kg (129 lb 13.6 oz)    Current vitals:   BP (!) 143/69 (BP Location: Left arm)   Pulse 85   Temp " "98.7  F (37.1  C) (Oral)   Resp 16   Ht 1.626 m (5' 4\")   Wt 67 kg (147 lb 9.6 oz)   SpO2 96%   BMI 25.34 kg/m      CVP (mmHg): 7 mmHg    Vital sign ranges:    Temp:  [97.5  F (36.4  C)-98.7  F (37.1  C)] 98.7  F (37.1  C)  Pulse:  [76-85] 85  Resp:  [14-16] 16  BP: (110-197)/(65-90) 143/69  SpO2:  [96 %-100 %] 96 %  Patient Vitals for the past 24 hrs:   BP Temp Temp src Pulse Resp SpO2 Weight   02/23/23 1422 (!) 143/69 98.7  F (37.1  C) Oral 85 16 96 % --   02/23/23 0951 (!) 183/80 98.2  F (36.8  C) Oral 80 16 100 % --   02/23/23 0823 -- -- -- -- -- -- 67 kg (147 lb 9.6 oz)   02/23/23 0748 (!) 197/90 -- -- 82 -- -- --   02/23/23 0144 (!) 155/66 97.5  F (36.4  C) Oral 84 15 99 % --   02/22/23 1900 110/65 -- -- 85 -- 98 % --   02/22/23 1700 (!) 146/71 -- -- 77 -- 97 % --   02/22/23 1600 (!) 149/72 -- -- 77 16 99 % --   02/22/23 1511 -- -- -- 76 -- -- --   02/22/23 1500 (!) 158/71 -- -- 78 14 97 % --     General Appearance: in no apparent distress.   Skin: normal  Heart: regular rate and rhythm  Lungs: NLB on room air  Abdomen: The abdomen is rounded, and  non-tender, generalized. he is not tender over the kidney graft. The wound is Healing well, well approximated, without signs of infection and no drainage.  : mcgovern is present.   Extremities: edema: absent.   Neurologic: awake, alert and oriented. Tremor absent..     Data:   CMP  Recent Labs   Lab 02/23/23  1219 02/23/23  1019 02/23/23  0734 02/22/23  2200 02/22/23  2132 02/22/23  1205 02/22/23  1127 02/22/23  0700 02/22/23  0625   NA  --   --  134*  --   --   --   --   --  136   POTASSIUM  --   --  4.0  --  3.8   < > 3.3*  --  3.3*  3.3*   CHLORIDE  --   --  106  --   --   --   --   --  106   CO2  --   --  18*  --   --   --   --   --  17*   *  --  213*   < >  --    < >  --    < > 135*   BUN  --   --  28.2*  --   --   --   --   --  52.9*   CR  --   --  1.12  --   --   --   --   --  3.03*   GFRESTIMATED  --   --  76  --   --   --   --   --  23*   REYMUNDO  " --   --  7.2*  --   --   --   --   --  7.0*   ICAW  --  4.2*  --   --   --   --  4.1*  --   --    MAG  --   --  1.9  --   --   --   --   --  1.7   PHOS  --   --  2.9  --   --   --   --   --  3.7    < > = values in this interval not displayed.     CBC  Recent Labs   Lab 02/23/23  0734 02/22/23  2132 02/22/23  1127 02/22/23  0625 02/22/23  0203   HGB 7.7* 7.1*   < > 8.3* 8.2*   WBC 11.4*  --   --  15.3*  --    PLT 62*  --   --  87*  --    A1C  --   --   --   --  6.8*    < > = values in this interval not displayed.     COAGS  No lab results found in last 7 days.    Invalid input(s): XA   Urinalysis  Recent Labs   Lab Test 02/16/23  1248 11/18/22  0547   COLOR Light Yellow Light Yellow   APPEARANCE Clear Turbid*   URINEGLC 150* 50*   URINEBILI Negative Negative   URINEKETONE Negative Negative   SG 1.017 1.010   UBLD Negative Negative   URINEPH 5.0 5.0   PROTEIN 30* 50*   NITRITE Negative Negative   LEUKEST Negative Negative   RBCU 1 0   WBCU 2 3     Virology:  Hepatitis C Antibody   Date Value Ref Range Status   02/21/2023 Nonreactive Nonreactive Final   Attestation:    The patient has been seen with team and evaluated by me.   Vital signs, labs, medications and orders were reviewed.   When obtained, diagnostic images were reviewed by me and interpreted as above.    The care plan was discussed with the multidisciplinary team and I agree with the findings and plan in this note, with any differences recorded in blue.    Immunosuppressive medication management was reviewed and adjusted as reflected in the note and orders.     .

## 2023-02-23 NOTE — PROGRESS NOTES
[facility]  Transplant Nephrology Follow Up  Date of Admission:  2/21/2023  Today's Date: 02/23/2023  Requesting physician: Talha Weinstein MD    Recommendations:  - Send ionized calcium   -Increase coreg to 12.5mg bid (I started 6.25mg bid but BP not improving)      Assessment & Plan   # LDKT: Trend down   - Baseline Creatinine: ~ TBD   - Proteinuria: Not checked post transplant   - Date DSA Last Checked: Feb/2023      Latest DSA: No DSA at time of transplant   - BK Viremia: Not checked post transplant   - Kidney Tx Biopsy: No    -Double J stent placed at time of kidney transplant. Remove 4-6 weeks post transplant.   -PD catheter removed at time of transplant   -3d mcgovern    # Immunosuppression: Tacrolimus immediate release (goal 8-10), Mycophenolate mofetil (dose 750 mg every 12 hours) and Prednisone (dose taper)    - Intermediate risk induction   - Changes: No    # Infection Prophylaxis:   - PJP: Sulfa/TMP (Bactrim)  - CMV: Valganciclovir (Valcyte) x3m    # Hypertension: Inadequate control;  Goal BP: < 150/90   - Volume status: Euvolemic  EDW ~ TBD   - Changes: Yes - Increase coreg to 12.5mg bid. I started 6.25mg bid and BP remains elevated    # Diabetes: Controlled (HbA1c <7%) Last HbA1c: 6.8% (2/22/23)   - Management as per primary team.    # Anemia in Chronic Renal Disease: Hgb: Trend up, received PRBC on 2/21      NERY: No   - Iron studies: Replete    # Mineral Bone Disorder:   - Secondary renal hyperparathyroidism; PTH level: Mildly elevated (151-300 pg/ml)        On treatment: None  - Vitamin D; level: Low        On supplement: Yes cholecalciferol 2000 units PO daily.  - Calcium; level: Low, obtain ionized calcium         On supplement: Yes Tums 1000 mg PO between meals.  - Phosphorus; level: Normal        On supplement: No    # Electrolytes:   - Potassium; level: Normal        On supplement: No  - Magnesium; level: Normal        On supplement: Yes  -  Bicarbonate; level: Low normal        On supplement: Yes, continue sodium bicarbonate 650 mg PO twice daily.    # Thrombocytopenia:   -Trending down.    -Seen previously by hematology. Thought to have peripheral consumption vs ITP    -Baseline platelets 40-90k    # Hepatitis B: Core antibody positive. Check Hepatitis B DNA level three months post transplant.    # Asymmetric Left Ventricular Hypertrophy: Stable. No history of sustained ventricular arrhythmias.    # CAD: s/p PCI with stent 11/2021 with negative stress echo 1/2023.  Previously on Brilinta and aspirin. Brillinta stopped 1/2023    # Hyperlipidemia: On atorvastatin 20 mg daily. Most recent cholesterol in November 2022 was 122, triglycerides 234, HDL 22, and LDL 53.    # Transplant History:  Etiology of Kidney Failure: Diabetes mellitus type 2  Tx: LDKT  Transplant: 2/21/2023 (Kidney)  Crossmatch at time of Tx: negative  DSA at time of Tx: No  Significant changes in immunosuppression: None  Significant transplant-related complications: None    Recommendations were communicated to the primary team verbally.    Rome Kim MD  Pager: [phone]    Interval events:  Mr. Barbosa's creatinine is 1.12 (02/23 0734); Trend down.  Good urine output.  Other significant labs/tests/vitals: BP elevated this morning  No events overnight.  No chest pain or shortness of breath.  No leg swelling.  No nausea and vomiting.  Bowel movements are not yet present.  No fever, sweats or chills.      Review of Systems    The 4point Review of Systems is negative other than noted above or here.     Past Medical History    I have reviewed this patient's medical history and updated it with pertinent information if needed.   Past Medical History:   Diagnosis Date     Acute kidney injury (H) 6/10/2021     CKD (chronic kidney disease) stage 5, GFR less than 15 ml/min (H)      Dyslipidemia      Metabolic acidosis      Type 2 diabetes mellitus (H)        Past Surgical History   I have  reviewed this patient's surgical history and updated it with pertinent information if needed.  Past Surgical History:   Procedure Laterality Date     BENCH KIDNEY  2/21/2023    Procedure: Bench kidney;  Surgeon: Talha Weinstein MD;  Location: [facility] OR     CV CORONARY ANGIOGRAM N/A 12/6/2021    Procedure: Coronary Angiogram;  Surgeon: Cristela Banks MD;  Location: [facility] CATH LAB CV     CV LEFT HEART CATH N/A 12/6/2021    Procedure: Left Heart Cath;  Surgeon: Cristela Banks MD;  Location: [facility] CATH LAB CV     CV PCI N/A 12/6/2021    Procedure: Percutaneous Coronary Intervention;  Surgeon: Cristela Banks MD;  Location: [facility] LAB CV     REMOVE CATHETER PERITONEAL N/A 2/21/2023    Procedure: Remove catheter peritoneal;  Surgeon: Talha Weinstein MD;  Location: [facility] OR       Family History   I have reviewed this patient's family history and updated it with pertinent information if needed.   Family History   Problem Relation Age of Onset     Diabetes Type 2  Mother      Other - See Comments Daughter         granular cell tumor of thigh     Heart Disease Other      Social History   I have reviewed this patient's social history and updated it with pertinent information if needed. Modesto Barbosa  reports that he has never smoked. He has never been exposed to tobacco smoke. He has never used smokeless tobacco. He reports that he does not currently use alcohol. He reports that he does not use drugs.    Allergies   No Known Allergies  Prior to Admission Medications     acetaminophen  975 mg Oral Q8H     aspirin  81 mg Oral Daily     atorvastatin  20 mg Oral Daily     [START ON 2/26/2023] basiliximab (SIMULECT) infusion  20 mg Intravenous Once     calcium carbonate  1,000 mg Oral BID     carvedilol  12.5 mg Oral BID w/meals     carvedilol  6.25 mg Oral Once     insulin aspart   Subcutaneous TID w/meals     insulin aspart  1-10 Units Subcutaneous TID AC     insulin aspart  1-7 Units Subcutaneous At Bedtime  "    insulin glargine  36 Units Subcutaneous QAM AC     magnesium oxide  400 mg Oral Daily with lunch     mycophenolate  750 mg Oral BID IS     polyethylene glycol  17 g Oral Daily     [START ON 2023] predniSONE  60 mg Oral Daily    Followed by     [START ON 2023] predniSONE  40 mg Oral Daily    Followed by     [START ON 2023] predniSONE  20 mg Oral Daily    Followed by     [START ON 3/7/2023] predniSONE  15 mg Oral Daily    Followed by     [START ON 3/14/2023] predniSONE  10 mg Oral Daily    Followed by     [START ON 3/21/2023] predniSONE  5 mg Oral Daily     senna-docusate  1 tablet Oral BID     sodium bicarbonate  650 mg Oral BID     sodium chloride (PF)  10 mL Intracatheter Q8H     [START ON 2023] sulfamethoxazole-trimethoprim  1 tablet Oral Daily     tacrolimus  2 mg Oral BID IS     [START ON 2023] valGANciclovir  900 mg Oral Daily     cholecalciferol  50 mcg Oral Daily       dextrose       insulin regular Stopped (23 1608)     - MEDICATION INSTRUCTIONS -       sodium chloride 75 mL/hr at 23 0828       Physical Exam   Temp  Av  F (36.7  C)  Min: 97.2  F (36.2  C)  Max: 98.2  F (36.8  C)  Arterial Line BP  Min: 77/38  Max: 274/258  Arterial Line MAP (mmHg)  Av.7 mmHg  Min: 51 mmHg  Max: 264 mmHg      Pulse  Av.1  Min: 62  Max: 78 Resp  Av.8  Min: 7  Max: 20  SpO2  Av.4 %  Min: 95 %  Max: 100 %    CVP (mmHg): 8 mmHgBP (!) 183/80 (BP Location: Left arm)   Pulse 80   Temp 98.2  F (36.8  C) (Oral)   Resp 16   Ht 1.626 m (5' 4\")   Wt 67 kg (147 lb 9.6 oz)   SpO2 100%   BMI 25.34 kg/m     Date 23 0700 - 23 0659   Shift 0790-2474 9492-0824 9566-5317 24 Hour Total   INTAKE   I.V. 9   9   Shift Total(mL/kg) 9(0.15)   9(0.15)   OUTPUT   Urine 400   400   Shift Total(mL/kg) 400(6.79)   400(6.79)   Weight (kg) 58.9 58.9 58.9 58.9      Admit Weight: 58.9 kg (129 lb 13.6 oz)     GENERAL APPEARANCE: alert and no distress  HENT: mouth without ulcers " or lesions  RESP: lungs clear to auscultation - no rales, rhonchi or wheezes  CV: regular rhythm, normal rate, no rub, no murmur  EDEMA: no LE edema bilaterally  ABDOMEN: soft, nondistended, appropriately tender, bowel sounds normal  MS: extremities normal - no gross deformities noted, no evidence of inflammation in joints, no muscle tenderness  SKIN: no rash, surgical incision healing by primary intention.  DIALYSIS ACCESS:  None    Data   CMP  Recent Labs   Lab 02/23/23  0734 02/23/23  0401 02/22/23  2200 02/22/23  2132 02/22/23  1823 02/22/23  1736 02/22/23  1507 02/22/23  1406 02/22/23  0700 02/22/23  0625 02/21/23  2047 02/21/23  2003 02/21/23  1942 02/21/23  1609 02/21/23  1300 02/21/23  1227 02/16/23  1226   *  --   --   --   --   --   --   --   --  136  --  128* 132*   < > 131*  --  131*   POTASSIUM 4.0  --   --  3.8  --  3.6  --  3.3*   < > 3.3*  3.3*   < > 3.2* 3.7   < > 3.4  --  3.1*   CHLORIDE 106  --   --   --   --   --   --   --   --  106  --   --  96*  --  91*  --  92*   CO2 18*  --   --   --   --   --   --   --   --  17*  --   --  18*  --  18*  --  27   ANIONGAP 10  --   --   --   --   --   --   --   --  13  --   --  18*  --  22*  --  12   * 246* 295*  --  129*  --    < >  --    < > 135*   < > 146* 176*   < > 288*   < > 284*   BUN 28.2*  --   --   --   --   --   --   --   --  52.9*  --   --  70.3*  --  82.1*  --  53.2*   CR 1.12  --   --   --   --   --   --   --   --  3.03*  --   --  5.08*  --  6.92*  --  5.99*   GFRESTIMATED 76  --   --   --   --   --   --   --   --  23*  --   --  12*  --  9*  --  10*   REYMUNDO 7.2*  --   --   --   --   --   --   --   --  7.0*  --   --  6.9*  --  8.0*  --  8.8   MAG 1.9  --   --   --   --   --   --   --   --  1.7  --   --  2.5*  --   --   --   --    PHOS 2.9  --   --   --   --   --   --   --   --  3.7  --   --  4.8*  --   --   --   --    PROTTOTAL  --   --   --   --   --   --   --   --   --   --   --   --   --   --   --   --  6.1*   ALBUMIN  --   --   --    --   --   --   --   --   --   --   --   --   --   --   --   --  3.3*   BILITOTAL  --   --   --   --   --   --   --   --   --   --   --   --   --   --   --   --  0.2   ALKPHOS  --   --   --   --   --   --   --   --   --   --   --   --   --   --   --   --  66   AST  --   --   --   --   --   --   --   --   --   --   --   --   --   --   --   --  18   ALT  --   --   --   --   --   --   --   --   --   --   --   --   --   --   --   --  18    < > = values in this interval not displayed.     CBC  Recent Labs   Lab 02/23/23  0734 02/22/23  2132 02/22/23  1736 02/22/23  1406 02/22/23  1127 02/22/23  0625 02/22/23  0203 02/21/23  2241 02/21/23 2003 02/21/23  1942   HGB 7.7* 7.1* 7.6* 7.7*   < > 8.3*   < > 8.4*   < > 8.7*   WBC 11.4*  --   --   --   --  15.3*  --  8.1  --  5.1   RBC 2.65*  --   --   --   --  2.85*  --  2.81*  --  2.90*   HCT 22.9*  --   --   --   --  24.8*  --  24.3*  --  25.3*   MCV 86  --   --   --   --  87  --  87  --  87   MCH 29.1  --   --   --   --  29.1  --  29.9  --  30.0   MCHC 33.6  --   --   --   --  33.5  --  34.6  --  34.4   RDW 15.3*  --   --   --   --  15.4*  --  15.3*  --  15.5*   PLT 62*  --   --   --   --  87*  --  60*  --  54*    < > = values in this interval not displayed.     INR  Recent Labs   Lab 02/16/23  1226   INR 1.08     ABG  Recent Labs   Lab 02/21/23 2003 02/21/23  1920 02/21/23  1847 02/21/23  1759 02/21/23  1609   PH  --   --   --   --  7.41   PCO2  --   --   --   --  32*   PO2  --   --   --   --  226*   HCO3  --   --   --   --  20*   O2PER 81.0 38.0 38.0 44.0 50.0      Urine Studies  Recent Labs   Lab Test 02/16/23  1248 11/18/22  0547 08/23/21  1243 06/10/21  1630 08/12/20  1508   COLOR Light Yellow Light Yellow Straw Colorless Yellow   APPEARANCE Clear Turbid* Clear Clear Clear   URINEGLC 150* 50* 50* Negative Negative   URINEBILI Negative Negative Negative Negative Negative   URINEKETONE Negative Negative Negative Negative Negative   SG 1.017 1.010 1.010 1.006 1.010   UBLD  Negative Negative Negative Negative Negative   URINEPH 5.0 5.0 5.0 6.0 6.0   PROTEIN 30* 50* 100* 100 mg/dL* 300 mg/dL*   UROBILINOGEN  --   --   --  <2.0 mg/dL <2.0 E.U./dL   NITRITE Negative Negative Negative Negative Negative   LEUKEST Negative Negative Negative Negative Negative   RBCU 1 0 2 <1 0-2   WBCU 2 3 <1 0 0-5     No lab results found.  PTH  Recent Labs   Lab Test 02/16/23  1226 02/09/23  1459   PTHI 232* 31     Iron Studies  Recent Labs   Lab Test 02/16/23  1226 06/11/21  0728   IRON 37* 22*   *  --    IRONSAT 21  --    MATEO 847* 301*       IMAGING:  All imaging studies reviewed by me.

## 2023-02-23 NOTE — PROGRESS NOTES
Care Management Follow Up    Length of Stay (days): 2    Expected Discharge Date: 02/24/2023     Concerns to be Addressed:       Patient plan of care discussed at interdisciplinary rounds: Yes    Anticipated Discharge Disposition: Transitional Care     Anticipated Discharge Services:    Anticipated Discharge DME:      Patient/family educated on Medicare website which has current facility and service quality ratings:    Education Provided on the Discharge Plan:  yes  Patient/Family in Agreement with the Plan:  yes    Referrals Placed by CM/SW: home care     Additional Information:  Pt s/p kidney transplant.  Team anticipates discharge tomorrow pending endocrinology recommendations.  Met with pt. Introduced RNCC role.  Discussed anticipated plan for discharge.  Reviewed/discussed anticipated plan for ATC. Reviewed/discussed option of home care RN services.  Pt deferred to his daughter Yanick to decide about home care.  Pt has not had transplant pharmacy education.  Pt notes no questions or concerns.  Spoke with ELIDIA Goodson.  Agreed to f/u with pt daughter and transplant pharmacy regarding education.    Spoke with pt daughter via phone.  Reviewed above.  Pt daughter is a nurse.  She is presenting a conference tomorrow and would not be available til 6 p.m.  Pt daughter confirmed she will be primary caregiver and the one managing/assisting pt with his medications.  Pt daughter would be interested in home care RN services.   Agreed to f/u with provider team.  Reviewed with [facility].  Awaiting final recommendation from endocrine.  Updated Rich Tovar Pharmacist.  Plan for pt to receive education and discharge pharmacy pharmacist can provide education upon discharge.       Initiated referral to [facility]    Ibeth Benson RN BSN, PHN, ACM-RN  7A RN Care Coordinator  Phone: [phone]  Pager [phone]    To contact the [facility] RNCC  [place] (0800 - 1630) Saturday and Sunday    Units: 4A, 4C, 4E, 5A  and 5B- Pager 1: [phone]    Units: 6A, 6B, 6C, 6D- Pager 2: [phone]    Units: 7A, 7B, 7C, 7D, and 5C-Pager 3: [phone]    [facility] ([phone]) Saturday and Sunday    Units: 5 Ortho, 8A, 10 ICU, & Pediatric Units-Pager 4: [phone]    2/23/2023 2:50 PM

## 2023-02-23 NOTE — PLAN OF CARE
"Goal Outcome Evaluation:  BP (!) 149/72   Pulse 77   Temp 97.6  F (36.4  C) (Oral)   Resp 16   Ht 1.626 m (5' 4\")   Wt 58.9 kg (129 lb 13.6 oz)   SpO2 99%   BMI 22.29 kg/m      Care from: [id number]      VS & Pain: Hypertensive but otherwise vitally stable on RA. Denies pain    Neuro: A&Ox4, able to make needs known. Hmong speaking, understands moderate English    Respiratory: WDL, LS clear and equal    Cardiac: WDL    GI/: Voiding via catheter with adequate output. No BM today, passing gas    Nutrition: Regular diet, needs help ordering    Skin: Abdominal incision, old PD site    Lines: Triple lumen CVC, L PIV    Activity: Not OOB this shift. Plan for walk after dinner.    Plan: Continue with plan of care                      "

## 2023-02-23 NOTE — PLAN OF CARE
"/65   Pulse 85   Temp 97.6  F (36.4  C) (Oral)   Resp 16   Ht 1.626 m (5' 4\")   Wt 58.9 kg (129 lb 13.6 oz)   SpO2 98%   BMI 22.29 kg/m       Patient alert and oriented x 4. Intermittently hypertensive. All other VS stable. Patient on room air. Patient denies pain. Scheduled Tylenol given (See MAR). Patient denies nausea. BG - ACHS. Carbohydrate coverage. Urine Output - mcgovern catheter in use. Bowel Function - small BM during shift. Patient passing gas. Nutrition - Regular diet. Poor appetite. Abdominal incision - approximated, liquid bandage, open to air. Abdominal bulge near incision - outlined, no change in size. Old PD site. CVC - NS @ 75 ml/hr. PIV - saline locked. Activity - SBA. Patient ambulated in hallway. Med card and lab book are up to date. Plan of Care - Will continue to monitor and notify care team of any changes.    "

## 2023-02-23 NOTE — PROGRESS NOTES
Inpatient Diabetes progress note    Chief Complaint for transplant      Assessment and plan:  Modesto Barbosa is a 57 year old male with PMH significant for ESKD 2/2 DMII on PD, CAD s/p NSTEMI, HTN. Now s/p LDKT on 2/21/23     On steroids:  Solumedrol 500mg 2/21/23  Solumedrol 250mg 2/22/23  Solumedrol 100mg 2/23/23  Prednisone 60mg from 2/24/23      Type 2 DM, post transplant, on steroid    - Increase lantus from 30 units to 36 units daily am   - continue  Novolog 1per 10gram CHO meals/snacks  - continue Novolog high correction scale  - BG checks AC, HS, 0200  - Hypoglycemia protocol        Heidi Robles MD  Staff Physician  Endocrinology and Metabolism  [facility]  License: [id number]  Pager: [phone]       =====================================================================      History of Present Illness  Modesto Barbosa is a 57 year old male with PMH significant for ESKD 2/2 DMII on PD, CAD s/p NSTEMI, HTN. Now s/p LDKT on 2/21/23         Recent Labs   Lab 02/23/23  0734 02/23/23  0401 02/22/23  2200 02/22/23  1823 02/22/23  1606 02/22/23  1507   * 246* 295* 129* 95 128*         DM2    Usual Diabetes Regimen:   Long actinv 20 unit daily  Short 10 unit BID    Diabetes Control:   Lab Results   Component Value Date    A1C 6.8 02/22/2023    A1C 6.8 02/21/2023    A1C 7.1 02/16/2023    A1C 6.9 02/09/2023    A1C 5.6 08/23/2021     Review of Systems  10 point ROS completed with pertinent positives and negatives noted in the HPI    Past medical, family and social histories are reviewed and updated.    Past Medical History  Past Medical History:   Diagnosis Date     Acute kidney injury (H) 6/10/2021     CKD (chronic kidney disease) stage 5, GFR less than 15 ml/min (H)      Dyslipidemia      Metabolic acidosis      Type 2 diabetes mellitus (H)        Family History  Family History   Problem Relation Age of Onset     Diabetes Type 2  Mother      Other - See Comments Daughter         granular cell  "tumor of thigh     Heart Disease Other        Social History  Social History     Socioeconomic History     Marital status: Patient Declined     Spouse name: None     Number of children: None     Years of education: None     Highest education level: None   Tobacco Use     Smoking status: Never     Passive exposure: Never     Smokeless tobacco: Never   Vaping Use     Vaping Use: Never used   Substance and Sexual Activity     Alcohol use: Not Currently     Drug use: Never         Physical Examination:  Vital signs:  Temp: 98.2  F (36.8  C) Temp src: Oral BP: (!) 183/80 Pulse: 80   Resp: 16 SpO2: 100 % O2 Device: None (Room air) Oxygen Delivery: 6 LPM Height: 162.6 cm (5' 4\") Weight: 67 kg (147 lb 9.6 oz)  Estimated body mass index is 25.34 kg/m  as calculated from the following:    Height as of this encounter: 1.626 m (5' 4\").    Weight as of this encounter: 67 kg (147 lb 9.6 oz).  General : no acute distress  Mental: awake  Rep: normally breating      Laboratory  Recent Labs   Lab Test 02/23/23  0734 02/23/23  0401 02/22/23  2200 02/22/23  2132 02/22/23  0700 02/22/23  0625   *  --   --   --   --  136   POTASSIUM 4.0  --   --  3.8   < > 3.3*  3.3*   CHLORIDE 106  --   --   --   --  106   CO2 18*  --   --   --   --  17*   ANIONGAP 10  --   --   --   --  13   * 246*   < >  --    < > 135*   BUN 28.2*  --   --   --   --  52.9*   CR 1.12  --   --   --   --  3.03*   REYMUNDO 7.2*  --   --   --   --  7.0*    < > = values in this interval not displayed.     CBC RESULTS:   Recent Labs   Lab Test 02/23/23  0734   WBC 11.4*   RBC 2.65*   HGB 7.7*   HCT 22.9*   MCV 86   MCH 29.1   MCHC 33.6   RDW 15.3*   PLT 62*       Liver Function Studies -   Recent Labs   Lab Test 02/16/23  1226   PROTTOTAL 6.1*   ALBUMIN 3.3*   BILITOTAL 0.2   ALKPHOS 66   AST 18   ALT 18       Active Medications  Current Facility-Administered Medications   Medication     [START ON 2/24/2023] acetaminophen (TYLENOL) tablet 650 mg     acetaminophen " (TYLENOL) tablet 975 mg     aspirin EC tablet 81 mg     atorvastatin (LIPITOR) tablet 20 mg     [START ON 2/26/2023] basiliximab (SIMULECT) 20 mg in sodium chloride 0.9 % 50 mL infusion     bisacodyl (DULCOLAX) suppository 10 mg     calcium carbonate (TUMS) chewable tablet 1,000 mg     carvedilol (COREG) tablet 12.5 mg     dextrose 10% infusion     glucose gel 15-30 g    Or     dextrose 50 % injection 25-50 mL    Or     glucagon injection 1 mg     HYDROmorphone (DILAUDID) injection 0.2 mg    Or     HYDROmorphone (DILAUDID) injection 0.4 mg     insulin 1 unit/mL in saline (NovoLIN, HumuLIN Regular) drip - ADULT IV Infusion     insulin aspart (NovoLOG) injection (RAPID ACTING)     insulin aspart (NovoLOG) injection (RAPID ACTING)     insulin aspart (NovoLOG) injection (RAPID ACTING)     insulin aspart (NovoLOG) injection (RAPID ACTING)     insulin glargine (LANTUS PEN) injection 36 Units     magnesium hydroxide (MILK OF MAGNESIA) suspension 30 mL     magnesium oxide (MAG-OX) tablet 400 mg     Med Instruction - Transition from IV Insulin Infusion to Sub-Q Insulin     mycophenolate (GENERIC EQUIVALENT) capsule 750 mg     naloxone (NARCAN) injection 0.2 mg    Or     naloxone (NARCAN) injection 0.4 mg    Or     naloxone (NARCAN) injection 0.2 mg    Or     naloxone (NARCAN) injection 0.4 mg     ondansetron (ZOFRAN ODT) ODT tab 4 mg    Or     ondansetron (ZOFRAN) injection 4 mg     oxyCODONE (ROXICODONE) tablet 5 mg    Or     oxyCODONE IR (ROXICODONE) tablet 10 mg     polyethylene glycol (MIRALAX) Packet 17 g     [START ON 2/24/2023] predniSONE (DELTASONE) tablet 60 mg    Followed by     [START ON 2/26/2023] predniSONE (DELTASONE) tablet 40 mg    Followed by     [START ON 2/28/2023] predniSONE (DELTASONE) tablet 20 mg    Followed by     [START ON 3/7/2023] predniSONE (DELTASONE) tablet 15 mg    Followed by     [START ON 3/14/2023] predniSONE (DELTASONE) tablet 10 mg    Followed by     [START ON 3/21/2023] predniSONE  (DELTASONE) tablet 5 mg     prochlorperazine (COMPAZINE) injection 10 mg    Or     prochlorperazine (COMPAZINE) tablet 10 mg     senna-docusate (SENOKOT-S/PERICOLACE) 8.6-50 MG per tablet 1 tablet     sodium bicarbonate tablet 650 mg     sodium chloride (PF) 0.9% PF flush 10 mL     sodium chloride (PF) 0.9% PF flush 10-20 mL     [START ON 2/24/2023] sulfamethoxazole-trimethoprim (BACTRIM) 400-80 MG per tablet 1 tablet     tacrolimus (GENERIC EQUIVALENT) capsule 2 mg     [START ON 2/24/2023] valGANciclovir (VALCYTE) tablet 900 mg     Vitamin D3 (CHOLECALCIFEROL) tablet 50 mcg     No current outpatient medications on file.       Current Diet  Orders Placed This Encounter      Advance Diet as Tolerated: Regular Diet Adult

## 2023-02-23 NOTE — OP NOTE
Transplant Surgery  Operative Note     Procedure date:  02/21/23    Preoperative diagnosis:  End Stage renal failure due to diabetes mellitus type 2    Postoperative diagnosis:  Same    Procedure:  1. Left kidney  transplant,  Living, Right  iliac fossa, without vascular reconstruction. A J-J ureteral stent was placed.  2. Kidney allograft preparation on Back Table  3. Removal of PD catheter       Surgeon:  DANIA SHAY    Fellow/Assistant:  Jaime Saavedra MD. There was no qualified resident to assist with this procedure.    Anesthesia:  General    Specimen:  None    Drains:  no drain    Urine output:  500 mls    Estimated blood loss:  200    Fluids administered:       Recip: 57M, ESRD 2/2 DM2   - End to side to right external vessels; recipient artery soft without significant plaque    - graft with good reperfusion; firm, pink; producing urine    - Liche bladder anastomosis with ureteral stent      Donor: living donor, imported; left kidney with single artery and single vein and ureter     ndication: The patient has End Stage renal failure due to diabetes mellitus type 2 and received an organ offer for a Living kidney allograft. After discussing the risks and benefits of proceeding, the patient agreed to proceed with surgery and provided informed consent.    Final ABO/Crossmatch verification: After the donor organ arrived to the operating room and prior to anastomosis, I participated in the transplant pre-verification upon organ receipt timeout by visually verifying the donor ID, organ and laterality, donor blood type, recipient unique identifier, recipient blood type, and that the donor and recipient are blood type compatible. The crossmatch was done prospectively; the T cell flow crossmatch result was negative and B cell flow crossmatch result was negative prior to anastomosis.  The patient received Thymoglobulin, Cellcept and Solumedrol on induction.    Donor Organ Information:   Donor UNOS ID:   [id number]    Donor arterial clamp on:  2/21/2023  9:22 AM    Total ischemic time:  502 min    Cold ischemic time:  468 min    Warm ischemic time:  34 min    Preservation fluid:      Back Table Details:   Procedure:  Bench preparation of the kidney allograft for transplantation without vascular reconstruction    Surgeon:  DANIA SHAY    Faculty Co-Surgeon:      Fellow/Assistant:  Jaime Saavedra MD     Donor arrival to recipient room:  2/21/2023  4:39 PM    Graft injury:  None     Graft biopsy:  None     Organ received on:  Ice    Pump resistance:      Pump flow:      Arterial anatomy:  single     Donor arterial quality:  good     Venous anatomy:  single     Ureteral anatomy:  single     Any reconstruction:  None     Artery:  N/A     Vein:  N/A        Back Table Preparation:  The donor kidney was received and inspected. It had been flushed with UW. The graft was prepared on the back table by removing perinephric fat and ligating venous tributaries and lymphatics. The ureter was also cleaned of excess tissue. If required, reconstruction was performed as detailed above. The kidney was stored in iced cold preservation solution until ready for transplantation. Faculty was present for the critical portions of the procedure.    Operative Procedure:   Arterial anastomosis start:  2/21/2023  5:10 PM    Arterial unclamp:  2/21/2023  5:44 PM    Extra vessels used:         The patient was brought to the operating room, placed in a supine position, and a time out was performed. Sequential compression devices were placed on both lower extremities and general endotracheal anesthesia was induced.  The patient was given IV antibiotics and a Odell catheter. A central line was placed by Anesthesia service. The abdomen was then shaved, prepped, and draped in the usual sterile fashion.  An incision was made in the right lower quadrant and carried down through the subcutaneous tissue and the abdominal wall fascia. If encountered,  the epigastric vessels were ligated in continuity, divided and secured with surgical clips. The right iliac artery and vein were exposed. The retractor system was placed and the lymphatics overlying the vessels were serially ligated and divided.     The patient was heparinized. We applied atraumatic vascular clamps and the donor kidney was brought to the operative field. We made a venotomy and the renal vein was anastomosed to the recipient right external iliac vein in an end-to-side fashion. An arteriotomy was made and the donor renal artery was anastomosed to the recipient right external iliac artery  in an end to side fashion. The patient was simultaneously loaded with IV mannitol, Lasix and volume. The renal artery was protected and the clamps were removed. After several cardiac cycles, we opened the renal artery and the kidney had good reperfusion and was firm, without edema and pink .    The transplant ureter was managed by creating a Liche anastomosis with absorbable suture. A stent was placed across the anastomosis. The kidney made good urine prior to implantation.    Hemostasis was obtained, the anastomoses inspected, and the kidney placed in the iliac fossa. After placement, the vessel lay was inspected and found to be acceptable. The kidney position was placed with hilum facing in. The field was irrigated with antibiotic solution. No drain was placed. The retractor was removed and the abdominal wall fascia reapproximated. Subcutaneous tissues were irrigated and hemostasis obtained.  The skin was reapproximated with running subcuticular stitch and dermabond was applied.   All needle, sponge and instrument counts were correct x 2.     Attention was turned to removal of PD catheter. The previous skin incision was opened. The subcutaneous tissue was dissected down to the PD catheter. Then the internal cuff was removed from the surrounding fascia. The fascial defect was closed with UR-6 vicryl. Then the  external cuff was then removed. The wound was irrigated and closed with 3-O vicryl and 4-O monocryl.   The patient was awakened, extubated, and transferred to PACU for post-op monitoring. Faculty was present for key portions of the procedure.    MARITZA Weinstein was present and performed/supervised the procedure as described.  Dr Saavedra assisted as no qualified resident was available.

## 2023-02-24 ENCOUNTER — TELEPHONE (OUTPATIENT)
Dept: TRANSPLANT | Facility: CLINIC | Age: 58
End: 2023-02-24
Payer: COMMERCIAL

## 2023-02-24 PROBLEM — E83.39 HYPOPHOSPHATEMIA: Status: ACTIVE | Noted: 2023-02-24

## 2023-02-24 PROBLEM — Z94.0 STATUS POST KIDNEY TRANSPLANT: Status: ACTIVE | Noted: 2023-02-24

## 2023-02-24 PROBLEM — R76.8 HEPATITIS B CORE ANTIBODY POSITIVE: Status: ACTIVE | Noted: 2023-02-24

## 2023-02-24 PROBLEM — D69.6 THROMBOCYTOPENIA (H): Status: ACTIVE | Noted: 2022-06-02

## 2023-02-24 PROBLEM — D63.8 ANEMIA IN OTHER CHRONIC DISEASES CLASSIFIED ELSEWHERE: Status: ACTIVE | Noted: 2023-02-24

## 2023-02-24 PROBLEM — E83.42 HYPOMAGNESEMIA: Status: ACTIVE | Noted: 2023-02-24

## 2023-02-24 PROBLEM — E83.51 HYPOCALCEMIA: Status: ACTIVE | Noted: 2023-02-24

## 2023-02-24 PROBLEM — D84.9 IMMUNOSUPPRESSION (H): Status: ACTIVE | Noted: 2023-02-24

## 2023-02-24 PROBLEM — D72.829 LEUKOCYTOSIS: Status: ACTIVE | Noted: 2023-02-24

## 2023-02-24 LAB
ANION GAP SERPL CALCULATED.3IONS-SCNC: 5 MMOL/L (ref 7–15)
BASOPHILS # BLD AUTO: 0 10E3/UL (ref 0–0.2)
BASOPHILS NFR BLD AUTO: 0 %
BUN SERPL-MCNC: 27.1 MG/DL (ref 6–20)
CALCIUM SERPL-MCNC: 7.7 MG/DL (ref 8.6–10)
CHLORIDE SERPL-SCNC: 109 MMOL/L (ref 98–107)
CREAT SERPL-MCNC: 1.04 MG/DL (ref 0.67–1.17)
DEPRECATED HCO3 PLAS-SCNC: 21 MMOL/L (ref 22–29)
EOSINOPHIL # BLD AUTO: 0 10E3/UL (ref 0–0.7)
EOSINOPHIL NFR BLD AUTO: 0 %
ERYTHROCYTE [DISTWIDTH] IN BLOOD BY AUTOMATED COUNT: 15 % (ref 10–15)
GFR SERPL CREATININE-BSD FRML MDRD: 83 ML/MIN/1.73M2
GLUCOSE BLDC GLUCOMTR-MCNC: 123 MG/DL (ref 70–99)
GLUCOSE BLDC GLUCOMTR-MCNC: 153 MG/DL (ref 70–99)
GLUCOSE BLDC GLUCOMTR-MCNC: 229 MG/DL (ref 70–99)
GLUCOSE BLDC GLUCOMTR-MCNC: 235 MG/DL (ref 70–99)
GLUCOSE BLDC GLUCOMTR-MCNC: 325 MG/DL (ref 70–99)
GLUCOSE SERPL-MCNC: 163 MG/DL (ref 70–99)
HCT VFR BLD AUTO: 20.8 % (ref 40–53)
HGB BLD-MCNC: 7.1 G/DL (ref 13.3–17.7)
IMM GRANULOCYTES # BLD: 0.2 10E3/UL
IMM GRANULOCYTES NFR BLD: 2 %
LYMPHOCYTES # BLD AUTO: 0.1 10E3/UL (ref 0.8–5.3)
LYMPHOCYTES NFR BLD AUTO: 1 %
MAGNESIUM SERPL-MCNC: 2.1 MG/DL (ref 1.7–2.3)
MCH RBC QN AUTO: 29.8 PG (ref 26.5–33)
MCHC RBC AUTO-ENTMCNC: 34.1 G/DL (ref 31.5–36.5)
MCV RBC AUTO: 87 FL (ref 78–100)
MONOCYTES # BLD AUTO: 0.6 10E3/UL (ref 0–1.3)
MONOCYTES NFR BLD AUTO: 6 %
NEUTROPHILS # BLD AUTO: 8.3 10E3/UL (ref 1.6–8.3)
NEUTROPHILS NFR BLD AUTO: 91 %
NRBC # BLD AUTO: 0.1 10E3/UL
NRBC BLD AUTO-RTO: 1 /100
PHOSPHATE SERPL-MCNC: 1.7 MG/DL (ref 2.5–4.5)
PLATELET # BLD AUTO: 57 10E3/UL (ref 150–450)
POTASSIUM SERPL-SCNC: 4.8 MMOL/L (ref 3.4–5.3)
RBC # BLD AUTO: 2.38 10E6/UL (ref 4.4–5.9)
SODIUM SERPL-SCNC: 135 MMOL/L (ref 136–145)
TACROLIMUS BLD-MCNC: 2.9 UG/L (ref 5–15)
TME LAST DOSE: ABNORMAL H
TME LAST DOSE: ABNORMAL H
WBC # BLD AUTO: 9.2 10E3/UL (ref 4–11)

## 2023-02-24 PROCEDURE — 250N000013 HC RX MED GY IP 250 OP 250 PS 637: Performed by: STUDENT IN AN ORGANIZED HEALTH CARE EDUCATION/TRAINING PROGRAM

## 2023-02-24 PROCEDURE — 80048 BASIC METABOLIC PNL TOTAL CA: CPT | Performed by: STUDENT IN AN ORGANIZED HEALTH CARE EDUCATION/TRAINING PROGRAM

## 2023-02-24 PROCEDURE — 99232 SBSQ HOSP IP/OBS MODERATE 35: CPT | Mod: GC | Performed by: INTERNAL MEDICINE

## 2023-02-24 PROCEDURE — 85025 COMPLETE CBC W/AUTO DIFF WBC: CPT | Performed by: STUDENT IN AN ORGANIZED HEALTH CARE EDUCATION/TRAINING PROGRAM

## 2023-02-24 PROCEDURE — 99233 SBSQ HOSP IP/OBS HIGH 50: CPT | Mod: 24

## 2023-02-24 PROCEDURE — 999N000157 HC STATISTIC RCP TIME EA 10 MIN

## 2023-02-24 PROCEDURE — 250N000012 HC RX MED GY IP 250 OP 636 PS 637: Performed by: STUDENT IN AN ORGANIZED HEALTH CARE EDUCATION/TRAINING PROGRAM

## 2023-02-24 PROCEDURE — 250N000012 HC RX MED GY IP 250 OP 636 PS 637: Performed by: PHYSICIAN ASSISTANT

## 2023-02-24 PROCEDURE — 36415 COLL VENOUS BLD VENIPUNCTURE: CPT | Performed by: STUDENT IN AN ORGANIZED HEALTH CARE EDUCATION/TRAINING PROGRAM

## 2023-02-24 PROCEDURE — 120N000011 HC R&B TRANSPLANT UMMC

## 2023-02-24 PROCEDURE — 80197 ASSAY OF TACROLIMUS: CPT | Performed by: STUDENT IN AN ORGANIZED HEALTH CARE EDUCATION/TRAINING PROGRAM

## 2023-02-24 PROCEDURE — 250N000013 HC RX MED GY IP 250 OP 250 PS 637: Performed by: PHYSICIAN ASSISTANT

## 2023-02-24 PROCEDURE — 83735 ASSAY OF MAGNESIUM: CPT | Performed by: STUDENT IN AN ORGANIZED HEALTH CARE EDUCATION/TRAINING PROGRAM

## 2023-02-24 PROCEDURE — 250N000013 HC RX MED GY IP 250 OP 250 PS 637: Performed by: TRANSPLANT SURGERY

## 2023-02-24 PROCEDURE — 84100 ASSAY OF PHOSPHORUS: CPT | Performed by: STUDENT IN AN ORGANIZED HEALTH CARE EDUCATION/TRAINING PROGRAM

## 2023-02-24 PROCEDURE — 250N000013 HC RX MED GY IP 250 OP 250 PS 637: Performed by: INTERNAL MEDICINE

## 2023-02-24 RX ORDER — ALBUTEROL SULFATE 90 UG/1
1-2 AEROSOL, METERED RESPIRATORY (INHALATION)
Status: CANCELLED
Start: 2023-02-26

## 2023-02-24 RX ORDER — HEPARIN SODIUM,PORCINE 10 UNIT/ML
5 VIAL (ML) INTRAVENOUS
Status: CANCELLED | OUTPATIENT
Start: 2023-02-26

## 2023-02-24 RX ORDER — CARVEDILOL 12.5 MG/1
25 TABLET ORAL 2 TIMES DAILY WITH MEALS
Status: DISCONTINUED | OUTPATIENT
Start: 2023-02-24 | End: 2023-02-26 | Stop reason: HOSPADM

## 2023-02-24 RX ORDER — EPINEPHRINE 1 MG/ML
0.3 INJECTION, SOLUTION, CONCENTRATE INTRAVENOUS EVERY 5 MIN PRN
Status: CANCELLED | OUTPATIENT
Start: 2023-02-26

## 2023-02-24 RX ORDER — DIPHENHYDRAMINE HYDROCHLORIDE 50 MG/ML
50 INJECTION INTRAMUSCULAR; INTRAVENOUS
Status: CANCELLED
Start: 2023-02-26

## 2023-02-24 RX ORDER — TACROLIMUS 1 MG/1
4 CAPSULE ORAL
Status: DISCONTINUED | OUTPATIENT
Start: 2023-02-24 | End: 2023-02-26 | Stop reason: HOSPADM

## 2023-02-24 RX ORDER — METHYLPREDNISOLONE SODIUM SUCCINATE 125 MG/2ML
125 INJECTION, POWDER, LYOPHILIZED, FOR SOLUTION INTRAMUSCULAR; INTRAVENOUS
Status: CANCELLED
Start: 2023-02-26

## 2023-02-24 RX ORDER — HEPARIN SODIUM (PORCINE) LOCK FLUSH IV SOLN 100 UNIT/ML 100 UNIT/ML
5 SOLUTION INTRAVENOUS
Status: CANCELLED | OUTPATIENT
Start: 2023-02-26

## 2023-02-24 RX ORDER — ALBUTEROL SULFATE 0.83 MG/ML
2.5 SOLUTION RESPIRATORY (INHALATION)
Status: CANCELLED | OUTPATIENT
Start: 2023-02-26

## 2023-02-24 RX ORDER — MEPERIDINE HYDROCHLORIDE 25 MG/ML
25 INJECTION INTRAMUSCULAR; INTRAVENOUS; SUBCUTANEOUS EVERY 30 MIN PRN
Status: CANCELLED | OUTPATIENT
Start: 2023-02-26

## 2023-02-24 RX ADMIN — ACETAMINOPHEN 975 MG: 325 TABLET ORAL at 01:28

## 2023-02-24 RX ADMIN — SULFAMETHOXAZOLE AND TRIMETHOPRIM 1 TABLET: 400; 80 TABLET ORAL at 08:47

## 2023-02-24 RX ADMIN — TACROLIMUS 2 MG: 1 CAPSULE ORAL at 08:48

## 2023-02-24 RX ADMIN — INSULIN ASPART 1 UNITS: 100 INJECTION, SOLUTION INTRAVENOUS; SUBCUTANEOUS at 12:01

## 2023-02-24 RX ADMIN — SODIUM PHOSPHATE, DIBASIC, ANHYDROUS, POTASSIUM PHOSPHATE, MONOBASIC, AND SODIUM PHOSPHATE, MONOBASIC, MONOHYDRATE 250 MG: 852; 155; 130 TABLET, COATED ORAL at 20:15

## 2023-02-24 RX ADMIN — ASPIRIN 81 MG: 81 TABLET ORAL at 08:47

## 2023-02-24 RX ADMIN — SODIUM BICARBONATE 650 MG: 650 TABLET ORAL at 20:15

## 2023-02-24 RX ADMIN — INSULIN ASPART 4 UNITS: 100 INJECTION, SOLUTION INTRAVENOUS; SUBCUTANEOUS at 17:01

## 2023-02-24 RX ADMIN — CALCIUM CARBONATE (ANTACID) CHEW TAB 500 MG 1000 MG: 500 CHEW TAB at 08:47

## 2023-02-24 RX ADMIN — MYCOPHENOLATE MOFETIL 750 MG: 250 CAPSULE ORAL at 08:47

## 2023-02-24 RX ADMIN — CARVEDILOL 25 MG: 12.5 TABLET, FILM COATED ORAL at 08:47

## 2023-02-24 RX ADMIN — SODIUM PHOSPHATE, DIBASIC, ANHYDROUS, POTASSIUM PHOSPHATE, MONOBASIC, AND SODIUM PHOSPHATE, MONOBASIC, MONOHYDRATE 250 MG: 852; 155; 130 TABLET, COATED ORAL at 10:21

## 2023-02-24 RX ADMIN — INSULIN ASPART 5 UNITS: 100 INJECTION, SOLUTION INTRAVENOUS; SUBCUTANEOUS at 18:07

## 2023-02-24 RX ADMIN — CARVEDILOL 25 MG: 12.5 TABLET, FILM COATED ORAL at 18:06

## 2023-02-24 RX ADMIN — INSULIN ASPART 3 UNITS: 100 INJECTION, SOLUTION INTRAVENOUS; SUBCUTANEOUS at 12:02

## 2023-02-24 RX ADMIN — ATORVASTATIN CALCIUM 20 MG: 20 TABLET, FILM COATED ORAL at 08:47

## 2023-02-24 RX ADMIN — PREDNISONE 60 MG: 20 TABLET ORAL at 08:47

## 2023-02-24 RX ADMIN — SENNOSIDES AND DOCUSATE SODIUM 1 TABLET: 50; 8.6 TABLET ORAL at 20:15

## 2023-02-24 RX ADMIN — MYCOPHENOLATE MOFETIL 750 MG: 250 CAPSULE ORAL at 18:06

## 2023-02-24 RX ADMIN — SODIUM BICARBONATE 650 MG: 650 TABLET ORAL at 08:48

## 2023-02-24 RX ADMIN — CALCIUM CARBONATE (ANTACID) CHEW TAB 500 MG 1000 MG: 500 CHEW TAB at 20:14

## 2023-02-24 RX ADMIN — VALGANCICLOVIR 900 MG: 450 TABLET, FILM COATED ORAL at 08:47

## 2023-02-24 RX ADMIN — ACETAMINOPHEN 975 MG: 325 TABLET ORAL at 08:47

## 2023-02-24 RX ADMIN — ACETAMINOPHEN 975 MG: 325 TABLET ORAL at 17:07

## 2023-02-24 RX ADMIN — SENNOSIDES AND DOCUSATE SODIUM 1 TABLET: 50; 8.6 TABLET ORAL at 08:47

## 2023-02-24 RX ADMIN — MAGNESIUM OXIDE TAB 400 MG (241.3 MG ELEMENTAL MG) 400 MG: 400 (241.3 MG) TAB at 12:01

## 2023-02-24 RX ADMIN — Medication 50 MCG: at 08:48

## 2023-02-24 RX ADMIN — POLYETHYLENE GLYCOL 3350 17 G: 17 POWDER, FOR SOLUTION ORAL at 08:48

## 2023-02-24 RX ADMIN — TACROLIMUS 4 MG: 1 CAPSULE ORAL at 18:06

## 2023-02-24 ASSESSMENT — ACTIVITIES OF DAILY LIVING (ADL)
ADLS_ACUITY_SCORE: 24
ADLS_ACUITY_SCORE: 30
ADLS_ACUITY_SCORE: 23
ADLS_ACUITY_SCORE: 21
ADLS_ACUITY_SCORE: 30
ADLS_ACUITY_SCORE: 21

## 2023-02-24 NOTE — PLAN OF CARE
"/63 (BP Location: Left arm)   Pulse 76   Temp 97.9  F (36.6  C) (Oral)   Resp 16   Ht 1.626 m (5' 4\")   Wt 67.3 kg (148 lb 6.4 oz)   SpO2 98%   BMI 25.47 kg/m         2432-6984  AVSS on RA. Med card and lab book updated and by bedside.  Speciality pharmacy has seen patient today (2/24); patient felt good about teaching. Will have family member present and by bedside to go through medication teaching tomm AM (2/25) prior to discharge- anticipating discharge tomm. 2/25. Left PIV saline locked. Right abdominal incision- liquid bandaged; remains open to air; no drainage. BS ACHS with carb coverage; (100's-230's). Regular diet, good appetite and PO intake. Odell removed this morning; good urine output and PVR's negative. Continues to save in hat in bathroom. BM this afternoon. Calls appropriately. Up with SB in hallway- tolerating activity very well. No SOB stated or noted with or without activity. Incisional discomfort managed with scheduled tylenol. Denies nausea. Patient in good spirits! Continue plan of care, please notify MD with any changes.            "

## 2023-02-24 NOTE — PROGRESS NOTES
Care Management Follow Up    Length of Stay (days): 3    Expected Discharge Date: 02/25/2023     Concerns to be Addressed:    discharge   Patient plan of care discussed at interdisciplinary rounds: Yes    Anticipated Discharge Disposition: Transitional Care     Anticipated Discharge Services:  Home care  Anticipated Discharge DME:      Patient/family educated on Medicare website which has current facility and service quality ratings:  yes  Education Provided on the Discharge Plan:  yes  Patient/Family in Agreement with the Plan:  yes    Referrals Placed by CM/SW:  Home care      Additional Information:  Pt status reviewed/discussed during care team rounds.  Anticipate discharge on Saturday 2/25.  ATC appointments requested. [facility] confirmed acceptance.  Discharge home care orders updated.    Accepting Home Care:  [facility]  Phone: [phone]  Fax: [phone]      Ibeth Benson RN BSN, PHN, ACM-RN  7A RN Care Coordinator  Phone: [phone]  Pager [phone]    To contact the weekend RNCC  [place] ([phone]) Saturday and Sunday    Units: 4A, 4C, 4E, 5A and 5B- Pager 1: [phone]    Units: 6A, 6B, 6C, 6D- Pager 2: [phone]    Units: 7A, 7B, 7C, 7D, and 5C-Pager 3: [phone]    [facility] ([phone]) Saturday and Sunday    Units: 5 Ortho, 8A, 10 ICU, & Pediatric Units-Pager 4: [phone]    2/24/2023 3:00 PM

## 2023-02-24 NOTE — PROGRESS NOTES
CLINICAL NUTRITION SERVICES - BRIEF/DISCHARGE NOTE     Nutrition Prescription    Future/Additional Recommendations:  Minimize diet restrictions as able d/t high calorie/protein needs post-transplant.    High protein food choices with meals to help meet high needs post-transplant over the next 6-8 weeks.     Heart-healthy diet (low saturated fat, low sodium, high fiber) and food safety precautions long term due to immunosuppression regimen post-transplant         EVALUATION OF THE PROGRESS TOWARD GOALS   Diet: Regular + Ensure Clear with lunch meal    Intake: Pt reports he is eating well, notes he is tolerating more food at one time compared with prior to the transplant.     INTERVENTIONS  Patient s discharge needs assessed and discharge planning has been conducted with the multidisciplinary transplant care team including physicians, pharmacy, social work and transplant coordinator.     Monitoring/Evaluation  Progress toward goals will be monitored and evaluated per protocol.    Once discharged, place outpatient nutrition consult via the transplant team if nutrition concerns arise.    Enedina Still, MS, RD, LD, CCTD, CNSC  7A/Obs unit pager [phone]  Weekend pager [phone]

## 2023-02-24 NOTE — PLAN OF CARE
"Shift: 9726-2704  BP (!) 150/78 (BP Location: Left arm)   Pulse 77   Temp 97.5  F (36.4  C) (Oral)   Resp 16   Ht 1.626 m (5' 4\")   Wt 67 kg (147 lb 9.6 oz)   SpO2 99%   BMI 25.34 kg/m       VS- hypertensive systolic 150's/160's on RA.   BG- ACHS 275, 235  on sliding scale and carb coverage  Pain/Nausea/PRN'S- scheduled tylenol   Diet- regular diet  LDA- R internal jugular removed, L PIV SL, PD cath packed  Gtt/IVF- none  GI/- mcgovern cathter 675 ml output, pink/red urine, BM 2/23  Skin- abdominal incision derm abounded CUBA  Activity- Assist of  one with walker  Plan- cont with plan of care.    "

## 2023-02-24 NOTE — PLAN OF CARE
Patient's Name: Modesto Barbosa    Procedure Date: 02/23/23  Procedure Time 2130    Procedure Performed: Central line removal     The Central Venous Catheter (CVC) was removed per provider order.     The central venous catheter was located: Right Internal Jugular vein, with a total of three Lumens.     The patient was placed in Trendelenburg position with Insertion site lower than heart.     Valsalva response achieved by: Patient instructed to take a deep breath and bear down while the CVC was removed with a constant steady motion.     Firm pressure was applied at the access site for 2 minutes until bleeding stopped.     Post removal site assessment; Clean and dry without bleeding. Occlusive dressing applied: Yes    CVC tip was inspected and intact: Yes     Tip was sent to lab for culture per provider order: No    Patient tolerated the procedure: yes    2nd RN present during removal: Maritza Farrell RN   2/23/2023   1000 PM

## 2023-02-24 NOTE — TELEPHONE ENCOUNTER
A pharmacist spent 30 minutes providing medication teaching with Modesto Barbosa via  for discharge with a focus on new medications/dose changes.  The discharge medication list was reviewed with the patient/family and the following points were discussed, as applicable: Name, description, purpose, dose/strength, duration of medications, common side effects, food/medications to avoid, action to be taken if dose is missed and how to obtain refills.  The patient's daughter Yanick will be responsible for managing medications. Additionally, the following transplant related education was covered: Purpose of medication card, Timing of medications and day of lab draw considerations , Emesis or missed dose, Prescription Insurance  and Discharge process for receiving meds   Patient will  transplant supplies including 7 day pill organizer, thermometer, and BP monitor at the discharge pharmacy along with medications.  Patient chooses to receive medications from  specialty pharmacy.   Clinical Pharmacy Consult:                                                      Transplant Specific:   Date of Transplant: 02/24/2023  Type of Transplant: kidney  First Transplant: yes  History of rejection: no    Immunosuppression Regimen   TAC 2 mg qAM & 2mg qPM and MMF  750 mg qAM & 750 mg qPM; Prednisone taper  Patient specific goal: Tac 8 to 10  Most recent level: 2.9, date 02/24  Immunosuppressant Levels:  Subtherapeutic  Pt adherent to lab draws: yes  Scr:   Lab Results   Component Value Date    CR 1.04 02/24/2023     Side effects: no side effects    Prophylactic Medications  Antibacterial:  Bactrim 400/80 mg once daily  Scheduled Discontinue Date: Indefinitely    Antifungal: Not needed thus far  Scheduled Discontinue Date: N/A    Antiviral: CrCl >/=60 mL/minute: Valcyte 900mg daily   Scheduled Discontinue Date: 3 months    Acid Reducer:   Scheduled Reviewed Date:     Thrombosis Prevention: Aspirin 81 mg once daily  Scheduled  Discontinue Date: review in clinic    Blood Pressure Management  Frequency of home Blood Pressure checks: once daily  Most recent home BP: 120/69  Patient Blood pressure goal: <150/90  Patient blood pressure at goal:  yes  Hospitalizations/ER visits since last assessment: 0      Med rec/DUR performed: yes  Med Rec Discrepancies: no    Reminders:    1. Bring to first clinic appt: med box, med card, bp monitor, all medications being taken, and lab book.  2.   MTM pharmacist visit on first clinic appt and if ok, again in 3 to 4 months during follow up appt.  3.   Avoid Grapefruit and Grapefruit juice.   4.   Avoid herbal supplements. If wish to take other medications or supplements, call your coordinator.   5.   Keep lab appts.   6.   Can use apps on phone like Etherpad to help manage medication lists and reminders.   7.   Make sure you are protecting your skin by wearing long sleeves and applying sunscreen to exposed skin, for any significant time in the sun.     Transplant Coordinator is Shalonda Meza, Pharm.D.  [facility]  [phone]

## 2023-02-24 NOTE — PROGRESS NOTES
Transplant Surgery  Inpatient Daily Progress Note  02/24/2023    Assessment & Plan: Modesto Barbosa is a 57 year old male with PMH significant for ESKD 2/2 DM2 on PD, CAD s/p NSTEMI, HTN. Now s/p LDKT on 2/21/23 with Dr. Weinstein.     Graft function:  S/p LDKT 2/21/23: POD#3. Cr 1.0 (down from 1.1). Good UOP. Post op US patent.    Immunosuppression management:   Induction: via intermediate risk protocol (cPRA 27%) with Thymo 125 mg intra-op, basiliximab, and steroid pulse with taper per protocol.  Maintenance:  - mg BID  -Tac 2 mg BID, Goal 8-10. 2/24 level pending.     Neuro:  Acute post op pain: Controlled on current regimen. Stop IV dilaudid. Continue scheduled Tylenol. PRN oxycodone available (not using).    Hematology:   Leukocytosis: WBC 9.2 (from 15), likely r/t steroids. Monitor.   Anemia of chronic disease/Acute blood loss: HGB stable 7-8.   Thrombocytopenia: Present prior to admission. Seen by hematology, likely consumption vs ITP. PLT 57, monitor.    Cardiorespiratory:   Hx CAD s/p STEMI; HLD: Resume ASA 81 mg daily and statin.   HTN: PTA Coreg 25 mg BID and Hydralazine 50 mg TID. Start Coreg 25 mg BID.    GI/Nutrition: Tolerating regular diet. Continue bowel regimen.     Endocrine:   DMII; Steroid induced hyperglycemia: HGBA1C 6.8%. On insulin PTA. Initially on insulin gtt with steroids. Endocrinology consulted for insulin management and discharge planning with steroid taper. Off insulin gtt, management per endo. Endo recommending to monitor for next 24 hours before safe to discharge.    Fluid/Electrolytes:   Hypocalcemia: iCa 4.2, continue Tums 1000 mg BID.  Hypomagnesemia: Continue Mag-Ox 400 mg daily.  Metabolic acidosis: Continue sodium bicarb 650 mg BID.   Hypophosphatemia: Start neutra phos 250 mg BID    : Odell to be removed today and check PVR.     Infectious disease:   Hep B Core Ab +: Check Hep B DNA 3 months post-transplant and POD 3.     Prophylaxis: DVT (Mechanical), fall, PCP (Bactrim),  viral (Valcyte)    Disposition: 7A, plan to discharge home with daughter tomorrow    PHOEBE/Fellow/Resident Provider: Chely Mccann PA-C [id number]    Faculty: Talha Weinstein M.D.  _________________________________________________________________  Transplant History: Admitted 2/21/2023 for kidney transplant  2/21/2023 (Kidney), Postoperative day: 3     Interval History: History is obtained from the patient  Overnight events: No acute events overnight. Pain well controlled with positive bowel function.     ROS:   A 10-point review of systems was negative except as noted above.    Meds:   acetaminophen  975 mg Oral Q8H    aspirin  81 mg Oral Daily    atorvastatin  20 mg Oral Daily    [START ON 2/26/2023] basiliximab (SIMULECT) infusion  20 mg Intravenous Once    calcium carbonate  1,000 mg Oral BID    carvedilol  25 mg Oral BID w/meals    insulin aspart   Subcutaneous TID w/meals    insulin aspart  1-10 Units Subcutaneous TID AC    insulin aspart  1-7 Units Subcutaneous At Bedtime    insulin glargine  36 Units Subcutaneous QAM AC    magnesium oxide  400 mg Oral Daily with lunch    mycophenolate  750 mg Oral BID IS    phosphorus tablet 250 mg  250 mg Oral BID    polyethylene glycol  17 g Oral Daily    predniSONE  60 mg Oral Daily    Followed by    [START ON 2/26/2023] predniSONE  40 mg Oral Daily    Followed by    [START ON 2/28/2023] predniSONE  20 mg Oral Daily    Followed by    [START ON 3/7/2023] predniSONE  15 mg Oral Daily    Followed by    [START ON 3/14/2023] predniSONE  10 mg Oral Daily    Followed by    [START ON 3/21/2023] predniSONE  5 mg Oral Daily    senna-docusate  1 tablet Oral BID    sodium bicarbonate  650 mg Oral BID    sodium chloride (PF)  10 mL Intracatheter Q8H    sulfamethoxazole-trimethoprim  1 tablet Oral Daily    tacrolimus  2 mg Oral BID IS    valGANciclovir  900 mg Oral Daily    cholecalciferol  50 mcg Oral Daily       Physical Exam:     Admit Weight: 58.9 kg (129 lb 13.6 oz)    Current vitals:  "  BP (!) 150/78 (BP Location: Left arm)   Pulse 77   Temp 97.5  F (36.4  C) (Oral)   Resp 16   Ht 1.626 m (5' 4\")   Wt 67.3 kg (148 lb 6.4 oz)   SpO2 99%   BMI 25.47 kg/m      Vital sign ranges:    Temp:  [97.5  F (36.4  C)-98.7  F (37.1  C)] 97.5  F (36.4  C)  Pulse:  [77-85] 77  Resp:  [16] 16  BP: (139-162)/(69-89) 150/78  SpO2:  [96 %-99 %] 99 %  Patient Vitals for the past 24 hrs:   BP Temp Temp src Pulse Resp SpO2 Weight   02/24/23 0905 -- -- -- -- -- -- 67.3 kg (148 lb 6.4 oz)   02/24/23 0536 (!) 150/78 97.5  F (36.4  C) Oral 77 16 99 % --   02/24/23 0203 (!) 162/89 97.9  F (36.6  C) Oral 83 16 98 % --   02/23/23 2222 139/69 97.6  F (36.4  C) Oral 82 16 98 % --   02/23/23 1810 (!) 148/71 98.1  F (36.7  C) Oral -- 16 -- --   02/23/23 1422 (!) 143/69 98.7  F (37.1  C) Oral 85 16 96 % --     General Appearance: in no apparent distress.   Skin: normal  Heart: regular rate and rhythm  Lungs: NLB on room air  Abdomen: The abdomen is rounded, and  non-tender, generalized. he is not tender over the kidney graft. The wound is Healing well, well approximated, without signs of infection and no drainage.  : mcgovern is present.   Extremities: edema: absent.   Neurologic: awake, alert and oriented. Tremor absent.    Data:   CMP  Recent Labs   Lab 02/24/23  0748 02/24/23  0551 02/23/23  1219 02/23/23  1019 02/23/23  0734 02/22/23  1205 02/22/23  1127   NA  --  135*  --   --  134*  --   --    POTASSIUM  --  4.8  --   --  4.0   < > 3.3*   CHLORIDE  --  109*  --   --  106  --   --    CO2  --  21*  --   --  18*  --   --    * 163*   < >  --  213*   < >  --    BUN  --  27.1*  --   --  28.2*  --   --    CR  --  1.04  --   --  1.12  --   --    GFRESTIMATED  --  83  --   --  76  --   --    REYMUNDO  --  7.7*  --   --  7.2*  --   --    ICAW  --   --   --  4.2*  --   --  4.1*   MAG  --  2.1  --   --  1.9  --   --    PHOS  --  1.7*  --   --  2.9  --   --     < > = values in this interval not displayed.     CBC  Recent Labs "   Lab 02/24/23  0551 02/23/23  0734 02/22/23  0625 02/22/23  0203   HGB 7.1* 7.7*   < > 8.2*   WBC 9.2 11.4*   < >  --    PLT 57* 62*   < >  --    A1C  --   --   --  6.8*    < > = values in this interval not displayed.     COAGS  No lab results found in last 7 days.    Invalid input(s): XA   Urinalysis  Recent Labs   Lab Test 02/16/23  1248 11/18/22  0547   COLOR Light Yellow Light Yellow   APPEARANCE Clear Turbid*   URINEGLC 150* 50*   URINEBILI Negative Negative   URINEKETONE Negative Negative   SG 1.017 1.010   UBLD Negative Negative   URINEPH 5.0 5.0   PROTEIN 30* 50*   NITRITE Negative Negative   LEUKEST Negative Negative   RBCU 1 0   WBCU 2 3     Virology:  Hepatitis C Antibody   Date Value Ref Range Status   02/21/2023 Nonreactive Nonreactive Final   Attestation:    The patient has been seen with team and evaluated by me.   Vital signs, labs, medications and orders were reviewed.   When obtained, diagnostic images were reviewed by me and interpreted as above.    The care plan was discussed with the multidisciplinary team and I agree with the findings and plan in this note, with any differences recorded in blue.    Immunosuppressive medication management was reviewed and adjusted as reflected in the note and orders.     .

## 2023-02-24 NOTE — PLAN OF CARE
"Goal Outcome Evaluation:  BP (!) 148/71 (BP Location: Left arm)   Pulse 85   Temp 98.1  F (36.7  C) (Oral)   Resp 16   Ht 1.626 m (5' 4\")   Wt 67 kg (147 lb 9.6 oz)   SpO2 96%   BMI 25.34 kg/m      Care from: 4776-9040    Hypertensive but otherwise vitally stable on RA. Given coreg for high bps. Denies pain, scheduled tylenol given. A&Ox4, Hmong speaking but understands basic English. Voiding via mcgovern with adequate output, formed BM this shift. Patient passing gas. Regular diet, good appetite. Needs help with ordering meals. Abdominal incision, approximated, liquid bandage open to air. Abdominal bulge near incision, no change. Old PD site. L PIV SL, R triple lumen CVC. Up with SBA, ambulated halls today.     Plan: Continue with plan of care      Plan of Care Reviewed With: patient    Overall Patient Progress: improvingOverall Patient Progress: improving           "

## 2023-02-24 NOTE — DISCHARGE SUMMARY
[facility]    Discharge Summary  Transplant Surgery    Date of Admission:  2/21/2023  Date of Discharge:  2/25/2023  Discharging Provider: Yen Saeed NP/Talha Weinstein MD     Discharge Diagnoses   Principal Problem:    ESRD (end stage renal disease) (H)  Active Problems:    Hypertension    Metabolic acidosis    Thrombocytopenia (H)    Awaiting organ transplant    Pre-diabetes    Immunosuppression (H)    Status post kidney transplant    Leukocytosis    Anemia in other chronic diseases classified elsewhere    Hypocalcemia    Hypomagnesemia    Hypophosphatemia    Hepatitis B core antibody positive      History of Present Illness   Modesto Barbosa is an 57 year old male with PMH significant for ESKD 2/2 DM2 on PD, CAD s/p NSTEMI, HTN. Now s/p LDKT with ureteral stent on 2/21/23 with Dr. Weinstein.     Hospital Course      Living donor Kidney transplant with ureteral stent 2/21/23:SCr improved, 0.96 on discharge. Odell removed on POD #3, Good UOP without retention. Post op US patent.    Immunosuppression:  -Induction with intermediate risk protocol (cPRA 27%) with Thyroglobulin and Basiliximab and steroid taper.  -Tacrolimus, goal level 8-10 (12 hour trough).  -Mycophenolate 750mg BID.  -Infectious prophylaxis with Bactrim indefinitely and valganciclovir [id number].    Transplant coordinator Shalonda Roy  [phone]  Donor type:  Live  DSA at time of transplant:  no  Ureteral stent: yes  CMV:  Donor + / Recipient +  EBV:  Donor + / Recipient +  Thymoglobulin: 125mg, 2mg/kg    Immunosuppression management:   Induction: via intermediate risk protocol  with Thymo 125 mg intra-op, basiliximab, and steroid pulse with taper per protocol.    Neuro:  Acute post op pain: Controlled on scheduled tylenol and prn oxycodone.      Leukocytosis: Most likely steroid induced, WBC normal by discharge.     Anemia of chronic disease/Acute blood loss: HGB 8.4 at discharge after receiving 1 PRBC  on 2/26.      Thrombocytopenia: Present prior to admission. Thymo x1 intra-op. Seen by hematology, likely consumption vs ITP. Plt count 53 on discharge.      Hx CAD s/p STEMI; HLD: Resume ASA 81 mg daily and statin.     HTN: PTA Coreg 25 mg BID and Hydralazine 50 mg TID. Will discharge on Coreg 25 mg BID and norvasc 5mg daily    DMII; Steroid induced hyperglycemia: HGBA1C 6.8%. On insulin PTA. Initially on insulin gtt and transitioned to Lantus taper with steroids + Novolog 5 units TID w/ meals. Endocrine was consulted to assist with management.     Hypocalcemia: iCa 4.6, continue Tums 1000 mg BID.  Hypomagnesemia: Continue Mag-Ox 400 mg daily.  Metabolic acidosis: Continue sodium bicarb 650 mg BID.   Hypophosphatemia: Continue Neutra phos 250 mg BID.       Hep B Core Ab +: Check Hep B DNA 3 months post-transplant. Hep B virus DNA levels pending on discharge.       Significant Results and Procedures   Procedure date:  02/21/23    Preoperative diagnosis:  End Stage renal failure due to diabetes mellitus type 2    Postoperative diagnosis:  Same    Procedure:  1. Left kidney  transplant,  Living, Right  iliac fossa, without vascular reconstruction. A J-J ureteral stent was placed.  2. Kidney allograft preparation on Back Table  3. Removal of PD catheter        Surgeon:  DANIA SHAY    Fellow/Assistant:  Jaime Saavedra MD. There was no qualified resident to assist with this procedure.    Anesthesia:  General    Specimen:  None    Drains:  no drain    Urine output:  500 mls    Estimated blood loss:  200    Fluids administered:       Pending Results   These results will be followed up by transplant coordinator  Unresulted Labs Ordered in the Past 30 Days of this Admission       Date and Time Order Name Status Description    2/24/2023 11:30 PM Hep B Virus DNA Quant Real Time PCR In process     2/21/2023  6:14 PM Prepare red blood cells (unit) Preliminary     2/21/2023 12:27 PM BK Virus IgG Antibody In process              Code Status   Full    Primary Care Physician   Pal Cooper    Physical Exam   Temp: 97.4  F (36.3  C) Temp src: Oral BP: 131/70 Pulse: 73   Resp: 16 SpO2: 98 % O2 Device: None (Room air)    Vitals:    02/21/23 1222 02/23/23 0823 02/24/23 0905   Weight: 58.9 kg (129 lb 13.6 oz) 67 kg (147 lb 9.6 oz) 67.3 kg (148 lb 6.4 oz)     Vital Signs with Ranges  Temp:  [97.4  F (36.3  C)-98.7  F (37.1  C)] 97.4  F (36.3  C)  Pulse:  [73-81] 73  Resp:  [15-20] 16  BP: (122-191)/(68-98) 131/70  SpO2:  [97 %-99 %] 98 %  I/O last 3 completed shifts:  In: 1127 [P.O.:1127]  Out: 2275 [Urine:2275]    Physical Exam:  General Appearance: in no apparent distress.   Skin: normal, dry, (see abd)  Heart: regular rate and rhythm  Lungs: NLB on room air  Abdomen: The abdomen is soft, and non-tender, generalized. he is not tender over the kidney graft. The wound is healing well, well approximated, without signs of infection and no drainage. Ecchymosis to lower quadrants. Prior PD cath site covered with dressing.   : voiding without retention  Extremities: edema: absent.   Neurologic: awake, alert and oriented. Tremor absent.    Time Spent on this Encounter   I, Davian Jaime MD, General Surgery Resident saw this patient in conjunction with fellow Dr. Pedro and attending surgeon Dr. Weinstein and spent greater than 30 minutes on this discharge encounter.     Discharge Disposition   Discharged to home  Condition at discharge: Stable    Consultations This Hospital Stay   ADVANCE DIRECTIVE IP CONSULT  PHARMACY IP CONSULT  PHARMACY IP CONSULT  SOT MEDICATION HISTORY IP PHARMACY CONSULT  SOCIAL WORK IP CONSULT  PHARMACY IP CONSULT  NUTRITION SERVICES ADULT IP CONSULT  NEPHROLOGY KIDNEY/PANCREAS TRANSPLANT ADULT IP CONSULT  ADVANCE DIRECTIVE IP CONSULT  ENDOCRINE DIABETES ADULT IP CONSULT    Discharge Orders      Home Care Referral      Reason for your hospital stay    Kidney transplant.     Activity    Your activity upon discharge:  Walk at least four times a day, lift no greater than 10 pounds for 6-8 weeks from the time of surgery.  No driving while taking narcotics or 3 weeks after surgery.     Monitor and record    Monitor blood pressure and weight daily.     Monitor glucose 4 times per day.     When to contact your care team    WHEN TO CONTACT YOUR  COORDINATOR:     Transplant Coordinator [phone]     Notify your coordinator if you have pain over your kidney, increased redness or drainage from your incision, fever greater than 100F, decreased urine output or new or increased amount of blood in urine.     Notify your coordinator immediately if you are ever unable to take your immunosuppressive medications for any reason.     If you have URGENT concerns after office hours, please call the hospital switchboard at [phone] and ask to have the organ transplant nurse on-call paged. If you have a life-threatening emergency, go to the nearest emergency room.     Wound care and dressings    Instructions to care for your wound at home: If you have staples in place, they will be removed in 3 weeks after operation. Wash incision daily with soap and water. Do not soak or scrub.     Follow Up and recommended labs and tests    [facility] FOLLOW UP:     1. [facility]: Over the next 2 days you will be seen in the [facility] (ph. [phone], option 3). Your labs will be drawn at the beginning of your appointment. DO NOT take your medications prior to having labs drawn. Please bring all your medications with you from home to take after labs are drawn.     2. Follow up with Dr. Weinstein in Transplant Clinic in 1-2 weeks.     3. Follow up with Transplant Nephrologist as scheduled.     4.  Ureteral stent removal in 4-6 weeks, to be scheduled by Transplant Coordinator. If a  does not contact you for this, please contact your transplant coordinator.     5. Follow up with your primary care provider in ~8  weeks. Patient to schedule.     Remember to always bring an updated medication list to all appointments.        Call your Transplant Coordinator ([phone]) with questions about Transplant Center appointment scheduling.     LABS:     CBC, BMP, magnesium, phosphorus, tacrolimus level to be drawn daily while in [facility], then every Monday and Thursday by home health care nurse if arranged, or at an outpatient lab.      Hep B PCR 3 months post op.     Adult [facility] Follow-up and recommended labs and tests    Follow up with Dr. Weinstein in transplant surgery clinic in 1-2 weeks.     Appointments on [facility] and/or [facility] (with [facility] or [facility] provider or service). Call [phone] if you haven't heard regarding these appointments within 7 days of discharge.     Diet    Follow this diet upon discharge: Diet recommendations post-transplant: Heart healthy dietary habits long term (low saturated/trans fat, low sodium). High protein diet x 8 weeks. Practice food safety precautions.     Discharge Medications   Current Discharge Medication List        START taking these medications    Details   amLODIPine (NORVASC) 5 MG tablet Take 1 tablet (5 mg) by mouth daily  Qty: 30 tablet, Refills: 1    Associated Diagnoses: Essential hypertension      calcium carbonate (TUMS) 500 MG chewable tablet Take 2 tablets (1,000 mg) by mouth 2 times daily  Qty: 120 tablet, Refills: 0    Associated Diagnoses: Kidney replaced by transplant      magnesium oxide (MAG-OX) 400 MG tablet Take 1 tablet (400 mg) by mouth daily (with lunch)  Qty: 30 tablet, Refills: 1    Associated Diagnoses: Kidney replaced by transplant      mycophenolate (GENERIC EQUIVALENT) 250 MG capsule Take 3 capsules (750 mg) by mouth 2 times daily for 360 days  Qty: 180 capsule, Refills: 11    Associated Diagnoses: Kidney replaced by transplant      oxyCODONE (ROXICODONE) 5 MG tablet Take 1 tablet (5 mg) by mouth every 4 hours as needed for moderate pain (4-6)  Qty: 10  tablet, Refills: 0    Associated Diagnoses: Kidney replaced by transplant      phosphorus tablet 250 mg (PHOSPHA 250 NEUTRAL) 250 MG per tablet Take 1 tablet (250 mg) by mouth 2 times daily for 60 days  Qty: 60 tablet, Refills: 1    Associated Diagnoses: Kidney replaced by transplant      polyethylene glycol (MIRALAX) 17 GM/Dose powder Take 17 g by mouth daily  Qty: 510 g, Refills: 0    Associated Diagnoses: Kidney replaced by transplant      predniSONE (DELTASONE) 10 MG tablet Take 1 tablet (10 mg) by mouth daily Take 4 tablets by mouth daily for 2 days starting on Sun, 2/26/2023. Then, take 2 tablets by mouth daily for 7 days starting 2/28/2023. Then, take one and half tablets by mouth daily for 7 days starting 3/7/2023. Then, take one tablet by mouth daily starting 3/14/2023. Then, take half a tablet by mouth daily for 7 days starting 3/21/2023.  Qty: 50 tablet, Refills: 0    Associated Diagnoses: Kidney replaced by transplant      senna-docusate (SENOKOT-S/PERICOLACE) 8.6-50 MG tablet Take 1 tablet by mouth 2 times daily for 30 days  Qty: 60 tablet, Refills: 0    Associated Diagnoses: Kidney replaced by transplant      sodium bicarbonate 650 MG tablet Take 1 tablet (650 mg) by mouth 2 times daily for 60 days  Qty: 60 tablet, Refills: 1    Associated Diagnoses: Kidney replaced by transplant      sulfamethoxazole-trimethoprim (BACTRIM) 400-80 MG tablet Take 1 tablet by mouth daily  Qty: 30 tablet, Refills: 11    Associated Diagnoses: Kidney replaced by transplant      !! tacrolimus (GENERIC EQUIVALENT) 0.5 MG capsule Take 1 capsule (0.5 mg) by mouth daily as needed (To be used for dosage adjustments to maintain goal Tac level 8-10 and/or as instructed by primary transplant team.)  Qty: 30 capsule, Refills: 0    Associated Diagnoses: Kidney replaced by transplant      !! tacrolimus (GENERIC EQUIVALENT) 0.5 MG capsule Take 1 capsule (0.5 mg) by mouth 2 times daily as needed (To be used for dosage adjustments to  maintain goal Tac level 8-10 and/or as instructed by primary transplant team.)  Qty: 60 capsule, Refills: 0    Associated Diagnoses: Kidney replaced by transplant      tacrolimus (GENERIC EQUIVALENT) 1 MG capsule Take 4 capsules (4 mg) by mouth 2 times daily for 360 days  Qty: 240 capsule, Refills: 11    Associated Diagnoses: Kidney replaced by transplant      valGANciclovir (VALCYTE) 450 MG tablet Take 2 tablets (900 mg) by mouth daily for 90 days  Qty: 60 tablet, Refills: 2    Associated Diagnoses: Kidney replaced by transplant      Vitamin D3 (CHOLECALCIFEROL) 25 mcg (1000 units) tablet Take 2 tablets (50 mcg) by mouth daily  Qty: 60 tablet, Refills: 0    Associated Diagnoses: Kidney replaced by transplant       !! - Potential duplicate medications found. Please discuss with provider.        CONTINUE these medications which have CHANGED    Details   acetaminophen (TYLENOL) 325 MG tablet Take 2 tablets (650 mg) by mouth every 4 hours as needed for other (For optimal non-opioid multimodal pain management to improve pain control.)  Qty: 30 tablet, Refills: 0    Associated Diagnoses: Kidney replaced by transplant      aspirin (ASA) 81 MG EC tablet Take 1 tablet (81 mg) by mouth daily  Qty: 30 tablet, Refills: 0    Associated Diagnoses: Kidney replaced by transplant      atorvastatin (LIPITOR) 20 MG tablet Take 1 tablet (20 mg) by mouth daily  Qty: 30 tablet, Refills: 0    Associated Diagnoses: Kidney replaced by transplant      carvedilol (COREG) 25 MG tablet Take 1 tablet (25 mg) by mouth 2 times daily (with meals)  Qty: 60 tablet, Refills: 0    Associated Diagnoses: Kidney replaced by transplant      insulin aspart (NOVOLOG PEN) 100 UNIT/ML pen Inject 5 Units Subcutaneous 3 times daily (with meals) for 180 days  Qty: 9 mL, Refills: 2    Associated Diagnoses: Type 2 diabetes mellitus with diabetic chronic kidney disease, unspecified CKD stage, unspecified whether long term insulin use (H)      insulin detemir  "(LEVEMIR PEN) 100 UNIT/ML pen Inject 30 Units Subcutaneous At Bedtime for 2 days, THEN 28 Units At Bedtime for 7 days, THEN 26 Units At Bedtime for 7 days, THEN 24 Units At Bedtime for 7 days, THEN 20 Units At Bedtime for 7 days, THEN 20 Units At Bedtime for 30 days. On the days that you are taking the prescribed Prednisone 40mg daily, take levemir 30 units at bedtime. On the days that you are taking the prescribed Prednisone 20mg daily take levemir 28 units at bedtime. On the days that you are taking the prescribed Prednisone 15mg daily take levemir 26 units at bedtime. On the days that you are taking the prescribed Prednisone 10mg daily take levemir 24 units at bedtime. On the days that you are taking the prescribed Prednisone 5mg daily take levemir 20 units at bedtime. Once you are off the steroid taper, take levemir 20 units at bedtime daily.  Qty: 13.46 mL, Refills: 0    Associated Diagnoses: Type 2 diabetes mellitus with diabetic chronic kidney disease, unspecified CKD stage, unspecified whether long term insulin use (H)           CONTINUE these medications which have NOT CHANGED    Details   loratadine (CLARITIN) 10 MG tablet Take 1 tablet (10 mg) by mouth daily as needed for allergies (itchy/rash)  Qty: 90 tablet, Refills: 3    Associated Diagnoses: Rash      nitroGLYcerin (NITROSTAT) 0.4 MG sublingual tablet One tablet under the tongue every 5 minutes if needed for chest pain. May repeat every 5 minutes for a maximum of 3 doses in 15 minutes\"  Qty: 25 tablet, Refills: 3    Associated Diagnoses: Pre-transplant evaluation for kidney transplant; Essential hypertension      traZODone (DESYREL) 50 MG tablet Take 50 mg by mouth At Bedtime      triamcinolone (KENALOG) 0.1 % external cream Apply topically 2 times daily As needed for itching/rash.  Qty: 45 g, Refills: 3    Associated Diagnoses: Rash           STOP taking these medications       calcitRIOL (ROCALTROL) 0.25 MCG capsule Comments:   Reason for Stopping: "         calcium carbonate 1250 (500 Ca) MG CHEW Comments:   Reason for Stopping:         docusate sodium (COLACE) 100 MG capsule Comments:   Reason for Stopping:         hydrALAZINE (APRESOLINE) 100 MG tablet Comments:   Reason for Stopping:             Allergies   No Known Allergies  Data   Most Recent 3 CBC's:  Recent Labs   Lab Test 02/26/23  0504 02/25/23  0743 02/25/23  0555 02/24/23  0551   WBC 6.9  --  5.9 9.2   HGB 8.4* 6.8* 6.7* 7.1*   MCV 86  --  88 87   PLT 53*  --  51* 57*      Most Recent 3 BMP's:  Recent Labs   Lab Test 02/26/23  1138 02/26/23  0901 02/26/23  0724 02/26/23  0504 02/25/23  0822 02/25/23  0555 02/24/23  0748 02/24/23  0551   NA  --   --   --  133*  --  133*  --  135*   POTASSIUM  --   --   --  5.1  --  4.4  --  4.8   CHLORIDE  --   --   --  107  --  107  --  109*   CO2  --   --   --  21*  --  21*  --  21*   BUN  --   --   --  27.2*  --  22.5*  --  27.1*   CR  --   --   --  0.96  --  0.93  --  1.04   ANIONGAP  --   --   --  5*  --  5*  --  5*   REYMUNDO  --   --   --  7.1*  --  7.6*  --  7.7*   * 133* 101* 136*   < > 92   < > 163*    < > = values in this interval not displayed.     Most Recent 2 LFT's:  Recent Labs   Lab Test 02/16/23  1226 11/16/22  1908   AST 18 16   ALT 18 16   ALKPHOS 66 68   BILITOTAL 0.2 0.6     Most Recent INR's and Anticoagulation Dosing History:  Anticoagulation Dose History       Recent Dosing and Labs Latest Ref Rng & Units 6/11/2021 8/23/2021 10/6/2021 12/6/2021 11/17/2022 2/16/2023    INR 0.85 - 1.15 1.27(H) 1.25(H) 1.12 1.01 1.16(H) 1.08          Most Recent 3 Troponin's:No lab results found.  Most Recent Cholesterol Panel:  Recent Labs   Lab Test 11/17/22  0916   CHOL 122   LDL 53   HDL 22*   TRIG 234*     Most Recent 6 Bacteria Isolates From Any Culture (See EPIC Reports for Culture Details):No lab results found.  Most Recent TSH, T4 and A1c Labs:  Recent Labs   Lab Test 02/22/23  0203   A1C 6.8*   Attestation:    The patient has been seen with team and  evaluated by me.   <30 min planning discharge  Vital signs, labs, medications and orders were reviewed.   When obtained, diagnostic images were reviewed by me and interpreted as above.    The care plan was discussed with the multidisciplinary team and I agree with the findings and plan in this note, with any differences recorded in blue.    Immunosuppressive medication management was reviewed and adjusted as reflected in the note and orders.     .

## 2023-02-24 NOTE — PROGRESS NOTES
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 Now s/p LDKT on 2/21/23     On steroids:  Solumedrol 500mg 2/21/23  Solumedrol 250mg 2/22/23  Solumedrol 100mg 2/23/23  Prednisone 60mg from 2/24/23 x2 days. Today is first day of oral steroids. Further planned taper from 02/25/23 is 40 mg x2, 20 mg x 7, 15mg x7, 10 mg x7 , 5 mg x7 then off. Home dose of Levemir was 20 units, currently on 36 units Lantus dosing for methylprednisolone coverage. Will require a decrease in Lantus dosing moving forward to account for switch to oral steroids. He also does not know how to count carbohydrates; will require fixed dose of Novolog on discharge, which will need to be higher with high dose steroids on board. Also does not use a correction scale at home. Daughter manages his insulin at home. Would recommend monitoring for 24 hours at least on oral steroids to develop optimal discharge insulin plan.     Type 2 DM, post transplant, on steroids  - Lantus 36 units daily am   - Continue  Novolog 1per 10gram CHO meals/snacks  - Continue Novolog high correction scale  - BG checks AC, HS, 0200  - Hypoglycemia protocol    Case and recommendations discussed with Dr. Robles, staff Endocrinologist.     Attending tie-in note  I saw the patient with endocrine fellow Dr. Meza and directly examined patient and discussed. Agree above note and plan.       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 Now s/p LDKT on 2/21/23   Today is his first day of oral steroids; we discussed that we will monitor trends on this given recent transition from IV steroids. He relates that his daughter manages his  "insulin at home; is aware of his prior to admission doses. Endorses that he does not know how to carb count and does not administer a correction scale at home. Endorses that he has a poor appetite both here and at home.     Recent Labs   Lab 02/24/23  0748 02/24/23  0551 02/24/23  0200 02/23/23  2253 02/23/23  1940 02/23/23  1812   * 163* 235* 275* 250* 280*         Usual Diabetes Regimen:   Levemir 20 units daily  Novolog 10 units BID AC    Diabetes Control:   Lab Results   Component Value Date    A1C 6.8 02/22/2023    A1C 6.8 02/21/2023    A1C 7.1 02/16/2023    A1C 6.9 02/09/2023    A1C 5.6 08/23/2021     Review of Systems  10 point ROS completed with pertinent positives and negatives noted in the HPI    Physical Examination:  Vital signs:  Temp: 97.5  F (36.4  C) Temp src: Oral BP: (!) 150/78 Pulse: 77   Resp: 16 SpO2: 99 % O2 Device: None (Room air) Oxygen Delivery: 6 LPM Height: 162.6 cm (5' 4\") Weight: 67 kg (147 lb 9.6 oz)  Estimated body mass index is 25.34 kg/m  as calculated from the following:    Height as of this encounter: 1.626 m (5' 4\").    Weight as of this encounter: 67 kg (147 lb 9.6 oz).  General : no acute distress, lying in bed  Mental: awake, alert  Rep: normally breathing, no cough    Laboratory  Last Comprehensive Metabolic Panel:  Lab Results   Component Value Date     (L) 02/24/2023    POTASSIUM 4.8 02/24/2023    CHLORIDE 109 (H) 02/24/2023    CO2 21 (L) 02/24/2023    ANIONGAP 5 (L) 02/24/2023     (H) 02/24/2023    BUN 27.1 (H) 02/24/2023    CR 1.04 02/24/2023    GFRESTIMATED 83 02/24/2023    REYMUNDO 7.7 (L) 02/24/2023       Active Medications  Current Facility-Administered Medications   Medication    acetaminophen (TYLENOL) tablet 650 mg    acetaminophen (TYLENOL) tablet 975 mg    aspirin EC tablet 81 mg    atorvastatin (LIPITOR) tablet 20 mg    [START ON 2/26/2023] basiliximab (SIMULECT) 20 mg in sodium chloride 0.9 % 50 mL infusion    bisacodyl (DULCOLAX) suppository 10 " mg    calcium carbonate (TUMS) chewable tablet 1,000 mg    carvedilol (COREG) tablet 25 mg    dextrose 10% infusion    glucose gel 15-30 g    Or    dextrose 50 % injection 25-50 mL    Or    glucagon injection 1 mg    insulin 1 unit/mL in saline (NovoLIN, HumuLIN Regular) drip - ADULT IV Infusion    insulin aspart (NovoLOG) injection (RAPID ACTING)    insulin aspart (NovoLOG) injection (RAPID ACTING)    insulin aspart (NovoLOG) injection (RAPID ACTING)    insulin aspart (NovoLOG) injection (RAPID ACTING)    insulin glargine (LANTUS PEN) injection 36 Units    magnesium hydroxide (MILK OF MAGNESIA) suspension 30 mL    magnesium oxide (MAG-OX) tablet 400 mg    Med Instruction - Transition from IV Insulin Infusion to Sub-Q Insulin    mycophenolate (GENERIC EQUIVALENT) capsule 750 mg    naloxone (NARCAN) injection 0.2 mg    Or    naloxone (NARCAN) injection 0.4 mg    Or    naloxone (NARCAN) injection 0.2 mg    Or    naloxone (NARCAN) injection 0.4 mg    ondansetron (ZOFRAN ODT) ODT tab 4 mg    oxyCODONE (ROXICODONE) tablet 5 mg    phosphorus tablet 250 mg (PHOSPHA 250 NEUTRAL) per tablet 250 mg    polyethylene glycol (MIRALAX) Packet 17 g    predniSONE (DELTASONE) tablet 60 mg    Followed by    [START ON 2/26/2023] predniSONE (DELTASONE) tablet 40 mg    Followed by    [START ON 2/28/2023] predniSONE (DELTASONE) tablet 20 mg    Followed by    [START ON 3/7/2023] predniSONE (DELTASONE) tablet 15 mg    Followed by    [START ON 3/14/2023] predniSONE (DELTASONE) tablet 10 mg    Followed by    [START ON 3/21/2023] predniSONE (DELTASONE) tablet 5 mg    prochlorperazine (COMPAZINE) tablet 10 mg    senna-docusate (SENOKOT-S/PERICOLACE) 8.6-50 MG per tablet 1 tablet    sodium bicarbonate tablet 650 mg    sodium chloride (PF) 0.9% PF flush 10 mL    sodium chloride (PF) 0.9% PF flush 10-20 mL    sulfamethoxazole-trimethoprim (BACTRIM) 400-80 MG per tablet 1 tablet    tacrolimus (GENERIC EQUIVALENT) capsule 2 mg    valGANciclovir  (VALCYTE) tablet 900 mg    Vitamin D3 (CHOLECALCIFEROL) tablet 50 mcg     No current outpatient medications on file.       Current Diet  Orders Placed This Encounter      Advance Diet as Tolerated: Regular Diet Adult      Diet

## 2023-02-24 NOTE — PROGRESS NOTES
[facility]  Transplant Nephrology Follow Up  Date of Admission:  2/21/2023  Today's Date: 02/24/2023  Requesting physician: Talha Weinstein MD    Recommendations:  - Increase Coreg to 25 mg PO twice daily   - Check ionized calcium and Hepatitis B DNA level tomorrow. If calcium continues to be low despite tums 1g BID would restart calcitriol    Assessment & Plan   # LDKT: Trend down   - Baseline Creatinine: ~ TBD   - Proteinuria: Not checked post transplant   - Date DSA Last Checked: Feb/2023      Latest DSA: No DSA at time of transplant   - BK Viremia: Not checked post transplant   - Kidney Tx Biopsy: No    -Double J stent placed at time of kidney transplant. Remove 4-6 weeks post transplant.   -PD catheter removed at time of transplant    # Immunosuppression: Tacrolimus immediate release (goal 8-10), Mycophenolate mofetil (dose 750 mg every 12 hours) and Prednisone (dose taper)    - Intermediate risk induction   - Changes: No    # Infection Prophylaxis:   - PJP: Sulfa/TMP (Bactrim)  - CMV: Valganciclovir (Valcyte) x3m    # Hypertension: Inadequate control;  Goal BP: < 150/90   - Volume status: Euvolemic  EDW ~ TBD   - Changes: Yes - Increase Coreg to 25 mg PO twice daily    # Diabetes: Controlled (HbA1c <7%) Last HbA1c: 6.8% (2/22/23)   - Management as per primary team.    # Anemia in Chronic Renal Disease: Hgb: Trend down, received PRBC on 2/21      NERY: No   - Iron studies: Replete    # Mineral Bone Disorder:   - Secondary renal hyperparathyroidism; PTH level: Mildly elevated (151-300 pg/ml)        On treatment: None  - Vitamin D; level: Low        On supplement: Yes cholecalciferol 2000 units PO daily.  - Calcium; level: Low normal, check ionized calcium tomorrow.       On supplement: Yes Tums 1000 mg PO between meals. If calcium remains low would consider restarting calcitriol  - Phosphorus; level: Low        On supplement: Yes, phos 250mg bid    #  Electrolytes:   - Potassium; level: Normal        On supplement: No  - Magnesium; level: Normal        On supplement: Yes  - Bicarbonate; level: Low normal        On supplement: Yes, continue sodium bicarbonate 650 mg PO twice daily.    # Thrombocytopenia:   -Trending down.    -Seen previously by hematology. Thought to have peripheral consumption vs ITP    -Baseline platelets 40-90k    # Hepatitis B: Core antibody positive. Check Hepatitis B DNA level tomorrow and  three months post transplant.    # Asymmetric Left Ventricular Hypertrophy: Stable. No history of sustained ventricular arrhythmias.    # CAD: s/p PCI with stent 11/2021 with negative stress echo 1/2023.  Previously on Brilinta and aspirin. Brillinta stopped 1/2023     # Hyperlipidemia: On atorvastatin 20 mg daily. Most recent cholesterol in November 2022 was 122, triglycerides 234, HDL 22, and LDL 53.    # Transplant History:  Etiology of Kidney Failure: Diabetes mellitus type 2  Tx: LDKT  Transplant: 2/21/2023 (Kidney)  Crossmatch at time of Tx: negative  DSA at time of Tx: No  Significant changes in immunosuppression: None  Significant transplant-related complications: None    Recommendations were communicated to the primary team verbally.    Zander Alonzo, APRN CNP  Pager: [phone]      Physician Attestation     I saw and evaluated Modesto Barbosa as part of a shared APRN/PA visit.     I personally reviewed the vital signs, medications, labs and imaging.    I personally performed the substantive portion of the medical decision making for this visit - please see the PHOEBE's documentation for full details.    Key management decisions made by me and carried out under my direction: Increas coreg to 25mg bid. Remove mcgovern. Obtain ionized calcium and Hep B DNA tmrw. If calcium remains low would start calcitriol.     I personally performed the substantive portion of the history for this visit - please see the PHOEBE's documentation for full details.  Key  additional history findings made by me: BGs remain elevated, followed by endocrine, BP remains elevated. Feels well, no complaints.     Rome Kim MD  Date of Service (when I saw the patient): 02/24/23    Interval events:  Mr. Day creatinine is 1.04 (02/24 0551); Trend down.  Good urine output.  Other significant labs/tests/vitals: BP elevated this morning  No events overnight.  No chest pain or shortness of breath.  No leg swelling.  No nausea and vomiting.  Pt had a BM last night.  No fever, sweats or chills.      Review of Systems    The 4point Review of Systems is negative other than noted above or here.     Past Medical History    I have reviewed this patient's medical history and updated it with pertinent information if needed.   Past Medical History:   Diagnosis Date     Acute kidney injury (H) 6/10/2021     CKD (chronic kidney disease) stage 5, GFR less than 15 ml/min (H)      Dyslipidemia      Metabolic acidosis      Type 2 diabetes mellitus (H)        Past Surgical History   I have reviewed this patient's surgical history and updated it with pertinent information if needed.  Past Surgical History:   Procedure Laterality Date     BENCH KIDNEY  2/21/2023    Procedure: Bench kidney;  Surgeon: Talha Weinstein MD;  Location: [facility] OR     CV CORONARY ANGIOGRAM N/A 12/6/2021    Procedure: Coronary Angiogram;  Surgeon: Cristela Banks MD;  Location: [facility] LAB CV     CV LEFT HEART CATH N/A 12/6/2021    Procedure: Left Heart Cath;  Surgeon: Cristela Banks MD;  Location: [facility] LAB CV     CV PCI N/A 12/6/2021    Procedure: Percutaneous Coronary Intervention;  Surgeon: Cristela Banks MD;  Location: [facility] CATH LAB CV     REMOVE CATHETER PERITONEAL N/A 2/21/2023    Procedure: Remove catheter peritoneal;  Surgeon: Talha Weinstein MD;  Location: [facility] OR       Family History   I have reviewed this patient's family history and updated it with pertinent information if needed.   Family  History   Problem Relation Age of Onset     Diabetes Type 2  Mother      Other - See Comments Daughter         granular cell tumor of thigh     Heart Disease Other      Social History   I have reviewed this patient's social history and updated it with pertinent information if needed. Modesto Barbosa  reports that he has never smoked. He has never been exposed to tobacco smoke. He has never used smokeless tobacco. He reports that he does not currently use alcohol. He reports that he does not use drugs.    Allergies   No Known Allergies  Prior to Admission Medications     acetaminophen  975 mg Oral Q8H     aspirin  81 mg Oral Daily     atorvastatin  20 mg Oral Daily     [START ON 2/26/2023] basiliximab (SIMULECT) infusion  20 mg Intravenous Once     calcium carbonate  1,000 mg Oral BID     carvedilol  25 mg Oral BID w/meals     insulin aspart   Subcutaneous TID w/meals     insulin aspart  1-10 Units Subcutaneous TID AC     insulin aspart  1-7 Units Subcutaneous At Bedtime     insulin glargine  36 Units Subcutaneous QAM AC     magnesium oxide  400 mg Oral Daily with lunch     mycophenolate  750 mg Oral BID IS     phosphorus tablet 250 mg  250 mg Oral BID     polyethylene glycol  17 g Oral Daily     predniSONE  60 mg Oral Daily    Followed by     [START ON 2/26/2023] predniSONE  40 mg Oral Daily    Followed by     [START ON 2/28/2023] predniSONE  20 mg Oral Daily    Followed by     [START ON 3/7/2023] predniSONE  15 mg Oral Daily    Followed by     [START ON 3/14/2023] predniSONE  10 mg Oral Daily    Followed by     [START ON 3/21/2023] predniSONE  5 mg Oral Daily     senna-docusate  1 tablet Oral BID     sodium bicarbonate  650 mg Oral BID     sodium chloride (PF)  10 mL Intracatheter Q8H     sulfamethoxazole-trimethoprim  1 tablet Oral Daily     tacrolimus  4 mg Oral BID IS     valGANciclovir  900 mg Oral Daily     cholecalciferol  50 mcg Oral Daily       dextrose       insulin regular Stopped (02/22/23 4827)     -  "MEDICATION INSTRUCTIONS -         Physical Exam   Temp  Av  F (36.7  C)  Min: 97.2  F (36.2  C)  Max: 98.2  F (36.8  C)  Arterial Line BP  Min: 77/38  Max: 274/258  Arterial Line MAP (mmHg)  Av.7 mmHg  Min: 51 mmHg  Max: 264 mmHg      Pulse  Av.1  Min: 62  Max: 78 Resp  Av.8  Min: 7  Max: 20  SpO2  Av.4 %  Min: 95 %  Max: 100 %    CVP (mmHg): 8 mmHgBP 120/69 (BP Location: Left arm)   Pulse 75   Temp 97.4  F (36.3  C) (Oral)   Resp 16   Ht 1.626 m (5' 4\")   Wt 67.3 kg (148 lb 6.4 oz)   SpO2 98%   BMI 25.47 kg/m     Date 23 0700 - 23 0659   Shift 2482-7220 8182-2811 0676-2852 24 Hour Total   INTAKE   I.V. 9   9   Shift Total(mL/kg) 9(0.15)   9(0.15)   OUTPUT   Urine 400   400   Shift Total(mL/kg) 400(6.79)   400(6.79)   Weight (kg) 58.9 58.9 58.9 58.9      Admit Weight: 58.9 kg (129 lb 13.6 oz)     GENERAL APPEARANCE: alert and no distress  HENT: mouth without ulcers or lesions  RESP: lungs clear to auscultation - no rales, rhonchi or wheezes  CV: regular rhythm, normal rate, no rub, no murmur  EDEMA: no LE edema bilaterally  ABDOMEN: soft, nondistended, appropriately tender, bowel sounds normal  MS: extremities normal - no gross deformities noted, no evidence of inflammation in joints, no muscle tenderness  SKIN: no rash, surgical incision healing by primary intention.  DIALYSIS ACCESS:  None    Data   CMP  Recent Labs   Lab 23  1111 23  0748 23  0551 23  0200 23  1219 23  0734 23  2200 23  2132 23  1823 23  1736 23  0700 23  0625 23   NA  --   --  135*  --   --  134*  --   --   --   --   --  136  --  128* 132*   POTASSIUM  --   --  4.8  --   --  4.0  --  3.8  --  3.6   < > 3.3*  3.3*   < > 3.2* 3.7   CHLORIDE  --   --  109*  --   --  106  --   --   --   --   --  106  --   --  96*   CO2  --   --  21*  --   --  18*  --   --   --   --   --  17*  --   --  18* "   ANIONGAP  --   --  5*  --   --  10  --   --   --   --   --  13  --   --  18*   * 123* 163* 235*   < > 213*   < >  --    < >  --    < > 135*   < > 146* 176*   BUN  --   --  27.1*  --   --  28.2*  --   --   --   --   --  52.9*  --   --  70.3*   CR  --   --  1.04  --   --  1.12  --   --   --   --   --  3.03*  --   --  5.08*   GFRESTIMATED  --   --  83  --   --  76  --   --   --   --   --  23*  --   --  12*   REYMUNDO  --   --  7.7*  --   --  7.2*  --   --   --   --   --  7.0*  --   --  6.9*   MAG  --   --  2.1  --   --  1.9  --   --   --   --   --  1.7  --   --  2.5*   PHOS  --   --  1.7*  --   --  2.9  --   --   --   --   --  3.7  --   --  4.8*    < > = values in this interval not displayed.     CBC  Recent Labs   Lab 02/24/23  0551 02/23/23  0734 02/22/23  2132 02/22/23  1736 02/22/23  1127 02/22/23  0625 02/22/23  0203 02/21/23  2241   HGB 7.1* 7.7* 7.1* 7.6*   < > 8.3*   < > 8.4*   WBC 9.2 11.4*  --   --   --  15.3*  --  8.1   RBC 2.38* 2.65*  --   --   --  2.85*  --  2.81*   HCT 20.8* 22.9*  --   --   --  24.8*  --  24.3*   MCV 87 86  --   --   --  87  --  87   MCH 29.8 29.1  --   --   --  29.1  --  29.9   MCHC 34.1 33.6  --   --   --  33.5  --  34.6   RDW 15.0 15.3*  --   --   --  15.4*  --  15.3*   PLT 57* 62*  --   --   --  87*  --  60*    < > = values in this interval not displayed.     INR  No lab results found in last 7 days.  ABG  Recent Labs   Lab 02/21/23 2003 02/21/23  1920 02/21/23  1847 02/21/23  1759 02/21/23  1609   PH  --   --   --   --  7.41   PCO2  --   --   --   --  32*   PO2  --   --   --   --  226*   HCO3  --   --   --   --  20*   O2PER 81.0 38.0 38.0 44.0 50.0      Urine Studies  Recent Labs   Lab Test 02/16/23  1248 11/18/22  0547 08/23/21  1243 06/10/21  1630 08/12/20  1508   COLOR Light Yellow Light Yellow Straw Colorless Yellow   APPEARANCE Clear Turbid* Clear Clear Clear   URINEGLC 150* 50* 50* Negative Negative   URINEBILI Negative Negative Negative Negative Negative   URINEKETONE  Negative Negative Negative Negative Negative   SG 1.017 1.010 1.010 1.006 1.010   UBLD Negative Negative Negative Negative Negative   URINEPH 5.0 5.0 5.0 6.0 6.0   PROTEIN 30* 50* 100* 100 mg/dL* 300 mg/dL*   UROBILINOGEN  --   --   --  <2.0 mg/dL <2.0 E.U./dL   NITRITE Negative Negative Negative Negative Negative   LEUKEST Negative Negative Negative Negative Negative   RBCU 1 0 2 <1 0-2   WBCU 2 3 <1 0 0-5     No lab results found.  PTH  Recent Labs   Lab Test 02/16/23  1226 02/09/23  1459   PTHI 232* 31     Iron Studies  Recent Labs   Lab Test 02/16/23  1226 06/11/21  0728   IRON 37* 22*   *  --    IRONSAT 21  --    MATEO 847* 301*       IMAGING:  All imaging studies reviewed by me.

## 2023-02-25 LAB
ABO/RH(D): NORMAL
ANION GAP SERPL CALCULATED.3IONS-SCNC: 5 MMOL/L (ref 7–15)
ANTIBODY SCREEN: NEGATIVE
BASOPHILS # BLD AUTO: 0 10E3/UL (ref 0–0.2)
BASOPHILS NFR BLD AUTO: 0 %
BLD PROD TYP BPU: NORMAL
BLOOD COMPONENT TYPE: NORMAL
BUN SERPL-MCNC: 22.5 MG/DL (ref 6–20)
CA-I BLD-MCNC: 4.6 MG/DL (ref 4.4–5.2)
CALCIUM SERPL-MCNC: 7.6 MG/DL (ref 8.6–10)
CHLORIDE SERPL-SCNC: 107 MMOL/L (ref 98–107)
CODING SYSTEM: NORMAL
CREAT SERPL-MCNC: 0.93 MG/DL (ref 0.67–1.17)
CROSSMATCH: NORMAL
DEPRECATED HCO3 PLAS-SCNC: 21 MMOL/L (ref 22–29)
EOSINOPHIL # BLD AUTO: 0 10E3/UL (ref 0–0.7)
EOSINOPHIL NFR BLD AUTO: 0 %
ERYTHROCYTE [DISTWIDTH] IN BLOOD BY AUTOMATED COUNT: 15 % (ref 10–15)
GFR SERPL CREATININE-BSD FRML MDRD: >90 ML/MIN/1.73M2
GLUCOSE BLDC GLUCOMTR-MCNC: 195 MG/DL (ref 70–99)
GLUCOSE BLDC GLUCOMTR-MCNC: 199 MG/DL (ref 70–99)
GLUCOSE BLDC GLUCOMTR-MCNC: 206 MG/DL (ref 70–99)
GLUCOSE BLDC GLUCOMTR-MCNC: 223 MG/DL (ref 70–99)
GLUCOSE BLDC GLUCOMTR-MCNC: 226 MG/DL (ref 70–99)
GLUCOSE BLDC GLUCOMTR-MCNC: 242 MG/DL (ref 70–99)
GLUCOSE BLDC GLUCOMTR-MCNC: 262 MG/DL (ref 70–99)
GLUCOSE BLDC GLUCOMTR-MCNC: 269 MG/DL (ref 70–99)
GLUCOSE BLDC GLUCOMTR-MCNC: 46 MG/DL (ref 70–99)
GLUCOSE BLDC GLUCOMTR-MCNC: 55 MG/DL (ref 70–99)
GLUCOSE BLDC GLUCOMTR-MCNC: 67 MG/DL (ref 70–99)
GLUCOSE BLDC GLUCOMTR-MCNC: 74 MG/DL (ref 70–99)
GLUCOSE BLDC GLUCOMTR-MCNC: 88 MG/DL (ref 70–99)
GLUCOSE BLDC GLUCOMTR-MCNC: 92 MG/DL (ref 70–99)
GLUCOSE SERPL-MCNC: 92 MG/DL (ref 70–99)
HCT VFR BLD AUTO: 19.5 % (ref 40–53)
HGB BLD-MCNC: 6.7 G/DL (ref 13.3–17.7)
HGB BLD-MCNC: 6.8 G/DL (ref 13.3–17.7)
HOLD SPECIMEN: NORMAL
IMM GRANULOCYTES # BLD: 0.2 10E3/UL
IMM GRANULOCYTES NFR BLD: 4 %
ISSUE DATE AND TIME: NORMAL
LYMPHOCYTES # BLD AUTO: 0.2 10E3/UL (ref 0.8–5.3)
LYMPHOCYTES NFR BLD AUTO: 3 %
MAGNESIUM SERPL-MCNC: 2.3 MG/DL (ref 1.7–2.3)
MCH RBC QN AUTO: 30.2 PG (ref 26.5–33)
MCHC RBC AUTO-ENTMCNC: 34.4 G/DL (ref 31.5–36.5)
MCV RBC AUTO: 88 FL (ref 78–100)
MONOCYTES # BLD AUTO: 0.4 10E3/UL (ref 0–1.3)
MONOCYTES NFR BLD AUTO: 6 %
NEUTROPHILS # BLD AUTO: 5.2 10E3/UL (ref 1.6–8.3)
NEUTROPHILS NFR BLD AUTO: 87 %
NRBC # BLD AUTO: 0.1 10E3/UL
NRBC BLD AUTO-RTO: 1 /100
PHOSPHATE SERPL-MCNC: 1.7 MG/DL (ref 2.5–4.5)
PLATELET # BLD AUTO: 51 10E3/UL (ref 150–450)
POTASSIUM SERPL-SCNC: 4.4 MMOL/L (ref 3.4–5.3)
RBC # BLD AUTO: 2.22 10E6/UL (ref 4.4–5.9)
SODIUM SERPL-SCNC: 133 MMOL/L (ref 136–145)
SPECIMEN EXPIRATION DATE: NORMAL
UNIT ABO/RH: NORMAL
UNIT NUMBER: NORMAL
UNIT STATUS: NORMAL
UNIT TYPE ISBT: 6200
WBC # BLD AUTO: 5.9 10E3/UL (ref 4–11)

## 2023-02-25 PROCEDURE — 36415 COLL VENOUS BLD VENIPUNCTURE: CPT | Performed by: NURSE PRACTITIONER

## 2023-02-25 PROCEDURE — 99233 SBSQ HOSP IP/OBS HIGH 50: CPT | Mod: FS | Performed by: NURSE PRACTITIONER

## 2023-02-25 PROCEDURE — 250N000013 HC RX MED GY IP 250 OP 250 PS 637: Performed by: TRANSPLANT SURGERY

## 2023-02-25 PROCEDURE — 84100 ASSAY OF PHOSPHORUS: CPT | Performed by: STUDENT IN AN ORGANIZED HEALTH CARE EDUCATION/TRAINING PROGRAM

## 2023-02-25 PROCEDURE — 250N000012 HC RX MED GY IP 250 OP 636 PS 637: Performed by: PHYSICIAN ASSISTANT

## 2023-02-25 PROCEDURE — 120N000011 HC R&B TRANSPLANT UMMC

## 2023-02-25 PROCEDURE — 80048 BASIC METABOLIC PNL TOTAL CA: CPT | Performed by: STUDENT IN AN ORGANIZED HEALTH CARE EDUCATION/TRAINING PROGRAM

## 2023-02-25 PROCEDURE — 250N000012 HC RX MED GY IP 250 OP 636 PS 637: Performed by: STUDENT IN AN ORGANIZED HEALTH CARE EDUCATION/TRAINING PROGRAM

## 2023-02-25 PROCEDURE — 36415 COLL VENOUS BLD VENIPUNCTURE: CPT | Performed by: PHYSICIAN ASSISTANT

## 2023-02-25 PROCEDURE — 85025 COMPLETE CBC W/AUTO DIFF WBC: CPT | Performed by: STUDENT IN AN ORGANIZED HEALTH CARE EDUCATION/TRAINING PROGRAM

## 2023-02-25 PROCEDURE — 99232 SBSQ HOSP IP/OBS MODERATE 35: CPT | Mod: GC | Performed by: INTERNAL MEDICINE

## 2023-02-25 PROCEDURE — 250N000013 HC RX MED GY IP 250 OP 250 PS 637: Performed by: PHYSICIAN ASSISTANT

## 2023-02-25 PROCEDURE — P9016 RBC LEUKOCYTES REDUCED: HCPCS

## 2023-02-25 PROCEDURE — 250N000013 HC RX MED GY IP 250 OP 250 PS 637: Performed by: STUDENT IN AN ORGANIZED HEALTH CARE EDUCATION/TRAINING PROGRAM

## 2023-02-25 PROCEDURE — 86923 COMPATIBILITY TEST ELECTRIC: CPT

## 2023-02-25 PROCEDURE — 85018 HEMOGLOBIN: CPT | Performed by: NURSE PRACTITIONER

## 2023-02-25 PROCEDURE — 86900 BLOOD TYPING SEROLOGIC ABO: CPT | Performed by: TRANSPLANT SURGERY

## 2023-02-25 PROCEDURE — 258N000001 HC RX 258

## 2023-02-25 PROCEDURE — 82330 ASSAY OF CALCIUM: CPT | Performed by: PHYSICIAN ASSISTANT

## 2023-02-25 PROCEDURE — 250N000013 HC RX MED GY IP 250 OP 250 PS 637: Performed by: INTERNAL MEDICINE

## 2023-02-25 PROCEDURE — 250N000013 HC RX MED GY IP 250 OP 250 PS 637

## 2023-02-25 PROCEDURE — 87517 HEPATITIS B DNA QUANT: CPT | Performed by: PHYSICIAN ASSISTANT

## 2023-02-25 PROCEDURE — 83735 ASSAY OF MAGNESIUM: CPT | Performed by: STUDENT IN AN ORGANIZED HEALTH CARE EDUCATION/TRAINING PROGRAM

## 2023-02-25 RX ORDER — OXYCODONE HYDROCHLORIDE 5 MG/1
5 TABLET ORAL EVERY 4 HOURS PRN
Qty: 10 TABLET | Refills: 0 | Status: SHIPPED | OUTPATIENT
Start: 2023-02-25 | End: 2023-03-10

## 2023-02-25 RX ORDER — VALGANCICLOVIR 450 MG/1
900 TABLET, FILM COATED ORAL DAILY
Qty: 60 TABLET | Refills: 2 | Status: SHIPPED | OUTPATIENT
Start: 2023-02-26 | End: 2023-07-07

## 2023-02-25 RX ORDER — VITAMIN B COMPLEX
50 TABLET ORAL DAILY
Qty: 60 TABLET | Refills: 0 | Status: SHIPPED | OUTPATIENT
Start: 2023-02-26 | End: 2023-03-21

## 2023-02-25 RX ORDER — SULFAMETHOXAZOLE AND TRIMETHOPRIM 400; 80 MG/1; MG/1
1 TABLET ORAL DAILY
Qty: 30 TABLET | Refills: 11 | Status: SHIPPED | OUTPATIENT
Start: 2023-02-26 | End: 2024-02-15

## 2023-02-25 RX ORDER — ATORVASTATIN CALCIUM 20 MG/1
20 TABLET, FILM COATED ORAL DAILY
Qty: 30 TABLET | Refills: 0 | Status: SHIPPED | OUTPATIENT
Start: 2023-02-26 | End: 2023-03-21

## 2023-02-25 RX ORDER — SODIUM BICARBONATE 650 MG/1
650 TABLET ORAL 2 TIMES DAILY
Qty: 60 TABLET | Refills: 1 | Status: SHIPPED | OUTPATIENT
Start: 2023-02-25 | End: 2023-03-21

## 2023-02-25 RX ORDER — CARVEDILOL 25 MG/1
25 TABLET ORAL 2 TIMES DAILY WITH MEALS
Qty: 60 TABLET | Refills: 0 | Status: SHIPPED | OUTPATIENT
Start: 2023-02-25 | End: 2023-03-21

## 2023-02-25 RX ORDER — AMOXICILLIN 250 MG
1 CAPSULE ORAL 2 TIMES DAILY
Qty: 60 TABLET | Refills: 0 | Status: SHIPPED | OUTPATIENT
Start: 2023-02-25 | End: 2023-03-10

## 2023-02-25 RX ORDER — TACROLIMUS 0.5 MG/1
0.5 CAPSULE ORAL DAILY PRN
Qty: 30 CAPSULE | Refills: 0 | Status: SHIPPED | OUTPATIENT
Start: 2023-02-25 | End: 2023-03-10

## 2023-02-25 RX ORDER — TACROLIMUS 0.5 MG/1
0.5 CAPSULE ORAL 2 TIMES DAILY PRN
Qty: 60 CAPSULE | Refills: 0 | Status: SHIPPED | OUTPATIENT
Start: 2023-02-25 | End: 2023-02-27

## 2023-02-25 RX ORDER — TACROLIMUS 1 MG/1
4 CAPSULE ORAL 2 TIMES DAILY
Qty: 240 CAPSULE | Refills: 11 | Status: SHIPPED | OUTPATIENT
Start: 2023-02-25 | End: 2023-02-27

## 2023-02-25 RX ORDER — POLYETHYLENE GLYCOL 3350 17 G/17G
17 POWDER, FOR SOLUTION ORAL DAILY
Qty: 510 G | Refills: 0 | Status: SHIPPED | OUTPATIENT
Start: 2023-02-25 | End: 2023-05-01

## 2023-02-25 RX ORDER — MAGNESIUM OXIDE 400 MG/1
400 TABLET ORAL
Qty: 30 TABLET | Refills: 1 | Status: SHIPPED | OUTPATIENT
Start: 2023-02-25 | End: 2023-04-13

## 2023-02-25 RX ORDER — CALCIUM CARBONATE 500 MG/1
2 TABLET, CHEWABLE ORAL 2 TIMES DAILY
Qty: 120 TABLET | Refills: 0 | Status: SHIPPED | OUTPATIENT
Start: 2023-02-25 | End: 2023-03-21

## 2023-02-25 RX ORDER — ACETAMINOPHEN 325 MG/1
650 TABLET ORAL EVERY 4 HOURS PRN
Qty: 30 TABLET | Refills: 0 | Status: SHIPPED | OUTPATIENT
Start: 2023-02-25

## 2023-02-25 RX ORDER — MYCOPHENOLATE MOFETIL 250 MG/1
750 CAPSULE ORAL 2 TIMES DAILY
Qty: 180 CAPSULE | Refills: 11 | Status: SHIPPED | OUTPATIENT
Start: 2023-02-25 | End: 2023-08-03

## 2023-02-25 RX ORDER — PREDNISONE 10 MG/1
10 TABLET ORAL DAILY
Qty: 50 TABLET | Refills: 0 | Status: SHIPPED | OUTPATIENT
Start: 2023-02-25 | End: 2023-03-21

## 2023-02-25 RX ADMIN — SODIUM BICARBONATE 650 MG: 650 TABLET ORAL at 08:17

## 2023-02-25 RX ADMIN — POLYETHYLENE GLYCOL 3350 17 G: 17 POWDER, FOR SOLUTION ORAL at 08:17

## 2023-02-25 RX ADMIN — SODIUM PHOSPHATE, DIBASIC, ANHYDROUS, POTASSIUM PHOSPHATE, MONOBASIC, AND SODIUM PHOSPHATE, MONOBASIC, MONOHYDRATE 250 MG: 852; 155; 130 TABLET, COATED ORAL at 08:17

## 2023-02-25 RX ADMIN — SENNOSIDES AND DOCUSATE SODIUM 1 TABLET: 50; 8.6 TABLET ORAL at 08:17

## 2023-02-25 RX ADMIN — VALGANCICLOVIR 900 MG: 450 TABLET, FILM COATED ORAL at 08:17

## 2023-02-25 RX ADMIN — INSULIN ASPART: 100 INJECTION, SOLUTION INTRAVENOUS; SUBCUTANEOUS at 08:32

## 2023-02-25 RX ADMIN — ASPIRIN 81 MG: 81 TABLET ORAL at 08:17

## 2023-02-25 RX ADMIN — PREDNISONE 60 MG: 20 TABLET ORAL at 08:16

## 2023-02-25 RX ADMIN — DEXTROSE 15 G: 15 GEL ORAL at 08:38

## 2023-02-25 RX ADMIN — Medication 50 MCG: at 08:17

## 2023-02-25 RX ADMIN — TACROLIMUS 4 MG: 1 CAPSULE ORAL at 17:49

## 2023-02-25 RX ADMIN — CARVEDILOL 25 MG: 12.5 TABLET, FILM COATED ORAL at 08:17

## 2023-02-25 RX ADMIN — MYCOPHENOLATE MOFETIL 750 MG: 250 CAPSULE ORAL at 17:49

## 2023-02-25 RX ADMIN — MAGNESIUM OXIDE TAB 400 MG (241.3 MG ELEMENTAL MG) 400 MG: 400 (241.3 MG) TAB at 11:42

## 2023-02-25 RX ADMIN — MYCOPHENOLATE MOFETIL 750 MG: 250 CAPSULE ORAL at 08:17

## 2023-02-25 RX ADMIN — INSULIN ASPART 6 UNITS: 100 INJECTION, SOLUTION INTRAVENOUS; SUBCUTANEOUS at 11:43

## 2023-02-25 RX ADMIN — SULFAMETHOXAZOLE AND TRIMETHOPRIM 1 TABLET: 400; 80 TABLET ORAL at 08:17

## 2023-02-25 RX ADMIN — DEXTROSE 15 G: 15 GEL ORAL at 08:30

## 2023-02-25 RX ADMIN — INSULIN ASPART 4 UNITS: 100 INJECTION, SOLUTION INTRAVENOUS; SUBCUTANEOUS at 17:52

## 2023-02-25 RX ADMIN — ATORVASTATIN CALCIUM 20 MG: 20 TABLET, FILM COATED ORAL at 08:17

## 2023-02-25 RX ADMIN — MAGNESIUM HYDROXIDE 30 ML: 400 SUSPENSION ORAL at 11:42

## 2023-02-25 RX ADMIN — CALCIUM CARBONATE (ANTACID) CHEW TAB 500 MG 1000 MG: 500 CHEW TAB at 19:49

## 2023-02-25 RX ADMIN — SODIUM PHOSPHATE, DIBASIC, ANHYDROUS, POTASSIUM PHOSPHATE, MONOBASIC, AND SODIUM PHOSPHATE, MONOBASIC, MONOHYDRATE 250 MG: 852; 155; 130 TABLET, COATED ORAL at 19:50

## 2023-02-25 RX ADMIN — INSULIN ASPART 4 UNITS: 100 INJECTION, SOLUTION INTRAVENOUS; SUBCUTANEOUS at 17:53

## 2023-02-25 RX ADMIN — DEXTROSE MONOHYDRATE 50 ML: 25 INJECTION, SOLUTION INTRAVENOUS at 09:03

## 2023-02-25 RX ADMIN — CALCIUM CARBONATE (ANTACID) CHEW TAB 500 MG 1000 MG: 500 CHEW TAB at 08:16

## 2023-02-25 RX ADMIN — TACROLIMUS 4 MG: 1 CAPSULE ORAL at 08:17

## 2023-02-25 RX ADMIN — SODIUM BICARBONATE 650 MG: 650 TABLET ORAL at 19:50

## 2023-02-25 RX ADMIN — ACETAMINOPHEN 650 MG: 325 TABLET ORAL at 10:06

## 2023-02-25 RX ADMIN — CARVEDILOL 25 MG: 12.5 TABLET, FILM COATED ORAL at 17:49

## 2023-02-25 ASSESSMENT — ACTIVITIES OF DAILY LIVING (ADL)
ADLS_ACUITY_SCORE: 24

## 2023-02-25 NOTE — PLAN OF CARE
"/68 (BP Location: Left arm)   Pulse 81   Temp 97.8  F (36.6  C) (Oral)   Resp 20   Ht 1.626 m (5' 4\")   Wt 67.3 kg (148 lb 6.4 oz)   SpO2 97%   BMI 25.47 kg/m        7975-0708  AVSS on RA. Critical hemoglobin= 6.8 this AM. Received 1 unit of blood; recheck tomm AM. Blood sugars continue to be unpredictable; 40's( this AM)- 260's. Lantus dose decreased to 32 units today. Hoping this will help manage his blood sugars and that he will safe going home tomorrow (2/26). Family aware of plan. Daughter (she's a nurse) by bedside this afternoon- watching transplant videos. Good support system. Bedside nurse will go over medications tomorrow prior to discharge as well. Left PIV saline locked. Patient has not had a good appetite today; we will continue to strongly encourage good fluid intake and the importance of nutrition. Up with SB, calls appropriately. Right abdominal incision- liquid bandage- no drainage. Patient has had lots of bruising on abdomen; will continue to monitor for changes. Incisional pain managed with PRN tylenol. Denies nausea. Had BM today. Adequate urine output- remains karen today. Continues to do well with saving. Continue plan of care, please notify MD with any changes.           "

## 2023-02-25 NOTE — PROGRESS NOTES
[facility]  Transplant Nephrology Follow Up  Date of Admission:  2/21/2023  Today's Date: 02/25/2023  Requesting physician: Talha Weinstein MD    Recommendations:  - give 1unit RPBC     Assessment & Plan   # LDKT: Trend down in creatinine.  Good urine output.  No acute indications for dialysis.   - Baseline Creatinine: ~ TBD   - Proteinuria: Not checked post transplant   - Date DSA Last Checked: Feb/2023      Latest DSA: No DSA at time of transplant   - BK Viremia: Not checked post transplant   - Kidney Tx Biopsy: No    - Transplant Ureteral Stent: Yes    # Immunosuppression: Tacrolimus immediate release (goal 8-10), Mycophenolate mofetil (dose 750 mg every 12 hours) and Prednisone (dose taper)    - Intermediate risk induction   - Changes: No    # Infection Prophylaxis:   - PJP: Sulfa/TMP (Bactrim)  - CMV: Valganciclovir (Valcyte) x3m    # Hypertension: Controlled;  Goal BP: < 150/90   - Volume status: Euvolemic  EDW ~ TBD   - Changes: Not at this time    # Diabetes: Controlled (HbA1c <7%) Last HbA1c: 6.8% (2/22/23)   - Management as per primary team and Endocrinology.    # Anemia in Chronic Renal Disease: Hgb: Trend down, give 1 unit PRBC     NERY: No   - Iron studies: Replete    # Mineral Bone Disorder:   - Secondary renal hyperparathyroidism; PTH level: Mildly elevated (151-300 pg/ml)        On treatment: None  - Vitamin D; level: Low        On supplement: Yes cholecalciferol 2000 units PO daily.  - Calcium; level: Low normal, check ionized calcium tomorrow.       On supplement: Yes Tums 1000 mg PO between meals. If calcium remains low would consider restarting calcitriol  - Phosphorus; level: Low        On supplement: Yes, phos 250mg bid    # Electrolytes:   - Potassium; level: Normal        On supplement: No  - Magnesium; level: Normal        On supplement: Yes  - Bicarbonate; level: Low normal        On supplement: Yes, continue sodium bicarbonate 650 mg PO  twice daily.    # Thrombocytopenia:   - Trending down.    - Seen previously by hematology. Thought to have peripheral consumption vs ITP    - Baseline platelets 40-90k    # Hepatitis B: Core antibody positive. Check Hepatitis B DNA level pending and  three months post transplant.    # Asymmetric Left Ventricular Hypertrophy: Stable. No history of sustained ventricular arrhythmias.    # CAD: s/p PCI with stent 11/2021 with negative stress echo 1/2023.  Previously on Brilinta and aspirin. Brillinta stopped 1/2023     # Hyperlipidemia: On atorvastatin 20 mg daily. Most recent cholesterol in November 2022 was 122, triglycerides 234, HDL 22, and LDL 53.    # Transplant History:  Etiology of Kidney Failure: Diabetes mellitus type 2  Tx: LDKT  Transplant: 2/21/2023 (Kidney)  Crossmatch at time of Tx: negative  DSA at time of Tx: No  Significant changes in immunosuppression: None  Significant transplant-related complications: None    Recommendations were communicated to the primary team verbally.    Patient was seen and discussed with Dr. Phan.    Ritu Bonilla NP  Pager: [phone]    Physician Attestation     I saw and evaluated Modesto Barbosa as part of a shared APRN/PA visit.     I personally reviewed the vital signs, medications and labs.    I personally performed the substantive portion of the medical decision making for this visit - please see the PHOEBE's documentation for full details.    Key management decisions made by me and carried out under my direction: No changes in immunosuppression.  No acute indications for dialysis.  Would recommend blood transfusion.    Patrick Phan MD  Date of Service (when I saw the patient): 02/25/23    Interval events:  Mr. Barboas's creatinine is 0.93 (02/25 0555); Trend down.  good urine output.  Other significant labs/tests/vitals: 1unit RPBC tpday  No events overnight.  no chest pain or shortness of breath.  no leg swelling.  no nausea and vomiting.  Bowel movements are  soft.  no fever, sweats or chills.    Review of Systems    The 4point Review of Systems is negative other than noted above or here.     Past Medical History    I have reviewed this patient's medical history and updated it with pertinent information if needed.   Past Medical History:   Diagnosis Date     Acute kidney injury (H) 6/10/2021     CKD (chronic kidney disease) stage 5, GFR less than 15 ml/min (H)      Dyslipidemia      Metabolic acidosis      Type 2 diabetes mellitus (H)        Past Surgical History   I have reviewed this patient's surgical history and updated it with pertinent information if needed.  Past Surgical History:   Procedure Laterality Date     BENCH KIDNEY  2/21/2023    Procedure: Bench kidney;  Surgeon: Talha Weinstein MD;  Location: [facility] OR     CV CORONARY ANGIOGRAM N/A 12/6/2021    Procedure: Coronary Angiogram;  Surgeon: Cristela Banks MD;  Location: [facility] CATH LAB CV     CV LEFT HEART CATH N/A 12/6/2021    Procedure: Left Heart Cath;  Surgeon: Cristela Banks MD;  Location: [facility] CATH LAB CV     CV PCI N/A 12/6/2021    Procedure: Percutaneous Coronary Intervention;  Surgeon: Cristela Banks MD;  Location: [facility] CATH LAB CV     REMOVE CATHETER PERITONEAL N/A 2/21/2023    Procedure: Remove catheter peritoneal;  Surgeon: Talha Weinstein MD;  Location: [facility] OR       Family History   I have reviewed this patient's family history and updated it with pertinent information if needed.   Family History   Problem Relation Age of Onset     Diabetes Type 2  Mother      Other - See Comments Daughter         granular cell tumor of thigh     Heart Disease Other      Social History   I have reviewed this patient's social history and updated it with pertinent information if needed. Modesto Barbosa  reports that he has never smoked. He has never been exposed to tobacco smoke. He has never used smokeless tobacco. He reports that he does not currently use alcohol. He reports that he does not  "use drugs.    Allergies   No Known Allergies  Prior to Admission Medications     aspirin  81 mg Oral Daily     atorvastatin  20 mg Oral Daily     [START ON 2023] basiliximab (SIMULECT) infusion  20 mg Intravenous Once     calcium carbonate  1,000 mg Oral BID     carvedilol  25 mg Oral BID w/meals     insulin aspart   Subcutaneous TID w/meals     insulin aspart  1-10 Units Subcutaneous TID AC     insulin aspart  1-7 Units Subcutaneous At Bedtime     [START ON 2023] insulin glargine  32 Units Subcutaneous QAM AC     magnesium oxide  400 mg Oral Daily with lunch     mycophenolate  750 mg Oral BID IS     phosphorus tablet 250 mg  250 mg Oral BID     polyethylene glycol  17 g Oral Daily     [START ON 2023] predniSONE  40 mg Oral Daily    Followed by     [START ON 2023] predniSONE  20 mg Oral Daily    Followed by     [START ON 3/7/2023] predniSONE  15 mg Oral Daily    Followed by     [START ON 3/14/2023] predniSONE  10 mg Oral Daily    Followed by     [START ON 3/21/2023] predniSONE  5 mg Oral Daily     senna-docusate  1 tablet Oral BID     sodium bicarbonate  650 mg Oral BID     sodium chloride (PF)  10 mL Intracatheter Q8H     sulfamethoxazole-trimethoprim  1 tablet Oral Daily     tacrolimus  4 mg Oral BID IS     valGANciclovir  900 mg Oral Daily     cholecalciferol  50 mcg Oral Daily       dextrose         Physical Exam   Temp  Av  F (36.7  C)  Min: 97.2  F (36.2  C)  Max: 98.2  F (36.8  C)  Arterial Line BP  Min: 77/38  Max: 274/258  Arterial Line MAP (mmHg)  Av.7 mmHg  Min: 51 mmHg  Max: 264 mmHg      Pulse  Av.1  Min: 62  Max: 78 Resp  Av.8  Min: 7  Max: 20  SpO2  Av.4 %  Min: 95 %  Max: 100 %    CVP (mmHg): 8 mmHgBP (!) 149/74 (BP Location: Left arm)   Pulse 74   Temp 98.5  F (36.9  C) (Oral)   Resp 20   Ht 1.626 m (5' 4\")   Wt 67.3 kg (148 lb 6.4 oz)   SpO2 97%   BMI 25.47 kg/m     Date 23 0700 - 23 0659   Shift [id number] 24 Hour " Total   INTAKE   I.V. 9   9   Shift Total(mL/kg) 9(0.15)   9(0.15)   OUTPUT   Urine 400   400   Shift Total(mL/kg) 400(6.79)   400(6.79)   Weight (kg) 58.9 58.9 58.9 58.9      Admit Weight: 58.9 kg (129 lb 13.6 oz)     GENERAL APPEARANCE: alert and no distress  HENT: mouth without ulcers or lesions  RESP: lungs clear to auscultation - no rales, rhonchi or wheezes  CV: regular rhythm, normal rate, no rub, no murmur  EDEMA: no LE edema bilaterally  ABDOMEN: soft, nondistended, appropriately tender, bowel sounds normal  MS: extremities normal - no gross deformities noted, no evidence of inflammation in joints, no muscle tenderness  SKIN: no rash, surgical incision healing by primary intention.  DIALYSIS ACCESS:  None    Data   CMP  Recent Labs   Lab 02/25/23  1242 02/25/23  1134 02/25/23  0924 02/25/23  0909 02/25/23  0822 02/25/23  0555 02/24/23  0748 02/24/23  0551 02/23/23  1219 02/23/23  0734 02/22/23  2200 02/22/23  2132 02/22/23  0700 02/22/23  0625   NA  --   --   --   --   --  133*  --  135*  --  134*  --   --   --  136   POTASSIUM  --   --   --   --   --  4.4  --  4.8  --  4.0  --  3.8   < > 3.3*  3.3*   CHLORIDE  --   --   --   --   --  107  --  109*  --  106  --   --   --  106   CO2  --   --   --   --   --  21*  --  21*  --  18*  --   --   --  17*   ANIONGAP  --   --   --   --   --  5*  --  5*  --  10  --   --   --  13   * 269* 195* 262*   < > 92   < > 163*   < > 213*   < >  --    < > 135*   BUN  --   --   --   --   --  22.5*  --  27.1*  --  28.2*  --   --   --  52.9*   CR  --   --   --   --   --  0.93  --  1.04  --  1.12  --   --   --  3.03*   GFRESTIMATED  --   --   --   --   --  >90  --  83  --  76  --   --   --  23*   REYMUNDO  --   --   --   --   --  7.6*  --  7.7*  --  7.2*  --   --   --  7.0*   MAG  --   --   --   --   --  2.3  --  2.1  --  1.9  --   --   --  1.7   PHOS  --   --   --   --   --  1.7*  --  1.7*  --  2.9  --   --   --  3.7    < > = values in this interval not displayed.      CBC  Recent Labs   Lab 02/25/23  0743 02/25/23  0555 02/24/23  0551 02/23/23  0734 02/22/23  1127 02/22/23  0625   HGB 6.8* 6.7* 7.1* 7.7*   < > 8.3*   WBC  --  5.9 9.2 11.4*  --  15.3*   RBC  --  2.22* 2.38* 2.65*  --  2.85*   HCT  --  19.5* 20.8* 22.9*  --  24.8*   MCV  --  88 87 86  --  87   MCH  --  30.2 29.8 29.1  --  29.1   MCHC  --  34.4 34.1 33.6  --  33.5   RDW  --  15.0 15.0 15.3*  --  15.4*   PLT  --  51* 57* 62*  --  87*    < > = values in this interval not displayed.     INR  No lab results found in last 7 days.  ABG  Recent Labs   Lab 02/21/23 2003 02/21/23  1920 02/21/23  1847 02/21/23  1759 02/21/23  1609   PH  --   --   --   --  7.41   PCO2  --   --   --   --  32*   PO2  --   --   --   --  226*   HCO3  --   --   --   --  20*   O2PER 81.0 38.0 38.0 44.0 50.0      Urine Studies  Recent Labs   Lab Test 02/16/23  1248 11/18/22  0547 08/23/21  1243 06/10/21  1630 08/12/20  1508   COLOR Light Yellow Light Yellow Straw Colorless Yellow   APPEARANCE Clear Turbid* Clear Clear Clear   URINEGLC 150* 50* 50* Negative Negative   URINEBILI Negative Negative Negative Negative Negative   URINEKETONE Negative Negative Negative Negative Negative   SG 1.017 1.010 1.010 1.006 1.010   UBLD Negative Negative Negative Negative Negative   URINEPH 5.0 5.0 5.0 6.0 6.0   PROTEIN 30* 50* 100* 100 mg/dL* 300 mg/dL*   UROBILINOGEN  --   --   --  <2.0 mg/dL <2.0 E.U./dL   NITRITE Negative Negative Negative Negative Negative   LEUKEST Negative Negative Negative Negative Negative   RBCU 1 0 2 <1 0-2   WBCU 2 3 <1 0 0-5     No lab results found.  PTH  Recent Labs   Lab Test 02/16/23  1226 02/09/23  1459   PTHI 232* 31     Iron Studies  Recent Labs   Lab Test 02/16/23  1226 06/11/21  0728   IRON 37* 22*   *  --    IRONSAT 21  --    MATEO 847* 301*       IMAGING:  All imaging studies reviewed by me.

## 2023-02-25 NOTE — PLAN OF CARE
"VS: BP (!) 143/78 (BP Location: Left arm)   Pulse 69   Temp 98.3  F (36.8  C) (Oral)   Resp 16   Ht 1.626 m (5' 4\")   Wt 67.3 kg (148 lb 6.4 oz)   SpO2 100%   BMI 25.47 kg/m      Cares: 1900 - 0730     Neuro: Aox4   Cardio: slightly hypertensive 140/70s  Respiratory: WDL on RA   GI/: voiding adequately w/out issues, LBM 2/24  Skin: right abdominal incision - dermabond CUBA, old PD site - dressing C/D/I  Diet: Regular      Labs: hgb: 6.7 this AM - MD notified and ordered a recheck  BG: ACHS (325, 199)  LDA: Left PIV   Mobility: SBA  Pain/Nausea: denies pain or nausea    PRN medications: none given   Plan of Care: plan for discharge today, continue with current POC and update MD with any changes      "

## 2023-02-25 NOTE — PROVIDER NOTIFICATION
Notified Intern at 0827 AM regarding critical results read back.      Spoke with: Dr. Jaime, Kidney Transplant Intern    Orders pending.    Comments:   Spoke to Heme Lab, critical value: Hgb 6.8  Previous level Hgb 6.7  Reported result to MD.  Order for PRBC pending.

## 2023-02-25 NOTE — PROGRESS NOTES
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 Now s/p LDKT on 2/21/23     On steroids:  Prednisone 60mg from 2/24/23 x2 days. Today is first day of oral steroids. Further planned taper from 02/25/23 is 40 mg x2, 20 mg x 7, 15mg x7, 10 mg x7 , 5 mg x7 then off. Home dose of Levemir was 20 units, currently on 36 units Lantus dosing for methylprednisolone coverage. Will require a decrease in Lantus dosing moving forward to account for switch to oral steroids. He also does not know how to count carbohydrates; will require fixed dose of Novolog on discharge, which will need to be higher with high dose steroids on board. Also does not use a correction scale at home. Daughter manages his insulin at home.     Type 2 DM, post transplant, on steroids  - Decrease Lantus to 32 units daily am   - Continue  Novolog 1per 10gram CHO meals/snacks  - Continue Novolog high correction scale  - BG checks AC, HS, 0200  - Hypoglycemia protocol    ON DISCHARGE:  Novolog 5 units TID with meals all the time + levemir taper dose as per prednisone    While on   Prednisone 40mg daily - levemir 30 units  Prednisone 20mg daily - levemir 28 units  Prednisone 15mg daily - levemir 26 units  Prednisone 10mg daily - levemir 24 units  Prednisone 5mg daily - levemir 20 units  Off steroids - levemir 20 units daily      Case and recommendations discussed with Dr. Robles, staff Endocrinologist.     Attending tie-in note  I saw the patient with endocrine fellow Dr. Howe and directly examined patient and discussed. Agree above note and plan.       Heidi Robles MD  Staff Physician  Endocrinology and Metabolism  [facility]  License: [id number]  Pager: [phone]     Interval History   Reviewed labs and nursing notes from last 24hrs  BG low this AM, otherwise doing well        Recent Labs   Lab  "02/25/23  0924 02/25/23  0909 02/25/23  0900 02/25/23  0851 02/25/23  0841 02/25/23  0836   * 262* 88 92 74 67*       Usual Diabetes Regimen:   Levemir 20 units daily  Novolog 10 units BID AC    Diabetes Control:   Lab Results   Component Value Date    A1C 6.8 02/22/2023    A1C 6.8 02/21/2023    A1C 7.1 02/16/2023    A1C 6.9 02/09/2023    A1C 5.6 08/23/2021     Review of Systems  10 point ROS completed with pertinent positives and negatives noted in the HPI    Physical Examination:  Vital signs:  Temp: 97.3  F (36.3  C) Temp src: Oral BP: 97/65 Pulse: 73   Resp: 22 SpO2: 98 % O2 Device: None (Room air) Oxygen Delivery: 6 LPM Height: 162.6 cm (5' 4\") Weight: 67.3 kg (148 lb 6.4 oz)  Estimated body mass index is 25.47 kg/m  as calculated from the following:    Height as of this encounter: 1.626 m (5' 4\").    Weight as of this encounter: 67.3 kg (148 lb 6.4 oz).  General : no acute distress, lying in bed  Mental: awake, alert  Rep: normally breathing, no cough    Laboratory  Last Comprehensive Metabolic Panel:  Lab Results   Component Value Date     (L) 02/25/2023    POTASSIUM 4.4 02/25/2023    CHLORIDE 107 02/25/2023    CO2 21 (L) 02/25/2023    ANIONGAP 5 (L) 02/25/2023     (H) 02/25/2023    BUN 22.5 (H) 02/25/2023    CR 0.93 02/25/2023    GFRESTIMATED >90 02/25/2023    REYMUNDO 7.6 (L) 02/25/2023       Active Medications  Current Facility-Administered Medications   Medication    acetaminophen (TYLENOL) tablet 650 mg    aspirin EC tablet 81 mg    atorvastatin (LIPITOR) tablet 20 mg    [START ON 2/26/2023] basiliximab (SIMULECT) 20 mg in sodium chloride 0.9 % 50 mL infusion    bisacodyl (DULCOLAX) suppository 10 mg    calcium carbonate (TUMS) chewable tablet 1,000 mg    carvedilol (COREG) tablet 25 mg    dextrose 10% infusion    glucose gel 15-30 g    Or    dextrose 50 % injection 25-50 mL    Or    glucagon injection 1 mg    insulin aspart (NovoLOG) injection (RAPID ACTING)    insulin aspart (NovoLOG) " injection (RAPID ACTING)    insulin aspart (NovoLOG) injection (RAPID ACTING)    insulin aspart (NovoLOG) injection (RAPID ACTING)    insulin glargine (LANTUS PEN) injection 36 Units    magnesium hydroxide (MILK OF MAGNESIA) suspension 30 mL    magnesium oxide (MAG-OX) tablet 400 mg    mycophenolate (GENERIC EQUIVALENT) capsule 750 mg    naloxone (NARCAN) injection 0.2 mg    Or    naloxone (NARCAN) injection 0.4 mg    Or    naloxone (NARCAN) injection 0.2 mg    Or    naloxone (NARCAN) injection 0.4 mg    ondansetron (ZOFRAN ODT) ODT tab 4 mg    oxyCODONE (ROXICODONE) tablet 5 mg    phosphorus tablet 250 mg (PHOSPHA 250 NEUTRAL) per tablet 250 mg    polyethylene glycol (MIRALAX) Packet 17 g    [START ON 2/26/2023] predniSONE (DELTASONE) tablet 40 mg    Followed by    [START ON 2/28/2023] predniSONE (DELTASONE) tablet 20 mg    Followed by    [START ON 3/7/2023] predniSONE (DELTASONE) tablet 15 mg    Followed by    [START ON 3/14/2023] predniSONE (DELTASONE) tablet 10 mg    Followed by    [START ON 3/21/2023] predniSONE (DELTASONE) tablet 5 mg    prochlorperazine (COMPAZINE) tablet 10 mg    senna-docusate (SENOKOT-S/PERICOLACE) 8.6-50 MG per tablet 1 tablet    sodium bicarbonate tablet 650 mg    sodium chloride (PF) 0.9% PF flush 10 mL    sodium chloride (PF) 0.9% PF flush 10-20 mL    sulfamethoxazole-trimethoprim (BACTRIM) 400-80 MG per tablet 1 tablet    tacrolimus (GENERIC EQUIVALENT) capsule 4 mg    valGANciclovir (VALCYTE) tablet 900 mg    Vitamin D3 (CHOLECALCIFEROL) tablet 50 mcg     Current Outpatient Medications   Medication Sig Dispense Refill    acetaminophen (TYLENOL) 325 MG tablet Take 2 tablets (650 mg) by mouth every 4 hours as needed for other (For optimal non-opioid multimodal pain management to improve pain control.) 30 tablet 0    [START ON 2/26/2023] aspirin (ASA) 81 MG EC tablet Take 1 tablet (81 mg) by mouth daily 30 tablet 0    [START ON 2/26/2023] atorvastatin (LIPITOR) 20 MG tablet Take 1  tablet (20 mg) by mouth daily 30 tablet 0    calcium carbonate (TUMS) 500 MG chewable tablet Take 2 tablets (1,000 mg) by mouth 2 times daily 120 tablet 0    carvedilol (COREG) 25 MG tablet Take 1 tablet (25 mg) by mouth 2 times daily (with meals) 60 tablet 0    magnesium oxide (MAG-OX) 400 MG tablet Take 1 tablet (400 mg) by mouth daily (with lunch) 30 tablet 1    mycophenolate (GENERIC EQUIVALENT) 250 MG capsule Take 3 capsules (750 mg) by mouth 2 times daily for 360 days 180 capsule 11    oxyCODONE (ROXICODONE) 5 MG tablet Take 1 tablet (5 mg) by mouth every 4 hours as needed for moderate pain (4-6) 10 tablet 0    phosphorus tablet 250 mg (PHOSPHA 250 NEUTRAL) 250 MG per tablet Take 1 tablet (250 mg) by mouth 2 times daily for 60 days 60 tablet 1    polyethylene glycol (MIRALAX) 17 GM/Dose powder Take 17 g by mouth daily 510 g 0    predniSONE (DELTASONE) 10 MG tablet Take 1 tablet (10 mg) by mouth daily Take 4 tablets by mouth daily for 2 days starting on Sun, 2/26/2023. Then, take 2 tablets by mouth daily for 7 days starting 2/28/2023. Then, take one and half tablets by mouth daily for 7 days starting 3/7/2023. Then, take one tablet by mouth daily starting 3/14/2023. Then, take half a tablet by mouth daily for 7 days starting 3/21/2023. 50 tablet 0    senna-docusate (SENOKOT-S/PERICOLACE) 8.6-50 MG tablet Take 1 tablet by mouth 2 times daily for 30 days 60 tablet 0    sodium bicarbonate 650 MG tablet Take 1 tablet (650 mg) by mouth 2 times daily for 60 days 60 tablet 1    [START ON 2/26/2023] sulfamethoxazole-trimethoprim (BACTRIM) 400-80 MG tablet Take 1 tablet by mouth daily 30 tablet 11    tacrolimus (GENERIC EQUIVALENT) 0.5 MG capsule Take 1 capsule (0.5 mg) by mouth daily as needed (To be used for dosage adjustments to maintain goal Tac level 8-10 and/or as instructed by primary transplant team.) 30 capsule 0    tacrolimus (GENERIC EQUIVALENT) 1 MG capsule Take 4 capsules (4 mg) by mouth 2 times daily for  360 days 240 capsule 11    [START ON 2/26/2023] valGANciclovir (VALCYTE) 450 MG tablet Take 2 tablets (900 mg) by mouth daily for 90 days 60 tablet 2    [START ON 2/26/2023] Vitamin D3 (CHOLECALCIFEROL) 25 mcg (1000 units) tablet Take 2 tablets (50 mcg) by mouth daily 60 tablet 0       Current Diet  Orders Placed This Encounter      Advance Diet as Tolerated: Regular Diet Adult      Diet

## 2023-02-25 NOTE — PROGRESS NOTES
Care Management Follow Up    Length of Stay (days): 4    Expected Discharge Date: 02/25/2023     Concerns to be Addressed:     IMM  Patient plan of care discussed at interdisciplinary rounds: Yes    Anticipated Discharge Disposition: Home     Anticipated Discharge Services:    Anticipated Discharge DME:      Patient/family educated on Medicare website which has current facility and service quality ratings:    Education Provided on the Discharge Plan:    Patient/Family in Agreement with the Plan:      Referrals Placed by CM/SW:    Private pay costs discussed: Not applicable    Additional Information:  RNCC went over IMM with patient's daughter, received verbal approval for signature and faxed form to [facility] ([phone]). Patient may stay one more day after needed blood transfusion today. Patient has ATC appointment set up for tomorrow, may need to reschedule. RNCC continues to follow for discharge planning needs.      VANGIE GoodmanN, BA, RN, CMSRN, RNCC  Covering Units 6D/OBS  Pager: [phone]  Phone: [phone]  6th floor Weekend/Holiday Pager: [phone]  Observation weekend/after hours: [phone]

## 2023-02-26 VITALS
BODY MASS INDEX: 25.33 KG/M2 | SYSTOLIC BLOOD PRESSURE: 131 MMHG | HEIGHT: 64 IN | HEART RATE: 73 BPM | RESPIRATION RATE: 16 BRPM | TEMPERATURE: 97.4 F | OXYGEN SATURATION: 98 % | WEIGHT: 148.4 LBS | DIASTOLIC BLOOD PRESSURE: 70 MMHG

## 2023-02-26 LAB
ANION GAP SERPL CALCULATED.3IONS-SCNC: 5 MMOL/L (ref 7–15)
BASOPHILS # BLD MANUAL: 0 10E3/UL (ref 0–0.2)
BASOPHILS NFR BLD MANUAL: 0 %
BUN SERPL-MCNC: 27.2 MG/DL (ref 6–20)
CALCIUM SERPL-MCNC: 7.1 MG/DL (ref 8.6–10)
CHLORIDE SERPL-SCNC: 107 MMOL/L (ref 98–107)
CREAT SERPL-MCNC: 0.96 MG/DL (ref 0.67–1.17)
DEPRECATED HCO3 PLAS-SCNC: 21 MMOL/L (ref 22–29)
EOSINOPHIL # BLD MANUAL: 0 10E3/UL (ref 0–0.7)
EOSINOPHIL NFR BLD MANUAL: 0 %
ERYTHROCYTE [DISTWIDTH] IN BLOOD BY AUTOMATED COUNT: 15.3 % (ref 10–15)
GFR SERPL CREATININE-BSD FRML MDRD: >90 ML/MIN/1.73M2
GLUCOSE BLDC GLUCOMTR-MCNC: 101 MG/DL (ref 70–99)
GLUCOSE BLDC GLUCOMTR-MCNC: 102 MG/DL (ref 70–99)
GLUCOSE BLDC GLUCOMTR-MCNC: 133 MG/DL (ref 70–99)
GLUCOSE BLDC GLUCOMTR-MCNC: 191 MG/DL (ref 70–99)
GLUCOSE BLDC GLUCOMTR-MCNC: 246 MG/DL (ref 70–99)
GLUCOSE SERPL-MCNC: 136 MG/DL (ref 70–99)
HCT VFR BLD AUTO: 24.5 % (ref 40–53)
HGB BLD-MCNC: 8.4 G/DL (ref 13.3–17.7)
LYMPHOCYTES # BLD MANUAL: 0 10E3/UL (ref 0.8–5.3)
LYMPHOCYTES NFR BLD MANUAL: 0 %
MAGNESIUM SERPL-MCNC: 2.4 MG/DL (ref 1.7–2.3)
MCH RBC QN AUTO: 29.4 PG (ref 26.5–33)
MCHC RBC AUTO-ENTMCNC: 34.3 G/DL (ref 31.5–36.5)
MCV RBC AUTO: 86 FL (ref 78–100)
METAMYELOCYTES # BLD MANUAL: 0.1 10E3/UL
METAMYELOCYTES NFR BLD MANUAL: 1 %
MONOCYTES # BLD MANUAL: 0.1 10E3/UL (ref 0–1.3)
MONOCYTES NFR BLD MANUAL: 2 %
MYELOCYTES # BLD MANUAL: 0.1 10E3/UL
MYELOCYTES NFR BLD MANUAL: 2 %
NEUTROPHILS # BLD MANUAL: 6.6 10E3/UL (ref 1.6–8.3)
NEUTROPHILS NFR BLD MANUAL: 95 %
PHOSPHATE SERPL-MCNC: 1.5 MG/DL (ref 2.5–4.5)
PLAT MORPH BLD: ABNORMAL
PLATELET # BLD AUTO: 53 10E3/UL (ref 150–450)
POTASSIUM SERPL-SCNC: 5.1 MMOL/L (ref 3.4–5.3)
RBC # BLD AUTO: 2.86 10E6/UL (ref 4.4–5.9)
RBC MORPH BLD: ABNORMAL
SODIUM SERPL-SCNC: 133 MMOL/L (ref 136–145)
TACROLIMUS BLD-MCNC: 9.1 UG/L (ref 5–15)
TME LAST DOSE: NORMAL H
TME LAST DOSE: NORMAL H
WBC # BLD AUTO: 6.9 10E3/UL (ref 4–11)

## 2023-02-26 PROCEDURE — 99232 SBSQ HOSP IP/OBS MODERATE 35: CPT | Mod: GC | Performed by: INTERNAL MEDICINE

## 2023-02-26 PROCEDURE — 99233 SBSQ HOSP IP/OBS HIGH 50: CPT | Mod: FS | Performed by: NURSE PRACTITIONER

## 2023-02-26 PROCEDURE — 250N000012 HC RX MED GY IP 250 OP 636 PS 637: Performed by: STUDENT IN AN ORGANIZED HEALTH CARE EDUCATION/TRAINING PROGRAM

## 2023-02-26 PROCEDURE — 85007 BL SMEAR W/DIFF WBC COUNT: CPT | Performed by: STUDENT IN AN ORGANIZED HEALTH CARE EDUCATION/TRAINING PROGRAM

## 2023-02-26 PROCEDURE — 80048 BASIC METABOLIC PNL TOTAL CA: CPT | Performed by: STUDENT IN AN ORGANIZED HEALTH CARE EDUCATION/TRAINING PROGRAM

## 2023-02-26 PROCEDURE — 83735 ASSAY OF MAGNESIUM: CPT | Performed by: STUDENT IN AN ORGANIZED HEALTH CARE EDUCATION/TRAINING PROGRAM

## 2023-02-26 PROCEDURE — 250N000013 HC RX MED GY IP 250 OP 250 PS 637: Performed by: STUDENT IN AN ORGANIZED HEALTH CARE EDUCATION/TRAINING PROGRAM

## 2023-02-26 PROCEDURE — 258N000003 HC RX IP 258 OP 636: Performed by: PHYSICIAN ASSISTANT

## 2023-02-26 PROCEDURE — 250N000013 HC RX MED GY IP 250 OP 250 PS 637: Performed by: NURSE PRACTITIONER

## 2023-02-26 PROCEDURE — 80197 ASSAY OF TACROLIMUS: CPT | Performed by: STUDENT IN AN ORGANIZED HEALTH CARE EDUCATION/TRAINING PROGRAM

## 2023-02-26 PROCEDURE — 250N000013 HC RX MED GY IP 250 OP 250 PS 637: Performed by: TRANSPLANT SURGERY

## 2023-02-26 PROCEDURE — 250N000013 HC RX MED GY IP 250 OP 250 PS 637: Performed by: INTERNAL MEDICINE

## 2023-02-26 PROCEDURE — 36415 COLL VENOUS BLD VENIPUNCTURE: CPT | Performed by: STUDENT IN AN ORGANIZED HEALTH CARE EDUCATION/TRAINING PROGRAM

## 2023-02-26 PROCEDURE — 250N000012 HC RX MED GY IP 250 OP 636 PS 637: Performed by: PHYSICIAN ASSISTANT

## 2023-02-26 PROCEDURE — 250N000011 HC RX IP 250 OP 636: Performed by: PHYSICIAN ASSISTANT

## 2023-02-26 PROCEDURE — 84100 ASSAY OF PHOSPHORUS: CPT | Performed by: STUDENT IN AN ORGANIZED HEALTH CARE EDUCATION/TRAINING PROGRAM

## 2023-02-26 PROCEDURE — 85027 COMPLETE CBC AUTOMATED: CPT | Performed by: STUDENT IN AN ORGANIZED HEALTH CARE EDUCATION/TRAINING PROGRAM

## 2023-02-26 PROCEDURE — 250N000013 HC RX MED GY IP 250 OP 250 PS 637: Performed by: PHYSICIAN ASSISTANT

## 2023-02-26 PROCEDURE — 250N000011 HC RX IP 250 OP 636

## 2023-02-26 RX ORDER — AMLODIPINE BESYLATE 5 MG/1
5 TABLET ORAL DAILY
Status: DISCONTINUED | OUTPATIENT
Start: 2023-02-26 | End: 2023-02-26 | Stop reason: HOSPADM

## 2023-02-26 RX ORDER — AMLODIPINE BESYLATE 5 MG/1
5 TABLET ORAL DAILY
Qty: 30 TABLET | Refills: 1 | Status: SHIPPED | OUTPATIENT
Start: 2023-02-26 | End: 2023-04-05

## 2023-02-26 RX ORDER — LABETALOL HYDROCHLORIDE 5 MG/ML
20 INJECTION, SOLUTION INTRAVENOUS EVERY 4 HOURS PRN
Status: DISCONTINUED | OUTPATIENT
Start: 2023-02-26 | End: 2023-02-26 | Stop reason: HOSPADM

## 2023-02-26 RX ORDER — FUROSEMIDE 20 MG
40 TABLET ORAL ONCE
Status: COMPLETED | OUTPATIENT
Start: 2023-02-26 | End: 2023-02-26

## 2023-02-26 RX ORDER — LABETALOL HYDROCHLORIDE 5 MG/ML
10 INJECTION, SOLUTION INTRAVENOUS
Status: COMPLETED | OUTPATIENT
Start: 2023-02-26 | End: 2023-02-26

## 2023-02-26 RX ORDER — HYDRALAZINE HYDROCHLORIDE 20 MG/ML
10 INJECTION INTRAMUSCULAR; INTRAVENOUS EVERY 4 HOURS PRN
Status: DISCONTINUED | OUTPATIENT
Start: 2023-02-26 | End: 2023-02-26 | Stop reason: HOSPADM

## 2023-02-26 RX ADMIN — SULFAMETHOXAZOLE AND TRIMETHOPRIM 1 TABLET: 400; 80 TABLET ORAL at 07:57

## 2023-02-26 RX ADMIN — FUROSEMIDE 40 MG: 20 TABLET ORAL at 10:45

## 2023-02-26 RX ADMIN — AMLODIPINE BESYLATE 5 MG: 5 TABLET ORAL at 11:39

## 2023-02-26 RX ADMIN — SODIUM BICARBONATE 650 MG: 650 TABLET ORAL at 07:58

## 2023-02-26 RX ADMIN — CARVEDILOL 25 MG: 12.5 TABLET, FILM COATED ORAL at 07:58

## 2023-02-26 RX ADMIN — ACETAMINOPHEN 650 MG: 325 TABLET ORAL at 13:40

## 2023-02-26 RX ADMIN — ASPIRIN 81 MG: 81 TABLET ORAL at 07:58

## 2023-02-26 RX ADMIN — Medication 50 MCG: at 07:59

## 2023-02-26 RX ADMIN — PREDNISONE 40 MG: 20 TABLET ORAL at 07:58

## 2023-02-26 RX ADMIN — HYDRALAZINE HYDROCHLORIDE 10 MG: 20 INJECTION INTRAMUSCULAR; INTRAVENOUS at 04:34

## 2023-02-26 RX ADMIN — MAGNESIUM OXIDE TAB 400 MG (241.3 MG ELEMENTAL MG) 400 MG: 400 (241.3 MG) TAB at 11:39

## 2023-02-26 RX ADMIN — TACROLIMUS 4 MG: 1 CAPSULE ORAL at 07:56

## 2023-02-26 RX ADMIN — ACETAMINOPHEN 650 MG: 325 TABLET ORAL at 02:05

## 2023-02-26 RX ADMIN — LABETALOL HYDROCHLORIDE 10 MG: 5 INJECTION, SOLUTION INTRAVENOUS at 02:48

## 2023-02-26 RX ADMIN — POLYETHYLENE GLYCOL 3350 17 G: 17 POWDER, FOR SOLUTION ORAL at 07:59

## 2023-02-26 RX ADMIN — CALCIUM CARBONATE (ANTACID) CHEW TAB 500 MG 1000 MG: 500 CHEW TAB at 11:39

## 2023-02-26 RX ADMIN — MYCOPHENOLATE MOFETIL 750 MG: 250 CAPSULE ORAL at 07:57

## 2023-02-26 RX ADMIN — SENNOSIDES AND DOCUSATE SODIUM 1 TABLET: 50; 8.6 TABLET ORAL at 07:58

## 2023-02-26 RX ADMIN — ACETAMINOPHEN 650 MG: 325 TABLET ORAL at 07:55

## 2023-02-26 RX ADMIN — ATORVASTATIN CALCIUM 20 MG: 20 TABLET, FILM COATED ORAL at 07:58

## 2023-02-26 RX ADMIN — SODIUM PHOSPHATE, DIBASIC, ANHYDROUS, POTASSIUM PHOSPHATE, MONOBASIC, AND SODIUM PHOSPHATE, MONOBASIC, MONOHYDRATE 250 MG: 852; 155; 130 TABLET, COATED ORAL at 08:01

## 2023-02-26 RX ADMIN — VALGANCICLOVIR 900 MG: 450 TABLET, FILM COATED ORAL at 07:57

## 2023-02-26 RX ADMIN — SODIUM CHLORIDE 20 MG: 9 INJECTION, SOLUTION INTRAVENOUS at 10:41

## 2023-02-26 ASSESSMENT — ACTIVITIES OF DAILY LIVING (ADL)
ADLS_ACUITY_SCORE: 24

## 2023-02-26 NOTE — PLAN OF CARE
"/70 (BP Location: Right arm)   Pulse 73   Temp 97.4  F (36.3  C) (Oral)   Resp 16   Ht 1.626 m (5' 4\")   Wt 67.3 kg (148 lb 6.4 oz)   SpO2 98%   BMI 25.47 kg/m       DISCHARGE:  D: Patient with orders to discharge home with family.  I: Discharge instructions, medications, & follow ups reviewed with daughter and patient; Nhia. Copy of discharge summary given to Nhia. PIV removed. All belongings packed & sent with patient. Medications filled & picked up at discharge pharmacy. Paperwork faxed.   A: Patient in stable condition. AVSS. Nhia and family had no further questions regarding discharge instructions and medications. Patient transferred out by wheelchair & left with transportation at 1415.  P: Plan for follow- up with outpatient clinic tomorrow (2/26) at 0745 for lab draw.              "

## 2023-02-26 NOTE — PROGRESS NOTES
[facility]  Transplant Nephrology Follow Up  Date of Admission:  2/21/2023  Today's Date: 02/26/2023  Requesting physician: Talha Weinstein MD    Recommendations:  - Recommend starting amlodipine 5 mg nightly.     Assessment & Plan   # LDKT: Trend down in creatinine.  Good urine output.  No acute indications for dialysis.   - Baseline Creatinine: ~ TBD   - Proteinuria: Not checked post transplant   - Date DSA Last Checked: Feb/2023      Latest DSA: No DSA at time of transplant   - BK Viremia: Not checked post transplant   - Kidney Tx Biopsy: No    - Transplant Ureteral Stent: Yes    # Immunosuppression: Tacrolimus immediate release (goal 8-10), Mycophenolate mofetil (dose 750 mg every 12 hours) and Prednisone (dose taper)    - Intermediate risk induction   - Changes: No    # Infection Prophylaxis:   - PJP: Sulfa/TMP (Bactrim)  - CMV: Valganciclovir (Valcyte) x3m    # Hypertension: Inadequate control;  Goal BP: < 150/90   - Volume status: Mildly hypervolemic  EDW ~ TBD   - Changes: Yes - add norvasc 5mg daily. Okay with furosemide 40mg daily    # Diabetes: Controlled (HbA1c <7%) Last HbA1c: 6.8% (2/22/23)   - Management as per primary team and Endocrinology.    # Anemia in Chronic Renal Disease: Hgb: Trend up, after 1 unit PRBC     NERY: No   - Iron studies: Replete    # Mineral Bone Disorder:   - Secondary renal hyperparathyroidism; PTH level: Mildly elevated (151-300 pg/ml)        On treatment: None  - Vitamin D; level: Low        On supplement: Yes cholecalciferol 2000 units PO daily.  - Calcium; level: Low normal, check ionized calcium tomorrow.       On supplement: Yes Tums 1000 mg PO between meals. If calcium remains low would consider restarting calcitriol  - Phosphorus; level: Low        On supplement: Yes, phos 250mg bid    # Electrolytes:   - Potassium; level: Normal        On supplement: No  - Magnesium; level: Normal        On supplement: Yes  -  Bicarbonate; level: Low normal        On supplement: Yes, continue sodium bicarbonate 650 mg PO twice daily.    # Thrombocytopenia:   - Trending down.    - Seen previously by hematology. Thought to have peripheral consumption vs ITP    - Baseline platelets 40-90k    # Hepatitis B: Core antibody positive. Check Hepatitis B DNA level pending and  three months post transplant.    # Asymmetric Left Ventricular Hypertrophy: Stable. No history of sustained ventricular arrhythmias.    # CAD: s/p PCI with stent 11/2021 with negative stress echo 1/2023.  Previously on Brilinta and aspirin. Brillinta stopped 1/2023     # Hyperlipidemia: On atorvastatin 20 mg daily. Most recent cholesterol in November 2022 was 122, triglycerides 234, HDL 22, and LDL 53.    # Transplant History:  Etiology of Kidney Failure: Diabetes mellitus type 2  Tx: LDKT  Transplant: 2/21/2023 (Kidney)  Crossmatch at time of Tx: negative  DSA at time of Tx: No  Significant changes in immunosuppression: None  Significant transplant-related complications: None    Recommendations were communicated to the primary team verbally.    Patient was seen and discussed with Dr. Phan.    Ritu Bonilla NP  Pager: [phone]    Physician Attestation     I saw and evaluated Modesto Barbosa as part of a shared APRN/PA visit.     I personally reviewed the vital signs, medications and labs.    I personally performed the substantive portion of the medical decision making for this visit - please see the PHOEBE's documentation for full details.    Key management decisions made by me and carried out under my direction: No changes in immunosuppression.  Recommend starting amlodipine 5 mg nightly.    Patrick Phan MD  Date of Service (when I saw the patient): 02/26/23    Interval events:  Mr. Barbosa's creatinine is 0.96 (02/26 0504); Stable.  excellent urine output.  Other significant labs/tests/vitals: VSS  No events overnight.  No chest pain or shortness of breath.  mild leg  swelling.  No nausea and vomiting.  Bowel movements are soft.  No fever, sweats or chills.    Review of Systems    The 4point Review of Systems is negative other than noted above or here.     Past Medical History    I have reviewed this patient's medical history and updated it with pertinent information if needed.   Past Medical History:   Diagnosis Date     Acute kidney injury (H) 6/10/2021     CKD (chronic kidney disease) stage 5, GFR less than 15 ml/min (H)      Dyslipidemia      Metabolic acidosis      Type 2 diabetes mellitus (H)        Past Surgical History   I have reviewed this patient's surgical history and updated it with pertinent information if needed.  Past Surgical History:   Procedure Laterality Date     BENCH KIDNEY  2/21/2023    Procedure: Bench kidney;  Surgeon: Talha Weinstein MD;  Location: [facility] OR     CV CORONARY ANGIOGRAM N/A 12/6/2021    Procedure: Coronary Angiogram;  Surgeon: Cristela Banks MD;  Location: [facility] CATH LAB CV     CV LEFT HEART CATH N/A 12/6/2021    Procedure: Left Heart Cath;  Surgeon: Cristela Banks MD;  Location: [facility] CATH LAB CV     CV PCI N/A 12/6/2021    Procedure: Percutaneous Coronary Intervention;  Surgeon: Cristela Banks MD;  Location: [facility] CATH LAB CV     REMOVE CATHETER PERITONEAL N/A 2/21/2023    Procedure: Remove catheter peritoneal;  Surgeon: Talha Weinstein MD;  Location: [facility] OR       Family History   I have reviewed this patient's family history and updated it with pertinent information if needed.   Family History   Problem Relation Age of Onset     Diabetes Type 2  Mother      Other - See Comments Daughter         granular cell tumor of thigh     Heart Disease Other      Social History   I have reviewed this patient's social history and updated it with pertinent information if needed. Modesto Marianoong  reports that he has never smoked. He has never been exposed to tobacco smoke. He has never used smokeless tobacco. He reports that he does  "not currently use alcohol. He reports that he does not use drugs.    Allergies   No Known Allergies  Prior to Admission Medications     aspirin  81 mg Oral Daily     atorvastatin  20 mg Oral Daily     basiliximab (SIMULECT) infusion  20 mg Intravenous Once     calcium carbonate  1,000 mg Oral BID     carvedilol  25 mg Oral BID w/meals     insulin aspart   Subcutaneous TID w/meals     insulin aspart  1-10 Units Subcutaneous TID AC     insulin aspart  1-7 Units Subcutaneous At Bedtime     insulin glargine  30 Units Subcutaneous QAM AC     magnesium oxide  400 mg Oral Daily with lunch     mycophenolate  750 mg Oral BID IS     phosphorus tablet 250 mg  250 mg Oral BID     polyethylene glycol  17 g Oral Daily     predniSONE  40 mg Oral Daily    Followed by     [START ON 2023] predniSONE  20 mg Oral Daily    Followed by     [START ON 3/7/2023] predniSONE  15 mg Oral Daily    Followed by     [START ON 3/14/2023] predniSONE  10 mg Oral Daily    Followed by     [START ON 3/21/2023] predniSONE  5 mg Oral Daily     senna-docusate  1 tablet Oral BID     sodium bicarbonate  650 mg Oral BID     sodium chloride (PF)  10 mL Intracatheter Q8H     sulfamethoxazole-trimethoprim  1 tablet Oral Daily     tacrolimus  4 mg Oral BID IS     valGANciclovir  900 mg Oral Daily     cholecalciferol  50 mcg Oral Daily       dextrose         Physical Exam   Temp  Av  F (36.7  C)  Min: 97.2  F (36.2  C)  Max: 98.2  F (36.8  C)  Arterial Line BP  Min: 77/38  Max: 274/258  Arterial Line MAP (mmHg)  Av.7 mmHg  Min: 51 mmHg  Max: 264 mmHg      Pulse  Av.1  Min: 62  Max: 78 Resp  Av.8  Min: 7  Max: 20  SpO2  Av.4 %  Min: 95 %  Max: 100 %    CVP (mmHg): 8 mmHgBP (!) 158/83   Pulse 74   Temp 97.8  F (36.6  C) (Oral)   Resp 15   Ht 1.626 m (5' 4\")   Wt 67.3 kg (148 lb 6.4 oz)   SpO2 98%   BMI 25.47 kg/m     Date 23 0700 - 23 0659   Shift 9000-9370 1128-2507 3227-1881 24 Hour Total   INTAKE   I.V. 9   9 "   Shift Total(mL/kg) 9(0.15)   9(0.15)   OUTPUT   Urine 400   400   Shift Total(mL/kg) 400(6.79)   400(6.79)   Weight (kg) 58.9 58.9 58.9 58.9      Admit Weight: 58.9 kg (129 lb 13.6 oz)     GENERAL APPEARANCE: alert and no distress  HENT: mouth without ulcers or lesions  RESP: lungs clear to auscultation - no rales, rhonchi or wheezes  CV: regular rhythm, normal rate, no rub, no murmur  EDEMA: no LE edema bilaterally  ABDOMEN: soft, nondistended, appropriately tender, bowel sounds normal  MS: extremities normal - no gross deformities noted, no evidence of inflammation in joints, no muscle tenderness  SKIN: no rash, surgical incision healing by primary intention.  DIALYSIS ACCESS:  None    Data   CMP  Recent Labs   Lab 02/26/23  0901 02/26/23  0724 02/26/23  0504 02/26/23  0156 02/25/23  0822 02/25/23  0555 02/24/23  0748 02/24/23  0551 02/23/23  1219 02/23/23  0734   NA  --   --  133*  --   --  133*  --  135*  --  134*   POTASSIUM  --   --  5.1  --   --  4.4  --  4.8  --  4.0   CHLORIDE  --   --  107  --   --  107  --  109*  --  106   CO2  --   --  21*  --   --  21*  --  21*  --  18*   ANIONGAP  --   --  5*  --   --  5*  --  5*  --  10   * 101* 136* 191*   < > 92   < > 163*   < > 213*   BUN  --   --  27.2*  --   --  22.5*  --  27.1*  --  28.2*   CR  --   --  0.96  --   --  0.93  --  1.04  --  1.12   GFRESTIMATED  --   --  >90  --   --  >90  --  83  --  76   REYMUNDO  --   --  7.1*  --   --  7.6*  --  7.7*  --  7.2*   MAG  --   --  2.4*  --   --  2.3  --  2.1  --  1.9   PHOS  --   --  1.5*  --   --  1.7*  --  1.7*  --  2.9    < > = values in this interval not displayed.     CBC  Recent Labs   Lab 02/26/23  0504 02/25/23  0743 02/25/23  0555 02/24/23  0551 02/23/23  0734   HGB 8.4* 6.8* 6.7* 7.1* 7.7*   WBC 6.9  --  5.9 9.2 11.4*   RBC 2.86*  --  2.22* 2.38* 2.65*   HCT 24.5*  --  19.5* 20.8* 22.9*   MCV 86  --  88 87 86   MCH 29.4  --  30.2 29.8 29.1   MCHC 34.3  --  34.4 34.1 33.6   RDW 15.3*  --  15.0 15.0 15.3*    PLT 53*  --  51* 57* 62*     INR  No lab results found in last 7 days.  ABG  Recent Labs   Lab 02/21/23 2003 02/21/23  1920 02/21/23  1847 02/21/23  1759 02/21/23  1609   PH  --   --   --   --  7.41   PCO2  --   --   --   --  32*   PO2  --   --   --   --  226*   HCO3  --   --   --   --  20*   O2PER 81.0 38.0 38.0 44.0 50.0      Urine Studies  Recent Labs   Lab Test 02/16/23  1248 11/18/22  0547 08/23/21  1243 06/10/21  1630 08/12/20  1508   COLOR Light Yellow Light Yellow Straw Colorless Yellow   APPEARANCE Clear Turbid* Clear Clear Clear   URINEGLC 150* 50* 50* Negative Negative   URINEBILI Negative Negative Negative Negative Negative   URINEKETONE Negative Negative Negative Negative Negative   SG 1.017 1.010 1.010 1.006 1.010   UBLD Negative Negative Negative Negative Negative   URINEPH 5.0 5.0 5.0 6.0 6.0   PROTEIN 30* 50* 100* 100 mg/dL* 300 mg/dL*   UROBILINOGEN  --   --   --  <2.0 mg/dL <2.0 E.U./dL   NITRITE Negative Negative Negative Negative Negative   LEUKEST Negative Negative Negative Negative Negative   RBCU 1 0 2 <1 0-2   WBCU 2 3 <1 0 0-5     No lab results found.  PTH  Recent Labs   Lab Test 02/16/23  1226 02/09/23  1459   PTHI 232* 31     Iron Studies  Recent Labs   Lab Test 02/16/23  1226 06/11/21  0728   IRON 37* 22*   *  --    IRONSAT 21  --    MATEO 847* 301*       IMAGING:  All imaging studies reviewed by me.

## 2023-02-26 NOTE — PROGRESS NOTES
Care Management Discharge Note    Discharge Date: 02/25/2023       Discharge Disposition: Transitional Care    Discharge Services:  ATC appointment    Discharge DME:      Discharge Transportation:      Private pay costs discussed: Not applicable    PAS Confirmation Code:  n/a  Patient/family educated on Medicare website which has current facility and service quality ratings:  no    Education Provided on the Discharge Plan:  yes  Persons Notified of Discharge Plans: patient/daughter  Patient/Family in Agreement with the Plan:  yes    Handoff Referral Completed: Yes    Additional Information:  RNCC followed up with [facility] for post transplant follow up appointment. Patient has appointment for Monday 2/27. RNCC available as needed for any further planning needs.    [facility]  [facility]  [address] [place] [address]  tel:[phone]  Charge RN: [phone]    VANGIE GoodmanN, BA, RN, CMSRN, RNCC  Covering Units 6D/OBS  Pager: [phone]  Phone: [phone]  6th floor Weekend/Holiday Pager: [phone]  Observation weekend/after hours: [phone]

## 2023-02-26 NOTE — PHARMACY-TRANSPLANT NOTE
Solid Organ Transplant Recipient Prior to Discharge Note    57 year old male s/p kidney transplant on 2/21/23.    Pharmacy has monitored for medication interactions and immunosuppression levels in conjunction with the multidisciplinary team. In anticipation for discharge, medication therapy needs have been addressed daily throughout the current admission via multidisciplinary rounds and/or discussions, order verification, daily clinical pharmacy review, and communication with prescribers.  Jose Petersen, PharmD, BCPS

## 2023-02-26 NOTE — PROVIDER NOTIFICATION
7A 2312 YULIA Barbosa   pt was given labetalol @ 0245. BP now 191/93 and recheck  180/93. Elly POWELL, [phone]

## 2023-02-26 NOTE — PLAN OF CARE
"VS: BP (!) 157/82 (BP Location: Right arm, Patient Position: Semi-Oliver's)   Pulse 74   Temp 97.8  F (36.6  C) (Oral)   Resp 15   Ht 1.626 m (5' 4\")   Wt 67.3 kg (148 lb 6.4 oz)   SpO2 98%   BMI 25.47 kg/m      Cares: 1900 - 0730     Neuro: Aox4   Cardio: hypertensive as high as 180/90s overnight  Respiratory: WDL on RA   GI/: voiding adequately w/out issues (karen), BM overnight x2 (loose)  Skin: right abdominal incision dermabond CUBA, old PD site   Diet: Regular   Labs: hgb: 8.4   BG: ACHS (136-242)  LDA: Left PIV SL  Mobility: SBA   Pain/Nausea: denies pain or nausea   PRN medications: tylenol x1, labetalol 10mg x1, hydralazine 10mg x1  Plan of Care: plan for discharge this morning. Med card and lab book updated this morning, continue with current POC and update MD with any changes     "

## 2023-02-26 NOTE — PROGRESS NOTES
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 Now s/p LDKT on 2/21/23     On steroids:  Prednisone 60mg from 2/24/23 x2 days. Today is first day of oral steroids. Further planned taper from 02/25/23 is 40 mg x2, 20 mg x 7, 15mg x7, 10 mg x7 , 5 mg x7 then off. Home dose of Levemir was 20 units, currently on 36 units Lantus dosing for methylprednisolone coverage. Will require a decrease in Lantus dosing moving forward to account for switch to oral steroids. He also does not know how to count carbohydrates; will require fixed dose of Novolog on discharge, which will need to be higher with high dose steroids on board. Also does not use a correction scale at home. Daughter manages his insulin at home.     Type 2 DM, post transplant, on steroids  - Decrease Lantus to 30 units daily am   - Continue  Novolog 1per 10gram CHO meals/snacks  - Continue Novolog high correction scale  - BG checks AC, HS, 0200  - Hypoglycemia protocol      ON DISCHARGE:  Novolog 5 units TID with meals all the time + levemir taper dose as per prednisone    While on   Prednisone 40mg daily - levemir 30 units  Prednisone 20mg daily - levemir 28 units  Prednisone 15mg daily - levemir 26 units  Prednisone 10mg daily - levemir 24 units  Prednisone 5mg daily - levemir 20 units  Off steroids - levemir 20 units daily      Case and recommendations discussed with Dr. Robles, staff Endocrinologist.     Lakisha Howe MD  Endocrinology fellow  Page number: [phone]    Attending tie-in note  I saw the patient with endocrine fellow Dr. Howe and directly examined patient and discussed. Agree above note and plan.       Heidi Robles MD  Staff Physician  Endocrinology and Metabolism  [facility]  License: [id number]  Pager: [phone]         Interval History   Reviewed labs and nursing notes  "from last 24hrs  BG overall stable      Recent Labs   Lab 02/25/23  0924 02/25/23  0909 02/25/23  0900 02/25/23  0851 02/25/23  0841 02/25/23  0836   * 262* 88 92 74 67*       Usual Diabetes Regimen:   Levemir 20 units daily  Novolog 10 units BID AC    Diabetes Control:   Lab Results   Component Value Date    A1C 6.8 02/22/2023    A1C 6.8 02/21/2023    A1C 7.1 02/16/2023    A1C 6.9 02/09/2023    A1C 5.6 08/23/2021     Review of Systems  10 point ROS completed with pertinent positives and negatives noted in the HPI    Physical Examination:  Vital signs:  Temp: 97.8  F (36.6  C) Temp src: Oral BP: (!) 158/83 Pulse: 74   Resp: 15 SpO2: 98 % O2 Device: None (Room air) Oxygen Delivery: 6 LPM Height: 162.6 cm (5' 4\") Weight: 67.3 kg (148 lb 6.4 oz)  Estimated body mass index is 25.47 kg/m  as calculated from the following:    Height as of this encounter: 1.626 m (5' 4\").    Weight as of this encounter: 67.3 kg (148 lb 6.4 oz).  General : no acute distress, lying in bed  Mental: awake, alert  Rep: normally breathing, no cough    Laboratory  Last Comprehensive Metabolic Panel:  Lab Results   Component Value Date     (L) 02/26/2023    POTASSIUM 5.1 02/26/2023    CHLORIDE 107 02/26/2023    CO2 21 (L) 02/26/2023    ANIONGAP 5 (L) 02/26/2023     (H) 02/26/2023    BUN 27.2 (H) 02/26/2023    CR 0.96 02/26/2023    GFRESTIMATED >90 02/26/2023    REYMUNDO 7.1 (L) 02/26/2023       Active Medications  Current Facility-Administered Medications   Medication     acetaminophen (TYLENOL) tablet 650 mg     aspirin EC tablet 81 mg     atorvastatin (LIPITOR) tablet 20 mg     basiliximab (SIMULECT) 20 mg in sodium chloride 0.9 % 50 mL infusion     bisacodyl (DULCOLAX) suppository 10 mg     calcium carbonate (TUMS) chewable tablet 1,000 mg     carvedilol (COREG) tablet 25 mg     dextrose 10% infusion     glucose gel 15-30 g    Or     dextrose 50 % injection 25-50 mL    Or     glucagon injection 1 mg     hydrALAZINE (APRESOLINE) " injection 10 mg     insulin aspart (NovoLOG) injection (RAPID ACTING)     insulin aspart (NovoLOG) injection (RAPID ACTING)     insulin aspart (NovoLOG) injection (RAPID ACTING)     insulin aspart (NovoLOG) injection (RAPID ACTING)     insulin glargine (LANTUS PEN) injection 32 Units     labetalol (NORMODYNE/TRANDATE) injection 20 mg     magnesium hydroxide (MILK OF MAGNESIA) suspension 30 mL     magnesium oxide (MAG-OX) tablet 400 mg     mycophenolate (GENERIC EQUIVALENT) capsule 750 mg     naloxone (NARCAN) injection 0.2 mg    Or     naloxone (NARCAN) injection 0.4 mg    Or     naloxone (NARCAN) injection 0.2 mg    Or     naloxone (NARCAN) injection 0.4 mg     ondansetron (ZOFRAN ODT) ODT tab 4 mg     oxyCODONE (ROXICODONE) tablet 5 mg     phosphorus tablet 250 mg (PHOSPHA 250 NEUTRAL) per tablet 250 mg     polyethylene glycol (MIRALAX) Packet 17 g     predniSONE (DELTASONE) tablet 40 mg    Followed by     [START ON 2/28/2023] predniSONE (DELTASONE) tablet 20 mg    Followed by     [START ON 3/7/2023] predniSONE (DELTASONE) tablet 15 mg    Followed by     [START ON 3/14/2023] predniSONE (DELTASONE) tablet 10 mg    Followed by     [START ON 3/21/2023] predniSONE (DELTASONE) tablet 5 mg     prochlorperazine (COMPAZINE) tablet 10 mg     senna-docusate (SENOKOT-S/PERICOLACE) 8.6-50 MG per tablet 1 tablet     sodium bicarbonate tablet 650 mg     sodium chloride (PF) 0.9% PF flush 10 mL     sodium chloride (PF) 0.9% PF flush 10-20 mL     sulfamethoxazole-trimethoprim (BACTRIM) 400-80 MG per tablet 1 tablet     tacrolimus (GENERIC EQUIVALENT) capsule 4 mg     valGANciclovir (VALCYTE) tablet 900 mg     Vitamin D3 (CHOLECALCIFEROL) tablet 50 mcg     Current Outpatient Medications   Medication Sig Dispense Refill     acetaminophen (TYLENOL) 325 MG tablet Take 2 tablets (650 mg) by mouth every 4 hours as needed for other (For optimal non-opioid multimodal pain management to improve pain control.) 30 tablet 0     aspirin (ASA)  81 MG EC tablet Take 1 tablet (81 mg) by mouth daily 30 tablet 0     atorvastatin (LIPITOR) 20 MG tablet Take 1 tablet (20 mg) by mouth daily 30 tablet 0     calcium carbonate (TUMS) 500 MG chewable tablet Take 2 tablets (1,000 mg) by mouth 2 times daily 120 tablet 0     carvedilol (COREG) 25 MG tablet Take 1 tablet (25 mg) by mouth 2 times daily (with meals) 60 tablet 0     insulin aspart (NOVOLOG PEN) 100 UNIT/ML pen Inject 5 Units Subcutaneous 3 times daily (with meals) for 180 days 9 mL 2     insulin detemir (LEVEMIR PEN) 100 UNIT/ML pen Inject 30 Units Subcutaneous At Bedtime for 2 days, THEN 28 Units At Bedtime for 7 days, THEN 26 Units At Bedtime for 7 days, THEN 24 Units At Bedtime for 7 days, THEN 20 Units At Bedtime for 7 days, THEN 20 Units At Bedtime for 30 days. On the days that you are taking the prescribed Prednisone 40mg daily, take levemir 30 units at bedtime. On the days that you are taking the prescribed Prednisone 20mg daily take levemir 28 units at bedtime. On the days that you are taking the prescribed Prednisone 15mg daily take levemir 26 units at bedtime. On the days that you are taking the prescribed Prednisone 10mg daily take levemir 24 units at bedtime. On the days that you are taking the prescribed Prednisone 5mg daily take levemir 20 units at bedtime. Once you are off the steroid taper, take levemir 20 units at bedtime daily. 13.46 mL 0     magnesium oxide (MAG-OX) 400 MG tablet Take 1 tablet (400 mg) by mouth daily (with lunch) 30 tablet 1     mycophenolate (GENERIC EQUIVALENT) 250 MG capsule Take 3 capsules (750 mg) by mouth 2 times daily for 360 days 180 capsule 11     oxyCODONE (ROXICODONE) 5 MG tablet Take 1 tablet (5 mg) by mouth every 4 hours as needed for moderate pain (4-6) 10 tablet 0     phosphorus tablet 250 mg (PHOSPHA 250 NEUTRAL) 250 MG per tablet Take 1 tablet (250 mg) by mouth 2 times daily for 60 days 60 tablet 1     polyethylene glycol (MIRALAX) 17 GM/Dose powder Take  17 g by mouth daily 510 g 0     predniSONE (DELTASONE) 10 MG tablet Take 1 tablet (10 mg) by mouth daily Take 4 tablets by mouth daily for 2 days starting on Sun, 2/26/2023. Then, take 2 tablets by mouth daily for 7 days starting 2/28/2023. Then, take one and half tablets by mouth daily for 7 days starting 3/7/2023. Then, take one tablet by mouth daily starting 3/14/2023. Then, take half a tablet by mouth daily for 7 days starting 3/21/2023. 50 tablet 0     senna-docusate (SENOKOT-S/PERICOLACE) 8.6-50 MG tablet Take 1 tablet by mouth 2 times daily for 30 days 60 tablet 0     sodium bicarbonate 650 MG tablet Take 1 tablet (650 mg) by mouth 2 times daily for 60 days 60 tablet 1     sulfamethoxazole-trimethoprim (BACTRIM) 400-80 MG tablet Take 1 tablet by mouth daily 30 tablet 11     tacrolimus (GENERIC EQUIVALENT) 0.5 MG capsule Take 1 capsule (0.5 mg) by mouth daily as needed (To be used for dosage adjustments to maintain goal Tac level 8-10 and/or as instructed by primary transplant team.) 30 capsule 0     tacrolimus (GENERIC EQUIVALENT) 0.5 MG capsule Take 1 capsule (0.5 mg) by mouth 2 times daily as needed (To be used for dosage adjustments to maintain goal Tac level 8-10 and/or as instructed by primary transplant team.) 60 capsule 0     tacrolimus (GENERIC EQUIVALENT) 1 MG capsule Take 4 capsules (4 mg) by mouth 2 times daily for 360 days 240 capsule 11     valGANciclovir (VALCYTE) 450 MG tablet Take 2 tablets (900 mg) by mouth daily for 90 days 60 tablet 2     Vitamin D3 (CHOLECALCIFEROL) 25 mcg (1000 units) tablet Take 2 tablets (50 mcg) by mouth daily 60 tablet 0       Current Diet  Orders Placed This Encounter      Advance Diet as Tolerated: Regular Diet Adult      Diet

## 2023-02-27 ENCOUNTER — OFFICE VISIT (OUTPATIENT)
Dept: INFUSION THERAPY | Facility: CLINIC | Age: 58
End: 2023-02-27
Attending: NURSE PRACTITIONER
Payer: COMMERCIAL

## 2023-02-27 ENCOUNTER — MYC MEDICAL ADVICE (OUTPATIENT)
Dept: TRANSPLANT | Facility: CLINIC | Age: 58
End: 2023-02-27

## 2023-02-27 ENCOUNTER — TELEPHONE (OUTPATIENT)
Dept: TRANSPLANT | Facility: CLINIC | Age: 58
End: 2023-02-27

## 2023-02-27 VITALS
WEIGHT: 151.2 LBS | TEMPERATURE: 98.8 F | BODY MASS INDEX: 25.95 KG/M2 | OXYGEN SATURATION: 100 % | RESPIRATION RATE: 18 BRPM

## 2023-02-27 DIAGNOSIS — Z94.0 KIDNEY REPLACED BY TRANSPLANT: Primary | ICD-10-CM

## 2023-02-27 DIAGNOSIS — Z94.0 KIDNEY REPLACED BY TRANSPLANT: ICD-10-CM

## 2023-02-27 DIAGNOSIS — Z20.828 CONTACT WITH AND (SUSPECTED) EXPOSURE TO OTHER VIRAL COMMUNICABLE DISEASES: ICD-10-CM

## 2023-02-27 DIAGNOSIS — Z48.298 AFTERCARE FOLLOWING ORGAN TRANSPLANT: ICD-10-CM

## 2023-02-27 DIAGNOSIS — R63.8 OTHER SYMPTOMS AND SIGNS CONCERNING FOOD AND FLUID INTAKE: ICD-10-CM

## 2023-02-27 DIAGNOSIS — Z79.899 ENCOUNTER FOR LONG-TERM CURRENT USE OF MEDICATION: ICD-10-CM

## 2023-02-27 LAB
ANION GAP SERPL CALCULATED.3IONS-SCNC: 8 MMOL/L (ref 7–15)
BASOPHILS # BLD MANUAL: 0 10E3/UL (ref 0–0.2)
BASOPHILS NFR BLD MANUAL: 0 %
BUN SERPL-MCNC: 27.2 MG/DL (ref 6–20)
BURR CELLS BLD QL SMEAR: SLIGHT
CALCIUM SERPL-MCNC: 7.5 MG/DL (ref 8.6–10)
CHLORIDE SERPL-SCNC: 106 MMOL/L (ref 98–107)
CREAT SERPL-MCNC: 0.93 MG/DL (ref 0.67–1.17)
DEPRECATED HCO3 PLAS-SCNC: 22 MMOL/L (ref 22–29)
EOSINOPHIL # BLD MANUAL: 0 10E3/UL (ref 0–0.7)
EOSINOPHIL NFR BLD MANUAL: 0 %
ERYTHROCYTE [DISTWIDTH] IN BLOOD BY AUTOMATED COUNT: 16.5 % (ref 10–15)
GFR SERPL CREATININE-BSD FRML MDRD: >90 ML/MIN/1.73M2
GLUCOSE SERPL-MCNC: 166 MG/DL (ref 70–99)
HBV DNA SERPL NAA+PROBE-ACNC: NOT DETECTED IU/ML
HCT VFR BLD AUTO: 27.9 % (ref 40–53)
HGB BLD-MCNC: 9.6 G/DL (ref 13.3–17.7)
LYMPHOCYTES # BLD MANUAL: 0.2 10E3/UL (ref 0.8–5.3)
LYMPHOCYTES NFR BLD MANUAL: 2 %
MAGNESIUM SERPL-MCNC: 2.3 MG/DL (ref 1.7–2.3)
MCH RBC QN AUTO: 29.6 PG (ref 26.5–33)
MCHC RBC AUTO-ENTMCNC: 34.4 G/DL (ref 31.5–36.5)
MCV RBC AUTO: 86 FL (ref 78–100)
METAMYELOCYTES # BLD MANUAL: 0.2 10E3/UL
METAMYELOCYTES NFR BLD MANUAL: 2 %
MONOCYTES # BLD MANUAL: 0.2 10E3/UL (ref 0–1.3)
MONOCYTES NFR BLD MANUAL: 2 %
MYELOCYTES # BLD MANUAL: 0.1 10E3/UL
MYELOCYTES NFR BLD MANUAL: 1 %
NEUTROPHILS # BLD MANUAL: 7 10E3/UL (ref 1.6–8.3)
NEUTROPHILS NFR BLD MANUAL: 93 %
PHOSPHATE SERPL-MCNC: 2.4 MG/DL (ref 2.5–4.5)
PLAT MORPH BLD: ABNORMAL
PLATELET # BLD AUTO: 64 10E3/UL (ref 150–450)
POLYCHROMASIA BLD QL SMEAR: SLIGHT
POTASSIUM SERPL-SCNC: 4.4 MMOL/L (ref 3.4–5.3)
RBC # BLD AUTO: 3.24 10E6/UL (ref 4.4–5.9)
RBC MORPH BLD: ABNORMAL
SODIUM SERPL-SCNC: 136 MMOL/L (ref 136–145)
TACROLIMUS BLD-MCNC: 11.2 UG/L (ref 5–15)
TME LAST DOSE: NORMAL H
TME LAST DOSE: NORMAL H
WBC # BLD AUTO: 7.5 10E3/UL (ref 4–11)

## 2023-02-27 PROCEDURE — 83735 ASSAY OF MAGNESIUM: CPT

## 2023-02-27 PROCEDURE — 36415 COLL VENOUS BLD VENIPUNCTURE: CPT

## 2023-02-27 PROCEDURE — 82310 ASSAY OF CALCIUM: CPT

## 2023-02-27 PROCEDURE — 99214 OFFICE O/P EST MOD 30 MIN: CPT | Mod: 24 | Performed by: NURSE PRACTITIONER

## 2023-02-27 PROCEDURE — 85027 COMPLETE CBC AUTOMATED: CPT

## 2023-02-27 PROCEDURE — 85007 BL SMEAR W/DIFF WBC COUNT: CPT

## 2023-02-27 PROCEDURE — 80197 ASSAY OF TACROLIMUS: CPT

## 2023-02-27 PROCEDURE — 84100 ASSAY OF PHOSPHORUS: CPT

## 2023-02-27 RX ORDER — TACROLIMUS 0.5 MG/1
0.5 CAPSULE ORAL 2 TIMES DAILY
Qty: 120 CAPSULE | Refills: 0 | Status: SHIPPED | OUTPATIENT
Start: 2023-02-27 | End: 2023-03-03

## 2023-02-27 RX ORDER — TACROLIMUS 1 MG/1
3 CAPSULE ORAL 2 TIMES DAILY
Qty: 180 CAPSULE | Refills: 11 | Status: SHIPPED | OUTPATIENT
Start: 2023-02-27 | End: 2023-03-03

## 2023-02-27 NOTE — TELEPHONE ENCOUNTER
Modesto is a post-kidney transplant patient who discharged 2/26/23.    Patient chooses to receive medications from [facility] specialty pharmacy.     The patient's daughter, Yanick, will be responsible for managing medications. Yanick speaks English. Modesto needs a GI Track . Yanick's phone is [phone].    Tiffany Jensen [facility]  [phone]

## 2023-02-27 NOTE — PROGRESS NOTES
"Chief Complaint   Patient presents with     Eval/Assessment     LBA 1 Kidney Txp     Modesto Barbosa came to [facility] today for a lab and assess following a kidney transplant on 2/21.      Discharge date: 2/26  Transplant coordinator: Shalonda WINN  Phone number patient can be reached at: [phone] Yanick (daughter) okay to leave     Physical Assessment:  See physical assessment located under \"Document Flowsheets\".  Incision site: RLQ with dermabond CDI. LLQ old PD site open and bleeding small amount- ABD pad applied. L wrist with blister from former IV dressing- Tegaderm added to allergy list. Wound and incision education given as well as additional supplies.   Lines: n/a  Odell: n/a  Urine clarity: Yellow with pink-tinged. Denies foul odor or irritation.  Hydration: approximately 48 ounces since discharge. Reiterated 2 L water requirements.  Nutrition: decreased appetite, denies N/V. Encouraged small frequent meals  Last BM: yesterday  Pain: 0/10   My transplant place videos watched: yes  Sitting 172/77  59   Standing 161/80  60  Wt 151.2- up 15 lbs from baseline (135)    Labs drawn by [facility] staff Yes. Copy of labs given to patient.     Plan of care for today:    waiver form filled out per pt request and sent to scanning. Daughter is an RN and will be interpreting for patient.   Labs an assessment reviewed with Katrina Frederick NP.  Pill box education reviewed with patient's daughter as she set up pill box.  Confirmed patient's daughter understanding prednisone taper and sliding scale.   Checklist completed.    Medication changes: n/a    Medications administered: n/a    Patient education:  The following teaching topics were addressed: Importance of drinking 2L of non-caffeinated fluids daily, Incisional care, Signs/symptoms of infection, Good handwashing, Medications (purposes, doses and times of administration), Phone numbers to call with concenrs (Transplant coordinator, [address] and [facility]) and Plan of " "care   Patient and daughter verbalized understanding and all questions answered.    Drug level:  Patient took 4 mg of Tacrolimus last evening at 8 PM.  Care coordinator to follow up with the result.    Face to face time: 60 minutes    Discharge Plan  Pt will follow up with [facility] tomorrow.   Discharge instructions reviewed with patient: YES  Patient/Representative verbalized understanding, all questions answered: YES    Discharged from unit at 1040 with whom: daughter to home.      Vital signs:  Temp: 98.8  F (37.1  C) Temp src: Oral       Resp: 18 SpO2: 100 % O2 Device: None (Room air)     Weight: 68.6 kg (151 lb 3.2 oz)  Estimated body mass index is 25.95 kg/m  as calculated from the following:    Height as of 2/21/23: 1.626 m (5' 4\").    Weight as of this encounter: 68.6 kg (151 lb 3.2 oz).        "

## 2023-02-27 NOTE — LETTER
2/27/2023         RE: Modesto Barbosa  [address] [place] [address]        Dear Colleague,    Thank you for referring your patient, Modesto Barbosa, to the [facility]. Please see a copy of my visit note below.    TRANSPLANT SURGERY EARLY POST TRANSPLANT VISIT    Assessment & Plan   # LDKT: Stable - doing well. Monitor BP at home after taking meds. [facility] tomorrow.    - Baseline Creatinine: ~ TBD   - Proteinuria: Not checked recently   - Date DSA Last Checked: Feb/2023      Latest DSA: No DSA at time of transplant   - BK Viremia: No   - Kidney Tx Biopsy: No   - Transplant Ureteral Stent: Yes    # Immunosuppression: Tacrolimus immediate release (goal 8-10), Mycophenolate mofetil (dose 750 mg every 12 hours) and Prednisone (dose taper)   - Induction with Recent Transplant:  Intermediate Intensity   - Continue with intensive monitoring of immunosuppression for efficacy and toxicity.   - Changes: Not at this time    # Infection Prophylaxis:   - PJP: Sulfa/TMP (Bactrim)  - CMV: Valganciclovir (Valcyte)    # Hypertension: Borderline control;  Goal BP: < 150/90   - Volume status: Euvolemic     - Changes: Not at this time - has not taken BP meds yet today. Instructed to take at home tomorrow before [facility] arrival.     # Diabetes: Controlled (HbA1c <7%) Last HbA1c: 6.8%   - Management as per primary team.    # Anemia in Chronic Renal Disease: Hgb: Trend up      NERY: No   - Iron studies: Replete    # Mineral Bone Disorder:   - Secondary renal hyperparathyroidism; PTH level: Mildly elevated (151-300 pg/ml)        On treatment: None  - Vitamin D; level: Low        On supplement: Yes  - Calcium; level: Low        On supplement: Yes  - Phosphorus; level: Low normal        On supplement: Yes    # Electrolytes:   - Potassium; level: Normal        On supplement: No  - Magnesium; level: Normal        On supplement: Yes  - Bicarbonate; level: Normal        On supplement: Yes  - Sodium; level:  Normal    # Thrombocytopenia:              - Trending up today.               - Seen previously by hematology. Thought to have peripheral consumption vs ITP                    - Baseline platelets 40-90k     # Hepatitis B: Core antibody positive. Check Hepatitis B DNA level pending and  three months post transplant.     # Asymmetric Left Ventricular Hypertrophy: Stable. No history of sustained ventricular arrhythmias.     # CAD: s/p PCI with stent 11/2021 with negative stress echo 1/2023.  Previously on Brilinta and aspirin. Brillinta stopped 1/2023      # Hyperlipidemia: On atorvastatin 20 mg daily. Most recent cholesterol in November 2022 was 122, triglycerides 234, HDL 22, and LDL 53.    # Transplant History:  Etiology of Kidney Failure: Diabetes mellitus type 2  Tx: LDKT  Transplant: 2/21/2023 (Kidney)  Donor Type: Living Donor Class:   Crossmatch at time of Tx: negative  DSA at time of Tx: No  Significant changes in immunosuppression: None  CMV IgG Ab High Risk Discordance (D+/R-): No  EBV IgG Ab High Risk Discordance (D+/R-): No  Significant transplant-related complications: None    Transplant Office Phone Number: [phone]    Assessment and plan was discussed with the patient and he voiced his understanding and agreement.    Return visit: [facility] tomorrow for LBA.   STEPHEN Pacheco CNP    Chief Complaint   Mr. Barbosa is a 57 year old here for follow-up after hospitalization.     History of Present Illness   Modesto Barbosa is an 57 year old male with PMH significant for ESKD 2/2 DM2 on PD, CAD s/p NSTEMI, HTN. Now s/p LDKT with ureteral stent on 2/21/23 with Dr. Weinstein.     Doing well. No complaints. Pain controlled. LBM yesterday. Drinking well. Eating some but feels bloated and full.   Glucose was 62 this AM and came up with eating to 166. Asymptomatic. BP elevated currently 170/60 but has not taken meds yet today. Will take prior to [facility] arrival tomorrow and will f/u.       Problem List   Patient Active  Problem List   Diagnosis     Type 2 Diabetes Mellitus     Dyslipidemia, goal LDL below 70     Hypertension     ESRD (end stage renal disease) on dialysis (H)     Metabolic acidosis     Coronary artery disease involving native coronary artery of native heart without angina pectoris     Thrombocytopenia (H)     Splenomegaly     NSTEMI (non-ST elevated myocardial infarction) (H)     Hypotension, unspecified hypotension type     Rash     Awaiting organ transplant     ESRD (end stage renal disease) (H)     Pre-diabetes     Immunosuppression (H)     Status post kidney transplant     Leukocytosis     Anemia in other chronic diseases classified elsewhere     Hypocalcemia     Hypomagnesemia     Hypophosphatemia     Hepatitis B core antibody positive       Allergies   Allergies   Allergen Reactions     Tegaderm Transparent Dressing (Informational Only) Blisters       Medications   Current Outpatient Medications   Medication Sig     acetaminophen (TYLENOL) 325 MG tablet Take 2 tablets (650 mg) by mouth every 4 hours as needed for other (For optimal non-opioid multimodal pain management to improve pain control.)     amLODIPine (NORVASC) 5 MG tablet Take 1 tablet (5 mg) by mouth daily     aspirin (ASA) 81 MG EC tablet Take 1 tablet (81 mg) by mouth daily     atorvastatin (LIPITOR) 20 MG tablet Take 1 tablet (20 mg) by mouth daily     calcium carbonate (TUMS) 500 MG chewable tablet Take 2 tablets (1,000 mg) by mouth 2 times daily     carvedilol (COREG) 25 MG tablet Take 1 tablet (25 mg) by mouth 2 times daily (with meals)     insulin aspart (NOVOLOG PEN) 100 UNIT/ML pen Inject 5 Units Subcutaneous 3 times daily (with meals) for 180 days     insulin detemir (LEVEMIR PEN) 100 UNIT/ML pen Inject 30 Units Subcutaneous At Bedtime for 2 days, THEN 28 Units At Bedtime for 7 days, THEN 26 Units At Bedtime for 7 days, THEN 24 Units At Bedtime for 7 days, THEN 20 Units At Bedtime for 7 days, THEN 20 Units At Bedtime for 30 days. On the  "days that you are taking the prescribed Prednisone 40mg daily, take levemir 30 units at bedtime. On the days that you are taking the prescribed Prednisone 20mg daily take levemir 28 units at bedtime. On the days that you are taking the prescribed Prednisone 15mg daily take levemir 26 units at bedtime. On the days that you are taking the prescribed Prednisone 10mg daily take levemir 24 units at bedtime. On the days that you are taking the prescribed Prednisone 5mg daily take levemir 20 units at bedtime. Once you are off the steroid taper, take levemir 20 units at bedtime daily.     loratadine (CLARITIN) 10 MG tablet Take 1 tablet (10 mg) by mouth daily as needed for allergies (itchy/rash)     magnesium oxide (MAG-OX) 400 MG tablet Take 1 tablet (400 mg) by mouth daily (with lunch)     mycophenolate (GENERIC EQUIVALENT) 250 MG capsule Take 3 capsules (750 mg) by mouth 2 times daily for 360 days     nitroGLYcerin (NITROSTAT) 0.4 MG sublingual tablet One tablet under the tongue every 5 minutes if needed for chest pain. May repeat every 5 minutes for a maximum of 3 doses in 15 minutes\" (Patient not taking: Reported on 2/27/2023)     oxyCODONE (ROXICODONE) 5 MG tablet Take 1 tablet (5 mg) by mouth every 4 hours as needed for moderate pain (4-6) (Patient not taking: Reported on 2/27/2023)     phosphorus tablet 250 mg (PHOSPHA 250 NEUTRAL) 250 MG per tablet Take 1 tablet (250 mg) by mouth 2 times daily for 60 days     polyethylene glycol (MIRALAX) 17 GM/Dose powder Take 17 g by mouth daily (Patient not taking: Reported on 2/27/2023)     predniSONE (DELTASONE) 10 MG tablet Take 1 tablet (10 mg) by mouth daily Take 4 tablets by mouth daily for 2 days starting on Sun, 2/26/2023. Then, take 2 tablets by mouth daily for 7 days starting 2/28/2023. Then, take one and half tablets by mouth daily for 7 days starting 3/7/2023. Then, take one tablet by mouth daily starting 3/14/2023. Then, take half a tablet by mouth daily for 7 days " starting 3/21/2023.     senna-docusate (SENOKOT-S/PERICOLACE) 8.6-50 MG tablet Take 1 tablet by mouth 2 times daily for 30 days     sodium bicarbonate 650 MG tablet Take 1 tablet (650 mg) by mouth 2 times daily for 60 days     sulfamethoxazole-trimethoprim (BACTRIM) 400-80 MG tablet Take 1 tablet by mouth daily     tacrolimus (GENERIC EQUIVALENT) 0.5 MG capsule Take 1 capsule (0.5 mg) by mouth daily as needed (To be used for dosage adjustments to maintain goal Tac level 8-10 and/or as instructed by primary transplant team.)     tacrolimus (GENERIC EQUIVALENT) 0.5 MG capsule Take 1 capsule (0.5 mg) by mouth 2 times daily as needed (To be used for dosage adjustments to maintain goal Tac level 8-10 and/or as instructed by primary transplant team.)     tacrolimus (GENERIC EQUIVALENT) 1 MG capsule Take 4 capsules (4 mg) by mouth 2 times daily for 360 days     traZODone (DESYREL) 50 MG tablet Take 50 mg by mouth At Bedtime (Patient not taking: Reported on 2/27/2023)     triamcinolone (KENALOG) 0.1 % external cream Apply topically 2 times daily As needed for itching/rash. (Patient not taking: Reported on 2/27/2023)     valGANciclovir (VALCYTE) 450 MG tablet Take 2 tablets (900 mg) by mouth daily for 90 days     Vitamin D3 (CHOLECALCIFEROL) 25 mcg (1000 units) tablet Take 2 tablets (50 mcg) by mouth daily     No current facility-administered medications for this visit.     There are no discontinued medications.    Physical Exam   Vital Signs: There were no vitals taken for this visit.    GENERAL APPEARANCE: alert and no distress  HENT: mouth without ulcers or lesions  LYMPHATICS: no cervical or supraclavicular nodes  RESP: lungs clear to auscultation - no rales, rhonchi or wheezes  CV: regular rhythm, normal rate, no rub, no murmur  EDEMA: no LE edema bilaterally  ABDOMEN: soft, nondistended, nontender, bowel sounds normal  MS: extremities normal - no gross deformities noted, no evidence of inflammation in joints, no muscle  tenderness  SKIN: no rash  SKIN: RLQ incision intact with surgical glue. LLQ previous PD site covered with gauze - draining moderate amount of serosang fluid. No erythema or purulence.     Data     Renal Latest Ref Rng & Units 2/27/2023 2/26/2023 2/26/2023   SODIUM 136 - 145 mmol/L 136 - -   SODIUM POCT 133 - 144 mmol/L - - -   K 3.4 - 5.3 mmol/L 4.4 - -   Cl 98 - 107 mmol/L 106 - -   Cl (external) 98 - 107 mmol/L 106 - -   CO2 22 - 29 mmol/L 22 - -   UREA NITROGEN 8 - 22 mg/dL - - -   UREA NITROGEN (R) 6.0 - 20.0 mg/dL 27.2(H) - -   CREATININE 0.67 - 1.17 mg/dL 0.93 - -   Glucose 70 - 99 mg/dL 166(H) 102(H) 133(H)   CALCIUM, TOTAL 8.6 - 10.0 mg/dL 7.5(L) - -   MAGNESIUM 1.7 - 2.3 mg/dL 2.3 - -     Bone Health Latest Ref Rng & Units 2/27/2023 2/26/2023 2/25/2023   PHOSPHORUS 2.5 - 4.5 mg/dL 2.4(L) 1.5(L) 1.7(L)   PARATHYROID HORMONE INTACT 15 - 65 pg/mL - - -   Vit D Def 20 - 75 ug/L - - -     Heme Latest Ref Rng & Units 2/27/2023 2/26/2023 2/25/2023   WBC 4.0 - 11.0 10e3/uL 7.5 6.9 -   Hgb 13.3 - 17.7 g/dL 9.6(L) 8.4(L) 6.8(LL)   Plt 150 - 450 10e3/uL 64(L) 53(L) -   ABSOLUTE NEUTROPHIL 1.6 - 8.3 10e3/uL 7.0 6.6 -   ABSOLUTE LYMPHOCYTES 0.8 - 5.3 10e3/uL 0.2(L) 0.0(L) -   ABSOLUTE MONOCYTES 0.0 - 1.3 10e3/uL 0.2 0.1 -   ABSOLUTE EOSINOPHILS 0.0 - 0.7 10e3/uL 0.0 0.0 -     Liver Latest Ref Rng & Units 2/16/2023 11/17/2022 11/16/2022   AP 40 - 129 U/L 66 - 68   TBili <=1.2 mg/dL 0.2 - 0.6   BILIRUBIN, DIRECT <=0.5 mg/dL - - -   BILIRUBIN DIRECT (R) 0.00 - 0.30 mg/dL - - <0.20   ALT 10 - 50 U/L 18 - 16   AST 10 - 50 U/L 18 - 16   Tot Protein 6.4 - 8.3 g/dL 6.1(L) - 6.5   ALBUMIN 3.4 - 5.0 g/dL - - -   ALBUMIN (R) 3.5 - 5.2 g/dL 3.3(L) 2.6(L) 3.4(L)     Pancreas Latest Ref Rng & Units 2/22/2023 2/21/2023 2/16/2023   A1C <5.7 % 6.8(H) 6.8(H) 7.1(H)   Lipase 0 - 52 U/L - - -     Iron studies Latest Ref Rng & Units 2/16/2023 6/11/2021   Iron 61 - 157 ug/dL 37(L) 22(L)   IRON SATURATION INDEX (R) 15 - 46 % 21 -   FERRITIN  31 - 409 ng/mL 847(H) 301(H)     [facility] Txp Virology Latest Ref Rng & Units 2/21/2023 2/16/2023 8/23/2021   CMV QUANT IU/ML Not Detected IU/mL Not Detected - -   EBV CAPSID ANTIBODY IGG No detectable antibody. - Positive(A) Positive(A)        Recent Labs   Lab Test 02/24/23  0551 02/26/23  0504   TACROL 2.9* 9.1               Katrina Frederick, STEPHEN CNP

## 2023-02-27 NOTE — TELEPHONE ENCOUNTER
Issue tacrolimus level 11.2        OUTCOME:   Spoke with daughter ,  confirm accurate tacrolimus   trough level and current dose tacrolimus  4.0 mg BID. Patient confirmed dose change to 3.5 mg BID and to repeat labs in 1 days. Orders sent to preferred pharmacy for dose change and lab for repeat labs. Patient voiced understanding of plan.           Called lower 3.5 mg twice per day Modesto Barbosa Mary K, RN  Phone Number: [phone]     Larry Liz,   He took the med at 8pm last night so I will lower his dose to 3.5mg today for the 8pm dose and 8am dose tomorrow. Thanks

## 2023-02-27 NOTE — LETTER
"    2/27/2023         RE: Modesto Barbosa  [address] [place] [address]        Dear Colleague,    Thank you for referring your patient, Modesto Barbosa, to the [facility]. Please see a copy of my visit note below.    Chief Complaint   Patient presents with     Eval/Assessment     LBA 1 Kidney Txp     Modesto [facility] today for a lab and assess following a kidney transplant on 2/21.      Discharge date: 2/26  Transplant coordinator: Shalonda WINN  Phone number patient can be reached at: [phone] Yanick (daughter) okay to leave     Physical Assessment:  See physical assessment located under \"Document Flowsheets\".  Incision site: RLQ with dermabond CDI. LLQ old PD site open and bleeding small amount- ABD pad applied. L wrist with blister from former IV dressing- Tegaderm added to allergy list. Wound and incision education given as well as additional supplies.   Lines: n/a  Odell: n/a  Urine clarity: Yellow with pink-tinged. Denies foul odor or irritation.  Hydration: approximately 48 ounces since discharge. Reiterated 2 L water requirements.  Nutrition: decreased appetite, denies N/V. Encouraged small frequent meals  Last BM: yesterday  Pain: 0/10   My transplant place videos watched: yes  Sitting 172/77  59   Standing 161/80  60  Wt 151.2- up 15 lbs from baseline (135)    Labs drawn by [facility] staff Yes. Copy of labs given to patient.     Plan of care for today:    waiver form filled out per pt request and sent to scanning. Daughter is an RN and will be interpreting for patient.   Labs an assessment reviewed with Katrina Frederick NP.  Pill box education reviewed with patient's daughter as she set up pill box.  Confirmed patient's daughter understanding prednisone taper and sliding scale.   Checklist completed.    Medication changes: n/a    Medications administered: n/a    Patient education:  The following teaching topics were addressed: Importance of drinking " "2L of non-caffeinated fluids daily, Incisional care, Signs/symptoms of infection, Good handwashing, Medications (purposes, doses and times of administration), Phone numbers to call with concenrs (Transplant coordinator, [facility] and [facility]) and Plan of care   Patient and daughter verbalized understanding and all questions answered.    Drug level:  Patient took 4 mg of Tacrolimus last evening at 8 PM.  Care coordinator to follow up with the result.    Face to face time: 60 minutes    Discharge Plan  Pt will follow up with [facility] tomorrow.   Discharge instructions reviewed with patient: YES  Patient/Representative verbalized understanding, all questions answered: YES    Discharged from unit at 1040 with whom: daughter to home.      Vital signs:  Temp: 98.8  F (37.1  C) Temp src: Oral       Resp: 18 SpO2: 100 % O2 Device: None (Room air)     Weight: 68.6 kg (151 lb 3.2 oz)  Estimated body mass index is 25.95 kg/m  as calculated from the following:    Height as of 2/21/23: 1.626 m (5' 4\").    Weight as of this encounter: 68.6 kg (151 lb 3.2 oz).            Again, thank you for allowing me to participate in the care of your patient.        Sincerely,        Specialty Infusion Nurse    "

## 2023-02-27 NOTE — PATIENT INSTRUCTIONS
Dear Modesto Barbosa    Thank you for choosing [facility] ([facility]) for your transplant cares.  The following information is a summary of our appointment as well as important reminders.      Please make sure your phone is available today because your coordinator will call to update you with your anti-rejection drug levels and possibly make changes to your anti-rejection dosages.    Transplant Coordinator: Shalonda WINN  Transplant Office:  [phone]  [facility]:  [phone]  Ask for physician on call for kidney transplant.  [facility] [address] (Transplant Unit):  [phone]  [facility]:  [phone]    [email or web address]    We look forward in seeing you on your next appointment here at [facility] ([facility]).  Please don t hesitate to call us at [phone] to reschedule any of your appointments or to speak with one of the [facility] registered nurses.  It was a pleasure taking care of you today.    Sincerely,    [facility]  [address] [place]  [address]  Phone:  [phone]

## 2023-02-27 NOTE — PROGRESS NOTES
TRANSPLANT SURGERY EARLY POST TRANSPLANT VISIT    Assessment & Plan   # LDKT: Stable - doing well. Monitor BP at home after [facility] meds[facility]    - Baseline Creatinine: ~ TBD   - Proteinuria: Not checked recently   - Date DSA Last Checked: Feb/2023      Latest DSA: No DSA at time of transplant   - BK Viremia: No   - Kidney Tx Biopsy: No   - Transplant Ureteral Stent: Yes    # Immunosuppression: Tacrolimus immediate release (goal 8-10), Mycophenolate mofetil (dose 750 mg every 12 hours) and Prednisone (dose taper)   - Induction with Recent Transplant:  Intermediate Intensity   - Continue with intensive monitoring of immunosuppression for efficacy and toxicity.   - Changes: Not at this time    # Infection Prophylaxis:   - PJP: Sulfa/TMP (Bactrim)  - CMV: Valganciclovir (Valcyte)    # Hypertension: Borderline control;  Goal BP: < 150/90   - Volume status: Euvolemic     - Changes: Not at this time - has not taken BP meds yet today. Instructed to take at home tomorrow before [facility] arrival.     # Diabetes: Controlled (HbA1c <7%) Last HbA1c: 6.8%   - Management as per primary team.    # Anemia in Chronic Renal Disease: Hgb: Trend up      NERY: No   - Iron studies: Replete    # Mineral Bone Disorder:   - Secondary renal hyperparathyroidism; PTH level: Mildly elevated (151-300 pg/ml)        On treatment: None  - Vitamin D; level: Low        On supplement: Yes  - Calcium; level: Low        On supplement: Yes  - Phosphorus; level: Low normal        On supplement: Yes    # Electrolytes:   - Potassium; level: Normal        On supplement: No  - Magnesium; level: Normal        On supplement: Yes  - Bicarbonate; level: Normal        On supplement: Yes  - Sodium; level: Normal    # Thrombocytopenia:              - Trending up today.               - Seen previously by hematology. Thought to have peripheral consumption vs ITP                    - Baseline platelets 40-90k     # Hepatitis B: Core antibody positive. Check  Hepatitis B DNA level pending and  three months post transplant.     # Asymmetric Left Ventricular Hypertrophy: Stable. No history of sustained ventricular arrhythmias.     # CAD: s/p PCI with stent 11/2021 with negative stress echo 1/2023.  Previously on Brilinta and aspirin. Brillinta stopped 1/2023      # Hyperlipidemia: On atorvastatin 20 mg daily. Most recent cholesterol in November 2022 was 122, triglycerides 234, HDL 22, and LDL 53.    # Transplant History:  Etiology of Kidney Failure: Diabetes mellitus type 2  Tx: LDKT  Transplant: 2/21/2023 (Kidney)  Donor Type: Living Donor Class:   Crossmatch at time of Tx: negative  DSA at time of Tx: No  Significant changes in immunosuppression: None  CMV IgG Ab High Risk Discordance (D+/R-): No  EBV IgG Ab High Risk Discordance (D+/R-): No  Significant transplant-related complications: None    Transplant Office Phone Number: [phone]    Assessment and plan was discussed with the patient and he voiced his understanding and agreement.    Return visit: [facility] tomorrow for LBA.   STEPHEN Pacheco CNP    Chief Complaint   Mr. Barbosa is a 57 year old here for follow-up after hospitalization.     History of Present Illness   Modesto Barbosa is an 57 year old male with PMH significant for ESKD 2/2 DM2 on PD, CAD s/p NSTEMI, HTN. Now s/p LDKT with ureteral stent on 2/21/23 with Dr. Weinstein.     Doing well. No complaints. Pain controlled. LBM yesterday. Drinking well. Eating some but feels bloated and full.   Glucose was 62 this AM and came up with eating to 166. Asymptomatic. BP elevated currently 170/60 but has not taken meds yet today. Will take prior to [facility] arrival tomorrow and will f/u.       Problem List   Patient Active Problem List   Diagnosis     Type 2 Diabetes Mellitus     Dyslipidemia, goal LDL below 70     Hypertension     ESRD (end stage renal disease) on dialysis (H)     Metabolic acidosis     Coronary artery disease involving native coronary artery of native  heart without angina pectoris     Thrombocytopenia (H)     Splenomegaly     NSTEMI (non-ST elevated myocardial infarction) (H)     Hypotension, unspecified hypotension type     Rash     Awaiting organ transplant     ESRD (end stage renal disease) (H)     Pre-diabetes     Immunosuppression (H)     Status post kidney transplant     Leukocytosis     Anemia in other chronic diseases classified elsewhere     Hypocalcemia     Hypomagnesemia     Hypophosphatemia     Hepatitis B core antibody positive       Allergies   Allergies   Allergen Reactions     Tegaderm Transparent Dressing (Informational Only) Blisters       Medications   Current Outpatient Medications   Medication Sig     acetaminophen (TYLENOL) 325 MG tablet Take 2 tablets (650 mg) by mouth every 4 hours as needed for other (For optimal non-opioid multimodal pain management to improve pain control.)     amLODIPine (NORVASC) 5 MG tablet Take 1 tablet (5 mg) by mouth daily     aspirin (ASA) 81 MG EC tablet Take 1 tablet (81 mg) by mouth daily     atorvastatin (LIPITOR) 20 MG tablet Take 1 tablet (20 mg) by mouth daily     calcium carbonate (TUMS) 500 MG chewable tablet Take 2 tablets (1,000 mg) by mouth 2 times daily     carvedilol (COREG) 25 MG tablet Take 1 tablet (25 mg) by mouth 2 times daily (with meals)     insulin aspart (NOVOLOG PEN) 100 UNIT/ML pen Inject 5 Units Subcutaneous 3 times daily (with meals) for 180 days     insulin detemir (LEVEMIR PEN) 100 UNIT/ML pen Inject 30 Units Subcutaneous At Bedtime for 2 days, THEN 28 Units At Bedtime for 7 days, THEN 26 Units At Bedtime for 7 days, THEN 24 Units At Bedtime for 7 days, THEN 20 Units At Bedtime for 7 days, THEN 20 Units At Bedtime for 30 days. On the days that you are taking the prescribed Prednisone 40mg daily, take levemir 30 units at bedtime. On the days that you are taking the prescribed Prednisone 20mg daily take levemir 28 units at bedtime. On the days that you are taking the prescribed  "Prednisone 15mg daily take levemir 26 units at bedtime. On the days that you are taking the prescribed Prednisone 10mg daily take levemir 24 units at bedtime. On the days that you are taking the prescribed Prednisone 5mg daily take levemir 20 units at bedtime. Once you are off the steroid taper, take levemir 20 units at bedtime daily.     loratadine (CLARITIN) 10 MG tablet Take 1 tablet (10 mg) by mouth daily as needed for allergies (itchy/rash)     magnesium oxide (MAG-OX) 400 MG tablet Take 1 tablet (400 mg) by mouth daily (with lunch)     mycophenolate (GENERIC EQUIVALENT) 250 MG capsule Take 3 capsules (750 mg) by mouth 2 times daily for 360 days     nitroGLYcerin (NITROSTAT) 0.4 MG sublingual tablet One tablet under the tongue every 5 minutes if needed for chest pain. May repeat every 5 minutes for a maximum of 3 doses in 15 minutes\" (Patient not taking: Reported on 2/27/2023)     oxyCODONE (ROXICODONE) 5 MG tablet Take 1 tablet (5 mg) by mouth every 4 hours as needed for moderate pain (4-6) (Patient not taking: Reported on 2/27/2023)     phosphorus tablet 250 mg (PHOSPHA 250 NEUTRAL) 250 MG per tablet Take 1 tablet (250 mg) by mouth 2 times daily for 60 days     polyethylene glycol (MIRALAX) 17 GM/Dose powder Take 17 g by mouth daily (Patient not taking: Reported on 2/27/2023)     predniSONE (DELTASONE) 10 MG tablet Take 1 tablet (10 mg) by mouth daily Take 4 tablets by mouth daily for 2 days starting on Sun, 2/26/2023. Then, take 2 tablets by mouth daily for 7 days starting 2/28/2023. Then, take one and half tablets by mouth daily for 7 days starting 3/7/2023. Then, take one tablet by mouth daily starting 3/14/2023. Then, take half a tablet by mouth daily for 7 days starting 3/21/2023.     senna-docusate (SENOKOT-S/PERICOLACE) 8.6-50 MG tablet Take 1 tablet by mouth 2 times daily for 30 days     sodium bicarbonate 650 MG tablet Take 1 tablet (650 mg) by mouth 2 times daily for 60 days     " sulfamethoxazole-trimethoprim (BACTRIM) 400-80 MG tablet Take 1 tablet by mouth daily     tacrolimus (GENERIC EQUIVALENT) 0.5 MG capsule Take 1 capsule (0.5 mg) by mouth daily as needed (To be used for dosage adjustments to maintain goal Tac level 8-10 and/or as instructed by primary transplant team.)     tacrolimus (GENERIC EQUIVALENT) 0.5 MG capsule Take 1 capsule (0.5 mg) by mouth 2 times daily as needed (To be used for dosage adjustments to maintain goal Tac level 8-10 and/or as instructed by primary transplant team.)     tacrolimus (GENERIC EQUIVALENT) 1 MG capsule Take 4 capsules (4 mg) by mouth 2 times daily for 360 days     traZODone (DESYREL) 50 MG tablet Take 50 mg by mouth At Bedtime (Patient not taking: Reported on 2/27/2023)     triamcinolone (KENALOG) 0.1 % external cream Apply topically 2 times daily As needed for itching/rash. (Patient not taking: Reported on 2/27/2023)     valGANciclovir (VALCYTE) 450 MG tablet Take 2 tablets (900 mg) by mouth daily for 90 days     Vitamin D3 (CHOLECALCIFEROL) 25 mcg (1000 units) tablet Take 2 tablets (50 mcg) by mouth daily     No current facility-administered medications for this visit.     There are no discontinued medications.    Physical Exam   Vital Signs: There were no vitals taken for this visit.    GENERAL APPEARANCE: alert and no distress  HENT: mouth without ulcers or lesions  LYMPHATICS: no cervical or supraclavicular nodes  RESP: lungs clear to auscultation - no rales, rhonchi or wheezes  CV: regular rhythm, normal rate, no rub, no murmur  EDEMA: no LE edema bilaterally  ABDOMEN: soft, nondistended, nontender, bowel sounds normal  MS: extremities normal - no gross deformities noted, no evidence of inflammation in joints, no muscle tenderness  SKIN: no rash  SKIN: RLQ incision intact with surgical glue. LLQ previous PD site covered with gauze - draining moderate amount of serosang fluid. No erythema or purulence.     Data     Renal Latest Ref Rng & Units  2/27/2023 2/26/2023 2/26/2023   SODIUM 136 - 145 mmol/L 136 - -   SODIUM POCT 133 - 144 mmol/L - - -   K 3.4 - 5.3 mmol/L 4.4 - -   Cl 98 - 107 mmol/L 106 - -   Cl (external) 98 - 107 mmol/L 106 - -   CO2 22 - 29 mmol/L 22 - -   UREA NITROGEN 8 - 22 mg/dL - - -   UREA NITROGEN (R) 6.0 - 20.0 mg/dL 27.2(H) - -   CREATININE 0.67 - 1.17 mg/dL 0.93 - -   Glucose 70 - 99 mg/dL 166(H) 102(H) 133(H)   CALCIUM, TOTAL 8.6 - 10.0 mg/dL 7.5(L) - -   MAGNESIUM 1.7 - 2.3 mg/dL 2.3 - -     Bone Health Latest Ref Rng & Units 2/27/2023 2/26/2023 2/25/2023   PHOSPHORUS 2.5 - 4.5 mg/dL 2.4(L) 1.5(L) 1.7(L)   PARATHYROID HORMONE INTACT 15 - 65 pg/mL - - -   Vit D Def 20 - 75 ug/L - - -     Heme Latest Ref Rng & Units 2/27/2023 2/26/2023 2/25/2023   WBC 4.0 - 11.0 10e3/uL 7.5 6.9 -   Hgb 13.3 - 17.7 g/dL 9.6(L) 8.4(L) 6.8(LL)   Plt 150 - 450 10e3/uL 64(L) 53(L) -   ABSOLUTE NEUTROPHIL 1.6 - 8.3 10e3/uL 7.0 6.6 -   ABSOLUTE LYMPHOCYTES 0.8 - 5.3 10e3/uL 0.2(L) 0.0(L) -   ABSOLUTE MONOCYTES 0.0 - 1.3 10e3/uL 0.2 0.1 -   ABSOLUTE EOSINOPHILS 0.0 - 0.7 10e3/uL 0.0 0.0 -     Liver Latest Ref Rng & Units 2/16/2023 11/17/2022 11/16/2022   AP 40 - 129 U/L 66 - 68   TBili <=1.2 mg/dL 0.2 - 0.6   BILIRUBIN, DIRECT <=0.5 mg/dL - - -   BILIRUBIN DIRECT (R) 0.00 - 0.30 mg/dL - - <0.20   ALT 10 - 50 U/L 18 - 16   AST 10 - 50 U/L 18 - 16   Tot Protein 6.4 - 8.3 g/dL 6.1(L) - 6.5   ALBUMIN 3.4 - 5.0 g/dL - - -   ALBUMIN (R) 3.5 - 5.2 g/dL 3.3(L) 2.6(L) 3.4(L)     Pancreas Latest Ref Rng & Units 2/22/2023 2/21/2023 2/16/2023   A1C <5.7 % 6.8(H) 6.8(H) 7.1(H)   Lipase 0 - 52 U/L - - -     Iron studies Latest Ref Rng & Units 2/16/2023 6/11/2021   Iron 61 - 157 ug/dL 37(L) 22(L)   IRON SATURATION INDEX (R) 15 - 46 % 21 -   FERRITIN 31 - 409 ng/mL 847(H) 301(H)     [facility] Txp Virology Latest Ref Rng & Units 2/21/2023 2/16/2023 8/23/2021   CMV QUANT IU/ML Not Detected IU/mL Not Detected - -   EBV CAPSID ANTIBODY IGG No detectable antibody. - Positive(A)  Positive(A)        Recent Labs   Lab Test 02/24/23  0551 02/26/23  0504   TACROL 2.9* 9.1

## 2023-02-28 ENCOUNTER — OFFICE VISIT (OUTPATIENT)
Dept: INFUSION THERAPY | Facility: CLINIC | Age: 58
End: 2023-02-28
Attending: NURSE PRACTITIONER
Payer: COMMERCIAL

## 2023-02-28 ENCOUNTER — MEDICAL CORRESPONDENCE (OUTPATIENT)
Dept: HEALTH INFORMATION MANAGEMENT | Facility: CLINIC | Age: 58
End: 2023-02-28

## 2023-02-28 VITALS
HEART RATE: 65 BPM | OXYGEN SATURATION: 100 % | SYSTOLIC BLOOD PRESSURE: 149 MMHG | TEMPERATURE: 98.4 F | DIASTOLIC BLOOD PRESSURE: 81 MMHG | WEIGHT: 152.9 LBS | BODY MASS INDEX: 26.25 KG/M2 | RESPIRATION RATE: 16 BRPM

## 2023-02-28 DIAGNOSIS — Z94.0 KIDNEY REPLACED BY TRANSPLANT: Primary | ICD-10-CM

## 2023-02-28 DIAGNOSIS — Z94.0 KIDNEY REPLACED BY TRANSPLANT: ICD-10-CM

## 2023-02-28 LAB
ANION GAP SERPL CALCULATED.3IONS-SCNC: 7 MMOL/L (ref 7–15)
BASOPHILS # BLD MANUAL: 0 10E3/UL (ref 0–0.2)
BASOPHILS NFR BLD MANUAL: 0 %
BUN SERPL-MCNC: 24.5 MG/DL (ref 6–20)
CALCIUM SERPL-MCNC: 7.6 MG/DL (ref 8.6–10)
CHLORIDE SERPL-SCNC: 103 MMOL/L (ref 98–107)
CREAT SERPL-MCNC: 0.95 MG/DL (ref 0.67–1.17)
DACRYOCYTES BLD QL SMEAR: SLIGHT
DEPRECATED HCO3 PLAS-SCNC: 23 MMOL/L (ref 22–29)
EOSINOPHIL # BLD MANUAL: 0 10E3/UL (ref 0–0.7)
EOSINOPHIL NFR BLD MANUAL: 0 %
ERYTHROCYTE [DISTWIDTH] IN BLOOD BY AUTOMATED COUNT: 16.8 % (ref 10–15)
GFR SERPL CREATININE-BSD FRML MDRD: >90 ML/MIN/1.73M2
GLUCOSE SERPL-MCNC: 107 MG/DL (ref 70–99)
HCT VFR BLD AUTO: 25.5 % (ref 40–53)
HGB BLD-MCNC: 8.7 G/DL (ref 13.3–17.7)
LYMPHOCYTES # BLD MANUAL: 0.1 10E3/UL (ref 0.8–5.3)
LYMPHOCYTES NFR BLD MANUAL: 1 %
MAGNESIUM SERPL-MCNC: 2.2 MG/DL (ref 1.7–2.3)
MCH RBC QN AUTO: 29.5 PG (ref 26.5–33)
MCHC RBC AUTO-ENTMCNC: 34.1 G/DL (ref 31.5–36.5)
MCV RBC AUTO: 86 FL (ref 78–100)
METAMYELOCYTES # BLD MANUAL: 0.1 10E3/UL
METAMYELOCYTES NFR BLD MANUAL: 1 %
MONOCYTES # BLD MANUAL: 0.2 10E3/UL (ref 0–1.3)
MONOCYTES NFR BLD MANUAL: 2 %
NEUTROPHILS # BLD MANUAL: 8.5 10E3/UL (ref 1.6–8.3)
NEUTROPHILS NFR BLD MANUAL: 96 %
NRBC # BLD AUTO: 0.1 10E3/UL
NRBC BLD MANUAL-RTO: 1 %
PHOSPHATE SERPL-MCNC: 2 MG/DL (ref 2.5–4.5)
PLAT MORPH BLD: ABNORMAL
PLATELET # BLD AUTO: 64 10E3/UL (ref 150–450)
POLYCHROMASIA BLD QL SMEAR: SLIGHT
POTASSIUM SERPL-SCNC: 4.9 MMOL/L (ref 3.4–5.3)
RBC # BLD AUTO: 2.95 10E6/UL (ref 4.4–5.9)
RBC MORPH BLD: ABNORMAL
SODIUM SERPL-SCNC: 133 MMOL/L (ref 136–145)
TACROLIMUS BLD-MCNC: 12.7 UG/L (ref 5–15)
TME LAST DOSE: NORMAL H
TME LAST DOSE: NORMAL H
WBC # BLD AUTO: 8.9 10E3/UL (ref 4–11)

## 2023-02-28 PROCEDURE — 85027 COMPLETE CBC AUTOMATED: CPT

## 2023-02-28 PROCEDURE — 80048 BASIC METABOLIC PNL TOTAL CA: CPT

## 2023-02-28 PROCEDURE — 80197 ASSAY OF TACROLIMUS: CPT

## 2023-02-28 PROCEDURE — 85007 BL SMEAR W/DIFF WBC COUNT: CPT

## 2023-02-28 PROCEDURE — 99214 OFFICE O/P EST MOD 30 MIN: CPT | Mod: 24 | Performed by: NURSE PRACTITIONER

## 2023-02-28 PROCEDURE — 96360 HYDRATION IV INFUSION INIT: CPT

## 2023-02-28 PROCEDURE — 36415 COLL VENOUS BLD VENIPUNCTURE: CPT

## 2023-02-28 PROCEDURE — 258N000003 HC RX IP 258 OP 636: Performed by: NURSE PRACTITIONER

## 2023-02-28 PROCEDURE — 84100 ASSAY OF PHOSPHORUS: CPT

## 2023-02-28 PROCEDURE — 83735 ASSAY OF MAGNESIUM: CPT

## 2023-02-28 RX ADMIN — SODIUM CHLORIDE 500 ML: 9 INJECTION, SOLUTION INTRAVENOUS at 09:22

## 2023-02-28 NOTE — TELEPHONE ENCOUNTER
1 year post kidney lab orders entered into [employer] and lab letter completed   Follow up appointments with transplant surgery ,nephrology and jj stent removal in scheduling  process       Dr Weinstein    Living, Right  iliac fossa, without vascular reconstruction. A J-J ureteral stent was placed.   . Removal of PD catheter    CMV: Donor + /Recipient +   EBV: Donor + / Recipient +   PRA 27%   no dsa

## 2023-02-28 NOTE — LETTER
2/28/2023         RE: Modesto Barbosa  [address] [place] [address]        Dear Colleague,    Thank you for referring your patient, Modesto Barbosa, to the [facility]. Please see a copy of my visit note below.    TRANSPLANT SURGERY EARLY POST TRANSPLANT VISIT    Assessment & Plan   # LDKT: Stable - 500ml  IVF today   - Baseline Creatinine: ~ TBD   - Proteinuria: Not checked recently   - Date DSA Last Checked: Feb/2023      Latest DSA: No DSA at time of transplant   - BK Viremia: No   - Kidney Tx Biopsy: No   - Transplant Ureteral Stent: Yes    # Immunosuppression: Tacrolimus immediate release (goal 8-10), Mycophenolate mofetil (dose 750 mg every 12 hours) and Prednisone (dose taper)   - Induction with Recent Transplant:  Intermediate Intensity   - Continue with intensive monitoring of immunosuppression for efficacy and toxicity.   - Changes: Not at this time    # Infection Prophylaxis:   - PJP: Sulfa/TMP (Bactrim)  - CMV: Valganciclovir (Valcyte)    # Hypertension: Borderline control;  Goal BP: < 150/90   - Volume status: Euvolemic     - Changes: Not at this time - has not taken BP meds yet today. Instructed to take at home tomorrow before [facility] arrival.     # Diabetes: Controlled (HbA1c <7%) Last HbA1c: 6.8%   - Management as per primary team.    # Anemia in Chronic Renal Disease: Hgb: Trend up      NERY: No   - Iron studies: Replete    # Mineral Bone Disorder:   - Secondary renal hyperparathyroidism; PTH level: Mildly elevated (151-300 pg/ml)        On treatment: None  - Vitamin D; level: Low        On supplement: Yes  - Calcium; level: Low        On supplement: Yes  - Phosphorus; level: Low normal        On supplement: Yes    # Electrolytes:   - Potassium; level: Normal        On supplement: No  - Magnesium; level: Normal        On supplement: Yes  - Bicarbonate; level: Normal        On supplement: Yes  - Sodium; level: Low    # Thrombocytopenia:              -  Trending up today.               - Seen previously by hematology. Thought to have peripheral consumption vs ITP                    - Baseline platelets 40-90k     # Hepatitis B: Core antibody positive. Check Hepatitis B DNA level pending and  three months post transplant.     # Asymmetric Left Ventricular Hypertrophy: Stable. No history of sustained ventricular arrhythmias.     # CAD: s/p PCI with stent 11/2021 with negative stress echo 1/2023.  Previously on Brilinta and aspirin. Brillinta stopped 1/2023      # Hyperlipidemia: On atorvastatin 20 mg daily. Most recent cholesterol in November 2022 was 122, triglycerides 234, HDL 22, and LDL 53.     # PD site: area opened and area packed with gauze, covered.  Cover daily    # Transplant History:  Etiology of Kidney Failure: Diabetes mellitus type 2  Tx: LDKT  Transplant: 2/21/2023 (Kidney)  Donor Type: Living Donor Class:   Crossmatch at time of Tx: negative  DSA at time of Tx: No  Significant changes in immunosuppression: None  CMV IgG Ab High Risk Discordance (D+/R-): No  EBV IgG Ab High Risk Discordance (D+/R-): No  Significant transplant-related complications: None    Transplant Office Phone Number: [phone]    Assessment and plan was discussed with the patient and he voiced his understanding and agreement.      Ritu Bonilla NP    Chief Complaint   Mr. Barbosa is a 57 year old here for follow-up after hospitalization.     History of Present Illness   Modesto Barbosa is an 57 year old male with PMH significant for ESKD 2/2 DM2 on PD, CAD s/p NSTEMI, HTN. Now s/p LDKT with ureteral stent on 2/21/23 with Dr. Weinstein.     Doing well. No complaints. Pain controlled.     No urinary symptoms.        Problem List   Patient Active Problem List   Diagnosis     Type 2 Diabetes Mellitus     Dyslipidemia, goal LDL below 70     Hypertension     ESRD (end stage renal disease) on dialysis (H)     Metabolic acidosis     Coronary artery disease involving native coronary artery  of native heart without angina pectoris     Thrombocytopenia (H)     Splenomegaly     NSTEMI (non-ST elevated myocardial infarction) (H)     Hypotension, unspecified hypotension type     Rash     Awaiting organ transplant     ESRD (end stage renal disease) (H)     Pre-diabetes     Immunosuppression (H)     Status post kidney transplant     Leukocytosis     Anemia in other chronic diseases classified elsewhere     Hypocalcemia     Hypomagnesemia     Hypophosphatemia     Hepatitis B core antibody positive       Allergies   Allergies   Allergen Reactions     Tegaderm Transparent Dressing (Informational Only) Blisters       Medications   Current Outpatient Medications   Medication Sig     acetaminophen (TYLENOL) 325 MG tablet Take 2 tablets (650 mg) by mouth every 4 hours as needed for other (For optimal non-opioid multimodal pain management to improve pain control.)     amLODIPine (NORVASC) 5 MG tablet Take 1 tablet (5 mg) by mouth daily     aspirin (ASA) 81 MG EC tablet Take 1 tablet (81 mg) by mouth daily     atorvastatin (LIPITOR) 20 MG tablet Take 1 tablet (20 mg) by mouth daily     calcium carbonate (TUMS) 500 MG chewable tablet Take 2 tablets (1,000 mg) by mouth 2 times daily     carvedilol (COREG) 25 MG tablet Take 1 tablet (25 mg) by mouth 2 times daily (with meals)     insulin aspart (NOVOLOG PEN) 100 UNIT/ML pen Inject 5 Units Subcutaneous 3 times daily (with meals) for 180 days     insulin detemir (LEVEMIR PEN) 100 UNIT/ML pen Inject 30 Units Subcutaneous At Bedtime for 2 days, THEN 28 Units At Bedtime for 7 days, THEN 26 Units At Bedtime for 7 days, THEN 24 Units At Bedtime for 7 days, THEN 20 Units At Bedtime for 7 days, THEN 20 Units At Bedtime for 30 days. On the days that you are taking the prescribed Prednisone 40mg daily, take levemir 30 units at bedtime. On the days that you are taking the prescribed Prednisone 20mg daily take levemir 28 units at bedtime. On the days that you are taking the  "prescribed Prednisone 15mg daily take levemir 26 units at bedtime. On the days that you are taking the prescribed Prednisone 10mg daily take levemir 24 units at bedtime. On the days that you are taking the prescribed Prednisone 5mg daily take levemir 20 units at bedtime. Once you are off the steroid taper, take levemir 20 units at bedtime daily.     loratadine (CLARITIN) 10 MG tablet Take 1 tablet (10 mg) by mouth daily as needed for allergies (itchy/rash)     magnesium oxide (MAG-OX) 400 MG tablet Take 1 tablet (400 mg) by mouth daily (with lunch)     mycophenolate (GENERIC EQUIVALENT) 250 MG capsule Take 3 capsules (750 mg) by mouth 2 times daily for 360 days     nitroGLYcerin (NITROSTAT) 0.4 MG sublingual tablet One tablet under the tongue every 5 minutes if needed for chest pain. May repeat every 5 minutes for a maximum of 3 doses in 15 minutes\" (Patient not taking: Reported on 2/27/2023)     oxyCODONE (ROXICODONE) 5 MG tablet Take 1 tablet (5 mg) by mouth every 4 hours as needed for moderate pain (4-6) (Patient not taking: Reported on 2/27/2023)     phosphorus tablet 250 mg (PHOSPHA 250 NEUTRAL) 250 MG per tablet Take 1 tablet (250 mg) by mouth 2 times daily for 60 days     polyethylene glycol (MIRALAX) 17 GM/Dose powder Take 17 g by mouth daily (Patient not taking: Reported on 2/27/2023)     predniSONE (DELTASONE) 10 MG tablet Take 1 tablet (10 mg) by mouth daily Take 4 tablets by mouth daily for 2 days starting on Sun, 2/26/2023. Then, take 2 tablets by mouth daily for 7 days starting 2/28/2023. Then, take one and half tablets by mouth daily for 7 days starting 3/7/2023. Then, take one tablet by mouth daily starting 3/14/2023. Then, take half a tablet by mouth daily for 7 days starting 3/21/2023.     senna-docusate (SENOKOT-S/PERICOLACE) 8.6-50 MG tablet Take 1 tablet by mouth 2 times daily for 30 days     sodium bicarbonate 650 MG tablet Take 1 tablet (650 mg) by mouth 2 times daily for 60 days     " sulfamethoxazole-trimethoprim (BACTRIM) 400-80 MG tablet Take 1 tablet by mouth daily     tacrolimus (GENERIC EQUIVALENT) 0.5 MG capsule Take 1 capsule (0.5 mg) by mouth 2 times daily Take total dose 3.5 mg twice per day     tacrolimus (GENERIC EQUIVALENT) 0.5 MG capsule Take 1 capsule (0.5 mg) by mouth daily as needed (To be used for dosage adjustments to maintain goal Tac level 8-10 and/or as instructed by primary transplant team.)     tacrolimus (GENERIC EQUIVALENT) 1 MG capsule Take 3 capsules (3 mg) by mouth 2 times daily Take total  3.5 mg twice per day     traZODone (DESYREL) 50 MG tablet Take 50 mg by mouth At Bedtime (Patient not taking: Reported on 2/27/2023)     triamcinolone (KENALOG) 0.1 % external cream Apply topically 2 times daily As needed for itching/rash. (Patient not taking: Reported on 2/27/2023)     valGANciclovir (VALCYTE) 450 MG tablet Take 2 tablets (900 mg) by mouth daily for 90 days     Vitamin D3 (CHOLECALCIFEROL) 25 mcg (1000 units) tablet Take 2 tablets (50 mcg) by mouth daily     No current facility-administered medications for this visit.     There are no discontinued medications.    Physical Exam   Vital Signs: There were no vitals taken for this visit.    GENERAL APPEARANCE: alert and no distress  HENT: mouth without ulcers or lesions  LYMPHATICS: no cervical or supraclavicular nodes  RESP: lungs clear to auscultation - no rales, rhonchi or wheezes  CV: regular rhythm, normal rate, no rub, no murmur  EDEMA: no LE edema bilaterally  ABDOMEN: soft, nondistended, nontender, bowel sounds normal  MS: extremities normal - no gross deformities noted, no evidence of inflammation in joints, no muscle tenderness  SKIN: no rash  SKIN: RLQ incision intact with surgical glue. LLQ previous PD site covered with gauze - draining moderate amount of serosang fluid. No erythema or purulence.     Data     Renal Latest Ref Rng & Units 2/28/2023 2/27/2023 2/26/2023   SODIUM 136 - 145 mmol/L 133(L) 136 -    SODIUM POCT 133 - 144 mmol/L - - -   K 3.4 - 5.3 mmol/L 4.9 4.4 -   Cl 98 - 107 mmol/L 103 106 -   Cl (external) 98 - 107 mmol/L 103 106 -   CO2 22 - 29 mmol/L 23 22 -   UREA NITROGEN 8 - 22 mg/dL - - -   UREA NITROGEN (R) 6.0 - 20.0 mg/dL 24.5(H) 27.2(H) -   CREATININE 0.67 - 1.17 mg/dL 0.95 0.93 -   Glucose 70 - 99 mg/dL 107(H) 166(H) 102(H)   CALCIUM, TOTAL 8.6 - 10.0 mg/dL 7.6(L) 7.5(L) -   MAGNESIUM 1.7 - 2.3 mg/dL 2.2 2.3 -     Bone Health Latest Ref Rng & Units 2/28/2023 2/27/2023 2/26/2023   PHOSPHORUS 2.5 - 4.5 mg/dL 2.0(L) 2.4(L) 1.5(L)   PARATHYROID HORMONE INTACT 15 - 65 pg/mL - - -   Vit D Def 20 - 75 ug/L - - -     Heme Latest Ref Rng & Units 2/28/2023 2/27/2023 2/26/2023   WBC 4.0 - 11.0 10e3/uL 8.9 7.5 6.9   Hgb 13.3 - 17.7 g/dL 8.7(L) 9.6(L) 8.4(L)   Plt 150 - 450 10e3/uL 64(L) 64(L) 53(L)   ABSOLUTE NEUTROPHIL 1.6 - 8.3 10e3/uL 8.5(H) 7.0 6.6   ABSOLUTE LYMPHOCYTES 0.8 - 5.3 10e3/uL 0.1(L) 0.2(L) 0.0(L)   ABSOLUTE MONOCYTES 0.0 - 1.3 10e3/uL 0.2 0.2 0.1   ABSOLUTE EOSINOPHILS 0.0 - 0.7 10e3/uL 0.0 0.0 0.0     Liver Latest Ref Rng & Units 2/16/2023 11/17/2022 11/16/2022   AP 40 - 129 U/L 66 - 68   TBili <=1.2 mg/dL 0.2 - 0.6   BILIRUBIN, DIRECT <=0.5 mg/dL - - -   BILIRUBIN DIRECT (R) 0.00 - 0.30 mg/dL - - <0.20   ALT 10 - 50 U/L 18 - 16   AST 10 - 50 U/L 18 - 16   Tot Protein 6.4 - 8.3 g/dL 6.1(L) - 6.5   ALBUMIN 3.4 - 5.0 g/dL - - -   ALBUMIN (R) 3.5 - 5.2 g/dL 3.3(L) 2.6(L) 3.4(L)     Pancreas Latest Ref Rng & Units 2/22/2023 2/21/2023 2/16/2023   A1C <5.7 % 6.8(H) 6.8(H) 7.1(H)   Lipase 0 - 52 U/L - - -     Iron studies Latest Ref Rng & Units 2/16/2023 6/11/2021   Iron 61 - 157 ug/dL 37(L) 22(L)   IRON SATURATION INDEX (R) 15 - 46 % 21 -   FERRITIN 31 - 409 ng/mL 847(H) 301(H)     P Txp Virology Latest Ref Rng & Units 2/21/2023 2/16/2023 8/23/2021   CMV QUANT IU/ML Not Detected IU/mL Not Detected - -   EBV CAPSID ANTIBODY IGG No detectable antibody. - Positive(A) Positive(A)        Recent  Labs   Lab Test 02/24/23  0551 02/26/23  0504 02/27/23  0821   TACROL 2.9* 9.1 11.2           Ritu Bonilla, NP

## 2023-02-28 NOTE — LETTER
"    2/28/2023         RE: Modesto Barbosa  [address] [place] [address]        Dear Colleague,    Thank you for referring your patient, Modesto Barbosa, to the [facility]. Please see a copy of my visit note below.    Chief Complaint   Patient presents with     Eval/Assessment     LBA day 2 kidney transplant (live unrelated)     Modesto Barbosa came to [facility] today for a lab and assess following a kidney transplant on 2/21.       Discharge date: 2/26/23  Transplant coordinator: Shalonda WINN   Phone number patient can be reached at: [phone] Yanick (daughter) okay to leave      Physical Assessment:  See physical assessment located under \"Document Flowsheets\".  Incision site: RLQ incision with dermabond CDI. LLQ old PD site decreasing drainage amount, no s/s of infection. Decrease in drainage. L wrist blister unchanged and intact. No s/s of infection  Lines: n/a  Odell: n/a  Urine clarity: clear yellow, denies burning or irritation  Hydration: 1 L water. Encouraged 2 L water daily.  Nutrition: good appetite,    Last BM: yesterday  Pain: 0/10   My transplant place videos watched: yes  Sitting 184/79  72, standing 144/79  72. Denies dizziness. Wt up 1 lb from yesterday (baseline 135 lbs)    Labs drawn by [facility] staff Yes. Copy of results given to patient.    Plan of care for tod4ay:   -Labs and assessment reviewed with Ritu Bonilla NP.   -Med card and pill box were updated to reflect Tacrolimus changes.   -Checklist completed.   -RN contacted  Home care to start services on Thursday.     Medication changes: n/a    Medications administered:    Administrations This Visit     0.9% sodium chloride BOLUS     Admin Date  02/28/2023 Action  $New Bag Dose  500 mL Rate  666.7 mL/hr Route  Intravenous Administered By  Jared Orta, ELIDIA              Patient education:  The following teaching topics were addressed: Importance of drinking 2L of non-caffeinated fluids daily, Incisional care, " "Signs/symptoms of infection, Good handwashing, Medications (purposes, doses and times of administration), Phone numbers to call with concenrs (Transplant coordinator, [facility] and [facility]) and Plan of care   Patient and daughter verbalized understanding and all questions answered.    Drug level:  Patient took 3.5 mg of Tacrolimus last evening at 8 PM.  Care coordinator to follow up with the result.    Face to face time: 60 minutes    Discharge Plan  Pt will follow up with transplant coordinator and home care.   Discharge instructions reviewed with patient: YES  Patient/Representative verbalized understanding, all questions answered: YES    Discharged from unit at 1015 with whom: daughter to home.    Vital signs:  Temp: 98.4  F (36.9  C) Temp src: Oral       Resp: 18 SpO2: 100 % O2 Device: None (Room air)     Weight: 69.4 kg (152 lb 14.4 oz)  Estimated body mass index is 26.25 kg/m  as calculated from the following:    Height as of 2/21/23: 1.626 m (5' 4\").    Weight as of this encounter: 69.4 kg (152 lb 14.4 oz).              Again, thank you for allowing me to participate in the care of your patient.        Sincerely,        Specialty Infusion Nurse    "

## 2023-02-28 NOTE — PATIENT INSTRUCTIONS
Dear Modesto Barbosa    Thank you for choosing [facility] ([facility]) for your transplant cares.  The following information is a summary of our appointment as well as important reminders.      Please make sure your phone is available today because your coordinator will call to update you with your anti-rejection drug levels and possibly make changes to your anti-rejection dosages.    Transplant Coordinator: Shalonda WINN  Transplant Office:  [phone]  [facility]:  [phone]  Ask for physician on call for kidney transplant.  Unit [facility] (Transplant Unit):  [phone]  [facility]:  [phone]      High phosphorus foods    Milk  Chocolate  Cheese  Beer  Yogurt  Soybeans  Ice cream    Cola drinks  Dried or baked beans  Nuts and seeds of all kinds  Peanut butter  Split peas  Whole-grain cereals       We look forward in seeing you on your next appointment here at [facility] ([facility]).  Please don t hesitate to call us at [phone] to reschedule any of your appointments or to speak with one of the [facility] registered nurses.  It was a pleasure taking care of you today.    Sincerely,    [facility]  [address] [place] [address]  [id number]  Phone:  [phone]

## 2023-02-28 NOTE — PROGRESS NOTES
TRANSPLANT SURGERY EARLY POST TRANSPLANT VISIT    Assessment & Plan   # LDKT: Stable - 500ml  IVF today   - Baseline Creatinine: ~ TBD   - Proteinuria: Not checked recently   - Date DSA Last Checked: Feb/2023      Latest DSA: No DSA at time of transplant   - BK Viremia: No   - Kidney Tx Biopsy: No   - Transplant Ureteral Stent: Yes    # Immunosuppression: Tacrolimus immediate release (goal 8-10), Mycophenolate mofetil (dose 750 mg every 12 hours) and Prednisone (dose taper)   - Induction with Recent Transplant:  Intermediate Intensity   - Continue with intensive monitoring of immunosuppression for efficacy and toxicity.   - Changes: Not at this time    # Infection Prophylaxis:   - PJP: Sulfa/TMP (Bactrim)  - CMV: Valganciclovir (Valcyte)    # Hypertension: Borderline control;  Goal BP: < 150/90   - Volume status: Euvolemic     - Changes: Not at this time - has not taken BP meds yet today. [facility]     # Diabetes: Controlled (HbA1c <7%) Last HbA1c: 6.8%   - Management as per primary team.    # Anemia in Chronic Renal Disease: Hgb: Trend up      NERY: No   - Iron studies: Replete    # Mineral Bone Disorder:   - Secondary renal hyperparathyroidism; PTH level: Mildly elevated (151-300 pg/ml)        On treatment: None  - Vitamin D; level: Low        On supplement: Yes  - Calcium; level: Low        On supplement: Yes  - Phosphorus; level: Low normal        On supplement: Yes    # Electrolytes:   - Potassium; level: Normal        On supplement: No  - Magnesium; level: Normal        On supplement: Yes  - Bicarbonate; level: Normal        On supplement: Yes  - Sodium; level: Low    # Thrombocytopenia:              - Trending up today.               - Seen previously by hematology. Thought to have peripheral consumption vs ITP                    - Baseline platelets 40-90k     # Hepatitis B: Core antibody positive. Check Hepatitis B DNA level pending and  three months post  transplant.     # Asymmetric Left Ventricular Hypertrophy: Stable. No history of sustained ventricular arrhythmias.     # CAD: s/p PCI with stent 11/2021 with negative stress echo 1/2023.  Previously on Brilinta and aspirin. Brillinta stopped 1/2023      # Hyperlipidemia: On atorvastatin 20 mg daily. Most recent cholesterol in November 2022 was 122, triglycerides 234, HDL 22, and LDL 53.     # PD site: area opened and area packed with gauze, covered.  Cover daily    # Transplant History:  Etiology of Kidney Failure: Diabetes mellitus type 2  Tx: LDKT  Transplant: 2/21/2023 (Kidney)  Donor Type: Living Donor Class:   Crossmatch at time of Tx: negative  DSA at time of Tx: No  Significant changes in immunosuppression: None  CMV IgG Ab High Risk Discordance (D+/R-): No  EBV IgG Ab High Risk Discordance (D+/R-): No  Significant transplant-related complications: None    Transplant Office Phone Number: [phone]    Assessment and plan was discussed with the patient and he voiced his understanding and agreement.      Ritu Bonilla NP    Chief Complaint   Mr. Barbosa is a 57 year old here for follow-up after hospitalization.     History of Present Illness   Modesto Barbosa is an 57 year old male with PMH significant for ESKD 2/2 DM2 on PD, CAD s/p NSTEMI, HTN. Now s/p LDKT with ureteral stent on 2/21/23 with Dr. Weinstein.     Doing well. No complaints. Pain controlled.     No urinary symptoms.        Problem List   Patient Active Problem List   Diagnosis     Type 2 Diabetes Mellitus     Dyslipidemia, goal LDL below 70     Hypertension     ESRD (end stage renal disease) on dialysis (H)     Metabolic acidosis     Coronary artery disease involving native coronary artery of native heart without angina pectoris     Thrombocytopenia (H)     Splenomegaly     NSTEMI (non-ST elevated myocardial infarction) (H)     Hypotension, unspecified hypotension type     Rash     Awaiting organ transplant     ESRD (end stage renal disease) (H)      Pre-diabetes     Immunosuppression (H)     Status post kidney transplant     Leukocytosis     Anemia in other chronic diseases classified elsewhere     Hypocalcemia     Hypomagnesemia     Hypophosphatemia     Hepatitis B core antibody positive       Allergies   Allergies   Allergen Reactions     Tegaderm Transparent Dressing (Informational Only) Blisters       Medications   Current Outpatient Medications   Medication Sig     acetaminophen (TYLENOL) 325 MG tablet Take 2 tablets (650 mg) by mouth every 4 hours as needed for other (For optimal non-opioid multimodal pain management to improve pain control.)     amLODIPine (NORVASC) 5 MG tablet Take 1 tablet (5 mg) by mouth daily     aspirin (ASA) 81 MG EC tablet Take 1 tablet (81 mg) by mouth daily     atorvastatin (LIPITOR) 20 MG tablet Take 1 tablet (20 mg) by mouth daily     calcium carbonate (TUMS) 500 MG chewable tablet Take 2 tablets (1,000 mg) by mouth 2 times daily     carvedilol (COREG) 25 MG tablet Take 1 tablet (25 mg) by mouth 2 times daily (with meals)     insulin aspart (NOVOLOG PEN) 100 UNIT/ML pen Inject 5 Units Subcutaneous 3 times daily (with meals) for 180 days     insulin detemir (LEVEMIR PEN) 100 UNIT/ML pen Inject 30 Units Subcutaneous At Bedtime for 2 days, THEN 28 Units At Bedtime for 7 days, THEN 26 Units At Bedtime for 7 days, THEN 24 Units At Bedtime for 7 days, THEN 20 Units At Bedtime for 7 days, THEN 20 Units At Bedtime for 30 days. On the days that you are taking the prescribed Prednisone 40mg daily, take levemir 30 units at bedtime. On the days that you are taking the prescribed Prednisone 20mg daily take levemir 28 units at bedtime. On the days that you are taking the prescribed Prednisone 15mg daily take levemir 26 units at bedtime. On the days that you are taking the prescribed Prednisone 10mg daily take levemir 24 units at bedtime. On the days that you are taking the prescribed Prednisone 5mg daily take levemir 20 units at  "bedtime. Once you are off the steroid taper, take levemir 20 units at bedtime daily.     loratadine (CLARITIN) 10 MG tablet Take 1 tablet (10 mg) by mouth daily as needed for allergies (itchy/rash)     magnesium oxide (MAG-OX) 400 MG tablet Take 1 tablet (400 mg) by mouth daily (with lunch)     mycophenolate (GENERIC EQUIVALENT) 250 MG capsule Take 3 capsules (750 mg) by mouth 2 times daily for 360 days     nitroGLYcerin (NITROSTAT) 0.4 MG sublingual tablet One tablet under the tongue every 5 minutes if needed for chest pain. May repeat every 5 minutes for a maximum of 3 doses in 15 minutes\" (Patient not taking: Reported on 2/27/2023)     oxyCODONE (ROXICODONE) 5 MG tablet Take 1 tablet (5 mg) by mouth every 4 hours as needed for moderate pain (4-6) (Patient not taking: Reported on 2/27/2023)     phosphorus tablet 250 mg (PHOSPHA 250 NEUTRAL) 250 MG per tablet Take 1 tablet (250 mg) by mouth 2 times daily for 60 days     polyethylene glycol (MIRALAX) 17 GM/Dose powder Take 17 g by mouth daily (Patient not taking: Reported on 2/27/2023)     predniSONE (DELTASONE) 10 MG tablet Take 1 tablet (10 mg) by mouth daily Take 4 tablets by mouth daily for 2 days starting on Sun, 2/26/2023. Then, take 2 tablets by mouth daily for 7 days starting 2/28/2023. Then, take one and half tablets by mouth daily for 7 days starting 3/7/2023. Then, take one tablet by mouth daily starting 3/14/2023. Then, take half a tablet by mouth daily for 7 days starting 3/21/2023.     senna-docusate (SENOKOT-S/PERICOLACE) 8.6-50 MG tablet Take 1 tablet by mouth 2 times daily for 30 days     sodium bicarbonate 650 MG tablet Take 1 tablet (650 mg) by mouth 2 times daily for 60 days     sulfamethoxazole-trimethoprim (BACTRIM) 400-80 MG tablet Take 1 tablet by mouth daily     tacrolimus (GENERIC EQUIVALENT) 0.5 MG capsule Take 1 capsule (0.5 mg) by mouth 2 times daily Take total dose 3.5 mg twice per day     tacrolimus (GENERIC EQUIVALENT) 0.5 MG capsule " Take 1 capsule (0.5 mg) by mouth daily as needed (To be used for dosage adjustments to maintain goal Tac level 8-10 and/or as instructed by primary transplant team.)     tacrolimus (GENERIC EQUIVALENT) 1 MG capsule Take 3 capsules (3 mg) by mouth 2 times daily Take total  3.5 mg twice per day     traZODone (DESYREL) 50 MG tablet Take 50 mg by mouth At Bedtime (Patient not taking: Reported on 2/27/2023)     triamcinolone (KENALOG) 0.1 % external cream Apply topically 2 times daily As needed for itching/rash. (Patient not taking: Reported on 2/27/2023)     valGANciclovir (VALCYTE) 450 MG tablet Take 2 tablets (900 mg) by mouth daily for 90 days     Vitamin D3 (CHOLECALCIFEROL) 25 mcg (1000 units) tablet Take 2 tablets (50 mcg) by mouth daily     No current facility-administered medications for this visit.     There are no discontinued medications.    Physical Exam   Vital Signs: There were no vitals taken for this visit.    GENERAL APPEARANCE: alert and no distress  HENT: mouth without ulcers or lesions  LYMPHATICS: no cervical or supraclavicular nodes  RESP: lungs clear to auscultation - no rales, rhonchi or wheezes  CV: regular rhythm, normal rate, no rub, no murmur  EDEMA: no LE edema bilaterally  ABDOMEN: soft, nondistended, nontender, bowel sounds normal  MS: extremities normal - no gross deformities noted, no evidence of inflammation in joints, no muscle tenderness  SKIN: no rash  SKIN: RLQ incision intact with surgical glue. LLQ previous PD site covered with gauze - draining moderate amount of serosang fluid. No erythema or purulence.     Data     Renal Latest Ref Rng & Units 2/28/2023 2/27/2023 2/26/2023   SODIUM 136 - 145 mmol/L 133(L) 136 -   SODIUM POCT 133 - 144 mmol/L - - -   K 3.4 - 5.3 mmol/L 4.9 4.4 -   Cl 98 - 107 mmol/L 103 106 -   Cl (external) 98 - 107 mmol/L 103 106 -   CO2 22 - 29 mmol/L 23 22 -   UREA NITROGEN 8 - 22 mg/dL - - -   UREA NITROGEN (R) 6.0 - 20.0 mg/dL 24.5(H) 27.2(H) -   CREATININE  0.67 - 1.17 mg/dL 0.95 0.93 -   Glucose 70 - 99 mg/dL 107(H) 166(H) 102(H)   CALCIUM, TOTAL 8.6 - 10.0 mg/dL 7.6(L) 7.5(L) -   MAGNESIUM 1.7 - 2.3 mg/dL 2.2 2.3 -     Bone Health Latest Ref Rng & Units 2/28/2023 2/27/2023 2/26/2023   PHOSPHORUS 2.5 - 4.5 mg/dL 2.0(L) 2.4(L) 1.5(L)   PARATHYROID HORMONE INTACT 15 - 65 pg/mL - - -   Vit D Def 20 - 75 ug/L - - -     Heme Latest Ref Rng & Units 2/28/2023 2/27/2023 2/26/2023   WBC 4.0 - 11.0 10e3/uL 8.9 7.5 6.9   Hgb 13.3 - 17.7 g/dL 8.7(L) 9.6(L) 8.4(L)   Plt 150 - 450 10e3/uL 64(L) 64(L) 53(L)   ABSOLUTE NEUTROPHIL 1.6 - 8.3 10e3/uL 8.5(H) 7.0 6.6   ABSOLUTE LYMPHOCYTES 0.8 - 5.3 10e3/uL 0.1(L) 0.2(L) 0.0(L)   ABSOLUTE MONOCYTES 0.0 - 1.3 10e3/uL 0.2 0.2 0.1   ABSOLUTE EOSINOPHILS 0.0 - 0.7 10e3/uL 0.0 0.0 0.0     Liver Latest Ref Rng & Units 2/16/2023 11/17/2022 11/16/2022   AP 40 - 129 U/L 66 - 68   TBili <=1.2 mg/dL 0.2 - 0.6   BILIRUBIN, DIRECT <=0.5 mg/dL - - -   BILIRUBIN DIRECT (R) 0.00 - 0.30 mg/dL - - <0.20   ALT 10 - 50 U/L 18 - 16   AST 10 - 50 U/L 18 - 16   Tot Protein 6.4 - 8.3 g/dL 6.1(L) - 6.5   ALBUMIN 3.4 - 5.0 g/dL - - -   ALBUMIN (R) 3.5 - 5.2 g/dL 3.3(L) 2.6(L) 3.4(L)     Pancreas Latest Ref Rng & Units 2/22/2023 2/21/2023 2/16/2023   A1C <5.7 % 6.8(H) 6.8(H) 7.1(H)   Lipase 0 - 52 U/L - - -     Iron studies Latest Ref Rng & Units 2/16/2023 6/11/2021   Iron 61 - 157 ug/dL 37(L) 22(L)   IRON SATURATION INDEX (R) 15 - 46 % 21 -   FERRITIN 31 - 409 ng/mL 847(H) 301(H)     [facility] Txp Virology Latest Ref Rng & Units 2/21/2023 2/16/2023 8/23/2021   CMV QUANT IU/ML Not Detected IU/mL Not Detected - -   EBV CAPSID ANTIBODY IGG No detectable antibody. - Positive(A) Positive(A)        Recent Labs   Lab Test 02/24/23  0551 02/26/23  0504 02/27/23  0821   TACROL 2.9* 9.1 11.2

## 2023-02-28 NOTE — PROGRESS NOTES
"Chief Complaint   Patient presents with     Eval/Assessment     LBA day 2 kidney transplant (live unrelated)     Modesto Barbosa came to [facility] today for a lab and assess following a kidney transplant on 2/21.       Discharge date: 2/26/23  Transplant coordinator: Shalonda WINN   Phone number patient can be reached at: [phone] Yanick (daughter) okay to leave      Physical Assessment:  See physical assessment located under \"Document Flowsheets\".  Incision site: RLQ incision with dermabond CDI. LLQ old PD site decreasing drainage amount, no s/s of infection. Decrease in drainage. L wrist blister unchanged and intact. No s/s of infection  Lines: n/a  Odell: n/a  Urine clarity: clear yellow, denies burning or irritation  Hydration: 1 L water. Encouraged 2 L water daily.  Nutrition: good appetite,    Last BM: yesterday  Pain: 0/10   My transplant place videos watched: yes  Sitting 184/79  72, standing 144/79  72. Denies dizziness. Wt up 1 lb from yesterday (baseline 135 lbs)    Labs drawn by [facility] staff Yes. Copy of results given to patient.    Plan of care for tod4ay:   -Labs and assessment reviewed with Ritu Bonilla NP.   -Med card and pill box were updated to reflect Tacrolimus changes.   -Checklist completed.   -RN contacted  Home care to start services on Thursday.     Medication changes: n/a    Medications administered:    Administrations This Visit     0.9% sodium chloride BOLUS     Admin Date  02/28/2023 Action  $New Bag Dose  500 mL Rate  666.7 mL/hr Route  Intravenous Administered By  Jared Orta, RN              Patient education:  The following teaching topics were addressed: Importance of drinking 2L of non-caffeinated fluids daily, Incisional care, Signs/symptoms of infection, Good handwashing, Medications (purposes, doses and times of administration), Phone numbers to call with concenrs (Transplant coordinator, [address] and [facility]) and Plan of care   Patient and daughter verbalized understanding " "and all questions answered.    Drug level:  Patient took 3.5 mg of Tacrolimus last evening at 8 PM.  Care coordinator to follow up with the result.    Face to face time: 60 minutes    Discharge Plan  Pt will follow up with transplant coordinator and home care.   Discharge instructions reviewed with patient: YES  Patient/Representative verbalized understanding, all questions answered: YES    Discharged from unit at 1015 with whom: daughter to home.    Vital signs:  Temp: 98.4  F (36.9  C) Temp src: Oral       Resp: 18 SpO2: 100 % O2 Device: None (Room air)     Weight: 69.4 kg (152 lb 14.4 oz)  Estimated body mass index is 26.25 kg/m  as calculated from the following:    Height as of 2/21/23: 1.626 m (5' 4\").    Weight as of this encounter: 69.4 kg (152 lb 14.4 oz).          "

## 2023-02-28 NOTE — LETTER
Date:February 28, 2023      Provider requested that no letter be sent. Do not send.       [facility]

## 2023-03-01 ENCOUNTER — TELEPHONE (OUTPATIENT)
Dept: TRANSPLANT | Facility: CLINIC | Age: 58
End: 2023-03-01
Payer: COMMERCIAL

## 2023-03-01 NOTE — TELEPHONE ENCOUNTER
Post Kidney and Pancreas Transplant Team Conference  Date: 3/1/2023  Transplant Coordinator: Shalonda Roy     Attendees:  [x]  Dr. Phan [x] Diya Bautista, RN [x] Julia Bower LPN     []  Dr. Baldwin [x] Shalonda Roy, ELIDIA [] Candy Llanos LPN   [x]  Dr. Patel [x] Cora Vázquez, ELIDIA    [x]  Dr. Kim [] Analia Gallego RN [x] Santos Hussein, PharmD   [] Dr. Alves [] Neha Bhatti, ELIDIA    [] Dr. Walker [] Lalit Fofana RN    [x] Dr. Long [] Bijal Diez, ELIDIA [] Carlene Bell RN   [] Dr. Morse [] Vane Epperson RN    []  Dr. Saleh [] Lita Mays RN    [] Dr. Zhou [x] Enedina Kee RN    [] Ritu Bonilla, NP [] Claire Dunn RN        Verbal Plan Read Back:   Hep B DNA in 3 months    Routed to RN Coordinator   Julia Bower LPN

## 2023-03-02 ENCOUNTER — LAB (OUTPATIENT)
Dept: LAB | Facility: HOSPITAL | Age: 58
End: 2023-03-02
Payer: COMMERCIAL

## 2023-03-02 ENCOUNTER — TELEPHONE (OUTPATIENT)
Dept: TRANSPLANT | Facility: CLINIC | Age: 58
End: 2023-03-02

## 2023-03-02 DIAGNOSIS — Z94.0 KIDNEY REPLACED BY TRANSPLANT: ICD-10-CM

## 2023-03-02 DIAGNOSIS — Z79.899 ENCOUNTER FOR LONG-TERM CURRENT USE OF MEDICATION: ICD-10-CM

## 2023-03-02 DIAGNOSIS — Z20.828 CONTACT WITH AND (SUSPECTED) EXPOSURE TO OTHER VIRAL COMMUNICABLE DISEASES: ICD-10-CM

## 2023-03-02 DIAGNOSIS — Z94.0 KIDNEY REPLACED BY TRANSPLANT: Primary | ICD-10-CM

## 2023-03-02 DIAGNOSIS — Z48.298 AFTERCARE FOLLOWING ORGAN TRANSPLANT: ICD-10-CM

## 2023-03-02 LAB
ANION GAP SERPL CALCULATED.3IONS-SCNC: 6 MMOL/L (ref 7–15)
BK VIRUS IGG ANTIBODY: ABNORMAL
BUN SERPL-MCNC: 19.5 MG/DL (ref 6–20)
CALCIUM SERPL-MCNC: 7.6 MG/DL (ref 8.6–10)
CHLORIDE SERPL-SCNC: 106 MMOL/L (ref 98–107)
CREAT SERPL-MCNC: 0.87 MG/DL (ref 0.67–1.17)
DEPRECATED HCO3 PLAS-SCNC: 24 MMOL/L (ref 22–29)
ERYTHROCYTE [DISTWIDTH] IN BLOOD BY AUTOMATED COUNT: 18 % (ref 10–15)
GFR SERPL CREATININE-BSD FRML MDRD: >90 ML/MIN/1.73M2
GLUCOSE SERPL-MCNC: 82 MG/DL (ref 70–99)
HCT VFR BLD AUTO: 29.1 % (ref 40–53)
HGB BLD-MCNC: 9.7 G/DL (ref 13.3–17.7)
MAGNESIUM SERPL-MCNC: 2.1 MG/DL (ref 1.7–2.3)
MCH RBC QN AUTO: 30.1 PG (ref 26.5–33)
MCHC RBC AUTO-ENTMCNC: 33.3 G/DL (ref 31.5–36.5)
MCV RBC AUTO: 90 FL (ref 78–100)
PHOSPHATE SERPL-MCNC: 1.7 MG/DL (ref 2.5–4.5)
PLATELET # BLD AUTO: 71 10E3/UL (ref 150–450)
POTASSIUM SERPL-SCNC: 5.3 MMOL/L (ref 3.4–5.3)
RBC # BLD AUTO: 3.22 10E6/UL (ref 4.4–5.9)
SODIUM SERPL-SCNC: 136 MMOL/L (ref 136–145)
TACROLIMUS BLD-MCNC: 12.9 UG/L (ref 5–15)
TME LAST DOSE: NORMAL H
TME LAST DOSE: NORMAL H
WBC # BLD AUTO: 8.5 10E3/UL (ref 4–11)

## 2023-03-02 PROCEDURE — 80197 ASSAY OF TACROLIMUS: CPT

## 2023-03-02 PROCEDURE — 80048 BASIC METABOLIC PNL TOTAL CA: CPT

## 2023-03-02 PROCEDURE — 83735 ASSAY OF MAGNESIUM: CPT

## 2023-03-02 PROCEDURE — 85027 COMPLETE CBC AUTOMATED: CPT

## 2023-03-02 PROCEDURE — 80180 DRUG SCRN QUAN MYCOPHENOLATE: CPT

## 2023-03-02 PROCEDURE — 36415 COLL VENOUS BLD VENIPUNCTURE: CPT

## 2023-03-02 PROCEDURE — 84100 ASSAY OF PHOSPHORUS: CPT

## 2023-03-02 NOTE — TELEPHONE ENCOUNTER
ELIDIA Barlow  [facility] called;   Modesto has swollen testicles black/blue and purple  very painful.  But reported he is voiding ok no pain voiding     [facility] # [phone]

## 2023-03-03 ENCOUNTER — VIRTUAL VISIT (OUTPATIENT)
Dept: PHARMACY | Facility: CLINIC | Age: 58
End: 2023-03-03
Attending: INTERNAL MEDICINE
Payer: COMMERCIAL

## 2023-03-03 ENCOUNTER — HOSPITAL ENCOUNTER (OUTPATIENT)
Dept: ULTRASOUND IMAGING | Facility: HOSPITAL | Age: 58
Discharge: HOME | End: 2023-03-03
Attending: NURSE PRACTITIONER
Payer: COMMERCIAL

## 2023-03-03 ENCOUNTER — TELEPHONE (OUTPATIENT)
Dept: TRANSPLANT | Facility: CLINIC | Age: 58
End: 2023-03-03
Payer: COMMERCIAL

## 2023-03-03 ENCOUNTER — TELEPHONE (OUTPATIENT)
Dept: TRANSPLANT | Facility: CLINIC | Age: 58
End: 2023-03-03

## 2023-03-03 ENCOUNTER — OFFICE VISIT (OUTPATIENT)
Dept: TRANSPLANT | Facility: CLINIC | Age: 58
End: 2023-03-03
Attending: NURSE PRACTITIONER
Payer: COMMERCIAL

## 2023-03-03 VITALS — OXYGEN SATURATION: 99 % | SYSTOLIC BLOOD PRESSURE: 120 MMHG | HEART RATE: 81 BPM | DIASTOLIC BLOOD PRESSURE: 54 MMHG

## 2023-03-03 DIAGNOSIS — R52 PAIN: ICD-10-CM

## 2023-03-03 DIAGNOSIS — Z94.0 KIDNEY REPLACED BY TRANSPLANT: ICD-10-CM

## 2023-03-03 DIAGNOSIS — R60.9 EDEMA, UNSPECIFIED TYPE: ICD-10-CM

## 2023-03-03 DIAGNOSIS — R60.9 EDEMA, UNSPECIFIED TYPE: Primary | ICD-10-CM

## 2023-03-03 DIAGNOSIS — K59.00 CONSTIPATION, UNSPECIFIED CONSTIPATION TYPE: ICD-10-CM

## 2023-03-03 DIAGNOSIS — Z94.0 KIDNEY REPLACED BY TRANSPLANT: Primary | ICD-10-CM

## 2023-03-03 DIAGNOSIS — I15.1 HTN, KIDNEY TRANSPLANT RELATED: ICD-10-CM

## 2023-03-03 DIAGNOSIS — E78.5 DYSLIPIDEMIA, GOAL LDL BELOW 70: ICD-10-CM

## 2023-03-03 DIAGNOSIS — Z94.0 HTN, KIDNEY TRANSPLANT RELATED: ICD-10-CM

## 2023-03-03 DIAGNOSIS — E11.69 TYPE 2 DIABETES MELLITUS WITH OTHER SPECIFIED COMPLICATION, UNSPECIFIED WHETHER LONG TERM INSULIN USE (H): ICD-10-CM

## 2023-03-03 LAB
MYCOPHENOLATE SERPL LC/MS/MS-MCNC: 1.48 MG/L (ref 1–3.5)
MYCOPHENOLATE-G SERPL LC/MS/MS-MCNC: 41 MG/L (ref 30–95)
TME LAST DOSE: NORMAL H
TME LAST DOSE: NORMAL H

## 2023-03-03 PROCEDURE — 99214 OFFICE O/P EST MOD 30 MIN: CPT | Mod: 24 | Performed by: NURSE PRACTITIONER

## 2023-03-03 PROCEDURE — 76870 US EXAM SCROTUM: CPT

## 2023-03-03 PROCEDURE — 99607 MTMS BY PHARM ADDL 15 MIN: CPT | Performed by: PHARMACIST

## 2023-03-03 PROCEDURE — 99605 MTMS BY PHARM NP 15 MIN: CPT | Performed by: PHARMACIST

## 2023-03-03 PROCEDURE — G0463 HOSPITAL OUTPT CLINIC VISIT: HCPCS | Performed by: NURSE PRACTITIONER

## 2023-03-03 PROCEDURE — 93971 EXTREMITY STUDY: CPT | Mod: RT

## 2023-03-03 RX ORDER — TACROLIMUS 1 MG/1
2 CAPSULE ORAL 2 TIMES DAILY
Qty: 120 CAPSULE | Refills: 3 | Status: SHIPPED | OUTPATIENT
Start: 2023-03-03 | End: 2023-03-10

## 2023-03-03 RX ORDER — TACROLIMUS 0.5 MG/1
0.5 CAPSULE ORAL 2 TIMES DAILY
Qty: 120 CAPSULE | Refills: 3 | Status: SHIPPED | OUTPATIENT
Start: 2023-03-03 | End: 2023-03-10

## 2023-03-03 NOTE — PATIENT INSTRUCTIONS
"Recommendations from today's MTM visit:                                                       1. Recommend setting alarms at your dose times as an extra reminder.  2. Move Amlodipine 5mg to the evening, will work a little better if taken at this time.   3. Decrease Levemir to 26 units every morning.  Send me a message next Wednesday if his blood sugars drop below 80 during the week.  4. [facility] mail order will call you three weeks post discharge, but if your dose changes, call the number on the back of the pill box to talk to them earlier, [phone]. If your doctor sends over a new prescription to the mail order pharmacy you will have to call them to set up the order. They have an Cherise called \"[facility]\" as well.     Follow-up: 5/1 at 12:30 PM    It was great speaking with you today.  I value your experience and would be very thankful for your time in providing feedback in our clinic survey. In the next few days, you may receive an email or text message from [employer] with a link to a survey related to your  clinical pharmacist.\"     To schedule another MTM appointment, please call the clinic directly or you may call the MTM scheduling line at [phone] or toll-free at [phone].     My Clinical Pharmacist's contact information:                                                      Please feel free to contact me with any questions or concerns you have.      Santos Hussein, PharmD  MTM Pharmacist    Phone: [phone]     "

## 2023-03-03 NOTE — TELEPHONE ENCOUNTER
Mr Barbosa 10 days post kidney transplant hx of PD   Returned to clinic for evaluation  swollen testicles and bruising      Follow up with Ritu Bonilla Transplant Surgery NP   In clinic   (note pending )   Imaging set up 0830 pm this evening at [facility] Lower Extremity Venous Duplex Right 1 TIME IMAGING  US Testicular & Scrotum w Doppler Ltd  Per Ritu Bonilla Transplant Surgery NP   More swelling on the right side   - kidney          Spoke to Mr Chelsey Herndon  daughter RN after the clinic appointment   - reviewed that this may happen to post kidney transplant       Reviewed if ultrasound is negative - most often given furosemide furosemide lasix   Along  with elevation area   IF DVT or other issues may    need admission for further management      Reviewed   Ritu Bonilla Transplant Surgery NP will follow up with her with plan  or may  call [phone]       2nd item lower tacrolimus  2.5 mg twice per day for increase tacrolimus  Level

## 2023-03-03 NOTE — PROGRESS NOTES
Transplant Surgery Progress Note    Transplants:  2/21/2023 (Kidney); Postoperative day:  10  S: Modesto Barbosa is an 57 year old male with PMH significant for ESKD 2/2 DM2 on PD, CAD s/p NSTEMI, HTN. Now s/p LDKT with ureteral stent on 2/21/23 with Dr. Weinstein.     No fevers, chills, nausea or vomiting    No urinary issues.    Noted testicular swelling.  RLE swelling.  No tenderness      Transplant History:    Transplant Type:  LDKT  Donor Type: Living   Transplant Date:  2/21/2023 (Kidney)   Ureteral Stent:  Yes   Crossmatch:  negative   DSA at Tx:  No  Baseline Cr: TBD   DeNovo DSA: No    Acute Rejection Hx:  No    Present Maintenance Immunosuppression:  Tacrolimus, Mycophenolate mofetil and Prednisone    CMV IgG Ab Discordance:  No  EBV IgG Ab Discordance:  No    BK Viremia:  No  EBV Viremia:  No    Transplant Coordinator: Shalonda Roy     Transplant Office Phone Number: [phone]     Immunosuppressant Medications     Immunosuppressive Agents Disp Start End     mycophenolate (GENERIC EQUIVALENT) 250 MG capsule    180 capsule 2/25/2023 2/20/2024    Sig - Route: Take 3 capsules (750 mg) by mouth 2 times daily for 360 days - Oral    Class: E-Prescribe     tacrolimus (GENERIC EQUIVALENT) 0.5 MG capsule    120 capsule 3/3/2023     Sig - Route: Take 1 capsule (0.5 mg) by mouth 2 times daily Take total dose 2.5 mg twice per day - Oral    Class: E-Prescribe    Notes to Pharmacy: TXP DT 2/21/2023 (Kidney) TXP Dischg DT 2/26/2023 DX Kidney replaced by transplant [email or web address] TX [facility] ([place], [place])     tacrolimus (GENERIC EQUIVALENT) 0.5 MG capsule    30 capsule 2/25/2023     Sig - Route: Take 1 capsule (0.5 mg) by mouth daily as needed (To be used for dosage adjustments to maintain goal Tac level 8-10 and/or as instructed by primary transplant team.) - Oral    Patient not taking: Reported on 3/3/2023       Class: E-Prescribe     tacrolimus (GENERIC EQUIVALENT) 1 MG capsule     120 capsule 3/3/2023     Sig - Route: Take 2 capsules (2 mg) by mouth 2 times daily Take total  3.5 mg twice per day - Oral    Class: E-Prescribe    Notes to Pharmacy: TXP DT 2/21/2023 (Kidney) TXP Dischg DT 2/26/2023 DX Kidney replaced by transplant Z94.0 [facility] ([place], [place])          Possible Immunosuppression-related side effects:   []             headache  []             vivid dreams  []             irritability  []             cognitive difficuties  []             fine tremor  []             nausea  []             diarrhea  []             neuropathy      []             edema  []             renal calcineurin toxicity  []             hyperkalemia  []             post-transplant diabetes  []             decreased appetite  []             increased appetite  []             other:  []             none    Prescription Medications as of 3/3/2023       Rx Number Disp Refills Start End Last Dispensed Date Next Fill Date Owning Pharmacy    acetaminophen (TYLENOL) 325 MG tablet  30 tablet 0 2/25/2023    [address]    Sig: Take 2 tablets (650 mg) by mouth every 4 hours as needed for other (For optimal non-opioid multimodal pain management to improve pain control.)    Class: E-Prescribe    Route: Oral    amLODIPine (NORVASC) 5 MG tablet  30 tablet 1 2/26/2023    [address]    Sig: Take 1 tablet (5 mg) by mouth daily    Class: E-Prescribe    Route: Oral    Renewals     Renewal requests to authorizing provider (Yen Saeed NP) <b>prohibited</b>          aspirin (ASA) 81 MG EC tablet  30 tablet 0 2/26/2023    [address]    Sig: Take 1 tablet (81 mg) by mouth daily    Class: E-Prescribe    Route: Oral    atorvastatin (LIPITOR) 20 MG tablet  30 tablet 0 2/26/2023    [facility]  [address]    Sig: Take 1 tablet (20 mg) by mouth daily    Class: E-Prescribe    Route: Oral    calcium carbonate (TUMS) 500 MG chewable tablet  120 tablet 0 2/25/2023    [address]    Sig: Take 2 tablets (1,000 mg) by mouth 2 times daily    Class: E-Prescribe    Route: Oral    carvedilol (COREG) 25 MG tablet  60 tablet 0 2/25/2023    [address]    Sig: Take 1 tablet (25 mg) by mouth 2 times daily (with meals)    Class: E-Prescribe    Route: Oral    insulin aspart (NOVOLOG PEN) 100 UNIT/ML pen  9 mL 2 2/25/2023 8/24/2023   [address]    Sig: Inject 5 Units Subcutaneous 3 times daily (with meals) for 180 days    Class: E-Prescribe    Route: Subcutaneous    insulin detemir (LEVEMIR PEN) 100 UNIT/ML pen  13.46 mL 0 2/26/2023 4/27/2023   [address]    Sig: Inject 30 Units Subcutaneous At Bedtime for 2 days, THEN 28 Units At Bedtime for 7 days, THEN 26 Units At Bedtime for 7 days, THEN 24 Units At Bedtime for 7 days, THEN 20 Units At Bedtime for 7 days, THEN 20 Units At Bedtime for 30 days. On the days that you are taking the prescribed Prednisone 40mg daily, take levemir 30 units at bedtime. On the days that you are taking the prescribed Prednisone 20mg daily take levemir 28 units at bedtime. On the days that you are taking the prescribed Prednisone 15mg daily take levemir 26 units at bedtime. On the days that you are taking the prescribed Prednisone 10mg daily take levemir 24 units at bedtime. On the days that you are taking the prescribed Prednisone 5mg daily take levemir 20 units at bedtime. Once you are off the steroid taper, take levemir 20 units at bedtime daily.    Class: E-Prescribe    Route: Subcutaneous    loratadine (CLARITIN) 10 MG  tablet  90 tablet 3 2/9/2023    [id number] - [place], [place] - [address] [facility]    Sig: Take 1 tablet (10 mg) by mouth daily as needed for allergies (itchy/rash)    Class: E-Prescribe    Route: Oral    magnesium oxide (MAG-OX) 400 MG tablet  30 tablet 1 2/25/2023    [address]    Sig: Take 1 tablet (400 mg) by mouth daily (with lunch)    Class: E-Prescribe    Route: Oral    mycophenolate (GENERIC EQUIVALENT) 250 MG capsule  180 capsule 11 2/25/2023 2/20/2024   [address]    Sig: Take 3 capsules (750 mg) by mouth 2 times daily for 360 days    Class: E-Prescribe    Route: Oral    oxyCODONE (ROXICODONE) 5 MG tablet  10 tablet 0 2/25/2023        Sig: Take 1 tablet (5 mg) by mouth every 4 hours as needed for moderate pain (4-6)    Class: Local Print    Earliest Fill Date: 2/25/2023    Route: Oral    phosphorus tablet 250 mg (PHOSPHA 250 NEUTRAL) 250 MG per tablet  60 tablet 1 2/25/2023 4/26/2023   [address]    Sig: Take 1 tablet (250 mg) by mouth 2 times daily for 60 days    Class: E-Prescribe    Route: Oral    predniSONE (DELTASONE) 10 MG tablet  50 tablet 0 2/25/2023    [address]    Sig: Take 1 tablet (10 mg) by mouth daily Take 4 tablets by mouth daily for 2 days starting on Sun, 2/26/2023. Then, take 2 tablets by mouth daily for 7 days starting 2/28/2023. Then, take one and half tablets by mouth daily for 7 days starting 3/7/2023. Then, take one tablet by mouth daily starting 3/14/2023. Then, take half a tablet by mouth daily for 7 days starting 3/21/2023.    Class: E-Prescribe    Route: Oral    senna-docusate (SENOKOT-S/PERICOLACE) 8.6-50 MG tablet  60 tablet 0 2/25/2023 3/27/2023   [facility] - [place], [place] -  "[address]    Sig: Take 1 tablet by mouth 2 times daily for 30 days    Class: E-Prescribe    Route: Oral    sodium bicarbonate 650 MG tablet  60 tablet 1 2/25/2023 4/26/2023   [address]    Sig: Take 1 tablet (650 mg) by mouth 2 times daily for 60 days    Class: E-Prescribe    Route: Oral    sulfamethoxazole-trimethoprim (BACTRIM) 400-80 MG tablet  30 tablet 11 2/26/2023    [address]    Sig: Take 1 tablet by mouth daily    Class: E-Prescribe    Route: Oral    valGANciclovir (VALCYTE) 450 MG tablet  60 tablet 2 2/26/2023 5/27/2023   [address]    Sig: Take 2 tablets (900 mg) by mouth daily for 90 days    Class: E-Prescribe    Route: Oral    Vitamin D3 (CHOLECALCIFEROL) 25 mcg (1000 units) tablet  60 tablet 0 2/26/2023    [address]    Sig: Take 2 tablets (50 mcg) by mouth daily    Class: E-Prescribe    Route: Oral    nitroGLYcerin (NITROSTAT) 0.4 MG sublingual tablet  25 tablet 3 12/6/2021    [id number] - [place], [place] - [address]    Sig: One tablet under the tongue every 5 minutes if needed for chest pain. May repeat every 5 minutes for a maximum of 3 doses in 15 minutes\"    Class: E-Prescribe    polyethylene glycol (MIRALAX) 17 GM/Dose powder  510 g 0 2/25/2023    [place], [place] - [address] [place] [address]    Sig: Take 17 g by mouth daily    Class: E-Prescribe    Route: Oral    tacrolimus (GENERIC EQUIVALENT) 0.5 MG capsule  120 capsule 3 3/3/2023    [facility] [place], [place] - [address]    Sig: Take 1 capsule (0.5 mg) by mouth 2 times daily Take total dose 2.5 mg twice per day    Class: E-Prescribe    Notes to Pharmacy: TXP DT 2/21/2023 (Kidney) TXP Dischg DT 2/26/2023 DX Kidney replaced by " transplant Z94.0 TX [facility] ([place], [place])    Route: Oral    tacrolimus (GENERIC EQUIVALENT) 0.5 MG capsule  30 capsule 0 2/25/2023    [facility] - [place], [place] - [address]    Sig: Take 1 capsule (0.5 mg) by mouth daily as needed (To be used for dosage adjustments to maintain goal Tac level 8-10 and/or as instructed by primary transplant team.)    Class: E-Prescribe    Route: Oral    tacrolimus (GENERIC EQUIVALENT) 1 MG capsule  120 capsule 3 3/3/2023    [facility] - [place], [place] - [address]    Sig: Take 2 capsules (2 mg) by mouth 2 times daily Take total  3.5 mg twice per day    Class: E-Prescribe    Notes to Pharmacy: TXP DT 2/21/2023 (Kidney) TXP Dischg DT 2/26/2023 DX Kidney replaced by transplant Z94.0 TX [facility] ([place], [place])    Route: Oral          O:   Pulse:  [81] 81  BP: (120)/(54) 120/54  SpO2:  [99 %] 99 %  General Appearance: in no apparent distress.   Skin: Normal, no rashes or jaundice  Heart: regular rate and rhythm  Lungs: easy respirations, no audible wheezing.  Abdomen: flat, The wound is dry and intact, without hernia. The abdomen is non-tender. The kidney graft is not tender.  There is no ascites.  Extremities: Tremor absent.   Edema: present on the right side. 1+    Transplant Immunosuppression Labs Latest Ref Rng & Units 3/2/2023 2/28/2023 2/27/2023 2/26/2023 2/25/2023   Creat 0.67 - 1.17 mg/dL 0.87 0.95 0.93 0.96 0.93   UREA NITROGEN 8 - 22 mg/dL - - - - -   UREA NITROGEN (R) 6.0 - 20.0 mg/dL 19.5 24.5(H) 27.2(H) 27.2(H) 22.5(H)   WBC 4.0 - 11.0 10e3/uL 8.5 8.9 7.5 6.9 5.9   Neutrophil % - 96 93 95 87   ANEU 1.6 - 8.3 10e3/uL - 8.5(H) 7.0 6.6 -       Chemistries:   Recent Labs   Lab Test 03/02/23  0833   BUN 19.5   CR 0.87   GFRESTIMATED >90   GLC 82     Lab Results   Component Value Date    A1C 6.8 02/22/2023     Recent Labs    Lab Test 02/16/23  1226   ALBUMIN 3.3*   BILITOTAL 0.2   ALKPHOS 66   AST 18   ALT 18     Urine Studies:  Recent Labs   Lab Test 02/16/23  1248   COLOR Light Yellow   APPEARANCE Clear   URINEGLC 150*   URINEBILI Negative   URINEKETONE Negative   SG 1.017   UBLD Negative   URINEPH 5.0   PROTEIN 30*   NITRITE Negative   LEUKEST Negative   RBCU 1   WBCU 2     No lab results found.  Hematology:   Recent Labs   Lab Test 03/02/23  0833 02/28/23  0811 02/27/23  0821   HGB 9.7* 8.7* 9.6*   PLT 71* 64* 64*   WBC 8.5 8.9 7.5     Coags:   Recent Labs   Lab Test 02/16/23  1226 11/17/22  0916   INR 1.08 1.16*     HLA antibodies:   SA1 HI RISK MADHAV   Date Value Ref Range Status   02/16/2023 None  Final     SA1 MOD RISK MADHAV   Date Value Ref Range Status   02/16/2023 B:44  Final     SA2 HI RISK MADHAV   Date Value Ref Range Status   02/16/2023 None  Final     SA2 MOD RISK MADHAV   Date Value Ref Range Status   02/16/2023 None  Final       Assessment: Modesto Barbosa is doing well s/p LDKT:  Issues we addressed during his visit include:    Plan:    1. Graft function: stable  2. Immunosuppression Management: No change \continue same IS .  Complexity of management:Medium.  Contributing factors: recent txp  3. Right lower extremity swelling:  US to rule out DVT.  Likely related to recent txp  4. Testicular swelling.  Bruising and swelling noted.  Likely due to dependent swelling.  US ordered  Followup: as directed    Total Time: 25 min,   Counselling Time: 15 min.

## 2023-03-03 NOTE — LETTER
3/3/2023         RE: Modesto Barbosa  [address] [place] [address]        Dear Colleague,    Thank you for referring your patient, Modesto Barbosa, to the [facility]. Please see a copy of my visit note below.    Transplant Surgery Progress Note    Transplants:  2/21/2023 (Kidney); Postoperative day:  10  S: Modesto Barbosa is an 57 year old male with PMH significant for ESKD 2/2 DM2 on PD, CAD s/p NSTEMI, HTN. Now s/p LDKT with ureteral stent on 2/21/23 with Dr. Weinstein.     No fevers, chills, nausea or vomiting    No urinary issues.    Noted testicular swelling.  RLE swelling.  No tenderness      Transplant History:    Transplant Type:  LDKT  Donor Type: Living   Transplant Date:  2/21/2023 (Kidney)   Ureteral Stent:  Yes   Crossmatch:  negative   DSA at Tx:  No  Baseline Cr: TBD   DeNovo DSA: No    Acute Rejection Hx:  No    Present Maintenance Immunosuppression:  Tacrolimus, Mycophenolate mofetil and Prednisone    CMV IgG Ab Discordance:  No  EBV IgG Ab Discordance:  No    BK Viremia:  No  EBV Viremia:  No    Transplant Coordinator: Shalonda Roy     Transplant Office Phone Number: [phone]     Immunosuppressant Medications     Immunosuppressive Agents Disp Start End     mycophenolate (GENERIC EQUIVALENT) 250 MG capsule    180 capsule 2/25/2023 2/20/2024    Sig - Route: Take 3 capsules (750 mg) by mouth 2 times daily for 360 days - Oral    Class: E-Prescribe     tacrolimus (GENERIC EQUIVALENT) 0.5 MG capsule    120 capsule 3/3/2023     Sig - Route: Take 1 capsule (0.5 mg) by mouth 2 times daily Take total dose 2.5 mg twice per day - Oral    Class: E-Prescribe    Notes to Pharmacy: TXP DT 2/21/2023 (Kidney) TXP Dischg DT 2/26/2023 DX Kidney replaced by transplant Z94.0 TX [facility] ([place], [place])     tacrolimus (GENERIC EQUIVALENT) 0.5 MG capsule    30 capsule 2/25/2023     Sig - Route: Take 1 capsule (0.5 mg) by mouth daily as needed  (To be used for dosage adjustments to maintain goal Tac level 8-10 and/or as instructed by primary transplant team.) - Oral    Patient not taking: Reported on 3/3/2023       Class: E-Prescribe     tacrolimus (GENERIC EQUIVALENT) 1 MG capsule    120 capsule 3/3/2023     Sig - Route: Take 2 capsules (2 mg) by mouth 2 times daily Take total  3.5 mg twice per day - Oral    Class: E-Prescribe    Notes to Pharmacy: TXP DT 2/21/2023 (Kidney) TXP Dischg DT 2/26/2023 DX Kidney replaced by transplant Z94.0 TX Center [facility] ([place], [place])          Possible Immunosuppression-related side effects:   []             headache  []             vivid dreams  []             irritability  []             cognitive difficuties  []             fine tremor  []             nausea  []             diarrhea  []             neuropathy      []             edema  []             renal calcineurin toxicity  []             hyperkalemia  []             post-transplant diabetes  []             decreased appetite  []             increased appetite  []             other:  []             none    Prescription Medications as of 3/3/2023       Rx Number Disp Refills Start End Last Dispensed Date Next Fill Date Owning Pharmacy    acetaminophen (TYLENOL) 325 MG tablet  30 tablet 0 2/25/2023    [facility] - [place], [place] - [address] [place] [address]    Sig: Take 2 tablets (650 mg) by mouth every 4 hours as needed for other (For optimal non-opioid multimodal pain management to improve pain control.)    Class: E-Prescribe    Route: Oral    amLODIPine (NORVASC) 5 MG tablet  30 tablet 1 2/26/2023    [place], [place] - [address] [place] [address]    Sig: Take 1 tablet (5 mg) by mouth daily    Class: E-Prescribe    Route: Oral    Renewals     Renewal requests to authorizing provider (Yen Saeed NP) <b>prohibited</b>          aspirin (ASA) 81 MG EC tablet  30 tablet 0  2/26/2023    [address]    Sig: Take 1 tablet (81 mg) by mouth daily    Class: E-Prescribe    Route: Oral    atorvastatin (LIPITOR) 20 MG tablet  30 tablet 0 2/26/2023    [address]    Sig: Take 1 tablet (20 mg) by mouth daily    Class: E-Prescribe    Route: Oral    calcium carbonate (TUMS) 500 MG chewable tablet  120 tablet 0 2/25/2023    [address]    Sig: Take 2 tablets (1,000 mg) by mouth 2 times daily    Class: E-Prescribe    Route: Oral    carvedilol (COREG) 25 MG tablet  60 tablet 0 2/25/2023    [address]    Sig: Take 1 tablet (25 mg) by mouth 2 times daily (with meals)    Class: E-Prescribe    Route: Oral    insulin aspart (NOVOLOG PEN) 100 UNIT/ML pen  9 mL 2 2/25/2023 8/24/2023   [address]    Sig: Inject 5 Units Subcutaneous 3 times daily (with meals) for 180 days    Class: E-Prescribe    Route: Subcutaneous    insulin detemir (LEVEMIR PEN) 100 UNIT/ML pen  13.46 mL 0 2/26/2023 4/27/2023   [address]    Sig: Inject 30 Units Subcutaneous At Bedtime for 2 days, THEN 28 Units At Bedtime for 7 days, THEN 26 Units At Bedtime for 7 days, THEN 24 Units At Bedtime for 7 days, THEN 20 Units At Bedtime for 7 days, THEN 20 Units At Bedtime for 30 days. On the days that you are taking the prescribed Prednisone 40mg daily, take levemir 30 units at bedtime. On the days that you are taking the prescribed Prednisone 20mg daily take levemir 28 units at bedtime. On the days that you are taking the prescribed Prednisone 15mg daily take levemir 26 units at bedtime. On the days that you are taking the prescribed Prednisone 10mg daily take levemir 24 units at bedtime. On  the days that you are taking the prescribed Prednisone 5mg daily take levemir 20 units at bedtime. Once you are off the steroid taper, take levemir 20 units at bedtime daily.    Class: E-Prescribe    Route: Subcutaneous    loratadine (CLARITIN) 10 MG tablet  90 tablet 3 2/9/2023    [id number] - [place], [place] - [address] [facility]    Sig: Take 1 tablet (10 mg) by mouth daily as needed for allergies (itchy/rash)    Class: E-Prescribe    Route: Oral    magnesium oxide (MAG-OX) 400 MG tablet  30 tablet 1 2/25/2023    [address]    Sig: Take 1 tablet (400 mg) by mouth daily (with lunch)    Class: E-Prescribe    Route: Oral    mycophenolate (GENERIC EQUIVALENT) 250 MG capsule  180 capsule 11 2/25/2023 2/20/2024   [address]    Sig: Take 3 capsules (750 mg) by mouth 2 times daily for 360 days    Class: E-Prescribe    Route: Oral    oxyCODONE (ROXICODONE) 5 MG tablet  10 tablet 0 2/25/2023        Sig: Take 1 tablet (5 mg) by mouth every 4 hours as needed for moderate pain (4-6)    Class: Local Print    Earliest Fill Date: 2/25/2023    Route: Oral    phosphorus tablet 250 mg (PHOSPHA 250 NEUTRAL) 250 MG per tablet  60 tablet 1 2/25/2023 4/26/2023   [address]    Sig: Take 1 tablet (250 mg) by mouth 2 times daily for 60 days    Class: E-Prescribe    Route: Oral    predniSONE (DELTASONE) 10 MG tablet  50 tablet 0 2/25/2023    [address]    Sig: Take 1 tablet (10 mg) by mouth daily Take 4 tablets by mouth daily for 2 days starting on Sun, 2/26/2023. Then, take 2 tablets by mouth daily for 7 days starting 2/28/2023. Then, take one and half tablets by mouth daily for 7 days starting 3/7/2023. Then, take one tablet by mouth daily starting 3/14/2023. Then,  "take half a tablet by mouth daily for 7 days starting 3/21/2023.    Class: E-Prescribe    Route: Oral    senna-docusate (SENOKOT-S/PERICOLACE) 8.6-50 MG tablet  60 tablet 0 2/25/2023 3/27/2023   [address]    Sig: Take 1 tablet by mouth 2 times daily for 30 days    Class: E-Prescribe    Route: Oral    sodium bicarbonate 650 MG tablet  60 tablet 1 2/25/2023 4/26/2023   [address]    Sig: Take 1 tablet (650 mg) by mouth 2 times daily for 60 days    Class: E-Prescribe    Route: Oral    sulfamethoxazole-trimethoprim (BACTRIM) 400-80 MG tablet  30 tablet 11 2/26/2023    [address]    Sig: Take 1 tablet by mouth daily    Class: E-Prescribe    Route: Oral    valGANciclovir (VALCYTE) 450 MG tablet  60 tablet 2 2/26/2023 5/27/2023   [address]    Sig: Take 2 tablets (900 mg) by mouth daily for 90 days    Class: E-Prescribe    Route: Oral    Vitamin D3 (CHOLECALCIFEROL) 25 mcg (1000 units) tablet  60 tablet 0 2/26/2023    [address]    Sig: Take 2 tablets (50 mcg) by mouth daily    Class: E-Prescribe    Route: Oral    nitroGLYcerin (NITROSTAT) 0.4 MG sublingual tablet  25 tablet 3 12/6/2021    [id number] - [place], [place] - [address]    Sig: One tablet under the tongue every 5 minutes if needed for chest pain. May repeat every 5 minutes for a maximum of 3 doses in 15 minutes\"    Class: E-Prescribe    polyethylene glycol (MIRALAX) 17 GM/Dose powder  510 g 0 2/25/2023    [address]    Sig: Take 17 g by mouth daily    Class: E-Prescribe    Route: Oral    tacrolimus (GENERIC EQUIVALENT) 0.5 MG capsule  120 capsule 3 3/3/2023    [facility] - [place], [place] - [address]    Sig: Take 1 capsule (0.5 mg) by mouth 2 times daily Take total dose 2.5 mg twice per day    Class: E-Prescribe    Notes to Pharmacy: TXP DT 2/21/2023 (Kidney) TXP Dischg DT 2/26/2023 DX Kidney replaced by transplant Z94.0 TX [facility] ([place], [place])    Route: Oral    tacrolimus (GENERIC EQUIVALENT) 0.5 MG capsule  30 capsule 0 2/25/2023    [facility] - [place], [place] - [address]    Sig: Take 1 capsule (0.5 mg) by mouth daily as needed (To be used for dosage adjustments to maintain goal Tac level 8-10 and/or as instructed by primary transplant team.)    Class: E-Prescribe    Route: Oral    tacrolimus (GENERIC EQUIVALENT) 1 MG capsule  120 capsule 3 3/3/2023    [facility] - [place], [place] - [address]    Sig: Take 2 capsules (2 mg) by mouth 2 times daily Take total  3.5 mg twice per day    Class: E-Prescribe    Notes to Pharmacy: TXP DT 2/21/2023 (Kidney) TXP Dischg DT 2/26/2023 DX Kidney replaced by transplant Z94.0 TX [facility] ([place], [place])    Route: Oral          O:   Pulse:  [81] 81  BP: (120)/(54) 120/54  SpO2:  [99 %] 99 %  General Appearance: in no apparent distress.   Skin: Normal, no rashes or jaundice  Heart: regular rate and rhythm  Lungs: easy respirations, no audible wheezing.  Abdomen: flat, The wound is dry and intact, without hernia. The abdomen is non-tender. The kidney graft is not tender.  There is no ascites.  Extremities: Tremor absent.   Edema: present on the right side. 1+    Transplant Immunosuppression Labs Latest Ref Rng & Units 3/2/2023 2/28/2023 2/27/2023 2/26/2023 2/25/2023   Creat 0.67 - 1.17 mg/dL 0.87 0.95 0.93 0.96 0.93   UREA NITROGEN 8 - 22 mg/dL - - - - -   UREA NITROGEN (R) 6.0 - 20.0 mg/dL 19.5 24.5(H) 27.2(H) 27.2(H) 22.5(H)   WBC 4.0 - 11.0 10e3/uL 8.5 8.9 7.5 6.9 5.9   Neutrophil  % - 96 93 95 87   ANEU 1.6 - 8.3 10e3/uL - 8.5(H) 7.0 6.6 -       Chemistries:   Recent Labs   Lab Test 03/02/23  0833   BUN 19.5   CR 0.87   GFRESTIMATED >90   GLC 82     Lab Results   Component Value Date    A1C 6.8 02/22/2023     Recent Labs   Lab Test 02/16/23  1226   ALBUMIN 3.3*   BILITOTAL 0.2   ALKPHOS 66   AST 18   ALT 18     Urine Studies:  Recent Labs   Lab Test 02/16/23  1248   COLOR Light Yellow   APPEARANCE Clear   URINEGLC 150*   URINEBILI Negative   URINEKETONE Negative   SG 1.017   UBLD Negative   URINEPH 5.0   PROTEIN 30*   NITRITE Negative   LEUKEST Negative   RBCU 1   WBCU 2     No lab results found.  Hematology:   Recent Labs   Lab Test 03/02/23  0833 02/28/23  0811 02/27/23  0821   HGB 9.7* 8.7* 9.6*   PLT 71* 64* 64*   WBC 8.5 8.9 7.5     Coags:   Recent Labs   Lab Test 02/16/23  1226 11/17/22  0916   INR 1.08 1.16*     HLA antibodies:   SA1 HI RISK MADHAV   Date Value Ref Range Status   02/16/2023 None  Final     SA1 MOD RISK MADHAV   Date Value Ref Range Status   02/16/2023 B:44  Final     SA2 HI RISK MADHAV   Date Value Ref Range Status   02/16/2023 None  Final     SA2 MOD RISK MADHAV   Date Value Ref Range Status   02/16/2023 None  Final       Assessment: Modesto Barbosa is doing well s/p LDKT:  Issues we addressed during his visit include:    Plan:    1. Graft function: stable  2. Immunosuppression Management: No change \continue same IS .  Complexity of management:Medium.  Contributing factors: recent txp  3. Right lower extremity swelling:  US to rule out DVT.  Likely related to recent txp  4. Testicular swelling.  Bruising and swelling noted.  Likely due to dependent swelling.  US ordered  Followup: as directed    Total Time: 25 min,   Counselling Time: 15 min.            Again, thank you for allowing me to participate in the care of your patient.      Sincerely,    Ritu Bonilla NP

## 2023-03-03 NOTE — PROGRESS NOTES
"Medication Therapy Management (MTM) Encounter    ASSESSMENT:                            Medication Adherence/Access: recommended setting alarms on his phone as an extra reminder to take the meds on time.    Kidney Transplant:  Stable.    Type 2 Diabetes:  Patient has been having some low blood sugars in the morning. Recommended he reduce his Levemir to 26 units and continue reducing per his taper. All sugars at goal  during prednisone taper. .     Constipation: Stable.    Hypertension: BP at goal <150/90 for first 2 months post txp. Recommended he move Amlodipine to the evening as it may work better when taken at this time.    Hyperlipidemia: Stable.    Pain: Stable.     PLAN:                            1. Recommend setting alarms at your dose times as an extra reminder.  2. Move Amlodipine 5mg to the evening, will work a little better if taken at this time.   3. Decrease Levemir to 26 units every morning.  Send me a message next Wednesday if his blood sugars drop below 80 during the week.  4. [facility] mail order will call you three weeks post discharge, but if your dose changes, call the number on the back of the pill box to talk to them earlier, [phone]. If your doctor sends over a new prescription to the mail order pharmacy you will have to call them to set up the order. They have an Cherise called \"[facility]\" as well.     Follow-up: 5/1    SUBJECTIVE/OBJECTIVE:                          Modesto Barbosa is a 57 year old male called for a transitions of care visit. He was discharged from [facility] on [facility]/25 for kidney transplant. Patient was accompanied by Yanick. Called with , but pt declined.       Reason for visit: initial post transplant.    Allergies/ADRs: Reviewed in chart  Past Medical History: Reviewed in chart  Tobacco: He reports that he has never smoked. He has never been exposed to tobacco smoke. He has never used smokeless tobacco.  Alcohol: not currently using  CBD/THC: none    Medication " Adherence/Access: Patient uses pill box(es) and has assistance with medication administration: family member, Yanick. Comes over once a week to set up medications.   Patient takes medications 4 time(s) per day. 8, 12, 6, 8  Per patient, misses medication 0 times per week.   The patient fills medications at [facility]: YES.    Kidney Transplant:  Current immunosuppressants include Tacrolimus 3mg twice daily, Mycophenolate Mofetil 750mg twice daily and Prednisone 20mg every morning, decreasing on Monday.  Pt reports no side effects  Transplant date: 2/21/23  Estimated Creatinine Clearance: 90.8 mL/min (based on SCr of 0.87 mg/dL).  CMV prophylaxis: CrCl >/=60 mL/minute: Valcyte 900mg daily Treat 3 months post tx   PJP prophylaxis: Bactrim S S daily  Vascular prophylaxis: Aspirin 81mg daily (denies gastrointestinal bleed sx  Supplements: Mag Oxide 400mg daily ( 2 hours from MMF), Sodium Bicarb 650mg twice daily, vitamin D3 50mcg daily, Phosphorus 250mg twice daily .Calcium 1000mg twice daily Calcium and phosphorus are taken at separate dose times.   Tx Coordinator: Antoinette Bob MD: Dr. Kim, Using Med Card: Yes  Immunizations: annual flu shot 2022; Pneumovax 23:  unknown; Prevnar 13: 2021; TDaP:  unknown; Shingrix: unknown, HBV: no immunity, but core positive, COVID: Moderna 2021x3, Bivalent x1  Lab Results   Component Value Date    PHOS 1.7 (L) 03/02/2023    PHOS 2.0 (L) 02/28/2023    PHOS 2.4 (L) 02/27/2023    MAG 2.1 03/02/2023    MAG 2.2 02/28/2023    MAG 2.3 02/27/2023    CO2 24 03/02/2023    CO2 23 02/28/2023    CO2 22 02/27/2023     Lab Results   Component Value Date    REYMUNDO 7.6 (L) 03/02/2023    REYMUNDO 7.6 (L) 02/28/2023    REYMUNDO 7.5 (L) 02/27/2023       Type 2 Diabetes:  Currently taking Levemir 28 units every morning tapering with prednisone, Novolog 5 units with meals (two meals). Patient is not experiencing side effects.  Blood sugar monitoring: 3 time(s) daily. Ranges (patient reported):   Symptoms  of low blood sugar? none  Symptoms of high blood sugar? None  Urine Albumin:   Lab Results   Component Value Date    UMALCR 223.94 (H) 02/09/2023      Lab Results   Component Value Date    A1C 6.8 02/22/2023    A1C 6.8 02/21/2023    A1C 7.1 02/16/2023    A1C 6.9 02/09/2023    A1C 5.6 08/23/2021     Date FBG...later without food Lunch/2hours post Dinner /2hours post   3/3 114     3/2 78 102 131   3/1 92...78 102 78   2/28 88 119 145   2/27 66       Constipation: Pt is taking Senna 1 tablet twice daily. 1-2 BMs daily, soft. Has not needed Miralax.     Hypertension: Current medications include Amlodipine 5mg every morning, Carvedilol 25mg twice daily.  Patient does self-monitor blood pressure. 145/83, 136/82, 170/82 .  Patient reports swelling in one leg, going into clinic for diuresis.  BP Readings from Last 3 Encounters:   02/28/23 (!) 149/81   02/26/23 131/70   02/16/23 125/64     Hyperlipidemia: Current therapy includes Atorvastatin 20mg daily.  Patient reports no significant myalgias or other side effects.  The ASCVD Risk score (Rajeev DK, et al., 2019) failed to calculate for the following reasons:    The patient has a prior MI or stroke diagnosis  Recent Labs   Lab Test 11/17/22  0916 01/19/22  1144   CHOL 122 138   HDL 22* 37*   LDL 53 56   TRIG 234* 224*     Pain: Pt is taking Acetaminophen 650mg every 4-6 hours, Oxycodone 5mg prn. Pain is 4/10.     Today's Vitals: There were no vitals taken for this visit.  ----------------  Post Discharge Medication Reconciliation Status: discharge medications reconciled and changed, per note/orders.    I spent 35 minutes with this patient today. All changes were made via collaborative practice agreement with Dr. Kim. A copy of the visit note was provided to the patient's provider(s).    A summary of these recommendations was sent via [employer].    Santos Hussein, PharmD  [facility] Pharmacisthas     Phone: [phone]     Telemedicine Visit Details  Type of service:  Telephone  visit  Start Time: 8:35 AM  End Time: 9:10 AM     Medication Therapy Recommendations  HTN, kidney transplant related    Current Medication: amLODIPine (NORVASC) 5 MG tablet   Rationale: Incorrect administration - Dosage too low - Effectiveness   Recommendation: Change Administration Time   Status: Patient Agreed - Adherence/Education         Type 2 diabetes mellitus with other specified complication, unspecified whether long term insulin use (H)    Current Medication: insulin detemir (LEVEMIR PEN) 100 UNIT/ML pen   Rationale: Dose too high - Dosage too high - Safety   Recommendation: Decrease Dose   Status: Accepted per CPA

## 2023-03-03 NOTE — LETTER
"Recommended To-Do List      Prepared on: Mar 3, 2023       You can get the best results from your medications by completing the items on this \"To-Do List.\"      Bring your To-Do List when you go to your doctor. And, share it with your family or caregivers.    My To-Do List:  What we talked about: What I should do:   Your medication dosage being too low    Change when you are taking amLODIPine (NORVASC) to the evening          What we talked about: What I should do:   Your medication dosage being too high    Decrease your dosage of insulin detemir (LEVEMIR PEN) to 26 units every morning and continue taper          What we talked about: What I should do:                       "

## 2023-03-03 NOTE — LETTER
March 3, 2023  Modesto JOE Barbosa  [address] [place] [address]    Dear [facility]ord[facility] MTM     Thank you for talking with me on Mar 3, 2023 about your health and medications. As a follow-up to our conversation, I have included two documents:      1. Your Recommended To-Do List has steps you should take to get the best results from your medications.  2. Your Medication List will help you keep track of your medications and how to take them.    If you want to talk about these documents, please call Santos Hussein RPH at phone: [phone], Monday-Friday 8-4:30pm.    I look forward to working with you and your doctors to make sure your medications work well for you.    Sincerely,  Santos Hussein RPH  [facility] Pharmacist, [facility]

## 2023-03-03 NOTE — LETTER
_  Medication List        Prepared on: Mar 3, 2023     Bring your Medication List when you go to the doctor, hospital, or   emergency room. And, share it with your family or caregivers.     Note any changes to how you take your medications.  Cross out medications when you no longer use them.    Medication How I take it Why I use it Prescriber   acetaminophen (TYLENOL) 325 MG tablet Take 2 tablets (650 mg) by mouth every 4 hours as needed for other (For optimal non-opioid multimodal pain management to improve pain control.) Pain Davian Jaime MD   amLODIPine (NORVASC) 5 MG tablet Take 1 tablet (5 mg) by mouth daily Essential Hypertension Yen Saeed NP   aspirin (ASA) 81 MG EC tablet Take 1 tablet (81 mg) by mouth daily Blood thinner Davian Jaime MD   atorvastatin (LIPITOR) 20 MG tablet Take 1 tablet (20 mg) by mouth daily Cholesterol/heart health Davian Jaime MD   calcium carbonate (TUMS) 500 MG chewable tablet Take 2 tablets (1,000 mg) by mouth 2 times daily Low calcium Davian Jaime MD   carvedilol (COREG) 25 MG tablet Take 1 tablet (25 mg) by mouth 2 times daily (with meals) Blood pressure Davian Jaime MD   insulin aspart (NOVOLOG PEN) 100 UNIT/ML pen Inject 5 Units Subcutaneous 3 times daily (with meals) for 180 days Type 2 diabetes mellitus with diabetic chronic kidney disease, unspecified CKD stage, unspecified whether long term insulin use (H) Davian Jaime MD   insulin detemir (LEVEMIR PEN) 100 UNIT/ML pen Inject 30 Units Subcutaneous At Bedtime for 2 days, THEN 28 Units At Bedtime for 7 days, THEN 26 Units At Bedtime for 7 days, THEN 24 Units At Bedtime for 7 days, THEN 20 Units At Bedtime for 7 days, THEN 20 Units At Bedtime for 30 days. On the days that you are taking the prescribed Prednisone 40mg daily, take levemir 30 units at bedtime. On the days that you are taking the prescribed Prednisone 20mg daily take levemir 28 units at bedtime. On the days that  "you are taking the prescribed Prednisone 15mg daily take levemir 26 units at bedtime. On the days that you are taking the prescribed Prednisone 10mg daily take levemir 24 units at bedtime. On the days that you are taking the prescribed Prednisone 5mg daily take levemir 20 units at bedtime. Once you are off the steroid taper, take levemir 20 units at bedtime daily. Type 2 diabetes mellitus with diabetic chronic kidney disease, unspecified CKD stage, unspecified whether long term insulin use (H) Davian Jaime MD   loratadine (CLARITIN) 10 MG tablet Take 1 tablet (10 mg) by mouth daily as needed for allergies (itchy/rash) Rash Pal Cooper MD   magnesium oxide (MAG-OX) 400 MG tablet Take 1 tablet (400 mg) by mouth daily (with lunch) supplement Davian Jaime MD   mycophenolate (GENERIC EQUIVALENT) 250 MG capsule Take 3 capsules (750 mg) by mouth 2 times daily for 360 days Kidney Replaced by Transplant Davian Jaime MD   nitroGLYcerin (NITROSTAT) 0.4 MG sublingual tablet One tablet under the tongue every 5 minutes if needed for chest pain. May repeat every 5 minutes for a maximum of 3 doses in 15 minutes\" Pre-transplant evaluation for kidney transplant; Essential Hypertension Cristela Banks MD   oxyCODONE (ROXICODONE) 5 MG tablet Take 1 tablet (5 mg) by mouth every 4 hours as needed for moderate pain (4-6) Pain Davian Jaime MD   phosphorus tablet 250 mg (PHOSPHA 250 NEUTRAL) 250 MG per tablet Take 1 tablet (250 mg) by mouth 2 times daily for 60 days Low phosphorus Davian Jaime MD   polyethylene glycol (MIRALAX) 17 GM/Dose powder Take 17 g by mouth daily constipation Davian Jaime MD   predniSONE (DELTASONE) 10 MG tablet Take 1 tablet (10 mg) by mouth daily Take 4 tablets by mouth daily for 2 days starting on Sun, 2/26/2023. Then, take 2 tablets by mouth daily for 7 days starting 2/28/2023. Then, take one and half tablets by mouth daily for 7 days starting 3/7/2023. Then, take one " tablet by mouth daily starting 3/14/2023. Then, take half a tablet by mouth daily for 7 days starting 3/21/2023. Kidney Replaced by Transplant Davian Jaime MD   senna-docusate (SENOKOT-S/PERICOLACE) 8.6-50 MG tablet Take 1 tablet by mouth 2 times daily for 30 days Constipation Davian Jaime MD   sodium bicarbonate 650 MG tablet Take 1 tablet (650 mg) by mouth 2 times daily for 60 days Acid in the blood Davian Jaime MD   sulfamethoxazole-trimethoprim (BACTRIM) 400-80 MG tablet Take 1 tablet by mouth daily Kidney Replaced by Transplant Davian Jaime MD   tacrolimus (GENERIC EQUIVALENT) 0.5 MG capsule Take 1 capsule (0.5 mg) by mouth daily as needed (To be used for dosage adjustments to maintain goal Tac level 8-10 and/or as instructed by primary transplant team.) Kidney Replaced by Transplant Davian Jaime MD   tacrolimus (GENERIC EQUIVALENT) 0.5 MG capsule Take 1 capsule (0.5 mg) by mouth 2 times daily Take total dose 3.5 mg twice per day Kidney Replaced by Transplant Patrick Phan MD   tacrolimus (GENERIC EQUIVALENT) 1 MG capsule Take 3 capsules (3 mg) by mouth 2 times daily Take total  3.5 mg twice per day Kidney Replaced by Transplant Patrick Phan MD   valGANciclovir (VALCYTE) 450 MG tablet Take 2 tablets (900 mg) by mouth daily for 90 days Kidney Replaced by Transplant Davian Jaime MD   Vitamin D3 (CHOLECALCIFEROL) 25 mcg (1000 units) tablet Take 2 tablets (50 mcg) by mouth daily supplement Davian Jaime MD         Add new medications, over-the-counter drugs, herbals, vitamins, or  minerals in the blank rows below.    Medication How I take it Why I use it Prescriber                                      Allergies:      tegaderm transparent dressing (informational only)        Side effects I have had:               Other Information:              My notes and questions:

## 2023-03-04 DIAGNOSIS — R60.9 EDEMA, UNSPECIFIED TYPE: Primary | ICD-10-CM

## 2023-03-04 RX ORDER — POTASSIUM CHLORIDE 1500 MG/1
20 TABLET, EXTENDED RELEASE ORAL DAILY
Qty: 2 TABLET | Refills: 0 | Status: SHIPPED | OUTPATIENT
Start: 2023-03-05 | End: 2023-03-07

## 2023-03-04 RX ORDER — FUROSEMIDE 20 MG
20 TABLET ORAL DAILY
Qty: 3 TABLET | Refills: 0 | Status: SHIPPED | OUTPATIENT
Start: 2023-03-04 | End: 2023-03-10

## 2023-03-04 NOTE — TELEPHONE ENCOUNTER
TRIAGE CALL    Patients daughter calls to reports Right lower extremity US order that was placed in [facility] clinic earlier today was somehow cancelled.  Patient is scheduled for US this evening at [facility] at 1950 to R/O blood clot.    Per daughter,  Testicular and scrotum US orders still active, however RLE US is not.    US lower right extremity US order replaced.    Neha Bhatti RN   Transplant Coordinator  [phone]

## 2023-03-05 DIAGNOSIS — R60.9 EDEMA, UNSPECIFIED TYPE: ICD-10-CM

## 2023-03-06 ENCOUNTER — LAB REQUISITION (OUTPATIENT)
Dept: LAB | Facility: CLINIC | Age: 58
End: 2023-03-06
Payer: COMMERCIAL

## 2023-03-06 DIAGNOSIS — Z48.22 ENCOUNTER FOR AFTERCARE FOLLOWING KIDNEY TRANSPLANT: ICD-10-CM

## 2023-03-06 DIAGNOSIS — R60.9 EDEMA, UNSPECIFIED TYPE: ICD-10-CM

## 2023-03-06 LAB
ANION GAP SERPL CALCULATED.3IONS-SCNC: 8 MMOL/L (ref 7–15)
BASOPHILS # BLD AUTO: 0 10E3/UL (ref 0–0.2)
BASOPHILS NFR BLD AUTO: 0 %
BUN SERPL-MCNC: 18.3 MG/DL (ref 6–20)
CALCIUM SERPL-MCNC: 8.3 MG/DL (ref 8.6–10)
CHLORIDE SERPL-SCNC: 109 MMOL/L (ref 98–107)
CREAT SERPL-MCNC: 1.01 MG/DL (ref 0.67–1.17)
DEPRECATED HCO3 PLAS-SCNC: 21 MMOL/L (ref 22–29)
EOSINOPHIL # BLD AUTO: 0.1 10E3/UL (ref 0–0.7)
EOSINOPHIL NFR BLD AUTO: 1 %
ERYTHROCYTE [DISTWIDTH] IN BLOOD BY AUTOMATED COUNT: 20.4 % (ref 10–15)
GFR SERPL CREATININE-BSD FRML MDRD: 86 ML/MIN/1.73M2
GLUCOSE SERPL-MCNC: 167 MG/DL (ref 70–99)
HCT VFR BLD AUTO: 28.4 % (ref 40–53)
HGB BLD-MCNC: 9.4 G/DL (ref 13.3–17.7)
IMM GRANULOCYTES # BLD: 0.1 10E3/UL
IMM GRANULOCYTES NFR BLD: 1 %
LYMPHOCYTES # BLD AUTO: 0.3 10E3/UL (ref 0.8–5.3)
LYMPHOCYTES NFR BLD AUTO: 4 %
MAGNESIUM SERPL-MCNC: 1.8 MG/DL (ref 1.7–2.3)
MCH RBC QN AUTO: 30.9 PG (ref 26.5–33)
MCHC RBC AUTO-ENTMCNC: 33.1 G/DL (ref 31.5–36.5)
MCV RBC AUTO: 93 FL (ref 78–100)
MONOCYTES # BLD AUTO: 0.3 10E3/UL (ref 0–1.3)
MONOCYTES NFR BLD AUTO: 3 %
NEUTROPHILS # BLD AUTO: 7.7 10E3/UL (ref 1.6–8.3)
NEUTROPHILS NFR BLD AUTO: 91 %
NRBC # BLD AUTO: 0 10E3/UL
NRBC BLD AUTO-RTO: 0 /100
PHOSPHATE SERPL-MCNC: 1.4 MG/DL (ref 2.5–4.5)
PLATELET # BLD AUTO: 60 10E3/UL (ref 150–450)
POTASSIUM SERPL-SCNC: 5.4 MMOL/L (ref 3.4–5.3)
RBC # BLD AUTO: 3.04 10E6/UL (ref 4.4–5.9)
SODIUM SERPL-SCNC: 138 MMOL/L (ref 136–145)
TACROLIMUS BLD-MCNC: 8.5 UG/L (ref 5–15)
TME LAST DOSE: NORMAL H
TME LAST DOSE: NORMAL H
WBC # BLD AUTO: 8.4 10E3/UL (ref 4–11)

## 2023-03-06 PROCEDURE — 80197 ASSAY OF TACROLIMUS: CPT | Mod: ORL | Performed by: TRANSPLANT SURGERY

## 2023-03-06 PROCEDURE — 83735 ASSAY OF MAGNESIUM: CPT | Mod: ORL | Performed by: TRANSPLANT SURGERY

## 2023-03-06 PROCEDURE — 85025 COMPLETE CBC W/AUTO DIFF WBC: CPT | Mod: ORL | Performed by: TRANSPLANT SURGERY

## 2023-03-06 PROCEDURE — 80048 BASIC METABOLIC PNL TOTAL CA: CPT | Mod: ORL | Performed by: TRANSPLANT SURGERY

## 2023-03-06 PROCEDURE — 84100 ASSAY OF PHOSPHORUS: CPT | Mod: ORL | Performed by: TRANSPLANT SURGERY

## 2023-03-06 NOTE — TELEPHONE ENCOUNTER
Called  Zong confirmed       RX   Furosemide lasix     and potassium sent to local [facility]'s

## 2023-03-06 NOTE — TELEPHONE ENCOUNTER
Larry Liz,   My dad was just seen in clinic, concern about blood clot and he needs US imaging tonight. I was wondering about his Tac dosing since level was still elevated. Also thinking of doing [place] paperwork to care for him. I was wondering if you could call me when you have time today. I am not available from 2-3:30pm but available before and after. Thanks     Yanick       Please review telephone encounter regarding plan

## 2023-03-07 ENCOUNTER — ALLIED HEALTH/NURSE VISIT (OUTPATIENT)
Dept: EDUCATION SERVICES | Facility: CLINIC | Age: 58
End: 2023-03-07
Attending: FAMILY MEDICINE
Payer: COMMERCIAL

## 2023-03-07 VITALS — BODY MASS INDEX: 25.8 KG/M2 | WEIGHT: 150.3 LBS

## 2023-03-07 DIAGNOSIS — E13.69 OTHER SPECIFIED DIABETES MELLITUS WITH OTHER SPECIFIED COMPLICATION, WITH LONG-TERM CURRENT USE OF INSULIN (H): Primary | ICD-10-CM

## 2023-03-07 DIAGNOSIS — Z79.4 OTHER SPECIFIED DIABETES MELLITUS WITH OTHER SPECIFIED COMPLICATION, WITH LONG-TERM CURRENT USE OF INSULIN (H): Primary | ICD-10-CM

## 2023-03-07 PROCEDURE — G0108 DIAB MANAGE TRN  PER INDIV: HCPCS | Mod: AE | Performed by: DIETITIAN, REGISTERED

## 2023-03-07 NOTE — LETTER
3/7/2023         RE: Modesto Barbosa  [address] [place] [address]        Dear Colleague,    [facility] you [facility] Please see a copy of my visit note below.    Diabetes Self-Management Education & Support    Presents for:      Type of Service: In Person Visit    Assessment Type:   ASSESSMENT:    Initial visit for diabetes education, conducted with the help of a professional .  Also accompanying him today is his daughter, Chelsey. Chelsey is a public health nurse and does home care visits    PMH: ESRD from T2D  S/p kidney transplant on . Was in the hospital  -     Good family support: pt lives with wife and son and Chelsey (daughter) lives just a few minutes away and is very actively involved in patient's care.   Pt does not work outside the home    Has gained ~ 15 lbs post surgery.  150 lb (3/7) > 135 lb, post surgery - on lasix     Currently on Novolog 5 units BID with meals + levemir taper dose as per prednisone.Being managed by MTM and Endo  Levemir was decreased to 26 units on 3/3 (due to low AM blood sugars) - no longer having lows.     Taking levemir in the am along with prednisone. Will go back to 20 units at HS once done with prednisone  Taking Predinisone 20 mg daily - levemir was decreased to 26 units on 3/3 (due to low AM blood sugars)  15 mg - 24 units daily  10 mg daily - 22 units daily     SMBG:  Testing 4x/d, BG starting  - 3/5  FB (drank juice), 88, 115, 73, 113, 114, 85  Before breakfast: 220, 114, 131, 142, 201, 141, 104  Before lunch: 216, 109, 73, 106, 142, 114, 113  HS: 203, 151, 141, 193, 225, 161, 98  * pt does not have BS for 3/6 and 3/7    Appetite is slowly getting better after the transplant.  2 meals  + 3 snacks   Trying to eat more protein  2 meals + 3 snacks daily. Meals consist of rice, veg and protien.  Is trying to drink 2-3 litres water. Also drinking diet coke for  phosphate    Regular activity: stationary bike, elliptical, twice a day, 20-40 mins daily, baby sits grand kids .    Reports feeling well overall and denies any s/s of hypo/hyperglycemia.  No other concerns.     Daughter/patient wonder if they could ever go off insulin; they would prefer oral meds - discussed that we will continue to assess for any med changes/adjustments    Education/Discussion: Reviewed diabetes basics, AIC and BS goals, sx and tx of hypo and hyperglycemia, general activity and diet guidelines, CHO foods, pt expressed understanding.  We also reviewed heart healthy diet upon patient/daughter's request    Patient's most recent   Lab Results   Component Value Date    A1C 6.8 02/22/2023     is meeting goal of <7.0    Diabetes knowledge and skills assessment:   Patient is knowledgeable in diabetes management concepts related to: Monitoring    Continue education with the following diabetes management concepts: Problem Solving and Reducing Risks    Based on learning assessment above, most appropriate setting for further diabetes education would be: Individual setting.      PLAN    Daughter/patient wonder if they could ever go off insulin; they would prefer oral meds - discussed that we will continue to assess for any med changes/adjustments.    To continue to monitor BG 4x/d a keep a BG log for review.    Take meds as prescribed.    Watch high CHO foods and portions.     Regular activity as able/safe and per care team's guidelines    Topics to cover at upcoming visits: Problem Solving and Reducing Risks    Follow-up: 6-8 weeks (or sooner if concerns)  PCP:3/20    See Care Plan for co-developed, patient-state behavior change goals.  AVS provided for patient today.    Education Materials Provided:   [employer] Healthy Living with Diabetes Book   Placemat      SUBJECTIVE/OBJECTIVE:  Accompanied by: Self, Daughter  Focus of Visit: Assistance w/ making life changes  Diabetes type: Type 2  Other  "concerns:: Language barrier  Cultural Influences/Ethnic Background:  Not  or       Diabetes Symptoms & Complications:  Fatigue: No  Neuropathy: No  Polydipsia: No  Polyphagia: No  Polyuria: No  Visual change: No  Slow healing wounds: No  Weight trend: Increasing (Has gained ~ 15 lbs since his surgery)     Patient Problem List and Family Medical History reviewed for relevant medical history, current medical status, and diabetes risk factors.    Vitals:  Wt 68.2 kg (150 lb 4.8 oz)   BMI 25.80 kg/m    Estimated body mass index is 25.8 kg/m  as calculated from the following:    Height as of 2/21/23: 1.626 m (5' 4\").    Weight as of this encounter: 68.2 kg (150 lb 4.8 oz).   Last 3 BP:   BP Readings from Last 3 Encounters:   03/03/23 120/54   02/28/23 (!) 149/81   02/26/23 131/70       History   Smoking Status     Never   Smokeless Tobacco     Never       Labs:  Lab Results   Component Value Date    A1C 6.8 02/22/2023     Lab Results   Component Value Date     03/06/2023     02/26/2023     12/06/2021     Lab Results   Component Value Date    LDL 53 11/17/2022     Direct Measure HDL   Date Value Ref Range Status   11/17/2022 22 (L) >=40 mg/dL Final   ]  GFR Estimate   Date Value Ref Range Status   03/06/2023 86 >60 mL/min/1.73m2 Final     Comment:     eGFR calculated using 2021 CKD-EPI equation.   06/14/2021 15 (L) >60 mL/min/1.73m2 Final     GFR Estimate If Black   Date Value Ref Range Status   06/14/2021 18 (L) >60 mL/min/1.73m2 Final     Lab Results   Component Value Date    CR 1.01 03/06/2023     No results found for: MICROALBUMIN    Healthy Eating:  Healthy Eating Assessed Today: Yes  Meals include: Breakfast, Dinner, Morning Snack, Afternoon Snack, Evening Snack (2 meals + 3 snacks daily. Meals consist of rice, veg and protein.)  Snacks: eggs, chicken, potato chips ( MD recommended since his sodium was low  Beverages: Water, Diet soda (Is trying to drink 2-3 litres water. Also " drinking 1 can of diet coke for phosphate)    Being Active:  Being Active Assessed Today: Yes  Exercise:: Yes (uses his TM and elliptical daily, baby sits his 9 grand kids)  Days per week of moderate to strenuous exercise (like a brisk walk): 7  On average, minutes per day of exercise at this level: 40  Exercise Minutes per Week: 280    Monitoring:  Monitoring Assessed Today: Yes  Did patient bring glucose meter to appointment? : No    Taking Medications:  Diabetes Medication(s)     Insulin       insulin aspart (NOVOLOG PEN) 100 UNIT/ML pen    Inject 5 Units Subcutaneous 3 times daily (with meals) for 180 days     insulin detemir (LEVEMIR PEN) 100 UNIT/ML pen    Inject 30 Units Subcutaneous At Bedtime for 2 days, THEN 28 Units At Bedtime for 7 days, THEN 26 Units At Bedtime for 7 days, THEN 24 Units At Bedtime for 7 days, THEN 20 Units At Bedtime for 7 days, THEN 20 Units At Bedtime for 30 days. On the days that you are taking the prescribed Prednisone 40mg daily, take levemir 30 units at bedtime. On the days that you are taking the prescribed Prednisone 20mg daily take levemir 28 units at bedtime. On the days that you are taking the prescribed Prednisone 15mg daily take levemir 26 units at bedtime. On the days that you are taking the prescribed Prednisone 10mg daily take levemir 24 units at bedtime. On the days that you are taking the prescribed Prednisone 5mg daily take levemir 20 units at bedtime. Once you are off the steroid taper, take levemir 20 units at bedtime daily.          Taking Medication Assessed Today: Yes  Current Treatments: Insulin Injections  Dose schedule: Pre-lunch, Pre-dinner  Given by: Patient  Injection/Infusion sites: Abdomen    Problem Solving:  Problem Solving Assessed Today: Yes  Is the patient at risk for hypoglycemia?: Yes    Hypoglycemia symptoms  Hunger: Yes  Feeling shaky: Yes    Healthy Coping:  Healthy Coping Assessed Today: Yes  Emotional response to diabetes: Acceptance  Informal  Support system:: Children, Family  Stage of change: PREPARATION (Decided to change - considering how)  Patient Activation Measure Survey Score:  No flowsheet data found.    Care Plan and Education Provided:  Problem Solving and Reducing Risks    Time Spent: 100 minutes  Encounter Type: Individual    Any diabetes medication dose changes were made via the CDE Protocol per the patient's referring provider. A copy of this encounter was shared with the provider.

## 2023-03-07 NOTE — PROGRESS NOTES
Diabetes Self-Management Education & Support    Presents for:      Type of Service: In Person Visit    Assessment Type:   ASSESSMENT:    Initial visit for diabetes education, conducted with the help of a professional .  Also accompanying him today is his daughter, Chelsey. Chelsey is a public health nurse and does home care visits    PMH: ESRD from T2D  S/p kidney transplant on . Was in the hospital  -     Good family support: pt lives with wife and son and Chelsey (daughter) lives just a few minutes away and is very actively involved in patient's care.   Pt does not work outside the home    Has gained ~ 15 lbs post surgery.  150 lb (3/7) > 135 lb, post surgery - on lasix     Currently on Novolog 5 units BID with meals + levemir taper dose as per prednisone.Being managed by MTM and Endo  Levemir was decreased to 26 units on 3/3 (due to low AM blood sugars) - no longer having lows.     Taking levemir in the am along with prednisone. Will go back to 20 units at HS once done with prednisone  Taking Predinisone 20 mg daily - levemir was decreased to 26 units on 3/3 (due to low AM blood sugars)  15 mg - 24 units daily  10 mg daily - 22 units daily     SMBG:  Testing 4x/d, BG starting  - 3/5  FB (drank juice), 88, 115, 73, 113, 114, 85  Before breakfast: 220, 114, 131, 142, 201, 141, 104  Before lunch: 216, 109, 73, 106, 142, 114, 113  HS: 203, 151, 141, 193, 225, 161, 98  * pt does not have BS for 3/6 and 3/7    Appetite is slowly getting better after the transplant.  2 meals  + 3 snacks   Trying to eat more protein  2 meals + 3 snacks daily. Meals consist of rice, veg and protien.  Is trying to drink 2-3 litres water. Also drinking diet coke for phosphate    Regular activity: stationary bike, elliptical, twice a day, 20-40 mins daily, baby sits grand kids .    Reports feeling well overall and denies any s/s of hypo/hyperglycemia.  No other concerns.     Daughter/patient wonder if they could  ever go off insulin; they would prefer oral meds - discussed that we will continue to assess for any med changes/adjustments    Education/Discussion: Reviewed diabetes basics, AIC and BS goals, sx and tx of hypo and hyperglycemia, general activity and diet guidelines, CHO foods, pt expressed understanding.  We also reviewed heart healthy diet upon patient/daughter's request    Patient's most recent   Lab Results   Component Value Date    A1C 6.8 02/22/2023     is meeting goal of <7.0    Diabetes knowledge and skills assessment:   Patient is knowledgeable in diabetes management concepts related to: Monitoring    Continue education with the following diabetes management concepts: Problem Solving and Reducing Risks    Based on learning assessment above, most appropriate setting for further diabetes education would be: Individual setting.      PLAN    Daughter/patient wonder if they could ever go off insulin; they would prefer oral meds - discussed that we will continue to assess for any med changes/adjustments.    To continue to monitor BG 4x/d a keep a BG log for review.    Take meds as prescribed.    Watch high CHO foods and portions.     Regular activity as able/safe and per care team's guidelines    Topics to cover at upcoming visits: Problem Solving and Reducing Risks    Follow-up: 6-8 weeks (or sooner if concerns)  PCP:3/20    See Care Plan for co-developed, patient-state behavior change goals.  AVS provided for patient today.    Education Materials Provided:  [employer] Healthy Living with Diabetes Book   Placemat      SUBJECTIVE/OBJECTIVE:  Accompanied by: Self, Daughter  Focus of Visit: Assistance w/ making life changes  Diabetes type: Type 2  Other concerns:: Language barrier  Cultural Influences/Ethnic Background:  Not  or       Diabetes Symptoms & Complications:  Fatigue: No  Neuropathy: No  Polydipsia: No  Polyphagia: No  Polyuria: No  Visual change: No  Slow healing wounds: No  Weight  "trend: Increasing (Has gained ~ 15 lbs since his surgery)     Patient Problem List and Family Medical History reviewed for relevant medical history, current medical status, and diabetes risk factors.    Vitals:  Wt 68.2 kg (150 lb 4.8 oz)   BMI 25.80 kg/m    Estimated body mass index is 25.8 kg/m  as calculated from the following:    Height as of 2/21/23: 1.626 m (5' 4\").    Weight as of this encounter: 68.2 kg (150 lb 4.8 oz).   Last 3 BP:   BP Readings from Last 3 Encounters:   03/03/23 120/54   02/28/23 (!) 149/81   02/26/23 131/70       History   Smoking Status     Never   Smokeless Tobacco     Never       Labs:  Lab Results   Component Value Date    A1C 6.8 02/22/2023     Lab Results   Component Value Date     03/06/2023     02/26/2023     12/06/2021     Lab Results   Component Value Date    LDL 53 11/17/2022     Direct Measure HDL   Date Value Ref Range Status   11/17/2022 22 (L) >=40 mg/dL Final   ]  GFR Estimate   Date Value Ref Range Status   03/06/2023 86 >60 mL/min/1.73m2 Final     Comment:     eGFR calculated using 2021 CKD-EPI equation.   06/14/2021 15 (L) >60 mL/min/1.73m2 Final     GFR Estimate If Black   Date Value Ref Range Status   06/14/2021 18 (L) >60 mL/min/1.73m2 Final     Lab Results   Component Value Date    CR 1.01 03/06/2023     No results found for: MICROALBUMIN    Healthy Eating:  Healthy Eating Assessed Today: Yes  Meals include: Breakfast, Dinner, Morning Snack, Afternoon Snack, Evening Snack (2 meals + 3 snacks daily. Meals consist of rice, veg and protein.)  Snacks: eggs, chicken, potato chips ( MD recommended since his sodium was low  Beverages: Water, Diet soda (Is trying to drink 2-3 litres water. Also drinking 1 can of diet coke for phosphate)    Being Active:  Being Active Assessed Today: Yes  Exercise:: Yes (uses his TM and elliptical daily, baby sits his 9 grand kids)  Days per week of moderate to strenuous exercise (like a brisk walk): 7  On average, " minutes per day of exercise at this level: 40  Exercise Minutes per Week: 280    Monitoring:  Monitoring Assessed Today: Yes  Did patient bring glucose meter to appointment? : No    Taking Medications:  Diabetes Medication(s)     Insulin       insulin aspart (NOVOLOG PEN) 100 UNIT/ML pen    Inject 5 Units Subcutaneous 3 times daily (with meals) for 180 days     insulin detemir (LEVEMIR PEN) 100 UNIT/ML pen    Inject 30 Units Subcutaneous At Bedtime for 2 days, THEN 28 Units At Bedtime for 7 days, THEN 26 Units At Bedtime for 7 days, THEN 24 Units At Bedtime for 7 days, THEN 20 Units At Bedtime for 7 days, THEN 20 Units At Bedtime for 30 days. On the days that you are taking the prescribed Prednisone 40mg daily, take levemir 30 units at bedtime. On the days that you are taking the prescribed Prednisone 20mg daily take levemir 28 units at bedtime. On the days that you are taking the prescribed Prednisone 15mg daily take levemir 26 units at bedtime. On the days that you are taking the prescribed Prednisone 10mg daily take levemir 24 units at bedtime. On the days that you are taking the prescribed Prednisone 5mg daily take levemir 20 units at bedtime. Once you are off the steroid taper, take levemir 20 units at bedtime daily.          Taking Medication Assessed Today: Yes  Current Treatments: Insulin Injections  Dose schedule: Pre-lunch, Pre-dinner  Given by: Patient  Injection/Infusion sites: Abdomen    Problem Solving:  Problem Solving Assessed Today: Yes  Is the patient at risk for hypoglycemia?: Yes    Hypoglycemia symptoms  Hunger: Yes  Feeling shaky: Yes    Healthy Coping:  Healthy Coping Assessed Today: Yes  Emotional response to diabetes: Acceptance  Informal Support system:: Children, Family  Stage of change: PREPARATION (Decided to change - considering how)  Patient Activation Measure Survey Score:  No flowsheet data found.    Care Plan and Education Provided:  Problem Solving and Reducing Risks    Time Spent:  100 minutes  Encounter Type: Individual    Any diabetes medication dose changes were made via the CDE Protocol per the patient's referring provider. A copy of this encounter was shared with the provider.

## 2023-03-08 ENCOUNTER — TELEPHONE (OUTPATIENT)
Dept: TRANSPLANT | Facility: CLINIC | Age: 58
End: 2023-03-08
Payer: COMMERCIAL

## 2023-03-08 DIAGNOSIS — Z94.0 KIDNEY REPLACED BY TRANSPLANT: Primary | ICD-10-CM

## 2023-03-08 NOTE — TELEPHONE ENCOUNTER
Post Kidney and Pancreas Transplant Team Conference  Date: 3/8/2023  Transplant Coordinator: Shalonda Roy     Attendees:  [x]  Dr. Phan [x] Diya Bautista, RN [x] Julia Bower LPN     []  Dr. Baldwin [x] Shalonda Roy RN [] Candy Llanos LPN   [x]  Dr. Patel [x] Cora Vázquez, EL[place]IA    [x]  Dr. Kim [] Analia Gallego RN [x] Santos Hussein, PharmD   [] Dr. Alves [] Neha Bhatti, ELIDIA    [] Dr. Walker [] Lalit Fofana RN    [] Dr. Long [] Bijal Diez RN [] Carlene Bell RN   [] Dr. Morse [] Vane Epperson, ELIDIA    []  Dr. Saleh [] Lita Mays RN    [] Dr. Zhou [x] Enedina Kee, ELIDIA    [x] Ritu Bonilla, ALBERT [] Claire Dunn RN        Verbal Plan Read Back:   Schedule visit with Dr. Kim on Friday      Routed to RN Coordinator   Julia Bower LPN      
No

## 2023-03-09 ENCOUNTER — LAB REQUISITION (OUTPATIENT)
Dept: LAB | Facility: CLINIC | Age: 58
End: 2023-03-09
Payer: COMMERCIAL

## 2023-03-09 DIAGNOSIS — Z48.22 ENCOUNTER FOR AFTERCARE FOLLOWING KIDNEY TRANSPLANT: ICD-10-CM

## 2023-03-09 LAB
ANION GAP SERPL CALCULATED.3IONS-SCNC: 8 MMOL/L (ref 7–15)
BASOPHILS # BLD AUTO: 0 10E3/UL (ref 0–0.2)
BASOPHILS NFR BLD AUTO: 0 %
BUN SERPL-MCNC: 10.8 MG/DL (ref 6–20)
CALCIUM SERPL-MCNC: 8.4 MG/DL (ref 8.6–10)
CHLORIDE SERPL-SCNC: 105 MMOL/L (ref 98–107)
CREAT SERPL-MCNC: 0.86 MG/DL (ref 0.67–1.17)
DEPRECATED HCO3 PLAS-SCNC: 23 MMOL/L (ref 22–29)
EOSINOPHIL # BLD AUTO: 0.1 10E3/UL (ref 0–0.7)
EOSINOPHIL NFR BLD AUTO: 1 %
ERYTHROCYTE [DISTWIDTH] IN BLOOD BY AUTOMATED COUNT: 19.9 % (ref 10–15)
GFR SERPL CREATININE-BSD FRML MDRD: >90 ML/MIN/1.73M2
GLUCOSE SERPL-MCNC: 87 MG/DL (ref 70–99)
HCT VFR BLD AUTO: 28.5 % (ref 40–53)
HGB BLD-MCNC: 9.6 G/DL (ref 13.3–17.7)
IMM GRANULOCYTES # BLD: 0.1 10E3/UL
IMM GRANULOCYTES NFR BLD: 1 %
LYMPHOCYTES # BLD AUTO: 0.4 10E3/UL (ref 0.8–5.3)
LYMPHOCYTES NFR BLD AUTO: 6 %
MAGNESIUM SERPL-MCNC: 1.5 MG/DL (ref 1.7–2.3)
MCH RBC QN AUTO: 31.3 PG (ref 26.5–33)
MCHC RBC AUTO-ENTMCNC: 33.7 G/DL (ref 31.5–36.5)
MCV RBC AUTO: 93 FL (ref 78–100)
MONOCYTES # BLD AUTO: 0.2 10E3/UL (ref 0–1.3)
MONOCYTES NFR BLD AUTO: 3 %
NEUTROPHILS # BLD AUTO: 5.7 10E3/UL (ref 1.6–8.3)
NEUTROPHILS NFR BLD AUTO: 89 %
NRBC # BLD AUTO: 0 10E3/UL
NRBC BLD AUTO-RTO: 0 /100
PHOSPHATE SERPL-MCNC: 1.5 MG/DL (ref 2.5–4.5)
PLATELET # BLD AUTO: 62 10E3/UL (ref 150–450)
POTASSIUM SERPL-SCNC: 5.4 MMOL/L (ref 3.4–5.3)
RBC # BLD AUTO: 3.07 10E6/UL (ref 4.4–5.9)
SODIUM SERPL-SCNC: 136 MMOL/L (ref 136–145)
TACROLIMUS BLD-MCNC: 9.2 UG/L (ref 5–15)
TME LAST DOSE: NORMAL H
TME LAST DOSE: NORMAL H
WBC # BLD AUTO: 6.4 10E3/UL (ref 4–11)

## 2023-03-09 PROCEDURE — 84100 ASSAY OF PHOSPHORUS: CPT | Mod: ORL | Performed by: TRANSPLANT SURGERY

## 2023-03-09 PROCEDURE — 80048 BASIC METABOLIC PNL TOTAL CA: CPT | Mod: ORL | Performed by: TRANSPLANT SURGERY

## 2023-03-09 PROCEDURE — 85025 COMPLETE CBC W/AUTO DIFF WBC: CPT | Mod: ORL | Performed by: TRANSPLANT SURGERY

## 2023-03-09 PROCEDURE — 83735 ASSAY OF MAGNESIUM: CPT | Mod: ORL | Performed by: TRANSPLANT SURGERY

## 2023-03-09 PROCEDURE — 80197 ASSAY OF TACROLIMUS: CPT | Mod: ORL | Performed by: TRANSPLANT SURGERY

## 2023-03-10 ENCOUNTER — OFFICE VISIT (OUTPATIENT)
Dept: NEPHROLOGY | Facility: CLINIC | Age: 58
End: 2023-03-10
Attending: INTERNAL MEDICINE
Payer: COMMERCIAL

## 2023-03-10 VITALS
BODY MASS INDEX: 25.75 KG/M2 | WEIGHT: 150 LBS | SYSTOLIC BLOOD PRESSURE: 121 MMHG | HEART RATE: 64 BPM | DIASTOLIC BLOOD PRESSURE: 68 MMHG | TEMPERATURE: 97.8 F | OXYGEN SATURATION: 100 %

## 2023-03-10 DIAGNOSIS — R76.8 HEPATITIS B CORE ANTIBODY POSITIVE: ICD-10-CM

## 2023-03-10 DIAGNOSIS — Z94.0 KIDNEY REPLACED BY TRANSPLANT: Primary | ICD-10-CM

## 2023-03-10 DIAGNOSIS — I15.1 HTN, KIDNEY TRANSPLANT RELATED: ICD-10-CM

## 2023-03-10 DIAGNOSIS — D84.9 IMMUNOSUPPRESSION (H): ICD-10-CM

## 2023-03-10 DIAGNOSIS — N50.89 SCROTAL EDEMA: ICD-10-CM

## 2023-03-10 DIAGNOSIS — Z94.0 HTN, KIDNEY TRANSPLANT RELATED: ICD-10-CM

## 2023-03-10 PROBLEM — D72.829 LEUKOCYTOSIS: Status: RESOLVED | Noted: 2023-02-24 | Resolved: 2023-03-10

## 2023-03-10 PROBLEM — N18.6 ESRD (END STAGE RENAL DISEASE) (H): Status: RESOLVED | Noted: 2023-02-21 | Resolved: 2023-03-10

## 2023-03-10 PROBLEM — R16.1 SPLENOMEGALY: Status: RESOLVED | Noted: 2022-06-02 | Resolved: 2023-03-10

## 2023-03-10 PROBLEM — R73.03 PRE-DIABETES: Status: RESOLVED | Noted: 2023-02-21 | Resolved: 2023-03-10

## 2023-03-10 PROBLEM — R21 RASH: Status: RESOLVED | Noted: 2023-02-10 | Resolved: 2023-03-10

## 2023-03-10 PROBLEM — Z99.2 ESRD (END STAGE RENAL DISEASE) ON DIALYSIS (H): Status: RESOLVED | Noted: 2020-02-10 | Resolved: 2023-03-10

## 2023-03-10 PROBLEM — I95.9 HYPOTENSION, UNSPECIFIED HYPOTENSION TYPE: Status: RESOLVED | Noted: 2022-11-16 | Resolved: 2023-03-10

## 2023-03-10 PROBLEM — N18.31 ANEMIA IN STAGE 3A CHRONIC KIDNEY DISEASE (H): Status: ACTIVE | Noted: 2023-02-24

## 2023-03-10 PROBLEM — N18.6 ESRD (END STAGE RENAL DISEASE) ON DIALYSIS (H): Status: RESOLVED | Noted: 2020-02-10 | Resolved: 2023-03-10

## 2023-03-10 PROCEDURE — G0463 HOSPITAL OUTPT CLINIC VISIT: HCPCS | Performed by: INTERNAL MEDICINE

## 2023-03-10 PROCEDURE — 99215 OFFICE O/P EST HI 40 MIN: CPT | Mod: 24 | Performed by: INTERNAL MEDICINE

## 2023-03-10 RX ORDER — AMOXICILLIN 250 MG
1 CAPSULE ORAL DAILY PRN
Qty: 60 TABLET | Refills: 0 | COMMUNITY
Start: 2023-03-10 | End: 2023-05-01

## 2023-03-10 RX ORDER — TACROLIMUS 0.5 MG/1
0.5 CAPSULE ORAL 2 TIMES DAILY
Qty: 120 CAPSULE | Refills: 3 | Status: SHIPPED | OUTPATIENT
Start: 2023-03-10 | End: 2023-03-13

## 2023-03-10 RX ORDER — TACROLIMUS 1 MG/1
2 CAPSULE ORAL 2 TIMES DAILY
Qty: 120 CAPSULE | Refills: 3 | COMMUNITY
Start: 2023-03-10 | End: 2023-03-13

## 2023-03-10 ASSESSMENT — PAIN SCALES - GENERAL: PAINLEVEL: NO PAIN (0)

## 2023-03-10 NOTE — NURSING NOTE
Chief Complaint   Patient presents with     RECHECK     Return visit.     Blood pressure 121/68, pulse 64, temperature 97.8  F (36.6  C), weight 68 kg (150 lb), SpO2 100 %.    KAMAR NEUMANN

## 2023-03-10 NOTE — PATIENT INSTRUCTIONS
Patient Recommendations:  - Decrease intake of rice, potatoes, tropical fruits, tomatoes. This doesn't mean to stop eating those things but slightly decreasing how much of them that you eat.   -Only take senna as needed for constipation    Transplant Patient Information  Your Post Transplant Coordinator is: Shalonda Roy  For non urgent items, we encourage you to contact your coordinator/care team online via [facility]  You and your care team can also contact your transplant coordinator Monday - Friday, 8am - 5pm at [phone] (Option 2 to reach the coordinator or Option 4 to schedule an appointment).  After hours for urgent matters, please call [facility] at [phone].

## 2023-03-10 NOTE — PROGRESS NOTES
TRANSPLANT NEPHROLOGY EARLY POST TRANSPLANT VISIT    Assessment & Plan   # LDKT: Stable   - Baseline Creatinine: ~ 0.8-1   - Proteinuria: Not checked post transplant, obtain UPCR with next labs    - Date DSA Last Checked: Feb/2023      Latest DSA: No DSA at time of transplant   - BK Viremia: Not checked post transplant   - Kidney Tx Biopsy: No   - Transplant Ureteral Stent: Yes; Scheduled removal date Mar 28, 2023    # Immunosuppression: Tacrolimus immediate release (goal 8-10), Mycophenolate mofetil (dose 750 mg every 12 hours) and Prednisone (dose taper)   - Induction with Recent Transplant:  Intermediate Intensity   - Continue with intensive monitoring of immunosuppression for efficacy and toxicity.   - Changes: Not at this time     # Scrotal edema:    -Testicular U/S 3/3/23 shows severe diffuse scrotal wall edema without underlying fluid or mass. He does have small bilateral hydroceles   -Pt was put on lasix 20mg PO daily on 3/4-3/7/23 with improvement    # Infection Prophylaxis:   - PJP: Sulfa/TMP (Bactrim)  - CMV: Valganciclovir (Valcyte) x3m    # Hypertension: Controlled;  Goal BP: < 140/90   - Volume status: Euvolemic     - Changes: Not at this time . Pt has been taking amlodipine 5mg daily, coreg 25mg bid. He took lasix 20mg daily x3 days with improvement in scrotal edema    # Diabetes: Controlled (HbA1c <7%) Last HbA1c: 6.8%   - Management as per primary care and pharmacy    # Anemia in Chronic Renal Disease: Hgb: Stable      NERY: No   - Iron studies: Replete    # Mineral Bone Disorder:   - Secondary renal hyperparathyroidism; PTH level: Mildly elevated (151-300 pg/ml)        On treatment: None  - Vitamin D; level: Low        On supplement: Yes  - Calcium; level: Low        On supplement: Yes, tums 1g bid  - Phosphorus; level: Low        On supplement: Yes    # Electrolytes:   - Potassium; level: High        On supplement: No  - Magnesium; level: Low        On supplement: Yes, mag ox 400mg daily   -  Bicarbonate; level: Normal        On supplement: Yes, bicarb 650mg bid    # Hyperkalemia:   -Decrease intake of high K foods.    -If K remains elevated could consider low dose lasix    # Thrombocytopenia:              - Seen previously by hematology. Thought to have peripheral consumption vs ITP                    - Baseline platelets ~60k     # Hepatitis B Core Ab +:    - Check Hepatitis B DNA level three months post transplant. Negative at time of transplant      # Asymmetric Left Ventricular Hypertrophy: Stable. No history of sustained ventricular arrhythmias.     # CAD: s/p PCI with stent 11/2021 with negative stress echo 1/2023.  Previously on Brilinta and aspirin. Brillinta stopped 1/2023     # Medical Compliance: Yes    # COVID-19 Virus Review: Discussed COVID-19 virus and the potential medical risks.  Reviewed preventative health recommendations, including wearing a mask where appropriate.  Recommended COVID vaccination should be up to date with either an initial vaccination or booster shot when appropriate.  Asked the patient to inform the transplant center if they are exposed or diagnosed with this virus.    # COVID Vaccination Up To Date: Yes    # Transplant History:  Etiology of Kidney Failure: Diabetes mellitus type 2  Tx: LDKT  Transplant: 2/21/2023 (Kidney)  Donor Type: Living Donor Class:   Crossmatch at time of Tx: negative  DSA at time of Tx: No  Significant changes in immunosuppression: None  CMV IgG Ab High Risk Discordance (D+/R-): No  EBV IgG Ab High Risk Discordance (D+/R-): No  Significant transplant-related complications: None    Transplant Office Phone Number: [phone]    Assessment and plan was discussed with the patient and he voiced his understanding and agreement.    Return visit: Return in 26 days (on 4/5/2023) for 2 month post transplant visit.    Rome Kim MD    Chief Complaint   Mr. Barbosa is a 57 year old here for routine follow up, kidney transplant and immunosuppression  management.     History of Present Illness   The patient overall has been feeling well with the exception of scrotal edema but that has improved after receiving PO lasix from 3/4-3/7. He continues to have some discomfort around his incision but that is improving as well. His appetite is improving. He is drinking about 2L daily. He denies diarrhea. He denies fever, chills, SOB, chest pain, LE edema, dysuria, hematuria.     Home BP: 110-120s systolic typically    Problem List   Patient Active Problem List   Diagnosis     Type 2 Diabetes Mellitus     Dyslipidemia, goal LDL below 70     Hypertension     ESRD (end stage renal disease) on dialysis (H)     Metabolic acidosis     Coronary artery disease involving native coronary artery of native heart without angina pectoris     Thrombocytopenia (H)     Splenomegaly     NSTEMI (non-ST elevated myocardial infarction) (H)     Hypotension, unspecified hypotension type     Rash     Awaiting organ transplant     ESRD (end stage renal disease) (H)     Pre-diabetes     Immunosuppression (H)     Status post kidney transplant     Leukocytosis     Anemia in other chronic diseases classified elsewhere     Hypocalcemia     Hypomagnesemia     Hypophosphatemia     Hepatitis B core antibody positive       Allergies   Allergies   Allergen Reactions     Tegaderm Transparent Dressing (Informational Only) Blisters       Medications   Current Outpatient Medications   Medication Sig     senna-docusate (SENOKOT-S/PERICOLACE) 8.6-50 MG tablet Take 1 tablet by mouth daily as needed for constipation     tacrolimus (GENERIC EQUIVALENT) 0.5 MG capsule Take 1 capsule (0.5 mg) by mouth 2 times daily Take total dose 2.5 mg twice per day     tacrolimus (GENERIC EQUIVALENT) 1 MG capsule Take 2 capsules (2 mg) by mouth 2 times daily Take total  3.5 mg twice per day     acetaminophen (TYLENOL) 325 MG tablet Take 2 tablets (650 mg) by mouth every 4 hours as needed for other (For optimal non-opioid multimodal  pain management to improve pain control.)     amLODIPine (NORVASC) 5 MG tablet Take 1 tablet (5 mg) by mouth daily     aspirin (ASA) 81 MG EC tablet Take 1 tablet (81 mg) by mouth daily     atorvastatin (LIPITOR) 20 MG tablet Take 1 tablet (20 mg) by mouth daily     calcium carbonate (TUMS) 500 MG chewable tablet Take 2 tablets (1,000 mg) by mouth 2 times daily     carvedilol (COREG) 25 MG tablet Take 1 tablet (25 mg) by mouth 2 times daily (with meals)     insulin aspart (NOVOLOG PEN) 100 UNIT/ML pen Inject 5 Units Subcutaneous 3 times daily (with meals) for 180 days     insulin detemir (LEVEMIR PEN) 100 UNIT/ML pen Inject 30 Units Subcutaneous At Bedtime for 2 days, THEN 28 Units At Bedtime for 7 days, THEN 26 Units At Bedtime for 7 days, THEN 24 Units At Bedtime for 7 days, THEN 20 Units At Bedtime for 7 days, THEN 20 Units At Bedtime for 30 days. On the days that you are taking the prescribed Prednisone 40mg daily, take levemir 30 units at bedtime. On the days that you are taking the prescribed Prednisone 20mg daily take levemir 28 units at bedtime. On the days that you are taking the prescribed Prednisone 15mg daily take levemir 26 units at bedtime. On the days that you are taking the prescribed Prednisone 10mg daily take levemir 24 units at bedtime. On the days that you are taking the prescribed Prednisone 5mg daily take levemir 20 units at bedtime. Once you are off the steroid taper, take levemir 20 units at bedtime daily.     loratadine (CLARITIN) 10 MG tablet Take 1 tablet (10 mg) by mouth daily as needed for allergies (itchy/rash)     magnesium oxide (MAG-OX) 400 MG tablet Take 1 tablet (400 mg) by mouth daily (with lunch)     mycophenolate (GENERIC EQUIVALENT) 250 MG capsule Take 3 capsules (750 mg) by mouth 2 times daily for 360 days     nitroGLYcerin (NITROSTAT) 0.4 MG sublingual tablet One tablet under the tongue every 5 minutes if needed for chest pain. May repeat every 5 minutes for a maximum of 3  "doses in 15 minutes\" (Patient not taking: Reported on 2/27/2023)     phosphorus tablet 250 mg (PHOSPHA 250 NEUTRAL) 250 MG per tablet Take 1 tablet (250 mg) by mouth 2 times daily for 60 days     polyethylene glycol (MIRALAX) 17 GM/Dose powder Take 17 g by mouth daily (Patient not taking: Reported on 2/27/2023)     predniSONE (DELTASONE) 10 MG tablet Take 1 tablet (10 mg) by mouth daily Take 4 tablets by mouth daily for 2 days starting on Sun, 2/26/2023. Then, take 2 tablets by mouth daily for 7 days starting 2/28/2023. Then, take one and half tablets by mouth daily for 7 days starting 3/7/2023. Then, take one tablet by mouth daily starting 3/14/2023. Then, take half a tablet by mouth daily for 7 days starting 3/21/2023.     sodium bicarbonate 650 MG tablet Take 1 tablet (650 mg) by mouth 2 times daily for 60 days     sulfamethoxazole-trimethoprim (BACTRIM) 400-80 MG tablet Take 1 tablet by mouth daily     valGANciclovir (VALCYTE) 450 MG tablet Take 2 tablets (900 mg) by mouth daily for 90 days     Vitamin D3 (CHOLECALCIFEROL) 25 mcg (1000 units) tablet Take 2 tablets (50 mcg) by mouth daily     No current facility-administered medications for this visit.     Medications Discontinued During This Encounter   Medication Reason     furosemide (LASIX) 20 MG tablet      senna-docusate (SENOKOT-S/PERICOLACE) 8.6-50 MG tablet      tacrolimus (GENERIC EQUIVALENT) 0.5 MG capsule      tacrolimus (GENERIC EQUIVALENT) 0.5 MG capsule      tacrolimus (GENERIC EQUIVALENT) 1 MG capsule      oxyCODONE (ROXICODONE) 5 MG tablet        Physical Exam   Vital Signs: /68   Pulse 64   Temp 97.8  F (36.6  C)   Wt 68 kg (150 lb)   SpO2 100%   BMI 25.75 kg/m      GENERAL APPEARANCE: alert and no distress  HENT: mouth without ulcers or lesions  LYMPHATICS: no cervical or supraclavicular nodes  RESP: lungs clear to auscultation - no rales, rhonchi or wheezes  CV: systolic murmur 2/6, regular rate  EDEMA: no LE edema " bilaterally  ABDOMEN: soft, nondistended, nontender, bowel sounds normal  MS: extremities normal - no gross deformities noted, no evidence of inflammation in joints, no muscle tenderness  SKIN: no rash, scattered ecchymoses on R flank and over RLQ incision  NEURO: normal strength and tone, sensory exam grossly normal, mentation intact and speech normal  PSYCH: mentation appears normal and affect normal/bright  GU_male: mild scrotal edema  TX KIDNEY: normal    Data     Renal Latest Ref Rng & Units 3/9/2023 3/6/2023 3/2/2023   SODIUM 136 - 145 mmol/L 136 138 136   SODIUM POCT 133 - 144 mmol/L - - -   K 3.4 - 5.3 mmol/L 5.4(H) 5.4(H) 5.3   Cl 98 - 107 mmol/L 105 109(H) 106   Cl (external) 98 - 107 mmol/L 105 109(H) 106   CO2 22 - 29 mmol/L 23 21(L) 24   UREA NITROGEN 8 - 22 mg/dL - - -   UREA NITROGEN (R) 6.0 - 20.0 mg/dL 10.8 18.3 19.5   CREATININE 0.67 - 1.17 mg/dL 0.86 1.01 0.87   Glucose 70 - 99 mg/dL 87 167(H) 82   CALCIUM, TOTAL 8.6 - 10.0 mg/dL 8.4(L) 8.3(L) 7.6(L)   MAGNESIUM 1.7 - 2.3 mg/dL 1.5(L) 1.8 2.1     Bone Health Latest Ref Rng & Units 3/9/2023 3/6/2023 3/2/2023   PHOSPHORUS 2.5 - 4.5 mg/dL 1.5(L) 1.4(L) 1.7(L)   PARATHYROID HORMONE INTACT 15 - 65 pg/mL - - -   Vit D Def 20 - 75 ug/L - - -     Heme Latest Ref Rng & Units 3/9/2023 3/6/2023 3/2/2023   WBC 4.0 - 11.0 10e3/uL 6.4 8.4 8.5   Hgb 13.3 - 17.7 g/dL 9.6(L) 9.4(L) 9.7(L)   Plt 150 - 450 10e3/uL 62(L) 60(L) 71(L)   ABSOLUTE NEUTROPHIL 1.6 - 8.3 10e3/uL - - -   ABSOLUTE LYMPHOCYTES 0.8 - 5.3 10e3/uL - - -   ABSOLUTE MONOCYTES 0.0 - 1.3 10e3/uL - - -   ABSOLUTE EOSINOPHILS 0.0 - 0.7 10e3/uL - - -     Liver Latest Ref Rng & Units 2/16/2023 11/17/2022 11/16/2022   AP 40 - 129 U/L 66 - 68   TBili <=1.2 mg/dL 0.2 - 0.6   BILIRUBIN, DIRECT <=0.5 mg/dL - - -   BILIRUBIN DIRECT (R) 0.00 - 0.30 mg/dL - - <0.20   ALT 10 - 50 U/L 18 - 16   AST 10 - 50 U/L 18 - 16   Tot Protein 6.4 - 8.3 g/dL 6.1(L) - 6.5   ALBUMIN 3.4 - 5.0 g/dL - - -   ALBUMIN (R) 3.5 - 5.2  g/dL 3.3(L) 2.6(L) 3.4(L)     Pancreas Latest Ref Rng & Units 2/22/2023 2/21/2023 2/16/2023   A1C <5.7 % 6.8(H) 6.8(H) 7.1(H)   Lipase 0 - 52 U/L - - -     Iron studies Latest Ref Rng & Units 2/16/2023 6/11/2021   Iron 61 - 157 ug/dL 37(L) 22(L)   IRON SATURATION INDEX (R) 15 - 46 % 21 -   FERRITIN 31 - 409 ng/mL 847(H) 301(H)     [facility] Txp Virology Latest Ref Rng & Units 2/21/2023 2/16/2023 8/23/2021   CMV QUANT IU/ML Not Detected IU/mL Not Detected - -   EBV CAPSID ANTIBODY IGG No detectable antibody. - Positive(A) Positive(A)        Recent Labs   Lab Test 02/24/23  0551 02/26/23  0504 02/28/23  0811 03/02/23  0833 03/06/23  0828 03/09/23  0756   DOSTAC  --   --  2/27/2023 3/1/2023  --   --    TACROL 2.9*   < > 12.7 12.9 8.5 9.2    < > = values in this interval not displayed.     Recent Labs   Lab Test 03/02/23  0833   DOSMPA 3/1/2023   8:00 PM   MPACID 1.48   MPAG 41.0

## 2023-03-10 NOTE — LETTER
3/10/2023       RE: Modesto Barbosa  [address] [place] [address]     Dear Colleague,    Thank you for referring your patient, Modesto Barbosa, to the [facility]PH[facility] [place] at [facility]. Please see a copy of my visit note below.    TRANSPLANT NEPHROLOGY EARLY POST TRANSPLANT VISIT    Assessment & Plan   # LDKT: Stable   - Baseline Creatinine: ~ 0.8-1   - Proteinuria: Not checked post transplant, obtain UPCR with next labs    - Date DSA Last Checked: Feb/2023      Latest DSA: No DSA at time of transplant   - BK Viremia: Not checked post transplant   - Kidney Tx Biopsy: No   - Transplant Ureteral Stent: Yes; Scheduled removal date Mar 28, 2023    # Immunosuppression: Tacrolimus immediate release (goal 8-10), Mycophenolate mofetil (dose 750 mg every 12 hours) and Prednisone (dose taper)   - Induction with Recent Transplant:  Intermediate Intensity   - Continue with intensive monitoring of immunosuppression for efficacy and toxicity.   - Changes: Not at this time     # Scrotal edema:    -Testicular U/S 3/3/23 shows severe diffuse scrotal wall edema without underlying fluid or mass. He does have small bilateral hydroceles   -Pt was put on lasix 20mg PO daily on 3/4-3/7/23 with improvement    # Infection Prophylaxis:   - PJP: Sulfa/TMP (Bactrim)  - CMV: Valganciclovir (Valcyte) x3m    # Hypertension: Controlled;  Goal BP: < 140/90   - Volume status: Euvolemic     - Changes: Not at this time . Pt has been taking amlodipine 5mg daily, coreg 25mg bid. He took lasix 20mg daily x3 days with improvement in scrotal edema    # Diabetes: Controlled (HbA1c <7%) Last HbA1c: 6.8%   - Management as per primary care and pharmacy    # Anemia in Chronic Renal Disease: Hgb: Stable      NERY: No   - Iron studies: Replete    # Mineral Bone Disorder:   - Secondary renal hyperparathyroidism; PTH level: Mildly elevated (151-300 pg/ml)        On treatment: None  - Vitamin D;  level: Low        On supplement: Yes  - Calcium; level: Low        On supplement: Yes, tums 1g bid  - Phosphorus; level: Low        On supplement: Yes    # Electrolytes:   - Potassium; level: High        On supplement: No  - Magnesium; level: Low        On supplement: Yes, mag ox 400mg daily   - Bicarbonate; level: Normal        On supplement: Yes, bicarb 650mg bid    # Hyperkalemia:   -Decrease intake of high K foods.    -If K remains elevated could consider low dose lasix    # Thrombocytopenia:              - Seen previously by hematology. Thought to have peripheral consumption vs ITP                    - Baseline platelets ~60k     # Hepatitis B Core Ab +:    - Check Hepatitis B DNA level three months post transplant. Negative at time of transplant      # Asymmetric Left Ventricular Hypertrophy: Stable. No history of sustained ventricular arrhythmias.     # CAD: s/p PCI with stent 11/2021 with negative stress echo 1/2023.  Previously on Brilinta and aspirin. Brillinta stopped 1/2023     # Medical Compliance: Yes    # COVID-19 Virus Review: Discussed COVID-19 virus and the potential medical risks.  Reviewed preventative health recommendations, including wearing a mask where appropriate.  Recommended COVID vaccination should be up to date with either an initial vaccination or booster shot when appropriate.  Asked the patient to inform the transplant center if they are exposed or diagnosed with this virus.    # COVID Vaccination Up To Date: Yes    # Transplant History:  Etiology of Kidney Failure: Diabetes mellitus type 2  Tx: LDKT  Transplant: 2/21/2023 (Kidney)  Donor Type: Living Donor Class:   Crossmatch at time of Tx: negative  DSA at time of Tx: No  Significant changes in immunosuppression: None  CMV IgG Ab High Risk Discordance (D+/R-): No  EBV IgG Ab High Risk Discordance (D+/R-): No  Significant transplant-related complications: None    Transplant Office Phone Number: [phone]    Assessment and plan was  discussed with the patient and he voiced his understanding and agreement.    Return visit: Return in 26 days (on 4/5/2023) for 2 month post transplant visit.    Rome Kim MD    Chief Complaint   Mr. Barbosa is a 57 year old here for routine follow up, kidney transplant and immunosuppression management.     History of Present Illness   The patient overall has been feeling well with the exception of scrotal edema but that has improved after receiving PO lasix from 3/4-3/7. He continues to have some discomfort around his incision but that is improving as well. His appetite is improving. He is drinking about 2L daily. He denies diarrhea. He denies fever, chills, SOB, chest pain, LE edema, dysuria, hematuria.     Home BP: 110-120s systolic typically    Problem List   Patient Active Problem List   Diagnosis     Type 2 Diabetes Mellitus     Dyslipidemia, goal LDL below 70     Hypertension     ESRD (end stage renal disease) on dialysis (H)     Metabolic acidosis     Coronary artery disease involving native coronary artery of native heart without angina pectoris     Thrombocytopenia (H)     Splenomegaly     NSTEMI (non-ST elevated myocardial infarction) (H)     Hypotension, unspecified hypotension type     Rash     Awaiting organ transplant     ESRD (end stage renal disease) (H)     Pre-diabetes     Immunosuppression (H)     Status post kidney transplant     Leukocytosis     Anemia in other chronic diseases classified elsewhere     Hypocalcemia     Hypomagnesemia     Hypophosphatemia     Hepatitis B core antibody positive       Allergies   Allergies   Allergen Reactions     Tegaderm Transparent Dressing (Informational Only) Blisters       Medications   Current Outpatient Medications   Medication Sig     senna-docusate (SENOKOT-S/PERICOLACE) 8.6-50 MG tablet Take 1 tablet by mouth daily as needed for constipation     tacrolimus (GENERIC EQUIVALENT) 0.5 MG capsule Take 1 capsule (0.5 mg) by mouth 2 times daily Take total  dose 2.5 mg twice per day     tacrolimus (GENERIC EQUIVALENT) 1 MG capsule Take 2 capsules (2 mg) by mouth 2 times daily Take total  3.5 mg twice per day     acetaminophen (TYLENOL) 325 MG tablet Take 2 tablets (650 mg) by mouth every 4 hours as needed for other (For optimal non-opioid multimodal pain management to improve pain control.)     amLODIPine (NORVASC) 5 MG tablet Take 1 tablet (5 mg) by mouth daily     aspirin (ASA) 81 MG EC tablet Take 1 tablet (81 mg) by mouth daily     atorvastatin (LIPITOR) 20 MG tablet Take 1 tablet (20 mg) by mouth daily     calcium carbonate (TUMS) 500 MG chewable tablet Take 2 tablets (1,000 mg) by mouth 2 times daily     carvedilol (COREG) 25 MG tablet Take 1 tablet (25 mg) by mouth 2 times daily (with meals)     insulin aspart (NOVOLOG PEN) 100 UNIT/ML pen Inject 5 Units Subcutaneous 3 times daily (with meals) for 180 days     insulin detemir (LEVEMIR PEN) 100 UNIT/ML pen Inject 30 Units Subcutaneous At Bedtime for 2 days, THEN 28 Units At Bedtime for 7 days, THEN 26 Units At Bedtime for 7 days, THEN 24 Units At Bedtime for 7 days, THEN 20 Units At Bedtime for 7 days, THEN 20 Units At Bedtime for 30 days. On the days that you are taking the prescribed Prednisone 40mg daily, take levemir 30 units at bedtime. On the days that you are taking the prescribed Prednisone 20mg daily take levemir 28 units at bedtime. On the days that you are taking the prescribed Prednisone 15mg daily take levemir 26 units at bedtime. On the days that you are taking the prescribed Prednisone 10mg daily take levemir 24 units at bedtime. On the days that you are taking the prescribed Prednisone 5mg daily take levemir 20 units at bedtime. Once you are off the steroid taper, take levemir 20 units at bedtime daily.     loratadine (CLARITIN) 10 MG tablet Take 1 tablet (10 mg) by mouth daily as needed for allergies (itchy/rash)     magnesium oxide (MAG-OX) 400 MG tablet Take 1 tablet (400 mg) by mouth daily  "(with lunch)     mycophenolate (GENERIC EQUIVALENT) 250 MG capsule Take 3 capsules (750 mg) by mouth 2 times daily for 360 days     nitroGLYcerin (NITROSTAT) 0.4 MG sublingual tablet One tablet under the tongue every 5 minutes if needed for chest pain. May repeat every 5 minutes for a maximum of 3 doses in 15 minutes\" (Patient not taking: Reported on 2/27/2023)     phosphorus tablet 250 mg (PHOSPHA 250 NEUTRAL) 250 MG per tablet Take 1 tablet (250 mg) by mouth 2 times daily for 60 days     polyethylene glycol (MIRALAX) 17 GM/Dose powder Take 17 g by mouth daily (Patient not taking: Reported on 2/27/2023)     predniSONE (DELTASONE) 10 MG tablet Take 1 tablet (10 mg) by mouth daily Take 4 tablets by mouth daily for 2 days starting on Sun, 2/26/2023. Then, take 2 tablets by mouth daily for 7 days starting 2/28/2023. Then, take one and half tablets by mouth daily for 7 days starting 3/7/2023. Then, take one tablet by mouth daily starting 3/14/2023. Then, take half a tablet by mouth daily for 7 days starting 3/21/2023.     sodium bicarbonate 650 MG tablet Take 1 tablet (650 mg) by mouth 2 times daily for 60 days     sulfamethoxazole-trimethoprim (BACTRIM) 400-80 MG tablet Take 1 tablet by mouth daily     valGANciclovir (VALCYTE) 450 MG tablet Take 2 tablets (900 mg) by mouth daily for 90 days     Vitamin D3 (CHOLECALCIFEROL) 25 mcg (1000 units) tablet Take 2 tablets (50 mcg) by mouth daily     No current facility-administered medications for this visit.     Medications Discontinued During This Encounter   Medication Reason     furosemide (LASIX) 20 MG tablet      senna-docusate (SENOKOT-S/PERICOLACE) 8.6-50 MG tablet      tacrolimus (GENERIC EQUIVALENT) 0.5 MG capsule      tacrolimus (GENERIC EQUIVALENT) 0.5 MG capsule      tacrolimus (GENERIC EQUIVALENT) 1 MG capsule      oxyCODONE (ROXICODONE) 5 MG tablet        Physical Exam   Vital Signs: /68   Pulse 64   Temp 97.8  F (36.6  C)   Wt 68 kg (150 lb)   SpO2 " 100%   BMI 25.75 kg/m      GENERAL APPEARANCE: alert and no distress  HENT: mouth without ulcers or lesions  LYMPHATICS: no cervical or supraclavicular nodes  RESP: lungs clear to auscultation - no rales, rhonchi or wheezes  CV: systolic murmur 2/6, regular rate  EDEMA: no LE edema bilaterally  ABDOMEN: soft, nondistended, nontender, bowel sounds normal  MS: extremities normal - no gross deformities noted, no evidence of inflammation in joints, no muscle tenderness  SKIN: no rash, scattered ecchymoses on R flank and over RLQ incision  NEURO: normal strength and tone, sensory exam grossly normal, mentation intact and speech normal  PSYCH: mentation appears normal and affect normal/bright  GU_male: mild scrotal edema  TX KIDNEY: normal    Data     Renal Latest Ref Rng & Units 3/9/2023 3/6/2023 3/2/2023   SODIUM 136 - 145 mmol/L 136 138 136   SODIUM POCT 133 - 144 mmol/L - - -   K 3.4 - 5.3 mmol/L 5.4(H) 5.4(H) 5.3   Cl 98 - 107 mmol/L 105 109(H) 106   Cl (external) 98 - 107 mmol/L 105 109(H) 106   CO2 22 - 29 mmol/L 23 21(L) 24   UREA NITROGEN 8 - 22 mg/dL - - -   UREA NITROGEN (R) 6.0 - 20.0 mg/dL 10.8 18.3 19.5   CREATININE 0.67 - 1.17 mg/dL 0.86 1.01 0.87   Glucose 70 - 99 mg/dL 87 167(H) 82   CALCIUM, TOTAL 8.6 - 10.0 mg/dL 8.4(L) 8.3(L) 7.6(L)   MAGNESIUM 1.7 - 2.3 mg/dL 1.5(L) 1.8 2.1     Bone Health Latest Ref Rng & Units 3/9/2023 3/6/2023 3/2/2023   PHOSPHORUS 2.5 - 4.5 mg/dL 1.5(L) 1.4(L) 1.7(L)   PARATHYROID HORMONE INTACT 15 - 65 pg/mL - - -   Vit D Def 20 - 75 ug/L - - -     Heme Latest Ref Rng & Units 3/9/2023 3/6/2023 3/2/2023   WBC 4.0 - 11.0 10e3/uL 6.4 8.4 8.5   Hgb 13.3 - 17.7 g/dL 9.6(L) 9.4(L) 9.7(L)   Plt 150 - 450 10e3/uL 62(L) 60(L) 71(L)   ABSOLUTE NEUTROPHIL 1.6 - 8.3 10e3/uL - - -   ABSOLUTE LYMPHOCYTES 0.8 - 5.3 10e3/uL - - -   ABSOLUTE MONOCYTES 0.0 - 1.3 10e3/uL - - -   ABSOLUTE EOSINOPHILS 0.0 - 0.7 10e3/uL - - -     Liver Latest Ref Rng & Units 2/16/2023 11/17/2022 11/16/2022   AP 40 -  129 U/L 66 - 68   TBili <=1.2 mg/dL 0.2 - 0.6   BILIRUBIN, DIRECT <=0.5 mg/dL - - -   BILIRUBIN DIRECT (R) 0.00 - 0.30 mg/dL - - <0.20   ALT 10 - 50 U/L 18 - 16   AST 10 - 50 U/L 18 - 16   Tot Protein 6.4 - 8.3 g/dL 6.1(L) - 6.5   ALBUMIN 3.4 - 5.0 g/dL - - -   ALBUMIN (R) 3.5 - 5.2 g/dL 3.3(L) 2.6(L) 3.4(L)     Pancreas Latest Ref Rng & Units 2/22/2023 2/21/2023 2/16/2023   A1C <5.7 % 6.8(H) 6.8(H) 7.1(H)   Lipase 0 - 52 U/L - - -     Iron studies Latest Ref Rng & Units 2/16/2023 6/11/2021   Iron 61 - 157 ug/dL 37(L) 22(L)   IRON SATURATION INDEX (R) 15 - 46 % 21 -   FERRITIN 31 - 409 ng/mL 847(H) 301(H)     [facility] Txp Virology Latest Ref Rng & Units 2/21/2023 2/16/2023 8/23/2021   CMV QUANT IU/ML Not Detected IU/mL Not Detected - -   EBV CAPSID ANTIBODY IGG No detectable antibody. - Positive(A) Positive(A)        Recent Labs   Lab Test 02/24/23  0551 02/26/23  0504 02/28/23  0811 03/02/23  0833 03/06/23  0828 03/09/23  0756   DOSTAC  --   --  2/27/2023 3/1/2023  --   --    TACROL 2.9*   < > 12.7 12.9 8.5 9.2    < > = values in this interval not displayed.     Recent Labs   Lab Test 03/02/23  0833   DOSMPA 3/1/2023   8:00 PM   MPACID 1.48   MPAG 41.0       Again, thank you for allowing me to participate in the care of your patient.      Sincerely,    Rome Kim MD

## 2023-03-13 ENCOUNTER — TELEPHONE (OUTPATIENT)
Dept: TRANSPLANT | Facility: CLINIC | Age: 58
End: 2023-03-13

## 2023-03-13 ENCOUNTER — LAB REQUISITION (OUTPATIENT)
Dept: LAB | Facility: CLINIC | Age: 58
End: 2023-03-13
Payer: COMMERCIAL

## 2023-03-13 DIAGNOSIS — Z94.0 KIDNEY REPLACED BY TRANSPLANT: Primary | ICD-10-CM

## 2023-03-13 DIAGNOSIS — Z48.22 ENCOUNTER FOR AFTERCARE FOLLOWING KIDNEY TRANSPLANT: ICD-10-CM

## 2023-03-13 LAB
ALBUMIN SERPL BCG-MCNC: 3.6 G/DL (ref 3.5–5.2)
ALP SERPL-CCNC: 57 U/L (ref 40–129)
ALT SERPL W P-5'-P-CCNC: 27 U/L (ref 10–50)
ANION GAP SERPL CALCULATED.3IONS-SCNC: 9 MMOL/L (ref 7–15)
AST SERPL W P-5'-P-CCNC: 17 U/L (ref 10–50)
BASOPHILS # BLD AUTO: 0 10E3/UL (ref 0–0.2)
BASOPHILS NFR BLD AUTO: 0 %
BILIRUB DIRECT SERPL-MCNC: 0.27 MG/DL (ref 0–0.3)
BILIRUB SERPL-MCNC: 1 MG/DL
BUN SERPL-MCNC: 9.5 MG/DL (ref 6–20)
CALCIUM SERPL-MCNC: 8.2 MG/DL (ref 8.6–10)
CHLORIDE SERPL-SCNC: 101 MMOL/L (ref 98–107)
CREAT SERPL-MCNC: 0.79 MG/DL (ref 0.67–1.17)
DEPRECATED HCO3 PLAS-SCNC: 21 MMOL/L (ref 22–29)
EOSINOPHIL # BLD AUTO: 0.1 10E3/UL (ref 0–0.7)
EOSINOPHIL NFR BLD AUTO: 1 %
ERYTHROCYTE [DISTWIDTH] IN BLOOD BY AUTOMATED COUNT: 19.8 % (ref 10–15)
FASTING STATUS PATIENT QL REPORTED: ABNORMAL
GFR SERPL CREATININE-BSD FRML MDRD: >90 ML/MIN/1.73M2
GLUCOSE SERPL-MCNC: 91 MG/DL (ref 70–99)
HCT VFR BLD AUTO: 29 % (ref 40–53)
HGB BLD-MCNC: 9.8 G/DL (ref 13.3–17.7)
HOLD SPECIMEN: NORMAL
HOLD SPECIMEN: NORMAL
IMM GRANULOCYTES # BLD: 0.1 10E3/UL
IMM GRANULOCYTES NFR BLD: 2 %
LYMPHOCYTES # BLD AUTO: 0.5 10E3/UL (ref 0.8–5.3)
LYMPHOCYTES NFR BLD AUTO: 10 %
MAGNESIUM SERPL-MCNC: 1.4 MG/DL (ref 1.7–2.3)
MCH RBC QN AUTO: 31.4 PG (ref 26.5–33)
MCHC RBC AUTO-ENTMCNC: 33.8 G/DL (ref 31.5–36.5)
MCV RBC AUTO: 93 FL (ref 78–100)
MONOCYTES # BLD AUTO: 0.2 10E3/UL (ref 0–1.3)
MONOCYTES NFR BLD AUTO: 3 %
NEUTROPHILS # BLD AUTO: 4.4 10E3/UL (ref 1.6–8.3)
NEUTROPHILS NFR BLD AUTO: 84 %
NRBC # BLD AUTO: 0 10E3/UL
NRBC BLD AUTO-RTO: 0 /100
PHOSPHATE SERPL-MCNC: 1.3 MG/DL (ref 2.5–4.5)
PLATELET # BLD AUTO: 70 10E3/UL (ref 150–450)
POTASSIUM SERPL-SCNC: 4.4 MMOL/L (ref 3.4–5.3)
PROT SERPL-MCNC: 5.4 G/DL (ref 6.4–8.3)
RBC # BLD AUTO: 3.12 10E6/UL (ref 4.4–5.9)
SODIUM SERPL-SCNC: 131 MMOL/L (ref 136–145)
TACROLIMUS BLD-MCNC: 9.3 UG/L (ref 5–15)
TME LAST DOSE: NORMAL H
TME LAST DOSE: NORMAL H
TRIGL SERPL-MCNC: 164 MG/DL
WBC # BLD AUTO: 5.3 10E3/UL (ref 4–11)

## 2023-03-13 PROCEDURE — 80053 COMPREHEN METABOLIC PANEL: CPT | Mod: ORL | Performed by: TRANSPLANT SURGERY

## 2023-03-13 PROCEDURE — 84478 ASSAY OF TRIGLYCERIDES: CPT | Mod: ORL | Performed by: TRANSPLANT SURGERY

## 2023-03-13 PROCEDURE — 80197 ASSAY OF TACROLIMUS: CPT | Mod: ORL | Performed by: TRANSPLANT SURGERY

## 2023-03-13 PROCEDURE — 84100 ASSAY OF PHOSPHORUS: CPT | Mod: ORL | Performed by: TRANSPLANT SURGERY

## 2023-03-13 PROCEDURE — 85025 COMPLETE CBC W/AUTO DIFF WBC: CPT | Mod: ORL | Performed by: TRANSPLANT SURGERY

## 2023-03-13 PROCEDURE — 84134 ASSAY OF PREALBUMIN: CPT | Mod: ORL | Performed by: TRANSPLANT SURGERY

## 2023-03-13 PROCEDURE — 82248 BILIRUBIN DIRECT: CPT | Mod: ORL | Performed by: TRANSPLANT SURGERY

## 2023-03-13 PROCEDURE — 83735 ASSAY OF MAGNESIUM: CPT | Mod: ORL | Performed by: TRANSPLANT SURGERY

## 2023-03-13 RX ORDER — TACROLIMUS 1 MG/1
2 CAPSULE ORAL 2 TIMES DAILY
Qty: 360 CAPSULE | Refills: 3 | Status: SHIPPED | OUTPATIENT
Start: 2023-03-13 | End: 2023-04-19

## 2023-03-13 RX ORDER — TACROLIMUS 0.5 MG/1
0.5 CAPSULE ORAL 2 TIMES DAILY
Qty: 180 CAPSULE | Refills: 3 | Status: SHIPPED | OUTPATIENT
Start: 2023-03-13 | End: 2023-04-19

## 2023-03-13 NOTE — TELEPHONE ENCOUNTER
Rome Kim MD Huepfel, Mary K RN  Please add phos 250mg bid       Called confirmed with daughter (claudia)  taking medication

## 2023-03-13 NOTE — TELEPHONE ENCOUNTER
Provider Call: Medication Clarification  Route to LPN  Name of Medication: Tacrolimus 0.5mg; Question: Directions does no match  with dosage  [facility]  [facility]armacy Location:  Callback needed? Yes    Return Call Needed  Same as documented in contacts section  When to return call?: Same day: Route High Priority

## 2023-03-14 LAB — PREALB SERPL IA-MCNC: 31 MG/DL (ref 15–45)

## 2023-03-16 ENCOUNTER — LAB REQUISITION (OUTPATIENT)
Dept: LAB | Facility: CLINIC | Age: 58
End: 2023-03-16
Payer: COMMERCIAL

## 2023-03-16 DIAGNOSIS — Z48.22 ENCOUNTER FOR AFTERCARE FOLLOWING KIDNEY TRANSPLANT: ICD-10-CM

## 2023-03-16 LAB
ALBUMIN SERPL BCG-MCNC: 3.7 G/DL (ref 3.5–5.2)
ALP SERPL-CCNC: 57 U/L (ref 40–129)
ALT SERPL W P-5'-P-CCNC: 29 U/L (ref 10–50)
ANION GAP SERPL CALCULATED.3IONS-SCNC: 10 MMOL/L (ref 7–15)
AST SERPL W P-5'-P-CCNC: 17 U/L (ref 10–50)
BASOPHILS # BLD AUTO: 0 10E3/UL (ref 0–0.2)
BASOPHILS NFR BLD AUTO: 1 %
BILIRUB DIRECT SERPL-MCNC: 0.21 MG/DL (ref 0–0.3)
BILIRUB SERPL-MCNC: 0.7 MG/DL
BUN SERPL-MCNC: 13.2 MG/DL (ref 6–20)
CALCIUM SERPL-MCNC: 8.9 MG/DL (ref 8.6–10)
CHLORIDE SERPL-SCNC: 108 MMOL/L (ref 98–107)
CREAT SERPL-MCNC: 0.83 MG/DL (ref 0.67–1.17)
DEPRECATED HCO3 PLAS-SCNC: 17 MMOL/L (ref 22–29)
EOSINOPHIL # BLD AUTO: 0.1 10E3/UL (ref 0–0.7)
EOSINOPHIL NFR BLD AUTO: 1 %
ERYTHROCYTE [DISTWIDTH] IN BLOOD BY AUTOMATED COUNT: 19.7 % (ref 10–15)
FASTING STATUS PATIENT QL REPORTED: NORMAL
GFR SERPL CREATININE-BSD FRML MDRD: >90 ML/MIN/1.73M2
GLUCOSE SERPL-MCNC: 125 MG/DL (ref 70–99)
HCT VFR BLD AUTO: 28.9 % (ref 40–53)
HGB BLD-MCNC: 9.5 G/DL (ref 13.3–17.7)
IMM GRANULOCYTES # BLD: 0.2 10E3/UL
IMM GRANULOCYTES NFR BLD: 4 %
LYMPHOCYTES # BLD AUTO: 0.5 10E3/UL (ref 0.8–5.3)
LYMPHOCYTES NFR BLD AUTO: 12 %
MAGNESIUM SERPL-MCNC: 1.5 MG/DL (ref 1.7–2.3)
MCH RBC QN AUTO: 31 PG (ref 26.5–33)
MCHC RBC AUTO-ENTMCNC: 32.9 G/DL (ref 31.5–36.5)
MCV RBC AUTO: 94 FL (ref 78–100)
MONOCYTES # BLD AUTO: 0.2 10E3/UL (ref 0–1.3)
MONOCYTES NFR BLD AUTO: 4 %
NEUTROPHILS # BLD AUTO: 3.3 10E3/UL (ref 1.6–8.3)
NEUTROPHILS NFR BLD AUTO: 78 %
NRBC # BLD AUTO: 0 10E3/UL
NRBC BLD AUTO-RTO: 0 /100
PHOSPHATE SERPL-MCNC: 1.4 MG/DL (ref 2.5–4.5)
PLATELET # BLD AUTO: 77 10E3/UL (ref 150–450)
POTASSIUM SERPL-SCNC: 5.9 MMOL/L (ref 3.4–5.3)
PROT SERPL-MCNC: 5.5 G/DL (ref 6.4–8.3)
RBC # BLD AUTO: 3.06 10E6/UL (ref 4.4–5.9)
SODIUM SERPL-SCNC: 135 MMOL/L (ref 136–145)
TACROLIMUS BLD-MCNC: 9.6 UG/L (ref 5–15)
TME LAST DOSE: NORMAL H
TME LAST DOSE: NORMAL H
TRIGL SERPL-MCNC: 120 MG/DL
WBC # BLD AUTO: 4.2 10E3/UL (ref 4–11)

## 2023-03-16 PROCEDURE — 84100 ASSAY OF PHOSPHORUS: CPT | Mod: ORL | Performed by: TRANSPLANT SURGERY

## 2023-03-16 PROCEDURE — 83735 ASSAY OF MAGNESIUM: CPT | Mod: ORL | Performed by: TRANSPLANT SURGERY

## 2023-03-16 PROCEDURE — 84478 ASSAY OF TRIGLYCERIDES: CPT | Mod: ORL | Performed by: TRANSPLANT SURGERY

## 2023-03-16 PROCEDURE — 85025 COMPLETE CBC W/AUTO DIFF WBC: CPT | Mod: ORL | Performed by: TRANSPLANT SURGERY

## 2023-03-16 PROCEDURE — 80053 COMPREHEN METABOLIC PANEL: CPT | Mod: ORL | Performed by: TRANSPLANT SURGERY

## 2023-03-16 PROCEDURE — 84134 ASSAY OF PREALBUMIN: CPT | Mod: ORL | Performed by: TRANSPLANT SURGERY

## 2023-03-16 PROCEDURE — 82248 BILIRUBIN DIRECT: CPT | Mod: ORL | Performed by: TRANSPLANT SURGERY

## 2023-03-16 PROCEDURE — 80197 ASSAY OF TACROLIMUS: CPT | Mod: ORL | Performed by: TRANSPLANT SURGERY

## 2023-03-17 ENCOUNTER — TELEPHONE (OUTPATIENT)
Dept: TRANSPLANT | Facility: CLINIC | Age: 58
End: 2023-03-17
Payer: COMMERCIAL

## 2023-03-17 ENCOUNTER — TELEPHONE (OUTPATIENT)
Dept: LAB | Facility: CLINIC | Age: 58
End: 2023-03-17
Payer: COMMERCIAL

## 2023-03-17 ENCOUNTER — TELEPHONE (OUTPATIENT)
Dept: TRANSPLANT | Facility: CLINIC | Age: 58
End: 2023-03-17

## 2023-03-17 ENCOUNTER — MYC MEDICAL ADVICE (OUTPATIENT)
Dept: TRANSPLANT | Facility: CLINIC | Age: 58
End: 2023-03-17
Payer: COMMERCIAL

## 2023-03-17 DIAGNOSIS — Z94.0 KIDNEY REPLACED BY TRANSPLANT: Primary | ICD-10-CM

## 2023-03-17 LAB — PREALB SERPL IA-MCNC: 31 MG/DL (ref 15–45)

## 2023-03-17 RX ORDER — FLUDROCORTISONE ACETATE 0.1 MG/1
0.1 TABLET ORAL
Qty: 14 TABLET | Refills: 3 | Status: SHIPPED | OUTPATIENT
Start: 2023-03-17 | End: 2023-04-03

## 2023-03-17 RX ORDER — SODIUM BICARBONATE 650 MG/1
1300 TABLET ORAL 2 TIMES DAILY
Qty: 120 TABLET | Refills: 3 | Status: SHIPPED | OUTPATIENT
Start: 2023-03-17 | End: 2023-07-13

## 2023-03-17 NOTE — TELEPHONE ENCOUNTER
Patient Call: General  Route to LPN    Reason for call: Daughter calling to speak with Coordinator regarding medication that was sent over to pharmacy today [facility]. Pharmacy only has 1 dose and won't have anymore until Monday daughter would like to know what to do because his 2 dose is due on Sunday    Call back needed? Yes    Return Call Needed  Same as documented in contacts section  When to return call?: Same day: Route High Priority

## 2023-03-17 NOTE — TELEPHONE ENCOUNTER
Rome Kim MD Huepfel, Shalonda GONZALES RN  Increase bicarb to 1300mg bid, florinef 0.1mg MWF, recheck labs tmrw please to make sure K is not >5.9. Can you also prescribe lokelma 10g PO daily PRN per transplant clinic? He should take 1 dose today. Should be taken ~noon to be  from IS meds by 3 hours.      fludrocortisone /Florinef

## 2023-03-17 NOTE — TELEPHONE ENCOUNTER
Modesto Barbosa daughter  Yanick is RN inpatient at [facility] and now Home care   Called to review the directions with Yanick  -  And sent my chart messages  -   Yanick verbalized understanding

## 2023-03-17 NOTE — TELEPHONE ENCOUNTER
"Modesto Barbosa Mary CHRISTIAN, RN  Phone Number: [phone]     Ok sounds good, thanks           Good morning  -Directions  (Newest Message First)  Nhia JOE MarianoChelsey  Rogelio 44 minutes ago (10:41 AM)       Ok sounds good, thanks        You  Modesto Barbosa 47 minutes ago (10:38 AM)        I have updated bicarb RX to [facility] -   I would recommend  to call  [facility] to confirm medications  are ready -  If you currently have plenty of bicarb tabs -- then only request the   of the 2 RX's      Thank you   [facility] Kidney Transplant Coordinator         Modesto Mercado 59 minutes ago (10:26 AM)       Hi Shalonda,  I will go to [facility] pharmacy right now to grab his meds and have him start all three meds today. To increase his Bicarb dose does he just use his med supply at home or is this a new prescription too? Just want to know if I am picking up 2 or 3 prescription for him.   I will have him check his labs tomorrow at [facility]. Thank you!     Zong        Rogelio Barbosa 1 hour ago (10:21 AM)       Issue K    5.9   Please confirm limiting  potassium  foods      Please increase bicarb to 1300mg  twice per day   Please start  fludrocortisone /Florinef  0.1mg  every Monday Wednesday Friday  starting today      Please take  lokelma 10g PO daily  every other day starting today Friday March 17 ,2023     He should take 1 dose today. This should  be  from his immunosuppression  medications by 3 hours.       Recheck labs tmrw please to make sure K is not >5.9.      The above are the  orders or directions   by  Dr Rome Kim       Good News the [facility] lab is open tomorrow from 9 am to 1 pm  - to repeat his labs               Unfortunately  the above medications will need to be picked up [address] [facility]  [phone] today   The speciality  pharmacy  will not be able to deliver today         Please indicate you have received this message or call    Shalonda Kidney Transplant " Coordinator

## 2023-03-18 ENCOUNTER — LAB (OUTPATIENT)
Dept: LAB | Facility: HOSPITAL | Age: 58
End: 2023-03-18
Payer: COMMERCIAL

## 2023-03-18 DIAGNOSIS — Z79.899 ENCOUNTER FOR LONG-TERM CURRENT USE OF MEDICATION: ICD-10-CM

## 2023-03-18 DIAGNOSIS — Z48.298 AFTERCARE FOLLOWING ORGAN TRANSPLANT: ICD-10-CM

## 2023-03-18 DIAGNOSIS — Z20.828 CONTACT WITH AND (SUSPECTED) EXPOSURE TO OTHER VIRAL COMMUNICABLE DISEASES: ICD-10-CM

## 2023-03-18 DIAGNOSIS — Z94.0 KIDNEY REPLACED BY TRANSPLANT: ICD-10-CM

## 2023-03-18 LAB
ALBUMIN SERPL BCG-MCNC: 3.6 G/DL (ref 3.5–5.2)
ALP SERPL-CCNC: 55 U/L (ref 40–129)
ALT SERPL W P-5'-P-CCNC: 22 U/L (ref 10–50)
ANION GAP SERPL CALCULATED.3IONS-SCNC: 7 MMOL/L (ref 7–15)
AST SERPL W P-5'-P-CCNC: 17 U/L (ref 10–50)
BILIRUB SERPL-MCNC: 0.6 MG/DL
BUN SERPL-MCNC: 12.1 MG/DL (ref 6–20)
CALCIUM SERPL-MCNC: 8.4 MG/DL (ref 8.6–10)
CHLORIDE SERPL-SCNC: 103 MMOL/L (ref 98–107)
CREAT SERPL-MCNC: 0.83 MG/DL (ref 0.67–1.17)
DEPRECATED HCO3 PLAS-SCNC: 24 MMOL/L (ref 22–29)
ERYTHROCYTE [DISTWIDTH] IN BLOOD BY AUTOMATED COUNT: 19.1 % (ref 10–15)
GFR SERPL CREATININE-BSD FRML MDRD: >90 ML/MIN/1.73M2
GLUCOSE SERPL-MCNC: 154 MG/DL (ref 70–99)
HCT VFR BLD AUTO: 27.8 % (ref 40–53)
HGB BLD-MCNC: 9.3 G/DL (ref 13.3–17.7)
MCH RBC QN AUTO: 31 PG (ref 26.5–33)
MCHC RBC AUTO-ENTMCNC: 33.5 G/DL (ref 31.5–36.5)
MCV RBC AUTO: 93 FL (ref 78–100)
PLATELET # BLD AUTO: 79 10E3/UL (ref 150–450)
POTASSIUM SERPL-SCNC: 4.8 MMOL/L (ref 3.4–5.3)
PROT SERPL-MCNC: 5.5 G/DL (ref 6.4–8.3)
RBC # BLD AUTO: 3 10E6/UL (ref 4.4–5.9)
SODIUM SERPL-SCNC: 134 MMOL/L (ref 136–145)
WBC # BLD AUTO: 3.9 10E3/UL (ref 4–11)

## 2023-03-18 PROCEDURE — 80180 DRUG SCRN QUAN MYCOPHENOLATE: CPT

## 2023-03-18 PROCEDURE — 80197 ASSAY OF TACROLIMUS: CPT

## 2023-03-18 PROCEDURE — 36415 COLL VENOUS BLD VENIPUNCTURE: CPT

## 2023-03-18 PROCEDURE — 85027 COMPLETE CBC AUTOMATED: CPT

## 2023-03-18 PROCEDURE — 80053 COMPREHEN METABOLIC PANEL: CPT

## 2023-03-19 LAB
TACROLIMUS BLD-MCNC: 36.4 UG/L (ref 5–15)
TME LAST DOSE: ABNORMAL H
TME LAST DOSE: ABNORMAL H

## 2023-03-19 NOTE — TELEPHONE ENCOUNTER
Follow up   -Confirm  Directions  From Phone call  To daughter       Shalonda CHRISTIAN RN  Phone Number: [phone]     Ok sounds good, thanks             (Newest Message First)  Modesto Barbosa  You 2 days ago       Ok sounds good, thanks        You  Modesto Barbosa 2 days ago        I have updated bicarb RX to [facility] -   I would recommend  to call  [facility] to confirm medications  are ready -  If you currently have plenty of bicarb tabs -- then only request the   of the 2 RX's      Thank you   Shalonda Kidney Transplant Coordinator

## 2023-03-20 ENCOUNTER — LAB REQUISITION (OUTPATIENT)
Dept: LAB | Facility: CLINIC | Age: 58
End: 2023-03-20
Payer: COMMERCIAL

## 2023-03-20 ENCOUNTER — TELEPHONE (OUTPATIENT)
Dept: TRANSPLANT | Facility: CLINIC | Age: 58
End: 2023-03-20

## 2023-03-20 DIAGNOSIS — Z48.22 ENCOUNTER FOR AFTERCARE FOLLOWING KIDNEY TRANSPLANT: ICD-10-CM

## 2023-03-20 LAB
MYCOPHENOLATE SERPL LC/MS/MS-MCNC: 6.05 MG/L (ref 1–3.5)
MYCOPHENOLATE-G SERPL LC/MS/MS-MCNC: 52.3 MG/L (ref 30–95)
TME LAST DOSE: ABNORMAL H
TME LAST DOSE: ABNORMAL H

## 2023-03-20 NOTE — TELEPHONE ENCOUNTER
ELIDIA Barlow [facility] called to inform us she will be out tomorrow (03/21/2023) for blood draw on patient as she forgot to spell the patient's full name  on the paper work and they declined his blood at our lab.  Yen's # [phone]

## 2023-03-21 ENCOUNTER — LAB REQUISITION (OUTPATIENT)
Dept: LAB | Facility: CLINIC | Age: 58
End: 2023-03-21
Payer: COMMERCIAL

## 2023-03-21 DIAGNOSIS — Z94.0 KIDNEY REPLACED BY TRANSPLANT: ICD-10-CM

## 2023-03-21 DIAGNOSIS — Z48.22 ENCOUNTER FOR AFTERCARE FOLLOWING KIDNEY TRANSPLANT: ICD-10-CM

## 2023-03-21 LAB
ALBUMIN SERPL BCG-MCNC: 4 G/DL (ref 3.5–5.2)
ALP SERPL-CCNC: 55 U/L (ref 40–129)
ALT SERPL W P-5'-P-CCNC: 29 U/L (ref 10–50)
ANION GAP SERPL CALCULATED.3IONS-SCNC: 10 MMOL/L (ref 7–15)
AST SERPL W P-5'-P-CCNC: 21 U/L (ref 10–50)
BASOPHILS # BLD MANUAL: 0 10E3/UL (ref 0–0.2)
BASOPHILS NFR BLD MANUAL: 0 %
BILIRUB DIRECT SERPL-MCNC: 0.25 MG/DL (ref 0–0.3)
BILIRUB SERPL-MCNC: 0.6 MG/DL
BUN SERPL-MCNC: 18.2 MG/DL (ref 6–20)
CALCIUM SERPL-MCNC: 8.3 MG/DL (ref 8.6–10)
CHLORIDE SERPL-SCNC: 108 MMOL/L (ref 98–107)
CREAT SERPL-MCNC: 0.76 MG/DL (ref 0.67–1.17)
DACRYOCYTES BLD QL SMEAR: SLIGHT
DEPRECATED HCO3 PLAS-SCNC: 18 MMOL/L (ref 22–29)
EOSINOPHIL # BLD MANUAL: 0 10E3/UL (ref 0–0.7)
EOSINOPHIL NFR BLD MANUAL: 1 %
ERYTHROCYTE [DISTWIDTH] IN BLOOD BY AUTOMATED COUNT: 18.9 % (ref 10–15)
FASTING STATUS PATIENT QL REPORTED: NORMAL
GFR SERPL CREATININE-BSD FRML MDRD: >90 ML/MIN/1.73M2
GLUCOSE SERPL-MCNC: 186 MG/DL (ref 70–99)
HCT VFR BLD AUTO: 28 % (ref 40–53)
HGB BLD-MCNC: 9.6 G/DL (ref 13.3–17.7)
LYMPHOCYTES # BLD MANUAL: 0.3 10E3/UL (ref 0.8–5.3)
LYMPHOCYTES NFR BLD MANUAL: 6 %
MAGNESIUM SERPL-MCNC: 1.8 MG/DL (ref 1.7–2.3)
MCH RBC QN AUTO: 31.9 PG (ref 26.5–33)
MCHC RBC AUTO-ENTMCNC: 34.3 G/DL (ref 31.5–36.5)
MCV RBC AUTO: 93 FL (ref 78–100)
METAMYELOCYTES # BLD MANUAL: 0.1 10E3/UL
METAMYELOCYTES NFR BLD MANUAL: 2 %
MONOCYTES # BLD MANUAL: 0 10E3/UL (ref 0–1.3)
MONOCYTES NFR BLD MANUAL: 1 %
NEUTROPHILS # BLD MANUAL: 3.8 10E3/UL (ref 1.6–8.3)
NEUTROPHILS NFR BLD MANUAL: 90 %
PHOSPHATE SERPL-MCNC: 1.5 MG/DL (ref 2.5–4.5)
PLAT MORPH BLD: ABNORMAL
PLATELET # BLD AUTO: 90 10E3/UL (ref 150–450)
POLYCHROMASIA BLD QL SMEAR: SLIGHT
POTASSIUM SERPL-SCNC: 4.3 MMOL/L (ref 3.4–5.3)
PREALB SERPL IA-MCNC: 30 MG/DL (ref 15–45)
PROT SERPL-MCNC: 5.5 G/DL (ref 6.4–8.3)
RBC # BLD AUTO: 3.01 10E6/UL (ref 4.4–5.9)
RBC MORPH BLD: ABNORMAL
SODIUM SERPL-SCNC: 136 MMOL/L (ref 136–145)
TACROLIMUS BLD-MCNC: 10 UG/L (ref 5–15)
TME LAST DOSE: NORMAL H
TME LAST DOSE: NORMAL H
TRIGL SERPL-MCNC: 119 MG/DL
WBC # BLD AUTO: 4.2 10E3/UL (ref 4–11)

## 2023-03-21 PROCEDURE — 83735 ASSAY OF MAGNESIUM: CPT | Mod: ORL | Performed by: TRANSPLANT SURGERY

## 2023-03-21 PROCEDURE — 82248 BILIRUBIN DIRECT: CPT | Mod: ORL | Performed by: TRANSPLANT SURGERY

## 2023-03-21 PROCEDURE — 80053 COMPREHEN METABOLIC PANEL: CPT | Mod: ORL | Performed by: TRANSPLANT SURGERY

## 2023-03-21 PROCEDURE — 84478 ASSAY OF TRIGLYCERIDES: CPT | Mod: ORL | Performed by: TRANSPLANT SURGERY

## 2023-03-21 PROCEDURE — 85007 BL SMEAR W/DIFF WBC COUNT: CPT | Mod: ORL | Performed by: TRANSPLANT SURGERY

## 2023-03-21 PROCEDURE — 84134 ASSAY OF PREALBUMIN: CPT | Mod: ORL | Performed by: TRANSPLANT SURGERY

## 2023-03-21 PROCEDURE — 84100 ASSAY OF PHOSPHORUS: CPT | Mod: ORL | Performed by: TRANSPLANT SURGERY

## 2023-03-21 PROCEDURE — 85027 COMPLETE CBC AUTOMATED: CPT | Mod: ORL | Performed by: TRANSPLANT SURGERY

## 2023-03-21 PROCEDURE — 80197 ASSAY OF TACROLIMUS: CPT | Mod: ORL | Performed by: TRANSPLANT SURGERY

## 2023-03-21 RX ORDER — CARVEDILOL 25 MG/1
25 TABLET ORAL 2 TIMES DAILY WITH MEALS
Qty: 60 TABLET | Refills: 0 | Status: SHIPPED | OUTPATIENT
Start: 2023-03-21 | End: 2023-04-05

## 2023-03-21 RX ORDER — CALCIUM CARBONATE 500 MG/1
2 TABLET, CHEWABLE ORAL 2 TIMES DAILY
Qty: 120 TABLET | Refills: 0 | Status: SHIPPED | OUTPATIENT
Start: 2023-03-21 | End: 2023-06-13

## 2023-03-21 RX ORDER — VITAMIN B COMPLEX
50 TABLET ORAL DAILY
Qty: 60 TABLET | Refills: 0 | Status: SHIPPED | OUTPATIENT
Start: 2023-03-21 | End: 2023-04-17

## 2023-03-21 RX ORDER — ATORVASTATIN CALCIUM 20 MG/1
20 TABLET, FILM COATED ORAL DAILY
Qty: 30 TABLET | Refills: 0 | Status: SHIPPED | OUTPATIENT
Start: 2023-03-21 | End: 2023-04-17

## 2023-03-21 RX ORDER — PREDNISONE 10 MG/1
10 TABLET ORAL DAILY
Qty: 50 TABLET | Refills: 0 | Status: SHIPPED | OUTPATIENT
Start: 2023-03-21 | End: 2023-03-21

## 2023-03-23 ENCOUNTER — LAB REQUISITION (OUTPATIENT)
Dept: LAB | Facility: CLINIC | Age: 58
End: 2023-03-23
Payer: COMMERCIAL

## 2023-03-23 ENCOUNTER — OFFICE VISIT (OUTPATIENT)
Dept: TRANSPLANT | Facility: CLINIC | Age: 58
End: 2023-03-23
Attending: TRANSPLANT SURGERY
Payer: COMMERCIAL

## 2023-03-23 VITALS
BODY MASS INDEX: 23.34 KG/M2 | SYSTOLIC BLOOD PRESSURE: 174 MMHG | HEART RATE: 72 BPM | DIASTOLIC BLOOD PRESSURE: 73 MMHG | OXYGEN SATURATION: 100 % | WEIGHT: 136 LBS

## 2023-03-23 DIAGNOSIS — Z94.0 KIDNEY REPLACED BY TRANSPLANT: ICD-10-CM

## 2023-03-23 DIAGNOSIS — D69.6 THROMBOCYTOPENIA (H): ICD-10-CM

## 2023-03-23 DIAGNOSIS — D84.9 IMMUNOSUPPRESSED STATUS (H): Primary | ICD-10-CM

## 2023-03-23 DIAGNOSIS — Z48.22 ENCOUNTER FOR AFTERCARE FOLLOWING KIDNEY TRANSPLANT: ICD-10-CM

## 2023-03-23 DIAGNOSIS — I10 ESSENTIAL HYPERTENSION: ICD-10-CM

## 2023-03-23 LAB
ANION GAP SERPL CALCULATED.3IONS-SCNC: 8 MMOL/L (ref 7–15)
BUN SERPL-MCNC: 17.3 MG/DL (ref 6–20)
CALCIUM SERPL-MCNC: 8.9 MG/DL (ref 8.6–10)
CHLORIDE SERPL-SCNC: 107 MMOL/L (ref 98–107)
CREAT SERPL-MCNC: 0.76 MG/DL (ref 0.67–1.17)
DEPRECATED HCO3 PLAS-SCNC: 22 MMOL/L (ref 22–29)
ERYTHROCYTE [DISTWIDTH] IN BLOOD BY AUTOMATED COUNT: 18.1 % (ref 10–15)
FASTING STATUS PATIENT QL REPORTED: ABNORMAL
GFR SERPL CREATININE-BSD FRML MDRD: >90 ML/MIN/1.73M2
GLUCOSE SERPL-MCNC: 259 MG/DL (ref 70–99)
HCT VFR BLD AUTO: 27.6 % (ref 40–53)
HGB BLD-MCNC: 9.4 G/DL (ref 13.3–17.7)
MCH RBC QN AUTO: 31 PG (ref 26.5–33)
MCHC RBC AUTO-ENTMCNC: 34.1 G/DL (ref 31.5–36.5)
MCV RBC AUTO: 91 FL (ref 78–100)
PLATELET # BLD AUTO: 80 10E3/UL (ref 150–450)
POTASSIUM SERPL-SCNC: 4.9 MMOL/L (ref 3.4–5.3)
PREALB SERPL IA-MCNC: 29 MG/DL (ref 15–45)
RBC # BLD AUTO: 3.03 10E6/UL (ref 4.4–5.9)
SODIUM SERPL-SCNC: 137 MMOL/L (ref 136–145)
TACROLIMUS BLD-MCNC: 9.3 UG/L (ref 5–15)
TME LAST DOSE: NORMAL H
TME LAST DOSE: NORMAL H
TRIGL SERPL-MCNC: 178 MG/DL
WBC # BLD AUTO: 4.5 10E3/UL (ref 4–11)

## 2023-03-23 PROCEDURE — G0463 HOSPITAL OUTPT CLINIC VISIT: HCPCS | Performed by: TRANSPLANT SURGERY

## 2023-03-23 PROCEDURE — 84478 ASSAY OF TRIGLYCERIDES: CPT | Mod: ORL | Performed by: TRANSPLANT SURGERY

## 2023-03-23 PROCEDURE — 99213 OFFICE O/P EST LOW 20 MIN: CPT | Mod: 24 | Performed by: TRANSPLANT SURGERY

## 2023-03-23 PROCEDURE — 85027 COMPLETE CBC AUTOMATED: CPT | Mod: ORL | Performed by: TRANSPLANT SURGERY

## 2023-03-23 PROCEDURE — 80197 ASSAY OF TACROLIMUS: CPT | Mod: ORL | Performed by: TRANSPLANT SURGERY

## 2023-03-23 PROCEDURE — 84134 ASSAY OF PREALBUMIN: CPT | Mod: ORL | Performed by: TRANSPLANT SURGERY

## 2023-03-23 PROCEDURE — 80048 BASIC METABOLIC PNL TOTAL CA: CPT | Mod: ORL | Performed by: TRANSPLANT SURGERY

## 2023-03-23 ASSESSMENT — PAIN SCALES - GENERAL: PAINLEVEL: NO PAIN (0)

## 2023-03-23 NOTE — LETTER
3/23/2023         RE: Modesto Barbosa  [address] [place] [address]        Dear Colleague,    Thank you for referring your patient, Modesto Barbosa, to the [facility] TRANSPLANT [facility]. Please see a copy of my visit note below.    SP LDKT 2/21/23  I/P  1. No surgical issues except stent removal.  Scheduled  2. IS stable tac 2.5/2.5, lvl 10, /750, Pred 5 taper off.  No side effects or issues.  No change  3. BP: modestly hypertensive.  No symptoms, if stays up, double Norvasc to 10 mg  4. DM: sugars ok by report.  5. Thrombocytopenia.  Unknown cause.  25 min total: 5 prep, 15 f2f and 10 documentation and discussion.    No prob since txp.  No dysuria, blood, fever chills.  No side effects from IS at present.  BP a bit high, but still adjusting and tac dose just decreased.  Current Outpatient Medications   Medication     acetaminophen (TYLENOL) 325 MG tablet     amLODIPine (NORVASC) 5 MG tablet     aspirin (ASA) 81 MG EC tablet     atorvastatin (LIPITOR) 20 MG tablet     calcium carbonate (TUMS) 500 MG chewable tablet     carvedilol (COREG) 25 MG tablet     fludrocortisone (FLORINEF) 0.1 MG tablet     insulin aspart (NOVOLOG PEN) 100 UNIT/ML pen     insulin detemir (LEVEMIR PEN) 100 UNIT/ML pen     loratadine (CLARITIN) 10 MG tablet     magnesium oxide (MAG-OX) 400 MG tablet     mycophenolate (GENERIC EQUIVALENT) 250 MG capsule     phosphorus tablet 250 mg (PHOSPHA 250 NEUTRAL) 250 MG per tablet     senna-docusate (SENOKOT-S/PERICOLACE) 8.6-50 MG tablet     sodium bicarbonate 650 MG tablet     sodium zirconium cyclosilicate (LOKELMA) 10 g PACK packet     sulfamethoxazole-trimethoprim (BACTRIM) 400-80 MG tablet     tacrolimus (GENERIC EQUIVALENT) 0.5 MG capsule     tacrolimus (GENERIC EQUIVALENT) 1 MG capsule     valGANciclovir (VALCYTE) 450 MG tablet     Vitamin D3 (CHOLECALCIFEROL) 25 mcg (1000 units) tablet     nitroGLYcerin (NITROSTAT) 0.4 MG sublingual tablet     polyethylene glycol (MIRALAX) 17  GM/Dose powder     No current facility-administered medications for this visit.     BP (!) 174/73   Pulse 72   Wt 61.7 kg (136 lb)   SpO2 100%   BMI 23.34 kg/m    Wound fine  No drainage  No edema     Latest Reference Range & Units 03/23/23 07:15   Sodium 136 - 145 mmol/L 137   Potassium 3.4 - 5.3 mmol/L 4.9   Chloride 98 - 107 mmol/L 107   Carbon Dioxide (CO2) 22 - 29 mmol/L 22   Urea Nitrogen 6.0 - 20.0 mg/dL 17.3   Creatinine 0.67 - 1.17 mg/dL 0.76      Latest Reference Range & Units 03/23/23 07:15   WBC 4.0 - 11.0 10e3/uL 4.5   Hemoglobin 13.3 - 17.7 g/dL 9.4 (L)   Hematocrit 40.0 - 53.0 % 27.6 (L)   Platelet Count 150 - 450 10e3/uL 80 (L)   (L): Data is abnormally low      Again, thank you for allowing me to participate in the care of your patient.        Sincerely,        Talha Weinstein MD

## 2023-03-23 NOTE — PROGRESS NOTES
SP LDKT 2/21/23  I/P  1. No surgical issues except stent removal.  Scheduled  2. IS stable tac 2.5/2.5, lvl 10, /750, Pred 5 taper off.  No side effects or issues.  No change  3. BP: modestly hypertensive.  No symptoms, if stays up, double Norvasc to 10 mg  4. DM: sugars ok by report.  5. Thrombocytopenia.  Unknown cause.  25 min total: 5 prep, 15 f2f and 10 documentation and discussion.    No prob since txp.  No dysuria, blood, fever chills.  No side effects from IS at present.  BP a bit high, but still adjusting and tac dose just decreased.  Current Outpatient Medications   Medication     acetaminophen (TYLENOL) 325 MG tablet     amLODIPine (NORVASC) 5 MG tablet     aspirin (ASA) 81 MG EC tablet     atorvastatin (LIPITOR) 20 MG tablet     calcium carbonate (TUMS) 500 MG chewable tablet     carvedilol (COREG) 25 MG tablet     fludrocortisone (FLORINEF) 0.1 MG tablet     insulin aspart (NOVOLOG PEN) 100 UNIT/ML pen     insulin detemir (LEVEMIR PEN) 100 UNIT/ML pen     loratadine (CLARITIN) 10 MG tablet     magnesium oxide (MAG-OX) 400 MG tablet     mycophenolate (GENERIC EQUIVALENT) 250 MG capsule     phosphorus tablet 250 mg (PHOSPHA 250 NEUTRAL) 250 MG per tablet     senna-docusate (SENOKOT-S/PERICOLACE) 8.6-50 MG tablet     sodium bicarbonate 650 MG tablet     sodium zirconium cyclosilicate (LOKELMA) 10 g PACK packet     sulfamethoxazole-trimethoprim (BACTRIM) 400-80 MG tablet     tacrolimus (GENERIC EQUIVALENT) 0.5 MG capsule     tacrolimus (GENERIC EQUIVALENT) 1 MG capsule     valGANciclovir (VALCYTE) 450 MG tablet     Vitamin D3 (CHOLECALCIFEROL) 25 mcg (1000 units) tablet     nitroGLYcerin (NITROSTAT) 0.4 MG sublingual tablet     polyethylene glycol (MIRALAX) 17 GM/Dose powder     No current facility-administered medications for this visit.     BP (!) 174/73   Pulse 72   Wt 61.7 kg (136 lb)   SpO2 100%   BMI 23.34 kg/m    Wound fine  No drainage  No edema     Latest Reference Range & Units 03/23/23  07:15   Sodium 136 - 145 mmol/L 137   Potassium 3.4 - 5.3 mmol/L 4.9   Chloride 98 - 107 mmol/L 107   Carbon Dioxide (CO2) 22 - 29 mmol/L 22   Urea Nitrogen 6.0 - 20.0 mg/dL 17.3   Creatinine 0.67 - 1.17 mg/dL 0.76      Latest Reference Range & Units 03/23/23 07:15   WBC 4.0 - 11.0 10e3/uL 4.5   Hemoglobin 13.3 - 17.7 g/dL 9.4 (L)   Hematocrit 40.0 - 53.0 % 27.6 (L)   Platelet Count 150 - 450 10e3/uL 80 (L)   (L): Data is abnormally low

## 2023-03-27 ENCOUNTER — LAB REQUISITION (OUTPATIENT)
Dept: LAB | Facility: CLINIC | Age: 58
End: 2023-03-27
Payer: COMMERCIAL

## 2023-03-27 ENCOUNTER — TELEPHONE (OUTPATIENT)
Dept: TRANSPLANT | Facility: CLINIC | Age: 58
End: 2023-03-27

## 2023-03-27 DIAGNOSIS — Z48.22 ENCOUNTER FOR AFTERCARE FOLLOWING KIDNEY TRANSPLANT: ICD-10-CM

## 2023-03-27 LAB
ANION GAP SERPL CALCULATED.3IONS-SCNC: 13 MMOL/L (ref 7–15)
BUN SERPL-MCNC: 18.8 MG/DL (ref 6–20)
CALCIUM SERPL-MCNC: 8.7 MG/DL (ref 8.6–10)
CHLORIDE SERPL-SCNC: 105 MMOL/L (ref 98–107)
CREAT SERPL-MCNC: 0.84 MG/DL (ref 0.67–1.17)
DEPRECATED HCO3 PLAS-SCNC: 19 MMOL/L (ref 22–29)
ERYTHROCYTE [DISTWIDTH] IN BLOOD BY AUTOMATED COUNT: 18 % (ref 10–15)
GFR SERPL CREATININE-BSD FRML MDRD: >90 ML/MIN/1.73M2
GLUCOSE SERPL-MCNC: 312 MG/DL (ref 70–99)
HCT VFR BLD AUTO: 26.6 % (ref 40–53)
HGB BLD-MCNC: 8.8 G/DL (ref 13.3–17.7)
MAGNESIUM SERPL-MCNC: 1.2 MG/DL (ref 1.7–2.3)
MCH RBC QN AUTO: 30.8 PG (ref 26.5–33)
MCHC RBC AUTO-ENTMCNC: 33.1 G/DL (ref 31.5–36.5)
MCV RBC AUTO: 93 FL (ref 78–100)
PHOSPHATE SERPL-MCNC: 2 MG/DL (ref 2.5–4.5)
PLATELET # BLD AUTO: 80 10E3/UL (ref 150–450)
POTASSIUM SERPL-SCNC: 4.1 MMOL/L (ref 3.4–5.3)
RBC # BLD AUTO: 2.86 10E6/UL (ref 4.4–5.9)
SODIUM SERPL-SCNC: 137 MMOL/L (ref 136–145)
WBC # BLD AUTO: 3.7 10E3/UL (ref 4–11)

## 2023-03-27 PROCEDURE — 80048 BASIC METABOLIC PNL TOTAL CA: CPT | Mod: ORL | Performed by: TRANSPLANT SURGERY

## 2023-03-27 PROCEDURE — 84100 ASSAY OF PHOSPHORUS: CPT | Mod: ORL | Performed by: TRANSPLANT SURGERY

## 2023-03-27 PROCEDURE — 80197 ASSAY OF TACROLIMUS: CPT | Mod: ORL | Performed by: TRANSPLANT SURGERY

## 2023-03-27 PROCEDURE — 85027 COMPLETE CBC AUTOMATED: CPT | Mod: ORL | Performed by: TRANSPLANT SURGERY

## 2023-03-27 PROCEDURE — 83735 ASSAY OF MAGNESIUM: CPT | Mod: ORL | Performed by: TRANSPLANT SURGERY

## 2023-03-27 NOTE — TELEPHONE ENCOUNTER
Chelsey Daughter of patient calling regarding lab draw on 3/28/23 Patient has labs drawn every Monday and Thursday, She's wondering does he need to keep his lab appointment for 3/28/23 because he had them drawn today.

## 2023-03-28 ENCOUNTER — TELEPHONE (OUTPATIENT)
Dept: PHARMACY | Facility: CLINIC | Age: 58
End: 2023-03-28

## 2023-03-28 ENCOUNTER — OFFICE VISIT (OUTPATIENT)
Dept: TRANSPLANT | Facility: CLINIC | Age: 58
End: 2023-03-28
Attending: TRANSPLANT SURGERY
Payer: COMMERCIAL

## 2023-03-28 VITALS
TEMPERATURE: 97.9 F | OXYGEN SATURATION: 99 % | SYSTOLIC BLOOD PRESSURE: 97 MMHG | DIASTOLIC BLOOD PRESSURE: 58 MMHG | WEIGHT: 135.5 LBS | BODY MASS INDEX: 23.26 KG/M2 | RESPIRATION RATE: 16 BRPM | HEART RATE: 69 BPM

## 2023-03-28 DIAGNOSIS — Z94.0 KIDNEY REPLACED BY TRANSPLANT: ICD-10-CM

## 2023-03-28 LAB
TACROLIMUS BLD-MCNC: 9.2 UG/L (ref 5–15)
TME LAST DOSE: NORMAL H
TME LAST DOSE: NORMAL H

## 2023-03-28 PROCEDURE — 250N000013 HC RX MED GY IP 250 OP 250 PS 637: Performed by: NURSE PRACTITIONER

## 2023-03-28 PROCEDURE — 52310 CYSTOSCOPY AND TREATMENT: CPT | Performed by: NURSE PRACTITIONER

## 2023-03-28 PROCEDURE — 250N000009 HC RX 250: Performed by: NURSE PRACTITIONER

## 2023-03-28 RX ORDER — LIDOCAINE HYDROCHLORIDE 20 MG/ML
JELLY TOPICAL ONCE
Status: COMPLETED | OUTPATIENT
Start: 2023-03-28 | End: 2023-03-28

## 2023-03-28 RX ORDER — LEVOFLOXACIN 250 MG/1
500 TABLET, FILM COATED ORAL ONCE
Status: COMPLETED | OUTPATIENT
Start: 2023-03-28 | End: 2023-03-28

## 2023-03-28 RX ADMIN — LIDOCAINE HYDROCHLORIDE: 20 JELLY TOPICAL at 11:36

## 2023-03-28 RX ADMIN — LEVOFLOXACIN 500 MG: 250 TABLET, FILM COATED ORAL at 11:35

## 2023-03-28 NOTE — LETTER
3/28/2023         RE: Modesto Barbosa  [address] [place] [address]        Dear Colleague,    Thank you for referring your patient, Modesto Barbosa, to the [facility] TRANSPL[facility]. Please see a copy of my visit note below.    See procedure note.       Again, thank you for allowing me to participate in the care of your patient.        Sincerely,        STEPHEN Pacheco CNP

## 2023-03-28 NOTE — NURSING NOTE
Cystoscopy - urethral stent removal    Prepped patient for procedure with no complications.   2% lidocaine gel injected into urethra via urojet.   Assisted Katrina with stent removal.  Administered 500mg Levaquin PO after procedure.  Patient was discharged in stable condition.    BP 97/58 (BP Location: Left arm, Patient Position: Sitting, Cuff Size: Adult Regular)   Pulse 69   Temp 97.9  F (36.6  C) (Oral)   Resp 16   Wt 61.5 kg (135 lb 8 oz)   SpO2 99%   BMI 23.26 kg/m         Allergies   Allergen Reactions     Tegaderm Transparent Dressing (Informational Only) Blisters         Ino Hutchison RN on 3/28/2023 at 11:42 AM

## 2023-03-28 NOTE — PROCEDURES
Transplant Surgery  Operative Note    Preop dx: Status post Living Unrelated Donor kidney transplant.  Post op dx: same   Procedure: Flexible cystoscopy and ureteral stent removal   Provider: STEPHEN Pacheco  Assistant: Slim Hutchison RN   Anesthesia: local  EBL: 0   Specimens: none.  Findings: none abnormal. Stent inspected and noted to be intact.   Indication: The patient is status post kidney transplant and the ureteral stent is no longer needed.    Procedure: The patient was positioned supine on the table.  The groin was sterilely prepped and draped in the usual fashion. Time out was done. Urojet was applied to the urethra. A flexible cystoscope was inserted and advanced thru the urethra into the bladder. The stent was visualized and grasped. The cystoscope, grasper and stent were removed en-mass. The stent was visualized to be intact.  The bladder mucosa was normal in appearance. Antibiotics were administered. The patient tolerated the procedure well and was asked to void before leaving the clinic.

## 2023-03-28 NOTE — TELEPHONE ENCOUNTER
Clinical Pharmacy Consult:                                                      Transplant Specific: 1 month post transplant medication review  Date of Transplant: 02/21/2023  Type of Transplant: kidney  First Transplant: yes  History of rejection: no    Immunosuppression Regimen   TAC 2.5mg qAM & 2.5mg qPM and MMF 750mg qAM & 750mg qPM  Patient specific goal: 8-10  Most recent level: 10, date 03/21/23  Immunosuppressant Levels:  Therapeutic  Pt adherent to lab draws: yes  Scr:   Lab Results   Component Value Date    CR 0.84 03/27/2023     Side effects: no side effects    Prophylactic Medications  Antibacterial:  Bactrim one daily  Scheduled Discontinue Date: Lifelong    Antifungal: Not needed thus far  Scheduled Discontinue Date: N/A    Antiviral: CrCl >/=60 mL/minute: Valcyte 900mg daily   Scheduled Discontinue Date: 3 months    Acid Reducer: Not needed  Scheduled Reviewed Date: N/A    Thrombosis Prevention: Aspirin 81 mg PO daily  Scheduled Discontinue Date: to be determined    Blood Pressure Management  Frequency of home Blood Pressure checks: twice daily  Most recent home BP: 140-150's/ not known  Patient Blood pressure goal: <140/90  Patient blood pressure at goal:  no  Hospitalizations/ER visits since last assessment: 0    Medication adherence flowsheet 3/28/2023   Patient medication administration: Has assistance with medications   Patient estimated adherence level: %   Pharmacist assessment of adherence: Good   Patient reported doses missed per week: 0-1   Facilitators to medication adherence  Pill box;Medication dosing chart;Caregiver assistance   Patient reported barriers to medication adherence  -   Adherence intervention(s): -      Medication access flowsheet 3/28/2023   Number of pharmacies used: 1   Pharmacy: [facility]   Enrolled in [facility]? Yes   Patient reported barriers to accessing medications: -   Medication access interventions: -      Med rec/DUR performed:  yes  Med Rec Discrepancies: yes, stopped Lokelma, trazodone prn from [facility]    Spoke with daughter Chelsey, who reported Mod[place] is doing really good. His side effects have all subsided. She help set up his pill box once a week and said he takes all meds himself and have not missed any dose. She mentioned his glucose have been running high. This might be due to prednisone taper which is now completed. His insulin was also decreased as the prednisone dose was lowered. They will be seeing a primary to follow up on his glucose levels.    [place]esto checks his BP twice daily with number running high around 140-150's. Last clinic BP was on the low end at 97/58. I suggested that she tells him to sit for a few minutes before checking his BP.    No other questions or concerns. Follow up with Santos MELTON on 5/1/23.    Leonard Hayward RPH

## 2023-03-30 ENCOUNTER — LAB REQUISITION (OUTPATIENT)
Dept: LAB | Facility: CLINIC | Age: 58
End: 2023-03-30
Payer: COMMERCIAL

## 2023-03-30 DIAGNOSIS — Z48.22 ENCOUNTER FOR AFTERCARE FOLLOWING KIDNEY TRANSPLANT: ICD-10-CM

## 2023-03-30 LAB
ALBUMIN SERPL BCG-MCNC: 3.9 G/DL (ref 3.5–5.2)
ALP SERPL-CCNC: 66 U/L (ref 40–129)
ALT SERPL W P-5'-P-CCNC: 15 U/L (ref 10–50)
ANION GAP SERPL CALCULATED.3IONS-SCNC: 9 MMOL/L (ref 7–15)
ANION GAP SERPL CALCULATED.3IONS-SCNC: 9 MMOL/L (ref 7–15)
AST SERPL W P-5'-P-CCNC: 14 U/L (ref 10–50)
BASOPHILS # BLD AUTO: 0 10E3/UL (ref 0–0.2)
BASOPHILS NFR BLD AUTO: 1 %
BILIRUB DIRECT SERPL-MCNC: <0.2 MG/DL (ref 0–0.3)
BILIRUB SERPL-MCNC: 0.7 MG/DL
BUN SERPL-MCNC: 13.8 MG/DL (ref 6–20)
BUN SERPL-MCNC: 13.8 MG/DL (ref 6–20)
CALCIUM SERPL-MCNC: 9.3 MG/DL (ref 8.6–10)
CALCIUM SERPL-MCNC: 9.3 MG/DL (ref 8.6–10)
CHLORIDE SERPL-SCNC: 98 MMOL/L (ref 98–107)
CHLORIDE SERPL-SCNC: 98 MMOL/L (ref 98–107)
CREAT SERPL-MCNC: 0.79 MG/DL (ref 0.67–1.17)
CREAT SERPL-MCNC: 0.79 MG/DL (ref 0.67–1.17)
DEPRECATED HCO3 PLAS-SCNC: 23 MMOL/L (ref 22–29)
DEPRECATED HCO3 PLAS-SCNC: 23 MMOL/L (ref 22–29)
EOSINOPHIL # BLD AUTO: 0 10E3/UL (ref 0–0.7)
EOSINOPHIL NFR BLD AUTO: 0 %
ERYTHROCYTE [DISTWIDTH] IN BLOOD BY AUTOMATED COUNT: 16.5 % (ref 10–15)
FASTING STATUS PATIENT QL REPORTED: ABNORMAL
GFR SERPL CREATININE-BSD FRML MDRD: >90 ML/MIN/1.73M2
GFR SERPL CREATININE-BSD FRML MDRD: >90 ML/MIN/1.73M2
GLUCOSE SERPL-MCNC: 254 MG/DL (ref 70–99)
GLUCOSE SERPL-MCNC: 254 MG/DL (ref 70–99)
HCT VFR BLD AUTO: 24.9 % (ref 40–53)
HGB BLD-MCNC: 8.6 G/DL (ref 13.3–17.7)
IMM GRANULOCYTES # BLD: 0.1 10E3/UL
IMM GRANULOCYTES NFR BLD: 2 %
LYMPHOCYTES # BLD AUTO: 0.4 10E3/UL (ref 0.8–5.3)
LYMPHOCYTES NFR BLD AUTO: 11 %
MAGNESIUM SERPL-MCNC: 1.5 MG/DL (ref 1.7–2.3)
MCH RBC QN AUTO: 30.9 PG (ref 26.5–33)
MCHC RBC AUTO-ENTMCNC: 34.5 G/DL (ref 31.5–36.5)
MCV RBC AUTO: 90 FL (ref 78–100)
MONOCYTES # BLD AUTO: 0.2 10E3/UL (ref 0–1.3)
MONOCYTES NFR BLD AUTO: 7 %
NEUTROPHILS # BLD AUTO: 2.8 10E3/UL (ref 1.6–8.3)
NEUTROPHILS NFR BLD AUTO: 79 %
NRBC # BLD AUTO: 0 10E3/UL
NRBC BLD AUTO-RTO: 0 /100
PHOSPHATE SERPL-MCNC: 2.5 MG/DL (ref 2.5–4.5)
PLATELET # BLD AUTO: 80 10E3/UL (ref 150–450)
POTASSIUM SERPL-SCNC: 4.6 MMOL/L (ref 3.4–5.3)
POTASSIUM SERPL-SCNC: 4.6 MMOL/L (ref 3.4–5.3)
PROT SERPL-MCNC: 5.7 G/DL (ref 6.4–8.3)
RBC # BLD AUTO: 2.78 10E6/UL (ref 4.4–5.9)
SODIUM SERPL-SCNC: 130 MMOL/L (ref 136–145)
SODIUM SERPL-SCNC: 130 MMOL/L (ref 136–145)
TACROLIMUS BLD-MCNC: 8.5 UG/L (ref 5–15)
TME LAST DOSE: NORMAL H
TME LAST DOSE: NORMAL H
TRIGL SERPL-MCNC: 155 MG/DL
WBC # BLD AUTO: 3.5 10E3/UL (ref 4–11)

## 2023-03-30 PROCEDURE — 83735 ASSAY OF MAGNESIUM: CPT | Mod: ORL | Performed by: TRANSPLANT SURGERY

## 2023-03-30 PROCEDURE — 84100 ASSAY OF PHOSPHORUS: CPT | Mod: ORL | Performed by: TRANSPLANT SURGERY

## 2023-03-30 PROCEDURE — 80197 ASSAY OF TACROLIMUS: CPT | Mod: ORL | Performed by: TRANSPLANT SURGERY

## 2023-03-30 PROCEDURE — 84478 ASSAY OF TRIGLYCERIDES: CPT | Mod: ORL | Performed by: TRANSPLANT SURGERY

## 2023-03-30 PROCEDURE — 85025 COMPLETE CBC W/AUTO DIFF WBC: CPT | Mod: ORL | Performed by: TRANSPLANT SURGERY

## 2023-03-30 PROCEDURE — 82248 BILIRUBIN DIRECT: CPT | Mod: ORL | Performed by: TRANSPLANT SURGERY

## 2023-03-30 PROCEDURE — 80053 COMPREHEN METABOLIC PANEL: CPT | Mod: ORL | Performed by: TRANSPLANT SURGERY

## 2023-03-31 ENCOUNTER — OFFICE VISIT (OUTPATIENT)
Dept: FAMILY MEDICINE | Facility: CLINIC | Age: 58
End: 2023-03-31
Payer: COMMERCIAL

## 2023-03-31 VITALS
HEART RATE: 72 BPM | DIASTOLIC BLOOD PRESSURE: 72 MMHG | TEMPERATURE: 98.2 F | HEIGHT: 64 IN | BODY MASS INDEX: 23.05 KG/M2 | RESPIRATION RATE: 16 BRPM | OXYGEN SATURATION: 100 % | SYSTOLIC BLOOD PRESSURE: 134 MMHG | WEIGHT: 135 LBS

## 2023-03-31 DIAGNOSIS — R51.9 NONINTRACTABLE HEADACHE, UNSPECIFIED CHRONICITY PATTERN, UNSPECIFIED HEADACHE TYPE: ICD-10-CM

## 2023-03-31 DIAGNOSIS — G62.9 NEUROPATHY: Primary | ICD-10-CM

## 2023-03-31 DIAGNOSIS — E11.59 TYPE 2 DIABETES MELLITUS WITH OTHER CIRCULATORY COMPLICATIONS (H): ICD-10-CM

## 2023-03-31 DIAGNOSIS — Z94.0 KIDNEY REPLACED BY TRANSPLANT: ICD-10-CM

## 2023-03-31 DIAGNOSIS — R20.9 BILATERAL COLD FEET: ICD-10-CM

## 2023-03-31 DIAGNOSIS — D84.9 IMMUNOSUPPRESSION (H): ICD-10-CM

## 2023-03-31 DIAGNOSIS — Z94.0 HTN, KIDNEY TRANSPLANT RELATED: ICD-10-CM

## 2023-03-31 DIAGNOSIS — I15.1 HTN, KIDNEY TRANSPLANT RELATED: ICD-10-CM

## 2023-03-31 LAB
ALBUMIN MFR UR ELPH: 26.1 MG/DL (ref 1–14)
CREAT UR-MCNC: 146 MG/DL
FOLATE SERPL-MCNC: 9 NG/ML (ref 4.6–34.8)
PROT/CREAT 24H UR: 0.18 MG/MG CR (ref 0–0.2)
VIT B12 SERPL-MCNC: 607 PG/ML (ref 232–1245)

## 2023-03-31 PROCEDURE — 82746 ASSAY OF FOLIC ACID SERUM: CPT | Performed by: FAMILY MEDICINE

## 2023-03-31 PROCEDURE — 84156 ASSAY OF PROTEIN URINE: CPT | Performed by: FAMILY MEDICINE

## 2023-03-31 PROCEDURE — 36415 COLL VENOUS BLD VENIPUNCTURE: CPT | Performed by: FAMILY MEDICINE

## 2023-03-31 PROCEDURE — 82607 VITAMIN B-12: CPT | Performed by: FAMILY MEDICINE

## 2023-03-31 PROCEDURE — 99214 OFFICE O/P EST MOD 30 MIN: CPT | Performed by: FAMILY MEDICINE

## 2023-03-31 NOTE — PATIENT INSTRUCTIONS
-Thank you for choosing the [facility].  -It was a pleasure to see you today.  -Please take a look at the information below for more specific details regarding the treatment plan and recommendations.  -In this after visit summary is a list of your medications and specific instructions.  Please review this carefully as there may be changes made to your medication list.  -If there are any particular questions or concerns, please feel free to reach out to Dr. Cooper.  -If any labs have been completed, we will reach out to you about results.  If the results are normal or not concerning, a letter or [employer] message will be sent to you.  If any follow-up is needed, either Dr. Cooper or the nurse will give you a call.  If you have not heard regarding results after 2 weeks, please reach out to the clinic.    Patient Instructions:    -Novolog: give 7 units with meals.   -Levemir: give 24 units at night time.   -Continue to check the blood sugars.   -Keep the amount of carbohydrates even during each meal.    -Uses Tylenol as needed for headaches at this time.  -Eat a balanced diet.  -Get 8-9 hours of sleep each night.    Please seek immediate medical attention (go to the emergency room or urgent care) for the following reasons: worsening symptoms, or any concerning changes.    Please return to clinic in 1-2 months months for follow up, or sooner as needed.      --------------------------------------------------------------------------------------------------------------------    -We are always looking for ways to improve.  You may be selected to receive a survey regarding your visit today.  We encourage you to complete the survey and provide specific, constructive feedback to help us improve our processes.  Thank you for your time!  -Please review the contact information listed on the after visit summary and in the electronic chart.  Below is the phone number that we have on file.  If there are any changes  that are needed to be made, please reach out to the clinic.  [phone] (home)

## 2023-03-31 NOTE — PROGRESS NOTES
OFFICE VISIT    Assessment/Plan:     Patient Instructions:    -Novolog: give 7 units with meals.   -Levemir: give 24 units at night time.   -Continue to check the blood sugars.   -Keep the amount of carbohydrates even during each meal.    -Uses Tylenol as needed for headaches at this time.  -Eat a balanced diet.  -Get 8-9 hours of sleep each night.    Please seek immediate medical attention (go to the emergency room or urgent care) for the following reasons: worsening symptoms, or any concerning changes.    Please return to clinic in 1-2 months months for follow up, or sooner as needed.      [place]esto was seen today for recheck medication and headache.  Diagnoses and all orders for this visit:    Neuropathy  Type 2 diabetes mellitus with other circulatory complications (H)  Bilateral cold feet  Hypertension, kidney transplant related  Immunosuppression (H): Check labs as below.  Pulses are subtle on examination, so check ultrasound as well.  Further management pending results.  -     US PAULINE Doppler No Exercise; Future  -     Folate; Future  -     Vitamin B12; Future    Nonintractable headache, unspecified chronicity pattern, unspecified headache type: Potentially related to fludrocortisone, so patient was discontinued few days ago.  Reviewed conservative management recommendations.  Patient to continue Tylenol for now.  Monitor to see how his symptoms change as the fludrocortisone is metabolized out of the body.    Kidney replaced by transplant: Patient is doing well and continues to follow with transplant team.  -     Protein  random urine          The diagnoses, treatment options, risk, benefits, and recommendations were reviewed with patient/guardian.  Questions were answered to patient's/guardian satisfaction.  Red flag signs were reviewed.  Patient/guardian is in agreement with above plan.      Subjective: 57 year old male with history of kidney transplant (living unrelated donor on 02/21/2023), immunosuppressed  state, diabetes mellitus type 2, CAD with history of NSTEMI, hypertension, dyslipidemia, thrombocytopenia, splenomegaly who presents to clinic for the following complaints:   Patient presents with:  Recheck Medication  Headache    Answers for HPI/ROS submitted by the patient on 3/31/2023  Do you check your blood pressure regularly outside of the clinic?: Yes  Are your blood pressures ever more than 140 on the top number (systolic) OR more than 90 on the bottom number (diastolic)? (For example, greater than 140/90): Yes  Are you following a low salt diet?: No  Frequency of checking blood sugars:: three times daily  What time of day are you checking your blood sugars : before meals, at bedtime  Have you had any blood sugars above 200?: Yes  Have you had any blood sugars below 70?: No  Hypoglycemia symptoms:: shakiness, dizziness, weakness  Diabetic concerns:: other  Paraesthesia present:: numbness in feet  Have you had a diabetic eye exam within the last year?: No  Do you take any over the counter pain medicine?  : Yes    What over the counter medicine are you taking for your pain?  : Tylenol  How often do you take this medicine?: a few times a week  How many servings of fruits and vegetables do you eat daily?: 2-3  On average, how many sweetened beverages do you drink each day (Examples: soda, juice, sweet tea, etc.  Do NOT count diet or artificially sweetened beverages)?: 1  How many minutes a day do you exercise enough to make your heart beat faster?: 9 or less  How many days a week do you exercise enough to make your heart beat faster?: 3 or less  How many days per week do you miss taking your medication?: 0      Patient finished prednisone on 3/27.  Patient had been on a tapering course and during that time, the insulin medications had been adjusted.  In addition to the prednisone medication, he has been noted that patient has had increased appetite since recovering from the kidney transplant.  He notices that he  has gained some weight as well.  Reviewed dietary intake and conservative management recommendations.  Discussed carb counting and adjustment of NovoLog medication based on the amount of carbs he takes in.  He states he is not ready and would prefer a single dose of the NovoLog medication at this time.    Current insulin doses:  Levemir: 20 units in the morning time  Novolog 5 units with meal      Legs/feet numbness and coldness: The two legs have been numb and cold for a long time now.  Symptoms of the leg coldness started when he started during dialysis.  Since then, symptoms have persisted.  He had brought this up with his previous doctors and they had recommended that he try conservative management (such as wearing socks, doing things to keep the warm, etc.).  He has tried the skin nothing has been successful.  He is wondering if there is something else that is going on, such as a blood flow issue.  He would like further testing on this.  No previous labs noted for folic acid or vitamin B12.      Hemoglobin A1C   Date Value Ref Range Status   02/22/2023 6.8 (H) <5.7 % Final     Comment:     Normal <5.7%   Prediabetes 5.7-6.4%    Diabetes 6.5% or higher     Note: Adopted from ADA consensus guidelines.   02/21/2023 6.8 (H) <5.7 % Final     Comment:     Normal <5.7%   Prediabetes 5.7-6.4%    Diabetes 6.5% or higher     Note: Adopted from ADA consensus guidelines.   02/16/2023 7.1 (H) <5.7 % Final     Comment:     Normal <5.7%   Prediabetes 5.7-6.4%    Diabetes 6.5% or higher     Note: Adopted from ADA consensus guidelines.         Patient has been doing well postoperatively from the renal transplant.  Patient has been following with the transplant team.  Overall, it appears the patient has been doing well.  Most recent labs noted as below.    Hypertension: Norvasc had been increased to 10 mg on 03/23/2023.  Blood pressure is 134/72 today.  Plan to continue on the blood pressure medications at this time.    Patient  presents with daughter.    The 10 point review of system is negative except as stated in the HPI.    Allergies were reviewed and updated.      Latest Reference Range & Units 03/23/23 07:15   Sodium 136 - 145 mmol/L 137   Potassium 3.4 - 5.3 mmol/L 4.9   Chloride 98 - 107 mmol/L 107   Carbon Dioxide (CO2) 22 - 29 mmol/L 22   Urea Nitrogen 6.0 - 20.0 mg/dL 17.3   Creatinine 0.67 - 1.17 mg/dL 0.76        Latest Reference Range & Units 03/23/23 07:15   WBC 4.0 - 11.0 10e3/uL 4.5   Hemoglobin 13.3 - 17.7 g/dL 9.4 (L)   Hematocrit 40.0 - 53.0 % 27.6 (L)   Platelet Count 150 - 450 10e3/uL 80 (L)   (L): Data is abnormally low    HM due was reviewed with patient/parent.  Recommendations, risk, benefits were reviewed.  Accepted recommendations were ordered.  Otherwise, patient/parent declined.    Health Maintenance Due   Topic Date Due     DIABETIC FOOT EXAM  Never done     ANNUAL REVIEW OF HM ORDERS  Never done     ADVANCE CARE PLANNING  Never done     EYE EXAM  Never done     MEDICARE ANNUAL WELLNESS VISIT  Never done     ZOSTER IMMUNIZATION (1 of 2) Never done     HEPATITIS B IMMUNIZATION (1 of 3 - Risk Dialysis 4-dose series) Never done     DTAP/TDAP/TD IMMUNIZATION (1 - Tdap) Never done     Pneumococcal Vaccine: Pediatrics (0 to 5 Years) and At-Risk Patients (6 to 64 Years) (2 - PPSV23 if available, else PCV20) 12/05/2021         [facility] records:   COVID-19  03/17/2021  1 of 3  Spikevax-MOD 100mcg  Full    No    COVID-19  04/14/2021  2 of 3  Spikevax-MOD 100mcg  Full    No    COVID-19  11/08/2021  3 of 3  Spikevax-MOD 50mcg RED  Full    No    COVID-19  11/02/2022  4 of 3  Comirnaty-PFR Biv. 30mcg  Full    No    H1N1 Novel Flu  02/11/2010    Full    No    HepA  12/18/2007  1 of 2   Full    No    Influenza  12/18/2007  Booster   Full    No    Influenza  09/16/2016  Booster  Fluarix quad pfree     Yes    Influenza  10/19/2018  Booster  Afluria quad pfree  Full    No    Influenza  03/12/2020  Booster  Flucelvax quad pfree  Full  "   No    Influenza  09/28/2020  Booster  Fluzone quad pfree  Full    No    Influenza  10/05/2021  Booster  Fluarix quad pfree     Yes    Influenza  10/14/2022  Booster  Fluarix quad pfree  Full    No    Pneumo-conj  10/10/2021   Prevnar 13     Yes    Typhoid  12/18/2007    Full    No       Objective:   /72   Pulse 72   Temp 98.2  F (36.8  C) (Oral)   Resp 16   Ht 1.626 m (5' 4\")   Wt 61.2 kg (135 lb)   SpO2 100%   BMI 23.17 kg/m    General: Active, alert, nontoxic-appearing.  No acute distress.  HEENT: Normocephalic, atraumatic.  Pupils are equal and round.  Sclera is clear.  Normal external ears. Nares patent.  Moist mucous membranes.    Cardiac: RRR.  S1, S2 present.  No murmurs, rubs, or gallops.  Respiratory/chest: Clear to auscultation bilaterally.  No wheezes, rales, rhonchi.  Breathing is not labored.  No accessory muscle usage.  Abdomen: Soft, nondistended, nontender.  No masses or organomegaly noted.  No guarding or rebound tenderness appreciated.  Doing scar noted on the right lower quadrant.  Subcutaneous mass (kidney transplant) noted.  Some bruising present around this area and at the surgical sites, though nontender.  Extremities: Voluntary movements intact.  Normal ambulation.  Pedal and posterior tibial pulses: +1/+4 bilaterally.  The midfoot and distally to feel slightly cold compared to the proximal foot/lower leg, though symmetric.  Capillary refill about 3 seconds.  Nontender.  Sensation to light touch intact throughout, though some numbness is present in the toes.  Integumentary: No concerning rash or skin changes appreciated.        Pal Cooper MD  [facility] [place] [address]  Phone: [phone]  Fax: [phone]    4/3/2023  6:04 PM            Current Outpatient Medications   Medication     acetaminophen (TYLENOL) 325 MG tablet     amLODIPine (NORVASC) 5 MG tablet     aspirin (ASA) 81 MG EC tablet     atorvastatin (LIPITOR) 20 MG tablet     " calcium carbonate (TUMS) 500 MG chewable tablet     carvedilol (COREG) 25 MG tablet     insulin aspart (NOVOLOG PEN) 100 UNIT/ML pen     insulin detemir (LEVEMIR PEN) 100 UNIT/ML pen     loratadine (CLARITIN) 10 MG tablet     magnesium oxide (MAG-OX) 400 MG tablet     mycophenolate (GENERIC EQUIVALENT) 250 MG capsule     nitroGLYcerin (NITROSTAT) 0.4 MG sublingual tablet     phosphorus tablet 250 mg (PHOSPHA 250 NEUTRAL) 250 MG per tablet     polyethylene glycol (MIRALAX) 17 GM/Dose powder     senna-docusate (SENOKOT-S/PERICOLACE) 8.6-50 MG tablet     sodium bicarbonate 650 MG tablet     sulfamethoxazole-trimethoprim (BACTRIM) 400-80 MG tablet     tacrolimus (GENERIC EQUIVALENT) 0.5 MG capsule     tacrolimus (GENERIC EQUIVALENT) 1 MG capsule     valGANciclovir (VALCYTE) 450 MG tablet     Vitamin D3 (CHOLECALCIFEROL) 25 mcg (1000 units) tablet     fludrocortisone (FLORINEF) 0.1 MG tablet     sodium zirconium cyclosilicate (LOKELMA) 10 g PACK packet     No current facility-administered medications for this visit.       Allergies   Allergen Reactions     Tegaderm Transparent Dressing (Informational Only) Blisters       Patient Active Problem List    Diagnosis Date Noted     Immunosuppression (H) 02/24/2023     Priority: Medium     Kidney replaced by transplant 02/24/2023     Priority: Medium     Anemia in stage 3a chronic kidney disease (H) 02/24/2023     Priority: Medium     Hypocalcemia 02/24/2023     Priority: Medium     Hypomagnesemia 02/24/2023     Priority: Medium     Hypophosphatemia 02/24/2023     Priority: Medium     Hepatitis B core antibody positive 02/24/2023     Priority: Medium     Awaiting organ transplant 02/21/2023     Priority: Medium     NSTEMI (non-ST elevated myocardial infarction) (H) 11/16/2022     Priority: Medium     Thrombocytopenia (H) 06/02/2022     Priority: Medium     Coronary artery disease involving native coronary artery of native heart without angina pectoris 12/16/2021     Priority:  Medium     Metabolic acidosis      Priority: Medium     HTN, kidney transplant related      Priority: Medium     Created by Conversion  Replacement Utility updated for latest IMO load         Type 2 Diabetes Mellitus      Priority: Medium     Created by Conversion         Dyslipidemia, goal LDL below 70      Priority: Medium     Created by Conversion           Family History   Problem Relation Age of Onset     Diabetes Type 2  Mother      Other - See Comments Daughter         granular cell tumor of thigh     Heart Disease Other        Past Surgical History:   Procedure Laterality Date     BENCH KIDNEY  2/21/2023    Procedure: Bench kidney;  Surgeon: Talha Weinstein MD;  Location: [facility] OR     CV CORONARY ANGIOGRAM N/A 12/6/2021    Procedure: Coronary Angiogram;  Surgeon: Cristela Banks MD;  Location: [facility] CATH LAB CV     CV LEFT HEART CATH N/A 12/6/2021    Procedure: Left Heart Cath;  Surgeon: Cristela Banks MD;  Location: [facility] CATH LAB CV     CV PCI N/A 12/6/2021    Procedure: Percutaneous Coronary Intervention;  Surgeon: Cristela Banks MD;  Location: [facility] CATH LAB CV     REMOVE CATHETER PERITONEAL N/A 2/21/2023    Procedure: Remove catheter peritoneal;  Surgeon: Talha Weinstein MD;  Location: [facility] OR        Social History     Socioeconomic History     Marital status: Patient Declined     Spouse name: Not on file     Number of children: Not on file     Years of education: Not on file     Highest education level: Not on file   Occupational History     Not on file   Tobacco Use     Smoking status: Never     Passive exposure: Never     Smokeless tobacco: Never   Vaping Use     Vaping status: Never Used     Passive vaping exposure: Yes   Substance and Sexual Activity     Alcohol use: Not Currently     Drug use: Never     Sexual activity: Not on file   Other Topics Concern     Parent/sibling w/ CABG, MI or angioplasty before 65F 55M? Not Asked   Social History Narrative     Not on file      Social Determinants of Health     Financial Resource Strain: Not on file   Food Insecurity: Not on file   Transportation Needs: Not on file   Physical Activity: Not on file   Stress: Not on file   Social Connections: Not on file   Intimate Partner Violence: Not on file   Housing Stability: Not on file

## 2023-04-03 ENCOUNTER — LAB REQUISITION (OUTPATIENT)
Dept: LAB | Facility: CLINIC | Age: 58
End: 2023-04-03
Payer: COMMERCIAL

## 2023-04-03 DIAGNOSIS — Z48.22 ENCOUNTER FOR AFTERCARE FOLLOWING KIDNEY TRANSPLANT: ICD-10-CM

## 2023-04-03 PROBLEM — Z94.0 KIDNEY REPLACED BY TRANSPLANT: Status: ACTIVE | Noted: 2023-02-21

## 2023-04-03 LAB
ALBUMIN SERPL BCG-MCNC: 3.8 G/DL (ref 3.5–5.2)
ALP SERPL-CCNC: 69 U/L (ref 40–129)
ALT SERPL W P-5'-P-CCNC: 12 U/L (ref 10–50)
ANION GAP SERPL CALCULATED.3IONS-SCNC: 11 MMOL/L (ref 7–15)
AST SERPL W P-5'-P-CCNC: 14 U/L (ref 10–50)
BASOPHILS # BLD AUTO: 0 10E3/UL (ref 0–0.2)
BASOPHILS NFR BLD AUTO: 0 %
BILIRUB DIRECT SERPL-MCNC: 0.22 MG/DL (ref 0–0.3)
BILIRUB SERPL-MCNC: 0.8 MG/DL
BUN SERPL-MCNC: 13.8 MG/DL (ref 6–20)
CALCIUM SERPL-MCNC: 9.2 MG/DL (ref 8.6–10)
CHLORIDE SERPL-SCNC: 102 MMOL/L (ref 98–107)
CREAT SERPL-MCNC: 0.97 MG/DL (ref 0.67–1.17)
DEPRECATED HCO3 PLAS-SCNC: 23 MMOL/L (ref 22–29)
EOSINOPHIL # BLD AUTO: 0 10E3/UL (ref 0–0.7)
EOSINOPHIL NFR BLD AUTO: 0 %
ERYTHROCYTE [DISTWIDTH] IN BLOOD BY AUTOMATED COUNT: 15.5 % (ref 10–15)
GFR SERPL CREATININE-BSD FRML MDRD: 90 ML/MIN/1.73M2
GLUCOSE SERPL-MCNC: 210 MG/DL (ref 70–99)
HCT VFR BLD AUTO: 24 % (ref 40–53)
HGB BLD-MCNC: 8.3 G/DL (ref 13.3–17.7)
IMM GRANULOCYTES # BLD: 0.1 10E3/UL
IMM GRANULOCYTES NFR BLD: 2 %
LYMPHOCYTES # BLD AUTO: 0.4 10E3/UL (ref 0.8–5.3)
LYMPHOCYTES NFR BLD AUTO: 15 %
MAGNESIUM SERPL-MCNC: 1.5 MG/DL (ref 1.7–2.3)
MCH RBC QN AUTO: 30.3 PG (ref 26.5–33)
MCHC RBC AUTO-ENTMCNC: 34.6 G/DL (ref 31.5–36.5)
MCV RBC AUTO: 88 FL (ref 78–100)
MONOCYTES # BLD AUTO: 0.2 10E3/UL (ref 0–1.3)
MONOCYTES NFR BLD AUTO: 7 %
NEUTROPHILS # BLD AUTO: 2.2 10E3/UL (ref 1.6–8.3)
NEUTROPHILS NFR BLD AUTO: 76 %
NRBC # BLD AUTO: 0 10E3/UL
NRBC BLD AUTO-RTO: 0 /100
PHOSPHATE SERPL-MCNC: 2.8 MG/DL (ref 2.5–4.5)
PLATELET # BLD AUTO: 115 10E3/UL (ref 150–450)
POTASSIUM SERPL-SCNC: 4.5 MMOL/L (ref 3.4–5.3)
PREALB SERPL IA-MCNC: 16 MG/DL (ref 15–45)
PROT SERPL-MCNC: 6.1 G/DL (ref 6.4–8.3)
RBC # BLD AUTO: 2.74 10E6/UL (ref 4.4–5.9)
SODIUM SERPL-SCNC: 136 MMOL/L (ref 136–145)
TACROLIMUS BLD-MCNC: 9.3 UG/L (ref 5–15)
TME LAST DOSE: NORMAL H
TME LAST DOSE: NORMAL H
TRIGL SERPL-MCNC: 91 MG/DL
WBC # BLD AUTO: 3 10E3/UL (ref 4–11)

## 2023-04-03 PROCEDURE — 83735 ASSAY OF MAGNESIUM: CPT | Mod: ORL | Performed by: TRANSPLANT SURGERY

## 2023-04-03 PROCEDURE — 84478 ASSAY OF TRIGLYCERIDES: CPT | Mod: ORL | Performed by: TRANSPLANT SURGERY

## 2023-04-03 PROCEDURE — 84100 ASSAY OF PHOSPHORUS: CPT | Mod: ORL | Performed by: TRANSPLANT SURGERY

## 2023-04-03 PROCEDURE — 84134 ASSAY OF PREALBUMIN: CPT | Mod: ORL | Performed by: TRANSPLANT SURGERY

## 2023-04-03 PROCEDURE — 85025 COMPLETE CBC W/AUTO DIFF WBC: CPT | Mod: ORL | Performed by: TRANSPLANT SURGERY

## 2023-04-03 PROCEDURE — 80053 COMPREHEN METABOLIC PANEL: CPT | Mod: ORL | Performed by: TRANSPLANT SURGERY

## 2023-04-03 PROCEDURE — 82248 BILIRUBIN DIRECT: CPT | Mod: ORL | Performed by: TRANSPLANT SURGERY

## 2023-04-03 PROCEDURE — 80197 ASSAY OF TACROLIMUS: CPT | Mod: ORL | Performed by: TRANSPLANT SURGERY

## 2023-04-04 ENCOUNTER — MYC MEDICAL ADVICE (OUTPATIENT)
Dept: TRANSPLANT | Facility: CLINIC | Age: 58
End: 2023-04-04
Payer: COMMERCIAL

## 2023-04-05 ENCOUNTER — OFFICE VISIT (OUTPATIENT)
Dept: NEPHROLOGY | Facility: CLINIC | Age: 58
End: 2023-04-05
Attending: INTERNAL MEDICINE
Payer: COMMERCIAL

## 2023-04-05 VITALS
OXYGEN SATURATION: 98 % | WEIGHT: 135 LBS | SYSTOLIC BLOOD PRESSURE: 91 MMHG | BODY MASS INDEX: 23.17 KG/M2 | DIASTOLIC BLOOD PRESSURE: 49 MMHG | HEART RATE: 71 BPM

## 2023-04-05 DIAGNOSIS — Z94.0 KIDNEY REPLACED BY TRANSPLANT: Primary | ICD-10-CM

## 2023-04-05 DIAGNOSIS — N18.31 ANEMIA IN STAGE 3A CHRONIC KIDNEY DISEASE (H): ICD-10-CM

## 2023-04-05 DIAGNOSIS — D84.9 IMMUNOSUPPRESSION (H): ICD-10-CM

## 2023-04-05 DIAGNOSIS — D63.1 ANEMIA IN STAGE 3A CHRONIC KIDNEY DISEASE (H): ICD-10-CM

## 2023-04-05 DIAGNOSIS — Z94.0 HTN, KIDNEY TRANSPLANT RELATED: ICD-10-CM

## 2023-04-05 DIAGNOSIS — I15.1 HTN, KIDNEY TRANSPLANT RELATED: ICD-10-CM

## 2023-04-05 PROBLEM — I21.4 NSTEMI (NON-ST ELEVATED MYOCARDIAL INFARCTION) (H): Status: RESOLVED | Noted: 2022-11-16 | Resolved: 2023-04-05

## 2023-04-05 PROBLEM — E83.39 HYPOPHOSPHATEMIA: Status: RESOLVED | Noted: 2023-02-24 | Resolved: 2023-04-05

## 2023-04-05 LAB
ALBUMIN UR-MCNC: NEGATIVE MG/DL
APPEARANCE UR: CLEAR
BILIRUB UR QL STRIP: NEGATIVE
COLOR UR AUTO: NORMAL
GLUCOSE UR STRIP-MCNC: NEGATIVE MG/DL
HGB UR QL STRIP: NEGATIVE
KETONES UR STRIP-MCNC: NEGATIVE MG/DL
LEUKOCYTE ESTERASE UR QL STRIP: NEGATIVE
NITRATE UR QL: NEGATIVE
PH UR STRIP: 7 [PH] (ref 5–7)
RBC URINE: 1 /HPF
SP GR UR STRIP: 1.02 (ref 1–1.03)
SQUAMOUS EPITHELIAL: <1 /HPF
UROBILINOGEN UR STRIP-MCNC: NORMAL MG/DL
WBC URINE: 2 /HPF

## 2023-04-05 PROCEDURE — 81003 URINALYSIS AUTO W/O SCOPE: CPT | Performed by: INTERNAL MEDICINE

## 2023-04-05 PROCEDURE — G0463 HOSPITAL OUTPT CLINIC VISIT: HCPCS | Performed by: INTERNAL MEDICINE

## 2023-04-05 PROCEDURE — 99215 OFFICE O/P EST HI 40 MIN: CPT | Mod: 24 | Performed by: INTERNAL MEDICINE

## 2023-04-05 RX ORDER — CARVEDILOL 25 MG/1
12.5 TABLET ORAL 2 TIMES DAILY WITH MEALS
Qty: 60 TABLET | Refills: 0 | COMMUNITY
Start: 2023-04-05 | End: 2023-06-13

## 2023-04-05 NOTE — PROGRESS NOTES
TRANSPLANT NEPHROLOGY EARLY POST TRANSPLANT VISIT    Assessment & Plan   # LDKT: Trend up but within baseline, possibly 2/2 hypotension   - Baseline Creatinine: ~ 0.8-1   - Proteinuria: Normal (<0.2 grams),    - Date DSA Last Checked: Feb/2023      Latest DSA: No DSA at time of transplant will obtain DSA with the next lab   - BK Viremia: Not checked post transplant will check BK with the next lab.   - Kidney Tx Biopsy: No   - Transplant Ureteral Stent: Removed 3/28/23    # Immunosuppression: Tacrolimus immediate release (goal 8-10) and Mycophenolate mofetil (dose 750 mg every 12 hours)   - Induction with Recent Transplant:  Intermediate Intensity   - Continue with intensive monitoring of immunosuppression for efficacy and toxicity.   - Changes: Not at this time. Obtain MPA level as he has low WBC     # Scrotal edema:(resolved)    -Testicular U/S 3/3/23 shows severe diffuse scrotal wall edema without underlying fluid or mass. He does have small bilateral hydroceles   -Pt was put on lasix 20mg PO daily on 3/4-3/7/23 with improvement    # Infection Prophylaxis:   - PJP: Sulfa/TMP (Bactrim)  - CMV: Valganciclovir (Valcyte) x3m    # Hypertension: Controlled, but low at times;  Goal BP: < 140/90   - Volume status: Euvolemic     - Changes: Yes - will stop amlodipine and cut down the coreg from 25 mg to 12.5 mg BID .He should measure the blood pressure before taking his medication and if Systolic is below 120 than hold medication and inform us.   -With low BP U/A was sent and was negative for infection    # Diabetes: Controlled (HbA1c <7%) Last HbA1c: 6.8%   - Management as per primary care and pharmacy    # Anemia in Chronic Renal Disease: Hgb: Stable, low      NERY: No will refer him to anemia clinic   - Iron studies: Replete    # Mineral Bone Disorder:   - Secondary renal hyperparathyroidism; PTH level: Mildly elevated (151-300 pg/ml)        On treatment: None  - Vitamin D; level: Low        On supplement: Yes  - Calcium;  level: Normal        On supplement: Yes, tums 1g bid  - Phosphorus; level: Normal        On supplement: Yes Discontinue the phosphorus supplements.    # Electrolytes:   - Potassium; level: Normal        On supplement: No  - Magnesium; level: Low normal        On supplement: Yes, mag ox 400mg daily   - Bicarbonate; level: Normal        On supplement: Yes,continue with  bicarb 1300mg bid       # Thrombocytopenia:(improved)              - Seen previously by hematology. Thought to have peripheral consumption vs ITP                    - Baseline platelets ~60k     # Hepatitis B Core Ab +:    - Check Hepatitis B DNA level three months post transplant. Negative at time of transplant. Will need to check it next month.     # Asymmetric Left Ventricular Hypertrophy: Stable. No history of sustained ventricular arrhythmias.     # CAD: s/p PCI with stent 11/2021 with negative stress echo 1/2023.  Previously on Brilinta and aspirin. Brillinta stopped 1/2023    -Continue atorvastatin     # Medical Compliance: Yes    # COVID-19 Virus Review: Discussed COVID-19 virus and the potential medical risks.  Reviewed preventative health recommendations, including wearing a mask where appropriate.  Recommended COVID vaccination should be up to date with either an initial vaccination or booster shot when appropriate.  Asked the patient to inform the transplant center if they are exposed or diagnosed with this virus.    # COVID Vaccination Up To Date: Yes    #Vaccination: Has not received zoster vaccine.Six months post transplant should receive one.    # Transplant History:  Etiology of Kidney Failure: Diabetes mellitus type 2  Tx: LDKT  Transplant: 2/21/2023 (Kidney)  Donor Type: Living Donor Class:   Crossmatch at time of Tx: negative  DSA at time of Tx: No  Significant changes in immunosuppression: None  CMV IgG Ab High Risk Discordance (D+/R-): No  EBV IgG Ab High Risk Discordance (D+/R-): No  Significant transplant-related complications:  None    Transplant Office Phone Number: [phone]    Assessment and plan was discussed with the patient and he voiced his understanding and agreement.    Return visit: Return in 26 days (on 5/1/2023).    Zaid Thomas MD     Physician Attestation   I, Rome Kim MD, personally examined and evaluated this patient.  I discussed the patient with the resident/fellow and care team, and agree with the assessment and plan of care as documented in the note on 04/05/23 .      I personally reviewed vital signs, medications, labs, and imaging.  Rome Kim MD  Date of Service (when I saw the patient): 04/05/23          Chief Complaint   Mr. Barbosa is a 57 year old here for routine follow up, kidney transplant and immunosuppression management.     History of Present Illness   The patient overall has been feeling well no complains. He does report some scrotal swelling but it has significantly gone down.    No peripheral edema,nausea, vomiting, fever, chills, headache, numbness, tingling, cough, dyspnea, chest pain, abdominal pain, diarrhea, constipation,NSAID use, dysuria, and rash. Normal appetite, and no weight gain.    Home BP: 110-130s systolic typically    Problem List   Patient Active Problem List   Diagnosis     Type 2 Diabetes Mellitus     Dyslipidemia, goal LDL below 70     HTN, kidney transplant related     Metabolic acidosis     Coronary artery disease involving native coronary artery of native heart without angina pectoris     Thrombocytopenia (H)     NSTEMI (non-ST elevated myocardial infarction) (H)     Immunosuppression (H)     Kidney replaced by transplant     Anemia in stage 3a chronic kidney disease (H)     Hypocalcemia     Hypomagnesemia     Hypophosphatemia     Hepatitis B core antibody positive       Allergies   Allergies   Allergen Reactions     Tegaderm Transparent Dressing (Informational Only) Blisters       Medications   Current Outpatient Medications   Medication Sig     carvedilol (COREG) 25 MG  tablet Take 0.5 tablets (12.5 mg) by mouth 2 times daily (with meals)     acetaminophen (TYLENOL) 325 MG tablet Take 2 tablets (650 mg) by mouth every 4 hours as needed for other (For optimal non-opioid multimodal pain management to improve pain control.)     aspirin (ASA) 81 MG EC tablet Take 1 tablet (81 mg) by mouth daily     atorvastatin (LIPITOR) 20 MG tablet Take 1 tablet (20 mg) by mouth daily     calcium carbonate (TUMS) 500 MG chewable tablet Take 2 tablets (1,000 mg) by mouth 2 times daily     insulin aspart (NOVOLOG PEN) 100 UNIT/ML pen Inject 5 Units Subcutaneous 3 times daily (with meals) for 180 days     insulin detemir (LEVEMIR PEN) 100 UNIT/ML pen Inject 30 Units Subcutaneous At Bedtime for 2 days, THEN 28 Units At Bedtime for 7 days, THEN 26 Units At Bedtime for 7 days, THEN 24 Units At Bedtime for 7 days, THEN 20 Units At Bedtime for 7 days, THEN 20 Units At Bedtime for 30 days. On the days that you are taking the prescribed Prednisone 40mg daily, take levemir 30 units at bedtime. On the days that you are taking the prescribed Prednisone 20mg daily take levemir 28 units at bedtime. On the days that you are taking the prescribed Prednisone 15mg daily take levemir 26 units at bedtime. On the days that you are taking the prescribed Prednisone 10mg daily take levemir 24 units at bedtime. On the days that you are taking the prescribed Prednisone 5mg daily take levemir 20 units at bedtime. Once you are off the steroid taper, take levemir 20 units at bedtime daily.     loratadine (CLARITIN) 10 MG tablet Take 1 tablet (10 mg) by mouth daily as needed for allergies (itchy/rash)     magnesium oxide (MAG-OX) 400 MG tablet Take 1 tablet (400 mg) by mouth daily (with lunch)     mycophenolate (GENERIC EQUIVALENT) 250 MG capsule Take 3 capsules (750 mg) by mouth 2 times daily for 360 days     nitroGLYcerin (NITROSTAT) 0.4 MG sublingual tablet One tablet under the tongue every 5 minutes if needed for chest pain.  "May repeat every 5 minutes for a maximum of 3 doses in 15 minutes\"     polyethylene glycol (MIRALAX) 17 GM/Dose powder Take 17 g by mouth daily     senna-docusate (SENOKOT-S/PERICOLACE) 8.6-50 MG tablet Take 1 tablet by mouth daily as needed for constipation     sodium bicarbonate 650 MG tablet Take 2 tablets (1,300 mg) by mouth 2 times daily     sulfamethoxazole-trimethoprim (BACTRIM) 400-80 MG tablet Take 1 tablet by mouth daily     tacrolimus (GENERIC EQUIVALENT) 0.5 MG capsule Take 1 capsule (0.5 mg) by mouth 2 times daily Take total dose 2.5 mg twice per day     tacrolimus (GENERIC EQUIVALENT) 1 MG capsule Take 2 capsules (2 mg) by mouth 2 times daily Take total  2.5 mg twice per day     valGANciclovir (VALCYTE) 450 MG tablet Take 2 tablets (900 mg) by mouth daily for 90 days     Vitamin D3 (CHOLECALCIFEROL) 25 mcg (1000 units) tablet Take 2 tablets (50 mcg) by mouth daily     No current facility-administered medications for this visit.     Medications Discontinued During This Encounter   Medication Reason     amLODIPine (NORVASC) 5 MG tablet      phosphorus tablet 250 mg (PHOSPHA 250 NEUTRAL) 250 MG per tablet      carvedilol (COREG) 25 MG tablet        Physical Exam   Vital Signs: BP 91/49   Pulse 71   Wt 61.2 kg (135 lb)   SpO2 98%   BMI 23.17 kg/m      GENERAL APPEARANCE: alert and no distress  HENT: mouth without ulcers or lesions  LYMPHATICS: no cervical or supraclavicular nodes  RESP: lungs clear to auscultation - no rales, rhonchi or wheezes  CV: systolic murmur 2/6, regular rate  EDEMA: no LE edema bilaterally  ABDOMEN: soft, nondistended, nontender, bowel sounds normal. Surgical scar on the lower part of abdomen well healed no drainage.  MS: extremities normal - no gross deformities noted, no evidence of inflammation in joints, no muscle tenderness  SKIN: no rash, scattered ecchymoses on R flank and over RLQ incision  NEURO: normal strength and tone, sensory exam grossly normal, mentation intact " and speech normal  PSYCH: mentation appears normal and affect normal/bright  : normal scrotum, no edema seen      Data         Latest Ref Rng & Units 4/3/2023     8:10 AM 3/30/2023     7:35 AM 3/27/2023     9:10 AM   Renal   Sodium 136 - 145 mmol/L 136   130      130   137     K 3.4 - 5.3 mmol/L 4.5   4.6      4.6   4.1     Cl 98 - 107 mmol/L 102   98      98   105     Cl (external) 98 - 107 mmol/L 102   98      98   105     CO2 22 - 29 mmol/L 23   23      23   19     Urea Nitrogen 6.0 - 20.0 mg/dL 13.8   13.8      13.8   18.8     Creatinine 0.67 - 1.17 mg/dL 0.97   0.79      0.79   0.84     Glucose 70 - 99 mg/dL 210   254      254   312     Calcium 8.6 - 10.0 mg/dL 9.2   9.3      9.3   8.7     Magnesium 1.7 - 2.3 mg/dL 1.5   1.5   1.2           Latest Ref Rng & Units 4/3/2023     8:10 AM 3/30/2023     7:35 AM 3/27/2023     9:10 AM   Bone Health   Phosphorus 2.5 - 4.5 mg/dL 2.8   2.5   2.0           Latest Ref Rng & Units 4/3/2023     8:10 AM 3/30/2023     7:35 AM 3/27/2023     9:10 AM   Heme   WBC 4.0 - 11.0 10e3/uL 3.0   3.5   3.7     Hgb 13.3 - 17.7 g/dL 8.3   8.6   8.8     Plt 150 - 450 10e3/uL 115   80   80           Latest Ref Rng & Units 4/3/2023     8:10 AM 3/30/2023     7:35 AM 3/21/2023     8:05 AM   Liver   AP 40 - 129 U/L 69   66   55     TBili <=1.2 mg/dL 0.8   0.7   0.6     Bilirubin Direct 0.00 - 0.30 mg/dL 0.22   <0.20   0.25     ALT 10 - 50 U/L 12   15   29     AST 10 - 50 U/L 14   14   21     Tot Protein 6.4 - 8.3 g/dL 6.1   5.7   5.5     Albumin 3.5 - 5.2 g/dL 3.8   3.9   4.0           Latest Ref Rng & Units 2/22/2023     2:03 AM 2/21/2023     7:42 PM 2/16/2023    12:26 PM   Pancreas   A1C <5.7 % 6.8   6.8   7.1           Latest Ref Rng & Units 2/16/2023    12:26 PM 6/11/2021     7:28 AM   Iron studies   Iron 61 - 157 ug/dL 37   22     Iron Sat Index 15 - 46 % 21      Ferritin 31 - 409 ng/mL 847   301           Latest Ref Rng & Units 2/21/2023     1:00 PM 2/16/2023    12:26 PM 8/23/2021    12:43  PM   [facility]   CMV QUANT IU/ML Not Detected IU/mL Not Detected       EBV CAPSID ANTIBODY IGG No detectable antibody.  Positive   Positive          Recent Labs   Lab Test 02/28/23  0811 03/02/23  0833 03/06/23  0828 03/18/23  0913 03/21/23  0805 03/27/23  0910 03/30/23  0735 04/03/23  0810   DOSTAC 2/27/2023 3/1/2023  --  3/17/2023  --   --   --   --    TACROL 12.7 12.9   < > 36.4*   < > 9.2 8.5 9.3    < > = values in this interval not displayed.     Recent Labs   Lab Test 03/02/23  0833 03/18/23  0913   DOSMPA 3/1/2023   8:00 PM  --    MPACID 1.48 6.05*   MPAG 41.0 52.3

## 2023-04-05 NOTE — LETTER
4/5/2023       RE: Modesto Barbosa  [address] [place] [address]     Dear Colleague,    Thank you for referring your patient, Modesto Barbosa, to the [facility] [place] at [facility]. Please see a copy of my visit note below.    TRANSPLANT NEPHROLOGY EARLY POST TRANSPLANT VISIT    Assessment & Plan   # LDKT: Trend up but within baseline, possibly 2/2 hypotension   - Baseline Creatinine: ~ 0.8-1   - Proteinuria: Normal (<0.2 grams),    - Date DSA Last Checked: Feb/2023      Latest DSA: No DSA at time of transplant will obtain DSA with the next lab   - BK Viremia: Not checked post transplant will check BK with the next lab.   - Kidney Tx Biopsy: No   - Transplant Ureteral Stent: Removed 3/28/23    # Immunosuppression: Tacrolimus immediate release (goal 8-10) and Mycophenolate mofetil (dose 750 mg every 12 hours)   - Induction with Recent Transplant:  Intermediate Intensity   - Continue with intensive monitoring of immunosuppression for efficacy and toxicity.   - Changes: Not at this time. Obtain MPA level as he has low WBC     # Scrotal edema:(resolved)    -Testicular U/S 3/3/23 shows severe diffuse scrotal wall edema without underlying fluid or mass. He does have small bilateral hydroceles   -Pt was put on lasix 20mg PO daily on 3/4-3/7/23 with improvement    # Infection Prophylaxis:   - PJP: Sulfa/TMP (Bactrim)  - CMV: Valganciclovir (Valcyte) x3m    # Hypertension: Controlled, but low at times;  Goal BP: < 140/90   - Volume status: Euvolemic     - Changes: Yes - will stop amlodipine and cut down the coreg from 25 mg to 12.5 mg BID .He should measure the blood pressure before taking his medication and if Systolic is below 120 than hold medication and inform us.   -With low BP U/A was sent and was negative for infection    # Diabetes: Controlled (HbA1c <7%) Last HbA1c: 6.8%   - Management as per primary care and pharmacy    # Anemia in Chronic  Renal Disease: Hgb: Stable, low      NERY: No will refer him to anemia clinic   - Iron studies: Replete    # Mineral Bone Disorder:   - Secondary renal hyperparathyroidism; PTH level: Mildly elevated (151-300 pg/ml)        On treatment: None  - Vitamin D; level: Low        On supplement: Yes  - Calcium; level: Normal        On supplement: Yes, tums 1g bid  - Phosphorus; level: Normal        On supplement: Yes Discontinue the phosphorus supplements.    # Electrolytes:   - Potassium; level: Normal        On supplement: No  - Magnesium; level: Low normal        On supplement: Yes, mag ox 400mg daily   - Bicarbonate; level: Normal        On supplement: Yes,continue with  bicarb 1300mg bid       # Thrombocytopenia:(improved)              - Seen previously by hematology. Thought to have peripheral consumption vs ITP                    - Baseline platelets ~60k     # Hepatitis B Core Ab +:    - Check Hepatitis B DNA level three months post transplant. Negative at time of transplant. Will need to check it next month.     # Asymmetric Left Ventricular Hypertrophy: Stable. No history of sustained ventricular arrhythmias.     # CAD: s/p PCI with stent 11/2021 with negative stress echo 1/2023.  Previously on Brilinta and aspirin. Brillinta stopped 1/2023    -Continue atorvastatin     # Medical Compliance: Yes    # COVID-19 Virus Review: Discussed COVID-19 virus and the potential medical risks.  Reviewed preventative health recommendations, including wearing a mask where appropriate.  Recommended COVID vaccination should be up to date with either an initial vaccination or booster shot when appropriate.  Asked the patient to inform the transplant center if they are exposed or diagnosed with this virus.    # COVID Vaccination Up To Date: Yes    #Vaccination: Has not received zoster vaccine.Six months post transplant should receive one.    # Transplant History:  Etiology of Kidney Failure: Diabetes mellitus type 2  Tx:  LDKT  Transplant: 2/21/2023 (Kidney)  Donor Type: Living Donor Class:   Crossmatch at time of Tx: negative  DSA at time of Tx: No  Significant changes in immunosuppression: None  CMV IgG Ab High Risk Discordance (D+/R-): No  EBV IgG Ab High Risk Discordance (D+/R-): No  Significant transplant-related complications: None    Transplant Office Phone Number: [phone]    Assessment and plan was discussed with the patient and he voiced his understanding and agreement.    Return visit: Return in 26 days (on 5/1/2023).    Zaid Thomas MD     Physician Attestation   I, Rome Kim MD, personally examined and evaluated this patient.  I discussed the patient with the resident/fellow and care team, and agree with the assessment and plan of care as documented in the note on 04/05/23 .      I personally reviewed vital signs, medications, labs, and imaging.  Rome Kim MD  Date of Service (when I saw the patient): 04/05/23          Chief Complaint   Mr. Barbosa is a 57 year old here for routine follow up, kidney transplant and immunosuppression management.     History of Present Illness   The patient overall has been feeling well no complains. He does report some scrotal swelling but it has significantly gone down.    No peripheral edema,nausea, vomiting, fever, chills, headache, numbness, tingling, cough, dyspnea, chest pain, abdominal pain, diarrhea, constipation,NSAID use, dysuria, and rash. Normal appetite, and no weight gain.    Home BP: 110-130s systolic typically    Problem List   Patient Active Problem List   Diagnosis    Type 2 Diabetes Mellitus    Dyslipidemia, goal LDL below 70    HTN, kidney transplant related    Metabolic acidosis    Coronary artery disease involving native coronary artery of native heart without angina pectoris    Thrombocytopenia (H)    NSTEMI (non-ST elevated myocardial infarction) (H)    Immunosuppression (H)    Kidney replaced by transplant    Anemia in stage 3a chronic kidney disease (H)     Hypocalcemia    Hypomagnesemia    Hypophosphatemia    Hepatitis B core antibody positive       Allergies   Allergies   Allergen Reactions    Tegaderm Transparent Dressing (Informational Only) Blisters       Medications   Current Outpatient Medications   Medication Sig    carvedilol (COREG) 25 MG tablet Take 0.5 tablets (12.5 mg) by mouth 2 times daily (with meals)    acetaminophen (TYLENOL) 325 MG tablet Take 2 tablets (650 mg) by mouth every 4 hours as needed for other (For optimal non-opioid multimodal pain management to improve pain control.)    aspirin (ASA) 81 MG EC tablet Take 1 tablet (81 mg) by mouth daily    atorvastatin (LIPITOR) 20 MG tablet Take 1 tablet (20 mg) by mouth daily    calcium carbonate (TUMS) 500 MG chewable tablet Take 2 tablets (1,000 mg) by mouth 2 times daily    insulin aspart (NOVOLOG PEN) 100 UNIT/ML pen Inject 5 Units Subcutaneous 3 times daily (with meals) for 180 days    insulin detemir (LEVEMIR PEN) 100 UNIT/ML pen Inject 30 Units Subcutaneous At Bedtime for 2 days, THEN 28 Units At Bedtime for 7 days, THEN 26 Units At Bedtime for 7 days, THEN 24 Units At Bedtime for 7 days, THEN 20 Units At Bedtime for 7 days, THEN 20 Units At Bedtime for 30 days. On the days that you are taking the prescribed Prednisone 40mg daily, take levemir 30 units at bedtime. On the days that you are taking the prescribed Prednisone 20mg daily take levemir 28 units at bedtime. On the days that you are taking the prescribed Prednisone 15mg daily take levemir 26 units at bedtime. On the days that you are taking the prescribed Prednisone 10mg daily take levemir 24 units at bedtime. On the days that you are taking the prescribed Prednisone 5mg daily take levemir 20 units at bedtime. Once you are off the steroid taper, take levemir 20 units at bedtime daily.    loratadine (CLARITIN) 10 MG tablet Take 1 tablet (10 mg) by mouth daily as needed for allergies (itchy/rash)    magnesium oxide (MAG-OX) 400 MG tablet  "Take 1 tablet (400 mg) by mouth daily (with lunch)    mycophenolate (GENERIC EQUIVALENT) 250 MG capsule Take 3 capsules (750 mg) by mouth 2 times daily for 360 days    nitroGLYcerin (NITROSTAT) 0.4 MG sublingual tablet One tablet under the tongue every 5 minutes if needed for chest pain. May repeat every 5 minutes for a maximum of 3 doses in 15 minutes\"    polyethylene glycol (MIRALAX) 17 GM/Dose powder Take 17 g by mouth daily    senna-docusate (SENOKOT-S/PERICOLACE) 8.6-50 MG tablet Take 1 tablet by mouth daily as needed for constipation    sodium bicarbonate 650 MG tablet Take 2 tablets (1,300 mg) by mouth 2 times daily    sulfamethoxazole-trimethoprim (BACTRIM) 400-80 MG tablet Take 1 tablet by mouth daily    tacrolimus (GENERIC EQUIVALENT) 0.5 MG capsule Take 1 capsule (0.5 mg) by mouth 2 times daily Take total dose 2.5 mg twice per day    tacrolimus (GENERIC EQUIVALENT) 1 MG capsule Take 2 capsules (2 mg) by mouth 2 times daily Take total  2.5 mg twice per day    valGANciclovir (VALCYTE) 450 MG tablet Take 2 tablets (900 mg) by mouth daily for 90 days    Vitamin D3 (CHOLECALCIFEROL) 25 mcg (1000 units) tablet Take 2 tablets (50 mcg) by mouth daily     No current facility-administered medications for this visit.     Medications Discontinued During This Encounter   Medication Reason    amLODIPine (NORVASC) 5 MG tablet     phosphorus tablet 250 mg (PHOSPHA 250 NEUTRAL) 250 MG per tablet     carvedilol (COREG) 25 MG tablet        Physical Exam   Vital Signs: BP 91/49   Pulse 71   Wt 61.2 kg (135 lb)   SpO2 98%   BMI 23.17 kg/m      GENERAL APPEARANCE: alert and no distress  HENT: mouth without ulcers or lesions  LYMPHATICS: no cervical or supraclavicular nodes  RESP: lungs clear to auscultation - no rales, rhonchi or wheezes  CV: systolic murmur 2/6, regular rate  EDEMA: no LE edema bilaterally  ABDOMEN: soft, nondistended, nontender, bowel sounds normal. Surgical scar on the lower part of abdomen well healed " no drainage.  MS: extremities normal - no gross deformities noted, no evidence of inflammation in joints, no muscle tenderness  SKIN: no rash, scattered ecchymoses on R flank and over RLQ incision  NEURO: normal strength and tone, sensory exam grossly normal, mentation intact and speech normal  PSYCH: mentation appears normal and affect normal/bright  : normal scrotum, no edema seen      Data         Latest Ref Rng & Units 4/3/2023     8:10 AM 3/30/2023     7:35 AM 3/27/2023     9:10 AM   Renal   Sodium 136 - 145 mmol/L 136   130      130   137     K 3.4 - 5.3 mmol/L 4.5   4.6      4.6   4.1     Cl 98 - 107 mmol/L 102   98      98   105     Cl (external) 98 - 107 mmol/L 102   98      98   105     CO2 22 - 29 mmol/L 23   23      23   19     Urea Nitrogen 6.0 - 20.0 mg/dL 13.8   13.8      13.8   18.8     Creatinine 0.67 - 1.17 mg/dL 0.97   0.79      0.79   0.84     Glucose 70 - 99 mg/dL 210   254      254   312     Calcium 8.6 - 10.0 mg/dL 9.2   9.3      9.3   8.7     Magnesium 1.7 - 2.3 mg/dL 1.5   1.5   1.2           Latest Ref Rng & Units 4/3/2023     8:10 AM 3/30/2023     7:35 AM 3/27/2023     9:10 AM   Bone Health   Phosphorus 2.5 - 4.5 mg/dL 2.8   2.5   2.0           Latest Ref Rng & Units 4/3/2023     8:10 AM 3/30/2023     7:35 AM 3/27/2023     9:10 AM   Heme   WBC 4.0 - 11.0 10e3/uL 3.0   3.5   3.7     Hgb 13.3 - 17.7 g/dL 8.3   8.6   8.8     Plt 150 - 450 10e3/uL 115   80   80           Latest Ref Rng & Units 4/3/2023     8:10 AM 3/30/2023     7:35 AM 3/21/2023     8:05 AM   Liver   AP 40 - 129 U/L 69   66   55     TBili <=1.2 mg/dL 0.8   0.7   0.6     Bilirubin Direct 0.00 - 0.30 mg/dL 0.22   <0.20   0.25     ALT 10 - 50 U/L 12   15   29     AST 10 - 50 U/L 14   14   21     Tot Protein 6.4 - 8.3 g/dL 6.1   5.7   5.5     Albumin 3.5 - 5.2 g/dL 3.8   3.9   4.0           Latest Ref Rng & Units 2/22/2023     2:03 AM 2/21/2023     7:42 PM 2/16/2023    12:26 PM   Pancreas   A1C <5.7 % 6.8   6.8   7.1            Latest Ref Rng & Units 2/16/2023    12:26 PM 6/11/2021     7:28 AM   Iron studies   Iron 61 - 157 ug/dL 37   22     Iron Sat Index 15 - 46 % 21      Ferritin 31 - 409 ng/mL 847   301           Latest Ref Rng & Units 2/21/2023     1:00 PM 2/16/2023    12:26 PM 8/23/2021    12:43 PM   [facility]   CMV QUANT IU/ML Not Detected IU/mL Not Detected       EBV CAPSID ANTIBODY IGG No detectable antibody.  Positive   Positive          Recent Labs   Lab Test 02/28/23  0811 03/02/23  0833 03/06/23  0828 03/18/23  0913 03/21/23  0805 03/27/23  0910 03/30/23  0735 04/03/23  0810   DOSTAC 2/27/2023 3/1/2023  --  3/17/2023  --   --   --   --    TACROL 12.7 12.9   < > 36.4*   < > 9.2 8.5 9.3    < > = values in this interval not displayed.     Recent Labs   Lab Test 03/02/23  0833 03/18/23  0913   DOSMPA 3/1/2023   8:00 PM  --    MPACID 1.48 6.05*   MPAG 41.0 52.3       Again, thank you for allowing me to participate in the care of your patient.      Sincerely,    Rome Kim MD

## 2023-04-05 NOTE — PATIENT INSTRUCTIONS
Patient Recommendations:  -stop amlodipine  -decrease carvedilol to 12.5mg twice daily  -If your blood pressure remains lower than 110/80 please let us know  -Before taking your carvedilol for the next few days check your blood pressure. If it is lower than 120/80 don't take the carvedilol  -Stop phosphorous tablet    Transplant Patient Information  Your Post Transplant Coordinator is: Shalonda Roy  For non urgent items, we encourage you to contact your coordinator/care team online via [employer]  You and your care team can also contact your transplant coordinator Monday - Friday, 8am - 5pm at [phone] (Option 2 to reach the coordinator or Option 4 to schedule an appointment).  After hours for urgent matters, please call [facility] at [phone].

## 2023-04-06 ENCOUNTER — TELEPHONE (OUTPATIENT)
Dept: TRANSPLANT | Facility: CLINIC | Age: 58
End: 2023-04-06
Payer: COMMERCIAL

## 2023-04-06 NOTE — TELEPHONE ENCOUNTER
Home care nurse Lori called to OK lab draw tomorrow for patient due to miscommunication regarding labs 2 X this week.  Gave verbal OK.  KALIN

## 2023-04-07 ENCOUNTER — LAB REQUISITION (OUTPATIENT)
Dept: LAB | Facility: CLINIC | Age: 58
End: 2023-04-07
Payer: COMMERCIAL

## 2023-04-07 LAB
ALBUMIN SERPL-MCNC: 3.7 G/DL (ref 3.5–5)
ALP SERPL-CCNC: 74 U/L (ref 45–120)
ALT SERPL W P-5'-P-CCNC: 10 U/L (ref 0–45)
ANION GAP SERPL CALCULATED.3IONS-SCNC: 10 MMOL/L (ref 5–18)
ANION GAP SERPL CALCULATED.3IONS-SCNC: 10 MMOL/L (ref 5–18)
AST SERPL W P-5'-P-CCNC: 12 U/L (ref 0–40)
BILIRUB SERPL-MCNC: 0.7 MG/DL (ref 0–1)
BUN SERPL-MCNC: 11 MG/DL (ref 8–22)
BUN SERPL-MCNC: 11 MG/DL (ref 8–22)
CALCIUM SERPL-MCNC: 9.3 MG/DL (ref 8.5–10.5)
CALCIUM SERPL-MCNC: 9.3 MG/DL (ref 8.5–10.5)
CHLORIDE BLD-SCNC: 102 MMOL/L (ref 98–107)
CHLORIDE BLD-SCNC: 102 MMOL/L (ref 98–107)
CO2 SERPL-SCNC: 21 MMOL/L (ref 22–31)
CO2 SERPL-SCNC: 21 MMOL/L (ref 22–31)
CREAT SERPL-MCNC: 0.99 MG/DL (ref 0.7–1.3)
CREAT SERPL-MCNC: 0.99 MG/DL (ref 0.7–1.3)
ERYTHROCYTE [DISTWIDTH] IN BLOOD BY AUTOMATED COUNT: 15.1 % (ref 10–15)
GFR SERPL CREATININE-BSD FRML MDRD: 88 ML/MIN/1.73M2
GFR SERPL CREATININE-BSD FRML MDRD: 88 ML/MIN/1.73M2
GLUCOSE BLD-MCNC: 183 MG/DL (ref 70–125)
GLUCOSE BLD-MCNC: 183 MG/DL (ref 70–125)
HCT VFR BLD AUTO: 24.6 % (ref 40–53)
HGB BLD-MCNC: 8.3 G/DL (ref 13.3–17.7)
MAGNESIUM SERPL-MCNC: 1.4 MG/DL (ref 1.8–2.6)
MCH RBC QN AUTO: 29.5 PG (ref 26.5–33)
MCHC RBC AUTO-ENTMCNC: 33.7 G/DL (ref 31.5–36.5)
MCV RBC AUTO: 88 FL (ref 78–100)
PHOSPHATE SERPL-MCNC: 2.8 MG/DL (ref 2.5–4.5)
PLATELET # BLD AUTO: 148 10E3/UL (ref 150–450)
POTASSIUM BLD-SCNC: 5.1 MMOL/L (ref 3.5–5)
POTASSIUM BLD-SCNC: 5.1 MMOL/L (ref 3.5–5)
PROT SERPL-MCNC: 6.3 G/DL (ref 6–8)
RBC # BLD AUTO: 2.81 10E6/UL (ref 4.4–5.9)
SODIUM SERPL-SCNC: 133 MMOL/L (ref 136–145)
SODIUM SERPL-SCNC: 133 MMOL/L (ref 136–145)
TACROLIMUS BLD-MCNC: 7.8 UG/L (ref 5–15)
TME LAST DOSE: NORMAL H
TME LAST DOSE: NORMAL H
WBC # BLD AUTO: 2.3 10E3/UL (ref 4–11)

## 2023-04-07 PROCEDURE — 84295 ASSAY OF SERUM SODIUM: CPT | Performed by: TRANSPLANT SURGERY

## 2023-04-07 PROCEDURE — 85027 COMPLETE CBC AUTOMATED: CPT | Performed by: TRANSPLANT SURGERY

## 2023-04-07 PROCEDURE — 80197 ASSAY OF TACROLIMUS: CPT | Performed by: TRANSPLANT SURGERY

## 2023-04-07 PROCEDURE — 84100 ASSAY OF PHOSPHORUS: CPT | Performed by: TRANSPLANT SURGERY

## 2023-04-07 PROCEDURE — 80053 COMPREHEN METABOLIC PANEL: CPT | Performed by: TRANSPLANT SURGERY

## 2023-04-07 PROCEDURE — 83735 ASSAY OF MAGNESIUM: CPT | Performed by: TRANSPLANT SURGERY

## 2023-04-09 DIAGNOSIS — Z94.0 KIDNEY REPLACED BY TRANSPLANT: Primary | ICD-10-CM

## 2023-04-10 ENCOUNTER — LAB REQUISITION (OUTPATIENT)
Dept: LAB | Facility: CLINIC | Age: 58
End: 2023-04-10
Payer: COMMERCIAL

## 2023-04-10 ENCOUNTER — TELEPHONE (OUTPATIENT)
Dept: PHARMACY | Facility: CLINIC | Age: 58
End: 2023-04-10

## 2023-04-10 DIAGNOSIS — N18.30 STAGE 3 CHRONIC KIDNEY DISEASE, UNSPECIFIED WHETHER STAGE 3A OR 3B CKD (H): ICD-10-CM

## 2023-04-10 DIAGNOSIS — D63.1 ANEMIA OF CHRONIC RENAL FAILURE, STAGE 3 (MODERATE), UNSPECIFIED WHETHER STAGE 3A OR 3B CKD (H): ICD-10-CM

## 2023-04-10 DIAGNOSIS — Z94.0 KIDNEY REPLACED BY TRANSPLANT: Primary | ICD-10-CM

## 2023-04-10 DIAGNOSIS — N18.30 ANEMIA OF CHRONIC RENAL FAILURE, STAGE 3 (MODERATE), UNSPECIFIED WHETHER STAGE 3A OR 3B CKD (H): ICD-10-CM

## 2023-04-10 DIAGNOSIS — Z48.22 ENCOUNTER FOR AFTERCARE FOLLOWING KIDNEY TRANSPLANT: ICD-10-CM

## 2023-04-10 LAB
ANION GAP SERPL CALCULATED.3IONS-SCNC: 12 MMOL/L (ref 7–15)
BUN SERPL-MCNC: 18.3 MG/DL (ref 6–20)
CALCIUM SERPL-MCNC: 9.5 MG/DL (ref 8.6–10)
CHLORIDE SERPL-SCNC: 105 MMOL/L (ref 98–107)
CREAT SERPL-MCNC: 1.15 MG/DL (ref 0.67–1.17)
DEPRECATED HCO3 PLAS-SCNC: 20 MMOL/L (ref 22–29)
ERYTHROCYTE [DISTWIDTH] IN BLOOD BY AUTOMATED COUNT: 15.1 % (ref 10–15)
GFR SERPL CREATININE-BSD FRML MDRD: 74 ML/MIN/1.73M2
GLUCOSE SERPL-MCNC: 191 MG/DL (ref 70–99)
HCT VFR BLD AUTO: 24.3 % (ref 40–53)
HGB BLD-MCNC: 8.1 G/DL (ref 13.3–17.7)
MAGNESIUM SERPL-MCNC: 1.7 MG/DL (ref 1.7–2.3)
MCH RBC QN AUTO: 29.6 PG (ref 26.5–33)
MCHC RBC AUTO-ENTMCNC: 33.3 G/DL (ref 31.5–36.5)
MCV RBC AUTO: 89 FL (ref 78–100)
PHOSPHATE SERPL-MCNC: 3.1 MG/DL (ref 2.5–4.5)
PLATELET # BLD AUTO: 182 10E3/UL (ref 150–450)
POTASSIUM SERPL-SCNC: 5.1 MMOL/L (ref 3.4–5.3)
RBC # BLD AUTO: 2.74 10E6/UL (ref 4.4–5.9)
SODIUM SERPL-SCNC: 137 MMOL/L (ref 136–145)
TACROLIMUS BLD-MCNC: 9.1 UG/L (ref 5–15)
TME LAST DOSE: NORMAL H
TME LAST DOSE: NORMAL H
WBC # BLD AUTO: 2.5 10E3/UL (ref 4–11)

## 2023-04-10 PROCEDURE — 80048 BASIC METABOLIC PNL TOTAL CA: CPT | Mod: ORL | Performed by: TRANSPLANT SURGERY

## 2023-04-10 PROCEDURE — 84100 ASSAY OF PHOSPHORUS: CPT | Mod: ORL | Performed by: TRANSPLANT SURGERY

## 2023-04-10 PROCEDURE — 85027 COMPLETE CBC AUTOMATED: CPT | Mod: ORL | Performed by: TRANSPLANT SURGERY

## 2023-04-10 PROCEDURE — 83735 ASSAY OF MAGNESIUM: CPT | Mod: ORL | Performed by: TRANSPLANT SURGERY

## 2023-04-10 PROCEDURE — 80197 ASSAY OF TACROLIMUS: CPT | Mod: ORL | Performed by: TRANSPLANT SURGERY

## 2023-04-10 NOTE — TELEPHONE ENCOUNTER
Anemia Management Note - Enrollment  SUBJECTIVE/OBJECTIVE:    Referred by Dr. Rome Kim on 04/10/2023  Primary Diagnosis: Anemia in Chronic Kidney Disease (N18.3, D63.1)   3a  Secondary Diagnosis:  Chronic Kidney Disease, Stage 3 (N18.3)  3a  Kidney Tx: 2023  Hgb goal range:  9-10  Epo/Darbo: TBD:  Needs updated Iron levels before Insurance will approve NERY.   Iron regimen:  TBD; Needs Updated Iron levels.   Labs : 4/10/2024  Recent NERY use, transfusion, IV iron: NA  RX/TX plans : TBD    *[facility].   [phone] fax [phone]      No history of stroke, MI and blood clots or cancers.    Contact: OK to speak with Yanick Barbosa (daughter) and Edy Barbosa (son) regarding Medical, Billing, and Scheduling Informaiton per consent to communicate dated 2021.        Latest Ref Rng & Units 3/21/2023     8:05 AM 3/23/2023     7:15 AM 3/27/2023     9:10 AM 3/30/2023     7:35 AM 4/3/2023     8:10 AM 2023     8:10 AM 4/10/2023     8:00 AM   Anemia   Hemoglobin 13.3 - 17.7 g/dL 9.6   9.4   8.8   8.6   8.3   8.3   8.1         BP Readings from Last 3 Encounters:   23 91/49   23 134/72   23 97/58     Wt Readings from Last 2 Encounters:   23 135 lb (61.2 kg)   23 135 lb (61.2 kg)     Current Outpatient Medications   Medication Sig Dispense Refill     acetaminophen (TYLENOL) 325 MG tablet Take 2 tablets (650 mg) by mouth every 4 hours as needed for other (For optimal non-opioid multimodal pain management to improve pain control.) 30 tablet 0     aspirin (ASA) 81 MG EC tablet Take 1 tablet (81 mg) by mouth daily 30 tablet 0     atorvastatin (LIPITOR) 20 MG tablet Take 1 tablet (20 mg) by mouth daily 30 tablet 0     calcium carbonate (TUMS) 500 MG chewable tablet Take 2 tablets (1,000 mg) by mouth 2 times daily 120 tablet 0     carvedilol (COREG) 25 MG tablet Take 0.5 tablets (12.5 mg) by mouth 2 times daily (with meals) 60 tablet 0     insulin aspart (NOVOLOG PEN) 100  "UNIT/ML pen Inject 5 Units Subcutaneous 3 times daily (with meals) for 180 days 9 mL 2     insulin detemir (LEVEMIR PEN) 100 UNIT/ML pen Inject 30 Units Subcutaneous At Bedtime for 2 days, THEN 28 Units At Bedtime for 7 days, THEN 26 Units At Bedtime for 7 days, THEN 24 Units At Bedtime for 7 days, THEN 20 Units At Bedtime for 7 days, THEN 20 Units At Bedtime for 30 days. On the days that you are taking the prescribed Prednisone 40mg daily, take levemir 30 units at bedtime. On the days that you are taking the prescribed Prednisone 20mg daily take levemir 28 units at bedtime. On the days that you are taking the prescribed Prednisone 15mg daily take levemir 26 units at bedtime. On the days that you are taking the prescribed Prednisone 10mg daily take levemir 24 units at bedtime. On the days that you are taking the prescribed Prednisone 5mg daily take levemir 20 units at bedtime. Once you are off the steroid taper, take levemir 20 units at bedtime daily. 13.46 mL 0     loratadine (CLARITIN) 10 MG tablet Take 1 tablet (10 mg) by mouth daily as needed for allergies (itchy/rash) 90 tablet 3     magnesium oxide (MAG-OX) 400 MG tablet Take 1 tablet (400 mg) by mouth daily (with lunch) 30 tablet 1     mycophenolate (GENERIC EQUIVALENT) 250 MG capsule Take 3 capsules (750 mg) by mouth 2 times daily for 360 days 180 capsule 11     nitroGLYcerin (NITROSTAT) 0.4 MG sublingual tablet One tablet under the tongue every 5 minutes if needed for chest pain. May repeat every 5 minutes for a maximum of 3 doses in 15 minutes\" 25 tablet 3     polyethylene glycol (MIRALAX) 17 GM/Dose powder Take 17 g by mouth daily 510 g 0     senna-docusate (SENOKOT-S/PERICOLACE) 8.6-50 MG tablet Take 1 tablet by mouth daily as needed for constipation 60 tablet 0     sodium bicarbonate 650 MG tablet Take 2 tablets (1,300 mg) by mouth 2 times daily 120 tablet 3     sulfamethoxazole-trimethoprim (BACTRIM) 400-80 MG tablet Take 1 tablet by mouth daily 30 " tablet 11     tacrolimus (GENERIC EQUIVALENT) 0.5 MG capsule Take 1 capsule (0.5 mg) by mouth 2 times daily Take total dose 2.5 mg twice per day 180 capsule 3     tacrolimus (GENERIC EQUIVALENT) 1 MG capsule Take 2 capsules (2 mg) by mouth 2 times daily Take total  2.5 mg twice per day 360 capsule 3     valGANciclovir (VALCYTE) 450 MG tablet Take 2 tablets (900 mg) by mouth daily for 90 days 60 tablet 2     Vitamin D3 (CHOLECALCIFEROL) 25 mcg (1000 units) tablet Take 2 tablets (50 mcg) by mouth daily 60 tablet 0     ASSESSMENT:  Hgb Not at goal/Initiation of therapy   Ferritin: Due for labs  TSat: Due for labs  Iron regimen recommended: TBD: Need Updated Iron levels.   Recommended NERY regimen: TBD:  Needs updated Iron levels before Insurance will approve NERY.   Blood Pressure: Stable    PLAN:  1. Patient will be calledfor enrollment in Anemia Management Service once Irpn levels are drawn.  2. Need to dscuss:  anemia overview, monitoring service and goal hemoglobin range and rationale and risks of NERY blood clots, stroke and increase in blood pressure  3. Dose location: TBD  [facility]. [facility].   5. Pharmacy: TBD        Modesto needs updated Iron levels before Insurance will approve the NERY.  Unable to add on to today's lab draw.   Modesto is seen by [facility].  Called home Care. [phone]. Spoke with Noemi, Modesto is being seen weekly on Mondays.   Additional lab orders faxed to [phone].       Next call date:  4/17/23    Nimo Banda RN   Anemia Services  [address] [address]   [email or web address]   Office : [phone]  Fax: [phone]

## 2023-04-10 NOTE — LETTER
PHYSICIAN ORDERS      DATE & TIME ISSUED: April 10, 2023 11:41 AM  PATIENT NAME: Modesto Barbosa   : 1965     [facility] MR# [if applicable]: [id number]                 Primary Diagnosis: Anemia in Chronic Kidney Disease (N18.3, D63.1)   3a              Secondary Diagnosis:  Chronic Kidney Disease, Stage 3 (N18.3)  3a              Kidney Tx: 2023    Additional Standing Lab Orders;  -Hgb/hematocrit weekly  -Ferritin every 4 weeks  -Iron/Iron Binding every 4 weeks    Any questions please call: Nimo Banda RN at [phone].           Rome Kim MD

## 2023-04-11 ENCOUNTER — MYC MEDICAL ADVICE (OUTPATIENT)
Dept: TRANSPLANT | Facility: CLINIC | Age: 58
End: 2023-04-11
Payer: COMMERCIAL

## 2023-04-13 ENCOUNTER — LAB (OUTPATIENT)
Dept: LAB | Facility: HOSPITAL | Age: 58
End: 2023-04-13
Payer: COMMERCIAL

## 2023-04-13 DIAGNOSIS — Z94.0 KIDNEY REPLACED BY TRANSPLANT: ICD-10-CM

## 2023-04-13 DIAGNOSIS — N18.30 ANEMIA OF CHRONIC RENAL FAILURE, STAGE 3 (MODERATE), UNSPECIFIED WHETHER STAGE 3A OR 3B CKD (H): ICD-10-CM

## 2023-04-13 DIAGNOSIS — Z76.0 ENCOUNTER FOR MEDICATION REFILL: Primary | ICD-10-CM

## 2023-04-13 DIAGNOSIS — Z48.298 AFTERCARE FOLLOWING ORGAN TRANSPLANT: ICD-10-CM

## 2023-04-13 DIAGNOSIS — D63.1 ANEMIA OF CHRONIC RENAL FAILURE, STAGE 3 (MODERATE), UNSPECIFIED WHETHER STAGE 3A OR 3B CKD (H): ICD-10-CM

## 2023-04-13 DIAGNOSIS — N18.30 STAGE 3 CHRONIC KIDNEY DISEASE, UNSPECIFIED WHETHER STAGE 3A OR 3B CKD (H): ICD-10-CM

## 2023-04-13 DIAGNOSIS — Z20.828 CONTACT WITH AND (SUSPECTED) EXPOSURE TO OTHER VIRAL COMMUNICABLE DISEASES: ICD-10-CM

## 2023-04-13 DIAGNOSIS — Z79.899 ENCOUNTER FOR LONG-TERM CURRENT USE OF MEDICATION: ICD-10-CM

## 2023-04-13 LAB
ANION GAP SERPL CALCULATED.3IONS-SCNC: 8 MMOL/L (ref 7–15)
BUN SERPL-MCNC: 23.1 MG/DL (ref 6–20)
CALCIUM SERPL-MCNC: 9.3 MG/DL (ref 8.6–10)
CHLORIDE SERPL-SCNC: 103 MMOL/L (ref 98–107)
CREAT SERPL-MCNC: 1.29 MG/DL (ref 0.67–1.17)
DEPRECATED HCO3 PLAS-SCNC: 23 MMOL/L (ref 22–29)
ERYTHROCYTE [DISTWIDTH] IN BLOOD BY AUTOMATED COUNT: 14.5 % (ref 10–15)
FERRITIN SERPL-MCNC: 947 NG/ML (ref 31–409)
GFR SERPL CREATININE-BSD FRML MDRD: 64 ML/MIN/1.73M2
GLUCOSE SERPL-MCNC: 255 MG/DL (ref 70–99)
HCT VFR BLD AUTO: 25.5 % (ref 40–53)
HGB BLD-MCNC: 8.4 G/DL (ref 13.3–17.7)
IRON BINDING CAPACITY (ROCHE): 172 UG/DL (ref 240–430)
IRON SATN MFR SERPL: 22 % (ref 15–46)
IRON SERPL-MCNC: 38 UG/DL (ref 61–157)
MCH RBC QN AUTO: 29.2 PG (ref 26.5–33)
MCHC RBC AUTO-ENTMCNC: 32.9 G/DL (ref 31.5–36.5)
MCV RBC AUTO: 89 FL (ref 78–100)
PLATELET # BLD AUTO: 161 10E3/UL (ref 150–450)
POTASSIUM SERPL-SCNC: 5 MMOL/L (ref 3.4–5.3)
RBC # BLD AUTO: 2.88 10E6/UL (ref 4.4–5.9)
SODIUM SERPL-SCNC: 134 MMOL/L (ref 136–145)
TACROLIMUS BLD-MCNC: 9.8 UG/L (ref 5–15)
TME LAST DOSE: NORMAL H
TME LAST DOSE: NORMAL H
WBC # BLD AUTO: 2.7 10E3/UL (ref 4–11)

## 2023-04-13 PROCEDURE — 82310 ASSAY OF CALCIUM: CPT

## 2023-04-13 PROCEDURE — 80180 DRUG SCRN QUAN MYCOPHENOLATE: CPT

## 2023-04-13 PROCEDURE — 85027 COMPLETE CBC AUTOMATED: CPT

## 2023-04-13 PROCEDURE — 82728 ASSAY OF FERRITIN: CPT

## 2023-04-13 PROCEDURE — 36415 COLL VENOUS BLD VENIPUNCTURE: CPT

## 2023-04-13 PROCEDURE — 80197 ASSAY OF TACROLIMUS: CPT

## 2023-04-13 PROCEDURE — 83550 IRON BINDING TEST: CPT

## 2023-04-14 LAB
MYCOPHENOLATE SERPL LC/MS/MS-MCNC: 3.94 MG/L (ref 1–3.5)
MYCOPHENOLATE-G SERPL LC/MS/MS-MCNC: 60.6 MG/L (ref 30–95)
TME LAST DOSE: ABNORMAL H
TME LAST DOSE: ABNORMAL H

## 2023-04-14 RX ORDER — MAGNESIUM OXIDE 400 MG/1
400 TABLET ORAL
Qty: 90 TABLET | Refills: 3 | Status: SHIPPED | OUTPATIENT
Start: 2023-04-14 | End: 2023-05-01

## 2023-04-17 ENCOUNTER — LAB REQUISITION (OUTPATIENT)
Dept: LAB | Facility: CLINIC | Age: 58
End: 2023-04-17
Payer: COMMERCIAL

## 2023-04-17 DIAGNOSIS — Z94.0 KIDNEY REPLACED BY TRANSPLANT: Primary | ICD-10-CM

## 2023-04-17 DIAGNOSIS — Z48.22 ENCOUNTER FOR AFTERCARE FOLLOWING KIDNEY TRANSPLANT: ICD-10-CM

## 2023-04-17 LAB
ANION GAP SERPL CALCULATED.3IONS-SCNC: 13 MMOL/L (ref 7–15)
BUN SERPL-MCNC: 23.5 MG/DL (ref 6–20)
CALCIUM SERPL-MCNC: 9.6 MG/DL (ref 8.6–10)
CHLORIDE SERPL-SCNC: 101 MMOL/L (ref 98–107)
CREAT SERPL-MCNC: 1.18 MG/DL (ref 0.67–1.17)
DEPRECATED HCO3 PLAS-SCNC: 21 MMOL/L (ref 22–29)
ERYTHROCYTE [DISTWIDTH] IN BLOOD BY AUTOMATED COUNT: 14.5 % (ref 10–15)
GFR SERPL CREATININE-BSD FRML MDRD: 72 ML/MIN/1.73M2
GLUCOSE SERPL-MCNC: 140 MG/DL (ref 70–99)
HCT VFR BLD AUTO: 25.6 % (ref 40–53)
HGB BLD-MCNC: 8.5 G/DL (ref 13.3–17.7)
HGB BLD-MCNC: 8.5 G/DL (ref 13.3–17.7)
MAGNESIUM SERPL-MCNC: 1.6 MG/DL (ref 1.7–2.3)
MCH RBC QN AUTO: 28.6 PG (ref 26.5–33)
MCHC RBC AUTO-ENTMCNC: 33.2 G/DL (ref 31.5–36.5)
MCV RBC AUTO: 86 FL (ref 78–100)
PHOSPHATE SERPL-MCNC: 3.2 MG/DL (ref 2.5–4.5)
PLATELET # BLD AUTO: 167 10E3/UL (ref 150–450)
POTASSIUM SERPL-SCNC: 5.1 MMOL/L (ref 3.4–5.3)
RBC # BLD AUTO: 2.97 10E6/UL (ref 4.4–5.9)
SODIUM SERPL-SCNC: 135 MMOL/L (ref 136–145)
TACROLIMUS BLD-MCNC: 13.8 UG/L (ref 5–15)
TME LAST DOSE: NORMAL H
TME LAST DOSE: NORMAL H
WBC # BLD AUTO: 3.4 10E3/UL (ref 4–11)

## 2023-04-17 PROCEDURE — 84100 ASSAY OF PHOSPHORUS: CPT | Mod: ORL | Performed by: TRANSPLANT SURGERY

## 2023-04-17 PROCEDURE — 80197 ASSAY OF TACROLIMUS: CPT | Mod: ORL | Performed by: TRANSPLANT SURGERY

## 2023-04-17 PROCEDURE — 85027 COMPLETE CBC AUTOMATED: CPT | Mod: ORL | Performed by: TRANSPLANT SURGERY

## 2023-04-17 PROCEDURE — 83735 ASSAY OF MAGNESIUM: CPT | Mod: ORL | Performed by: TRANSPLANT SURGERY

## 2023-04-17 PROCEDURE — 80048 BASIC METABOLIC PNL TOTAL CA: CPT | Mod: ORL | Performed by: TRANSPLANT SURGERY

## 2023-04-17 RX ORDER — ATORVASTATIN CALCIUM 20 MG/1
20 TABLET, FILM COATED ORAL DAILY
Qty: 30 TABLET | Refills: 11 | Status: SHIPPED | OUTPATIENT
Start: 2023-04-17 | End: 2024-04-18

## 2023-04-18 RX ORDER — VITAMIN B COMPLEX
50 TABLET ORAL DAILY
Qty: 60 TABLET | Refills: 11 | Status: SHIPPED | OUTPATIENT
Start: 2023-04-18 | End: 2024-04-18

## 2023-04-19 ENCOUNTER — APPOINTMENT (OUTPATIENT)
Dept: INTERPRETER SERVICES | Facility: CLINIC | Age: 58
End: 2023-04-19
Payer: COMMERCIAL

## 2023-04-19 ENCOUNTER — TELEPHONE (OUTPATIENT)
Dept: TRANSPLANT | Facility: CLINIC | Age: 58
End: 2023-04-19
Payer: COMMERCIAL

## 2023-04-19 DIAGNOSIS — Z94.0 KIDNEY REPLACED BY TRANSPLANT: Primary | ICD-10-CM

## 2023-04-19 RX ORDER — TACROLIMUS 1 MG/1
2 CAPSULE ORAL 2 TIMES DAILY
Qty: 360 CAPSULE | Refills: 3 | Status: SHIPPED | OUTPATIENT
Start: 2023-04-19 | End: 2023-05-25

## 2023-04-19 RX ORDER — TACROLIMUS 0.5 MG/1
CAPSULE ORAL
Qty: 180 CAPSULE | Refills: 3
Start: 2023-04-19 | End: 2023-05-08

## 2023-04-19 NOTE — TELEPHONE ENCOUNTER
Issue tacrolimus  Level 13.8 above goal level  57 days post transplant           PLAN:   Please call patient and confirm this was an accurate 12-hour trough. Verify Tacrolimus IR dose 2.5 mg BID. Confirm no new medications or illness. Confirm no missed doses. If accurate trough and accurate dose, decrease Tacrolimus IR dose to 2.0 mg BID and repeat labs in one week  Continue to encourage hydration  2 to 3 l per day

## 2023-04-19 NOTE — TELEPHONE ENCOUNTER
Spoke to patients daughter who confirms this was an accurate 12-hour trough. Verified Tacrolimus IR dose 2.5 mg BID. Confirmed no new medications or illness. Confirmed no missed doses. Patient confirms decrease Tacrolimus IR dose to 2.0 mg BID and repeat labs in one week  Continue to encourage hydration  2 to 3 l per day

## 2023-04-24 ENCOUNTER — TELEPHONE (OUTPATIENT)
Dept: FAMILY MEDICINE | Facility: CLINIC | Age: 58
End: 2023-04-24

## 2023-04-24 ENCOUNTER — TELEPHONE (OUTPATIENT)
Dept: TRANSPLANT | Facility: CLINIC | Age: 58
End: 2023-04-24

## 2023-04-24 ENCOUNTER — LAB REQUISITION (OUTPATIENT)
Dept: LAB | Facility: CLINIC | Age: 58
End: 2023-04-24
Payer: COMMERCIAL

## 2023-04-24 DIAGNOSIS — N18.30 CHRONIC KIDNEY DISEASE, STAGE 3 UNSPECIFIED (H): ICD-10-CM

## 2023-04-24 DIAGNOSIS — D84.9 IMMUNOSUPPRESSION (H): ICD-10-CM

## 2023-04-24 DIAGNOSIS — Z94.0 KIDNEY REPLACED BY TRANSPLANT: Primary | ICD-10-CM

## 2023-04-24 DIAGNOSIS — E86.0 DEHYDRATION: ICD-10-CM

## 2023-04-24 DIAGNOSIS — D63.1 ANEMIA IN CHRONIC KIDNEY DISEASE (CODE): ICD-10-CM

## 2023-04-24 LAB
ANION GAP SERPL CALCULATED.3IONS-SCNC: 9 MMOL/L (ref 7–15)
BUN SERPL-MCNC: 29.5 MG/DL (ref 6–20)
CALCIUM SERPL-MCNC: 9 MG/DL (ref 8.6–10)
CHLORIDE SERPL-SCNC: 102 MMOL/L (ref 98–107)
CREAT SERPL-MCNC: 1.36 MG/DL (ref 0.67–1.17)
DEPRECATED HCO3 PLAS-SCNC: 22 MMOL/L (ref 22–29)
ERYTHROCYTE [DISTWIDTH] IN BLOOD BY AUTOMATED COUNT: 14.4 % (ref 10–15)
FERRITIN SERPL-MCNC: 972 NG/ML (ref 31–409)
GFR SERPL CREATININE-BSD FRML MDRD: 60 ML/MIN/1.73M2
GLUCOSE SERPL-MCNC: 175 MG/DL (ref 70–99)
HCT VFR BLD AUTO: 24.4 % (ref 40–53)
HGB BLD-MCNC: 8 G/DL (ref 13.3–17.7)
HOLD SPECIMEN: NORMAL
IRON BINDING CAPACITY (ROCHE): 155 UG/DL (ref 240–430)
IRON SATN MFR SERPL: 34 % (ref 15–46)
IRON SERPL-MCNC: 52 UG/DL (ref 61–157)
MAGNESIUM SERPL-MCNC: 1.6 MG/DL (ref 1.7–2.3)
MCH RBC QN AUTO: 28.2 PG (ref 26.5–33)
MCHC RBC AUTO-ENTMCNC: 32.8 G/DL (ref 31.5–36.5)
MCV RBC AUTO: 86 FL (ref 78–100)
PHOSPHATE SERPL-MCNC: 2.6 MG/DL (ref 2.5–4.5)
PLATELET # BLD AUTO: 134 10E3/UL (ref 150–450)
POTASSIUM SERPL-SCNC: 4.6 MMOL/L (ref 3.4–5.3)
RBC # BLD AUTO: 2.84 10E6/UL (ref 4.4–5.9)
SODIUM SERPL-SCNC: 133 MMOL/L (ref 136–145)
TACROLIMUS BLD-MCNC: 7.7 UG/L (ref 5–15)
TME LAST DOSE: NORMAL H
TME LAST DOSE: NORMAL H
WBC # BLD AUTO: 4 10E3/UL (ref 4–11)

## 2023-04-24 PROCEDURE — 83550 IRON BINDING TEST: CPT | Mod: ORL | Performed by: TRANSPLANT SURGERY

## 2023-04-24 PROCEDURE — 83735 ASSAY OF MAGNESIUM: CPT | Mod: ORL | Performed by: TRANSPLANT SURGERY

## 2023-04-24 PROCEDURE — 85027 COMPLETE CBC AUTOMATED: CPT | Mod: ORL | Performed by: TRANSPLANT SURGERY

## 2023-04-24 PROCEDURE — 82728 ASSAY OF FERRITIN: CPT | Mod: ORL | Performed by: TRANSPLANT SURGERY

## 2023-04-24 PROCEDURE — 80197 ASSAY OF TACROLIMUS: CPT | Mod: ORL | Performed by: TRANSPLANT SURGERY

## 2023-04-24 PROCEDURE — 80048 BASIC METABOLIC PNL TOTAL CA: CPT | Mod: ORL | Performed by: TRANSPLANT SURGERY

## 2023-04-24 PROCEDURE — 84100 ASSAY OF PHOSPHORUS: CPT | Mod: ORL | Performed by: TRANSPLANT SURGERY

## 2023-04-24 RX ORDER — ALBUTEROL SULFATE 0.83 MG/ML
2.5 SOLUTION RESPIRATORY (INHALATION)
Status: CANCELLED | OUTPATIENT
Start: 2023-04-24

## 2023-04-24 RX ORDER — ALBUTEROL SULFATE 90 UG/1
1-2 AEROSOL, METERED RESPIRATORY (INHALATION)
Status: CANCELLED
Start: 2023-04-24

## 2023-04-24 RX ORDER — HEPARIN SODIUM (PORCINE) LOCK FLUSH IV SOLN 100 UNIT/ML 100 UNIT/ML
5 SOLUTION INTRAVENOUS
Status: CANCELLED | OUTPATIENT
Start: 2023-04-24

## 2023-04-24 RX ORDER — MEPERIDINE HYDROCHLORIDE 25 MG/ML
25 INJECTION INTRAMUSCULAR; INTRAVENOUS; SUBCUTANEOUS EVERY 30 MIN PRN
Status: CANCELLED | OUTPATIENT
Start: 2023-04-24

## 2023-04-24 RX ORDER — METHYLPREDNISOLONE SODIUM SUCCINATE 125 MG/2ML
125 INJECTION, POWDER, LYOPHILIZED, FOR SOLUTION INTRAMUSCULAR; INTRAVENOUS
Status: CANCELLED
Start: 2023-04-24

## 2023-04-24 RX ORDER — DIPHENHYDRAMINE HYDROCHLORIDE 50 MG/ML
50 INJECTION INTRAMUSCULAR; INTRAVENOUS
Status: CANCELLED
Start: 2023-04-24

## 2023-04-24 RX ORDER — EPINEPHRINE 1 MG/ML
0.3 INJECTION, SOLUTION, CONCENTRATE INTRAVENOUS EVERY 5 MIN PRN
Status: CANCELLED | OUTPATIENT
Start: 2023-04-24

## 2023-04-24 RX ORDER — HEPARIN SODIUM,PORCINE 10 UNIT/ML
5 VIAL (ML) INTRAVENOUS
Status: CANCELLED | OUTPATIENT
Start: 2023-04-24

## 2023-04-24 NOTE — TELEPHONE ENCOUNTER
I approve and give verbal permission for orders as below:    Reason for Call:  Home Health Care     Shoua with [facility] called regarding (reason for call): verbal order     Orders are needed for this patient. SNV         PT: n/a     OT: n/a     Skilled Nursin visit weekly x 8 weeks and 1 as needed    Pal Cooper MD  [facility]  2023  3:51 PM

## 2023-04-24 NOTE — TELEPHONE ENCOUNTER
Reason for Call:  Home Health Care    Shoua with [facility] called regarding (reason for call): verbal order    Orders are needed for this patient. SNV     PT: n/a    OT: n/a    Skilled Nursin visit weekly x 8 weeks and 1 as needed    Pt Provider: Dr. Cooper    Phone Number Homecare Nurse can be reached at: [phone]     Can we leave a detailed message on this number? YES    Phone number patient can be reached at: Home number on file [phone] (home)    Best Time: yes    Will route request to Dr. Cooper for review.    Call taken on 2023 at 1:01 PM by Kennedy Hull RN

## 2023-04-24 NOTE — TELEPHONE ENCOUNTER
Issue increase creatinine  --   Called weight down to 129 lbs   From 135 lbs despite increasing fluids    Denies fevers , denies N/V diarrhea      Rome Kim MD Huepfel, Shalonda GONZALES RN  IV fluids 1L and ultrasound txp + U/A       Orders entered for fluids

## 2023-04-25 ENCOUNTER — ANCILLARY ORDERS (OUTPATIENT)
Dept: FAMILY MEDICINE | Facility: CLINIC | Age: 58
End: 2023-04-25

## 2023-04-25 ENCOUNTER — HOSPITAL ENCOUNTER (OUTPATIENT)
Dept: ULTRASOUND IMAGING | Facility: HOSPITAL | Age: 58
Discharge: HOME | End: 2023-04-25
Attending: FAMILY MEDICINE | Admitting: FAMILY MEDICINE
Payer: COMMERCIAL

## 2023-04-25 DIAGNOSIS — G62.9 NEUROPATHY: ICD-10-CM

## 2023-04-25 DIAGNOSIS — E11.59 TYPE 2 DIABETES MELLITUS WITH OTHER CIRCULATORY COMPLICATIONS (H): ICD-10-CM

## 2023-04-25 DIAGNOSIS — R20.9 BILATERAL COLD FEET: ICD-10-CM

## 2023-04-25 PROBLEM — R68.89 ABNORMAL ANKLE BRACHIAL INDEX: Status: ACTIVE | Noted: 2023-04-25

## 2023-04-25 PROCEDURE — 93924 LWR XTR VASC STDY BILAT: CPT

## 2023-04-26 ENCOUNTER — INFUSION THERAPY VISIT (OUTPATIENT)
Dept: INFUSION THERAPY | Facility: CLINIC | Age: 58
End: 2023-04-26
Attending: INTERNAL MEDICINE
Payer: COMMERCIAL

## 2023-04-26 ENCOUNTER — TELEPHONE (OUTPATIENT)
Dept: TRANSPLANT | Facility: CLINIC | Age: 58
End: 2023-04-26

## 2023-04-26 ENCOUNTER — HOSPITAL ENCOUNTER (OUTPATIENT)
Dept: ULTRASOUND IMAGING | Facility: CLINIC | Age: 58
Discharge: HOME | End: 2023-04-26
Attending: INTERNAL MEDICINE | Admitting: INTERNAL MEDICINE
Payer: COMMERCIAL

## 2023-04-26 VITALS
BODY MASS INDEX: 22.73 KG/M2 | RESPIRATION RATE: 16 BRPM | SYSTOLIC BLOOD PRESSURE: 166 MMHG | OXYGEN SATURATION: 91 % | TEMPERATURE: 97.7 F | WEIGHT: 132.4 LBS | HEART RATE: 61 BPM | DIASTOLIC BLOOD PRESSURE: 72 MMHG

## 2023-04-26 DIAGNOSIS — Z94.0 KIDNEY REPLACED BY TRANSPLANT: ICD-10-CM

## 2023-04-26 DIAGNOSIS — E86.0 DEHYDRATION: Primary | ICD-10-CM

## 2023-04-26 DIAGNOSIS — D84.9 IMMUNOSUPPRESSION (H): ICD-10-CM

## 2023-04-26 DIAGNOSIS — E86.0 DEHYDRATION: ICD-10-CM

## 2023-04-26 DIAGNOSIS — Z94.0 KIDNEY REPLACED BY TRANSPLANT: Primary | ICD-10-CM

## 2023-04-26 LAB
ALBUMIN UR-MCNC: NEGATIVE MG/DL
APPEARANCE UR: CLEAR
BILIRUB UR QL STRIP: NEGATIVE
COLOR UR AUTO: NORMAL
GLUCOSE UR STRIP-MCNC: NEGATIVE MG/DL
HGB UR QL STRIP: NEGATIVE
KETONES UR STRIP-MCNC: NEGATIVE MG/DL
LEUKOCYTE ESTERASE UR QL STRIP: NEGATIVE
NITRATE UR QL: NEGATIVE
PH UR STRIP: 6 [PH] (ref 5–7)
RBC URINE: <1 /HPF
SP GR UR STRIP: 1.01 (ref 1–1.03)
UROBILINOGEN UR STRIP-MCNC: NORMAL MG/DL
WBC URINE: <1 /HPF

## 2023-04-26 PROCEDURE — 258N000003 HC RX IP 258 OP 636: Performed by: INTERNAL MEDICINE

## 2023-04-26 PROCEDURE — 86833 HLA CLASS II HIGH DEFIN QUAL: CPT

## 2023-04-26 PROCEDURE — 76776 US EXAM K TRANSPL W/DOPPLER: CPT | Mod: 26 | Performed by: RADIOLOGY

## 2023-04-26 PROCEDURE — 81003 URINALYSIS AUTO W/O SCOPE: CPT

## 2023-04-26 PROCEDURE — 36415 COLL VENOUS BLD VENIPUNCTURE: CPT

## 2023-04-26 PROCEDURE — 86832 HLA CLASS I HIGH DEFIN QUAL: CPT

## 2023-04-26 PROCEDURE — 76776 US EXAM K TRANSPL W/DOPPLER: CPT

## 2023-04-26 PROCEDURE — 96360 HYDRATION IV INFUSION INIT: CPT

## 2023-04-26 RX ORDER — HEPARIN SODIUM (PORCINE) LOCK FLUSH IV SOLN 100 UNIT/ML 100 UNIT/ML
5 SOLUTION INTRAVENOUS
Status: CANCELLED | OUTPATIENT
Start: 2023-04-26

## 2023-04-26 RX ORDER — EPINEPHRINE 1 MG/ML
0.3 INJECTION, SOLUTION INTRAMUSCULAR; SUBCUTANEOUS EVERY 5 MIN PRN
Status: CANCELLED | OUTPATIENT
Start: 2023-04-26

## 2023-04-26 RX ORDER — METHYLPREDNISOLONE SODIUM SUCCINATE 125 MG/2ML
125 INJECTION, POWDER, LYOPHILIZED, FOR SOLUTION INTRAMUSCULAR; INTRAVENOUS
Status: CANCELLED
Start: 2023-04-26

## 2023-04-26 RX ORDER — ALBUTEROL SULFATE 90 UG/1
1-2 AEROSOL, METERED RESPIRATORY (INHALATION)
Status: CANCELLED
Start: 2023-04-26

## 2023-04-26 RX ORDER — MEPERIDINE HYDROCHLORIDE 25 MG/ML
25 INJECTION INTRAMUSCULAR; INTRAVENOUS; SUBCUTANEOUS EVERY 30 MIN PRN
Status: CANCELLED | OUTPATIENT
Start: 2023-04-26

## 2023-04-26 RX ORDER — HEPARIN SODIUM,PORCINE 10 UNIT/ML
5 VIAL (ML) INTRAVENOUS
Status: CANCELLED | OUTPATIENT
Start: 2023-04-26

## 2023-04-26 RX ORDER — ALBUTEROL SULFATE 0.83 MG/ML
2.5 SOLUTION RESPIRATORY (INHALATION)
Status: CANCELLED | OUTPATIENT
Start: 2023-04-26

## 2023-04-26 RX ORDER — DIPHENHYDRAMINE HYDROCHLORIDE 50 MG/ML
50 INJECTION INTRAMUSCULAR; INTRAVENOUS
Status: CANCELLED
Start: 2023-04-26

## 2023-04-26 RX ADMIN — SODIUM CHLORIDE 1000 ML: 9 INJECTION, SOLUTION INTRAVENOUS at 08:29

## 2023-04-26 NOTE — TELEPHONE ENCOUNTER
Post Kidney and Pancreas Transplant Team Conference  Date: 4/26/2023  Transplant Coordinator: Shalonda Roy     Attendees:  []  Dr. Phan [] Diya Bautista, RN [] Julia Bower LPN     []  Dr. Baldwin [] Shalonda Roy RN [] Candy Llanos LPN   []  Dr. Patel [] Cora Vázquez RN    []  Dr. Kim [] Analia Gallego RN [] Santos Hussein, PharmD   [] Dr. Alves [] Neha Bhatti RN    [] Dr. Walker [] Lalit Fofana RN    [] Dr. Long [] Bijal Diez RN [] Carlene Bell RN   [] Dr. Morse [] Vane Epperson RN    []  Dr. Saleh [] Lita Mays RN    [] Dr. Zhou [] Enedina Kee RN    [] Ritu Bonilla, NP [] Claire Dunn RN        Verbal Plan Read Back:   Biopsy if cr is above 1.2  IV fluids and labs today    Routed to RN Coordinator   Julia Bower LPN

## 2023-04-26 NOTE — PATIENT INSTRUCTIONS
Dear Modesto Barbosa    Thank you for choosing [facility] ([facility]) for your infusion.  The following information is a summary of our appointment as well as important reminders.          We look forward in seeing you on your next appointment here at [facility] ([facility]).  Please don t hesitate to call us at [phone] to reschedule any of your appointments or to speak with one of the [facility] registered nurses.  It was a pleasure taking care of you today.    Sincerely,    [facility]  [address] [place] [address]  [id number]  Phone:  [phone]

## 2023-04-26 NOTE — PROGRESS NOTES
Infusion Nursing Note:  Modesto Barbosa presents today for IVF.    Patient seen by provider today: No   present during visit today: Not Applicable. Patient can speak and understand english.    Note: -1L NS Bolus infused over an hr.  -Paged Dr. Kim about dextrose 5% 250 ml hydration order in the therapy plan. He said only to give the NS 1L bolus.  -Patient verbalized he has no allergy to Tegaderm.    Intravenous Access:  Labs drawn without difficulty.  Peripheral IV placed.    Treatment Conditions:  None.      Post Infusion Assessment:  Patient tolerated infusion without incident.  Blood return noted pre and post infusion.  Site patent and intact, free from redness, edema or discomfort.  No evidence of extravasations.  Access discontinued per protocol.       Discharge Plan:   Discharge instructions reviewed with: Patient.  Patient and/or family verbalized understanding of discharge instructions and all questions answered.  AVS to patient via [employer].  Patient will follow up with his provider as needed.  Patient discharged in stable condition accompanied by: self.  Departure Mode: Ambulatory.    Administrations This Visit     0.9% sodium chloride BOLUS     Admin Date  04/26/2023 Action  $New Bag Dose  1,000 mL Route  Intravenous Administered By  Carlene Camejo RN                BP (!) 166/72   Pulse 61   Temp 97.7  F (36.5  C) (Oral)   Resp 16   SpO2 91%     Carlene Camejo RN

## 2023-04-28 ENCOUNTER — LAB (OUTPATIENT)
Dept: LAB | Facility: HOSPITAL | Age: 58
End: 2023-04-28
Payer: COMMERCIAL

## 2023-04-28 ENCOUNTER — TELEPHONE (OUTPATIENT)
Dept: TRANSPLANT | Facility: CLINIC | Age: 58
End: 2023-04-28

## 2023-04-28 DIAGNOSIS — Z94.0 KIDNEY REPLACED BY TRANSPLANT: Primary | ICD-10-CM

## 2023-04-28 DIAGNOSIS — N18.30 STAGE 3 CHRONIC KIDNEY DISEASE, UNSPECIFIED WHETHER STAGE 3A OR 3B CKD (H): ICD-10-CM

## 2023-04-28 DIAGNOSIS — Z94.0 KIDNEY REPLACED BY TRANSPLANT: ICD-10-CM

## 2023-04-28 DIAGNOSIS — D84.9 IMMUNOSUPPRESSION (H): ICD-10-CM

## 2023-04-28 DIAGNOSIS — D63.1 ANEMIA OF CHRONIC RENAL FAILURE, STAGE 3 (MODERATE), UNSPECIFIED WHETHER STAGE 3A OR 3B CKD (H): ICD-10-CM

## 2023-04-28 DIAGNOSIS — N18.30 ANEMIA OF CHRONIC RENAL FAILURE, STAGE 3 (MODERATE), UNSPECIFIED WHETHER STAGE 3A OR 3B CKD (H): ICD-10-CM

## 2023-04-28 LAB
ANION GAP SERPL CALCULATED.3IONS-SCNC: 13 MMOL/L (ref 7–15)
BUN SERPL-MCNC: 25.8 MG/DL (ref 6–20)
CALCIUM SERPL-MCNC: 9.2 MG/DL (ref 8.6–10)
CHLORIDE SERPL-SCNC: 102 MMOL/L (ref 98–107)
CREAT SERPL-MCNC: 1.36 MG/DL (ref 0.67–1.17)
DEPRECATED HCO3 PLAS-SCNC: 19 MMOL/L (ref 22–29)
DONOR IDENTIFICATION: NORMAL
DSA COMMENTS: NORMAL
DSA PRESENT: NO
DSA TEST METHOD: NORMAL
FERRITIN SERPL-MCNC: 943 NG/ML (ref 31–409)
GFR SERPL CREATININE-BSD FRML MDRD: 60 ML/MIN/1.73M2
GLUCOSE SERPL-MCNC: 152 MG/DL (ref 70–99)
HCT VFR BLD AUTO: 24.6 % (ref 40–53)
HGB BLD-MCNC: 8.3 G/DL (ref 13.3–17.7)
IRON BINDING CAPACITY (ROCHE): 147 UG/DL (ref 240–430)
IRON SATN MFR SERPL: 36 % (ref 15–46)
IRON SERPL-MCNC: 53 UG/DL (ref 61–157)
ORGAN: NORMAL
POTASSIUM SERPL-SCNC: 5.2 MMOL/L (ref 3.4–5.3)
SA 1 CELL: NORMAL
SA 1 TEST METHOD: NORMAL
SA 2 CELL: NORMAL
SA 2 TEST METHOD: NORMAL
SA1 HI RISK ABY: NORMAL
SA1 MOD RISK ABY: NORMAL
SA2 HI RISK ABY: NORMAL
SA2 MOD RISK ABY: NORMAL
SODIUM SERPL-SCNC: 134 MMOL/L (ref 136–145)
UNACCEPTABLE ANTIGENS: NORMAL
UNOS CPRA: 27
ZZZSA 1  COMMENTS: NORMAL
ZZZSA 2 COMMENTS: NORMAL

## 2023-04-28 PROCEDURE — 83550 IRON BINDING TEST: CPT

## 2023-04-28 PROCEDURE — 80197 ASSAY OF TACROLIMUS: CPT

## 2023-04-28 PROCEDURE — 82728 ASSAY OF FERRITIN: CPT

## 2023-04-28 PROCEDURE — 80048 BASIC METABOLIC PNL TOTAL CA: CPT

## 2023-04-28 PROCEDURE — 85018 HEMOGLOBIN: CPT

## 2023-04-28 PROCEDURE — 36415 COLL VENOUS BLD VENIPUNCTURE: CPT

## 2023-04-29 ENCOUNTER — MEDICAL CORRESPONDENCE (OUTPATIENT)
Dept: HEALTH INFORMATION MANAGEMENT | Facility: CLINIC | Age: 58
End: 2023-04-29

## 2023-04-29 LAB
TACROLIMUS BLD-MCNC: 9.6 UG/L (ref 5–15)
TME LAST DOSE: NORMAL H
TME LAST DOSE: NORMAL H

## 2023-05-01 ENCOUNTER — LAB (OUTPATIENT)
Dept: LAB | Facility: CLINIC | Age: 58
End: 2023-05-01
Attending: INTERNAL MEDICINE
Payer: COMMERCIAL

## 2023-05-01 ENCOUNTER — TELEPHONE (OUTPATIENT)
Dept: FAMILY MEDICINE | Facility: CLINIC | Age: 58
End: 2023-05-01

## 2023-05-01 ENCOUNTER — VIRTUAL VISIT (OUTPATIENT)
Dept: PHARMACY | Facility: CLINIC | Age: 58
End: 2023-05-01
Payer: COMMERCIAL

## 2023-05-01 ENCOUNTER — OFFICE VISIT (OUTPATIENT)
Dept: NEPHROLOGY | Facility: CLINIC | Age: 58
End: 2023-05-01
Attending: INTERNAL MEDICINE
Payer: COMMERCIAL

## 2023-05-01 VITALS
HEART RATE: 68 BPM | DIASTOLIC BLOOD PRESSURE: 75 MMHG | SYSTOLIC BLOOD PRESSURE: 197 MMHG | OXYGEN SATURATION: 100 % | BODY MASS INDEX: 23.09 KG/M2 | WEIGHT: 134.5 LBS

## 2023-05-01 DIAGNOSIS — D84.9 IMMUNOSUPPRESSED STATUS (H): ICD-10-CM

## 2023-05-01 DIAGNOSIS — I15.1 HTN, KIDNEY TRANSPLANT RELATED: Primary | ICD-10-CM

## 2023-05-01 DIAGNOSIS — E11.42 DIABETIC PERIPHERAL NEUROPATHY (H): ICD-10-CM

## 2023-05-01 DIAGNOSIS — N25.81 SECONDARY RENAL HYPERPARATHYROIDISM (H): ICD-10-CM

## 2023-05-01 DIAGNOSIS — Z94.0 KIDNEY REPLACED BY TRANSPLANT: ICD-10-CM

## 2023-05-01 DIAGNOSIS — Z94.0 KIDNEY REPLACED BY TRANSPLANT: Primary | ICD-10-CM

## 2023-05-01 DIAGNOSIS — Z76.0 ENCOUNTER FOR MEDICATION REFILL: ICD-10-CM

## 2023-05-01 DIAGNOSIS — Z79.899 ENCOUNTER FOR LONG-TERM CURRENT USE OF MEDICATION: ICD-10-CM

## 2023-05-01 DIAGNOSIS — N18.2 CKD (CHRONIC KIDNEY DISEASE) STAGE 2, GFR 60-89 ML/MIN: ICD-10-CM

## 2023-05-01 DIAGNOSIS — E87.20 METABOLIC ACIDOSIS: ICD-10-CM

## 2023-05-01 DIAGNOSIS — Z94.0 HTN, KIDNEY TRANSPLANT RELATED: Primary | ICD-10-CM

## 2023-05-01 DIAGNOSIS — T86.10 COMPLICATION OF TRANSPLANTED KIDNEY: ICD-10-CM

## 2023-05-01 DIAGNOSIS — Z29.89 NEED FOR PNEUMOCYSTIS PROPHYLAXIS: ICD-10-CM

## 2023-05-01 DIAGNOSIS — G62.9 NEUROPATHY: ICD-10-CM

## 2023-05-01 DIAGNOSIS — Z20.828 CONTACT WITH AND (SUSPECTED) EXPOSURE TO OTHER VIRAL COMMUNICABLE DISEASES: ICD-10-CM

## 2023-05-01 DIAGNOSIS — Z48.298 AFTERCARE FOLLOWING ORGAN TRANSPLANT: ICD-10-CM

## 2023-05-01 DIAGNOSIS — Z94.0 HTN, KIDNEY TRANSPLANT RELATED: ICD-10-CM

## 2023-05-01 DIAGNOSIS — E78.5 DYSLIPIDEMIA, GOAL LDL BELOW 70: ICD-10-CM

## 2023-05-01 DIAGNOSIS — E11.69 TYPE 2 DIABETES MELLITUS WITH OTHER SPECIFIED COMPLICATION, UNSPECIFIED WHETHER LONG TERM INSULIN USE (H): ICD-10-CM

## 2023-05-01 DIAGNOSIS — E83.51 HYPOCALCEMIA: ICD-10-CM

## 2023-05-01 DIAGNOSIS — E83.42 HYPOMAGNESEMIA: ICD-10-CM

## 2023-05-01 DIAGNOSIS — I15.1 HTN, KIDNEY TRANSPLANT RELATED: ICD-10-CM

## 2023-05-01 DIAGNOSIS — E55.9 VITAMIN D DEFICIENCY: ICD-10-CM

## 2023-05-01 LAB
ALBUMIN MFR UR ELPH: <6 MG/DL (ref 1–14)
ANION GAP SERPL CALCULATED.3IONS-SCNC: 9 MMOL/L (ref 7–15)
BUN SERPL-MCNC: 18.8 MG/DL (ref 6–20)
CALCIUM SERPL-MCNC: 9.8 MG/DL (ref 8.6–10)
CHLORIDE SERPL-SCNC: 99 MMOL/L (ref 98–107)
CREAT SERPL-MCNC: 1.16 MG/DL (ref 0.67–1.17)
CREAT UR-MCNC: 72.7 MG/DL
DEPRECATED HCO3 PLAS-SCNC: 23 MMOL/L (ref 22–29)
ERYTHROCYTE [DISTWIDTH] IN BLOOD BY AUTOMATED COUNT: 14.7 % (ref 10–15)
GFR SERPL CREATININE-BSD FRML MDRD: 73 ML/MIN/1.73M2
GLUCOSE SERPL-MCNC: 211 MG/DL (ref 70–99)
HCT VFR BLD AUTO: 25.5 % (ref 40–53)
HGB BLD-MCNC: 8.3 G/DL (ref 13.3–17.7)
MAGNESIUM SERPL-MCNC: 1.7 MG/DL (ref 1.7–2.3)
MCH RBC QN AUTO: 27.9 PG (ref 26.5–33)
MCHC RBC AUTO-ENTMCNC: 32.5 G/DL (ref 31.5–36.5)
MCV RBC AUTO: 86 FL (ref 78–100)
MYCOPHENOLATE SERPL LC/MS/MS-MCNC: 1.6 MG/L (ref 1–3.5)
MYCOPHENOLATE-G SERPL LC/MS/MS-MCNC: 65.3 MG/L (ref 30–95)
PHOSPHATE SERPL-MCNC: 3.2 MG/DL (ref 2.5–4.5)
PLATELET # BLD AUTO: 126 10E3/UL (ref 150–450)
POTASSIUM SERPL-SCNC: 5 MMOL/L (ref 3.4–5.3)
PROT/CREAT 24H UR: NORMAL MG/G{CREAT}
RBC # BLD AUTO: 2.98 10E6/UL (ref 4.4–5.9)
SODIUM SERPL-SCNC: 131 MMOL/L (ref 136–145)
TACROLIMUS BLD-MCNC: 8.2 UG/L (ref 5–15)
TME LAST DOSE: NORMAL H
WBC # BLD AUTO: 3.3 10E3/UL (ref 4–11)

## 2023-05-01 PROCEDURE — 99000 SPECIMEN HANDLING OFFICE-LAB: CPT | Performed by: PATHOLOGY

## 2023-05-01 PROCEDURE — 85027 COMPLETE CBC AUTOMATED: CPT | Performed by: PATHOLOGY

## 2023-05-01 PROCEDURE — G0463 HOSPITAL OUTPT CLINIC VISIT: HCPCS | Performed by: INTERNAL MEDICINE

## 2023-05-01 PROCEDURE — 84100 ASSAY OF PHOSPHORUS: CPT | Performed by: PATHOLOGY

## 2023-05-01 PROCEDURE — 84156 ASSAY OF PROTEIN URINE: CPT | Performed by: PATHOLOGY

## 2023-05-01 PROCEDURE — 80180 DRUG SCRN QUAN MYCOPHENOLATE: CPT | Mod: 90 | Performed by: PATHOLOGY

## 2023-05-01 PROCEDURE — 80197 ASSAY OF TACROLIMUS: CPT | Mod: 90 | Performed by: PATHOLOGY

## 2023-05-01 PROCEDURE — 99215 OFFICE O/P EST HI 40 MIN: CPT | Mod: 24 | Performed by: INTERNAL MEDICINE

## 2023-05-01 PROCEDURE — 99606 MTMS BY PHARM EST 15 MIN: CPT | Performed by: PHARMACIST

## 2023-05-01 PROCEDURE — 99607 MTMS BY PHARM ADDL 15 MIN: CPT | Performed by: PHARMACIST

## 2023-05-01 PROCEDURE — 87799 DETECT AGENT NOS DNA QUANT: CPT | Mod: 90 | Performed by: PATHOLOGY

## 2023-05-01 PROCEDURE — 86833 HLA CLASS II HIGH DEFIN QUAL: CPT | Performed by: PATHOLOGY

## 2023-05-01 PROCEDURE — 36415 COLL VENOUS BLD VENIPUNCTURE: CPT | Performed by: PATHOLOGY

## 2023-05-01 PROCEDURE — 83735 ASSAY OF MAGNESIUM: CPT | Performed by: PATHOLOGY

## 2023-05-01 PROCEDURE — 80048 BASIC METABOLIC PNL TOTAL CA: CPT | Performed by: PATHOLOGY

## 2023-05-01 PROCEDURE — 86832 HLA CLASS I HIGH DEFIN QUAL: CPT | Performed by: PATHOLOGY

## 2023-05-01 RX ORDER — AMLODIPINE BESYLATE 5 MG/1
5 TABLET ORAL AT BEDTIME
Qty: 90 TABLET | Refills: 3 | Status: SHIPPED | OUTPATIENT
Start: 2023-05-01 | End: 2023-06-28

## 2023-05-01 RX ORDER — GABAPENTIN 100 MG/1
100 CAPSULE ORAL
Qty: 90 CAPSULE | Refills: 0 | Status: SHIPPED | OUTPATIENT
Start: 2023-05-01 | End: 2023-05-09

## 2023-05-01 RX ORDER — MAGNESIUM OXIDE 400 MG/1
400 TABLET ORAL 2 TIMES DAILY
Qty: 180 TABLET | Refills: 3 | Status: SHIPPED | OUTPATIENT
Start: 2023-05-01 | End: 2024-06-04

## 2023-05-01 ASSESSMENT — PAIN SCALES - GENERAL: PAINLEVEL: NO PAIN (0)

## 2023-05-01 NOTE — NURSING NOTE
Chief Complaint   Patient presents with     RECHECK     BP (!) 197/75   Pulse 68   Wt 61 kg (134 lb 8 oz)   SpO2 100%   BMI 23.09 kg/m      Ever Hartman on 5/1/2023 at 7:41 AM

## 2023-05-01 NOTE — PATIENT INSTRUCTIONS
"Recommendations from today's MTM visit:                                                       1. Can stop Valganciclovir on 5/21.   2. Check blood pressures after taking medications to watch for low blood pressures.     Follow-up: 6/28    It was great speaking with you today.  I value your experience and would be very thankful for your time in providing feedback in our clinic survey. In the next few days, you may receive an email or text message from [employer] with a link to a survey related to your  clinical pharmacist.\"     To schedule another MTM appointment, please call the clinic directly or you may call the MTM scheduling line at [phone] or toll-free at [phone].     My Clinical Pharmacist's contact information:                                                      Please feel free to contact me with any questions or concerns you have.      Santos Hussein, PharmD  MTM Pharmacist    Phone: [phone]     "

## 2023-05-01 NOTE — CONSULTS
Completed referral.     Desiree Ku DNP APRN  Interventional Radiology   IR on-call pager: [phone]

## 2023-05-01 NOTE — LETTER
5/1/2023       RE: Modesto Barbosa  [address] [place] [address]     Dear Colleague,    Thank you for referring your patient, Modesto Barbosa, to the [facility] [place] [facility]. Please see a copy of my visit note below.    TRANSPLANT NEPHROLOGY EARLY POST TRANSPLANT VISIT    Assessment & Plan   # LDKT: Trend up in creatinine due to unclear etiology.  He does have a large fluid collection by the kidney, but no reported hydronephrosis on ultrasound.  Will plan to have IR drain fluid collection and obtain a kidney transplant biopsy.   - Baseline Creatinine: ~ 0.8-1.0   - Proteinuria: Normal (<0.2 grams),    - Date DSA Last Checked: Apr/2023      Latest DSA: No cPRA: 27%   - BK Viremia: Not checked post transplant   - Kidney Tx Biopsy: No   - Transplant Ureteral Stent: Removed    # Immunosuppression: Tacrolimus immediate release (goal 8-10) and Mycophenolate mofetil (dose 750 mg every 12 hours)   - Induction with Recent Transplant:  Intermediate Intensity   - Continue with intensive monitoring of immunosuppression for efficacy and toxicity.   - Changes: Not at this time    # Infection Prophylaxis:   - PJP: Sulfa/TMP (Bactrim)  - CMV: Valganciclovir (Valcyte); CMV IgG Ab positive    # Hypertension: Borderline control;  Goal BP: < 140/90   - Volume status: Euvolemic     - Changes: Yes - Will restart amlodipine 5 mg nightly.    # Diabetes: Controlled (HbA1c <7%) Last HbA1c: 6.8%   - Management as per primary care    # Anemia in Chronic Renal Disease: Hgb: Stable, low      NERY: No   - Iron studies: Replete    # Mineral Bone Disorder:   - Secondary renal hyperparathyroidism; PTH level: Mildly elevated (151-300 pg/ml)        On treatment: None  - Vitamin D; level: Low        On supplement: Yes  - Calcium; level: Normal        On supplement: Yes    # Electrolytes:   - Potassium; level: High normal        On supplement: No  - Magnesium; level: Low  normal        On supplement: Yes; Will increase magnesium oxide to 400 mg twice daily  - Bicarbonate; level: Normal        On supplement: Yes    # CAD, s/p PCI: Asymptomatic.    # Asymmetric Left Ventricular Hypertrophy (HOCM): Stable. No history of sustained ventricular arrhythmias.     # Hepatitis B Core Ab +: Patient with core Ab positive, but negative Ag and also negative surface Ab.  Hep B DNA was negative at time of transplant.   - Will recheck Hep B DNA PCR at 4 months post transplant.     # Peripheral Neuropathy: Patient with increased symptoms and affects his sleep.  Likely due to underlying diabetes, but tacrolimus can worsen symptoms, especially with low magnesium levels.   - Will start gabapentin 100 mg nightly and have patient follow up with PCP for further adjustment and management.   - Will increase magnesium supplement.   - If symptoms persist, may consider changing tacrolimus immediate release to the extended release version.    # Medical Compliance: Yes    # COVID-19 Virus Review: Discussed COVID-19 virus and the potential medical risks.  Reviewed preventative health recommendations, including wearing a mask where appropriate.  Recommended COVID vaccination should be up to date with either an initial vaccination or booster shot when appropriate.  Asked the patient to inform the transplant center if they are exposed or diagnosed with this virus.    # COVID Vaccination Up To Date: Yes    #Vaccination: Has not received zoster vaccine.Six months post transplant should receive one.    # Transplant History:  Etiology of Kidney Failure: Diabetes mellitus type 2  Tx: LDKT  Transplant: 2/21/2023 (Kidney)  Donor Type: Living Donor Class:   Crossmatch at time of Tx: negative  DSA at time of Tx: No  Significant changes in immunosuppression: None  CMV IgG Ab High Risk Discordance (D+/R-): No  EBV IgG Ab High Risk Discordance (D+/R-): No  Significant transplant-related complications: None    Transplant Office  Phone Number: [phone]    Assessment and plan was discussed with the patient and he voiced his understanding and agreement.    Return visit: Return for 4 month post transplant visit.    Patrick Phan MD     Chief Complaint   Mr. Barbosa is a 57 year old here for routine follow up, kidney transplant and immunosuppression management.     History of Present Illness    Mr. Barbosa reports feeling okay overall with some medical complaints.  Since last clinic visit, patient reports no hospitalizations or new medical complaints and has been doing well overall.  His energy level is improving, now mostly good and close to normal.  He does report having some trouble sleeping, however.  This is mostly due to increased numbness and tingling in his feet at night.  The peripheral neuropathy was present when on dialysis, but now a bit worse and affecting his sleep.  He is otherwise active and does get some exercise, mostly with walking.  Denies any chest pain or shortness of breath with exertion.  No leg swelling.    Appetite is much better and weight is stable.  No nausea, vomiting or diarrhea.  No fever, sweats or chills.  No night sweats.    Home BP: 140-160/60s; His blood pressure is much higher as this clinic visit as he hasn't taken his medications yet.    Problem List   Patient Active Problem List   Diagnosis    Type 2 Diabetes Mellitus    Dyslipidemia, goal LDL below 70    HTN, kidney transplant related    Metabolic acidosis    Coronary artery disease involving native coronary artery of native heart without angina pectoris    Thrombocytopenia (H)    Immunosuppressed status (H)    Kidney replaced by transplant    Anemia in chronic renal disease    Hypocalcemia    Hypomagnesemia    Hepatitis B core antibody positive    Abnormal ankle brachial index    Aftercare following organ transplant    Need for pneumocystis prophylaxis    CKD (chronic kidney disease) stage 2, GFR 60-89 ml/min    Vitamin D deficiency    Secondary  "renal hyperparathyroidism (H)       Allergies   Allergies   Allergen Reactions    Tegaderm Transparent Dressing (Informational Only) Blisters       Medications   Current Outpatient Medications   Medication Sig    acetaminophen (TYLENOL) 325 MG tablet Take 2 tablets (650 mg) by mouth every 4 hours as needed for other (For optimal non-opioid multimodal pain management to improve pain control.)    amLODIPine (NORVASC) 5 MG tablet Take 1 tablet (5 mg) by mouth At Bedtime    aspirin (ASA) 81 MG EC tablet Take 1 tablet (81 mg) by mouth daily    atorvastatin (LIPITOR) 20 MG tablet Take 1 tablet (20 mg) by mouth daily    calcium carbonate (TUMS) 500 MG chewable tablet Take 2 tablets (1,000 mg) by mouth 2 times daily    carvedilol (COREG) 25 MG tablet Take 0.5 tablets (12.5 mg) by mouth 2 times daily (with meals)    insulin aspart (NOVOLOG PEN) 100 UNIT/ML pen Inject 5 Units Subcutaneous 3 times daily (with meals) for 180 days (Patient taking differently: Inject 7 Units Subcutaneous 3 times daily (with meals))    loratadine (CLARITIN) 10 MG tablet Take 1 tablet (10 mg) by mouth daily as needed for allergies (itchy/rash)    magnesium oxide (MAG-OX) 400 MG tablet Take 1 tablet (400 mg) by mouth 2 times daily    mycophenolate (GENERIC EQUIVALENT) 250 MG capsule Take 3 capsules (750 mg) by mouth 2 times daily for 360 days    nitroGLYcerin (NITROSTAT) 0.4 MG sublingual tablet One tablet under the tongue every 5 minutes if needed for chest pain. May repeat every 5 minutes for a maximum of 3 doses in 15 minutes\"    sodium bicarbonate 650 MG tablet Take 2 tablets (1,300 mg) by mouth 2 times daily    sulfamethoxazole-trimethoprim (BACTRIM) 400-80 MG tablet Take 1 tablet by mouth daily    tacrolimus (GENERIC EQUIVALENT) 1 MG capsule Take 2 capsules (2 mg) by mouth 2 times daily (Patient taking differently: Take 2 mg by mouth 2 times daily 2.5 in the morning and 2 in the evening)    valGANciclovir (VALCYTE) 450 MG tablet Take 2 tablets " (900 mg) by mouth daily for 90 days    Vitamin D3 (CHOLECALCIFEROL) 25 mcg (1000 units) tablet Take 2 tablets (50 mcg) by mouth daily    gabapentin (NEURONTIN) 100 MG capsule Take 2 capsules (200 mg) by mouth nightly as needed for neuropathic pain    insulin detemir (LEVEMIR PEN) 100 UNIT/ML pen 24 units at bedtime    tacrolimus (GENERIC EQUIVALENT) 0.5 MG capsule Take 1 capsule (0.5 mg) by mouth 2 times daily TAKE 1 CAPSULE BY MOUTH 2 TIMES DAILY (TOTAL DOSE 3.5MG TWICE PER DAY)     No current facility-administered medications for this visit.     Medications Discontinued During This Encounter   Medication Reason    magnesium oxide (MAG-OX) 400 MG tablet        Physical Exam   Vital Signs: BP (!) 197/75   Pulse 68   Wt 61 kg (134 lb 8 oz)   SpO2 100%   BMI 23.09 kg/m      GENERAL APPEARANCE: alert and no distress  HENT: mouth without ulcers or lesions  LYMPHATICS: no cervical or supraclavicular nodes  RESP: lungs clear to auscultation - no rales, rhonchi or wheezes  CV: regular rhythm, normal rate, no rub, no murmur  EDEMA: no LE edema bilaterally  ABDOMEN: soft, nondistended, nontender, bowel sounds normal  MS: extremities normal - no gross deformities noted, no evidence of inflammation in joints, no muscle tenderness  SKIN: no rash  TX KIDNEY: normal  DIALYSIS ACCESS: none    Data         Latest Ref Rng & Units 5/16/2023     9:02 AM 5/8/2023     8:15 AM 5/1/2023     7:15 AM   Renal   Sodium 136 - 145 mmol/L 135   127   131     K 3.4 - 5.3 mmol/L 5.0   4.6   5.0     Cl 98 - 107 mmol/L 102   94   99     Cl (external) 98 - 107 mmol/L 102   94   99     CO2 22 - 29 mmol/L 23   22   23     Urea Nitrogen 6.0 - 20.0 mg/dL 23.6   12.0   18.8     Creatinine 0.67 - 1.17 mg/dL 1.05   1.18   1.16     Glucose 70 - 99 mg/dL 151   164   211     Calcium 8.6 - 10.0 mg/dL 9.5   9.2   9.8     Magnesium 1.7 - 2.3 mg/dL 1.7   1.7   1.7           Latest Ref Rng & Units 5/16/2023     9:02 AM 5/8/2023     8:15 AM 5/1/2023     7:15 AM    Bone Health   Phosphorus 2.5 - 4.5 mg/dL 3.5   3.0   3.2           Latest Ref Rng & Units 5/16/2023     9:02 AM 5/8/2023     8:15 AM 5/1/2023     7:15 AM   Heme   WBC 4.0 - 11.0 10e3/uL  4.0 - 11.0 10e3/uL 1.2      1.2   2.3   3.3     Hgb 13.3 - 17.7 g/dL 9.5   8.9   8.3     Plt 150 - 450 10e3/uL 133   143   126     ABSOLUTE NEUTROPHIL 1.6 - 8.3 10e3/uL 0.7       ABSOLUTE LYMPHOCYTES 0.8 - 5.3 10e3/uL 0.3       ABSOLUTE MONOCYTES 0.0 - 1.3 10e3/uL 0.2       ABSOLUTE EOSINOPHILS 0.0 - 0.7 10e3/uL 0.0             Latest Ref Rng & Units 4/7/2023     8:10 AM 4/3/2023     8:10 AM 3/30/2023     7:35 AM   Liver   AP 45 - 120 U/L 74   69   66     TBili 0.0 - 1.0 mg/dL 0.7   0.8   0.7     Bilirubin Direct 0.00 - 0.30 mg/dL  0.22   <0.20     ALT 0 - 45 U/L 10   12   15     AST 0 - 40 U/L 12   14   14     Tot Protein 6.0 - 8.0 g/dL 6.3   6.1   5.7     Albumin 3.5 - 5.0 g/dL 3.7       Albumin 3.5 - 5.2 g/dL  3.8   3.9           Latest Ref Rng & Units 2/22/2023     2:03 AM 2/21/2023     7:42 PM 2/16/2023    12:26 PM   Pancreas   A1C <5.7 % 6.8   6.8   7.1           Latest Ref Rng & Units 4/28/2023     8:36 AM 4/24/2023     8:15 AM 4/13/2023     8:03 AM   Iron studies   Iron 61 - 157 ug/dL 53   52   38     Iron Sat Index 15 - 46 % 36   34   22     Ferritin 31 - 409 ng/mL 943   972   947           Latest Ref Rng & Units 5/1/2023     7:15 AM 2/21/2023     1:00 PM 2/16/2023    12:26 PM   [facility] Txp Virology   CMV QUANT IU/ML Not Detected IU/mL  Not Detected      EBV CAPSID ANTIBODY IGG No detectable antibody.   Positive     EBV DNA COPIES/ML Not Detected copies/mL <500       EBV DNA LOG OF COPIES  <2.7            Recent Labs   Lab Test 05/01/23  0715 05/08/23  0815 05/16/23  0902   DOSTAC 4/30/2023 5/7/2023 5/15/2023   TACROL 8.2 9.0 8.4     Recent Labs   Lab Test 03/02/23  0833 03/18/23  0913 04/13/23  0803 05/01/23  0715 05/16/23  0902   DOSMPA 3/1/2023   8:00 PM  --   --  4/30/2023   8:00 AM  --    MPACID 1.48   < > 3.94* 1.60  2.30   MPAG 41.0   < > 60.6 65.3 53.7    < > = values in this interval not displayed.       Patrick Phan MD

## 2023-05-01 NOTE — TELEPHONE ENCOUNTER
Home Health Care  Reason for call:  FYI    Orders are needed for this patient.  Skilled Nursing: cancel today's visit patient at dialysis appt    Pt Provider: Allison    Phone Number Homecare Nurse can be reached at: [phone]    Can we leave a detailed message on this number? NO call back needed      Could we send this information to you in [id number] or would you prefer to receive a phone call?:   No preference   Okay to leave a detailed message?: No at Other phone number:  n/a

## 2023-05-01 NOTE — PROGRESS NOTES
Medication Therapy Management (MTM) Encounter    ASSESSMENT:                            Medication Adherence/Access: No issues identified    Kidney Transplant:  patient may stop Valcyte 3 months post transplant.     Type 2 Diabetes:  Blood sugars fairly well controlled per patient report. Seeing diabetes educator next week.     Hypertension: BP not at goal <140/90, but started on Amlodipine today by neph. He has a history of lows, recommended he start checking BPs after taking medications rather than before.     Neuropathy: Stable.     Hyperlipidemia: Stable.     PLAN:                            1. Can stop Valganciclovir on 5/21.   2. Check blood pressures after taking medications to watch for low blood pressures.     Follow-up: 6/28    SUBJECTIVE/OBJECTIVE:                          Modesto Barbosa is a 57 year old male called for a follow-up visit. Patient was accompanied by Yanick. Today's visit is a follow-up MTM visit from 3/3     Reason for visit: 2 months.    Allergies/ADRs: Reviewed in chart  Past Medical History: Reviewed in chart  Tobacco: He reports that he has never smoked. He has never been exposed to tobacco smoke. He has never used smokeless tobacco.  Alcohol: not currently using    Medication Adherence/Access: no issues reported    Kidney Transplant:  Current immunosuppressants include Tacrolimus 2.5mg every morning and 2mg every evening, Mycophenolate Mofetil 750mg twice daily.  Pt reports no side effects  Transplant date: 2/21/23  Estimated Creatinine Clearance: 59.9 mL/min (based on SCr of 1.16 mg/dL).  CMV prophylaxis: CrCl >/=60 mL/minute: Valcyte 900mg daily Treat 3 months post tx   PJP prophylaxis: Bactrim S S daily  Vascular prophylaxis: Aspirin 81mg daily (denies gastrointestinal bleed sx)  Supplements: Mag Oxide 400mg twice daily ( 2 hours from MMF), Sodium Bicarb 650mg twice daily, vitamin D3 50mcg daily, Calcium 1000mg twice daily  Tx Coordinator: Shalonda Roy Tx MD: Dr. Kim, Using  Med Card: Yes  Immunizations: annual flu shot 2022; Pneumovax 23:  unknown; Prevnar 13: 2021; TDaP:  unknown; Shingrix: unknown, HBV: no immunity, but core positive, COVID: Moderna 2021x3, Bivalent x1  Lab Results   Component Value Date    CO2 23 05/01/2023    CO2 19 (L) 04/28/2023    MAG 1.7 05/01/2023    MAG 1.6 (L) 04/24/2023     Type 2 Diabetes:  Currently taking Levemir 24 units daily, Humalog 7 Units. Patient is not experiencing side effects.  Blood sugar monitoring: 3-4 time(s) daily. Ranges (patient reported):  Seeing diabetes nurse educator next week  AM: 110-140  Before bed: 200  Symptoms of low blood sugar? none  Symptoms of high blood sugar? none  Eye exam: up to date  Foot exam: up to date  Urine Albumin:   Lab Results   Component Value Date    UMALCR 223.94 (H) 02/09/2023      Lab Results   Component Value Date    A1C 6.8 02/22/2023    A1C 6.8 02/21/2023    A1C 7.1 02/16/2023    A1C 6.9 02/09/2023    A1C 5.6 08/23/2021     Hypertension: Current medications include Carvedilol 12.5mg twice daily, Amlodipine 5mg at bedtime (Started today).  Patient does self-monitor blood pressure. 140-160s in the morning before meds. Patient reports no current medication side effects. Has had lows in the past.   BP Readings from Last 3 Encounters:   05/01/23 (!) 197/75   04/26/23 (!) 166/72   04/05/23 91/49     Neuropathy: Pt complaining of numbness and tingling in his feet. Taking Gabapentin 100mg at bedtime. Started today.     Hyperlipidemia: Current therapy includes Atorvastatin 20mg daily.  Patient reports no significant myalgias or other side effects.  The ASCVD Risk score (Rajeev BRITTON, et al., 2019) failed to calculate for the following reasons:    The patient has a prior MI or stroke diagnosis  Recent Labs   Lab Test 04/03/23  0810 03/30/23  0735 03/13/23  0955 11/17/22  0916 01/19/22  1144   CHOL  --   --   --  122 138   HDL  --   --   --  22* 37*   LDL  --   --   --  53 56   TRIG 91 155*   < > 234* 224*    < > =  values in this interval not displayed.     Today's Vitals: There were no vitals taken for this visit.  ----------------    I spent 12 minutes with this patient today. All changes were made via collaborative practice agreement with Dr. Kim. A copy of the visit note was provided to the patient's provider(s).    A summary of these recommendations was sent via [email or web address].    Santos Hussein PharmD  [facility] Pharmacist    Phone: [phone]     Telemedicine Visit Details  Type of service:  Telephone visit  Start Time: 11:12 AM  End Time: 11:24 AM     Medication Therapy Recommendations  Kidney replaced by transplant    Current Medication: valGANciclovir (VALCYTE) 450 MG tablet   Rationale: Does not understand instructions - Adherence - Adherence   Recommendation: Provide Education   Status: Patient Agreed - Adherence/Education

## 2023-05-01 NOTE — LETTER
5/1/2023      RE: Modesto JOE Barbosa  [address] [place] [address]       TRANSPLANT NEPHROLOGY EARLY POST TRANSPLANT VISIT    Assessment & Plan   # LDKT: Trend up in creatinine due to unclear etiology.  He does have a large fluid collection by the kidney, but no reported hydronephrosis on ultrasound.  Will plan to have IR drain fluid collection and obtain a kidney transplant biopsy.   - Baseline Creatinine: ~ 0.8-1.0   - Proteinuria: Normal (<0.2 grams),    - Date DSA Last Checked: Apr/2023      Latest DSA: No cPRA: 27%   - BK Viremia: Not checked post transplant   - Kidney Tx Biopsy: No   - Transplant Ureteral Stent: Removed    # Immunosuppression: Tacrolimus immediate release (goal 8-10) and Mycophenolate mofetil (dose 750 mg every 12 hours)   - Induction with Recent Transplant:  Intermediate Intensity   - Continue with intensive monitoring of immunosuppression for efficacy and toxicity.   - Changes: Not at this time    # Infection Prophylaxis:   - PJP: Sulfa/TMP (Bactrim)  - CMV: Valganciclovir (Valcyte); CMV IgG Ab positive    # Hypertension: Borderline control;  Goal BP: < 140/90   - Volume status: Euvolemic     - Changes: Yes - Will restart amlodipine 5 mg nightly.    # Diabetes: Controlled (HbA1c <7%) Last HbA1c: 6.8%   - Management as per primary care    # Anemia in Chronic Renal Disease: Hgb: Stable, low      NERY: No   - Iron studies: Replete    # Mineral Bone Disorder:   - Secondary renal hyperparathyroidism; PTH level: Mildly elevated (151-300 pg/ml)        On treatment: None  - Vitamin D; level: Low        On supplement: Yes  - Calcium; level: Normal        On supplement: Yes    # Electrolytes:   - Potassium; level: High normal        On supplement: No  - Magnesium; level: Low normal        On supplement: Yes; Will increase magnesium oxide to 400 mg twice daily  - Bicarbonate; level: Normal        On supplement: Yes    # CAD, s/p PCI: Asymptomatic.    # Asymmetric Left Ventricular Hypertrophy (HOCM):  Stable. No history of sustained ventricular arrhythmias.     # Hepatitis B Core Ab +: Patient with core Ab positive, but negative Ag and also negative surface Ab.  Hep B DNA was negative at time of transplant.   - Will recheck Hep B DNA PCR at 4 months post transplant.     # Peripheral Neuropathy: Patient with increased symptoms and affects his sleep.  Likely due to underlying diabetes, but tacrolimus can worsen symptoms, especially with low magnesium levels.   - Will start gabapentin 100 mg nightly and have patient follow up with PCP for further adjustment and management.   - Will increase magnesium supplement.   - If symptoms persist, may consider changing tacrolimus immediate release to the extended release version.    # Medical Compliance: Yes    # COVID-19 Virus Review: Discussed COVID-19 virus and the potential medical risks.  Reviewed preventative health recommendations, including wearing a mask where appropriate.  Recommended COVID vaccination should be up to date with either an initial vaccination or booster shot when appropriate.  Asked the patient to inform the transplant center if they are exposed or diagnosed with this virus.    # COVID Vaccination Up To Date: Yes    #Vaccination: Has not received zoster vaccine.Six months post transplant should receive one.    # Transplant History:  Etiology of Kidney Failure: Diabetes mellitus type 2  Tx: LDKT  Transplant: 2/21/2023 (Kidney)  Donor Type: Living Donor Class:   Crossmatch at time of Tx: negative  DSA at time of Tx: No  Significant changes in immunosuppression: None  CMV IgG Ab High Risk Discordance (D+/R-): No  EBV IgG Ab High Risk Discordance (D+/R-): No  Significant transplant-related complications: None    Transplant Office Phone Number: [phone]    Assessment and plan was discussed with the patient and he voiced his understanding and agreement.    Return visit: Return for 4 month post transplant visit.    Patrick Phan MD     Chief  Complaint   Mr. Barbosa is a 57 year old here for routine follow up, kidney transplant and immunosuppression management.     History of Present Illness    Mr. Barbosa reports feeling okay overall with some medical complaints.  Since last clinic visit, patient reports no hospitalizations or new medical complaints and has been doing well overall.  His energy level is improving, now mostly good and close to normal.  He does report having some trouble sleeping, however.  This is mostly due to increased numbness and tingling in his feet at night.  The peripheral neuropathy was present when on dialysis, but now a bit worse and affecting his sleep.  He is otherwise active and does get some exercise, mostly with walking.  Denies any chest pain or shortness of breath with exertion.  No leg swelling.    Appetite is much better and weight is stable.  No nausea, vomiting or diarrhea.  No fever, sweats or chills.  No night sweats.    Home BP: 140-160/60s; His blood pressure is much higher as this clinic visit as he hasn't taken his medications yet.    Problem List   Patient Active Problem List   Diagnosis     Type 2 Diabetes Mellitus     Dyslipidemia, goal LDL below 70     HTN, kidney transplant related     Metabolic acidosis     Coronary artery disease involving native coronary artery of native heart without angina pectoris     Thrombocytopenia (H)     Immunosuppressed status (H)     Kidney replaced by transplant     Anemia in chronic renal disease     Hypocalcemia     Hypomagnesemia     Hepatitis B core antibody positive     Abnormal ankle brachial index     Aftercare following organ transplant     Need for pneumocystis prophylaxis     CKD (chronic kidney disease) stage 2, GFR 60-89 ml/min     Vitamin D deficiency     Secondary renal hyperparathyroidism (H)       Allergies   Allergies   Allergen Reactions     Tegaderm Transparent Dressing (Informational Only) Blisters       Medications   Current Outpatient Medications   Medication  "Sig     acetaminophen (TYLENOL) 325 MG tablet Take 2 tablets (650 mg) by mouth every 4 hours as needed for other (For optimal non-opioid multimodal pain management to improve pain control.)     amLODIPine (NORVASC) 5 MG tablet Take 1 tablet (5 mg) by mouth At Bedtime     aspirin (ASA) 81 MG EC tablet Take 1 tablet (81 mg) by mouth daily     atorvastatin (LIPITOR) 20 MG tablet Take 1 tablet (20 mg) by mouth daily     calcium carbonate (TUMS) 500 MG chewable tablet Take 2 tablets (1,000 mg) by mouth 2 times daily     carvedilol (COREG) 25 MG tablet Take 0.5 tablets (12.5 mg) by mouth 2 times daily (with meals)     insulin aspart (NOVOLOG PEN) 100 UNIT/ML pen Inject 5 Units Subcutaneous 3 times daily (with meals) for 180 days (Patient taking differently: Inject 7 Units Subcutaneous 3 times daily (with meals))     loratadine (CLARITIN) 10 MG tablet Take 1 tablet (10 mg) by mouth daily as needed for allergies (itchy/rash)     magnesium oxide (MAG-OX) 400 MG tablet Take 1 tablet (400 mg) by mouth 2 times daily     mycophenolate (GENERIC EQUIVALENT) 250 MG capsule Take 3 capsules (750 mg) by mouth 2 times daily for 360 days     nitroGLYcerin (NITROSTAT) 0.4 MG sublingual tablet One tablet under the tongue every 5 minutes if needed for chest pain. May repeat every 5 minutes for a maximum of 3 doses in 15 minutes\"     sodium bicarbonate 650 MG tablet Take 2 tablets (1,300 mg) by mouth 2 times daily     sulfamethoxazole-trimethoprim (BACTRIM) 400-80 MG tablet Take 1 tablet by mouth daily     tacrolimus (GENERIC EQUIVALENT) 1 MG capsule Take 2 capsules (2 mg) by mouth 2 times daily (Patient taking differently: Take 2 mg by mouth 2 times daily 2.5 in the morning and 2 in the evening)     valGANciclovir (VALCYTE) 450 MG tablet Take 2 tablets (900 mg) by mouth daily for 90 days     Vitamin D3 (CHOLECALCIFEROL) 25 mcg (1000 units) tablet Take 2 tablets (50 mcg) by mouth daily     gabapentin (NEURONTIN) 100 MG capsule Take 2 " capsules (200 mg) by mouth nightly as needed for neuropathic pain     insulin detemir (LEVEMIR PEN) 100 UNIT/ML pen 24 units at bedtime     tacrolimus (GENERIC EQUIVALENT) 0.5 MG capsule Take 1 capsule (0.5 mg) by mouth 2 times daily TAKE 1 CAPSULE BY MOUTH 2 TIMES DAILY (TOTAL DOSE 3.5MG TWICE PER DAY)     No current facility-administered medications for this visit.     Medications Discontinued During This Encounter   Medication Reason     magnesium oxide (MAG-OX) 400 MG tablet        Physical Exam   Vital Signs: BP (!) 197/75   Pulse 68   Wt 61 kg (134 lb 8 oz)   SpO2 100%   BMI 23.09 kg/m      GENERAL APPEARANCE: alert and no distress  HENT: mouth without ulcers or lesions  LYMPHATICS: no cervical or supraclavicular nodes  RESP: lungs clear to auscultation - no rales, rhonchi or wheezes  CV: regular rhythm, normal rate, no rub, no murmur  EDEMA: no LE edema bilaterally  ABDOMEN: soft, nondistended, nontender, bowel sounds normal  MS: extremities normal - no gross deformities noted, no evidence of inflammation in joints, no muscle tenderness  SKIN: no rash  TX KIDNEY: normal  DIALYSIS ACCESS: none    Data         Latest Ref Rng & Units 5/16/2023     9:02 AM 5/8/2023     8:15 AM 5/1/2023     7:15 AM   Renal   Sodium 136 - 145 mmol/L 135   127   131     K 3.4 - 5.3 mmol/L 5.0   4.6   5.0     Cl 98 - 107 mmol/L 102   94   99     Cl (external) 98 - 107 mmol/L 102   94   99     CO2 22 - 29 mmol/L 23   22   23     Urea Nitrogen 6.0 - 20.0 mg/dL 23.6   12.0   18.8     Creatinine 0.67 - 1.17 mg/dL 1.05   1.18   1.16     Glucose 70 - 99 mg/dL 151   164   211     Calcium 8.6 - 10.0 mg/dL 9.5   9.2   9.8     Magnesium 1.7 - 2.3 mg/dL 1.7   1.7   1.7           Latest Ref Rng & Units 5/16/2023     9:02 AM 5/8/2023     8:15 AM 5/1/2023     7:15 AM   Bone Health   Phosphorus 2.5 - 4.5 mg/dL 3.5   3.0   3.2           Latest Ref Rng & Units 5/16/2023     9:02 AM 5/8/2023     8:15 AM 5/1/2023     7:15 AM   Heme   WBC 4.0 - 11.0  10e3/uL  4.0 - 11.0 10e3/uL 1.2      1.2   2.3   3.3     Hgb 13.3 - 17.7 g/dL 9.5   8.9   8.3     Plt 150 - 450 10e3/uL 133   143   126     ABSOLUTE NEUTROPHIL 1.6 - 8.3 10e3/uL 0.7       ABSOLUTE LYMPHOCYTES 0.8 - 5.3 10e3/uL 0.3       ABSOLUTE MONOCYTES 0.0 - 1.3 10e3/uL 0.2       ABSOLUTE EOSINOPHILS 0.0 - 0.7 10e3/uL 0.0             Latest Ref Rng & Units 4/7/2023     8:10 AM 4/3/2023     8:10 AM 3/30/2023     7:35 AM   Liver   AP 45 - 120 U/L 74   69   66     TBili 0.0 - 1.0 mg/dL 0.7   0.8   0.7     Bilirubin Direct 0.00 - 0.30 mg/dL  0.22   <0.20     ALT 0 - 45 U/L 10   12   15     AST 0 - 40 U/L 12   14   14     Tot Protein 6.0 - 8.0 g/dL 6.3   6.1   5.7     Albumin 3.5 - 5.0 g/dL 3.7       Albumin 3.5 - 5.2 g/dL  3.8   3.9           Latest Ref Rng & Units 2/22/2023     2:03 AM 2/21/2023     7:42 PM 2/16/2023    12:26 PM   Pancreas   A1C <5.7 % 6.8   6.8   7.1           Latest Ref Rng & Units 4/28/2023     8:36 AM 4/24/2023     8:15 AM 4/13/2023     8:03 AM   Iron studies   Iron 61 - 157 ug/dL 53   52   38     Iron Sat Index 15 - 46 % 36   34   22     Ferritin 31 - 409 ng/mL 943   972   947           Latest Ref Rng & Units 5/1/2023     7:15 AM 2/21/2023     1:00 PM 2/16/2023    12:26 PM   [facility] Txp Virology   CMV QUANT IU/ML Not Detected IU/mL  Not Detected      EBV CAPSID ANTIBODY IGG No detectable antibody.   Positive     EBV DNA COPIES/ML Not Detected copies/mL <500       EBV DNA LOG OF COPIES  <2.7            Recent Labs   Lab Test 05/01/23  0715 05/08/23  0815 05/16/23  0902   DOSTAC 4/30/2023 5/7/2023 5/15/2023   TACROL 8.2 9.0 8.4     Recent Labs   Lab Test 03/02/23  0833 03/18/23  0913 04/13/23  0803 05/01/23  0715 05/16/23  0902   DOSMPA 3/1/2023   8:00 PM  --   --  4/30/2023   8:00 AM  --    MPACID 1.48   < > 3.94* 1.60 2.30   MPAG 41.0   < > 60.6 65.3 53.7    < > = values in this interval not displayed.       Patrick Phan MD

## 2023-05-01 NOTE — PROGRESS NOTES
"    Outpatient IR Referral  05/01/23      Referring Provider: Patrick Phan MD  IR referral Request: \"Increase creatinine  - kidney bx -  Please review 2nd request to aspirate fluid collection\"  Procedures Approved for:    1. US guided aspiration of RLQ edwige-renal transplant fluid collection.    2. US guided RLQ transplant kidney biopsyl.     Send for Aerobic, cell count, fluid creatinine and surg path entered by Dr. Emilie HORTON.      Brief History:    Modesto Barbosa is a 57 year old Hmong speaking male with history of RLQ kidney transplant 2/21/2023 for ESRF due to DM2 with US on 4/26/2023 with 4.3 x 5.8 x 9.2 cm collection anterior to the mid kidney to upper pole.    2/21/2023 US done post transplant did not demonstrate a fluid collection.    Serum creatinine:  4/28/23:  1.36  4/24/23:  1.36  4/17/23: 1.18  4/13/23:  1.29  4/10/23:  1.15    Lab Results   Component Value Date    WBC 3.3 05/01/2023     Lab Results   Component Value Date    RBC 2.98 05/01/2023     Lab Results   Component Value Date    HGB 8.3 05/01/2023     Lab Results   Component Value Date    HCT 25.5 05/01/2023     No components found for: MCT  Lab Results   Component Value Date    MCV 86 05/01/2023     Lab Results   Component Value Date    MCH 27.9 05/01/2023     Lab Results   Component Value Date    MCHC 32.5 05/01/2023     Lab Results   Component Value Date    RDW 14.7 05/01/2023     Lab Results   Component Value Date     05/01/2023       Pertinent Imaging Reviewed:        Procedure orders placed.    If requesting team would like sample sent for anything else please use the IR order set \"IR RAD Biopsy or Fluid Aspirate Specimens\" to select your necessary diagnostic labs and pend for admission.   If there are labs you desire that are not found in this order set or you have questions regarding specific diagnostic labs please call the associated lab personnel.     Requesting team, Dr. Phan, made aware of IR recommendations via Epic " messaging.    Theodora Cunningham PA-C  Interventional Radiology   IR on-call pager: [phone]

## 2023-05-01 NOTE — PATIENT INSTRUCTIONS
Patient Recommendations:  - Restart amlodipine 5 mg nightly.  - Start gabapentin 100 mg nightly, as needed for neuropathic foot pain.  Please follow up with PCP for any dose adjustments or further refills.  - Increase magnesium oxide to 400 mg twice daily, taken once with lunch and once with supper.    Transplant Patient Information  Your Post Transplant Coordinator is: Shalonda Roy  For non urgent items, we encourage you to contact your coordinator/care team online via [email or web address]  You and your care team can also contact your transplant coordinator Monday - Friday, 8am - 5pm at [phone] (Option 2 to reach the coordinator or Option 4 to schedule an appointment).  After hours for urgent matters, please call [facility] at [phone].

## 2023-05-02 ENCOUNTER — TELEPHONE (OUTPATIENT)
Dept: PHARMACY | Facility: CLINIC | Age: 58
End: 2023-05-02
Payer: COMMERCIAL

## 2023-05-02 LAB — BKV DNA # SPEC NAA+PROBE: NOT DETECTED COPIES/ML

## 2023-05-02 NOTE — TELEPHONE ENCOUNTER
Anemia Management Note - Enrollment  SUBJECTIVE/OBJECTIVE:    Referred by Dr. Rome Kim on 04/10/2023  Primary Diagnosis: Anemia in Chronic Kidney Disease (N18.3, D63.1)   3a  Secondary Diagnosis:  Chronic Kidney Disease, Stage 3 (N18.3)  3a  Kidney Tx: 2023  Hgb goal range:  9-10  Epo/Darbo: TBD:  Needs updated Iron levels before Insurance will approve NERY.   *Hx of HTN.   Iron regimen:  NA  Labs : 4/10/2024  Recent NERY use, transfusion, IV iron: NA  RX/TX plans : TBD     Labs and Sholes's.         No history of stroke, MI and blood clots or cancers.     Contact: OK to speak with Yanick Barbosa (daughter) and Edy Barbosa (son) regarding Medical, Billing, and Scheduling Informaiton per consent to communicate dated 2021.         Latest Ref Rng & Units 2023     8:10 AM 4/10/2023     8:00 AM 2023     8:03 AM 2023     8:05 AM 2023     8:15 AM 2023     8:36 AM 2023     7:15 AM   Anemia   Hemoglobin 13.3 - 17.7 g/dL 8.3   8.1   8.4   8.5      8.5   8.0   8.3   8.3     Ferritin 31 - 409 ng/mL   947    972   943          BP Readings from Last 3 Encounters:   23 (!) 197/75   23 (!) 166/72   23 91/49     Wt Readings from Last 2 Encounters:   23 134 lb 8 oz (61 kg)   23 132 lb 6.4 oz (60.1 kg)     Current Outpatient Medications   Medication Sig Dispense Refill     acetaminophen (TYLENOL) 325 MG tablet Take 2 tablets (650 mg) by mouth every 4 hours as needed for other (For optimal non-opioid multimodal pain management to improve pain control.) 30 tablet 0     amLODIPine (NORVASC) 5 MG tablet Take 1 tablet (5 mg) by mouth At Bedtime 90 tablet 3     aspirin (ASA) 81 MG EC tablet Take 1 tablet (81 mg) by mouth daily 30 tablet 11     atorvastatin (LIPITOR) 20 MG tablet Take 1 tablet (20 mg) by mouth daily 30 tablet 11     calcium carbonate (TUMS) 500 MG chewable tablet Take 2 tablets (1,000 mg) by mouth 2 times daily 120 tablet 0     carvedilol (COREG)  "25 MG tablet Take 0.5 tablets (12.5 mg) by mouth 2 times daily (with meals) 60 tablet 0     gabapentin (NEURONTIN) 100 MG capsule Take 1 capsule (100 mg) by mouth nightly as needed for neuropathic pain 90 capsule 0     insulin aspart (NOVOLOG PEN) 100 UNIT/ML pen Inject 5 Units Subcutaneous 3 times daily (with meals) for 180 days (Patient taking differently: Inject 7 Units Subcutaneous 3 times daily (with meals)) 9 mL 2     insulin detemir (LEVEMIR PEN) 100 UNIT/ML pen Inject 30 Units Subcutaneous At Bedtime for 2 days, THEN 28 Units At Bedtime for 7 days, THEN 26 Units At Bedtime for 7 days, THEN 24 Units At Bedtime for 7 days, THEN 20 Units At Bedtime for 7 days, THEN 20 Units At Bedtime for 30 days. On the days that you are taking the prescribed Prednisone 40mg daily, take levemir 30 units at bedtime. On the days that you are taking the prescribed Prednisone 20mg daily take levemir 28 units at bedtime. On the days that you are taking the prescribed Prednisone 15mg daily take levemir 26 units at bedtime. On the days that you are taking the prescribed Prednisone 10mg daily take levemir 24 units at bedtime. On the days that you are taking the prescribed Prednisone 5mg daily take levemir 20 units at bedtime. Once you are off the steroid taper, take levemir 20 units at bedtime daily. (Patient taking differently: 24 units at bedtime) 13.46 mL 0     loratadine (CLARITIN) 10 MG tablet Take 1 tablet (10 mg) by mouth daily as needed for allergies (itchy/rash) 90 tablet 3     magnesium oxide (MAG-OX) 400 MG tablet Take 1 tablet (400 mg) by mouth 2 times daily 180 tablet 3     mycophenolate (GENERIC EQUIVALENT) 250 MG capsule Take 3 capsules (750 mg) by mouth 2 times daily for 360 days 180 capsule 11     nitroGLYcerin (NITROSTAT) 0.4 MG sublingual tablet One tablet under the tongue every 5 minutes if needed for chest pain. May repeat every 5 minutes for a maximum of 3 doses in 15 minutes\" 25 tablet 3     sodium bicarbonate " 650 MG tablet Take 2 tablets (1,300 mg) by mouth 2 times daily 120 tablet 3     sulfamethoxazole-trimethoprim (BACTRIM) 400-80 MG tablet Take 1 tablet by mouth daily 30 tablet 11     tacrolimus (GENERIC EQUIVALENT) 0.5 MG capsule HOLD FOR FUTURE DOSE CHANGE.  Profile Rx: patient will contact pharmacy when needed (Patient not taking: Reported on 5/1/2023) 180 capsule 3     tacrolimus (GENERIC EQUIVALENT) 1 MG capsule Take 2 capsules (2 mg) by mouth 2 times daily (Patient taking differently: Take 2 mg by mouth 2 times daily 2.5 in the morning and 2 in the evening) 360 capsule 3     valGANciclovir (VALCYTE) 450 MG tablet Take 2 tablets (900 mg) by mouth daily for 90 days 60 tablet 2     Vitamin D3 (CHOLECALCIFEROL) 25 mcg (1000 units) tablet Take 2 tablets (50 mcg) by mouth daily 60 tablet 11     ASSESSMENT:  Hgb Not at goal/Initiation of therapy   Ferritin: At goal (>100ng/mL)  TSat: at goal >30%  Iron regimen recommended: NA  Recommended NERY regimen: TBD  Blood Pressure: HTN    PLAN:  1. Patient will be called for enrollment in Anemia Management Service once Dr. Phan's note is complete from 5/1/23.  2. Need to discuss:  anemia overview, monitoring service and goal hemoglobin range and rationale and risks of NERY blood clots, stroke and increase in blood pressure  3. Dose location: [facility]  4. Labs: [facility]  5. Pharmacy: [facility] is scheduled for a kidney biopsy 5/3/23.  BP 5/1/23 was 197/75 at appt with Dr. Phan.  Will wait for Dr. Phan to complete his note before starting Aranesp. Labs are now being drawn at [facility]         Next call date:  5/4/23    Nimo Banda RN   Anemia Services  [address] [address]   [email or web address]   Office : [phone]  Fax: [phone]

## 2023-05-03 ENCOUNTER — HOSPITAL ENCOUNTER (OUTPATIENT)
Facility: CLINIC | Age: 58
Discharge: HOME | End: 2023-05-03
Attending: INTERNAL MEDICINE | Admitting: PHYSICIAN ASSISTANT
Payer: COMMERCIAL

## 2023-05-03 ENCOUNTER — TELEPHONE (OUTPATIENT)
Dept: TRANSPLANT | Facility: CLINIC | Age: 58
End: 2023-05-03

## 2023-05-03 ENCOUNTER — APPOINTMENT (OUTPATIENT)
Dept: MEDSURG UNIT | Facility: CLINIC | Age: 58
End: 2023-05-03
Attending: INTERNAL MEDICINE
Payer: COMMERCIAL

## 2023-05-03 ENCOUNTER — APPOINTMENT (OUTPATIENT)
Dept: INTERVENTIONAL RADIOLOGY/VASCULAR | Facility: CLINIC | Age: 58
End: 2023-05-03
Attending: INTERNAL MEDICINE
Payer: COMMERCIAL

## 2023-05-03 VITALS
HEART RATE: 72 BPM | WEIGHT: 134.5 LBS | RESPIRATION RATE: 18 BRPM | DIASTOLIC BLOOD PRESSURE: 74 MMHG | TEMPERATURE: 97.9 F | BODY MASS INDEX: 22.96 KG/M2 | HEIGHT: 64 IN | OXYGEN SATURATION: 98 % | SYSTOLIC BLOOD PRESSURE: 156 MMHG

## 2023-05-03 DIAGNOSIS — Z94.0 KIDNEY REPLACED BY TRANSPLANT: ICD-10-CM

## 2023-05-03 LAB
CREAT FLD-MCNC: 0.8 MG/DL
CREATININE BODY FLUID SOURCE: NORMAL
DONOR IDENTIFICATION: NORMAL
DSA COMMENTS: NORMAL
DSA PRESENT: NO
DSA TEST METHOD: NORMAL
EBV DNA # SPEC NAA+PROBE: <500 COPIES/ML
EBV DNA SPEC NAA+PROBE-LOG#: <2.7 {LOG_COPIES}/ML
INR PPP: 1.16 (ref 0.85–1.15)
ORGAN: NORMAL
SA 1 CELL: NORMAL
SA 1 TEST METHOD: NORMAL
SA 2 CELL: NORMAL
SA 2 TEST METHOD: NORMAL
SA1 HI RISK ABY: NORMAL
SA1 MOD RISK ABY: NORMAL
SA2 HI RISK ABY: NORMAL
SA2 MOD RISK ABY: NORMAL
UNACCEPTABLE ANTIGENS: NORMAL
UNOS CPRA: 27
ZZZSA 1  COMMENTS: NORMAL
ZZZSA 2 COMMENTS: NORMAL

## 2023-05-03 PROCEDURE — 88350 IMFLUOR EA ADDL 1ANTB STN PX: CPT | Mod: TC | Performed by: INTERNAL MEDICINE

## 2023-05-03 PROCEDURE — 250N000011 HC RX IP 250 OP 636: Performed by: PHYSICIAN ASSISTANT

## 2023-05-03 PROCEDURE — 82570 ASSAY OF URINE CREATININE: CPT | Performed by: INTERNAL MEDICINE

## 2023-05-03 PROCEDURE — 999N000142 HC STATISTIC PROCEDURE PREP ONLY

## 2023-05-03 PROCEDURE — 272N000502 HC NEEDLE CR3

## 2023-05-03 PROCEDURE — 99152 MOD SED SAME PHYS/QHP 5/>YRS: CPT

## 2023-05-03 PROCEDURE — 89050 BODY FLUID CELL COUNT: CPT | Performed by: INTERNAL MEDICINE

## 2023-05-03 PROCEDURE — 76942 ECHO GUIDE FOR BIOPSY: CPT | Mod: 26 | Performed by: PHYSICIAN ASSISTANT

## 2023-05-03 PROCEDURE — 36415 COLL VENOUS BLD VENIPUNCTURE: CPT | Performed by: NURSE PRACTITIONER

## 2023-05-03 PROCEDURE — 85610 PROTHROMBIN TIME: CPT | Performed by: NURSE PRACTITIONER

## 2023-05-03 PROCEDURE — 99152 MOD SED SAME PHYS/QHP 5/>YRS: CPT | Performed by: PHYSICIAN ASSISTANT

## 2023-05-03 PROCEDURE — 272N000505 HC NEEDLE CR5

## 2023-05-03 PROCEDURE — 88348 ELECTRON MICROSCOPY DX: CPT | Mod: 26 | Performed by: PATHOLOGY

## 2023-05-03 PROCEDURE — 999N000133 HC STATISTIC PP CARE STAGE 2

## 2023-05-03 PROCEDURE — 10160 PNXR ASPIR ABSC HMTMA BULLA: CPT | Performed by: PHYSICIAN ASSISTANT

## 2023-05-03 PROCEDURE — 88346 IMFLUOR 1ST 1ANTB STAIN PX: CPT | Mod: 26 | Performed by: PATHOLOGY

## 2023-05-03 PROCEDURE — 87070 CULTURE OTHR SPECIMN AEROBIC: CPT | Performed by: INTERNAL MEDICINE

## 2023-05-03 PROCEDURE — 50200 RENAL BIOPSY PERQ: CPT

## 2023-05-03 PROCEDURE — 88350 IMFLUOR EA ADDL 1ANTB STN PX: CPT | Mod: 26 | Performed by: PATHOLOGY

## 2023-05-03 PROCEDURE — 88305 TISSUE EXAM BY PATHOLOGIST: CPT | Mod: 26 | Performed by: PATHOLOGY

## 2023-05-03 PROCEDURE — 88313 SPECIAL STAINS GROUP 2: CPT | Mod: 26 | Performed by: PATHOLOGY

## 2023-05-03 PROCEDURE — 50200 RENAL BIOPSY PERQ: CPT | Mod: RT | Performed by: PHYSICIAN ASSISTANT

## 2023-05-03 PROCEDURE — 88313 SPECIAL STAINS GROUP 2: CPT | Mod: TC | Performed by: INTERNAL MEDICINE

## 2023-05-03 PROCEDURE — 250N000009 HC RX 250: Performed by: PHYSICIAN ASSISTANT

## 2023-05-03 RX ORDER — NALOXONE HYDROCHLORIDE 0.4 MG/ML
0.2 INJECTION, SOLUTION INTRAMUSCULAR; INTRAVENOUS; SUBCUTANEOUS
Status: DISCONTINUED | OUTPATIENT
Start: 2023-05-03 | End: 2023-05-03 | Stop reason: HOSPADM

## 2023-05-03 RX ORDER — LIDOCAINE 40 MG/G
CREAM TOPICAL
Status: DISCONTINUED | OUTPATIENT
Start: 2023-05-03 | End: 2023-05-03 | Stop reason: HOSPADM

## 2023-05-03 RX ORDER — FLUMAZENIL 0.1 MG/ML
0.2 INJECTION, SOLUTION INTRAVENOUS
Status: DISCONTINUED | OUTPATIENT
Start: 2023-05-03 | End: 2023-05-03 | Stop reason: HOSPADM

## 2023-05-03 RX ORDER — FENTANYL CITRATE 50 UG/ML
25-50 INJECTION, SOLUTION INTRAMUSCULAR; INTRAVENOUS EVERY 5 MIN PRN
Status: DISCONTINUED | OUTPATIENT
Start: 2023-05-03 | End: 2023-05-03 | Stop reason: HOSPADM

## 2023-05-03 RX ORDER — SODIUM CHLORIDE 9 MG/ML
INJECTION, SOLUTION INTRAVENOUS CONTINUOUS
Status: DISCONTINUED | OUTPATIENT
Start: 2023-05-03 | End: 2023-05-03 | Stop reason: HOSPADM

## 2023-05-03 RX ORDER — NALOXONE HYDROCHLORIDE 0.4 MG/ML
0.4 INJECTION, SOLUTION INTRAMUSCULAR; INTRAVENOUS; SUBCUTANEOUS
Status: DISCONTINUED | OUTPATIENT
Start: 2023-05-03 | End: 2023-05-03 | Stop reason: HOSPADM

## 2023-05-03 RX ADMIN — MIDAZOLAM 1 MG: 1 INJECTION INTRAMUSCULAR; INTRAVENOUS at 09:59

## 2023-05-03 RX ADMIN — FENTANYL CITRATE 50 MCG: 50 INJECTION, SOLUTION INTRAMUSCULAR; INTRAVENOUS at 10:00

## 2023-05-03 RX ADMIN — LIDOCAINE HYDROCHLORIDE 10 ML: 10 INJECTION, SOLUTION EPIDURAL; INFILTRATION; INTRACAUDAL; PERINEURAL at 10:08

## 2023-05-03 ASSESSMENT — ACTIVITIES OF DAILY LIVING (ADL)
ADLS_ACUITY_SCORE: 35
ADLS_ACUITY_SCORE: 35

## 2023-05-03 NOTE — TELEPHONE ENCOUNTER
Post Kidney and Pancreas Transplant Team Conference  Date: 5/3/2023  Transplant Coordinator: Shalonda Roy     Attendees:  [x]  Dr. Phan [x] Diya Bautista, RN [x] Julia Bower LPN     []  Dr. Baldwin [x] Shalonda Roy RN [] Candy Llanos LPN   [x]  Dr. Patel [x] Cora Vázquez RN    [x]  Dr. Kim [] Analia Gallego RN [x] Santos Hussein, PharmD   [] Dr. Alves [] Neha Bhatti, ELIDIA    [x] Dr. Walker [] Lalit Fofana RN    [] Dr. Long [] Bijal Diez RN [] Carlene Bell RN   [] Dr. Morse [] Vane Epperson RN    []  Dr. Saleh [] Lita Mays RN    [] Dr. Zhou [x] Enedina Kee RN    [x] Ritu Bonilla NP [] Claire Dunn RN        Verbal Plan Read Back:   Continue current treatment plan  OK to go from 5 mg to 10 mg on Amlodipine if needed    Routed to RN Coordinator   Julia Bower LPN

## 2023-05-03 NOTE — PROCEDURES
[facility]    Procedure: IR Procedure Note    Date/Time: 5/3/2023 10:27 AM    Performed by: Armando Benites PA-C  Authorized by: Armando Benites PA-C      UNIVERSAL PROTOCOL   Site Marked: NA  Prior Images Obtained and Reviewed:  Yes  Required items: Required blood products, implants, devices and special equipment available    Patient identity confirmed:  Verbally with patient, arm band, provided demographic data and hospital-assigned identification number  Patient was reevaluated immediately before administering moderate or deep sedation or anesthesia  Confirmation Checklist:  Patient's identity using two indicators, relevant allergies, procedure was appropriate and matched the consent or emergent situation and correct equipment/implants were available  Time out: Immediately prior to the procedure a time out was called    Universal Protocol: the Joint Commission Universal Protocol was followed    Preparation: Patient was prepped and draped in usual sterile fashion    ESBL (mL):  0.1     ANESTHESIA    Anesthesia: Local infiltration  Local Anesthetic:  Lidocaine 1% without epinephrine  Anesthetic Total (mL):  10      SEDATION  Patient Sedated: Yes    Sedation Type:  Moderate (conscious) sedation  Sedation:  Fentanyl and midazolam  Vital signs: Vital signs monitored during sedation    See dictated procedure note for full details.  Findings: 1. U/S guided RLQ transplant renal biopsy. Three 18 gauge cores taken. Adequate cortical tissue confirmed by Renal Pathology service.  2. U/S guided aspiration of highly-loculated RLQ transplant perinephric collection returns limited amount of dark black to sanguinous fluid consistent with hematoma. 5 ml aspirated and sent for labs as ordered.    Specimens: fluid and/or tissue for laboratory analysis and core needle biopsy specimens sent for pathological analysis    Complications: None    Condition: Stable    Plan: Follow  up per primary team.      PROCEDURE    Patient Tolerance:  Patient tolerated the procedure well with no immediate complications  Length of time physician/provider present for 1:1 monitoring during sedation: 20

## 2023-05-03 NOTE — PROGRESS NOTES
Returned from IR s/p transplanted kidney biopsy.  VSS.  Denies pain.  Resting in bed.  RLQ abdominal site covered with a [place], CDI.

## 2023-05-03 NOTE — DISCHARGE INSTRUCTIONS
[facility]    Interventional Radiology  Patient Instructions Following Biopsy of Kidney    AFTER YOU GO HOME  If you were given sedation DO NOT drive or operate machinery at home or at work for at least 24 hours  DO relax and take it easy for 48 hours, no strenuous activity for 24 hours  DO drink plenty of fluids  DO resume your regular diet, unless otherwise instructed by your Primary Physician  Keep the dressing dry and in place for 24 hours.  DO NOT SMOKE FOR AT LEAST 24 HOURS, if you have been given any medications that were to help you relax or sedate you during your procedure  DO NOT drink alcoholic beverages the day of your procedure  DO NOT do any strenuous exercise or lifting (> 10 lbs) for at least 7 days following your procedure  DO NOT take a bath or shower for at least 12 hours following your procedure  Remove dressing after shower the next day. Replace with Band aid for 2 days.  Never leave a wet dressing in place.  DO NOT make any important or legal decisions for 24 hours following your procedure  There should be minimum drainage from the biopsy site    CALL THE PHYSICIAN IF:  You start bleeding from the procedure site.  If you do start to bleed from that site, lie down flat and hold pressure on the site for a minimum of 10 minutes.  Your physician will tell you if you need to return to the hospital  You develop nausea or vomiting  You have excessive swelling, redness, or tenderness at the site  You have drainage that looks like it is infected.  You experience severe pain  You develop hives or a rash or unexplained itching  You develop shortness of breath  You develop a temperature of 101 degrees F or greater  You develop bloody clots or red urine after you are discharged        [facility] INTERVENTIONAL RADIOLOGY DEPARTMENT  Procedure Physician: Caleb Benites PA-C                                     Date of procedure: May 3, 2023  Telephone Numbers: [phone]      Monday-Friday 7:30 am  to 4:00 pm  [phone] After 4:00 pm Monday-Friday, Weekends & Holidays.   Ask for the Interventional Radiologist on call.  Someone is on call 24 hrs/day  [facility] toll free number: [phone] Monday-Friday 8:00 am to 4:30 pm  [facility] Emergency Dept: [phone]

## 2023-05-03 NOTE — PRE-PROCEDURE
GENERAL PRE-PROCEDURE:   Procedure:  Image guided aspiration of right peritransplant fluid collection and right lower quadrant transplant kidney biopsy  Date/Time:  5/3/2023 9:30 AM    Written consent obtained?: Yes    Risks and benefits: Risks, benefits and alternatives were discussed    Consent given by:  Patient  Patient states understanding of procedure being performed: Yes    Patient's understanding of procedure matches consent: Yes    Procedure consent matches procedure scheduled: Yes    Expected level of sedation:  Moderate  Appropriately NPO:  Yes  ASA Class:  3  Mallampati  :  Grade 2- soft palate, base of uvula, tonsillar pillars, and portion of posterior pharyngeal wall visible  Lungs:  Lungs clear with good breath sounds bilaterally  Heart:  Normal heart sounds and rate  History & Physical reviewed:  History and physical reviewed and no updates needed  Statement of review:  I have reviewed the lab findings, diagnostic data, medications, and the plan for sedation

## 2023-05-03 NOTE — PROGRESS NOTES
Tolerated bedrest without issues.  Voided clear yellow urine.  RLQ biopsy site CDI.  Denies pain.  Reviewed discharge instructions with Nhia and the [facility] .  Discharged to home with wife.

## 2023-05-03 NOTE — PROGRESS NOTES
Arrived from home for a transplanted kidney biopsy and fluid aspiration.  VSS.  Denies pain.  H&P current.  Consent obtained.  Will be ready for procedure when INR is resulted.

## 2023-05-03 NOTE — IR NOTE
Patient Name: Modesto Barbosa  Medical Record Number: [id number]  Today's Date: 5/3/2023    Procedure: RLQ Renal Transplant Biopsy and Fine Needle Aspiration   Proceduralist: Armando Benites PA-C  Pathology present: Yes    Procedure Start: 1001  Procedure end: 1023  Sedation medications administered: Midazolam 1 mg, Fentanyl 50 mcg    Report given to: ELIDIA Mahajan 2A  : Eugenio    Other Notes: Pt arrived to IR room 6 from . Consent reviewed. Pt denies any questions or concerns regarding procedure. Pt positioned supine and monitored per protocol.     3 cores obtained and sent to lab as ordered.    Fluid sample obtained and sent to lab as ordered. Only 5mL total able to aspirate.   GelFoam used to close biopsy needle track.  Hemostatis achieved.    Patient to be on bedrest for 2 hours ending at 1223 per Armando Benites PA-C.    Pt tolerated procedure without any noted complications. Pt transferred back to .

## 2023-05-04 ENCOUNTER — TELEPHONE (OUTPATIENT)
Dept: TRANSPLANT | Facility: CLINIC | Age: 58
End: 2023-05-04
Payer: COMMERCIAL

## 2023-05-04 NOTE — TELEPHONE ENCOUNTER
Patrick Phan MD Huepfel, Mary K, RN  Biopsy shows mild acute tubular injury, but no rejection.  Creatinine was better.     Please let patient know.     Thanks      Called updated daughter with kidney biopsy results

## 2023-05-04 NOTE — TELEPHONE ENCOUNTER
Nhia was prescribed Amlodipine on 5/1/23.  Unable to start NERY until BPs are stable.   Will continue to monitor labs and BP.     Nimo Banda RN   Anemia Services  [address] [address]   [email or web address]   Office : [phone]  Fax: [phone]

## 2023-05-05 LAB
PATH REPORT.COMMENTS IMP SPEC: NORMAL
PATH REPORT.FINAL DX SPEC: NORMAL
PATH REPORT.GROSS SPEC: NORMAL
PATH REPORT.MICROSCOPIC SPEC OTHER STN: NORMAL
PATH REPORT.RELEVANT HX SPEC: NORMAL
PHOTO IMAGE: NORMAL

## 2023-05-05 NOTE — TELEPHONE ENCOUNTER
Called Yanikc daughter    Reviewed the kidney biopsy for rejection and current cultures negative from aspiration from fluid collection   Plan continue hydration post kidney biopsy      Called Modesto Barbosa with   - reviewed the kidney biopsy negative for rejection   Reviewed to continue increase hydration     Mr Barbosa engaged in conversation

## 2023-05-08 ENCOUNTER — TELEPHONE (OUTPATIENT)
Dept: FAMILY MEDICINE | Facility: CLINIC | Age: 58
End: 2023-05-08

## 2023-05-08 ENCOUNTER — LAB REQUISITION (OUTPATIENT)
Dept: LAB | Facility: CLINIC | Age: 58
End: 2023-05-08
Payer: COMMERCIAL

## 2023-05-08 ENCOUNTER — TELEPHONE (OUTPATIENT)
Dept: TRANSPLANT | Facility: CLINIC | Age: 58
End: 2023-05-08
Payer: COMMERCIAL

## 2023-05-08 DIAGNOSIS — Z48.22 ENCOUNTER FOR AFTERCARE FOLLOWING KIDNEY TRANSPLANT: ICD-10-CM

## 2023-05-08 DIAGNOSIS — E11.42 DIABETIC PERIPHERAL NEUROPATHY (H): ICD-10-CM

## 2023-05-08 LAB
ANION GAP SERPL CALCULATED.3IONS-SCNC: 11 MMOL/L (ref 7–15)
BACTERIA FLD CULT: NO GROWTH
BUN SERPL-MCNC: 12 MG/DL (ref 6–20)
CALCIUM SERPL-MCNC: 9.2 MG/DL (ref 8.6–10)
CHLORIDE SERPL-SCNC: 94 MMOL/L (ref 98–107)
CREAT SERPL-MCNC: 1.18 MG/DL (ref 0.67–1.17)
DEPRECATED HCO3 PLAS-SCNC: 22 MMOL/L (ref 22–29)
ERYTHROCYTE [DISTWIDTH] IN BLOOD BY AUTOMATED COUNT: 15.4 % (ref 10–15)
GFR SERPL CREATININE-BSD FRML MDRD: 72 ML/MIN/1.73M2
GLUCOSE SERPL-MCNC: 164 MG/DL (ref 70–99)
GRAM STAIN RESULT: NORMAL
GRAM STAIN RESULT: NORMAL
HCT VFR BLD AUTO: 26.1 % (ref 40–53)
HGB BLD-MCNC: 8.9 G/DL (ref 13.3–17.7)
MCH RBC QN AUTO: 28.5 PG (ref 26.5–33)
MCHC RBC AUTO-ENTMCNC: 34.1 G/DL (ref 31.5–36.5)
MCV RBC AUTO: 84 FL (ref 78–100)
PLATELET # BLD AUTO: 143 10E3/UL (ref 150–450)
POTASSIUM SERPL-SCNC: 4.6 MMOL/L (ref 3.4–5.3)
RBC # BLD AUTO: 3.12 10E6/UL (ref 4.4–5.9)
SODIUM SERPL-SCNC: 127 MMOL/L (ref 136–145)
TACROLIMUS BLD-MCNC: 9 UG/L (ref 5–15)
TME LAST DOSE: NORMAL H
TME LAST DOSE: NORMAL H
WBC # BLD AUTO: 2.3 10E3/UL (ref 4–11)

## 2023-05-08 PROCEDURE — 80197 ASSAY OF TACROLIMUS: CPT | Mod: ORL | Performed by: TRANSPLANT SURGERY

## 2023-05-08 PROCEDURE — 84100 ASSAY OF PHOSPHORUS: CPT | Mod: ORL | Performed by: TRANSPLANT SURGERY

## 2023-05-08 PROCEDURE — 83735 ASSAY OF MAGNESIUM: CPT | Mod: ORL | Performed by: TRANSPLANT SURGERY

## 2023-05-08 PROCEDURE — 85027 COMPLETE CBC AUTOMATED: CPT | Mod: ORL | Performed by: TRANSPLANT SURGERY

## 2023-05-08 PROCEDURE — 80048 BASIC METABOLIC PNL TOTAL CA: CPT | Mod: ORL | Performed by: TRANSPLANT SURGERY

## 2023-05-08 RX ORDER — TACROLIMUS 0.5 MG/1
CAPSULE ORAL
Qty: 120 CAPSULE | Refills: 0 | Status: SHIPPED | OUTPATIENT
Start: 2023-05-08 | End: 2023-05-09

## 2023-05-08 NOTE — TELEPHONE ENCOUNTER
General Call    Contacts       Type Contact Phone/Fax    05/08/2023 09:09 AM CDT Phone (Incoming) Luz Marina [facility]EN[facility] care [phone]        Reason for Call:  Luz Marina [facility] care asking for increase of Gabapentin from 1 to 2 caps at HS.  One cap does not touch his pan. Transplant team will not touch changing med    Date of last appointment with provider: 3/21/23    Could we send this information to you in [facility] or would you prefer to receive a phone call?:   Patient would prefer a phone call     Okay to leave a detailed message?: Yes at [phone].  Thanks    Call taken on 05/08/23 at 915 am by ZHEN Goss

## 2023-05-08 NOTE — TELEPHONE ENCOUNTER
Luz Marina RN from [facility] calling and would like to speak with Coordinator patient is taking one tablet at night of Gabapentin 100 mg it's not working for the patient, wondering if they can add 2 tablet's at bedtime.

## 2023-05-09 ENCOUNTER — MYC MEDICAL ADVICE (OUTPATIENT)
Dept: FAMILY MEDICINE | Facility: CLINIC | Age: 58
End: 2023-05-09
Payer: COMMERCIAL

## 2023-05-09 DIAGNOSIS — Z94.0 KIDNEY REPLACED BY TRANSPLANT: Primary | ICD-10-CM

## 2023-05-09 LAB
MAGNESIUM SERPL-MCNC: 1.7 MG/DL (ref 1.7–2.3)
PHOSPHATE SERPL-MCNC: 3 MG/DL (ref 2.5–4.5)

## 2023-05-09 RX ORDER — TACROLIMUS 0.5 MG/1
0.5 CAPSULE ORAL 2 TIMES DAILY
Qty: 180 CAPSULE | Refills: 3 | Status: SHIPPED | OUTPATIENT
Start: 2023-05-09 | End: 2023-05-25

## 2023-05-09 RX ORDER — GABAPENTIN 100 MG/1
200 CAPSULE ORAL
Qty: 90 CAPSULE | Refills: 0 | Status: SHIPPED | OUTPATIENT
Start: 2023-05-09 | End: 2023-06-26

## 2023-05-09 NOTE — TELEPHONE ENCOUNTER
Orders placed as requested.  New prescription sent to the [facility]/specialty pharmacy in [place].  Please call to inform the nurse and have the nurse inform patient.    Pal Cooper MD  [facility]  5/9/2023  11:53 AM    Diagnoses and all orders for this visit:    Diabetic peripheral neuropathy (H)  -     gabapentin (NEURONTIN) 100 MG capsule; Take 2 capsules (200 mg) by mouth nightly as needed for neuropathic pain

## 2023-05-09 NOTE — TELEPHONE ENCOUNTER
See my chart message.  Closing encounter.    Pal Cooper MD  [facility] [place] [address]  Phone: [phone]  Fax: [phone]    5/9/2023  2:08 PM

## 2023-05-10 ENCOUNTER — TELEPHONE (OUTPATIENT)
Dept: FAMILY MEDICINE | Facility: CLINIC | Age: 58
End: 2023-05-10

## 2023-05-10 ENCOUNTER — MYC MEDICAL ADVICE (OUTPATIENT)
Dept: FAMILY MEDICINE | Facility: CLINIC | Age: 58
End: 2023-05-10

## 2023-05-10 ENCOUNTER — VIRTUAL VISIT (OUTPATIENT)
Dept: EDUCATION SERVICES | Facility: CLINIC | Age: 58
End: 2023-05-10
Payer: COMMERCIAL

## 2023-05-10 DIAGNOSIS — E11.9 DIABETES MELLITUS (H): Primary | ICD-10-CM

## 2023-05-10 DIAGNOSIS — E11.22 TYPE 2 DIABETES MELLITUS WITH DIABETIC CHRONIC KIDNEY DISEASE, UNSPECIFIED CKD STAGE, UNSPECIFIED WHETHER LONG TERM INSULIN USE (H): ICD-10-CM

## 2023-05-10 PROCEDURE — G0108 DIAB MANAGE TRN  PER INDIV: HCPCS | Mod: 93 | Performed by: DIETITIAN, REGISTERED

## 2023-05-10 NOTE — TELEPHONE ENCOUNTER
PA has been approved, but pharmacy needs a new rx because the previous rx was closed out by the provider.    Prior Authorization Approval    Authorization Effective Date: 4/10/2023  Authorization Expiration Date: 5/9/2024  Medication: LEVEMIR  UNIT/ML pen- APPROVED   Approved Dose/Quantity:   Reference #:     Insurance Company: [employer] - Phone [phone] Fax [phone]  Expected CoPay:       CoPay Card Available:      Foundation Assistance Needed:    Which Pharmacy is filling the prescription (Not needed for infusion/clinic administered): [id number] - [place], [place] - [address] [place] [address] AT [facility]  Pharmacy Notified: Yes- Pharmacy needs a new rx.  Patient Notified:       No

## 2023-05-10 NOTE — TELEPHONE ENCOUNTER
Responded to patient via [employer]. Prior authorization initiated per patient request.

## 2023-05-10 NOTE — TELEPHONE ENCOUNTER
Central Prior Authorization Team  Phone: [phone]    PA Initiation    Medication: LEVEMIR  UNIT/ML pen  Insurance Company: [employer] - Phone [phone] Fax [phone]  Pharmacy Filling the Rx: [id number] - [place], [place] - [address] [place] [address]  Filling Pharmacy Phone: [phone]  Filling Pharmacy Fax:    Start Date: 5/10/2023

## 2023-05-10 NOTE — PROGRESS NOTES
"Diabetes Self-Management Education & Support    Presents for:      Type of Service: Telephone Visit    Assessment Type:   ASSESSMENT:  Follow up visit for diabetes education, conducted with the help of a professional .  Has great support from family, especially his Daughter, Chelsey who is a public health nurse and does home care visits    SMBG data indicate elevated BG - pt endorses regular intake of juice/sugary drinks      PMH: ESRD from T2D  s/p kidney transplant on .      134 lb () < 150 lb (3/7) > 135 ( - was on lasix).    Reports feeling well overall and denies any s/s of hypo/hyperglycemia.  No other concerns.      Currently taking Novolog 7 units BID with meals + 24 units levemir at HS  Was on taper dose as per prednisone post surgery (on )  Being managed by MTM and Endo    SMBG:  Testing 3x/d, BG starting today, 5/10 - :  FB, 129, 151, 120, 150, 138, 117, 145, 154, 141, 116, 126, 141, 211, 162, 144, 188  Before lunch: 162, 181, 167, 193, 188, 201, 195, 182, 190, 180, 182, 201, 202, 193, 201  Before dinner: 208, 290, 272, 199, 201, 196, 211, 198, 202, 191, 212, 220, 213, 223, 211, 224     Appetite is a lot better now.  Does 2 meals daily. Meals typically consist of brown rice (1-2 small bowl fulls), veg and protein  Drinks ~ 2 litres water daily    Reports, he was told to drink soda/juice to help improve his \"health\" and that he has just been following those recommendations. Discussed that he was already on supplements (for electrolytes) and that drinking juice/any sugary beverages would only spike his BG.Encouraged a balanced diet with consistent CHO at each meal - pt expressed understanding. Ideally we would have titrated insulin today - deferred until next visit in 3-4 weeks, pt to focus on mindful intake and watch CHO foods and portions.     Walks regularly at a local park.      Education/Discussion: Reviewed diabetes basics, AIC and BS goals, sx and tx of hypo and " "hyperglycemia, general activity and diet guidelines, CHO foods: pt expressed understanding.    Patient's most recent   Lab Results   Component Value Date    A1C 6.8 02/22/2023     is not meeting goal of <7.0    Diabetes knowledge and skills assessment:   Patient is knowledgeable in diabetes management concepts related to: needs AADE 7 review     Continue education with the following diabetes management concepts: needs AADE 7 review     Based on learning assessment above, most appropriate setting for further diabetes education would be: Individual setting.      PLAN    Pt to focus on eating healthfully and watch CHO portions and quit drinking juice/suagry drinks. We will assess for insulin adjustment at our follow up visit in 3-4 weeks.    To test BG 3x/d  FBG and 2 hours after am and pm meals.    Topics to cover at upcoming visits: Healthy Eating, Monitoring, Taking Medication, Problem Solving and Reducing Risks    Follow-up: 3-4 weeks, we will assess for insulin adjustment    See Care Plan for co-developed, patient-state behavior change goals.  AVS provided for patient today.    Education Materials Provided:  n/a    SUBJECTIVE/OBJECTIVE:     Cultural Influences/Ethnic Background:  Not  or     Diabetes Symptoms & Complications:    Patient Problem List and Family Medical History reviewed for relevant medical history, current medical status, and diabetes risk factors.    Vitals:  There were no vitals taken for this visit.  Estimated body mass index is 23.09 kg/m  as calculated from the following:    Height as of 5/3/23: 1.626 m (5' 4\").    Weight as of 5/3/23: 61 kg (134 lb 8 oz).   Last 3 BP:   BP Readings from Last 3 Encounters:   05/03/23 (!) 156/74   05/01/23 (!) 197/75   04/26/23 (!) 166/72       History   Smoking Status     Never   Smokeless Tobacco     Never       Labs:  Lab Results   Component Value Date    A1C 6.8 02/22/2023     Lab Results   Component Value Date     05/08/2023     " 04/07/2023     04/07/2023     Lab Results   Component Value Date    LDL 53 11/17/2022     Direct Measure HDL   Date Value Ref Range Status   11/17/2022 22 (L) >=40 mg/dL Final   ]  GFR Estimate   Date Value Ref Range Status   05/08/2023 72 >60 mL/min/1.73m2 Final     Comment:     eGFR calculated using 2021 CKD-EPI equation.   06/14/2021 15 (L) >60 mL/min/1.73m2 Final     GFR Estimate If Black   Date Value Ref Range Status   06/14/2021 18 (L) >60 mL/min/1.73m2 Final     Lab Results   Component Value Date    CR 1.18 05/08/2023     No results found for: MICROALBUMIN    Taking Medications:  Diabetes Medication(s)     Insulin       insulin aspart (NOVOLOG PEN) 100 UNIT/ML pen    Inject 5 Units Subcutaneous 3 times daily (with meals) for 180 days     Patient taking differently: Inject 7 Units Subcutaneous 3 times daily (with meals)     insulin detemir (LEVEMIR PEN) 100 UNIT/ML pen    Inject 30 Units Subcutaneous At Bedtime for 2 days, THEN 28 Units At Bedtime for 7 days, THEN 26 Units At Bedtime for 7 days, THEN 24 Units At Bedtime for 7 days, THEN 20 Units At Bedtime for 7 days, THEN 20 Units At Bedtime for 30 days. On the days that you are taking the prescribed Prednisone 40mg daily, take levemir 30 units at bedtime. On the days that you are taking the prescribed Prednisone 20mg daily take levemir 28 units at bedtime. On the days that you are taking the prescribed Prednisone 15mg daily take levemir 26 units at bedtime. On the days that you are taking the prescribed Prednisone 10mg daily take levemir 24 units at bedtime. On the days that you are taking the prescribed Prednisone 5mg daily take levemir 20 units at bedtime. Once you are off the steroid taper, take levemir 20 units at bedtime daily.     Patient taking differently: 24 units at bedtime            Patient Activation Measure Survey Score:       View : No data to display.                Care Plan and Education Provided:  Healthy eating, monitoring, taking  medication, reducing risks, problem solving    Time Spent: 100 minutes  Encounter Type: Individual    Any diabetes medication dose changes were made via the CDE Protocol per the patient's referring provider. A copy of this encounter was shared with the provider.

## 2023-05-10 NOTE — TELEPHONE ENCOUNTER
Prior Authorization Request  Who's requesting:  PHARMACY   Pharmacy Name and Location: [facility]   Medication Name: insulin detemir (LEVEMIR PEN) 100 UNIT/ML pen  Insurance Plan: [facility] DUAL  Insurance Member ID Number:  [id number]   CoverMyMeds Key: N/A   Informed patient that prior authorizations can take up to 10 business days for response:   Yes  Okay to leave a detailed message: No

## 2023-05-10 NOTE — Clinical Note
"    5/10/2023         RE: Modesto Barbosa  [address] [place] [address]        Dear Colleague,    Thank you for referring your patient, [facility]osa[facility] Please see a copy of my visit note below.    Diabetes Self-Management Education & Support    Presents for:      Type of Service: Telephone Visit    Assessment Type:   ASSESSMENT:  Follow up visit for diabetes education, conducted with the help of a professional .  Has great support from family, faheem his Daughter, Chelsey who is a public health nurse and does home care visits     PMH: ESRD from T2D  S/p kidney transplant on .      Good family support: pt lives with wife and son and Chelsey (daughter) lives just a few minutes away and is very actively involved in patient's care.   Pt does not work outside the home     134 lb () < 150 lb (3/7)      Currently taking Novolog 7 units BID with meals + 24 units levemir at HS  Was on taper dose as per prednisone post surgery  Being managed by MTM and Endo  Levemir was decreased to 26 units on 3/3 (due to low AM blood sugars) - no longer having lows.      Taking levemir in the am along with prednisone. Will go back to 20 units at HS once done with prednisone  Taking Predinisone 20 mg daily - levemir was decreased to 26 units on 3/3 (due to low AM blood sugars)  15 mg - 24 units daily  10 mg daily - 22 units daily      SMBG:  Testing 3x/d, BG starting today, 5/10 - :  FB, 129, 151, 120, 150, 138, 117, 145, 154, 141, 116, 126, 141, 211, 162, 144, 188  Before lunch: 162, 181, 167, 193, 188, 201, 195, 182, 190, 180, 182, 201, 202, 193, 201  Before dinner:208, 290, 272, 199, 201, 196, 211, 198, 202, 191, 212, 220, 213, 223, 211, 224     Appetite is a lot better now.  Does 2 meals daily. Meals typically consist of brown rice (1-2 small bowl fulls), veg and protien  Drinks ~ 2 litres water daily  Was told to drink soda/juice to help improve his \"health\"   Discussed that " he was already on supplements (for electrolytes) and   # Electrolytes:   - Potassium; level: Normal        On supplement: No  - Magnesium; level: Low normal        On supplement: Yes, mag ox 400mg daily   - Bicarbonate; level: Normal        On supplement: Yes,continue with  bicarb 1300mg bid    -Novolog: give 7 units with meals.   -Levemir: give 24 units at night time.   -Continue to check the blood sugars.   -Keep the amount of carbohydrates even during each meal.     -Eat a balanced diet.  -Get 8-9 hours of sleep each night.    Walks regularly at a local park.      Reports feeling well overall and denies any s/s of hypo/hyperglycemia.  No other concerns.      Daughter/patient wonder if they could ever go off insulin; they would prefer oral meds - discussed that we will continue to assess for any med changes/adjustments     Education/Discussion: Reviewed diabetes basics, AIC and BS goals, sx and tx of hypo and hyperglycemia, general activity and diet guidelines, CHO foods, pt expressed understanding.  We also reviewed heart healthy diet upon patient/daughter's request    Patient's most recent   Lab Results   Component Value Date    A1C 6.8 02/22/2023     is not meeting goal of {A1C GOALS:283258}    Diabetes knowledge and skills assessment:   Patient is knowledgeable in diabetes management concepts related to: needs AADE 7 review     Continue education with the following diabetes management concepts: needs AADE 7 review     Based on learning assessment above, most appropriate setting for further diabetes education would be: Individual setting.      PLAN    Pt wants to work on eating healthfully and watch CHO portions    To test BG 3-4x/d  FBG and 2 hours after am and pm meals     Topics to cover at upcoming visits: Healthy Eating, Monitoring, Taking Medication, Problem Solving and Reducing Risks    Follow-up: 3-4 weeks for insulin adjustment    See Care Plan for co-developed, patient-state behavior change  "goals.  AVS provided for patient today.    Education Materials Provided:  n/a    SUBJECTIVE/OBJECTIVE:     Cultural Influences/Ethnic Background:  Not  or     Diabetes Symptoms & Complications:    Patient Problem List and Family Medical History reviewed for relevant medical history, current medical status, and diabetes risk factors.    Vitals:  There were no vitals taken for this visit.  Estimated body mass index is 23.09 kg/m  as calculated from the following:    Height as of 5/3/23: 1.626 m (5' 4\").    Weight as of 5/3/23: 61 kg (134 lb 8 oz).   Last 3 BP:   BP Readings from Last 3 Encounters:   05/03/23 (!) 156/74   05/01/23 (!) 197/75   04/26/23 (!) 166/72       History   Smoking Status     Never   Smokeless Tobacco     Never       Labs:  Lab Results   Component Value Date    A1C 6.8 02/22/2023     Lab Results   Component Value Date     05/08/2023     04/07/2023     04/07/2023     Lab Results   Component Value Date    LDL 53 11/17/2022     Direct Measure HDL   Date Value Ref Range Status   11/17/2022 22 (L) >=40 mg/dL Final   ]  GFR Estimate   Date Value Ref Range Status   05/08/2023 72 >60 mL/min/1.73m2 Final     Comment:     eGFR calculated using 2021 CKD-EPI equation.   06/14/2021 15 (L) >60 mL/min/1.73m2 Final     GFR Estimate If Black   Date Value Ref Range Status   06/14/2021 18 (L) >60 mL/min/1.73m2 Final     Lab Results   Component Value Date    CR 1.18 05/08/2023     No results found for: MICROALBUMIN    Taking Medications:  Diabetes Medication(s)     Insulin       insulin aspart (NOVOLOG PEN) 100 UNIT/ML pen    Inject 5 Units Subcutaneous 3 times daily (with meals) for 180 days     Patient taking differently: Inject 7 Units Subcutaneous 3 times daily (with meals)     insulin detemir (LEVEMIR PEN) 100 UNIT/ML pen    Inject 30 Units Subcutaneous At Bedtime for 2 days, THEN 28 Units At Bedtime for 7 days, THEN 26 Units At Bedtime for 7 days, THEN 24 Units At Bedtime for " 7 days, THEN 20 Units At Bedtime for 7 days, THEN 20 Units At Bedtime for 30 days. On the days that you are taking the prescribed Prednisone 40mg daily, take levemir 30 units at bedtime. On the days that you are taking the prescribed Prednisone 20mg daily take levemir 28 units at bedtime. On the days that you are taking the prescribed Prednisone 15mg daily take levemir 26 units at bedtime. On the days that you are taking the prescribed Prednisone 10mg daily take levemir 24 units at bedtime. On the days that you are taking the prescribed Prednisone 5mg daily take levemir 20 units at bedtime. Once you are off the steroid taper, take levemir 20 units at bedtime daily.     Patient taking differently: 24 units at bedtime            Patient Activation Measure Survey Score:       View : No data to display.                Care Plan and Education Provided:  Healthy eating, monitoring, taking medication, reducing risks, problem solving    Time Spent: 90 minutes  Encounter Type: Individual    Any diabetes medication dose changes were made via the CDE Protocol per the patient's referring provider. A copy of this encounter was shared with the provider.    Diabetes Self-Management Education & Support    Presents for:      Type of Service: Telephone Visit    Assessment Type:   ASSESSMENT:  Follow up visit for diabetes education, conducted with the help of a professional .  Has great support from family, especially his Daughter, Chelsey who is a public health nurse and does home care visits    SMBG data indicate elevated BG - pt endorses regular intake of juice/sugary drinks      PMH: ESRD from T2D  s/p kidney transplant on 2/21.      134 lb (5/1) < 150 lb (3/7) > 135 (2/21 - was on lasix).    Reports feeling well overall and denies any s/s of hypo/hyperglycemia.  No other concerns.      Currently taking Novolog 7 units BID with meals + 24 units levemir at HS  Was on taper dose as per prednisone post surgery (on  ")  Being managed by MTM and Endo    SMBG:  Testing 3x/d, BG starting today, 5/10 - :  FB, 129, 151, 120, 150, 138, 117, 145, 154, 141, 116, 126, 141, 211, 162, 144, 188  Before lunch: 162, 181, 167, 193, 188, 201, 195, 182, 190, 180, 182, 201, 202, 193, 201  Before dinner: 208, 290, 272, 199, 201, 196, 211, 198, 202, 191, 212, 220, 213, 223, 211, 224     Appetite is a lot better now.  Does 2 meals daily. Meals typically consist of brown rice (1-2 small bowl fulls), veg and protein  Drinks ~ 2 litres water daily    Reports, he was told to drink soda/juice to help improve his \"health\" and that he has just been following those recommendations. Discussed that he was already on supplements (for electrolytes) and that drinking juice/any sugary beverages would only spike his BG.Encouraged a balanced diet with consistent CHO at each meal - pt expressed understanding. Ideally we would have titrated insulin today - deferred until next visit in 3-4 weeks, pt to focus on mindful intake and watch CHO foods and portions.     Walks regularly at a local park.      Education/Discussion: Reviewed diabetes basics, AIC and BS goals, sx and tx of hypo and hyperglycemia, general activity and diet guidelines, CHO foods: pt expressed understanding.    Patient's most recent   Lab Results   Component Value Date    A1C 6.8 2023     is not meeting goal of {A1C GOALS:170724}    Diabetes knowledge and skills assessment:   Patient is knowledgeable in diabetes management concepts related to: needs AADE 7 review     Continue education with the following diabetes management concepts: needs AADE 7 review     Based on learning assessment above, most appropriate setting for further diabetes education would be: Individual setting.      PLAN    Pt to focus on eating healthfully and watch CHO portions and quit drinking juice/suagry drinks. We will assess for insulin adjustment at our follow up visit in 3-4 weeks.    To test BG " "3x/d  FBG and 2 hours after am and pm meals.    Topics to cover at upcoming visits: Healthy Eating, Monitoring, Taking Medication, Problem Solving and Reducing Risks    Follow-up: 3-4 weeks, we will assess for insulin adjustment    See Care Plan for co-developed, patient-state behavior change goals.  AVS provided for patient today.    Education Materials Provided:  n/a    SUBJECTIVE/OBJECTIVE:     Cultural Influences/Ethnic Background:  Not  or     Diabetes Symptoms & Complications:    Patient Problem List and Family Medical History reviewed for relevant medical history, current medical status, and diabetes risk factors.    Vitals:  There were no vitals taken for this visit.  Estimated body mass index is 23.09 kg/m  as calculated from the following:    Height as of 5/3/23: 1.626 m (5' 4\").    Weight as of 5/3/23: 61 kg (134 lb 8 oz).   Last 3 BP:   BP Readings from Last 3 Encounters:   05/03/23 (!) 156/74   05/01/23 (!) 197/75   04/26/23 (!) 166/72       History   Smoking Status     Never   Smokeless Tobacco     Never       Labs:  Lab Results   Component Value Date    A1C 6.8 02/22/2023     Lab Results   Component Value Date     05/08/2023     04/07/2023     04/07/2023     Lab Results   Component Value Date    LDL 53 11/17/2022     Direct Measure HDL   Date Value Ref Range Status   11/17/2022 22 (L) >=40 mg/dL Final   ]  GFR Estimate   Date Value Ref Range Status   05/08/2023 72 >60 mL/min/1.73m2 Final     Comment:     eGFR calculated using 2021 CKD-EPI equation.   06/14/2021 15 (L) >60 mL/min/1.73m2 Final     GFR Estimate If Black   Date Value Ref Range Status   06/14/2021 18 (L) >60 mL/min/1.73m2 Final     Lab Results   Component Value Date    CR 1.18 05/08/2023     No results found for: MICROALBUMIN    Taking Medications:  Diabetes Medication(s)     Insulin       insulin aspart (NOVOLOG PEN) 100 UNIT/ML pen    Inject 5 Units Subcutaneous 3 times daily (with meals) for 180 days "     Patient taking differently: Inject 7 Units Subcutaneous 3 times daily (with meals)     insulin detemir (LEVEMIR PEN) 100 UNIT/ML pen    Inject 30 Units Subcutaneous At Bedtime for 2 days, THEN 28 Units At Bedtime for 7 days, THEN 26 Units At Bedtime for 7 days, THEN 24 Units At Bedtime for 7 days, THEN 20 Units At Bedtime for 7 days, THEN 20 Units At Bedtime for 30 days. On the days that you are taking the prescribed Prednisone 40mg daily, take levemir 30 units at bedtime. On the days that you are taking the prescribed Prednisone 20mg daily take levemir 28 units at bedtime. On the days that you are taking the prescribed Prednisone 15mg daily take levemir 26 units at bedtime. On the days that you are taking the prescribed Prednisone 10mg daily take levemir 24 units at bedtime. On the days that you are taking the prescribed Prednisone 5mg daily take levemir 20 units at bedtime. Once you are off the steroid taper, take levemir 20 units at bedtime daily.     Patient taking differently: 24 units at bedtime            Patient Activation Measure Survey Score:       View : No data to display.                Care Plan and Education Provided:  Healthy eating, monitoring, taking medication, reducing risks, problem solving    Time Spent: 100 minutes  Encounter Type: Individual    Any diabetes medication dose changes were made via the CDE Protocol per the patient's referring provider. A copy of this encounter was shared with the provider.

## 2023-05-11 NOTE — TELEPHONE ENCOUNTER
Orders placed as requested.     Pal Cooper MD  [facility]  5/11/2023  1:48 PM    Nhia was seen today for prior auth - medication.    Diagnoses and all orders for this visit:    Type 2 diabetes mellitus with diabetic chronic kidney disease, unspecified CKD stage, unspecified whether long term insulin use (H)  -     insulin detemir (LEVEMIR PEN) 100 UNIT/ML pen; 24 units at bedtime

## 2023-05-15 ENCOUNTER — TELEPHONE (OUTPATIENT)
Dept: TRANSPLANT | Facility: CLINIC | Age: 58
End: 2023-05-15
Payer: COMMERCIAL

## 2023-05-16 ENCOUNTER — MYC MEDICAL ADVICE (OUTPATIENT)
Dept: TRANSPLANT | Facility: CLINIC | Age: 58
End: 2023-05-16

## 2023-05-16 ENCOUNTER — LAB (OUTPATIENT)
Dept: LAB | Facility: HOSPITAL | Age: 58
End: 2023-05-16
Payer: COMMERCIAL

## 2023-05-16 DIAGNOSIS — Z20.828 CONTACT WITH AND (SUSPECTED) EXPOSURE TO OTHER VIRAL COMMUNICABLE DISEASES: ICD-10-CM

## 2023-05-16 DIAGNOSIS — Z94.0 KIDNEY REPLACED BY TRANSPLANT: Primary | ICD-10-CM

## 2023-05-16 DIAGNOSIS — Z79.899 ENCOUNTER FOR LONG-TERM CURRENT USE OF MEDICATION: ICD-10-CM

## 2023-05-16 DIAGNOSIS — Z94.0 KIDNEY REPLACED BY TRANSPLANT: ICD-10-CM

## 2023-05-16 DIAGNOSIS — Z48.298 AFTERCARE FOLLOWING ORGAN TRANSPLANT: ICD-10-CM

## 2023-05-16 LAB
ANION GAP SERPL CALCULATED.3IONS-SCNC: 10 MMOL/L (ref 7–15)
BASOPHILS # BLD MANUAL: 0 10E3/UL (ref 0–0.2)
BASOPHILS NFR BLD MANUAL: 0 %
BUN SERPL-MCNC: 23.6 MG/DL (ref 6–20)
CALCIUM SERPL-MCNC: 9.5 MG/DL (ref 8.6–10)
CHLORIDE SERPL-SCNC: 102 MMOL/L (ref 98–107)
CREAT SERPL-MCNC: 1.05 MG/DL (ref 0.67–1.17)
DACRYOCYTES BLD QL SMEAR: SLIGHT
DEPRECATED HCO3 PLAS-SCNC: 23 MMOL/L (ref 22–29)
ELLIPTOCYTES BLD QL SMEAR: SLIGHT
EOSINOPHIL # BLD MANUAL: 0 10E3/UL (ref 0–0.7)
EOSINOPHIL NFR BLD MANUAL: 2 %
ERYTHROCYTE [DISTWIDTH] IN BLOOD BY AUTOMATED COUNT: 14.5 % (ref 10–15)
GFR SERPL CREATININE-BSD FRML MDRD: 82 ML/MIN/1.73M2
GLUCOSE SERPL-MCNC: 151 MG/DL (ref 70–99)
HCT VFR BLD AUTO: 27.7 % (ref 40–53)
HGB BLD-MCNC: 9.5 G/DL (ref 13.3–17.7)
LYMPHOCYTES # BLD MANUAL: 0.3 10E3/UL (ref 0.8–5.3)
LYMPHOCYTES NFR BLD MANUAL: 28 %
MAGNESIUM SERPL-MCNC: 1.7 MG/DL (ref 1.7–2.3)
MCH RBC QN AUTO: 28.4 PG (ref 26.5–33)
MCHC RBC AUTO-ENTMCNC: 34.3 G/DL (ref 31.5–36.5)
MCV RBC AUTO: 83 FL (ref 78–100)
MONOCYTES # BLD MANUAL: 0.2 10E3/UL (ref 0–1.3)
MONOCYTES NFR BLD MANUAL: 14 %
NEUTROPHILS # BLD MANUAL: 0.7 10E3/UL (ref 1.6–8.3)
NEUTROPHILS NFR BLD MANUAL: 56 %
NRBC # BLD AUTO: 0 10E3/UL
NRBC BLD AUTO-RTO: 0 /100
PATH REV: ABNORMAL
PHOSPHATE SERPL-MCNC: 3.5 MG/DL (ref 2.5–4.5)
PLAT MORPH BLD: ABNORMAL
PLATELET # BLD AUTO: 133 10E3/UL (ref 150–450)
POTASSIUM SERPL-SCNC: 5 MMOL/L (ref 3.4–5.3)
RBC # BLD AUTO: 3.34 10E6/UL (ref 4.4–5.9)
RBC MORPH BLD: ABNORMAL
SODIUM SERPL-SCNC: 135 MMOL/L (ref 136–145)
TACROLIMUS BLD-MCNC: 8.4 UG/L (ref 5–15)
TME LAST DOSE: NORMAL H
TME LAST DOSE: NORMAL H
WBC # BLD AUTO: 1.2 10E3/UL (ref 4–11)
WBC # BLD AUTO: 1.2 10E3/UL (ref 4–11)

## 2023-05-16 PROCEDURE — 85007 BL SMEAR W/DIFF WBC COUNT: CPT

## 2023-05-16 PROCEDURE — 80048 BASIC METABOLIC PNL TOTAL CA: CPT

## 2023-05-16 PROCEDURE — 80197 ASSAY OF TACROLIMUS: CPT

## 2023-05-16 PROCEDURE — 80180 DRUG SCRN QUAN MYCOPHENOLATE: CPT

## 2023-05-16 PROCEDURE — 36415 COLL VENOUS BLD VENIPUNCTURE: CPT

## 2023-05-16 PROCEDURE — 85027 COMPLETE CBC AUTOMATED: CPT

## 2023-05-16 PROCEDURE — 84100 ASSAY OF PHOSPHORUS: CPT

## 2023-05-16 PROCEDURE — 83735 ASSAY OF MAGNESIUM: CPT

## 2023-05-17 LAB
MYCOPHENOLATE SERPL LC/MS/MS-MCNC: 2.3 MG/L (ref 1–3.5)
MYCOPHENOLATE-G SERPL LC/MS/MS-MCNC: 53.7 MG/L (ref 30–95)
TME LAST DOSE: NORMAL H
TME LAST DOSE: NORMAL H

## 2023-05-18 ENCOUNTER — MEDICAL CORRESPONDENCE (OUTPATIENT)
Dept: HEALTH INFORMATION MANAGEMENT | Facility: CLINIC | Age: 58
End: 2023-05-18
Payer: COMMERCIAL

## 2023-05-18 ENCOUNTER — DOCUMENTATION ONLY (OUTPATIENT)
Dept: PHARMACY | Facility: CLINIC | Age: 58
End: 2023-05-18
Payer: COMMERCIAL

## 2023-05-18 PROBLEM — N18.2 CKD (CHRONIC KIDNEY DISEASE) STAGE 2, GFR 60-89 ML/MIN: Status: ACTIVE | Noted: 2023-05-18

## 2023-05-18 PROBLEM — E55.9 VITAMIN D DEFICIENCY: Status: ACTIVE | Noted: 2023-05-18

## 2023-05-18 PROBLEM — E86.0 DEHYDRATION: Status: RESOLVED | Noted: 2023-04-24 | Resolved: 2023-05-18

## 2023-05-18 PROBLEM — Z48.298 AFTERCARE FOLLOWING ORGAN TRANSPLANT: Status: ACTIVE | Noted: 2023-05-18

## 2023-05-18 PROBLEM — N25.81 SECONDARY RENAL HYPERPARATHYROIDISM (H): Status: ACTIVE | Noted: 2023-05-18

## 2023-05-18 NOTE — NURSING NOTE
Anemia Management Note  SUBJECTIVE/OBJECTIVE:  Referred by Dr. Rome Kim on 04/10/2023  Primary Diagnosis: Anemia in Chronic Kidney Disease (N18.3, D63.1)   3a  Secondary Diagnosis:  Chronic Kidney Disease, Stage 3 (N18.3)  3a  Kidney Tx: 2023  Hgb goal range:  9-10  Epo/Darbo: TBD: Hgb >9.0.  *Hx of HTN.   Iron regimen:  NA  Labs : 4/10/2024  Recent NERY use, transfusion, IV iron: NA  RX/TX plans : TBD     Labs and Cuco's.         No history of stroke, MI and blood clots or cancers.     Contact: OK to speak with Yanick Barbosa (daughter) and Edy Barbosa (son) regarding Medical, Billing, and Scheduling Informaiton per consent to communicate dated 2021.           Latest Ref Rng & Units 2023     8:03 AM 2023     8:05 AM 2023     8:15 AM 2023     8:36 AM 2023     7:15 AM 2023     8:15 AM 2023     9:02 AM   Anemia   Hemoglobin 13.3 - 17.7 g/dL 8.4   8.5      8.5   8.0   8.3   8.3   8.9   9.5     Ferritin 31 - 409 ng/mL 947    972   943          BP Readings from Last 3 Encounters:   23 (!) 156/74   23 (!) 197/75   23 (!) 166/72     Wt Readings from Last 2 Encounters:   23 134 lb 8 oz (61 kg)   23 134 lb 8 oz (61 kg)         ASSESSMENT:  Hgb:at goal - continue to monitor  TSat: at goal >30% Ferritin: At goal (>100ng/mL)    PLAN:  23 Nhia was prescribed Amlodipine on 23.  Unable to start NERY until BPs are stable.   Will continue to monitor labs and BP.  Hgb has been improving.     Orders needed to be renewed (for next follow-up date) in EPIC: None    Iron labs due:  End of May 2023    Plan discussed with:  No call, chart review       NEXT FOLLOW-UP DATE:  23 clinic appt    Nimo Banda RN   Anemia Services  [address] [address]   [email or web address]   Office : [phone]  Fax: [phone]

## 2023-05-18 NOTE — PROGRESS NOTES
TRANSPLANT NEPHROLOGY EARLY POST TRANSPLANT VISIT    Assessment & Plan   # LDKT: Trend up in creatinine due to unclear etiology.  He does have a large fluid collection by the kidney, but no reported hydronephrosis on ultrasound.  Will plan to have IR drain fluid collection and obtain a kidney transplant biopsy.   - Baseline Creatinine: ~ 0.8-1.0   - Proteinuria: Normal (<0.2 grams),    - Date DSA Last Checked: Apr/2023      Latest DSA: No cPRA: 27%   - BK Viremia: Not checked post transplant   - Kidney Tx Biopsy: No   - Transplant Ureteral Stent: Removed    # Immunosuppression: Tacrolimus immediate release (goal 8-10) and Mycophenolate mofetil (dose 750 mg every 12 hours)   - Induction with Recent Transplant:  Intermediate Intensity   - Continue with intensive monitoring of immunosuppression for efficacy and toxicity.   - Changes: Not at this time    # Infection Prophylaxis:   - PJP: Sulfa/TMP (Bactrim)  - CMV: Valganciclovir (Valcyte); CMV IgG Ab positive    # Hypertension: Borderline control;  Goal BP: < 140/90   - Volume status: Euvolemic     - Changes: Yes - Will restart amlodipine 5 mg nightly.    # Diabetes: Controlled (HbA1c <7%) Last HbA1c: 6.8%   - Management as per primary care    # Anemia in Chronic Renal Disease: Hgb: Stable, low      NERY: No   - Iron studies: Replete    # Mineral Bone Disorder:   - Secondary renal hyperparathyroidism; PTH level: Mildly elevated (151-300 pg/ml)        On treatment: None  - Vitamin D; level: Low        On supplement: Yes  - Calcium; level: Normal        On supplement: Yes    # Electrolytes:   - Potassium; level: High normal        On supplement: No  - Magnesium; level: Low normal        On supplement: Yes; Will increase magnesium oxide to 400 mg twice daily  - Bicarbonate; level: Normal        On supplement: Yes    # CAD, s/p PCI: Asymptomatic.    # Asymmetric Left Ventricular Hypertrophy (HOCM): Stable. No history of sustained ventricular arrhythmias.     # Hepatitis B  Core Ab +: Patient with core Ab positive, but negative Ag and also negative surface Ab.  Hep B DNA was negative at time of transplant.   - Will recheck Hep B DNA PCR at 4 months post transplant.     # Peripheral Neuropathy: Patient with increased symptoms and affects his sleep.  Likely due to underlying diabetes, but tacrolimus can worsen symptoms, especially with low magnesium levels.   - Will start gabapentin 100 mg nightly and have patient follow up with PCP for further adjustment and management.   - Will increase magnesium supplement.   - If symptoms persist, may consider changing tacrolimus immediate release to the extended release version.    # Medical Compliance: Yes    # COVID-19 Virus Review: Discussed COVID-19 virus and the potential medical risks.  Reviewed preventative health recommendations, including wearing a mask where appropriate.  Recommended COVID vaccination should be up to date with either an initial vaccination or booster shot when appropriate.  Asked the patient to inform the transplant center if they are exposed or diagnosed with this virus.    # COVID Vaccination Up To Date: Yes    #Vaccination: Has not received zoster vaccine.Six months post transplant should receive one.    # Transplant History:  Etiology of Kidney Failure: Diabetes mellitus type 2  Tx: LDKT  Transplant: 2/21/2023 (Kidney)  Donor Type: Living Donor Class:   Crossmatch at time of Tx: negative  DSA at time of Tx: No  Significant changes in immunosuppression: None  CMV IgG Ab High Risk Discordance (D+/R-): No  EBV IgG Ab High Risk Discordance (D+/R-): No  Significant transplant-related complications: None    Transplant Office Phone Number: [phone]    Assessment and plan was discussed with the patient and he voiced his understanding and agreement.    Return visit: Return for 4 month post transplant visit.    Patrick Phan MD     Chief Complaint   Mr. Barbosa is a 57 year old here for routine follow up, kidney  transplant and immunosuppression management.     History of Present Illness    Mr. Barbosa reports feeling okay overall with some medical complaints.  Since last clinic visit, patient reports no hospitalizations or new medical complaints and has been doing well overall.  His energy level is improving, now mostly good and close to normal.  He does report having some trouble sleeping, however.  This is mostly due to increased numbness and tingling in his feet at night.  The peripheral neuropathy was present when on dialysis, but now a bit worse and affecting his sleep.  He is otherwise active and does get some exercise, mostly with walking.  Denies any chest pain or shortness of breath with exertion.  No leg swelling.    Appetite is much better and weight is stable.  No nausea, vomiting or diarrhea.  No fever, sweats or chills.  No night sweats.    Home BP: 140-160/60s; His blood pressure is much higher as this clinic visit as he hasn't taken his medications yet.    Problem List   Patient Active Problem List   Diagnosis     Type 2 Diabetes Mellitus     Dyslipidemia, goal LDL below 70     HTN, kidney transplant related     Metabolic acidosis     Coronary artery disease involving native coronary artery of native heart without angina pectoris     Thrombocytopenia (H)     Immunosuppressed status (H)     Kidney replaced by transplant     Anemia in chronic renal disease     Hypocalcemia     Hypomagnesemia     Hepatitis B core antibody positive     Abnormal ankle brachial index     Aftercare following organ transplant     Need for pneumocystis prophylaxis     CKD (chronic kidney disease) stage 2, GFR 60-89 ml/min     Vitamin D deficiency     Secondary renal hyperparathyroidism (H)       Allergies   Allergies   Allergen Reactions     Tegaderm Transparent Dressing (Informational Only) Blisters       Medications   Current Outpatient Medications   Medication Sig     acetaminophen (TYLENOL) 325 MG tablet Take 2 tablets (650 mg) by  "mouth every 4 hours as needed for other (For optimal non-opioid multimodal pain management to improve pain control.)     amLODIPine (NORVASC) 5 MG tablet Take 1 tablet (5 mg) by mouth At Bedtime     aspirin (ASA) 81 MG EC tablet Take 1 tablet (81 mg) by mouth daily     atorvastatin (LIPITOR) 20 MG tablet Take 1 tablet (20 mg) by mouth daily     calcium carbonate (TUMS) 500 MG chewable tablet Take 2 tablets (1,000 mg) by mouth 2 times daily     carvedilol (COREG) 25 MG tablet Take 0.5 tablets (12.5 mg) by mouth 2 times daily (with meals)     insulin aspart (NOVOLOG PEN) 100 UNIT/ML pen Inject 5 Units Subcutaneous 3 times daily (with meals) for 180 days (Patient taking differently: Inject 7 Units Subcutaneous 3 times daily (with meals))     loratadine (CLARITIN) 10 MG tablet Take 1 tablet (10 mg) by mouth daily as needed for allergies (itchy/rash)     magnesium oxide (MAG-OX) 400 MG tablet Take 1 tablet (400 mg) by mouth 2 times daily     mycophenolate (GENERIC EQUIVALENT) 250 MG capsule Take 3 capsules (750 mg) by mouth 2 times daily for 360 days     nitroGLYcerin (NITROSTAT) 0.4 MG sublingual tablet One tablet under the tongue every 5 minutes if needed for chest pain. May repeat every 5 minutes for a maximum of 3 doses in 15 minutes\"     sodium bicarbonate 650 MG tablet Take 2 tablets (1,300 mg) by mouth 2 times daily     sulfamethoxazole-trimethoprim (BACTRIM) 400-80 MG tablet Take 1 tablet by mouth daily     tacrolimus (GENERIC EQUIVALENT) 1 MG capsule Take 2 capsules (2 mg) by mouth 2 times daily (Patient taking differently: Take 2 mg by mouth 2 times daily 2.5 in the morning and 2 in the evening)     valGANciclovir (VALCYTE) 450 MG tablet Take 2 tablets (900 mg) by mouth daily for 90 days     Vitamin D3 (CHOLECALCIFEROL) 25 mcg (1000 units) tablet Take 2 tablets (50 mcg) by mouth daily     gabapentin (NEURONTIN) 100 MG capsule Take 2 capsules (200 mg) by mouth nightly as needed for neuropathic pain     insulin " detemir (LEVEMIR PEN) 100 UNIT/ML pen 24 units at bedtime     tacrolimus (GENERIC EQUIVALENT) 0.5 MG capsule Take 1 capsule (0.5 mg) by mouth 2 times daily TAKE 1 CAPSULE BY MOUTH 2 TIMES DAILY (TOTAL DOSE 3.5MG TWICE PER DAY)     No current facility-administered medications for this visit.     Medications Discontinued During This Encounter   Medication Reason     magnesium oxide (MAG-OX) 400 MG tablet        Physical Exam   Vital Signs: BP (!) 197/75   Pulse 68   Wt 61 kg (134 lb 8 oz)   SpO2 100%   BMI 23.09 kg/m      GENERAL APPEARANCE: alert and no distress  HENT: mouth without ulcers or lesions  LYMPHATICS: no cervical or supraclavicular nodes  RESP: lungs clear to auscultation - no rales, rhonchi or wheezes  CV: regular rhythm, normal rate, no rub, no murmur  EDEMA: no LE edema bilaterally  ABDOMEN: soft, nondistended, nontender, bowel sounds normal  MS: extremities normal - no gross deformities noted, no evidence of inflammation in joints, no muscle tenderness  SKIN: no rash  TX KIDNEY: normal  DIALYSIS ACCESS: none    Data         Latest Ref Rng & Units 5/16/2023     9:02 AM 5/8/2023     8:15 AM 5/1/2023     7:15 AM   Renal   Sodium 136 - 145 mmol/L 135   127   131     K 3.4 - 5.3 mmol/L 5.0   4.6   5.0     Cl 98 - 107 mmol/L 102   94   99     Cl (external) 98 - 107 mmol/L 102   94   99     CO2 22 - 29 mmol/L 23   22   23     Urea Nitrogen 6.0 - 20.0 mg/dL 23.6   12.0   18.8     Creatinine 0.67 - 1.17 mg/dL 1.05   1.18   1.16     Glucose 70 - 99 mg/dL 151   164   211     Calcium 8.6 - 10.0 mg/dL 9.5   9.2   9.8     Magnesium 1.7 - 2.3 mg/dL 1.7   1.7   1.7           Latest Ref Rng & Units 5/16/2023     9:02 AM 5/8/2023     8:15 AM 5/1/2023     7:15 AM   Bone Health   Phosphorus 2.5 - 4.5 mg/dL 3.5   3.0   3.2           Latest Ref Rng & Units 5/16/2023     9:02 AM 5/8/2023     8:15 AM 5/1/2023     7:15 AM   Heme   WBC 4.0 - 11.0 10e3/uL  4.0 - 11.0 10e3/uL 1.2      1.2   2.3   3.3     Hgb 13.3 - 17.7 g/dL  9.5   8.9   8.3     Plt 150 - 450 10e3/uL 133   143   126     ABSOLUTE NEUTROPHIL 1.6 - 8.3 10e3/uL 0.7       ABSOLUTE LYMPHOCYTES 0.8 - 5.3 10e3/uL 0.3       ABSOLUTE MONOCYTES 0.0 - 1.3 10e3/uL 0.2       ABSOLUTE EOSINOPHILS 0.0 - 0.7 10e3/uL 0.0             Latest Ref Rng & Units 4/7/2023     8:10 AM 4/3/2023     8:10 AM 3/30/2023     7:35 AM   Liver   AP 45 - 120 U/L 74   69   66     TBili 0.0 - 1.0 mg/dL 0.7   0.8   0.7     Bilirubin Direct 0.00 - 0.30 mg/dL  0.22   <0.20     ALT 0 - 45 U/L 10   12   15     AST 0 - 40 U/L 12   14   14     Tot Protein 6.0 - 8.0 g/dL 6.3   6.1   5.7     Albumin 3.5 - 5.0 g/dL 3.7       Albumin 3.5 - 5.2 g/dL  3.8   3.9           Latest Ref Rng & Units 2/22/2023     2:03 AM 2/21/2023     7:42 PM 2/16/2023    12:26 PM   Pancreas   A1C <5.7 % 6.8   6.8   7.1           Latest Ref Rng & Units 4/28/2023     8:36 AM 4/24/2023     8:15 AM 4/13/2023     8:03 AM   Iron studies   Iron 61 - 157 ug/dL 53   52   38     Iron Sat Index 15 - 46 % 36   34   22     Ferritin 31 - 409 ng/mL 943   972   947           Latest Ref Rng & Units 5/1/2023     7:15 AM 2/21/2023     1:00 PM 2/16/2023    12:26 PM   [facility] Virology   CMV QUANT IU/ML Not Detected IU/mL  Not Detected      EBV CAPSID ANTIBODY IGG No detectable antibody.   Positive     EBV DNA COPIES/ML Not Detected copies/mL <500       EBV DNA LOG OF COPIES  <2.7            Recent Labs   Lab Test 05/01/23  0715 05/08/23  0815 05/16/23  0902   DOSTAC 4/30/2023 5/7/2023 5/15/2023   TACROL 8.2 9.0 8.4     Recent Labs   Lab Test 03/02/23  0833 03/18/23  0913 04/13/23  0803 05/01/23  0715 05/16/23  0902   DOSMPA 3/1/2023   8:00 PM  --   --  4/30/2023   8:00 AM  --    MPACID 1.48   < > 3.94* 1.60 2.30   MPAG 41.0   < > 60.6 65.3 53.7    < > = values in this interval not displayed.

## 2023-05-22 ENCOUNTER — LAB REQUISITION (OUTPATIENT)
Dept: LAB | Facility: CLINIC | Age: 58
End: 2023-05-22
Payer: COMMERCIAL

## 2023-05-22 ENCOUNTER — TELEPHONE (OUTPATIENT)
Dept: FAMILY MEDICINE | Facility: CLINIC | Age: 58
End: 2023-05-22

## 2023-05-22 DIAGNOSIS — Z94.0 KIDNEY REPLACED BY TRANSPLANT: Primary | ICD-10-CM

## 2023-05-22 DIAGNOSIS — Z48.22 ENCOUNTER FOR AFTERCARE FOLLOWING KIDNEY TRANSPLANT: ICD-10-CM

## 2023-05-22 LAB
ANION GAP SERPL CALCULATED.3IONS-SCNC: 11 MMOL/L (ref 7–15)
BUN SERPL-MCNC: 23.8 MG/DL (ref 6–20)
CALCIUM SERPL-MCNC: 9.3 MG/DL (ref 8.6–10)
CHLORIDE SERPL-SCNC: 100 MMOL/L (ref 98–107)
CREAT SERPL-MCNC: 1.15 MG/DL (ref 0.67–1.17)
DEPRECATED HCO3 PLAS-SCNC: 22 MMOL/L (ref 22–29)
ERYTHROCYTE [DISTWIDTH] IN BLOOD BY AUTOMATED COUNT: 14.8 % (ref 10–15)
GFR SERPL CREATININE-BSD FRML MDRD: 74 ML/MIN/1.73M2
GLUCOSE SERPL-MCNC: 185 MG/DL (ref 70–99)
HCT VFR BLD AUTO: 29.8 % (ref 40–53)
HGB BLD-MCNC: 9.6 G/DL (ref 13.3–17.7)
MAGNESIUM SERPL-MCNC: 1.7 MG/DL (ref 1.7–2.3)
MCH RBC QN AUTO: 27.6 PG (ref 26.5–33)
MCHC RBC AUTO-ENTMCNC: 32.2 G/DL (ref 31.5–36.5)
MCV RBC AUTO: 86 FL (ref 78–100)
PHOSPHATE SERPL-MCNC: 3.4 MG/DL (ref 2.5–4.5)
PLATELET # BLD AUTO: 136 10E3/UL (ref 150–450)
POTASSIUM SERPL-SCNC: 5 MMOL/L (ref 3.4–5.3)
RBC # BLD AUTO: 3.48 10E6/UL (ref 4.4–5.9)
SODIUM SERPL-SCNC: 133 MMOL/L (ref 136–145)
TACROLIMUS BLD-MCNC: 9.4 UG/L (ref 5–15)
TME LAST DOSE: NORMAL H
TME LAST DOSE: NORMAL H
WBC # BLD AUTO: 1.6 10E3/UL (ref 4–11)

## 2023-05-22 PROCEDURE — 83735 ASSAY OF MAGNESIUM: CPT | Mod: ORL | Performed by: TRANSPLANT SURGERY

## 2023-05-22 PROCEDURE — 84100 ASSAY OF PHOSPHORUS: CPT | Mod: ORL | Performed by: TRANSPLANT SURGERY

## 2023-05-22 PROCEDURE — 80048 BASIC METABOLIC PNL TOTAL CA: CPT | Mod: ORL | Performed by: TRANSPLANT SURGERY

## 2023-05-22 PROCEDURE — 85027 COMPLETE CBC AUTOMATED: CPT | Mod: ORL | Performed by: TRANSPLANT SURGERY

## 2023-05-22 PROCEDURE — 80197 ASSAY OF TACROLIMUS: CPT | Mod: ORL | Performed by: TRANSPLANT SURGERY

## 2023-05-23 ENCOUNTER — TELEPHONE (OUTPATIENT)
Dept: FAMILY MEDICINE | Facility: CLINIC | Age: 58
End: 2023-05-23
Payer: COMMERCIAL

## 2023-05-23 DIAGNOSIS — Z76.0 ENCOUNTER FOR MEDICATION REFILL: Primary | ICD-10-CM

## 2023-05-23 RX ORDER — LANCETS
EACH MISCELLANEOUS
Qty: 200 EACH | Refills: 3 | Status: SHIPPED | OUTPATIENT
Start: 2023-05-23

## 2023-05-23 NOTE — TELEPHONE ENCOUNTER
Medication Question or Refill    Contacts       Type Contact Phone/Fax    05/23/2023 02:11 PM CDT Phone (Incoming) [id number] - [place], [place] - [address] (Pharmacy) [phone]          What medication are you calling about (include dose and sig)?: new DM supplies generic - as insurance no longer covers accu check brand.     Preferred Pharmacy:   [id number]   [address] [place] [address]  Phone: [phone] Fax: [phone]      Who prescribed the medication?: PCP    Do you need a refill? Yes  accu check not covered by pt insurance    Patient offered an appointment? No unsure when last seen by Dr. Cooper - pt other provider left state and no longer practicing     Do you have any questions or concerns?  No    Could we send this information to you in [facility] or would you prefer to receive a phone call?:   Patient would prefer a phone call     Okay to leave a detailed message?: Yes at Home number on file [phone] (home)    Call taken on 5/23/23 at 215 pm by ZHEN Goss

## 2023-05-23 NOTE — TELEPHONE ENCOUNTER
Orders placed as requested.     Pal Cooper MD  [facility]  5/23/2023  2:38 PM    Nhia was seen today for formulary issue.    Diagnoses and all orders for this visit:    Encounter for medication refill  -     blood glucose monitoring (NO BRAND SPECIFIED) meter device kit; Use to test blood sugar 3 times daily or as directed. Preferred blood glucose meter OR supplies to accompany: Blood Glucose Monitor Brands: per insurance.  -     blood glucose (NO BRAND SPECIFIED) test strip; Use to test blood sugar 3 times daily or as directed. To accompany: Blood Glucose Monitor Brands: per insurance.  -     thin (NO BRAND SPECIFIED) lancets; Tests 3x daily Use with lanceting device. To accompany: Blood Glucose Monitor Brands: per insurance.

## 2023-05-25 ENCOUNTER — TELEPHONE (OUTPATIENT)
Dept: PHARMACY | Facility: CLINIC | Age: 58
End: 2023-05-25
Payer: COMMERCIAL

## 2023-05-25 DIAGNOSIS — Z94.0 KIDNEY REPLACED BY TRANSPLANT: Primary | ICD-10-CM

## 2023-05-25 RX ORDER — TACROLIMUS 1 MG/1
2 CAPSULE ORAL 2 TIMES DAILY
Qty: 360 CAPSULE | Refills: 3 | Status: SHIPPED | OUTPATIENT
Start: 2023-05-25 | End: 2023-06-01

## 2023-05-25 RX ORDER — TACROLIMUS 0.5 MG/1
0.5 CAPSULE ORAL EVERY MORNING
Qty: 90 CAPSULE | Refills: 3 | Status: SHIPPED | OUTPATIENT
Start: 2023-05-25 | End: 2023-06-01

## 2023-05-25 NOTE — TELEPHONE ENCOUNTER
Clinical Pharmacy Consult:                                                      Transplant Specific: 3 month post transplant medication review  Date of Transplant: 02/21/2023  Type of Transplant: kidney  First Transplant: yes  History of rejection: no    Immunosuppression Regimen   TAC 2.5mg qAM & 2.0mg qPM and MMF 750mg qAM & 750mg qPM  Patient specific goal: 8-10  Most recent level: 9.4, date 05/22/23  Immunosuppressant Levels:  Therapeutic  Pt adherent to lab draws: yes  Scr:   Lab Results   Component Value Date    CR 0.84 03/27/2023     Side effects: no side effects    Prophylactic Medications  Antibacterial:  Bactrim one daily  Scheduled Discontinue Date: Lifelong    Antifungal: Not needed thus far  Scheduled Discontinue Date: N/A    Antiviral: CrCl >/=60 mL/minute: Valcyte 900mg daily   Scheduled Discontinue Date: 3 months completed    Acid Reducer: Not needed  Scheduled Reviewed Date: N/A    Thrombosis Prevention: Aspirin 81 mg PO daily  Scheduled Discontinue Date: to be determined    Blood Pressure Management  Frequency of home Blood Pressure checks: twice daily  Most recent home BP: 140-150's/ not known  Patient Blood pressure goal: <140/90  Patient blood pressure at goal:  No    Hospitalizations/ER visits since last assessment: 0    Med rec/DUR performed: yes  Med Rec Discrepancies: no    With daughter Yanick who report [place] is feeling fine, but has some tingling and numbness in feet which is treated with gabapentin.    She helps manage his meds.  They use a med list a pill box and he has a good routine.  Claims no missed doses and last tacro level was at goal.    Takes blood pressure cata daily but she did not know the readings,  Last time charted on 5/3 it was att goal.    Elie Ch [facility]  Specialty Pharmacist [phone]

## 2023-05-30 ENCOUNTER — LAB REQUISITION (OUTPATIENT)
Dept: LAB | Facility: CLINIC | Age: 58
End: 2023-05-30
Payer: COMMERCIAL

## 2023-05-30 DIAGNOSIS — Z48.22 ENCOUNTER FOR AFTERCARE FOLLOWING KIDNEY TRANSPLANT: ICD-10-CM

## 2023-05-30 LAB
ANION GAP SERPL CALCULATED.3IONS-SCNC: 11 MMOL/L (ref 7–15)
BUN SERPL-MCNC: 31.2 MG/DL (ref 6–20)
CALCIUM SERPL-MCNC: 9.4 MG/DL (ref 8.6–10)
CHLORIDE SERPL-SCNC: 100 MMOL/L (ref 98–107)
CREAT SERPL-MCNC: 1.41 MG/DL (ref 0.67–1.17)
DEPRECATED HCO3 PLAS-SCNC: 20 MMOL/L (ref 22–29)
ERYTHROCYTE [DISTWIDTH] IN BLOOD BY AUTOMATED COUNT: 14.5 % (ref 10–15)
GFR SERPL CREATININE-BSD FRML MDRD: 58 ML/MIN/1.73M2
GLUCOSE SERPL-MCNC: 214 MG/DL (ref 70–99)
HCT VFR BLD AUTO: 28.5 % (ref 40–53)
HGB BLD-MCNC: 9.4 G/DL (ref 13.3–17.7)
MAGNESIUM SERPL-MCNC: 1.6 MG/DL (ref 1.7–2.3)
MCH RBC QN AUTO: 28.1 PG (ref 26.5–33)
MCHC RBC AUTO-ENTMCNC: 33 G/DL (ref 31.5–36.5)
MCV RBC AUTO: 85 FL (ref 78–100)
PHOSPHATE SERPL-MCNC: 3.5 MG/DL (ref 2.5–4.5)
PLATELET # BLD AUTO: 122 10E3/UL (ref 150–450)
POTASSIUM SERPL-SCNC: 4.5 MMOL/L (ref 3.4–5.3)
RBC # BLD AUTO: 3.35 10E6/UL (ref 4.4–5.9)
SODIUM SERPL-SCNC: 131 MMOL/L (ref 136–145)
WBC # BLD AUTO: 3.3 10E3/UL (ref 4–11)

## 2023-05-30 PROCEDURE — 80048 BASIC METABOLIC PNL TOTAL CA: CPT | Mod: ORL | Performed by: TRANSPLANT SURGERY

## 2023-05-30 PROCEDURE — 85027 COMPLETE CBC AUTOMATED: CPT | Mod: ORL | Performed by: TRANSPLANT SURGERY

## 2023-05-30 PROCEDURE — 80197 ASSAY OF TACROLIMUS: CPT | Mod: ORL | Performed by: TRANSPLANT SURGERY

## 2023-05-30 PROCEDURE — 84100 ASSAY OF PHOSPHORUS: CPT | Mod: ORL | Performed by: TRANSPLANT SURGERY

## 2023-05-30 PROCEDURE — 83735 ASSAY OF MAGNESIUM: CPT | Mod: ORL | Performed by: TRANSPLANT SURGERY

## 2023-05-31 LAB
TACROLIMUS BLD-MCNC: 10.9 UG/L (ref 5–15)
TME LAST DOSE: NORMAL H
TME LAST DOSE: NORMAL H

## 2023-06-01 ENCOUNTER — TELEPHONE (OUTPATIENT)
Dept: TRANSPLANT | Facility: CLINIC | Age: 58
End: 2023-06-01
Payer: COMMERCIAL

## 2023-06-01 DIAGNOSIS — Z94.0 KIDNEY REPLACED BY TRANSPLANT: Primary | ICD-10-CM

## 2023-06-01 NOTE — TELEPHONE ENCOUNTER
Issue slight increase creatine 1.4 from 1.18  2nd Issue  Tacrolimus  Level 10.9          PLAN:   Please call patient and confirm this was an accurate 12-hour trough. Verify Tacrolimus IR dose 2.5  Mg in am and 2.0  In pm Confirm no new medications or illness. Confirm no missed doses. If accurate trough and accurate dose, decrease Tacrolimus IR dose to 2.0 mg BID and repeat labs in 7  days  Please confirm tac dose

## 2023-06-03 RX ORDER — FUROSEMIDE 20 MG
TABLET ORAL
Qty: 3 TABLET | Refills: 0 | OUTPATIENT
Start: 2023-06-03

## 2023-06-03 RX ORDER — POTASSIUM CHLORIDE 1500 MG/1
TABLET, EXTENDED RELEASE ORAL
Qty: 2 TABLET | Refills: 0 | OUTPATIENT
Start: 2023-06-03

## 2023-06-04 ENCOUNTER — HEALTH MAINTENANCE LETTER (OUTPATIENT)
Age: 58
End: 2023-06-04

## 2023-06-04 RX ORDER — TACROLIMUS 0.5 MG/1
CAPSULE ORAL
Qty: 90 CAPSULE | Refills: 3 | Status: SHIPPED | OUTPATIENT
Start: 2023-06-04 | End: 2023-06-20

## 2023-06-04 RX ORDER — POTASSIUM CHLORIDE 1500 MG/1
TABLET, EXTENDED RELEASE ORAL
Qty: 2 TABLET | Refills: 0 | OUTPATIENT
Start: 2023-06-04

## 2023-06-04 RX ORDER — TACROLIMUS 1 MG/1
2 CAPSULE ORAL 2 TIMES DAILY
Qty: 360 CAPSULE | Refills: 3 | Status: SHIPPED | OUTPATIENT
Start: 2023-06-04 | End: 2023-06-20

## 2023-06-04 RX ORDER — FUROSEMIDE 20 MG
TABLET ORAL
Qty: 3 TABLET | Refills: 0 | OUTPATIENT
Start: 2023-06-04

## 2023-06-05 ENCOUNTER — LAB REQUISITION (OUTPATIENT)
Dept: LAB | Facility: CLINIC | Age: 58
End: 2023-06-05
Payer: COMMERCIAL

## 2023-06-05 DIAGNOSIS — Z48.22 ENCOUNTER FOR AFTERCARE FOLLOWING KIDNEY TRANSPLANT: ICD-10-CM

## 2023-06-05 DIAGNOSIS — D63.1 ANEMIA IN CHRONIC KIDNEY DISEASE (CODE): ICD-10-CM

## 2023-06-05 LAB
ANION GAP SERPL CALCULATED.3IONS-SCNC: 8 MMOL/L (ref 7–15)
BUN SERPL-MCNC: 22.9 MG/DL (ref 6–20)
CALCIUM SERPL-MCNC: 9.4 MG/DL (ref 8.6–10)
CHLORIDE SERPL-SCNC: 102 MMOL/L (ref 98–107)
CREAT SERPL-MCNC: 1.23 MG/DL (ref 0.67–1.17)
DEPRECATED HCO3 PLAS-SCNC: 23 MMOL/L (ref 22–29)
ERYTHROCYTE [DISTWIDTH] IN BLOOD BY AUTOMATED COUNT: 14.4 % (ref 10–15)
GFR SERPL CREATININE-BSD FRML MDRD: 68 ML/MIN/1.73M2
GLUCOSE SERPL-MCNC: 174 MG/DL (ref 70–99)
HCT VFR BLD AUTO: 30.5 % (ref 40–53)
HGB BLD-MCNC: 10 G/DL (ref 13.3–17.7)
MAGNESIUM SERPL-MCNC: 2 MG/DL (ref 1.7–2.3)
MCH RBC QN AUTO: 28.1 PG (ref 26.5–33)
MCHC RBC AUTO-ENTMCNC: 32.8 G/DL (ref 31.5–36.5)
MCV RBC AUTO: 86 FL (ref 78–100)
PHOSPHATE SERPL-MCNC: 4 MG/DL (ref 2.5–4.5)
PLATELET # BLD AUTO: 152 10E3/UL (ref 150–450)
POTASSIUM SERPL-SCNC: 4.5 MMOL/L (ref 3.4–5.3)
RBC # BLD AUTO: 3.56 10E6/UL (ref 4.4–5.9)
SODIUM SERPL-SCNC: 133 MMOL/L (ref 136–145)
TACROLIMUS BLD-MCNC: 8.5 UG/L (ref 5–15)
TME LAST DOSE: NORMAL H
TME LAST DOSE: NORMAL H
WBC # BLD AUTO: 4.2 10E3/UL (ref 4–11)

## 2023-06-05 PROCEDURE — 80048 BASIC METABOLIC PNL TOTAL CA: CPT | Mod: ORL | Performed by: TRANSPLANT SURGERY

## 2023-06-05 PROCEDURE — 83735 ASSAY OF MAGNESIUM: CPT | Mod: ORL | Performed by: TRANSPLANT SURGERY

## 2023-06-05 PROCEDURE — 80197 ASSAY OF TACROLIMUS: CPT | Mod: ORL | Performed by: TRANSPLANT SURGERY

## 2023-06-05 PROCEDURE — 85027 COMPLETE CBC AUTOMATED: CPT | Mod: ORL | Performed by: TRANSPLANT SURGERY

## 2023-06-05 PROCEDURE — 84100 ASSAY OF PHOSPHORUS: CPT | Mod: ORL | Performed by: TRANSPLANT SURGERY

## 2023-06-06 DIAGNOSIS — Z94.0 KIDNEY REPLACED BY TRANSPLANT: Primary | ICD-10-CM

## 2023-06-12 ENCOUNTER — LAB REQUISITION (OUTPATIENT)
Dept: LAB | Facility: HOSPITAL | Age: 58
End: 2023-06-12
Payer: COMMERCIAL

## 2023-06-12 DIAGNOSIS — Z48.22 ENCOUNTER FOR AFTERCARE FOLLOWING KIDNEY TRANSPLANT: ICD-10-CM

## 2023-06-12 LAB
ANION GAP SERPL CALCULATED.3IONS-SCNC: 11 MMOL/L (ref 7–15)
BUN SERPL-MCNC: 24.8 MG/DL (ref 6–20)
CALCIUM SERPL-MCNC: 9.6 MG/DL (ref 8.6–10)
CHLORIDE SERPL-SCNC: 104 MMOL/L (ref 98–107)
CREAT SERPL-MCNC: 0.98 MG/DL (ref 0.67–1.17)
DEPRECATED HCO3 PLAS-SCNC: 21 MMOL/L (ref 22–29)
ERYTHROCYTE [DISTWIDTH] IN BLOOD BY AUTOMATED COUNT: 14.6 % (ref 10–15)
GFR SERPL CREATININE-BSD FRML MDRD: 89 ML/MIN/1.73M2
GLUCOSE SERPL-MCNC: 152 MG/DL (ref 70–99)
HCT VFR BLD AUTO: 30.6 % (ref 40–53)
HGB BLD-MCNC: 10.1 G/DL (ref 13.3–17.7)
MAGNESIUM SERPL-MCNC: 1.6 MG/DL (ref 1.7–2.3)
MCH RBC QN AUTO: 27.4 PG (ref 26.5–33)
MCHC RBC AUTO-ENTMCNC: 33 G/DL (ref 31.5–36.5)
MCV RBC AUTO: 83 FL (ref 78–100)
PHOSPHATE SERPL-MCNC: 3.1 MG/DL (ref 2.5–4.5)
PLATELET # BLD AUTO: 143 10E3/UL (ref 150–450)
POTASSIUM SERPL-SCNC: 4.6 MMOL/L (ref 3.4–5.3)
RBC # BLD AUTO: 3.68 10E6/UL (ref 4.4–5.9)
SODIUM SERPL-SCNC: 136 MMOL/L (ref 136–145)
TACROLIMUS BLD-MCNC: 6.8 UG/L (ref 5–15)
TME LAST DOSE: NORMAL H
TME LAST DOSE: NORMAL H
WBC # BLD AUTO: 3.9 10E3/UL (ref 4–11)

## 2023-06-12 PROCEDURE — 80048 BASIC METABOLIC PNL TOTAL CA: CPT | Mod: ORL | Performed by: TRANSPLANT SURGERY

## 2023-06-12 PROCEDURE — 85027 COMPLETE CBC AUTOMATED: CPT | Mod: ORL | Performed by: TRANSPLANT SURGERY

## 2023-06-12 PROCEDURE — 87517 HEPATITIS B DNA QUANT: CPT | Mod: ORL | Performed by: TRANSPLANT SURGERY

## 2023-06-12 PROCEDURE — 80197 ASSAY OF TACROLIMUS: CPT | Mod: ORL | Performed by: TRANSPLANT SURGERY

## 2023-06-12 PROCEDURE — 87799 DETECT AGENT NOS DNA QUANT: CPT | Mod: ORL | Performed by: TRANSPLANT SURGERY

## 2023-06-12 PROCEDURE — 83735 ASSAY OF MAGNESIUM: CPT | Mod: ORL | Performed by: TRANSPLANT SURGERY

## 2023-06-12 PROCEDURE — 84100 ASSAY OF PHOSPHORUS: CPT | Mod: ORL | Performed by: TRANSPLANT SURGERY

## 2023-06-13 ENCOUNTER — TELEPHONE (OUTPATIENT)
Dept: TRANSPLANT | Facility: CLINIC | Age: 58
End: 2023-06-13
Payer: COMMERCIAL

## 2023-06-13 DIAGNOSIS — Z94.0 KIDNEY REPLACED BY TRANSPLANT: Primary | ICD-10-CM

## 2023-06-13 LAB
BKV DNA # SPEC NAA+PROBE: NOT DETECTED COPIES/ML
HBV DNA SERPL NAA+PROBE-ACNC: NOT DETECTED IU/ML

## 2023-06-13 NOTE — TELEPHONE ENCOUNTER
[employer] message sent to patient regarding:  tacrolimus level 6.8 twice per day -- 112 days post kidney transplant       PLAN:   Please call patient and confirm this was an accurate 12-hour trough. Verify Tacrolimus IR dose 2.0 mg BID. Confirm no new medications or illness. Confirm no missed doses. If accurate trough and accurate dose, increase Tacrolimus IR dose to 2.5 mg BID and repeat labs in 7 days

## 2023-06-13 NOTE — TELEPHONE ENCOUNTER
Patients daughter reports that patient took the wrong dose the night before his lab draw.  Patient took 1 mg instead of 2 mg.  Please advise if you still would like the patient to change his dose.

## 2023-06-13 NOTE — TELEPHONE ENCOUNTER
T   ISSUE:    tacrolimus level 6.8 twice per day -- 112 days post kidney transplant      PLAN:   Please call patient and confirm this was an accurate 12-hour trough. Verify Tacrolimus IR dose 2.0 mg BID. Confirm no new medications or illness. Confirm no missed doses. If accurate trough and accurate dose, increase Tacrolimus IR dose to 2.5 mg BID and repeat labs in 7 days

## 2023-06-14 RX ORDER — CARVEDILOL 25 MG/1
12.5 TABLET ORAL 2 TIMES DAILY WITH MEALS
Qty: 30 TABLET | Refills: 1 | Status: SHIPPED | OUTPATIENT
Start: 2023-06-14 | End: 2023-08-09

## 2023-06-14 RX ORDER — CALCIUM CARBONATE 500 MG/1
2 TABLET, CHEWABLE ORAL 2 TIMES DAILY
Qty: 120 TABLET | Refills: 1 | Status: SHIPPED | OUTPATIENT
Start: 2023-06-14 | End: 2023-07-07

## 2023-06-17 DIAGNOSIS — Z76.0 ENCOUNTER FOR MEDICATION REFILL: ICD-10-CM

## 2023-06-17 RX ORDER — BLOOD-GLUCOSE METER
EACH MISCELLANEOUS
Qty: 1 KIT | Refills: 0 | Status: SHIPPED | OUTPATIENT
Start: 2023-06-17

## 2023-06-17 NOTE — TELEPHONE ENCOUNTER
"Last Written Prescription Date: 5/23/2023--message sent to pharmacy   Last Fill Quantity:1 kit,   # refills: o  Last office visit provider: 5/1/2023 Virtual visit with pharmacist     Requested Prescriptions   Pending Prescriptions Disp Refills     blood glucose monitoring (ONE TOUCH ULTRA 2) meter device kit [Pharmacy Med Name: ONE TOUCH ULTRA 2 KIT]       Sig: USE TO TEST BLOOD SUGAR THREE TIMES DAILY OR AS DIRECTED.       Diabetic Supplies Protocol Passed - 6/17/2023  5:17 AM        Passed - Medication is active on med list        Passed - Patient is 18 years of age or older        Passed - Recent (6 mo) or future (30 days) visit within the authorizing provider's specialty     Patient had office visit in the last 6 months or has a visit in the next 30 days with authorizing provider.  See \"Patient Info\" tab in inbasket, or \"Choose Columns\" in Meds & Orders section of the refill encounter.                 Keya Jiang RN 06/17/23 5:22 PM  "

## 2023-06-19 ENCOUNTER — LAB REQUISITION (OUTPATIENT)
Dept: LAB | Facility: HOSPITAL | Age: 58
End: 2023-06-19
Payer: COMMERCIAL

## 2023-06-19 ENCOUNTER — TELEPHONE (OUTPATIENT)
Dept: TRANSPLANT | Facility: CLINIC | Age: 58
End: 2023-06-19
Payer: COMMERCIAL

## 2023-06-19 ENCOUNTER — TELEPHONE (OUTPATIENT)
Dept: FAMILY MEDICINE | Facility: CLINIC | Age: 58
End: 2023-06-19

## 2023-06-19 DIAGNOSIS — E11.22 TYPE 2 DIABETES MELLITUS WITH DIABETIC CHRONIC KIDNEY DISEASE (H): ICD-10-CM

## 2023-06-19 DIAGNOSIS — Z94.0 KIDNEY TRANSPLANT STATUS: ICD-10-CM

## 2023-06-19 DIAGNOSIS — D63.1 ANEMIA IN CHRONIC KIDNEY DISEASE (CODE): ICD-10-CM

## 2023-06-19 LAB
ALBUMIN UR-MCNC: NEGATIVE MG/DL
ANION GAP SERPL CALCULATED.3IONS-SCNC: 11 MMOL/L (ref 7–15)
APPEARANCE UR: CLEAR
BILIRUB UR QL STRIP: NEGATIVE
BUN SERPL-MCNC: 19.3 MG/DL (ref 6–20)
CALCIUM SERPL-MCNC: 9.2 MG/DL (ref 8.6–10)
CHLORIDE SERPL-SCNC: 103 MMOL/L (ref 98–107)
COLOR UR AUTO: COLORLESS
CREAT SERPL-MCNC: 1.03 MG/DL (ref 0.67–1.17)
DEPRECATED HCO3 PLAS-SCNC: 22 MMOL/L (ref 22–29)
ERYTHROCYTE [DISTWIDTH] IN BLOOD BY AUTOMATED COUNT: 14.5 % (ref 10–15)
FERRITIN SERPL-MCNC: 540 NG/ML (ref 31–409)
GFR SERPL CREATININE-BSD FRML MDRD: 84 ML/MIN/1.73M2
GLUCOSE SERPL-MCNC: 131 MG/DL (ref 70–99)
GLUCOSE UR STRIP-MCNC: NEGATIVE MG/DL
HCT VFR BLD AUTO: 31 % (ref 40–53)
HGB BLD-MCNC: 10.2 G/DL (ref 13.3–17.7)
HGB UR QL STRIP: NEGATIVE
HYALINE CASTS: 1 /LPF
IRON BINDING CAPACITY (ROCHE): 172 UG/DL (ref 240–430)
IRON SATN MFR SERPL: 31 % (ref 15–46)
IRON SERPL-MCNC: 54 UG/DL (ref 61–157)
KETONES UR STRIP-MCNC: NEGATIVE MG/DL
LEUKOCYTE ESTERASE UR QL STRIP: NEGATIVE
MCH RBC QN AUTO: 27.2 PG (ref 26.5–33)
MCHC RBC AUTO-ENTMCNC: 32.9 G/DL (ref 31.5–36.5)
MCV RBC AUTO: 83 FL (ref 78–100)
NITRATE UR QL: NEGATIVE
PH UR STRIP: 7 [PH] (ref 5–7)
PLATELET # BLD AUTO: 113 10E3/UL (ref 150–450)
POTASSIUM SERPL-SCNC: 4.6 MMOL/L (ref 3.4–5.3)
PTH-INTACT SERPL-MCNC: 29 PG/ML (ref 15–65)
RBC # BLD AUTO: 3.75 10E6/UL (ref 4.4–5.9)
RBC URINE: 0 /HPF
SODIUM SERPL-SCNC: 136 MMOL/L (ref 136–145)
SP GR UR STRIP: 1.01 (ref 1–1.03)
TACROLIMUS BLD-MCNC: 6.7 UG/L (ref 5–15)
TME LAST DOSE: NORMAL H
TME LAST DOSE: NORMAL H
UROBILINOGEN UR STRIP-MCNC: <2 MG/DL
WBC # BLD AUTO: 4.3 10E3/UL (ref 4–11)
WBC URINE: <1 /HPF

## 2023-06-19 PROCEDURE — 83550 IRON BINDING TEST: CPT | Mod: ORL | Performed by: TRANSPLANT SURGERY

## 2023-06-19 PROCEDURE — 82728 ASSAY OF FERRITIN: CPT | Mod: ORL | Performed by: TRANSPLANT SURGERY

## 2023-06-19 PROCEDURE — 85027 COMPLETE CBC AUTOMATED: CPT | Mod: ORL | Performed by: TRANSPLANT SURGERY

## 2023-06-19 PROCEDURE — 83970 ASSAY OF PARATHORMONE: CPT | Mod: ORL | Performed by: TRANSPLANT SURGERY

## 2023-06-19 PROCEDURE — 80197 ASSAY OF TACROLIMUS: CPT | Mod: ORL | Performed by: TRANSPLANT SURGERY

## 2023-06-19 PROCEDURE — 86833 HLA CLASS II HIGH DEFIN QUAL: CPT | Mod: ORL | Performed by: TRANSPLANT SURGERY

## 2023-06-19 PROCEDURE — 80048 BASIC METABOLIC PNL TOTAL CA: CPT | Mod: ORL | Performed by: TRANSPLANT SURGERY

## 2023-06-19 PROCEDURE — 81001 URINALYSIS AUTO W/SCOPE: CPT | Mod: ORL | Performed by: TRANSPLANT SURGERY

## 2023-06-19 PROCEDURE — 86832 HLA CLASS I HIGH DEFIN QUAL: CPT | Mod: ORL | Performed by: TRANSPLANT SURGERY

## 2023-06-19 NOTE — TELEPHONE ENCOUNTER
FYI - Status Update    Who is Calling: nurseKeya with [facility]    Update: Patient BP today was at 165/79. Patient systolic was supposed to be at 160 and under. Due to patient had to have lab draw this morning, patient did not take his medication. Patient took his medication after the lab draw per Keya. Please call Keya with any questions at [phone].    Does caller want a call/response back: No

## 2023-06-19 NOTE — TELEPHONE ENCOUNTER
Please return call: Please have patient monitor on a daily basis and reach out to the transplant team if blood pressures are above goal.    Pal Cooper MD  [facility] [place] [address]  Phone: [phone]  Fax: [phone]    6/19/2023  3:19 PM

## 2023-06-19 NOTE — TELEPHONE ENCOUNTER
Returned call to Keya with [facility] to provide provider recommendation below. HC nurse verbalized understood.  Nephrologist, Dr. Devika Nguyen information also provided.    VANGIE PowellN, RN  [facility]      Who is Calling: nurseKeya with [facility]     Update: Patient BP today was at 165/79. Patient systolic was supposed to be at 160 and under. Due to patient had to have lab draw this morning, patient did not take his medication. Patient took his medication after the lab draw per Keya. Please call Keya with any questions at [phone].     Does caller want a call/response back: No

## 2023-06-19 NOTE — TELEPHONE ENCOUNTER
Provider Call: General  Route to LPN    Reason for call: Call from N  Wants to clarify lab draw this Am  Will be there between 830-9    Call back needed? Yes    Return Call Needed  Same as documented in contacts section  When to return call?: Same day: Route High Priority

## 2023-06-20 ENCOUNTER — TELEPHONE (OUTPATIENT)
Dept: TRANSPLANT | Facility: CLINIC | Age: 58
End: 2023-06-20
Payer: COMMERCIAL

## 2023-06-20 DIAGNOSIS — Z94.0 KIDNEY REPLACED BY TRANSPLANT: Primary | ICD-10-CM

## 2023-06-20 LAB
DONOR IDENTIFICATION: NORMAL
DSA COMMENTS: NORMAL
DSA PRESENT: NO
DSA TEST METHOD: NORMAL
ORGAN: NORMAL
SA 1 CELL: NORMAL
SA 1 TEST METHOD: NORMAL
SA 2 CELL: NORMAL
SA 2 TEST METHOD: NORMAL
SA1 HI RISK ABY: NORMAL
SA1 MOD RISK ABY: NORMAL
SA2 HI RISK ABY: NORMAL
SA2 MOD RISK ABY: NORMAL
UNACCEPTABLE ANTIGENS: NORMAL
UNOS CPRA: 27
ZZZSA 1  COMMENTS: NORMAL
ZZZSA 2 COMMENTS: NORMAL

## 2023-06-20 RX ORDER — TACROLIMUS 1 MG/1
2 CAPSULE ORAL 2 TIMES DAILY
Qty: 360 CAPSULE | Refills: 3 | Status: SHIPPED | OUTPATIENT
Start: 2023-06-20 | End: 2023-06-28

## 2023-06-20 RX ORDER — TACROLIMUS 0.5 MG/1
0.5 CAPSULE ORAL 2 TIMES DAILY
Qty: 180 CAPSULE | Refills: 3 | Status: SHIPPED | OUTPATIENT
Start: 2023-06-20 | End: 2023-06-28

## 2023-06-20 NOTE — TELEPHONE ENCOUNTER
ISSUE:   Tacrolimus IR level 6.7 on 6/19, goal 8-10, dose 2 mg BID.    PLAN:   Please call patient and confirm this was an accurate 12-hour trough. Verify Tacrolimus IR dose 2 mg BID. Confirm no new medications or illness. Confirm no missed doses. If accurate trough and accurate dose, increase Tacrolimus IR dose to 2.5 mg BID and repeat labs in 1 week    Enedina Kee RN    OUTCOME:   Spoke with patient, they confirm accurate trough level and current dose 2 mg BID. Patient confirmed dose change to 2.5 mg BID and to repeat labs in 1 weeks. Orders sent to preferred pharmacy for dose change and lab for repeat labs. Patient voiced understanding of plan.

## 2023-06-20 NOTE — TELEPHONE ENCOUNTER
Left message and sent [employer] message to patient regarding:  Tacrolimus IR level 6.7 on 6/19, goal 8-10, dose 2 mg BID.     PLAN:   Please call patient and confirm this was an accurate 12-hour trough. Verify Tacrolimus IR dose 2 mg BID. Confirm no new medications or illness. Confirm no missed doses. If accurate trough and accurate dose, increase Tacrolimus IR dose to 2.5 mg BID and repeat labs in 1 week

## 2023-06-26 ENCOUNTER — OFFICE VISIT (OUTPATIENT)
Dept: FAMILY MEDICINE | Facility: CLINIC | Age: 58
End: 2023-06-26
Payer: COMMERCIAL

## 2023-06-26 ENCOUNTER — TELEPHONE (OUTPATIENT)
Dept: TRANSPLANT | Facility: CLINIC | Age: 58
End: 2023-06-26

## 2023-06-26 ENCOUNTER — LAB (OUTPATIENT)
Dept: LAB | Facility: HOSPITAL | Age: 58
End: 2023-06-26
Payer: COMMERCIAL

## 2023-06-26 VITALS
TEMPERATURE: 98.1 F | DIASTOLIC BLOOD PRESSURE: 67 MMHG | HEART RATE: 59 BPM | OXYGEN SATURATION: 100 % | SYSTOLIC BLOOD PRESSURE: 122 MMHG | HEIGHT: 64 IN | BODY MASS INDEX: 23.56 KG/M2 | RESPIRATION RATE: 16 BRPM | WEIGHT: 138 LBS

## 2023-06-26 DIAGNOSIS — E55.9 VITAMIN D DEFICIENCY: ICD-10-CM

## 2023-06-26 DIAGNOSIS — Z48.298 AFTERCARE FOLLOWING ORGAN TRANSPLANT: ICD-10-CM

## 2023-06-26 DIAGNOSIS — E11.22 TYPE 2 DIABETES MELLITUS WITH DIABETIC CHRONIC KIDNEY DISEASE, UNSPECIFIED CKD STAGE, UNSPECIFIED WHETHER LONG TERM INSULIN USE (H): ICD-10-CM

## 2023-06-26 DIAGNOSIS — E11.42 DIABETIC PERIPHERAL NEUROPATHY (H): ICD-10-CM

## 2023-06-26 DIAGNOSIS — N18.30 ANEMIA OF CHRONIC RENAL FAILURE, STAGE 3 (MODERATE), UNSPECIFIED WHETHER STAGE 3A OR 3B CKD (H): ICD-10-CM

## 2023-06-26 DIAGNOSIS — N18.30 STAGE 3 CHRONIC KIDNEY DISEASE, UNSPECIFIED WHETHER STAGE 3A OR 3B CKD (H): ICD-10-CM

## 2023-06-26 DIAGNOSIS — E11.42 DIABETIC PERIPHERAL NEUROPATHY (H): Primary | ICD-10-CM

## 2023-06-26 DIAGNOSIS — Z79.899 ENCOUNTER FOR LONG-TERM CURRENT USE OF MEDICATION: ICD-10-CM

## 2023-06-26 DIAGNOSIS — Z94.0 KIDNEY REPLACED BY TRANSPLANT: ICD-10-CM

## 2023-06-26 DIAGNOSIS — D63.1 ANEMIA OF CHRONIC RENAL FAILURE, STAGE 3 (MODERATE), UNSPECIFIED WHETHER STAGE 3A OR 3B CKD (H): ICD-10-CM

## 2023-06-26 DIAGNOSIS — Z23 NEED FOR VACCINATION: ICD-10-CM

## 2023-06-26 DIAGNOSIS — Z20.828 CONTACT WITH AND (SUSPECTED) EXPOSURE TO OTHER VIRAL COMMUNICABLE DISEASES: ICD-10-CM

## 2023-06-26 LAB
ANION GAP SERPL CALCULATED.3IONS-SCNC: 10 MMOL/L (ref 7–15)
BUN SERPL-MCNC: 25.9 MG/DL (ref 6–20)
CALCIUM SERPL-MCNC: 9.4 MG/DL (ref 8.6–10)
CHLORIDE SERPL-SCNC: 104 MMOL/L (ref 98–107)
CREAT SERPL-MCNC: 1.42 MG/DL (ref 0.67–1.17)
DEPRECATED CALCIDIOL+CALCIFEROL SERPL-MC: 42 UG/L (ref 20–75)
DEPRECATED HCO3 PLAS-SCNC: 24 MMOL/L (ref 22–29)
ERYTHROCYTE [DISTWIDTH] IN BLOOD BY AUTOMATED COUNT: 14.5 % (ref 10–15)
FERRITIN SERPL-MCNC: 545 NG/ML (ref 31–409)
GFR SERPL CREATININE-BSD FRML MDRD: 57 ML/MIN/1.73M2
GLUCOSE SERPL-MCNC: 188 MG/DL (ref 70–99)
HBA1C MFR BLD: 7.5 %
HCT VFR BLD AUTO: 32.2 % (ref 40–53)
HGB BLD-MCNC: 10.8 G/DL (ref 13.3–17.7)
IRON BINDING CAPACITY (ROCHE): 166 UG/DL (ref 240–430)
IRON SATN MFR SERPL: 28 % (ref 15–46)
IRON SERPL-MCNC: 47 UG/DL (ref 61–157)
MAGNESIUM SERPL-MCNC: 1.9 MG/DL (ref 1.7–2.3)
MCH RBC QN AUTO: 27.6 PG (ref 26.5–33)
MCHC RBC AUTO-ENTMCNC: 33.5 G/DL (ref 31.5–36.5)
MCV RBC AUTO: 82 FL (ref 78–100)
PHOSPHATE SERPL-MCNC: 4.1 MG/DL (ref 2.5–4.5)
PLATELET # BLD AUTO: 105 10E3/UL (ref 150–450)
POTASSIUM SERPL-SCNC: 4.8 MMOL/L (ref 3.4–5.3)
RBC # BLD AUTO: 3.91 10E6/UL (ref 4.4–5.9)
SODIUM SERPL-SCNC: 138 MMOL/L (ref 136–145)
TACROLIMUS BLD-MCNC: 10.8 UG/L (ref 5–15)
TME LAST DOSE: NORMAL H
TME LAST DOSE: NORMAL H
WBC # BLD AUTO: 4.2 10E3/UL (ref 4–11)

## 2023-06-26 PROCEDURE — 82310 ASSAY OF CALCIUM: CPT

## 2023-06-26 PROCEDURE — 87799 DETECT AGENT NOS DNA QUANT: CPT

## 2023-06-26 PROCEDURE — 90746 HEPB VACCINE 3 DOSE ADULT IM: CPT | Performed by: FAMILY MEDICINE

## 2023-06-26 PROCEDURE — 83735 ASSAY OF MAGNESIUM: CPT

## 2023-06-26 PROCEDURE — 82728 ASSAY OF FERRITIN: CPT

## 2023-06-26 PROCEDURE — 82306 VITAMIN D 25 HYDROXY: CPT

## 2023-06-26 PROCEDURE — 99214 OFFICE O/P EST MOD 30 MIN: CPT | Mod: 25 | Performed by: FAMILY MEDICINE

## 2023-06-26 PROCEDURE — 36415 COLL VENOUS BLD VENIPUNCTURE: CPT

## 2023-06-26 PROCEDURE — 84100 ASSAY OF PHOSPHORUS: CPT

## 2023-06-26 PROCEDURE — 85014 HEMATOCRIT: CPT

## 2023-06-26 PROCEDURE — 80197 ASSAY OF TACROLIMUS: CPT

## 2023-06-26 PROCEDURE — 83036 HEMOGLOBIN GLYCOSYLATED A1C: CPT

## 2023-06-26 PROCEDURE — G0009 ADMIN PNEUMOCOCCAL VACCINE: HCPCS | Performed by: FAMILY MEDICINE

## 2023-06-26 PROCEDURE — 90677 PCV20 VACCINE IM: CPT | Performed by: FAMILY MEDICINE

## 2023-06-26 PROCEDURE — 83550 IRON BINDING TEST: CPT

## 2023-06-26 PROCEDURE — G0010 ADMIN HEPATITIS B VACCINE: HCPCS | Performed by: FAMILY MEDICINE

## 2023-06-26 RX ORDER — GABAPENTIN 100 MG/1
100-300 CAPSULE ORAL 2 TIMES DAILY
Qty: 540 CAPSULE | Refills: 1 | Status: SHIPPED | OUTPATIENT
Start: 2023-06-26 | End: 2023-10-05

## 2023-06-26 NOTE — PATIENT INSTRUCTIONS
-Thank you for choosing the [facility].  -It was a pleasure to see you today.  -Please take a look at the information below for more specific details regarding the treatment plan and recommendations.  -In this after visit summary is a list of your medications and specific instructions.  Please review this carefully as there may be changes made to your medication list.  -If there are any particular questions or concerns, please feel free to reach out to Dr. Cooper.  -If any labs have been completed, we will reach out to you about results.  If the results are normal or not concerning, a letter or MyChart message will be sent to you.  If any follow-up is needed, either Dr. Cooper or the nurse will give you a call.  If you have not heard regarding results after 2 weeks, please reach out to the clinic.    Patient Instructions:    -Please take your medications as prescribed.  -The NovoLog/aspart dose has been increased.  Start taking the higher dose of the NovoLog/aspart at dinner as recommended.  -Check the blood sugars as recommended to help monitor how your blood sugars are doing.  -Be sure to stay well-hydrated by drinking water each day to help your kidneys.  -Maintaining good blood sugar control will help your kidneys stay strong for longer.  -Be sure to complete an eye exam once a year to make sure your eyes are healthy. You may call your eye doctor and schedule an appointment when you are ready.   -Check your feet a few times a week to monitor for any cuts/wounds or any changes.  If any concerning findings are noted, please return to clinic for reevaluation.  -If you feel unwell (such as dizziness, sweatiness, nausea, fast heartbeat, confused, etc.), be sure to check your blood sugar as it may be low (below 70 mg/dL).  If a low blood sugars noted, please eat or drink something sugary and repeat the blood sugar test in about 30 minutes.  Repeat until the blood sugar is above 70 mg/dL.  If blood  sugars are persistently low, please seek immediate medical attention.      -The Shingrix vaccine is recommended for you.  The Shingles/Shingrix vaccine is generally better covered by insurance companies if the vaccine is received at a pharmacy.  Please speak with your pharmacist regarding this vaccination as it is recommended for you.  -The Tdap vaccine is recommended for you.  The Tdap appears to not be covered by insurance if given in clinic, though if you get the Tdap vaccine at the pharmacy, the it will be covered.  Please talk with your pharmacist about this vaccination as well.      Please seek immediate medical attention (go to the emergency room or urgent care) for the following reasons: worsening symptoms, medication side effects, or any concerning changes.    Please return to clinic in 1-2 months for follow-up of neuropathy/medication, or sooner as needed.      --------------------------------------------------------------------------------------------------------------------    -We are always looking for ways to improve.  You may be selected to receive a survey regarding your visit today.  We encourage you to complete the survey and provide specific, constructive feedback to help us improve our processes.  Thank you for your time!  -Please review the contact information listed on the after visit summary and in the electronic chart.  Below is the phone number that we have on file.  If there are any changes that are needed to be made, please reach out to the clinic.  [phone] (home)

## 2023-06-26 NOTE — TELEPHONE ENCOUNTER
Patient Call: General  Route to LPN    Reason for call:   Shoua from [facility]  called in regards of patient is being discharge from Home health care. Shoua can provide more details with call back at [phone].       Call back needed? Yes    Return Call Needed  Same as documented in contacts section  When to return call?: Same day: Route High Priority

## 2023-06-26 NOTE — PROGRESS NOTES
OFFICE VISIT    Assessment/Plan:     Patient Instructions:    -Please take your medications as prescribed.  -The NovoLog/aspart dose has been increased.  Start taking the higher dose of the NovoLog/aspart at dinner as recommended.  -Check the blood sugars as recommended to help monitor how your blood sugars are doing.  -Be sure to stay well-hydrated by drinking water each day to help your kidneys.  -Maintaining good blood sugar control will help your kidneys stay strong for longer.  -Be sure to complete an eye exam once a year to make sure your eyes are healthy. You may call your eye doctor and schedule an appointment when you are ready.   -Check your feet a few times a week to monitor for any cuts/wounds or any changes.  If any concerning findings are noted, please return to clinic for reevaluation.  -If you feel unwell (such as dizziness, sweatiness, nausea, fast heartbeat, confused, etc.), be sure to check your blood sugar as it may be low (below 70 mg/dL).  If a low blood sugars noted, please eat or drink something sugary and repeat the blood sugar test in about 30 minutes.  Repeat until the blood sugar is above 70 mg/dL.  If blood sugars are persistently low, please seek immediate medical attention.      -The Shingrix vaccine is recommended for you.  The Shingles/Shingrix vaccine is generally better covered by insurance companies if the vaccine is received at a pharmacy.  Please speak with your pharmacist regarding this vaccination as it is recommended for you.  -The Tdap vaccine is recommended for you.  The Tdap appears to not be covered by insurance if given in clinic, though if you get the Tdap vaccine at the pharmacy, the it will be covered.  Please talk with your pharmacist about this vaccination as well.      Please seek immediate medical attention (go to the emergency room or urgent care) for the following reasons: worsening symptoms, medication side effects, or any concerning changes.    Please return  to clinic in 1-2 months for follow-up of neuropathy/medication, or sooner as needed.      [place] was seen today for follow up.  Diagnoses and all orders for this visit:    Diabetic peripheral neuropathy (H)  Type 2 diabetes mellitus with diabetic chronic kidney disease, unspecified CKD stage, unspecified whether long term insulin use (H): Plan to increase dose of gabapentin as below.  Discussed slow titration and monitoring of symptoms/side effects.  On review of blood sugars, bedtime and fasting blood sugars have been high, so plan to increase from 7 units to 8 units of aspart at dinnertime.  Lunch blood sugars appear to be in the appropriate range.  Patient will continue to monitor blood sugars and record them.  Patient has known history of vitamin D deficiency.  Recheck vitamin D today.  Folate and vitamin B12 are in the normal range.  -     Hemoglobin A1c; Future  -     gabapentin (NEURONTIN) 100 MG capsule; Take 1-3 capsules (100-300 mg) by mouth 2 times daily  -     insulin aspart (NOVOLOG PEN) 100 UNIT/ML pen; Before breaskfast and lunch, give 7 units. Before dinner give 8 units.    Need for vaccination: Tdap and Shingrix vaccinations are better covered at the pharmacy (per [place] when ordering the vaccinations), so patient was informed to check in with his pharmacy team.  Discussed potential effects of the immune suppressing medications on the protective ability of the vaccinations.  -     HEPATITIS B VACCINE ADULT 3 DOSE IM (ENGERIX-B/RECOMBIVAX HB)  -     Pneumococcal 20 Valent Conjugate (Prevnar 20)    Other orders  -     REVIEW OF HEALTH MAINTENANCE PROTOCOL ORDERS          The diagnoses, treatment options, risk, benefits, and recommendations were reviewed with patient/guardian.  Questions were answered to patient's/guardian satisfaction.  Red flag signs were reviewed.  Patient/guardian is in agreement with above plan.      Subjective: 57 year old male with history of kidney transplant (living unrelated  donor on 02/21/2023), immunosuppressed state, diabetes mellitus type 2, CAD with history of NSTEMI, hypertension, dyslipidemia, thrombocytopenia, splenomegaly, hepatitis B core antibody positi ve (Hep B virus not detected on 06/12/2023) who presents to clinic for the following complaints:   Patient presents with:  Follow Up      Neuropathy: Patient has had neuropathy type symptoms in the legs and he has been taking 200 mg of the gabapentin nightly as needed for neuropathic pains.  He has been taking his medication for about a month and a half now and he feels that the medication is not very helpful, but helps a bit. Drowsiness is not noted. Denies any significant issues or side effects otherwise. The numbness is the worse at night. During the day time, he is distracted and is looking around and doing things, so he doesn't notice the numbness much.  At night, when he is just laying there, he notices the pain the most. The numbness goes from the midfoot distally.     On review of labs, vitamin D was noted to be 5 on 2/16/2023.   Patient does not appear to be on any vitamin D replacement. Vitamin B12 and folate are in the normal range 3/31/2023.        Latest Reference Range & Units 08/23/21 12:43 02/21/23 13:00   Hepatitis B Surface Antibody Instrument Value <8.00 m[IU]/mL 5.68 4.52   Hepatitis B Surface Antibody   Nonreactive     Need Hep B vaccine: Patient has a history of hepatitis B core antibody positive test.  Negative hepatitis B DNA levels prior to transplant. Hep B surface antibody negative.       The most recent hemoglobin A1c was 6.8 from 02/22/2023. Home blood sugars have been okay overall.  During the morning fasting blood sugars, he will have blood sugars in the 200s range.  Most of the blood sugars will be above 150.  When checking the blood sugars before lunch, blood sugars are primarily in the 130-180s range.  Patient also checks his blood sugars at bedtime and sugars tend to be around the 200 range.   "Patient reports that he eats dinner pretty late sometimes.  He will check his bedtime blood sugars about an hour and sometimes 2 hours after he eats.  Discussed appropriate mealtimes and blood sugar checking.    Saw the eye doctor about a year ago. Reviewed recommendations to follow-up.  Patient will call to schedule an appointment.    HM due was reviewed with patient/parent.  Recommendations, risk, benefits were reviewed.  Accepted recommendations were ordered.  Otherwise, patient/parent declined.    Health Maintenance Due   Topic Date Due     DIABETIC FOOT EXAM  Never done     ANNUAL REVIEW OF HM ORDERS  Never done     ADVANCE CARE PLANNING  Never done     EYE EXAM  Never done     MEDICARE ANNUAL WELLNESS VISIT  Never done     ZOSTER IMMUNIZATION (1 of 2) Never done     HEPATITIS B IMMUNIZATION (1 of 3 - Risk Dialysis 4-dose series) Never done     DTAP/TDAP/TD IMMUNIZATION (1 - Tdap) Never done     Pneumococcal Vaccine: Pediatrics (0 to 5 Years) and At-Risk Patients (6 to 64 Years) (2 - PPSV23 if available, else PCV20) 12/05/2021     A1C  05/22/2023       The 10 point review of system is negative except as stated in the HPI.    Allergies were reviewed and updated.    Objective:   /67   Pulse 59   Temp 98.1  F (36.7  C) (Oral)   Resp 16   Ht 1.626 m (5' 4\")   Wt 62.6 kg (138 lb)   SpO2 100%   BMI 23.69 kg/m    General: Active, alert, nontoxic-appearing.  No acute distress.  HEENT: Normocephalic, atraumatic.  Pupils are equal and round.  Sclera is clear.  Normal external ears. Nares patent.  Moist mucous membranes.    Cardiac: RRR.  S1, S2 present.  No murmurs, rubs, or gallops.  Respiratory/chest: Clear to auscultation bilaterally.  No wheezes, rales, rhonchi.  Breathing is not labored.  No accessory muscle usage.  Abdomen: Soft, nondistended, nontender.  No masses or organomegaly noted.  No guarding or rebound tenderness appreciated.  Doing scar noted on the right lower quadrant.  Subcutaneous mass " (kidney transplant) noted.  Some bruising present around this area and at the surgical sites, though nontender.  Extremities: Voluntary movements intact.  Integumentary: No concerning rash or skin changes appreciated.        Pal Cooper MD  [facility] [place] [address]  Phone: [phone]  Fax: [phone]    6/26/2023  1:09 PM            Current Outpatient Medications   Medication     acetaminophen (TYLENOL) 325 MG tablet     amLODIPine (NORVASC) 5 MG tablet     aspirin (ASA) 81 MG EC tablet     atorvastatin (LIPITOR) 20 MG tablet     blood glucose (NO BRAND SPECIFIED) test strip     blood glucose monitoring (ONE TOUCH ULTRA 2) meter device kit     calcium carbonate (TUMS) 500 MG chewable tablet     carvedilol (COREG) 25 MG tablet     gabapentin (NEURONTIN) 100 MG capsule     insulin aspart (NOVOLOG PEN) 100 UNIT/ML pen     insulin detemir (LEVEMIR PEN) 100 UNIT/ML pen     loratadine (CLARITIN) 10 MG tablet     magnesium oxide (MAG-OX) 400 MG tablet     mycophenolate (GENERIC EQUIVALENT) 250 MG capsule     nitroGLYcerin (NITROSTAT) 0.4 MG sublingual tablet     sodium bicarbonate 650 MG tablet     sulfamethoxazole-trimethoprim (BACTRIM) 400-80 MG tablet     tacrolimus (GENERIC EQUIVALENT) 0.5 MG capsule     tacrolimus (GENERIC EQUIVALENT) 1 MG capsule     thin (NO BRAND SPECIFIED) lancets     Vitamin D3 (CHOLECALCIFEROL) 25 mcg (1000 units) tablet     valGANciclovir (VALCYTE) 450 MG tablet     No current facility-administered medications for this visit.       Allergies   Allergen Reactions     Tegaderm Transparent Dressing (Informational Only) Blisters       Patient Active Problem List    Diagnosis Date Noted     Aftercare following organ transplant 05/18/2023     Priority: Medium     Need for pneumocystis prophylaxis 05/18/2023     Priority: Medium     CKD (chronic kidney disease) stage 2, GFR 60-89 ml/min 05/18/2023     Priority: Medium     Vitamin D deficiency 05/18/2023      Priority: Medium     Secondary renal hyperparathyroidism (H) 05/18/2023     Priority: Medium     Abnormal ankle brachial index 04/25/2023     Priority: Medium     EXAM: RESTING AND POSTEXERCISE ANKLE-BRACHIAL INDICES (ABIs)  LOCATION: [facility]  DATE/TIME: 4/25/2023 3:35 PM CDT                                             IMPRESSION:  1.  RIGHT LOWER EXTREMITY: Normal resting ankle-brachial index of 0.99. There is moderate exercise-induced ischemia (0.65).  2.  LEFT LOWER EXTREMITY: Normal resting ankle-brachial index of 0.96. There is moderate exercise-induced ischemia (0.64).       Immunosuppressed status (H) 02/24/2023     Priority: Medium     Anemia in chronic renal disease 02/24/2023     Priority: Medium     Hypocalcemia 02/24/2023     Priority: Medium     Hypomagnesemia 02/24/2023     Priority: Medium     Hepatitis B core antibody positive 02/24/2023     Priority: Medium     Kidney replaced by transplant 02/21/2023     Priority: Medium     Thrombocytopenia (H) 06/02/2022     Priority: Medium     Coronary artery disease involving native coronary artery of native heart without angina pectoris 12/16/2021     Priority: Medium     Metabolic acidosis      Priority: Medium     HTN, kidney transplant related      Priority: Medium     Created by Conversion  Replacement Utility updated for latest IMO load         Type 2 Diabetes Mellitus      Priority: Medium     Created by Conversion         Dyslipidemia, goal LDL below 70      Priority: Medium     Created by Conversion           Family History   Problem Relation Age of Onset     Diabetes Type 2  Mother      Other - See Comments Daughter         granular cell tumor of thigh     Heart Disease Other        Past Surgical History:   Procedure Laterality Date     BENCH KIDNEY  2/21/2023    Procedure: Bench kidney;  Surgeon: Talha Weinstein MD;  Location: [facility] OR     CV CORONARY ANGIOGRAM N/A 12/6/2021    Procedure: Coronary Angiogram;  Surgeon:  Cristela Banks MD;  Location: [facility] CV     CV LEFT HEART CATH N/A 12/6/2021    Procedure: Left Heart Cath;  Surgeon: Crisetla Banks MD;  Location: [facility] LAB CV     CV PCI N/A 12/6/2021    Procedure: Percutaneous Coronary Intervention;  Surgeon: Cristela Banks MD;  Location: [facility] CV     IR FINE NEEDLE ASPIRATION W ULTRASOUND  5/3/2023     IR RENAL BIOPSY RIGHT  5/3/2023     REMOVE CATHETER PERITONEAL N/A 2/21/2023    Procedure: Remove catheter peritoneal;  Surgeon: Talha Weinstein MD;  Location: [facility] OR        Social History     Socioeconomic History     Marital status: Patient Declined     Spouse name: Not on file     Number of children: Not on file     Years of education: Not on file     Highest education level: Not on file   Occupational History     Not on file   Tobacco Use     Smoking status: Never     Passive exposure: Never     Smokeless tobacco: Never   Vaping Use     Vaping Use: Never used   Substance and Sexual Activity     Alcohol use: Not Currently     Drug use: Never     Sexual activity: Not on file   Other Topics Concern     Parent/sibling w/ CABG, MI or angioplasty before 65F 55M? Not Asked   Social History Narrative     Not on file     Social Determinants of Health     Financial Resource Strain: Not on file   Food Insecurity: Not on file   Transportation Needs: Not on file   Physical Activity: Not on file   Stress: Not on file   Social Connections: Not on file   Intimate Partner Violence: Not on file   Housing Stability: Not on file     Prior to immunization administration, verified patients identity using patient s name and date of birth. Please see Immunization Activity for additional information.     Screening Questionnaire for Adult Immunization    Are you sick today?   No   Do you have allergies to medications, food, a vaccine component or latex?   No   Have you ever had a serious reaction after receiving a vaccination?   No   Do you have a long-term  health problem with heart, lung, kidney, or metabolic disease (e.g., diabetes), asthma, a blood disorder, no spleen, complement component deficiency, a cochlear implant, or a spinal fluid leak?  Are you on long-term aspirin therapy?   No   Do you have cancer, leukemia, HIV/AIDS, or any other immune system problem?   No   Do you have a parent, brother, or sister with an immune system problem?   No   In the past 3 months, have you taken medications that affect  your immune system, such as prednisone, other steroids, or anticancer drugs; drugs for the treatment of rheumatoid arthritis, Crohn s disease, or psoriasis; or have you had radiation treatments?   No   Have you had a seizure, or a brain or other nervous system problem?   No   During the past year, have you received a transfusion of blood or blood    products, or been given immune (gamma) globulin or antiviral drug?   Yes   For women: Are you pregnant or is there a chance you could become       pregnant during the next month?   No   Have you received any vaccinations in the past 4 weeks?   No     Immunization questionnaire was positive for at least one answer.  Notified .      Patient instructed to remain in clinic for 15 minutes afterwards, and to report any adverse reactions.     Screening performed by Flash Garcia on 6/26/2023 at 10:14 AM.

## 2023-06-27 ENCOUNTER — TELEPHONE (OUTPATIENT)
Dept: TRANSPLANT | Facility: CLINIC | Age: 58
End: 2023-06-27
Payer: COMMERCIAL

## 2023-06-27 DIAGNOSIS — Z94.0 KIDNEY REPLACED BY TRANSPLANT: ICD-10-CM

## 2023-06-27 LAB — BKV DNA # SPEC NAA+PROBE: NOT DETECTED COPIES/ML

## 2023-06-27 NOTE — TELEPHONE ENCOUNTER
Issue tacrolimus  10.8  Above goal level         OUTCOME:   Spoke with daughter , they confirm accurate trough level and current dose 2.5 mg BID. Patient confirmed dose change to 2.5 in am  and 2.0  Mg in pm    and to repeat labs in one week  . Orders sent to preferred pharmacy for dose change and lab for repeat labs. Patient voiced understanding of plan.   Lowered tacrolimus  2.5 and 2.0       Discussed with his daughter increase creatinine - she states he is feeling  outside in the garden  -     Attempt to call Mr Barbosa to review to increase his water

## 2023-06-28 ENCOUNTER — MYC MEDICAL ADVICE (OUTPATIENT)
Dept: TRANSPLANT | Facility: CLINIC | Age: 58
End: 2023-06-28

## 2023-06-28 ENCOUNTER — PATIENT OUTREACH (OUTPATIENT)
Dept: CARE COORDINATION | Facility: CLINIC | Age: 58
End: 2023-06-28

## 2023-06-28 ENCOUNTER — OFFICE VISIT (OUTPATIENT)
Dept: TRANSPLANT | Facility: CLINIC | Age: 58
End: 2023-06-28
Attending: INTERNAL MEDICINE
Payer: COMMERCIAL

## 2023-06-28 VITALS
SYSTOLIC BLOOD PRESSURE: 148 MMHG | DIASTOLIC BLOOD PRESSURE: 67 MMHG | HEART RATE: 65 BPM | HEIGHT: 64 IN | WEIGHT: 139.2 LBS | BODY MASS INDEX: 23.76 KG/M2

## 2023-06-28 DIAGNOSIS — N18.2 CKD (CHRONIC KIDNEY DISEASE) STAGE 2, GFR 60-89 ML/MIN: ICD-10-CM

## 2023-06-28 DIAGNOSIS — D63.1 ANEMIA IN STAGE 2 CHRONIC KIDNEY DISEASE: ICD-10-CM

## 2023-06-28 DIAGNOSIS — R79.89 ELEVATED SERUM CREATININE: ICD-10-CM

## 2023-06-28 DIAGNOSIS — E55.9 VITAMIN D DEFICIENCY: ICD-10-CM

## 2023-06-28 DIAGNOSIS — Z29.89 NEED FOR PNEUMOCYSTIS PROPHYLAXIS: ICD-10-CM

## 2023-06-28 DIAGNOSIS — Z48.298 AFTERCARE FOLLOWING ORGAN TRANSPLANT: ICD-10-CM

## 2023-06-28 DIAGNOSIS — E87.20 METABOLIC ACIDOSIS: ICD-10-CM

## 2023-06-28 DIAGNOSIS — E83.42 HYPOMAGNESEMIA: ICD-10-CM

## 2023-06-28 DIAGNOSIS — N18.2 ANEMIA IN STAGE 2 CHRONIC KIDNEY DISEASE: ICD-10-CM

## 2023-06-28 DIAGNOSIS — I15.1 HTN, KIDNEY TRANSPLANT RELATED: Primary | ICD-10-CM

## 2023-06-28 DIAGNOSIS — Z94.0 HTN, KIDNEY TRANSPLANT RELATED: Primary | ICD-10-CM

## 2023-06-28 DIAGNOSIS — Z94.0 KIDNEY REPLACED BY TRANSPLANT: ICD-10-CM

## 2023-06-28 DIAGNOSIS — D84.9 IMMUNOSUPPRESSED STATUS (H): ICD-10-CM

## 2023-06-28 DIAGNOSIS — E83.51 HYPOCALCEMIA: ICD-10-CM

## 2023-06-28 PROCEDURE — 99214 OFFICE O/P EST MOD 30 MIN: CPT | Performed by: INTERNAL MEDICINE

## 2023-06-28 PROCEDURE — G0463 HOSPITAL OUTPT CLINIC VISIT: HCPCS | Performed by: INTERNAL MEDICINE

## 2023-06-28 RX ORDER — TACROLIMUS 1 MG/1
2 CAPSULE ORAL 2 TIMES DAILY
Qty: 120 CAPSULE | Refills: 3 | Status: SHIPPED | OUTPATIENT
Start: 2023-06-28 | End: 2023-07-06

## 2023-06-28 RX ORDER — AMLODIPINE BESYLATE 10 MG/1
10 TABLET ORAL AT BEDTIME
Qty: 90 TABLET | Refills: 3 | Status: SHIPPED | OUTPATIENT
Start: 2023-06-28 | End: 2023-10-19

## 2023-06-28 RX ORDER — TACROLIMUS 0.5 MG/1
0.5 CAPSULE ORAL 2 TIMES DAILY
Qty: 30 CAPSULE | Refills: 3 | Status: SHIPPED | OUTPATIENT
Start: 2023-06-28 | End: 2023-07-06

## 2023-06-28 ASSESSMENT — PAIN SCALES - GENERAL: PAINLEVEL: NO PAIN (0)

## 2023-06-28 NOTE — PATIENT INSTRUCTIONS
Patient Recommendations:  - Increase amlodipine to 10 mg nightly.    Transplant Patient Information  Your Post Transplant Coordinator is: Shalonda Roy  For non urgent items, we encourage you to contact your coordinator/care team online via [employer]  You and your care team can also contact your transplant coordinator Monday - Friday, 8am - 5pm at [phone] (Option 2 to reach the coordinator or Option 4 to schedule an appointment).  After hours for urgent matters, please call [facility] at [phone].  
No action was needed

## 2023-06-28 NOTE — LETTER
6/28/2023      RE: [place] JOE Barbosa  [address] [place] [address]       TRANSPLANT NEPHROLOGY EARLY POST TRANSPLANT VISIT    Assessment & Plan   # LDKT: Increased in creatinine, possibly due to supra therapeutic tacrolimus level.  If creatinine doesn't improve with getting tacrolimus at goal, would consider a kidney transplant biopsy.   - Baseline Creatinine: ~ 0.8-1.0   - Proteinuria: Normal (<0.2 grams),    - Date DSA Last Checked: Jun/2023      Latest DSA: No cPRA: 27%   - BK Viremia: No   - Kidney Tx Biopsy: No   - Transplant Ureteral Stent: Removed    # Immunosuppression: Tacrolimus immediate release (goal 8-10) and Mycophenolate mofetil (dose 750 mg every 12 hours)   - Induction with Recent Transplant:  Intermediate Intensity   - Continue with intensive monitoring of immunosuppression for efficacy and toxicity.   - Changes: Not at this time, but tacrolimus dose recently reduced for high tacrolimus level.    # Infection Prophylaxis:   - PJP: Sulfa/TMP (Bactrim)  - CMV: None, prophylaxis completed; CMV IgG Ab positive    # Hypertension: Borderline control;  Goal BP: < 130/80   - Volume status: Euvolemic     - Changes: Yes - Will increase amlodipine to 10 mg nightly.    # Diabetes: Borderline control (HbA1c 7-9%) Last HbA1c: 7.5%   - Management as per primary care    # Anemia in Chronic Renal Disease: Hgb: Stable      NERY: No   - Iron studies: Replete    # Thrombocytopenia: Stable, mildly low platelet level in the ~ 100-140s.  Patient does have splenomegaly.    # Mineral Bone Disorder:   - Secondary renal hyperparathyroidism; PTH level: Normal (15-65 pg/ml)        On treatment: None  - Vitamin D; level: Normal        On supplement: Yes  - Calcium; level: Normal        On supplement: Yes    # Electrolytes:   - Potassium; level: Normal        On supplement: No  - Magnesium; level: Normal        On supplement: Yes  - Bicarbonate; level: Normal        On supplement: Yes    # CAD, s/p PCI: Asymptomatic.    #  Asymmetric Left Ventricular Hypertrophy (HOCM): Stable. No history of sustained ventricular arrhythmias.     # Hepatitis B Core Ab +: Patient with core Ab positive, but negative Ag and also negative surface Ab.  Hep B DNA was negative at time of transplant, as well as repeat at 4 months post transplant.     # Peripheral Neuropathy: Stable symptoms.    # Medical Compliance: Yes    # Health Maintenance and Vaccination Review: Not Reviewed    # Transplant History:  Etiology of Kidney Failure: Diabetes mellitus type 2  Tx: LDKT  Transplant: 2/21/2023 (Kidney)  Donor Type: Living Donor Class:   Crossmatch at time of Tx: negative  DSA at time of Tx: No  Significant changes in immunosuppression: None  CMV IgG Ab High Risk Discordance (D+/R-): No  EBV IgG Ab High Risk Discordance (D+/R-): No  Significant transplant-related complications: None    Transplant Office Phone Number: [phone]    Assessment and plan was discussed with the patient and he voiced his understanding and agreement.    Return visit: Return for 6 month post transplant visit.    Patrick Phan MD     Chief Complaint   Mr. Barbosa is a 57 year old here for routine follow up, kidney transplant and immunosuppression management.     History of Present Illness    Mr. Barbosa reports feeling good overall with some medical complaints.  Since last clinic visit, patient reports no hospitalizations or new medical complaints and has been doing well overall.  His energy level continues to improve, now pretty much normal.  He is active and does get some exercise.  Denies any chest pain or shortness of breath with exertion.  No leg swelling.  Stable peripheral neuropathy.    Appetite is good and his weight is up ~ 5 lbs.  No nausea, vomiting or diarrhea with regular bowel movements.  No heartburn symptoms.  No fever, sweats or chills.  No night sweats.  No problems emptying his bladder.    Home BP: 130-150 systolic, frequently 130/50s with no lightheadedness.; His  blood pressure is much higher as this clinic visit as he hasn't taken his medications yet.    Problem List   Patient Active Problem List   Diagnosis     Type 2 Diabetes Mellitus     Dyslipidemia, goal LDL below 70     HTN, kidney transplant related     Metabolic acidosis     Coronary artery disease involving native coronary artery of native heart without angina pectoris     Thrombocytopenia (H)     Immunosuppressed status (H)     Kidney replaced by transplant     Anemia in chronic renal disease     Hypocalcemia     Hypomagnesemia     Hepatitis B core antibody positive     Abnormal ankle brachial index     Aftercare following organ transplant     Need for pneumocystis prophylaxis     CKD (chronic kidney disease) stage 2, GFR 60-89 ml/min     Vitamin D deficiency     Secondary renal hyperparathyroidism (H)       Allergies   Allergies   Allergen Reactions     Tegaderm Transparent Dressing (Informational Only) Blisters       Medications   Current Outpatient Medications   Medication Sig     acetaminophen (TYLENOL) 325 MG tablet Take 2 tablets (650 mg) by mouth every 4 hours as needed for other (For optimal non-opioid multimodal pain management to improve pain control.)     amLODIPine (NORVASC) 10 MG tablet Take 1 tablet (10 mg) by mouth At Bedtime     aspirin (ASA) 81 MG EC tablet Take 1 tablet (81 mg) by mouth daily     atorvastatin (LIPITOR) 20 MG tablet Take 1 tablet (20 mg) by mouth daily     blood glucose (NO BRAND SPECIFIED) test strip Use to test blood sugar 3 times daily or as directed. To accompany: Blood Glucose Monitor Brands: per insurance.     blood glucose monitoring (ONE TOUCH ULTRA 2) meter device kit USE TO TEST BLOOD SUGAR THREE TIMES DAILY OR AS DIRECTED.     calcium carbonate (TUMS) 500 MG chewable tablet Take 2 tablets (1,000 mg) by mouth 2 times daily     carvedilol (COREG) 25 MG tablet Take 0.5 tablets (12.5 mg) by mouth 2 times daily (with meals)     gabapentin (NEURONTIN) 100 MG capsule Take 1-3  "capsules (100-300 mg) by mouth 2 times daily     insulin aspart (NOVOLOG PEN) 100 UNIT/ML pen Before breaskfast and lunch, give 7 units. Before dinner give 8 units.     insulin detemir (LEVEMIR PEN) 100 UNIT/ML pen 24 units at bedtime     loratadine (CLARITIN) 10 MG tablet Take 1 tablet (10 mg) by mouth daily as needed for allergies (itchy/rash)     magnesium oxide (MAG-OX) 400 MG tablet Take 1 tablet (400 mg) by mouth 2 times daily     mycophenolate (GENERIC EQUIVALENT) 250 MG capsule Take 3 capsules (750 mg) by mouth 2 times daily for 360 days     nitroGLYcerin (NITROSTAT) 0.4 MG sublingual tablet One tablet under the tongue every 5 minutes if needed for chest pain. May repeat every 5 minutes for a maximum of 3 doses in 15 minutes\"     sodium bicarbonate 650 MG tablet Take 2 tablets (1,300 mg) by mouth 2 times daily     sulfamethoxazole-trimethoprim (BACTRIM) 400-80 MG tablet Take 1 tablet by mouth daily     tacrolimus (GENERIC EQUIVALENT) 0.5 MG capsule Take 1 capsule (0.5 mg) by mouth 2 times daily Take 2.5 mg in am and 2.0 mg in pm     tacrolimus (GENERIC EQUIVALENT) 1 MG capsule Take 2 capsules (2 mg) by mouth 2 times daily Total dose = 2.5 mg in and 2.0  Mg in pm     thin (NO BRAND SPECIFIED) lancets Tests 3x daily Use with lanceting device. To accompany: Blood Glucose Monitor Brands: per insurance.     Vitamin D3 (CHOLECALCIFEROL) 25 mcg (1000 units) tablet Take 2 tablets (50 mcg) by mouth daily     valGANciclovir (VALCYTE) 450 MG tablet Take 2 tablets (900 mg) by mouth daily for 90 days     No current facility-administered medications for this visit.     Medications Discontinued During This Encounter   Medication Reason     amLODIPine (NORVASC) 5 MG tablet        Physical Exam   Vital Signs: BP (!) 148/67   Pulse 65   Ht 1.626 m (5' 4\")   Wt 63.1 kg (139 lb 3.2 oz)   BMI 23.89 kg/m      GENERAL APPEARANCE: alert and no distress  HENT: mouth without ulcers or lesions  LYMPHATICS: no cervical or " supraclavicular nodes  RESP: lungs clear to auscultation - no rales, rhonchi or wheezes  CV: regular rhythm, normal rate, no rub, 2.6 systolic murmur  EDEMA: no LE edema bilaterally  ABDOMEN: soft, nondistended, nontender, bowel sounds normal  MS: extremities normal - no gross deformities noted, no evidence of inflammation in joints, no muscle tenderness  SKIN: no rash  TX KIDNEY: normal  DIALYSIS ACCESS: none    Data         Latest Ref Rng & Units 6/29/2023     7:15 AM 6/26/2023     8:08 AM 6/19/2023     9:45 AM   Renal   Sodium 136 - 145 mmol/L 136  138  136    K 3.4 - 5.3 mmol/L 4.8  4.8  4.6    Cl 98 - 107 mmol/L 101  104  103    Cl (external) 98 - 107 mmol/L 101  104  103    CO2 22 - 29 mmol/L 25  24  22    Urea Nitrogen 6.0 - 20.0 mg/dL 21.3  25.9  19.3    Creatinine 0.67 - 1.17 mg/dL 1.34  1.42  1.03    Glucose 70 - 99 mg/dL 196  188  131    Calcium 8.6 - 10.0 mg/dL 9.9  9.4  9.2    Magnesium 1.7 - 2.3 mg/dL  1.9           Latest Ref Rng & Units 6/26/2023     8:08 AM 6/19/2023     9:45 AM 6/12/2023     8:30 AM   Bone Health   Phosphorus 2.5 - 4.5 mg/dL 4.1   3.1    Parathyroid Hormone Intact 15 - 65 pg/mL  29     Vit D Def 20 - 75 ug/L 42            Latest Ref Rng & Units 6/29/2023     7:15 AM 6/26/2023     8:08 AM 6/19/2023     9:45 AM   Heme   WBC 4.0 - 11.0 10e3/uL 4.2  4.2  4.3    Hgb 13.3 - 17.7 g/dL 10.3  10.8  10.2    Plt 150 - 450 10e3/uL 94  105  113          Latest Ref Rng & Units 4/7/2023     8:10 AM 4/3/2023     8:10 AM 3/30/2023     7:35 AM   Liver   AP 45 - 120 U/L 74  69  66    TBili 0.0 - 1.0 mg/dL 0.7  0.8  0.7    Bilirubin Direct 0.00 - 0.30 mg/dL  0.22  <0.20    ALT 0 - 45 U/L 10  12  15    AST 0 - 40 U/L 12  14  14    Tot Protein 6.0 - 8.0 g/dL 6.3  6.1  5.7    Albumin 3.5 - 5.0 g/dL 3.7      Albumin 3.5 - 5.2 g/dL  3.8  3.9          Latest Ref Rng & Units 6/26/2023     8:08 AM 2/22/2023     2:03 AM 2/21/2023     7:42 PM   Pancreas   A1C <5.7 % 7.5  6.8  6.8          Latest Ref Rng & Units  6/26/2023     8:08 AM 6/19/2023     9:45 AM 4/28/2023     8:36 AM   Iron studies   Iron 61 - 157 ug/dL 47  54  53    Iron Sat Index 15 - 46 % 28  31  36    Ferritin 31 - 409 ng/mL 545  540  943          Latest Ref Rng & Units 5/1/2023     7:15 AM 2/21/2023     1:00 PM 2/16/2023    12:26 PM   [facility] Txp Virology   CMV QUANT IU/ML Not Detected IU/mL  Not Detected     EBV CAPSID ANTIBODY IGG No detectable antibody.   Positive    EBV DNA COPIES/ML Not Detected copies/mL <500      EBV DNA LOG OF COPIES  <2.7           Recent Labs   Lab Test 05/22/23  0850 05/30/23  0815 06/12/23  0830 06/19/23  0945 06/26/23  0808   DOSTAC 5/21/2023  --   --  6/18/2023 6/25/2023   TACROL 9.4   < > 6.8 6.7 10.8    < > = values in this interval not displayed.     Recent Labs   Lab Test 03/02/23  0833 03/18/23  0913 04/13/23  0803 05/01/23  0715 05/16/23  0902   DOSMPA 3/1/2023   8:00 PM  --   --  4/30/2023   8:00 AM  --    MPACID 1.48   < > 3.94* 1.60 2.30   MPAG 41.0   < > 60.6 65.3 53.7    < > = values in this interval not displayed.       Patrick Phan MD

## 2023-06-28 NOTE — LETTER
6/28/2023         RE: Modesto Barbosa  [address] [place] [address]        Dear Colleague,    Thank you for referring your patient, Modesto Barbosa, to the [facility]. Please see a copy of my visit note below.    TRANSPLANT NEPHROLOGY EARLY POST TRANSPLANT VISIT    Assessment & Plan   # LDKT: Increased in creatinine, possibly due to supra therapeutic tacrolimus level.  If creatinine doesn't improve with getting tacrolimus at goal, would consider a kidney transplant biopsy.   - Baseline Creatinine: ~ 0.8-1.0   - Proteinuria: Normal (<0.2 grams),    - Date DSA Last Checked: Jun/2023      Latest DSA: No cPRA: 27%   - BK Viremia: No   - Kidney Tx Biopsy: No   - Transplant Ureteral Stent: Removed    # Immunosuppression: Tacrolimus immediate release (goal 8-10) and Mycophenolate mofetil (dose 750 mg every 12 hours)   - Induction with Recent Transplant:  Intermediate Intensity   - Continue with intensive monitoring of immunosuppression for efficacy and toxicity.   - Changes: Not at this time, but tacrolimus dose recently reduced for high tacrolimus level.    # Infection Prophylaxis:   - PJP: Sulfa/TMP (Bactrim)  - CMV: None, prophylaxis completed; CMV IgG Ab positive    # Hypertension: Borderline control;  Goal BP: < 130/80   - Volume status: Euvolemic     - Changes: Yes - Will increase amlodipine to 10 mg nightly.    # Diabetes: Borderline control (HbA1c 7-9%) Last HbA1c: 7.5%   - Management as per primary care    # Anemia in Chronic Renal Disease: Hgb: Stable      NERY: No   - Iron studies: Replete    # Thrombocytopenia: Stable, mildly low platelet level in the ~ 100-140s.  Patient does have splenomegaly.    # Mineral Bone Disorder:   - Secondary renal hyperparathyroidism; PTH level: Normal (15-65 pg/ml)        On treatment: None  - Vitamin D; level: Normal        On supplement: Yes  - Calcium; level: Normal        On supplement: Yes    # Electrolytes:   - Potassium; level: Normal        On  supplement: No  - Magnesium; level: Normal        On supplement: Yes  - Bicarbonate; level: Normal        On supplement: Yes    # CAD, s/p PCI: Asymptomatic.    # Asymmetric Left Ventricular Hypertrophy (HOCM): Stable. No history of sustained ventricular arrhythmias.     # Hepatitis B Core Ab +: Patient with core Ab positive, but negative Ag and also negative surface Ab.  Hep B DNA was negative at time of transplant, as well as repeat at 4 months post transplant.     # Peripheral Neuropathy: Stable symptoms.    # Medical Compliance: Yes    # Health Maintenance and Vaccination Review: Not Reviewed    # Transplant History:  Etiology of Kidney Failure: Diabetes mellitus type 2  Tx: LDKT  Transplant: 2/21/2023 (Kidney)  Donor Type: Living Donor Class:   Crossmatch at time of Tx: negative  DSA at time of Tx: No  Significant changes in immunosuppression: None  CMV IgG Ab High Risk Discordance (D+/R-): No  EBV IgG Ab High Risk Discordance (D+/R-): No  Significant transplant-related complications: None    Transplant Office Phone Number: [phone]    Assessment and plan was discussed with the patient and he voiced his understanding and agreement.    Return visit: Return for 6 month post transplant visit.    Patrick Phan MD     Chief Complaint   Mr. Barbosa is a 57 year old here for routine follow up, kidney transplant and immunosuppression management.     History of Present Illness    Mr. Barbosa reports feeling good overall with some medical complaints.  Since last clinic visit, patient reports no hospitalizations or new medical complaints and has been doing well overall.  His energy level continues to improve, now pretty much normal.  He is active and does get some exercise.  Denies any chest pain or shortness of breath with exertion.  No leg swelling.  Stable peripheral neuropathy.    Appetite is good and his weight is up ~ 5 lbs.  No nausea, vomiting or diarrhea with regular bowel movements.  No heartburn  symptoms.  No fever, sweats or chills.  No night sweats.  No problems emptying his bladder.    Home BP: 130-150 systolic, frequently 130/50s with no lightheadedness.; His blood pressure is much higher as this clinic visit as he hasn't taken his medications yet.    Problem List   Patient Active Problem List   Diagnosis    Type 2 Diabetes Mellitus    Dyslipidemia, goal LDL below 70    HTN, kidney transplant related    Metabolic acidosis    Coronary artery disease involving native coronary artery of native heart without angina pectoris    Thrombocytopenia (H)    Immunosuppressed status (H)    Kidney replaced by transplant    Anemia in chronic renal disease    Hypocalcemia    Hypomagnesemia    Hepatitis B core antibody positive    Abnormal ankle brachial index    Aftercare following organ transplant    Need for pneumocystis prophylaxis    CKD (chronic kidney disease) stage 2, GFR 60-89 ml/min    Vitamin D deficiency    Secondary renal hyperparathyroidism (H)       Allergies   Allergies   Allergen Reactions    Tegaderm Transparent Dressing (Informational Only) Blisters       Medications   Current Outpatient Medications   Medication Sig    acetaminophen (TYLENOL) 325 MG tablet Take 2 tablets (650 mg) by mouth every 4 hours as needed for other (For optimal non-opioid multimodal pain management to improve pain control.)    amLODIPine (NORVASC) 10 MG tablet Take 1 tablet (10 mg) by mouth At Bedtime    aspirin (ASA) 81 MG EC tablet Take 1 tablet (81 mg) by mouth daily    atorvastatin (LIPITOR) 20 MG tablet Take 1 tablet (20 mg) by mouth daily    blood glucose (NO BRAND SPECIFIED) test strip Use to test blood sugar 3 times daily or as directed. To accompany: Blood Glucose Monitor Brands: per insurance.    blood glucose monitoring (ONE TOUCH ULTRA 2) meter device kit USE TO TEST BLOOD SUGAR THREE TIMES DAILY OR AS DIRECTED.    calcium carbonate (TUMS) 500 MG chewable tablet Take 2 tablets (1,000 mg) by mouth 2 times daily     "carvedilol (COREG) 25 MG tablet Take 0.5 tablets (12.5 mg) by mouth 2 times daily (with meals)    gabapentin (NEURONTIN) 100 MG capsule Take 1-3 capsules (100-300 mg) by mouth 2 times daily    insulin aspart (NOVOLOG PEN) 100 UNIT/ML pen Before breaskfast and lunch, give 7 units. Before dinner give 8 units.    insulin detemir (LEVEMIR PEN) 100 UNIT/ML pen 24 units at bedtime    loratadine (CLARITIN) 10 MG tablet Take 1 tablet (10 mg) by mouth daily as needed for allergies (itchy/rash)    magnesium oxide (MAG-OX) 400 MG tablet Take 1 tablet (400 mg) by mouth 2 times daily    mycophenolate (GENERIC EQUIVALENT) 250 MG capsule Take 3 capsules (750 mg) by mouth 2 times daily for 360 days    nitroGLYcerin (NITROSTAT) 0.4 MG sublingual tablet One tablet under the tongue every 5 minutes if needed for chest pain. May repeat every 5 minutes for a maximum of 3 doses in 15 minutes\"    sodium bicarbonate 650 MG tablet Take 2 tablets (1,300 mg) by mouth 2 times daily    sulfamethoxazole-trimethoprim (BACTRIM) 400-80 MG tablet Take 1 tablet by mouth daily    tacrolimus (GENERIC EQUIVALENT) 0.5 MG capsule Take 1 capsule (0.5 mg) by mouth 2 times daily Take 2.5 mg in am and 2.0 mg in pm    tacrolimus (GENERIC EQUIVALENT) 1 MG capsule Take 2 capsules (2 mg) by mouth 2 times daily Total dose = 2.5 mg in and 2.0  Mg in pm    thin (NO BRAND SPECIFIED) lancets Tests 3x daily Use with lanceting device. To accompany: Blood Glucose Monitor Brands: per insurance.    Vitamin D3 (CHOLECALCIFEROL) 25 mcg (1000 units) tablet Take 2 tablets (50 mcg) by mouth daily    valGANciclovir (VALCYTE) 450 MG tablet Take 2 tablets (900 mg) by mouth daily for 90 days     No current facility-administered medications for this visit.     Medications Discontinued During This Encounter   Medication Reason    amLODIPine (NORVASC) 5 MG tablet        Physical Exam   Vital Signs: BP (!) 148/67   Pulse 65   Ht 1.626 m (5' 4\")   Wt 63.1 kg (139 lb 3.2 oz)   BMI " 23.89 kg/m      GENERAL APPEARANCE: alert and no distress  HENT: mouth without ulcers or lesions  LYMPHATICS: no cervical or supraclavicular nodes  RESP: lungs clear to auscultation - no rales, rhonchi or wheezes  CV: regular rhythm, normal rate, no rub, 2.6 systolic murmur  EDEMA: no LE edema bilaterally  ABDOMEN: soft, nondistended, nontender, bowel sounds normal  MS: extremities normal - no gross deformities noted, no evidence of inflammation in joints, no muscle tenderness  SKIN: no rash  TX KIDNEY: normal  DIALYSIS ACCESS: none    Data         Latest Ref Rng & Units 6/29/2023     7:15 AM 6/26/2023     8:08 AM 6/19/2023     9:45 AM   Renal   Sodium 136 - 145 mmol/L 136  138  136    K 3.4 - 5.3 mmol/L 4.8  4.8  4.6    Cl 98 - 107 mmol/L 101  104  103    Cl (external) 98 - 107 mmol/L 101  104  103    CO2 22 - 29 mmol/L 25  24  22    Urea Nitrogen 6.0 - 20.0 mg/dL 21.3  25.9  19.3    Creatinine 0.67 - 1.17 mg/dL 1.34  1.42  1.03    Glucose 70 - 99 mg/dL 196  188  131    Calcium 8.6 - 10.0 mg/dL 9.9  9.4  9.2    Magnesium 1.7 - 2.3 mg/dL  1.9           Latest Ref Rng & Units 6/26/2023     8:08 AM 6/19/2023     9:45 AM 6/12/2023     8:30 AM   Bone Health   Phosphorus 2.5 - 4.5 mg/dL 4.1   3.1    Parathyroid Hormone Intact 15 - 65 pg/mL  29     Vit D Def 20 - 75 ug/L 42            Latest Ref Rng & Units 6/29/2023     7:15 AM 6/26/2023     8:08 AM 6/19/2023     9:45 AM   Heme   WBC 4.0 - 11.0 10e3/uL 4.2  4.2  4.3    Hgb 13.3 - 17.7 g/dL 10.3  10.8  10.2    Plt 150 - 450 10e3/uL 94  105  113          Latest Ref Rng & Units 4/7/2023     8:10 AM 4/3/2023     8:10 AM 3/30/2023     7:35 AM   Liver   AP 45 - 120 U/L 74  69  66    TBili 0.0 - 1.0 mg/dL 0.7  0.8  0.7    Bilirubin Direct 0.00 - 0.30 mg/dL  0.22  <0.20    ALT 0 - 45 U/L 10  12  15    AST 0 - 40 U/L 12  14  14    Tot Protein 6.0 - 8.0 g/dL 6.3  6.1  5.7    Albumin 3.5 - 5.0 g/dL 3.7      Albumin 3.5 - 5.2 g/dL  3.8  3.9          Latest Ref Rng & Units 6/26/2023      8:08 AM 2/22/2023     2:03 AM 2/21/2023     7:42 PM   Pancreas   A1C <5.7 % 7.5  6.8  6.8          Latest Ref Rng & Units 6/26/2023     8:08 AM 6/19/2023     9:45 AM 4/28/2023     8:36 AM   Iron studies   Iron 61 - 157 ug/dL 47  54  53    Iron Sat Index 15 - 46 % 28  31  36    Ferritin 31 - 409 ng/mL 545  540  943          Latest Ref Rng & Units 5/1/2023     7:15 AM 2/21/2023     1:00 PM 2/16/2023    12:26 PM   [facility] Txp Virology   CMV QUANT IU/ML Not Detected IU/mL  Not Detected     EBV CAPSID ANTIBODY IGG No detectable antibody.   Positive    EBV DNA COPIES/ML Not Detected copies/mL <500      EBV DNA LOG OF COPIES  <2.7           Recent Labs   Lab Test 05/22/23  0850 05/30/23  0815 06/12/23  0830 06/19/23  0945 06/26/23  0808   DOSTAC 5/21/2023  --   --  6/18/2023 6/25/2023   TACROL 9.4   < > 6.8 6.7 10.8    < > = values in this interval not displayed.     Recent Labs   Lab Test 03/02/23  0833 03/18/23  0913 04/13/23  0803 05/01/23  0715 05/16/23  0902   DOSMPA 3/1/2023   8:00 PM  --   --  4/30/2023   8:00 AM  --    MPACID 1.48   < > 3.94* 1.60 2.30   MPAG 41.0   < > 60.6 65.3 53.7    < > = values in this interval not displayed.           Sincerely,        Patrick Phan MD

## 2023-06-28 NOTE — NURSING NOTE
"Chief Complaint   Patient presents with     RECHECK     Follow up with kidney transplant     BP (!) 148/67   Pulse 65   Ht 1.626 m (5' 4\")   Wt 63.1 kg (139 lb 3.2 oz)   BMI 23.89 kg/m    Meena Sommesr CMA on 6/28/2023 at 10:41 AM    "

## 2023-06-28 NOTE — PROGRESS NOTES
Anemia Management Note  SUBJECTIVE/OBJECTIVE:  Referred by Dr. Rome Kim on 04/10/2023  Primary Diagnosis: Anemia in Chronic Kidney Disease (N18.3, D63.1)   3a  Secondary Diagnosis:  Chronic Kidney Disease, Stage 3 (N18.3)  3a  Kidney Tx: 2023  Hgb goal range:  9-10  Epo/Darbo: TBD: Hgb >9.0.  *Hx of HTN.   Iron regimen:  NA  Labs : 4/10/2024  Recent NERY use, transfusion, IV iron: NA  RX/TX plans : TBD     Labs and Cuco's.         No history of stroke, MI and blood clots or cancers.     Contact: OK to speak with Yanick Barbosa (daughter) and Edy Barbosa (son) regarding Medical, Billing, and Scheduling Informaiton per consent to communicate dated 2021.           Latest Ref Rng & Units 2023     9:02 AM 2023     8:50 AM 2023     8:15 AM 2023     8:25 AM 2023     8:30 AM 2023     9:45 AM 2023     8:08 AM   Anemia   Hemoglobin 13.3 - 17.7 g/dL 9.5  9.6  9.4  10.0  10.1  10.2  10.8    Ferritin 31 - 409 ng/mL      540  545      BP Readings from Last 3 Encounters:   23 (!) 148/67   23 122/67   23 (!) 156/74     Wt Readings from Last 2 Encounters:   23 63.1 kg (139 lb 3.2 oz)   23 62.6 kg (138 lb)           ASSESSMENT:  Hgb:at goal - continue to monitor  TSat: not at goal (>30%) but ferritin >500ng/mL.  IV iron not indicated at this time per anemia protocol. Ferritin: At goal (>100ng/mL)    PLAN:  RTC for Anemia Labs in 4 week(s). If labs are stable, ok to close Anemia Services.     Orders needed to be renewed (for next follow-up date) in EPIC: None    Iron labs due:  End of 2023    Plan discussed with:  No call, chart review       NEXT FOLLOW-UP DATE:  23    Nimo Banda RN   Anemia Services  [facility]  [email or web address]   Office : [phone]  Fax: [phone]

## 2023-06-29 ENCOUNTER — LAB (OUTPATIENT)
Dept: LAB | Facility: HOSPITAL | Age: 58
End: 2023-06-29
Payer: COMMERCIAL

## 2023-06-29 DIAGNOSIS — R79.89 ELEVATED SERUM CREATININE: ICD-10-CM

## 2023-06-29 DIAGNOSIS — Z94.0 KIDNEY REPLACED BY TRANSPLANT: ICD-10-CM

## 2023-06-29 LAB
ALBUMIN MFR UR ELPH: <4 MG/DL
ALBUMIN UR-MCNC: NEGATIVE MG/DL
ANION GAP SERPL CALCULATED.3IONS-SCNC: 10 MMOL/L (ref 7–15)
APPEARANCE UR: CLEAR
BILIRUB UR QL STRIP: NEGATIVE
BUN SERPL-MCNC: 21.3 MG/DL (ref 6–20)
CALCIUM SERPL-MCNC: 9.9 MG/DL (ref 8.6–10)
CHLORIDE SERPL-SCNC: 101 MMOL/L (ref 98–107)
COLOR UR AUTO: COLORLESS
CREAT SERPL-MCNC: 1.34 MG/DL (ref 0.67–1.17)
CREAT UR-MCNC: 26.5 MG/DL
DEPRECATED HCO3 PLAS-SCNC: 25 MMOL/L (ref 22–29)
ERYTHROCYTE [DISTWIDTH] IN BLOOD BY AUTOMATED COUNT: 14 % (ref 10–15)
GFR SERPL CREATININE-BSD FRML MDRD: 61 ML/MIN/1.73M2
GLUCOSE SERPL-MCNC: 196 MG/DL (ref 70–99)
GLUCOSE UR STRIP-MCNC: NEGATIVE MG/DL
HCT VFR BLD AUTO: 30.7 % (ref 40–53)
HGB BLD-MCNC: 10.3 G/DL (ref 13.3–17.7)
HGB UR QL STRIP: NEGATIVE
KETONES UR STRIP-MCNC: NEGATIVE MG/DL
LEUKOCYTE ESTERASE UR QL STRIP: NEGATIVE
MCH RBC QN AUTO: 27.5 PG (ref 26.5–33)
MCHC RBC AUTO-ENTMCNC: 33.6 G/DL (ref 31.5–36.5)
MCV RBC AUTO: 82 FL (ref 78–100)
NITRATE UR QL: NEGATIVE
PH UR STRIP: 7.5 [PH] (ref 5–7)
PLATELET # BLD AUTO: 94 10E3/UL (ref 150–450)
POTASSIUM SERPL-SCNC: 4.8 MMOL/L (ref 3.4–5.3)
PROT/CREAT 24H UR: NORMAL MG/G{CREAT}
RBC # BLD AUTO: 3.75 10E6/UL (ref 4.4–5.9)
SODIUM SERPL-SCNC: 136 MMOL/L (ref 136–145)
SP GR UR STRIP: 1.01 (ref 1–1.03)
UROBILINOGEN UR STRIP-MCNC: <2 MG/DL
WBC # BLD AUTO: 4.2 10E3/UL (ref 4–11)

## 2023-06-29 PROCEDURE — 81003 URINALYSIS AUTO W/O SCOPE: CPT

## 2023-06-29 PROCEDURE — 80048 BASIC METABOLIC PNL TOTAL CA: CPT

## 2023-06-29 PROCEDURE — 85027 COMPLETE CBC AUTOMATED: CPT

## 2023-06-29 PROCEDURE — 85041 AUTOMATED RBC COUNT: CPT

## 2023-06-29 PROCEDURE — 36415 COLL VENOUS BLD VENIPUNCTURE: CPT

## 2023-06-29 PROCEDURE — 84156 ASSAY OF PROTEIN URINE: CPT

## 2023-06-30 ENCOUNTER — TELEPHONE (OUTPATIENT)
Dept: TRANSPLANT | Facility: CLINIC | Age: 58
End: 2023-06-30
Payer: COMMERCIAL

## 2023-06-30 NOTE — TELEPHONE ENCOUNTER
ISSUE:  Creatinine above baseline but improving.  Other labs are stable.    PLAN:  MyChart message to pt to remind him to maintain adequate hydration, re-check labs in 1-2 weeks.

## 2023-07-01 NOTE — PROGRESS NOTES
TRANSPLANT NEPHROLOGY EARLY POST TRANSPLANT VISIT    Assessment & Plan   # LDKT: Increased in creatinine, possibly due to supra therapeutic tacrolimus level.  If creatinine doesn't improve with getting tacrolimus at goal, would consider a kidney transplant biopsy.   - Baseline Creatinine: ~ 0.8-1.0   - Proteinuria: Normal (<0.2 grams),    - Date DSA Last Checked: Jun/2023      Latest DSA: No cPRA: 27%   - BK Viremia: No   - Kidney Tx Biopsy: No   - Transplant Ureteral Stent: Removed    # Immunosuppression: Tacrolimus immediate release (goal 8-10) and Mycophenolate mofetil (dose 750 mg every 12 hours)   - Induction with Recent Transplant:  Intermediate Intensity   - Continue with intensive monitoring of immunosuppression for efficacy and toxicity.   - Changes: Not at this time, but tacrolimus dose recently reduced for high tacrolimus level.    # Infection Prophylaxis:   - PJP: Sulfa/TMP (Bactrim)  - CMV: None, prophylaxis completed; CMV IgG Ab positive    # Hypertension: Borderline control;  Goal BP: < 130/80   - Volume status: Euvolemic     - Changes: Yes - Will increase amlodipine to 10 mg nightly.    # Diabetes: Borderline control (HbA1c 7-9%) Last HbA1c: 7.5%   - Management as per primary care    # Anemia in Chronic Renal Disease: Hgb: Stable      NERY: No   - Iron studies: Replete    # Thrombocytopenia: Stable, mildly low platelet level in the ~ 100-140s.  Patient does have splenomegaly.    # Mineral Bone Disorder:   - Secondary renal hyperparathyroidism; PTH level: Normal (15-65 pg/ml)        On treatment: None  - Vitamin D; level: Normal        On supplement: Yes  - Calcium; level: Normal        On supplement: Yes    # Electrolytes:   - Potassium; level: Normal        On supplement: No  - Magnesium; level: Normal        On supplement: Yes  - Bicarbonate; level: Normal        On supplement: Yes    # CAD, s/p PCI: Asymptomatic.    # Asymmetric Left Ventricular Hypertrophy (HOCM): Stable. No history of sustained  ventricular arrhythmias.     # Hepatitis B Core Ab +: Patient with core Ab positive, but negative Ag and also negative surface Ab.  Hep B DNA was negative at time of transplant, as well as repeat at 4 months post transplant.     # Peripheral Neuropathy: Stable symptoms.    # Medical Compliance: Yes    # Health Maintenance and Vaccination Review: Not Reviewed    # Transplant History:  Etiology of Kidney Failure: Diabetes mellitus type 2  Tx: LDKT  Transplant: 2/21/2023 (Kidney)  Donor Type: Living Donor Class:   Crossmatch at time of Tx: negative  DSA at time of Tx: No  Significant changes in immunosuppression: None  CMV IgG Ab High Risk Discordance (D+/R-): No  EBV IgG Ab High Risk Discordance (D+/R-): No  Significant transplant-related complications: None    Transplant Office Phone Number: [phone]    Assessment and plan was discussed with the patient and he voiced his understanding and agreement.    Return visit: Return for 6 month post transplant visit.    Patrick Phan MD     Chief Complaint   Mr. Barbosa is a 57 year old here for routine follow up, kidney transplant and immunosuppression management.     History of Present Illness    Mr. Barbosa reports feeling good overall with some medical complaints.  Since last clinic visit, patient reports no hospitalizations or new medical complaints and has been doing well overall.  His energy level continues to improve, now pretty much normal.  He is active and does get some exercise.  Denies any chest pain or shortness of breath with exertion.  No leg swelling.  Stable peripheral neuropathy.    Appetite is good and his weight is up ~ 5 lbs.  No nausea, vomiting or diarrhea with regular bowel movements.  No heartburn symptoms.  No fever, sweats or chills.  No night sweats.  No problems emptying his bladder.    Home BP: 130-150 systolic, frequently 130/50s with no lightheadedness.; His blood pressure is much higher as this clinic visit as he hasn't taken his  medications yet.    Problem List   Patient Active Problem List   Diagnosis     Type 2 Diabetes Mellitus     Dyslipidemia, goal LDL below 70     HTN, kidney transplant related     Metabolic acidosis     Coronary artery disease involving native coronary artery of native heart without angina pectoris     Thrombocytopenia (H)     Immunosuppressed status (H)     Kidney replaced by transplant     Anemia in chronic renal disease     Hypocalcemia     Hypomagnesemia     Hepatitis B core antibody positive     Abnormal ankle brachial index     Aftercare following organ transplant     Need for pneumocystis prophylaxis     CKD (chronic kidney disease) stage 2, GFR 60-89 ml/min     Vitamin D deficiency     Secondary renal hyperparathyroidism (H)       Allergies   Allergies   Allergen Reactions     Tegaderm Transparent Dressing (Informational Only) Blisters       Medications   Current Outpatient Medications   Medication Sig     acetaminophen (TYLENOL) 325 MG tablet Take 2 tablets (650 mg) by mouth every 4 hours as needed for other (For optimal non-opioid multimodal pain management to improve pain control.)     amLODIPine (NORVASC) 10 MG tablet Take 1 tablet (10 mg) by mouth At Bedtime     aspirin (ASA) 81 MG EC tablet Take 1 tablet (81 mg) by mouth daily     atorvastatin (LIPITOR) 20 MG tablet Take 1 tablet (20 mg) by mouth daily     blood glucose (NO BRAND SPECIFIED) test strip Use to test blood sugar 3 times daily or as directed. To accompany: Blood Glucose Monitor Brands: per insurance.     blood glucose monitoring (ONE TOUCH ULTRA 2) meter device kit USE TO TEST BLOOD SUGAR THREE TIMES DAILY OR AS DIRECTED.     calcium carbonate (TUMS) 500 MG chewable tablet Take 2 tablets (1,000 mg) by mouth 2 times daily     carvedilol (COREG) 25 MG tablet Take 0.5 tablets (12.5 mg) by mouth 2 times daily (with meals)     gabapentin (NEURONTIN) 100 MG capsule Take 1-3 capsules (100-300 mg) by mouth 2 times daily     insulin aspart (NOVOLOG  "PEN) 100 UNIT/ML pen Before breaskfast and lunch, give 7 units. Before dinner give 8 units.     insulin detemir (LEVEMIR PEN) 100 UNIT/ML pen 24 units at bedtime     loratadine (CLARITIN) 10 MG tablet Take 1 tablet (10 mg) by mouth daily as needed for allergies (itchy/rash)     magnesium oxide (MAG-OX) 400 MG tablet Take 1 tablet (400 mg) by mouth 2 times daily     mycophenolate (GENERIC EQUIVALENT) 250 MG capsule Take 3 capsules (750 mg) by mouth 2 times daily for 360 days     nitroGLYcerin (NITROSTAT) 0.4 MG sublingual tablet One tablet under the tongue every 5 minutes if needed for chest pain. May repeat every 5 minutes for a maximum of 3 doses in 15 minutes\"     sodium bicarbonate 650 MG tablet Take 2 tablets (1,300 mg) by mouth 2 times daily     sulfamethoxazole-trimethoprim (BACTRIM) 400-80 MG tablet Take 1 tablet by mouth daily     tacrolimus (GENERIC EQUIVALENT) 0.5 MG capsule Take 1 capsule (0.5 mg) by mouth 2 times daily Take 2.5 mg in am and 2.0 mg in pm     tacrolimus (GENERIC EQUIVALENT) 1 MG capsule Take 2 capsules (2 mg) by mouth 2 times daily Total dose = 2.5 mg in and 2.0  Mg in pm     thin (NO BRAND SPECIFIED) lancets Tests 3x daily Use with lanceting device. To accompany: Blood Glucose Monitor Brands: per insurance.     Vitamin D3 (CHOLECALCIFEROL) 25 mcg (1000 units) tablet Take 2 tablets (50 mcg) by mouth daily     valGANciclovir (VALCYTE) 450 MG tablet Take 2 tablets (900 mg) by mouth daily for 90 days     No current facility-administered medications for this visit.     Medications Discontinued During This Encounter   Medication Reason     amLODIPine (NORVASC) 5 MG tablet        Physical Exam   Vital Signs: BP (!) 148/67   Pulse 65   Ht 1.626 m (5' 4\")   Wt 63.1 kg (139 lb 3.2 oz)   BMI 23.89 kg/m      GENERAL APPEARANCE: alert and no distress  HENT: mouth without ulcers or lesions  LYMPHATICS: no cervical or supraclavicular nodes  RESP: lungs clear to auscultation - no rales, rhonchi or " wheezes  CV: regular rhythm, normal rate, no rub, 2.6 systolic murmur  EDEMA: no LE edema bilaterally  ABDOMEN: soft, nondistended, nontender, bowel sounds normal  MS: extremities normal - no gross deformities noted, no evidence of inflammation in joints, no muscle tenderness  SKIN: no rash  TX KIDNEY: normal  DIALYSIS ACCESS: none    Data         Latest Ref Rng & Units 6/29/2023     7:15 AM 6/26/2023     8:08 AM 6/19/2023     9:45 AM   Renal   Sodium 136 - 145 mmol/L 136  138  136    K 3.4 - 5.3 mmol/L 4.8  4.8  4.6    Cl 98 - 107 mmol/L 101  104  103    Cl (external) 98 - 107 mmol/L 101  104  103    CO2 22 - 29 mmol/L 25  24  22    Urea Nitrogen 6.0 - 20.0 mg/dL 21.3  25.9  19.3    Creatinine 0.67 - 1.17 mg/dL 1.34  1.42  1.03    Glucose 70 - 99 mg/dL 196  188  131    Calcium 8.6 - 10.0 mg/dL 9.9  9.4  9.2    Magnesium 1.7 - 2.3 mg/dL  1.9           Latest Ref Rng & Units 6/26/2023     8:08 AM 6/19/2023     9:45 AM 6/12/2023     8:30 AM   Bone Health   Phosphorus 2.5 - 4.5 mg/dL 4.1   3.1    Parathyroid Hormone Intact 15 - 65 pg/mL  29     Vit D Def 20 - 75 ug/L 42            Latest Ref Rng & Units 6/29/2023     7:15 AM 6/26/2023     8:08 AM 6/19/2023     9:45 AM   Heme   WBC 4.0 - 11.0 10e3/uL 4.2  4.2  4.3    Hgb 13.3 - 17.7 g/dL 10.3  10.8  10.2    Plt 150 - 450 10e3/uL 94  105  113          Latest Ref Rng & Units 4/7/2023     8:10 AM 4/3/2023     8:10 AM 3/30/2023     7:35 AM   Liver   AP 45 - 120 U/L 74  69  66    TBili 0.0 - 1.0 mg/dL 0.7  0.8  0.7    Bilirubin Direct 0.00 - 0.30 mg/dL  0.22  <0.20    ALT 0 - 45 U/L 10  12  15    AST 0 - 40 U/L 12  14  14    Tot Protein 6.0 - 8.0 g/dL 6.3  6.1  5.7    Albumin 3.5 - 5.0 g/dL 3.7      Albumin 3.5 - 5.2 g/dL  3.8  3.9          Latest Ref Rng & Units 6/26/2023     8:08 AM 2/22/2023     2:03 AM 2/21/2023     7:42 PM   Pancreas   A1C <5.7 % 7.5  6.8  6.8          Latest Ref Rng & Units 6/26/2023     8:08 AM 6/19/2023     9:45 AM 4/28/2023     8:36 AM   Iron  studies   Iron 61 - 157 ug/dL 47  54  53    Iron Sat Index 15 - 46 % 28  31  36    Ferritin 31 - 409 ng/mL 545  540  943          Latest Ref Rng & Units 5/1/2023     7:15 AM 2/21/2023     1:00 PM 2/16/2023    12:26 PM   [facility] Vi[facility]   CMV QUANT IU/ML Not Detected IU/mL  Not Detected     EBV CAPSID ANTIBODY IGG No detectable antibody.   Positive    EBV DNA COPIES/ML Not Detected copies/mL <500      EBV DNA LOG OF COPIES  <2.7           Recent Labs   Lab Test 05/22/23  0850 05/30/23  0815 06/12/23  0830 06/19/23  0945 06/26/23  0808   DOSTAC 5/21/2023  --   --  6/18/2023 6/25/2023   TACROL 9.4   < > 6.8 6.7 10.8    < > = values in this interval not displayed.     Recent Labs   Lab Test 03/02/23  0833 03/18/23  0913 04/13/23  0803 05/01/23  0715 05/16/23  0902   DOSMPA 3/1/2023   8:00 PM  --   --  4/30/2023   8:00 AM  --    MPACID 1.48   < > 3.94* 1.60 2.30   MPAG 41.0   < > 60.6 65.3 53.7    < > = values in this interval not displayed.

## 2023-07-05 ENCOUNTER — ALLIED HEALTH/NURSE VISIT (OUTPATIENT)
Dept: EDUCATION SERVICES | Facility: CLINIC | Age: 58
End: 2023-07-05
Payer: COMMERCIAL

## 2023-07-05 ENCOUNTER — LAB (OUTPATIENT)
Dept: LAB | Facility: HOSPITAL | Age: 58
End: 2023-07-05
Payer: COMMERCIAL

## 2023-07-05 ENCOUNTER — TELEPHONE (OUTPATIENT)
Dept: TRANSPLANT | Facility: CLINIC | Age: 58
End: 2023-07-05

## 2023-07-05 VITALS — BODY MASS INDEX: 23.96 KG/M2 | WEIGHT: 139.6 LBS

## 2023-07-05 DIAGNOSIS — Z48.298 AFTERCARE FOLLOWING ORGAN TRANSPLANT: ICD-10-CM

## 2023-07-05 DIAGNOSIS — E11.9 DIABETES MELLITUS (H): Primary | ICD-10-CM

## 2023-07-05 DIAGNOSIS — Z94.0 KIDNEY REPLACED BY TRANSPLANT: ICD-10-CM

## 2023-07-05 DIAGNOSIS — Z79.899 ENCOUNTER FOR LONG-TERM CURRENT USE OF MEDICATION: ICD-10-CM

## 2023-07-05 DIAGNOSIS — Z20.828 CONTACT WITH AND (SUSPECTED) EXPOSURE TO OTHER VIRAL COMMUNICABLE DISEASES: ICD-10-CM

## 2023-07-05 LAB
ANION GAP SERPL CALCULATED.3IONS-SCNC: 9 MMOL/L (ref 7–15)
BUN SERPL-MCNC: 24.6 MG/DL (ref 6–20)
CALCIUM SERPL-MCNC: 9.3 MG/DL (ref 8.6–10)
CHLORIDE SERPL-SCNC: 101 MMOL/L (ref 98–107)
CREAT SERPL-MCNC: 1.3 MG/DL (ref 0.67–1.17)
DEPRECATED HCO3 PLAS-SCNC: 24 MMOL/L (ref 22–29)
ERYTHROCYTE [DISTWIDTH] IN BLOOD BY AUTOMATED COUNT: 13.8 % (ref 10–15)
GFR SERPL CREATININE-BSD FRML MDRD: 64 ML/MIN/1.73M2
GLUCOSE SERPL-MCNC: 147 MG/DL (ref 70–99)
HCT VFR BLD AUTO: 31.8 % (ref 40–53)
HGB BLD-MCNC: 10.6 G/DL (ref 13.3–17.7)
MAGNESIUM SERPL-MCNC: 1.8 MG/DL (ref 1.7–2.3)
MCH RBC QN AUTO: 27.1 PG (ref 26.5–33)
MCHC RBC AUTO-ENTMCNC: 33.3 G/DL (ref 31.5–36.5)
MCV RBC AUTO: 81 FL (ref 78–100)
PHOSPHATE SERPL-MCNC: 3.7 MG/DL (ref 2.5–4.5)
PLATELET # BLD AUTO: 132 10E3/UL (ref 150–450)
POTASSIUM SERPL-SCNC: 4.8 MMOL/L (ref 3.4–5.3)
RBC # BLD AUTO: 3.91 10E6/UL (ref 4.4–5.9)
SODIUM SERPL-SCNC: 134 MMOL/L (ref 136–145)
TACROLIMUS BLD-MCNC: 7.6 UG/L (ref 5–15)
TME LAST DOSE: NORMAL H
TME LAST DOSE: NORMAL H
WBC # BLD AUTO: 4.6 10E3/UL (ref 4–11)

## 2023-07-05 PROCEDURE — 87799 DETECT AGENT NOS DNA QUANT: CPT

## 2023-07-05 PROCEDURE — 84100 ASSAY OF PHOSPHORUS: CPT

## 2023-07-05 PROCEDURE — 85027 COMPLETE CBC AUTOMATED: CPT

## 2023-07-05 PROCEDURE — G0108 DIAB MANAGE TRN  PER INDIV: HCPCS | Performed by: DIETITIAN, REGISTERED

## 2023-07-05 PROCEDURE — 80197 ASSAY OF TACROLIMUS: CPT

## 2023-07-05 PROCEDURE — 36415 COLL VENOUS BLD VENIPUNCTURE: CPT

## 2023-07-05 PROCEDURE — 80048 BASIC METABOLIC PNL TOTAL CA: CPT

## 2023-07-05 PROCEDURE — 83735 ASSAY OF MAGNESIUM: CPT

## 2023-07-05 NOTE — PROGRESS NOTES
Diabetes Self-Management Education & Support    Presents for:      Type of Service: In Person Visit    Assessment Type:     ASSESSMENT:  Follow up visit for diabetes education, conducted with the help of a professional .  Pt has great support from family, especially his daughter, Chelsey who is a public health nurse and does home care visits.  PMH: ESRD from T2D  s/p kidney transplant on 23. Being managed by MTM and Endo.    Pt denies any s/s hypoglycemia.  Endorses tingling/numbness in toes (bilateral). Gabapentin was increased . Denies any other s/s of hyperglycemia.  Reports feeling well overall.     Pt appropriately takin units levemir at HS  novolog, 7 units before AM meal and 8 units before PM meal    Per the SMBG data, pt's BG continues to stay elevated. Meal time insulin was titrated upwards most recently on .  Writer used a sample insulin pen to review insulin basics and injection technique. Discussed storage, priming, sharps, new needle each use, site selection, holding the pen in place, action of insulin and hypoglycemia signs, symptoms and treatment. Patient verbalized understanding.    Pt also notes regular intake of apple juice and a sweetened drink/mix (with ginseng and sugar), he could not share any further details      139.6 () >134 lb () < 150 lb (3/7) > 135 ( - was on lasix).    SMBG:  Testing 3x/d, BG  - :  FB, 130, 191, 132, 201, 136, 210, 142, 134, 192, 192, 209, 158, 220, 128  2 hours after lunch:161, 114, 188, 171, 198, 161, 183, 173, 155, 194, 171, 201, 191, 146  2 hours after dinner: 301, 146, 282, 200, 220, 223, 195, 188, 198, 218, 201, 206, 211, 122  Lows:none, denies any s/s    We will go ahead and make insulin adjustments today to address elevated BG. Pt will also continue to focus on mindful intake and watch CHO foods and portions. Will also provide written instructions to patient today.  Updated insulin prescription:  27 units of  Levemir at bed time daily  7 units of novolog before lunch.   9 units of novolog before dinner.     No change in appetite  Does 2 meals daily. Meals typically consist of brown rice, protein (beef, fish) and veg.  Drinks ~ 2 litres water daily  Writer also used fake food/food models to review healthy, well proportioned meals.      Uses his exercise bike daily/goes for walks     Education/Discussion: Reviewed diabetes basics, AIC and BS goals, sx and tx of hypo and hyperglycemia, general activity and diet guidelines, CHO foods, insulin basics and injection technique: pt expressed understanding.    Patient's most recent   Lab Results   Component Value Date    A1C 7.5 06/26/2023     is not meeting goal of <7.0    PLAN    Take meds as prescribed. Your updated insulin prescription:  27 units of Levemir at bed time daily  7 units of novolog before lunch.   9 units of novolog before dinner.     Continue to test BG 3x/d:  Fasting blood sugar and 2 hours after am and pm meals.    Eat an early supper so you are not going to bed right after eating.    QUIT drinking sweetened ginseng drink and apple juice. Drink more water.    Always bring your meter, BG log and med vials to all clinic visits     Please call if 3 high or 2 low BS (or s/s of lows) in a 7- day period.    Topics to cover at upcoming visits: Healthy Eating, Monitoring, Taking Medication, Problem Solving and Reducing Risks     Follow-up: 4-6 weeks (or sooner if concerns)  Instructed pt to always bring their meter, BG log and med vials to all clinic visits - pt expressed understanding    See Care Plan for co-developed, patient-state behavior change goals.  AVS provided for patient today.    SUBJECTIVE/OBJECTIVE:     Cultural Influences/Ethnic Background:  Not  or     Diabetes Symptoms & Complications:    Patient Problem List and Family Medical History reviewed for relevant medical history, current medical status, and diabetes risk factors.    Vitals:  Wt  "63.3 kg (139 lb 9.6 oz)   BMI 23.96 kg/m    Estimated body mass index is 23.96 kg/m  as calculated from the following:    Height as of 6/28/23: 1.626 m (5' 4\").    Weight as of this encounter: 63.3 kg (139 lb 9.6 oz).   Last 3 BP:   BP Readings from Last 3 Encounters:   06/28/23 (!) 148/67   06/26/23 122/67   05/03/23 (!) 156/74       History   Smoking Status     Never   Smokeless Tobacco     Never       Labs:  Lab Results   Component Value Date    A1C 7.5 06/26/2023     Lab Results   Component Value Date     07/05/2023     04/07/2023     04/07/2023     Lab Results   Component Value Date    LDL 53 11/17/2022     Direct Measure HDL   Date Value Ref Range Status   11/17/2022 22 (L) >=40 mg/dL Final   ]  GFR Estimate   Date Value Ref Range Status   07/05/2023 64 >60 mL/min/1.73m2 Final   06/14/2021 15 (L) >60 mL/min/1.73m2 Final     GFR Estimate If Black   Date Value Ref Range Status   06/14/2021 18 (L) >60 mL/min/1.73m2 Final     Lab Results   Component Value Date    CR 1.30 07/05/2023     No results found for: MICROALBUMIN    Taking Medications:  Diabetes Medication(s)     Insulin       insulin aspart (NOVOLOG PEN) 100 UNIT/ML pen    Before breaskfast and lunch, give 7 units. Before dinner give 8 units.     insulin detemir (LEVEMIR PEN) 100 UNIT/ML pen    24 units at bedtime           Patient Activation Measure Survey Score:       No data to display                Time Spent: 90 minutes  Encounter Type: Individual    Any diabetes medication dose changes were made via the CDE Protocol per the patient's referring provider. A copy of this encounter was shared with the provider.    "

## 2023-07-05 NOTE — PATIENT INSTRUCTIONS
Take meds as prescribed. Your updated insulin prescription:  27 units of Levemir at bed time daily  7 units of novolog before lunch.   9 units of novolog before dinner.     Continue to test BG 3x/d:  Fasting blood sugar and 2 hours after am and pm meals.    Eat an early supper so you are not going to bed right after eating.    QUIT drinking sweetened ginseng drink and apple juice. Drink more water.    Always bring your meter, BG log and med vials to all clinic visits     Please call if 3 high or 2 low BS (or s/s of lows) in a 7- day period.

## 2023-07-05 NOTE — TELEPHONE ENCOUNTER
ISSUE:  Creatinine 1.3, remains above baseline (documneted in 6/2023 follow up visit note) of 0.8 - 1.0.  Bx 5/3/23 (ATN)  No IV hydration      PLAN:  Assess for hydration status, review need for biopsy with MD.

## 2023-07-05 NOTE — LETTER
2023         RE: Modesto Barbosa  [address] [place] [address]        Dear Colleague,    Thank you for referring your patient, Modesto Barbosa, to the [facility]. Please see a copy of my visit note below.      Diabetes Self-Management Education & Support    Presents for:      Type of Service: In Person Visit    Assessment Type:     ASSESSMENT:  Follow up visit for diabetes education, conducted with the help of a professional .  Has great support from family, especially his daughter, Chelsey who is a public health nurse and does home care visits.  PMH: ESRD from T2D  s/p kidney transplant on 23  Being managed by MTM and Endo    Pt denies any s/s hypoglycemia.  Endorses tingling/numbness in toes, bilaterally. Gabapentin was increased . Denies any other s/s of hyperglycemia.  Reports feeling well overall    Per the SMBG data, pt's BG continues to stay elevated. Meal time insulin was titrated upwards most recently on .  Writer used a sample insulin pen to review insulin basics and injection technique. Discussed storage, priming, sharps, new needle each use, site selection, holding the pen in place, action of insulin and hypoglycemia signs, symptoms and treatment. Patient verbalized understanding.    Pt notes regular intake of apple juice and a sweetened drink/mix (with ginseng and sugar), he could not share any further details      139.6 () >134 lb () < 150 lb (3/7) > 135 ( - was on lasix).     Pt appropriately takin units levemir at   novolog, 7 units before AM meal and 8 units before PM meal     SMBG:  Testing 3x/d, BG  - :  FB, 130, 191, 132, 201, 136, 210, 142, 134, 192, 192, 209, 158, 220, 128  2 hours after lunch:161, 114, 188, 171, 198, 161, 183, 173, 155, 194, 171, 201, 191, 146  2 hours after dinner: 301, 146, 282, 200, 220, 223, 195, 188, 198, 218, 201, 206, 211, 122  Lows:none, denies any s/s    We will go ahead and make  insulin adjustments today to address elevated BG. Pt will also continue to focus on mindful intake and watch CHO foods and portions. Will also provide written instructions to patient.  Updated insulin prescription:  27 units of Levemir at bed time daily  7 units of novolog before lunch.   9 units of novolog before dinner.     No change in appetite  Does 2 meals daily. Meals typically consist of brown rice, protein (beef, fish) and veg.  Drinks ~ 2 litres water daily  Writer also used fake food/food models to review healthy, well proportioned meals.      Uses his exercise bike daily/goes for walks     Education/Discussion: Reviewed diabetes basics, AIC and BS goals, sx and tx of hypo and hyperglycemia, general activity and diet guidelines, CHO foods, insulin basics and injection technique: pt expressed understanding.    Patient's most recent   Lab Results   Component Value Date    A1C 7.5 06/26/2023     is not meeting goal of <7.0    PLAN    Take meds as prescribed. Your updated insulin prescription:  27 units of Levemir at bed time daily  7 units of novolog before lunch.   9 units of novolog before dinner.     Continue to test BG 3x/d:  Fasting blood sugar and 2 hours after am and pm meals.    Eat an early supper so you are not going to bed right after eating.    QUIT drinking sweetened ginseng drink and apple juice. Drink more water.    Always bring your meter, BG log and med vials to all clinic visits     Please call if 3 high or 2 low BS (or s/s of lows) in a 7- day period.    Topics to cover at upcoming visits: Healthy Eating, Monitoring, Taking Medication, Problem Solving and Reducing Risks     Follow-up: 4-6 weeks (or sooner if concerns)  Instructed pt to always bring their meter, BG log and med vials to all clinic visits - pt expressed understanding    See Care Plan for co-developed, patient-state behavior change goals.  AVS provided for patient today.    SUBJECTIVE/OBJECTIVE:     Cultural Influences/Ethnic  "Background:  Not  or     Diabetes Symptoms & Complications:    Patient Problem List and Family Medical History reviewed for relevant medical history, current medical status, and diabetes risk factors.    Vitals:  Wt 63.3 kg (139 lb 9.6 oz)   BMI 23.96 kg/m    Estimated body mass index is 23.96 kg/m  as calculated from the following:    Height as of 6/28/23: 1.626 m (5' 4\").    Weight as of this encounter: 63.3 kg (139 lb 9.6 oz).   Last 3 BP:   BP Readings from Last 3 Encounters:   06/28/23 (!) 148/67   06/26/23 122/67   05/03/23 (!) 156/74       History   Smoking Status     Never   Smokeless Tobacco     Never       Labs:  Lab Results   Component Value Date    A1C 7.5 06/26/2023     Lab Results   Component Value Date     07/05/2023     04/07/2023     04/07/2023     Lab Results   Component Value Date    LDL 53 11/17/2022     Direct Measure HDL   Date Value Ref Range Status   11/17/2022 22 (L) >=40 mg/dL Final   ]  GFR Estimate   Date Value Ref Range Status   07/05/2023 64 >60 mL/min/1.73m2 Final   06/14/2021 15 (L) >60 mL/min/1.73m2 Final     GFR Estimate If Black   Date Value Ref Range Status   06/14/2021 18 (L) >60 mL/min/1.73m2 Final     Lab Results   Component Value Date    CR 1.30 07/05/2023     No results found for: MICROALBUMIN    Taking Medications:  Diabetes Medication(s)     Insulin       insulin aspart (NOVOLOG PEN) 100 UNIT/ML pen    Before breaskfast and lunch, give 7 units. Before dinner give 8 units.     insulin detemir (LEVEMIR PEN) 100 UNIT/ML pen    24 units at bedtime           Patient Activation Measure Survey Score:       No data to display                Time Spent: 90 minutes  Encounter Type: Individual    Any diabetes medication dose changes were made via the CDE Protocol per the patient's referring provider. A copy of this encounter was shared with the provider.          "

## 2023-07-06 DIAGNOSIS — Z94.0 KIDNEY REPLACED BY TRANSPLANT: Primary | ICD-10-CM

## 2023-07-06 LAB
BKV DNA # SPEC NAA+PROBE: NOT DETECTED COPIES/ML
EBV DNA # SPEC NAA+PROBE: <500 COPIES/ML
EBV DNA SPEC NAA+PROBE-LOG#: <2.7 {LOG_COPIES}/ML

## 2023-07-06 RX ORDER — TACROLIMUS 1 MG/1
2 CAPSULE ORAL 2 TIMES DAILY
Qty: 360 CAPSULE | Refills: 3 | Status: SHIPPED | OUTPATIENT
Start: 2023-07-06 | End: 2023-07-26

## 2023-07-06 RX ORDER — TACROLIMUS 0.5 MG/1
0.5 CAPSULE ORAL 2 TIMES DAILY
Qty: 180 CAPSULE | Refills: 3 | Status: SHIPPED | OUTPATIENT
Start: 2023-07-06 | End: 2023-07-26

## 2023-07-06 NOTE — TELEPHONE ENCOUNTER
ISSUE: tac below goal     PLAN/LPN TASK:     Please call pt with following message:    Your recent  Tacrolimus IR drug level was 7.6.    Please confirm this was an accurate 12-hour trough and verify your current Tacrolimus IR dose of 2 mg in the am and 1.5 mg in the pm.  If both are accurate, please increase your  Tacrolimus IR dose to 2 mg  BID and repeat labs in 1 weeks.

## 2023-07-06 NOTE — TELEPHONE ENCOUNTER
Spoke to patients daughter who confirms this was an accurate 12-hour trough and verify your current Tacrolimus IR dose of 2.5/2 mg BID.  Patient cofnirms increase your  Tacrolimus IR dose to 2.5 mg  BID and repeat labs in 1 weeks.

## 2023-07-07 ENCOUNTER — APPOINTMENT (OUTPATIENT)
Dept: INTERPRETER SERVICES | Facility: CLINIC | Age: 58
End: 2023-07-07
Payer: COMMERCIAL

## 2023-07-07 ENCOUNTER — VIRTUAL VISIT (OUTPATIENT)
Dept: PHARMACY | Facility: CLINIC | Age: 58
End: 2023-07-07
Payer: COMMERCIAL

## 2023-07-07 ENCOUNTER — TELEPHONE (OUTPATIENT)
Dept: CARDIOLOGY | Facility: CLINIC | Age: 58
End: 2023-07-07
Payer: COMMERCIAL

## 2023-07-07 DIAGNOSIS — Z94.0 HTN, KIDNEY TRANSPLANT RELATED: ICD-10-CM

## 2023-07-07 DIAGNOSIS — E11.69 TYPE 2 DIABETES MELLITUS WITH OTHER SPECIFIED COMPLICATION, UNSPECIFIED WHETHER LONG TERM INSULIN USE (H): ICD-10-CM

## 2023-07-07 DIAGNOSIS — E78.5 DYSLIPIDEMIA, GOAL LDL BELOW 70: ICD-10-CM

## 2023-07-07 DIAGNOSIS — G62.9 NEUROPATHY: ICD-10-CM

## 2023-07-07 DIAGNOSIS — I15.1 HTN, KIDNEY TRANSPLANT RELATED: ICD-10-CM

## 2023-07-07 DIAGNOSIS — Z94.0 KIDNEY REPLACED BY TRANSPLANT: Primary | ICD-10-CM

## 2023-07-07 PROCEDURE — 99606 MTMS BY PHARM EST 15 MIN: CPT | Performed by: PHARMACIST

## 2023-07-07 PROCEDURE — 99607 MTMS BY PHARM ADDL 15 MIN: CPT | Performed by: PHARMACIST

## 2023-07-07 NOTE — TELEPHONE ENCOUNTER
Last Blood Pressure: 148/67  Last Heart Rate: 65  Date: 6/28/23  Location: Other Specialty    Today's Blood Pressure: 111/68  Today's Heart Rate: 78  Location: Home BP    Patient reported blood pressure updated in Epic. Blood pressure falls within [place] Community Measures guidelines.  Patient will follow up as previously advised.

## 2023-07-07 NOTE — PROGRESS NOTES
Medication Therapy Management (MTM) Encounter    ASSESSMENT:                            Medication Adherence/Access: No issues identified    Kidney Transplant:  Patient got all his vaccinations a little bit early (like to wait 6 months post transplant typically), but will be due for Shingrix in 2 months. No medical indication for Tums at this time, likely was being used as a phos binder pre transplant, and to treat hypocalcemia post. Calcium levels normal.     Type 2 Diabetes:  PCP and diabetes educator titrating insulin, still somewhat elevated. Recommended he discuss oral medications at next visit with them, may provide additional cardiovascular benefit beyond just insulin use (metformin, SGLT-2 inhibitors, GLP1 agonists etc).    Hypertension: BP at goal at home <140/90, near goal of 130/80. Some home BPs still going melissa 140 since increasing Amlodipine. Recommended he sit for 5 minutes if this occurs.     Hyperlipidemia: Stable.     Neuropathy: Stable.     PLAN:                            1. Stop Tums.   2. Talk to Dr. Cooper and Diabetes educator about oral medications for diabetes  3. Anytime you have a BP over 140/90 at home, sit for 5 minutes and recheck again.   4. Due for 2nd Shingrix vaccine after 8/23.     Follow-up: as needed    SUBJECTIVE/OBJECTIVE:                          Modesto Barbosa is a 57 year old male called for a follow-up visit. Patient was accompanied by Yanick. Today's visit is a follow-up MTM visit from 5/1     Reason for visit: 4 months post transplant.    Allergies/ADRs: Reviewed in chart  Past Medical History: Reviewed in chart  Tobacco: He reports that he has never smoked. He has never been exposed to tobacco smoke. He has never used smokeless tobacco.  Alcohol: not currently using    Medication Adherence/Access: Missed a 1 mg capsule a few weeks ago since it was stuck the med box. No other missed doses.     Kidney Transplant:  Current immunosuppressants include Tacrolimus 2.5mg every morning  and 2.5mg every evening, Mycophenolate Mofetil 750mg twice daily.  Pt reports numbness and tingling in feet.  Transplant date: 2/21/23  Estimated Creatinine Clearance: 55.5 mL/min (A) (based on SCr of 1.3 mg/dL (H)).  CMV prophylaxis: treated 3 months post tx   PJP prophylaxis: Bactrim S S daily  Vascular prophylaxis: Aspirin 81mg daily (denies gastrointestinal bleed sx)  Supplements: Mag Oxide 400mg twice daily ( 2 hours from MMF), Sodium Bicarb 1300mg twice daily, vitamin D3 50mcg daily, Calcium 1000mg twice daily  Tx Coordinator: Shalonda Roy Tx MD: Dr. Kim, Using Med Card: Yes  Immunizations: annual flu shot 2022; Prevnar 13: 2021, Prevnar 20: 2023; TDaP:2023x1; Shingrix: 2023x1, HBV: no immunity, but core positive, COVID: Moderna 2021x3, Bivalent x1  Lab Results   Component Value Date    MAG 1.8 07/05/2023    MAG 1.9 06/26/2023    MAG 1.6 (L) 06/12/2023    CO2 24 07/05/2023    CO2 25 06/29/2023    REYMUNDO 9.3 07/05/2023    REYMUNDO 9.9 06/29/2023    REYMUNDO 9.4 06/26/2023     Type 2 Diabetes:    Levemir 27 units every evening increased on Wednesday  Novolog 7 units with breakfast and lunch, 9 units with dinner. Increased recently.   Patient is not experiencing side effects.  Blood sugar monitoring: 3 time(s) daily. Ranges (patient reported):   AM: 150-220  Noon 120-200  Evening 120-200  Current diabetes symptoms: none  Urine Albumin:   Lab Results   Component Value Date    UMALCR 223.94 (H) 02/09/2023      Lab Results   Component Value Date    A1C 7.5 (H) 06/26/2023     Hypertension:   Carvedilol 12.5mg twice daily  Amlodipine 10mg every evening increased last week  Patient self-monitors blood pressure. Yesterday 134/78, 136/83, 127/73, 141/82  Home BP monitoring in range of 120-140/70-80 mm Hg.  Patient reports no current medication side effects.  BP Readings from Last 3 Encounters:   06/28/23 (!) 148/67   06/26/23 122/67   05/03/23 (!) 156/74     Pulse Readings from Last 3 Encounters:   06/28/23 65    06/26/23 59   05/03/23 72     Hyperlipidemia:   Atorvastatin 20mg daily  Patient reports no significant myalgias or other side effects.  The ASCVD Risk score (Rajeev DK, et al., 2019) failed to calculate for the following reasons:    The patient has a prior MI or stroke diagnosis  Recent Labs   Lab Test 04/03/23  0810 03/30/23  0735 03/13/23  0955 11/17/22  0916 01/19/22  1144   CHOL  --   --   --  122 138   HDL  --   --   --  22* 37*   LDL  --   --   --  53 56   TRIG 91 155*   < > 234* 224*    < > = values in this interval not displayed.     Neuropathy: Pt is taking Gabapentin 300mg twice daily, can take larger dose if needed. This has been effective. Neuropathy is new post transplant for the past 1-2 months.     Today's Vitals: There were no vitals taken for this visit.  ----------------      I spent 20 minutes with this patient today. All changes were made via collaborative practice agreement with Dr. Kim. A copy of the visit note was provided to the patient's provider(s).    A summary of these recommendations was sent via [employer].    Santos Hussein PharmD  [facility] Pharmacist    Phone: [phone]     Telemedicine Visit Details  Type of service:  Telephone visit  Start Time: 9:26 AM  End Time: 9:46 AM     Medication Therapy Recommendations  Aftercare following organ transplant    Current Medication: calcium carbonate (TUMS) 500 MG chewable tablet (Discontinued)   Rationale: No medical indication at this time - Unnecessary medication therapy - Indication   Recommendation: Discontinue Medication   Status: Accepted - no CPA Needed          Rationale: Preventive therapy - Needs additional medication therapy - Indication   Recommendation: Order Vaccine - Shingrix 50 MCG/0.5ML Susr   Status: Patient Agreed - Adherence/Education         HTN, kidney transplant related    Current Medication: amLODIPine (NORVASC) 10 MG tablet   Rationale: Medication requires monitoring - Needs additional monitoring   Recommendation:  Self-Monitoring   Status: Patient Agreed - Adherence/Education         Type 2 diabetes mellitus with other specified complication, unspecified whether long term insulin use (H)    Current Medication: insulin detemir (LEVEMIR PEN) 100 UNIT/ML pen   Rationale: More effective medication available - Ineffective medication - Effectiveness   Recommendation: Make Appt with PCP   Status: Patient Agreed - Adherence/Education

## 2023-07-07 NOTE — PATIENT INSTRUCTIONS
"Recommendations from today's MTM visit:                                                       1. Stop Tums.   2. Talk to Dr. Cooper and Diabetes educator about oral medications for diabetes  3. Anytime you have a BP over 140/90 at home, sit for 5 minutes and recheck again.   4. Due for 2nd Shingrix vaccine after 8/23.     Follow-up: as needed    It was great speaking with you today.  I value your experience and would be very thankful for your time in providing feedback in our clinic survey. In the next few days, you may receive an email or text message from [employer] with a link to a survey related to your  clinical pharmacist.\"     To schedule another MTM appointment, please call the clinic directly or you may call the MTM scheduling line at [phone] or toll-free at [phone].     My Clinical Pharmacist's contact information:                                                      Please feel free to contact me with any questions or concerns you have.      Santos Hussein, PharmD  MTM Pharmacist    Phone: [phone]     "

## 2023-07-10 ENCOUNTER — LAB (OUTPATIENT)
Dept: LAB | Facility: HOSPITAL | Age: 58
End: 2023-07-10
Payer: COMMERCIAL

## 2023-07-10 DIAGNOSIS — Z79.899 ENCOUNTER FOR LONG-TERM CURRENT USE OF MEDICATION: ICD-10-CM

## 2023-07-10 DIAGNOSIS — Z48.298 AFTERCARE FOLLOWING ORGAN TRANSPLANT: ICD-10-CM

## 2023-07-10 DIAGNOSIS — Z20.828 CONTACT WITH AND (SUSPECTED) EXPOSURE TO OTHER VIRAL COMMUNICABLE DISEASES: ICD-10-CM

## 2023-07-10 DIAGNOSIS — Z94.0 KIDNEY REPLACED BY TRANSPLANT: ICD-10-CM

## 2023-07-10 LAB
ANION GAP SERPL CALCULATED.3IONS-SCNC: 10 MMOL/L (ref 7–15)
BUN SERPL-MCNC: 24.4 MG/DL (ref 6–20)
CALCIUM SERPL-MCNC: 9.2 MG/DL (ref 8.6–10)
CHLORIDE SERPL-SCNC: 102 MMOL/L (ref 98–107)
CREAT SERPL-MCNC: 1.39 MG/DL (ref 0.67–1.17)
DEPRECATED HCO3 PLAS-SCNC: 23 MMOL/L (ref 22–29)
ERYTHROCYTE [DISTWIDTH] IN BLOOD BY AUTOMATED COUNT: 14.4 % (ref 10–15)
GFR SERPL CREATININE-BSD FRML MDRD: 59 ML/MIN/1.73M2
GLUCOSE SERPL-MCNC: 156 MG/DL (ref 70–99)
HCT VFR BLD AUTO: 30.8 % (ref 40–53)
HGB BLD-MCNC: 10.1 G/DL (ref 13.3–17.7)
MCH RBC QN AUTO: 26.6 PG (ref 26.5–33)
MCHC RBC AUTO-ENTMCNC: 32.8 G/DL (ref 31.5–36.5)
MCV RBC AUTO: 81 FL (ref 78–100)
PLATELET # BLD AUTO: 119 10E3/UL (ref 150–450)
POTASSIUM SERPL-SCNC: 4.6 MMOL/L (ref 3.4–5.3)
RBC # BLD AUTO: 3.8 10E6/UL (ref 4.4–5.9)
SODIUM SERPL-SCNC: 135 MMOL/L (ref 136–145)
TACROLIMUS BLD-MCNC: 10.6 UG/L (ref 5–15)
TME LAST DOSE: NORMAL H
TME LAST DOSE: NORMAL H
WBC # BLD AUTO: 4.3 10E3/UL (ref 4–11)

## 2023-07-10 PROCEDURE — 80048 BASIC METABOLIC PNL TOTAL CA: CPT

## 2023-07-10 PROCEDURE — 80197 ASSAY OF TACROLIMUS: CPT

## 2023-07-10 PROCEDURE — 85027 COMPLETE CBC AUTOMATED: CPT

## 2023-07-10 PROCEDURE — 36415 COLL VENOUS BLD VENIPUNCTURE: CPT

## 2023-07-10 NOTE — TELEPHONE ENCOUNTER
ISSUE:  Serum creatinine 7/10 1.39, remains above documented baseline.  Bx 5/3 without rejection.    PLAN:  Review w/ MDs on 7/12 AM acute review, determine current baseline and continue to assess for hydration.     OUTCOME:  RNCC spoke with Yanick - Modesto has been working more outside; gardening, watering, mowing, etc.  BPs - 120 - 140, consistently  No lightheadedness /dizziness.     Of note - Modesto did accidentally receive an additional dose of bactrim over the weekend.  He will repeat labs 7/17 with improved hydration.  Creatinine baseline of 1.1 - 1.3 reviewed with Yanick.

## 2023-07-12 ENCOUNTER — TELEPHONE (OUTPATIENT)
Dept: TRANSPLANT | Facility: CLINIC | Age: 58
End: 2023-07-12
Payer: COMMERCIAL

## 2023-07-12 NOTE — TELEPHONE ENCOUNTER
Post Kidney and Pancreas Transplant Team Conference  Date: 7/12/2023  Transplant Coordinator: Shalonda Roy     Attendees:  []  Dr. Phan [] Diya Bautista, RN [] Julia Bower LPN     []  Dr. Baldwin [] Shalonda Roy RN [] Candy Llanos LPN   []  Dr. Patel [] Cora Vázquez RN    []  Dr. Kim [] Analia Glalego RN [] Santos Hussein, PharmD   [] Dr. Alves [] Neha Bhatti RN    [] Dr. Walker [] Lalit Fofana RN    [] Dr. Long [] Bijal Diez RN [] Carlene Bell RN   [] Dr. Morse [] Vane Epperson RN    []  Dr. Saleh [] Lita Mays RN    [] Dr. Zhou [] Enedina Kee RN    [] Ritu Bonilla, NP [] Claire Dunn RN        Verbal Plan Read Back:   Encourage hydration  Cr goal = 1.1 - 1.3  Repeat txp labs    Routed to RN Coordinator   Julia Bower LPN

## 2023-07-13 DIAGNOSIS — Z94.0 KIDNEY REPLACED BY TRANSPLANT: Primary | ICD-10-CM

## 2023-07-13 RX ORDER — SODIUM BICARBONATE 650 MG/1
1300 TABLET ORAL 2 TIMES DAILY
Qty: 120 TABLET | Refills: 11 | Status: SHIPPED | OUTPATIENT
Start: 2023-07-13 | End: 2024-07-18

## 2023-07-17 ENCOUNTER — LAB (OUTPATIENT)
Dept: LAB | Facility: HOSPITAL | Age: 58
End: 2023-07-17
Payer: COMMERCIAL

## 2023-07-17 DIAGNOSIS — Z48.298 AFTERCARE FOLLOWING ORGAN TRANSPLANT: ICD-10-CM

## 2023-07-17 DIAGNOSIS — Z20.828 CONTACT WITH AND (SUSPECTED) EXPOSURE TO OTHER VIRAL COMMUNICABLE DISEASES: ICD-10-CM

## 2023-07-17 DIAGNOSIS — Z79.899 ENCOUNTER FOR LONG-TERM CURRENT USE OF MEDICATION: ICD-10-CM

## 2023-07-17 DIAGNOSIS — Z94.0 KIDNEY REPLACED BY TRANSPLANT: ICD-10-CM

## 2023-07-17 LAB
ANION GAP SERPL CALCULATED.3IONS-SCNC: 9 MMOL/L (ref 7–15)
BUN SERPL-MCNC: 21.8 MG/DL (ref 6–20)
CALCIUM SERPL-MCNC: 9.1 MG/DL (ref 8.6–10)
CHLORIDE SERPL-SCNC: 105 MMOL/L (ref 98–107)
CREAT SERPL-MCNC: 1.27 MG/DL (ref 0.67–1.17)
DEPRECATED HCO3 PLAS-SCNC: 23 MMOL/L (ref 22–29)
ERYTHROCYTE [DISTWIDTH] IN BLOOD BY AUTOMATED COUNT: 14.6 % (ref 10–15)
GFR SERPL CREATININE-BSD FRML MDRD: 65 ML/MIN/1.73M2
GLUCOSE SERPL-MCNC: 155 MG/DL (ref 70–99)
HCT VFR BLD AUTO: 30.5 % (ref 40–53)
HGB BLD-MCNC: 9.8 G/DL (ref 13.3–17.7)
MCH RBC QN AUTO: 26.6 PG (ref 26.5–33)
MCHC RBC AUTO-ENTMCNC: 32.1 G/DL (ref 31.5–36.5)
MCV RBC AUTO: 83 FL (ref 78–100)
PLATELET # BLD AUTO: 116 10E3/UL (ref 150–450)
POTASSIUM SERPL-SCNC: 4.6 MMOL/L (ref 3.4–5.3)
RBC # BLD AUTO: 3.68 10E6/UL (ref 4.4–5.9)
SODIUM SERPL-SCNC: 137 MMOL/L (ref 136–145)
TACROLIMUS BLD-MCNC: 9.8 UG/L (ref 5–15)
TME LAST DOSE: NORMAL H
TME LAST DOSE: NORMAL H
WBC # BLD AUTO: 3.8 10E3/UL (ref 4–11)

## 2023-07-17 PROCEDURE — 80048 BASIC METABOLIC PNL TOTAL CA: CPT

## 2023-07-17 PROCEDURE — 36415 COLL VENOUS BLD VENIPUNCTURE: CPT

## 2023-07-17 PROCEDURE — 85027 COMPLETE CBC AUTOMATED: CPT

## 2023-07-17 PROCEDURE — 80197 ASSAY OF TACROLIMUS: CPT

## 2023-07-24 ENCOUNTER — LAB (OUTPATIENT)
Dept: LAB | Facility: HOSPITAL | Age: 58
End: 2023-07-24
Payer: COMMERCIAL

## 2023-07-24 DIAGNOSIS — Z94.0 KIDNEY REPLACED BY TRANSPLANT: ICD-10-CM

## 2023-07-24 DIAGNOSIS — Z79.899 ENCOUNTER FOR LONG-TERM CURRENT USE OF MEDICATION: ICD-10-CM

## 2023-07-24 DIAGNOSIS — Z48.298 AFTERCARE FOLLOWING ORGAN TRANSPLANT: ICD-10-CM

## 2023-07-24 DIAGNOSIS — Z20.828 CONTACT WITH AND (SUSPECTED) EXPOSURE TO OTHER VIRAL COMMUNICABLE DISEASES: ICD-10-CM

## 2023-07-24 LAB
ANION GAP SERPL CALCULATED.3IONS-SCNC: 12 MMOL/L (ref 7–15)
BUN SERPL-MCNC: 21.2 MG/DL (ref 6–20)
CALCIUM SERPL-MCNC: 9 MG/DL (ref 8.6–10)
CHLORIDE SERPL-SCNC: 103 MMOL/L (ref 98–107)
CREAT SERPL-MCNC: 1.31 MG/DL (ref 0.67–1.17)
DEPRECATED HCO3 PLAS-SCNC: 21 MMOL/L (ref 22–29)
ERYTHROCYTE [DISTWIDTH] IN BLOOD BY AUTOMATED COUNT: 15.2 % (ref 10–15)
GFR SERPL CREATININE-BSD FRML MDRD: 63 ML/MIN/1.73M2
GLUCOSE SERPL-MCNC: 103 MG/DL (ref 70–99)
HCT VFR BLD AUTO: 31.6 % (ref 40–53)
HGB BLD-MCNC: 10.4 G/DL (ref 13.3–17.7)
MCH RBC QN AUTO: 26.9 PG (ref 26.5–33)
MCHC RBC AUTO-ENTMCNC: 32.9 G/DL (ref 31.5–36.5)
MCV RBC AUTO: 82 FL (ref 78–100)
PLATELET # BLD AUTO: 110 10E3/UL (ref 150–450)
POTASSIUM SERPL-SCNC: 4.7 MMOL/L (ref 3.4–5.3)
RBC # BLD AUTO: 3.87 10E6/UL (ref 4.4–5.9)
SODIUM SERPL-SCNC: 136 MMOL/L (ref 136–145)
TACROLIMUS BLD-MCNC: 10 UG/L (ref 5–15)
TME LAST DOSE: NORMAL H
TME LAST DOSE: NORMAL H
WBC # BLD AUTO: 3.5 10E3/UL (ref 4–11)

## 2023-07-24 PROCEDURE — 36415 COLL VENOUS BLD VENIPUNCTURE: CPT

## 2023-07-24 PROCEDURE — 80197 ASSAY OF TACROLIMUS: CPT

## 2023-07-24 PROCEDURE — 85027 COMPLETE CBC AUTOMATED: CPT

## 2023-07-24 PROCEDURE — 80048 BASIC METABOLIC PNL TOTAL CA: CPT

## 2023-07-26 DIAGNOSIS — Z94.0 KIDNEY REPLACED BY TRANSPLANT: Primary | ICD-10-CM

## 2023-07-26 RX ORDER — TACROLIMUS 0.5 MG/1
CAPSULE ORAL
Qty: 180 CAPSULE | Refills: 3
Start: 2023-07-26 | End: 2023-11-22 | Stop reason: ALTCHOICE

## 2023-07-26 RX ORDER — TACROLIMUS 1 MG/1
2 CAPSULE ORAL 2 TIMES DAILY
Qty: 360 CAPSULE | Refills: 3 | Status: SHIPPED | OUTPATIENT
Start: 2023-07-26 | End: 2023-09-26

## 2023-07-26 NOTE — TELEPHONE ENCOUNTER
Patient confirms this was an accurate 12-hour trough. Verified Tacrolimus IR dose 2.5 mg BID. Confirmed no new medications or illness. Confirmed no missed doses. Pateint confirms decrease Tacrolimus IR dose to 2.0 mg BID and repeat labs in 7 days

## 2023-07-26 NOTE — TELEPHONE ENCOUNTER
Issue tacrolimus  level 10.0   155 days post transplant    PLAN:   Please call patient and confirm this was an accurate 12-hour trough. Verify Tacrolimus IR dose 2.5 mg BID. Confirm no new medications or illness. Confirm no missed doses. If accurate trough and accurate dose, decrease Tacrolimus IR dose to 2.0 mg BID and repeat labs in 7 days

## 2023-07-26 NOTE — TELEPHONE ENCOUNTER
Left message and sent [employer] message to patient regarding:   tacrolimus  level 10.0   155 days post transplant    PLAN:   Please call patient and confirm this was an accurate 12-hour trough. Verify Tacrolimus IR dose 2.5 mg BID. Confirm no new medications or illness. Confirm no missed doses. If accurate trough and accurate dose, decrease Tacrolimus IR dose to 2.0 mg BID and repeat labs in 7 days

## 2023-07-31 ENCOUNTER — LAB (OUTPATIENT)
Dept: LAB | Facility: HOSPITAL | Age: 58
End: 2023-07-31
Payer: COMMERCIAL

## 2023-07-31 DIAGNOSIS — Z94.0 KIDNEY REPLACED BY TRANSPLANT: ICD-10-CM

## 2023-07-31 DIAGNOSIS — Z20.828 CONTACT WITH AND (SUSPECTED) EXPOSURE TO OTHER VIRAL COMMUNICABLE DISEASES: ICD-10-CM

## 2023-07-31 DIAGNOSIS — Z79.899 ENCOUNTER FOR LONG-TERM CURRENT USE OF MEDICATION: ICD-10-CM

## 2023-07-31 DIAGNOSIS — Z48.298 AFTERCARE FOLLOWING ORGAN TRANSPLANT: ICD-10-CM

## 2023-07-31 LAB
ANION GAP SERPL CALCULATED.3IONS-SCNC: 12 MMOL/L (ref 7–15)
BUN SERPL-MCNC: 22.2 MG/DL (ref 6–20)
CALCIUM SERPL-MCNC: 9.5 MG/DL (ref 8.6–10)
CHLORIDE SERPL-SCNC: 104 MMOL/L (ref 98–107)
CREAT SERPL-MCNC: 1.31 MG/DL (ref 0.67–1.17)
DEPRECATED HCO3 PLAS-SCNC: 20 MMOL/L (ref 22–29)
ERYTHROCYTE [DISTWIDTH] IN BLOOD BY AUTOMATED COUNT: 15.4 % (ref 10–15)
GFR SERPL CREATININE-BSD FRML MDRD: 63 ML/MIN/1.73M2
GLUCOSE SERPL-MCNC: 82 MG/DL (ref 70–99)
HCT VFR BLD AUTO: 33.6 % (ref 40–53)
HGB BLD-MCNC: 10.9 G/DL (ref 13.3–17.7)
MAGNESIUM SERPL-MCNC: 2 MG/DL (ref 1.7–2.3)
MCH RBC QN AUTO: 26.7 PG (ref 26.5–33)
MCHC RBC AUTO-ENTMCNC: 32.4 G/DL (ref 31.5–36.5)
MCV RBC AUTO: 82 FL (ref 78–100)
PHOSPHATE SERPL-MCNC: 4.1 MG/DL (ref 2.5–4.5)
PLATELET # BLD AUTO: 108 10E3/UL (ref 150–450)
POTASSIUM SERPL-SCNC: 5 MMOL/L (ref 3.4–5.3)
RBC # BLD AUTO: 4.08 10E6/UL (ref 4.4–5.9)
SODIUM SERPL-SCNC: 136 MMOL/L (ref 136–145)
TACROLIMUS BLD-MCNC: 8.5 UG/L (ref 5–15)
TME LAST DOSE: NORMAL H
TME LAST DOSE: NORMAL H
WBC # BLD AUTO: 3.8 10E3/UL (ref 4–11)

## 2023-07-31 PROCEDURE — 85018 HEMOGLOBIN: CPT

## 2023-07-31 PROCEDURE — 36415 COLL VENOUS BLD VENIPUNCTURE: CPT

## 2023-07-31 PROCEDURE — 83735 ASSAY OF MAGNESIUM: CPT

## 2023-07-31 PROCEDURE — 80048 BASIC METABOLIC PNL TOTAL CA: CPT

## 2023-07-31 PROCEDURE — 84100 ASSAY OF PHOSPHORUS: CPT

## 2023-07-31 PROCEDURE — 80197 ASSAY OF TACROLIMUS: CPT

## 2023-07-31 PROCEDURE — 87799 DETECT AGENT NOS DNA QUANT: CPT

## 2023-08-02 ENCOUNTER — PATIENT OUTREACH (OUTPATIENT)
Dept: CARE COORDINATION | Facility: CLINIC | Age: 58
End: 2023-08-02
Payer: COMMERCIAL

## 2023-08-02 LAB
BK VIRUS DNA COPIES/ML, INSTRUMENT: 2798 COPIES/ML
BKV DNA SPEC NAA+PROBE-LOG#: 3.4 {LOG_COPIES}/ML

## 2023-08-02 NOTE — PROGRESS NOTES
Anemia Management Note  SUBJECTIVE/OBJECTIVE:  Referred by Dr. Rome Kim on 04/10/2023  Primary Diagnosis: Anemia in Chronic Kidney Disease (N18.3, D63.1)   3a  Secondary Diagnosis:  Chronic Kidney Disease, Stage 3 (N18.3)  3a  Kidney Tx: 2023  Hgb goal range:  9-10  Epo/Darbo: TBD: Hgb >9.0.  *Hx of HTN.   Iron regimen:  NA  Labs : 4/10/2024  Recent NERY use, transfusion, IV iron: NA  RX/TX plans : TBD     Labs and Cuco's.         No history of stroke, MI and blood clots or cancers.     Contact: OK to speak with Yanick Barbosa (daughter) and Edy Barbosa (son) regarding Medical, Billing, and Scheduling Informaiton per consent to communicate dated 2021.        Latest Ref Rng & Units 2023     8:08 AM 2023     7:15 AM 2023     7:43 AM 7/10/2023     7:26 AM 2023     9:07 AM 2023     8:40 AM 2023     8:44 AM   Anemia   Hemoglobin 13.3 - 17.7 g/dL 10.8  10.3  10.6  10.1  9.8  10.4  10.9    Ferritin 31 - 409 ng/mL 545            BP Readings from Last 3 Encounters:   23 (!) 148/67   23 122/67   23 (!) 156/74     Wt Readings from Last 2 Encounters:   23 63.3 kg (139 lb 9.6 oz)   23 63.1 kg (139 lb 3.2 oz)           ASSESSMENT:  Hgb:at goal - continue to monitor  TSat: not at goal (>30%) but ferritin >500ng/mL.  IV iron not indicated at this time per anemia protocol. Ferritin: At goal (>100ng/mL).    Due to have Iron labs drawn.     Orders needed to be renewed (for next follow-up date) in EPIC: None    Iron labs due:  Due    Plan discussed with:  No call, chart review      NEXT FOLLOW-UP DATE:  23  9am labs    Nimo Banda RN   Anemia [facility]  [email or web address]   Office : [phone]  Fax: [phone]

## 2023-08-03 ENCOUNTER — MYC MEDICAL ADVICE (OUTPATIENT)
Dept: TRANSPLANT | Facility: CLINIC | Age: 58
End: 2023-08-03
Payer: COMMERCIAL

## 2023-08-03 ENCOUNTER — APPOINTMENT (OUTPATIENT)
Dept: INTERPRETER SERVICES | Facility: CLINIC | Age: 58
End: 2023-08-03
Payer: COMMERCIAL

## 2023-08-03 ENCOUNTER — TELEPHONE (OUTPATIENT)
Dept: TRANSPLANT | Facility: CLINIC | Age: 58
End: 2023-08-03
Payer: COMMERCIAL

## 2023-08-03 DIAGNOSIS — Z94.0 KIDNEY REPLACED BY TRANSPLANT: Primary | ICD-10-CM

## 2023-08-03 RX ORDER — MYCOPHENOLATE MOFETIL 250 MG/1
500 CAPSULE ORAL 2 TIMES DAILY
Qty: 120 CAPSULE | Refills: 11 | Status: SHIPPED | OUTPATIENT
Start: 2023-08-03 | End: 2023-08-10

## 2023-08-03 NOTE — TELEPHONE ENCOUNTER
Patrick Phan MD  8/3/2023  8:05 AM CDT Back to Top      New, low level BK viremia and recommend decreasing mycophenolate mofetil to 500 mg twice daily and check IgG level.     Called Yanick daughter RN  regarding lowering his  Cellcept Mycophenolate mofetil 500 mg twice per day -- reviewed low level BK results reason for lowering immunosuppression     Called Nhia  with   denies any fevers or recent illness   /80  no dizziness - will review at his appointment  with Dr Rome Kim    Weight gain to 138 lbs   Nhis reports doing well post transplant  - outside most of the summer   Some lower leg edema at the end of his day but resolves in the morning          6 month Kidney Transplant Patient Phone Call    Congratulations on your 6 month post-transplant anniversary!    Please re-review with the patient how to contact the Transplant Center:  Best phone contact is [phone], as if the need is urgent, any care coordinator will be able to assist you.    Medications:  Now that you are 6 months post-transplant, please make the following medication changes:      .     *ALWAYS BRING YOUR MED CARD TO APPOINTMENTS.*    Please confirm if the patient is independent with medication set up and administration:   Daughter sets up medication s    Have you missed any medication doses in the past week: No.  Have you missed any medication doses in the past month: No.  Contact your pharmacy first for refills.  CONTACT THE TRANSPLANT CENTER IF YOU RUN OUT OF YOUR IS MEDICATIONS.    Labs:  Labs will now be drawn EVERY OTHER WEEK through 9 months post-transplant.    Please try to have these labs drawn early in the week (Monday or Tuesday).  Contact the Transplant Center if additional lab orders are needed.   It is recommended that you take a copy of your lab letter to each lab draw.    General Transplant Recommendations:  6 month and 1 year nephrology visits with our MDs.  Dr Rome Kim    -ensure patient has had  a 6 month follow up  -ensure patient has 1 year follow up scheduled  Frequent reviews of the transplant handbook and / or [facility].  Lab review on MyChart.   It is now safe to resume your regular dental care.

## 2023-08-07 ENCOUNTER — LAB (OUTPATIENT)
Dept: LAB | Facility: HOSPITAL | Age: 58
End: 2023-08-07
Payer: COMMERCIAL

## 2023-08-07 DIAGNOSIS — Z20.828 CONTACT WITH AND (SUSPECTED) EXPOSURE TO OTHER VIRAL COMMUNICABLE DISEASES: ICD-10-CM

## 2023-08-07 DIAGNOSIS — Z48.298 AFTERCARE FOLLOWING ORGAN TRANSPLANT: ICD-10-CM

## 2023-08-07 DIAGNOSIS — Z79.899 ENCOUNTER FOR LONG-TERM CURRENT USE OF MEDICATION: ICD-10-CM

## 2023-08-07 DIAGNOSIS — Z94.0 KIDNEY REPLACED BY TRANSPLANT: ICD-10-CM

## 2023-08-07 LAB
ANION GAP SERPL CALCULATED.3IONS-SCNC: 10 MMOL/L (ref 7–15)
BUN SERPL-MCNC: 23 MG/DL (ref 6–20)
CALCIUM SERPL-MCNC: 9.4 MG/DL (ref 8.6–10)
CHLORIDE SERPL-SCNC: 103 MMOL/L (ref 98–107)
CREAT SERPL-MCNC: 1.32 MG/DL (ref 0.67–1.17)
DEPRECATED HCO3 PLAS-SCNC: 23 MMOL/L (ref 22–29)
ERYTHROCYTE [DISTWIDTH] IN BLOOD BY AUTOMATED COUNT: 14.8 % (ref 10–15)
GFR SERPL CREATININE-BSD FRML MDRD: 63 ML/MIN/1.73M2
GLUCOSE SERPL-MCNC: 108 MG/DL (ref 70–99)
HCT VFR BLD AUTO: 30.2 % (ref 40–53)
HGB BLD-MCNC: 9.9 G/DL (ref 13.3–17.7)
MAGNESIUM SERPL-MCNC: 1.9 MG/DL (ref 1.7–2.3)
MCH RBC QN AUTO: 26.6 PG (ref 26.5–33)
MCHC RBC AUTO-ENTMCNC: 32.8 G/DL (ref 31.5–36.5)
MCV RBC AUTO: 81 FL (ref 78–100)
PHOSPHATE SERPL-MCNC: 3.7 MG/DL (ref 2.5–4.5)
PLATELET # BLD AUTO: 107 10E3/UL (ref 150–450)
POTASSIUM SERPL-SCNC: 4.7 MMOL/L (ref 3.4–5.3)
RBC # BLD AUTO: 3.72 10E6/UL (ref 4.4–5.9)
SODIUM SERPL-SCNC: 136 MMOL/L (ref 136–145)
TACROLIMUS BLD-MCNC: 7.8 UG/L (ref 5–15)
TME LAST DOSE: NORMAL H
TME LAST DOSE: NORMAL H
WBC # BLD AUTO: 3.5 10E3/UL (ref 4–11)

## 2023-08-07 PROCEDURE — 82310 ASSAY OF CALCIUM: CPT

## 2023-08-07 PROCEDURE — 87799 DETECT AGENT NOS DNA QUANT: CPT

## 2023-08-07 PROCEDURE — 80197 ASSAY OF TACROLIMUS: CPT

## 2023-08-07 PROCEDURE — 36415 COLL VENOUS BLD VENIPUNCTURE: CPT

## 2023-08-07 PROCEDURE — 85027 COMPLETE CBC AUTOMATED: CPT

## 2023-08-07 PROCEDURE — 84100 ASSAY OF PHOSPHORUS: CPT

## 2023-08-07 PROCEDURE — 83735 ASSAY OF MAGNESIUM: CPT

## 2023-08-08 LAB
BK VIRUS DNA COPIES/ML, INSTRUMENT: 5812 COPIES/ML
BKV DNA SPEC NAA+PROBE-LOG#: 3.8 {LOG_COPIES}/ML

## 2023-08-09 DIAGNOSIS — Z94.0 KIDNEY REPLACED BY TRANSPLANT: Primary | ICD-10-CM

## 2023-08-09 RX ORDER — CARVEDILOL 25 MG/1
12.5 TABLET ORAL 2 TIMES DAILY WITH MEALS
Qty: 30 TABLET | Refills: 11 | Status: SHIPPED | OUTPATIENT
Start: 2023-08-09 | End: 2024-09-03

## 2023-08-10 ENCOUNTER — TELEPHONE (OUTPATIENT)
Dept: TRANSPLANT | Facility: CLINIC | Age: 58
End: 2023-08-10
Payer: COMMERCIAL

## 2023-08-10 DIAGNOSIS — Z94.0 KIDNEY REPLACED BY TRANSPLANT: Primary | ICD-10-CM

## 2023-08-10 RX ORDER — MYCOPHENOLATE MOFETIL 250 MG/1
250 CAPSULE ORAL 2 TIMES DAILY
Qty: 60 CAPSULE | Refills: 11 | Status: SHIPPED | OUTPATIENT
Start: 2023-08-10 | End: 2023-09-12

## 2023-08-10 NOTE — TELEPHONE ENCOUNTER
Called Yanick   daughter     Reviewed her father's BK  5812  slightly increased this week   Reviewed  treatment for BK  PCR to lower immunosuppression   Yaakovjosr verbalized understanding and will change her father medication box                 Patrick Phan MD    Slight increase in BK viremia and recommend decreasing mycophenolate mofetil to 250 mg twice daily.

## 2023-08-14 ENCOUNTER — TELEPHONE (OUTPATIENT)
Dept: TRANSPLANT | Facility: CLINIC | Age: 58
End: 2023-08-14

## 2023-08-14 ENCOUNTER — LAB (OUTPATIENT)
Dept: LAB | Facility: HOSPITAL | Age: 58
End: 2023-08-14
Payer: COMMERCIAL

## 2023-08-14 DIAGNOSIS — Z79.899 ENCOUNTER FOR LONG-TERM CURRENT USE OF MEDICATION: ICD-10-CM

## 2023-08-14 DIAGNOSIS — Z94.0 KIDNEY REPLACED BY TRANSPLANT: ICD-10-CM

## 2023-08-14 DIAGNOSIS — D63.1 ANEMIA OF CHRONIC RENAL FAILURE, STAGE 3 (MODERATE), UNSPECIFIED WHETHER STAGE 3A OR 3B CKD (H): ICD-10-CM

## 2023-08-14 DIAGNOSIS — Z48.298 AFTERCARE FOLLOWING ORGAN TRANSPLANT: ICD-10-CM

## 2023-08-14 DIAGNOSIS — N18.30 STAGE 3 CHRONIC KIDNEY DISEASE, UNSPECIFIED WHETHER STAGE 3A OR 3B CKD (H): ICD-10-CM

## 2023-08-14 DIAGNOSIS — Z94.0 KIDNEY REPLACED BY TRANSPLANT: Primary | ICD-10-CM

## 2023-08-14 DIAGNOSIS — N18.30 ANEMIA OF CHRONIC RENAL FAILURE, STAGE 3 (MODERATE), UNSPECIFIED WHETHER STAGE 3A OR 3B CKD (H): ICD-10-CM

## 2023-08-14 DIAGNOSIS — Z20.828 CONTACT WITH AND (SUSPECTED) EXPOSURE TO OTHER VIRAL COMMUNICABLE DISEASES: ICD-10-CM

## 2023-08-14 LAB
ANION GAP SERPL CALCULATED.3IONS-SCNC: 9 MMOL/L (ref 7–15)
BUN SERPL-MCNC: 28.5 MG/DL (ref 6–20)
CALCIUM SERPL-MCNC: 9.7 MG/DL (ref 8.6–10)
CHLORIDE SERPL-SCNC: 103 MMOL/L (ref 98–107)
CREAT SERPL-MCNC: 1.79 MG/DL (ref 0.67–1.17)
DEPRECATED HCO3 PLAS-SCNC: 23 MMOL/L (ref 22–29)
ERYTHROCYTE [DISTWIDTH] IN BLOOD BY AUTOMATED COUNT: 15.4 % (ref 10–15)
FERRITIN SERPL-MCNC: 412 NG/ML (ref 31–409)
GFR SERPL CREATININE-BSD FRML MDRD: 43 ML/MIN/1.73M2
GLUCOSE SERPL-MCNC: 168 MG/DL (ref 70–99)
HCT VFR BLD AUTO: 34.5 % (ref 40–53)
HGB BLD-MCNC: 11.5 G/DL (ref 13.3–17.7)
IRON BINDING CAPACITY (ROCHE): 186 UG/DL (ref 240–430)
IRON SATN MFR SERPL: 22 % (ref 15–46)
IRON SERPL-MCNC: 40 UG/DL (ref 61–157)
MAGNESIUM SERPL-MCNC: 1.9 MG/DL (ref 1.7–2.3)
MCH RBC QN AUTO: 27.1 PG (ref 26.5–33)
MCHC RBC AUTO-ENTMCNC: 33.3 G/DL (ref 31.5–36.5)
MCV RBC AUTO: 81 FL (ref 78–100)
PLATELET # BLD AUTO: 109 10E3/UL (ref 150–450)
POTASSIUM SERPL-SCNC: 5 MMOL/L (ref 3.4–5.3)
RBC # BLD AUTO: 4.24 10E6/UL (ref 4.4–5.9)
SODIUM SERPL-SCNC: 135 MMOL/L (ref 136–145)
TACROLIMUS BLD-MCNC: 8.1 UG/L (ref 5–15)
TME LAST DOSE: NORMAL H
TME LAST DOSE: NORMAL H
WBC # BLD AUTO: 4.6 10E3/UL (ref 4–11)

## 2023-08-14 PROCEDURE — 83735 ASSAY OF MAGNESIUM: CPT

## 2023-08-14 PROCEDURE — 85027 COMPLETE CBC AUTOMATED: CPT

## 2023-08-14 PROCEDURE — 86833 HLA CLASS II HIGH DEFIN QUAL: CPT

## 2023-08-14 PROCEDURE — 80197 ASSAY OF TACROLIMUS: CPT

## 2023-08-14 PROCEDURE — 87799 DETECT AGENT NOS DNA QUANT: CPT

## 2023-08-14 PROCEDURE — 80180 DRUG SCRN QUAN MYCOPHENOLATE: CPT

## 2023-08-14 PROCEDURE — 82784 ASSAY IGA/IGD/IGG/IGM EACH: CPT

## 2023-08-14 PROCEDURE — 36415 COLL VENOUS BLD VENIPUNCTURE: CPT

## 2023-08-14 PROCEDURE — 83550 IRON BINDING TEST: CPT

## 2023-08-14 PROCEDURE — 82728 ASSAY OF FERRITIN: CPT

## 2023-08-14 PROCEDURE — 86832 HLA CLASS I HIGH DEFIN QUAL: CPT

## 2023-08-14 PROCEDURE — 82310 ASSAY OF CALCIUM: CPT

## 2023-08-15 ENCOUNTER — MYC MEDICAL ADVICE (OUTPATIENT)
Dept: TRANSPLANT | Facility: CLINIC | Age: 58
End: 2023-08-15
Payer: COMMERCIAL

## 2023-08-15 ENCOUNTER — TELEPHONE (OUTPATIENT)
Dept: TRANSPLANT | Facility: CLINIC | Age: 58
End: 2023-08-15
Payer: COMMERCIAL

## 2023-08-15 LAB
BK VIRUS DNA COPIES/ML, INSTRUMENT: ABNORMAL COPIES/ML
BKV DNA SPEC NAA+PROBE-LOG#: 4.3 {LOG_COPIES}/ML
IGG SERPL-MCNC: 974 MG/DL (ref 610–1616)

## 2023-08-15 RX ORDER — HEPARIN SODIUM,PORCINE 10 UNIT/ML
5-20 VIAL (ML) INTRAVENOUS DAILY PRN
Status: CANCELLED | OUTPATIENT
Start: 2023-08-15

## 2023-08-15 RX ORDER — MEPERIDINE HYDROCHLORIDE 25 MG/ML
25 INJECTION INTRAMUSCULAR; INTRAVENOUS; SUBCUTANEOUS EVERY 30 MIN PRN
Status: CANCELLED | OUTPATIENT
Start: 2023-08-15

## 2023-08-15 RX ORDER — METHYLPREDNISOLONE SODIUM SUCCINATE 125 MG/2ML
125 INJECTION, POWDER, LYOPHILIZED, FOR SOLUTION INTRAMUSCULAR; INTRAVENOUS
Status: CANCELLED
Start: 2023-08-15

## 2023-08-15 RX ORDER — HEPARIN SODIUM (PORCINE) LOCK FLUSH IV SOLN 100 UNIT/ML 100 UNIT/ML
5 SOLUTION INTRAVENOUS
Status: CANCELLED | OUTPATIENT
Start: 2023-08-15

## 2023-08-15 RX ORDER — ALBUTEROL SULFATE 90 UG/1
1-2 AEROSOL, METERED RESPIRATORY (INHALATION)
Status: CANCELLED
Start: 2023-08-15

## 2023-08-15 RX ORDER — DIPHENHYDRAMINE HYDROCHLORIDE 50 MG/ML
50 INJECTION INTRAMUSCULAR; INTRAVENOUS
Status: CANCELLED
Start: 2023-08-15

## 2023-08-15 RX ORDER — EPINEPHRINE 1 MG/ML
0.3 INJECTION, SOLUTION, CONCENTRATE INTRAVENOUS EVERY 5 MIN PRN
Status: CANCELLED | OUTPATIENT
Start: 2023-08-15

## 2023-08-15 RX ORDER — ALBUTEROL SULFATE 0.83 MG/ML
2.5 SOLUTION RESPIRATORY (INHALATION)
Status: CANCELLED | OUTPATIENT
Start: 2023-08-15

## 2023-08-15 NOTE — TELEPHONE ENCOUNTER
Patrick Phan MD Huepfel, Mary K, RN  Yes to IV fluids.          Previous Messages       ----- Message -----  From: Shalonda Roy, RN  Sent: 8/15/2023   1:01 PM CDT  To: Patrick Phan MD  Subject: IGG resulted -   b    I spoke to [facility] via    /84  weight down 3lbs since last week  No pain no fever  no illness   - good  energy outside in the garden    Decreased immunosuppression  Cellcept Mycophenolate mofetil 250 mg twice per days tacrolimus      Question should he have some IV fluids

## 2023-08-16 ENCOUNTER — TELEPHONE (OUTPATIENT)
Dept: TRANSPLANT | Facility: CLINIC | Age: 58
End: 2023-08-16
Payer: COMMERCIAL

## 2023-08-16 DIAGNOSIS — Z94.0 KIDNEY REPLACED BY TRANSPLANT: Primary | ICD-10-CM

## 2023-08-16 LAB
MYCOPHENOLATE SERPL LC/MS/MS-MCNC: 1.76 MG/L (ref 1–3.5)
MYCOPHENOLATE-G SERPL LC/MS/MS-MCNC: 38.6 MG/L (ref 30–95)
TME LAST DOSE: NORMAL H
TME LAST DOSE: NORMAL H

## 2023-08-16 NOTE — TELEPHONE ENCOUNTER
Post Kidney and Pancreas Transplant Team Conference  Date: 8/16/2023  Transplant Coordinator: Shalonda Roy     Attendees:  [x]  Dr. Phan [x] Shalonda Roy, RN [x] Julia Bower LPN     [x]  Dr. Patel [x] Diya Bautista, RN [] Reyna Llanos LPN    [x] Dr. Kim [x] Niyah Kee RN    [x] Dr. Carty [x] Rosalind Aayla, RN [x] Carlene Bell RN   [] Dr. Long [] Bijal Diez, RN    [x] Dr. Zhou [] Claire Dunn RN    []  Dr. Saleh [] Patricia Paniagua RN    [] Dr. Walker [] Lalit Fofana RN    [x] Ritu Bonilla NP [] Neha Bhatti RN    [] Katrina Frederick NP [] Lita Tirado RN        Verbal Plan Read Back:   Ultrasound, DSA recommended    Routed to RN Coordinator   Julia Bower LPN

## 2023-08-16 NOTE — TELEPHONE ENCOUNTER
Called daughter Yanick RN  confirm plan for fluids   No changes with immunosuppression at this time

## 2023-08-16 NOTE — TELEPHONE ENCOUNTER
Called [facility] --     IV hydration appointment  on Thurs - 8/17/2023     Spoke to Mr Chelsey and daughter Yanick regarding plan

## 2023-08-16 NOTE — TELEPHONE ENCOUNTER
Placed orders for Donor Specific Antibodies  UA/UC on 8/17/2023  Modesto Barbosa will be at [facility] for 1 L IV normal saline will go to lab after infusion

## 2023-08-17 ENCOUNTER — LAB (OUTPATIENT)
Dept: LAB | Facility: HOSPITAL | Age: 58
End: 2023-08-17
Payer: COMMERCIAL

## 2023-08-17 ENCOUNTER — INFUSION THERAPY VISIT (OUTPATIENT)
Dept: INFUSION THERAPY | Facility: HOSPITAL | Age: 58
End: 2023-08-17
Payer: COMMERCIAL

## 2023-08-17 VITALS
RESPIRATION RATE: 16 BRPM | HEART RATE: 57 BPM | SYSTOLIC BLOOD PRESSURE: 149 MMHG | OXYGEN SATURATION: 100 % | TEMPERATURE: 97.6 F | DIASTOLIC BLOOD PRESSURE: 64 MMHG

## 2023-08-17 DIAGNOSIS — E86.0 DEHYDRATION: Primary | ICD-10-CM

## 2023-08-17 DIAGNOSIS — D84.9 IMMUNOSUPPRESSED STATUS (H): ICD-10-CM

## 2023-08-17 DIAGNOSIS — Z94.0 KIDNEY REPLACED BY TRANSPLANT: ICD-10-CM

## 2023-08-17 LAB
ALBUMIN UR-MCNC: NEGATIVE MG/DL
ANION GAP SERPL CALCULATED.3IONS-SCNC: 8 MMOL/L (ref 7–15)
APPEARANCE UR: CLEAR
BILIRUB UR QL STRIP: NEGATIVE
BUN SERPL-MCNC: 18.2 MG/DL (ref 6–20)
CALCIUM SERPL-MCNC: 9.3 MG/DL (ref 8.6–10)
CHLORIDE SERPL-SCNC: 106 MMOL/L (ref 98–107)
COLOR UR AUTO: COLORLESS
CREAT SERPL-MCNC: 1.28 MG/DL (ref 0.67–1.17)
DEPRECATED HCO3 PLAS-SCNC: 25 MMOL/L (ref 22–29)
GFR SERPL CREATININE-BSD FRML MDRD: 65 ML/MIN/1.73M2
GLUCOSE SERPL-MCNC: 144 MG/DL (ref 70–99)
GLUCOSE UR STRIP-MCNC: 50 MG/DL
HGB UR QL STRIP: NEGATIVE
KETONES UR STRIP-MCNC: NEGATIVE MG/DL
LEUKOCYTE ESTERASE UR QL STRIP: NEGATIVE
NITRATE UR QL: NEGATIVE
PH UR STRIP: 6.5 [PH] (ref 5–7)
POTASSIUM SERPL-SCNC: 4.5 MMOL/L (ref 3.4–5.3)
RBC URINE: 0 /HPF
SODIUM SERPL-SCNC: 139 MMOL/L (ref 136–145)
SP GR UR STRIP: 1 (ref 1–1.03)
UROBILINOGEN UR STRIP-MCNC: <2 MG/DL
WBC URINE: <1 /HPF

## 2023-08-17 PROCEDURE — 96360 HYDRATION IV INFUSION INIT: CPT

## 2023-08-17 PROCEDURE — 82310 ASSAY OF CALCIUM: CPT

## 2023-08-17 PROCEDURE — 258N000003 HC RX IP 258 OP 636: Performed by: INTERNAL MEDICINE

## 2023-08-17 PROCEDURE — 81003 URINALYSIS AUTO W/O SCOPE: CPT

## 2023-08-17 PROCEDURE — 36415 COLL VENOUS BLD VENIPUNCTURE: CPT

## 2023-08-17 PROCEDURE — 96361 HYDRATE IV INFUSION ADD-ON: CPT

## 2023-08-17 RX ORDER — METHYLPREDNISOLONE SODIUM SUCCINATE 125 MG/2ML
125 INJECTION, POWDER, LYOPHILIZED, FOR SOLUTION INTRAMUSCULAR; INTRAVENOUS
Status: DISCONTINUED | OUTPATIENT
Start: 2023-08-17 | End: 2023-08-17 | Stop reason: HOSPADM

## 2023-08-17 RX ORDER — MEPERIDINE HYDROCHLORIDE 25 MG/ML
25 INJECTION INTRAMUSCULAR; INTRAVENOUS; SUBCUTANEOUS EVERY 30 MIN PRN
Status: CANCELLED | OUTPATIENT
Start: 2023-08-17

## 2023-08-17 RX ORDER — DIPHENHYDRAMINE HYDROCHLORIDE 50 MG/ML
50 INJECTION INTRAMUSCULAR; INTRAVENOUS
Status: DISCONTINUED | OUTPATIENT
Start: 2023-08-17 | End: 2023-08-17 | Stop reason: HOSPADM

## 2023-08-17 RX ORDER — ALBUTEROL SULFATE 0.83 MG/ML
2.5 SOLUTION RESPIRATORY (INHALATION)
Status: DISCONTINUED | OUTPATIENT
Start: 2023-08-17 | End: 2023-08-17 | Stop reason: HOSPADM

## 2023-08-17 RX ORDER — ALBUTEROL SULFATE 90 UG/1
1-2 AEROSOL, METERED RESPIRATORY (INHALATION)
Status: CANCELLED
Start: 2023-08-17

## 2023-08-17 RX ORDER — METHYLPREDNISOLONE SODIUM SUCCINATE 125 MG/2ML
125 INJECTION, POWDER, LYOPHILIZED, FOR SOLUTION INTRAMUSCULAR; INTRAVENOUS
Status: CANCELLED
Start: 2023-08-17

## 2023-08-17 RX ORDER — ALBUTEROL SULFATE 90 UG/1
1-2 AEROSOL, METERED RESPIRATORY (INHALATION)
Status: DISCONTINUED | OUTPATIENT
Start: 2023-08-17 | End: 2023-08-17 | Stop reason: HOSPADM

## 2023-08-17 RX ORDER — HEPARIN SODIUM,PORCINE 10 UNIT/ML
5-20 VIAL (ML) INTRAVENOUS DAILY PRN
Status: CANCELLED | OUTPATIENT
Start: 2023-08-17

## 2023-08-17 RX ORDER — ALBUTEROL SULFATE 0.83 MG/ML
2.5 SOLUTION RESPIRATORY (INHALATION)
Status: CANCELLED | OUTPATIENT
Start: 2023-08-17

## 2023-08-17 RX ORDER — HEPARIN SODIUM (PORCINE) LOCK FLUSH IV SOLN 100 UNIT/ML 100 UNIT/ML
5 SOLUTION INTRAVENOUS
Status: CANCELLED | OUTPATIENT
Start: 2023-08-17

## 2023-08-17 RX ORDER — EPINEPHRINE 1 MG/ML
0.3 INJECTION, SOLUTION INTRAMUSCULAR; SUBCUTANEOUS EVERY 5 MIN PRN
Status: DISCONTINUED | OUTPATIENT
Start: 2023-08-17 | End: 2023-08-17 | Stop reason: HOSPADM

## 2023-08-17 RX ORDER — DIPHENHYDRAMINE HYDROCHLORIDE 50 MG/ML
50 INJECTION INTRAMUSCULAR; INTRAVENOUS
Status: CANCELLED
Start: 2023-08-17

## 2023-08-17 RX ORDER — EPINEPHRINE 1 MG/ML
0.3 INJECTION, SOLUTION INTRAMUSCULAR; SUBCUTANEOUS EVERY 5 MIN PRN
Status: CANCELLED | OUTPATIENT
Start: 2023-08-17

## 2023-08-17 RX ORDER — MEPERIDINE HYDROCHLORIDE 25 MG/ML
25 INJECTION INTRAMUSCULAR; INTRAVENOUS; SUBCUTANEOUS EVERY 30 MIN PRN
Status: DISCONTINUED | OUTPATIENT
Start: 2023-08-17 | End: 2023-08-17 | Stop reason: HOSPADM

## 2023-08-17 RX ADMIN — SODIUM CHLORIDE 1000 ML: 9 INJECTION, SOLUTION INTRAVENOUS at 10:43

## 2023-08-17 NOTE — PROGRESS NOTES
"Infusion Nursing Note:  Modesto Barbosa presents today for IV fluids.    Patient seen by provider today: No   present during visit today: Yes, Language: Hmong.     Note: Modesto comes in today for IV fluids. Per Nhia he said that he is a post kidney transplant and his Kidney labs are elevated and he does not have enough fluid so the MD wanted him to com in for some IV fluids.\". I went over the plan of care with Modesto and he verbalized understanding. PIV placed with great blood return throughout.  IV fluids hung per order and he tolerated without issues. PIV removed and covered with gauze and coban. Modesto left ambulatory to the [facility] in stable condition.      Intravenous Access:  Peripheral IV placed.    Treatment Conditions:  Lab Results   Component Value Date    HGB 11.5 (L) 08/14/2023    WBC 4.6 08/14/2023    ANEU 0.7 (L) 05/16/2023    ANEUTAUTO 2.2 04/03/2023     (L) 08/14/2023        Lab Results   Component Value Date     (L) 08/14/2023    POTASSIUM 5.0 08/14/2023    MAG 1.9 08/14/2023    CR 1.79 (H) 08/14/2023    REYMUNDO 9.7 08/14/2023    BILITOTAL 0.7 04/07/2023    ALBUMIN 3.7 04/07/2023    ALT 10 04/07/2023    AST 12 04/07/2023         Post Infusion Assessment:  Patient tolerated infusion without incident.  Blood return noted pre and post infusion.  Site patent and intact, free from redness, edema or discomfort.  No evidence of extravasations.  Access discontinued per protocol.       Discharge Plan:   Discharge instructions reviewed with: Patient.  Patient and/or family verbalized understanding of discharge instructions and all questions answered.  Copy of AVS reviewed with patient and/or family.    Patient discharged in stable condition accompanied by: self.  Departure Mode: Ambulatory.      ELIGIO EASTMAN RN    "

## 2023-08-21 ENCOUNTER — LAB (OUTPATIENT)
Dept: LAB | Facility: HOSPITAL | Age: 58
End: 2023-08-21
Payer: COMMERCIAL

## 2023-08-21 DIAGNOSIS — Z20.828 CONTACT WITH AND (SUSPECTED) EXPOSURE TO OTHER VIRAL COMMUNICABLE DISEASES: ICD-10-CM

## 2023-08-21 DIAGNOSIS — Z94.0 KIDNEY REPLACED BY TRANSPLANT: ICD-10-CM

## 2023-08-21 DIAGNOSIS — Z79.899 ENCOUNTER FOR LONG-TERM CURRENT USE OF MEDICATION: ICD-10-CM

## 2023-08-21 DIAGNOSIS — Z48.298 AFTERCARE FOLLOWING ORGAN TRANSPLANT: ICD-10-CM

## 2023-08-21 LAB
ANION GAP SERPL CALCULATED.3IONS-SCNC: 10 MMOL/L (ref 7–15)
BUN SERPL-MCNC: 27.7 MG/DL (ref 6–20)
CALCIUM SERPL-MCNC: 9.1 MG/DL (ref 8.6–10)
CHLORIDE SERPL-SCNC: 103 MMOL/L (ref 98–107)
CREAT SERPL-MCNC: 1.54 MG/DL (ref 0.67–1.17)
DEPRECATED HCO3 PLAS-SCNC: 23 MMOL/L (ref 22–29)
ERYTHROCYTE [DISTWIDTH] IN BLOOD BY AUTOMATED COUNT: 15 % (ref 10–15)
GFR SERPL CREATININE-BSD FRML MDRD: 52 ML/MIN/1.73M2
GLUCOSE SERPL-MCNC: 160 MG/DL (ref 70–99)
HCT VFR BLD AUTO: 31.9 % (ref 40–53)
HGB BLD-MCNC: 10.5 G/DL (ref 13.3–17.7)
MCH RBC QN AUTO: 26.6 PG (ref 26.5–33)
MCHC RBC AUTO-ENTMCNC: 32.9 G/DL (ref 31.5–36.5)
MCV RBC AUTO: 81 FL (ref 78–100)
PLATELET # BLD AUTO: 96 10E3/UL (ref 150–450)
POTASSIUM SERPL-SCNC: 5 MMOL/L (ref 3.4–5.3)
RBC # BLD AUTO: 3.94 10E6/UL (ref 4.4–5.9)
SODIUM SERPL-SCNC: 136 MMOL/L (ref 136–145)
TACROLIMUS BLD-MCNC: 7.4 UG/L (ref 5–15)
TME LAST DOSE: NORMAL H
TME LAST DOSE: NORMAL H
WBC # BLD AUTO: 4.1 10E3/UL (ref 4–11)

## 2023-08-21 PROCEDURE — 36415 COLL VENOUS BLD VENIPUNCTURE: CPT

## 2023-08-21 PROCEDURE — 85027 COMPLETE CBC AUTOMATED: CPT

## 2023-08-21 PROCEDURE — 80197 ASSAY OF TACROLIMUS: CPT

## 2023-08-21 PROCEDURE — 80048 BASIC METABOLIC PNL TOTAL CA: CPT

## 2023-08-23 ENCOUNTER — TELEPHONE (OUTPATIENT)
Dept: TRANSPLANT | Facility: CLINIC | Age: 58
End: 2023-08-23
Payer: COMMERCIAL

## 2023-08-23 NOTE — TELEPHONE ENCOUNTER
Post Kidney and Pancreas Transplant Team Conference  Date: 8/23/2023  Transplant Coordinator: Shalonda Roy     Attendees:  [x]  Dr. Phan [x] Shalonda Roy, RN [x] Julia Bower LPN     [x]  Dr. Patel [x] Diya Bautista, RN [] Reyna Llanos LPN    [] Dr. Kim [x] Niyah Kee RN    [x] Dr. Carty [] Rosalind Ayala, ELIDIA [] Carlene Bell RN   [] Dr. Long [] Bijal Diez, RN    [x] Dr. Zhou [] Claire Dunn, ELIDIA    []  Dr. Saleh [] Patricia Paniagua RN    [] Dr. Walker [] Lalit Fofana RN    [x] Ritu Bonilla, NP [] Neha Bhatti RN    [x] Katrina Frederick NP [] Lita Tirado RN        Verbal Plan Read Back:   Txp ultrasound recommended        Routed to RN Coordinator   Julia Bower LPN

## 2023-08-24 ENCOUNTER — TELEPHONE (OUTPATIENT)
Dept: PHARMACY | Facility: CLINIC | Age: 58
End: 2023-08-24
Payer: COMMERCIAL

## 2023-08-24 NOTE — TELEPHONE ENCOUNTER
Clinical Pharmacy Consult:                                                      Transplant Specific: 6 month post transplant medication review  Date of Transplant: 02/21/2023  Type of Transplant: kidney  First Transplant: yes  History of rejection: no    Immunosuppression Regimen   TAC 2.0 mg qAM & 2.0 mg qPM and  mg qAM & 250 mg qPM  Patient specific goal: 6-8  Most recent level: 7.4 on 8/21/2023  Immunosuppressant Levels:  Therapeutic  Pt adherent to lab draws: yes  Scr:   Lab Results   Component Value Date    CR 0.84 03/27/2023     Side effects: paraesthesias in foot, mild    Prophylactic Medications  Antibacterial:  Bactrim 400-80 once daily  Scheduled Discontinue Date: Lifelong    Antifungal: Not needed thus far  Scheduled Discontinue Date: N/A    Antiviral: CrCl >/=60 mL/minute: Valcyte 900mg daily   Scheduled Discontinue Date: 3 months completed    Acid Reducer: Not needed  Scheduled Reviewed Date: N/A    Thrombosis Prevention: Aspirin 81 mg PO daily  Scheduled Discontinue Date:  to be determined    Blood Pressure Management  Frequency of home Blood Pressure checks: twice daily  Most recent home BP: 149/64 at clinic, last value early August at home 120/70  Patient Blood pressure goal: <140/90  Patient blood pressure at goal:  Yes    Hospitalizations/ER visits since last assessment: 0    Med rec/DUR performed: yes  Med Rec Discrepancies: no    Spoke with daughter, Yanick, today regarding Modesto. She reports he is doing well of late. He is getting outside more to do gardening and weeding. She notes Modesto has some mild paraesthesias in his feet at home. Possible side effect to tacrolimus, PCP started on gabapentin, dose appropriate for renal function. Tacrolimus at goal levels.     They continue to use the medication card and keep it up to date. Modesto does use the pill organizer, Yanick arranges it weekly for Modesto. Notes no missed doses. No issues obtaining medication. Percent days covered through pharmacy fill  records note 1.00 PDC, showing excellent adherence.     No outside hospitalizations or urgent care visits to report. BP seems to be at goal at home. Blood sugars AM/PM ranges (for early August per Yanick, she did not take a photo of his recent readings) are as follows Morning , Noon 100-122, Evening . Nhia tends to skip breakfast and does not dose Novolog if he skips it.     Next pharmacy follow-up in 6 months.     Time Spent: 15 minutes    Taurus Jacques, PharmD, BCACP  [facility]  [address] [place] [id number]  Specialty - [phone]  Transplant - [phone]  Mail - [phone]

## 2023-08-28 ENCOUNTER — HOSPITAL ENCOUNTER (OUTPATIENT)
Dept: ULTRASOUND IMAGING | Facility: HOSPITAL | Age: 58
Discharge: HOME | End: 2023-08-28
Attending: INTERNAL MEDICINE | Admitting: INTERNAL MEDICINE
Payer: COMMERCIAL

## 2023-08-28 ENCOUNTER — TELEPHONE (OUTPATIENT)
Dept: TRANSPLANT | Facility: CLINIC | Age: 58
End: 2023-08-28

## 2023-08-28 ENCOUNTER — OFFICE VISIT (OUTPATIENT)
Dept: TRANSPLANT | Facility: CLINIC | Age: 58
End: 2023-08-28
Attending: INTERNAL MEDICINE
Payer: COMMERCIAL

## 2023-08-28 ENCOUNTER — LAB (OUTPATIENT)
Dept: LAB | Facility: CLINIC | Age: 58
End: 2023-08-28
Attending: INTERNAL MEDICINE
Payer: COMMERCIAL

## 2023-08-28 ENCOUNTER — PATIENT OUTREACH (OUTPATIENT)
Dept: CARE COORDINATION | Facility: CLINIC | Age: 58
End: 2023-08-28

## 2023-08-28 VITALS
BODY MASS INDEX: 24.21 KG/M2 | SYSTOLIC BLOOD PRESSURE: 155 MMHG | HEART RATE: 64 BPM | OXYGEN SATURATION: 99 % | WEIGHT: 141.8 LBS | DIASTOLIC BLOOD PRESSURE: 63 MMHG | HEIGHT: 64 IN

## 2023-08-28 DIAGNOSIS — Z94.0 KIDNEY REPLACED BY TRANSPLANT: ICD-10-CM

## 2023-08-28 DIAGNOSIS — Z79.899 ENCOUNTER FOR LONG-TERM CURRENT USE OF MEDICATION: ICD-10-CM

## 2023-08-28 DIAGNOSIS — Z48.298 AFTERCARE FOLLOWING ORGAN TRANSPLANT: ICD-10-CM

## 2023-08-28 DIAGNOSIS — D84.9 IMMUNOSUPPRESSED STATUS (H): ICD-10-CM

## 2023-08-28 DIAGNOSIS — Z94.0 KIDNEY REPLACED BY TRANSPLANT: Primary | ICD-10-CM

## 2023-08-28 DIAGNOSIS — I15.1 HTN, KIDNEY TRANSPLANT RELATED: ICD-10-CM

## 2023-08-28 DIAGNOSIS — Z94.0 HTN, KIDNEY TRANSPLANT RELATED: ICD-10-CM

## 2023-08-28 DIAGNOSIS — E11.22 TYPE 2 DIABETES MELLITUS WITH DIABETIC CHRONIC KIDNEY DISEASE, UNSPECIFIED CKD STAGE, UNSPECIFIED WHETHER LONG TERM INSULIN USE (H): ICD-10-CM

## 2023-08-28 DIAGNOSIS — B34.8 BK VIREMIA: ICD-10-CM

## 2023-08-28 DIAGNOSIS — Z20.828 CONTACT WITH AND (SUSPECTED) EXPOSURE TO OTHER VIRAL COMMUNICABLE DISEASES: ICD-10-CM

## 2023-08-28 PROBLEM — Z29.89 NEED FOR PNEUMOCYSTIS PROPHYLAXIS: Status: RESOLVED | Noted: 2023-05-18 | Resolved: 2023-08-28

## 2023-08-28 PROBLEM — R68.89 ABNORMAL ANKLE BRACHIAL INDEX: Status: RESOLVED | Noted: 2023-04-25 | Resolved: 2023-08-28

## 2023-08-28 LAB
ALBUMIN MFR UR ELPH: 11.5 MG/DL
ALBUMIN SERPL BCG-MCNC: 4.6 G/DL (ref 3.5–5.2)
ALP SERPL-CCNC: 88 U/L (ref 40–129)
ALT SERPL W P-5'-P-CCNC: 17 U/L (ref 0–70)
ANION GAP SERPL CALCULATED.3IONS-SCNC: 9 MMOL/L (ref 7–15)
AST SERPL W P-5'-P-CCNC: 26 U/L (ref 0–45)
BILIRUB DIRECT SERPL-MCNC: <0.2 MG/DL (ref 0–0.3)
BILIRUB SERPL-MCNC: 0.5 MG/DL
BUN SERPL-MCNC: 29.4 MG/DL (ref 6–20)
CALCIUM SERPL-MCNC: 9.3 MG/DL (ref 8.6–10)
CHLORIDE SERPL-SCNC: 103 MMOL/L (ref 98–107)
CHOLEST SERPL-MCNC: 82 MG/DL
CREAT SERPL-MCNC: 1.57 MG/DL (ref 0.67–1.17)
CREAT UR-MCNC: 35.1 MG/DL
DEPRECATED HCO3 PLAS-SCNC: 24 MMOL/L (ref 22–29)
ERYTHROCYTE [DISTWIDTH] IN BLOOD BY AUTOMATED COUNT: 15.1 % (ref 10–15)
GFR SERPL CREATININE-BSD FRML MDRD: 51 ML/MIN/1.73M2
GLUCOSE SERPL-MCNC: 147 MG/DL (ref 70–99)
HBA1C MFR BLD: 5.8 %
HCT VFR BLD AUTO: 32 % (ref 40–53)
HDLC SERPL-MCNC: 42 MG/DL
HGB BLD-MCNC: 10.8 G/DL (ref 13.3–17.7)
LDLC SERPL CALC-MCNC: 26 MG/DL
MCH RBC QN AUTO: 27.1 PG (ref 26.5–33)
MCHC RBC AUTO-ENTMCNC: 33.8 G/DL (ref 31.5–36.5)
MCV RBC AUTO: 80 FL (ref 78–100)
NONHDLC SERPL-MCNC: 40 MG/DL
PLATELET # BLD AUTO: 92 10E3/UL (ref 150–450)
POTASSIUM SERPL-SCNC: 5.1 MMOL/L (ref 3.4–5.3)
PROT SERPL-MCNC: 6.8 G/DL (ref 6.4–8.3)
PROT/CREAT 24H UR: 0.33 MG/MG CR (ref 0–0.2)
RBC # BLD AUTO: 3.98 10E6/UL (ref 4.4–5.9)
SODIUM SERPL-SCNC: 136 MMOL/L (ref 136–145)
TACROLIMUS BLD-MCNC: 6.9 UG/L (ref 5–15)
TME LAST DOSE: NORMAL H
TME LAST DOSE: NORMAL H
TRIGL SERPL-MCNC: 68 MG/DL
URATE SERPL-MCNC: 8.6 MG/DL (ref 3.4–7)
WBC # BLD AUTO: 4.1 10E3/UL (ref 4–11)

## 2023-08-28 PROCEDURE — 82248 BILIRUBIN DIRECT: CPT | Performed by: PATHOLOGY

## 2023-08-28 PROCEDURE — G0463 HOSPITAL OUTPT CLINIC VISIT: HCPCS | Performed by: INTERNAL MEDICINE

## 2023-08-28 PROCEDURE — 36415 COLL VENOUS BLD VENIPUNCTURE: CPT | Performed by: PATHOLOGY

## 2023-08-28 PROCEDURE — 76776 US EXAM K TRANSPL W/DOPPLER: CPT

## 2023-08-28 PROCEDURE — 83036 HEMOGLOBIN GLYCOSYLATED A1C: CPT | Performed by: INTERNAL MEDICINE

## 2023-08-28 PROCEDURE — 80197 ASSAY OF TACROLIMUS: CPT | Performed by: INTERNAL MEDICINE

## 2023-08-28 PROCEDURE — 85027 COMPLETE CBC AUTOMATED: CPT | Performed by: PATHOLOGY

## 2023-08-28 PROCEDURE — 84156 ASSAY OF PROTEIN URINE: CPT | Performed by: PATHOLOGY

## 2023-08-28 PROCEDURE — 80053 COMPREHEN METABOLIC PANEL: CPT | Performed by: PATHOLOGY

## 2023-08-28 PROCEDURE — 87799 DETECT AGENT NOS DNA QUANT: CPT | Performed by: INTERNAL MEDICINE

## 2023-08-28 PROCEDURE — 84550 ASSAY OF BLOOD/URIC ACID: CPT | Performed by: PATHOLOGY

## 2023-08-28 PROCEDURE — 99215 OFFICE O/P EST HI 40 MIN: CPT | Performed by: INTERNAL MEDICINE

## 2023-08-28 PROCEDURE — 80061 LIPID PANEL: CPT | Performed by: PATHOLOGY

## 2023-08-28 PROCEDURE — 99000 SPECIMEN HANDLING OFFICE-LAB: CPT | Performed by: PATHOLOGY

## 2023-08-28 ASSESSMENT — PAIN SCALES - GENERAL: PAINLEVEL: NO PAIN (0)

## 2023-08-28 NOTE — LETTER
8/28/2023         RE: Modesto Barbosa  [address] [place] [address]        Dear Colleague,    Thank you for referring your patient, Modesto Barbosa, to the [facility]. Please see a copy of my visit note below.    TRANSPLANT NEPHROLOGY CHRONIC POST TRANSPLANT VISIT    Assessment & Plan  # LDKT: Trend up in creatinine a few days after fluids.    -Arrange for fluids once weekly    - Baseline Creatinine: ~ 1-1.2   - Proteinuria: Normal (<0.2 grams),    - Date DSA Last Checked: Aug/2023      Latest DSA: No cPRA: 27%   - BK Viremia: No   - Kidney Tx Biopsy: No    -Obtain U/S kidney txp. U/A negative 8/17/23.    -If U/S negative and Scr doesn't improve with weekly fluids will biopsy    # Immunosuppression: Tacrolimus immediate release (goal 6-8) and Mycophenolate mofetil (dose 250 mg every 12 hours)   - Continue with intensive monitoring of immunosuppression for efficacy and toxicity.   - Changes: Not at this time    # Infection Prophylaxis:   - PJP: Sulfa/TMP (Bactrim)    # Hypertension: Borderline control;  Goal BP: < 130/80   - Volume status: Euvolemic to slightly hypovolemic    - Changes: Not at this time. Continue amlodipine 10mg daily and carvedilol 12.5mg bid    # Diabetes: Borderline control (HbA1c 7-9%) Last HbA1c: 7.5%   - Management as per primary care    # Anemia in Chronic Renal Disease: Hgb: Stable      NERY: No   - Iron studies: Replete    # BK viremia:   -Trend up to 22k on 8/14, repeat today    -IgG 975   -If BK trend up today despite decrease in MMF to 250mg bid would change tac to CsA    # Thrombocytopenia: Stable, mildly low platelet level in the ~ 100-140s.  Patient does have splenomegaly.    # Mineral Bone Disorder:   - Secondary renal hyperparathyroidism; PTH level: Normal (15-65 pg/ml)        On treatment: None  - Vitamin D; level: Normal        On supplement: Yes  - Calcium; level: Normal        On supplement: Yes    # Electrolytes:   - Potassium; level: High normal         On supplement: No  - Magnesium; level: Normal        On supplement: Yes, mag ox 400mg bid  - Bicarbonate; level: Normal        On supplement: Yes, bicarb 1300mg bid    # CAD, s/p PCI: Asymptomatic.    # Asymmetric Left Ventricular Hypertrophy (HOCM): Stable. No history of sustained ventricular arrhythmias.     # Hepatitis B Core Ab +: Patient with core Ab positive, but negative Ag and also negative surface Ab.  Hep B DNA was negative at time of transplant, as well as repeat at 4 months post transplant.     # Peripheral Neuropathy: Stable symptoms.    # Medical Compliance: Yes     # Skin Cancer Risk:    - Discussed sun protection and recommend regular follow up with Dermatology.    # Health Maintenance and Vaccination Review: Recommend:  COVID booster, flu shot,  Shingrix    # Transplant History:  Etiology of Kidney Failure: Diabetes mellitus type 2  Tx: LDKT  Transplant: 2/21/2023 (Kidney)  Significant changes in immunosuppression: None  Significant transplant-related complications: BK Viremia    Transplant Office Phone Number: [phone]    Assessment and plan was discussed with the patient and he voiced his understanding and agreement.    Return visit: Return in about 3 months (around 11/28/2023).    Rome Kim MD     I spent 49 minutes on the date of the encounter doing chart review, review of test results, interpretation of tests, patient visit, documentation, and discussion with family    Chief Complaint  Mr. Barbosa is a 57 year old here for routine follow up, kidney transplant and immunosuppression management.     History of Present Illness  The patient overall feels well. He denies any recent hospitalizations. He denies nausea, vomiting, diarrhea, fever, chills, shortness of breath, chest pain, LE edema, unintentional weight loss, nights sweats, dysuria, hematuria.       Home BP:  130-150 systolic, frequently 130/50s with no lightheadedness.    Problem List  Patient Active Problem List   Diagnosis      Type 2 Diabetes Mellitus     Dyslipidemia, goal LDL below 70     HTN, kidney transplant related     Metabolic acidosis     Coronary artery disease involving native coronary artery of native heart without angina pectoris     Thrombocytopenia (H)     Immunosuppressed status (H)     Kidney replaced by transplant     Anemia in chronic renal disease     Hypocalcemia     Hypomagnesemia     Hepatitis B core antibody positive     Dehydration     Abnormal ankle brachial index     Aftercare following organ transplant     Need for pneumocystis prophylaxis     CKD (chronic kidney disease) stage 2, GFR 60-89 ml/min     Vitamin D deficiency     Secondary renal hyperparathyroidism (H)       Allergies  Allergies   Allergen Reactions     Tegaderm Transparent Dressing (Informational Only) Blisters       Medications  Current Outpatient Medications   Medication Sig     insulin detemir (LEVEMIR PEN) 100 UNIT/ML pen 27 units at bedtime     sodium bicarbonate 650 MG tablet Take 2 tablets (1,300 mg) by mouth 2 times daily     acetaminophen (TYLENOL) 325 MG tablet Take 2 tablets (650 mg) by mouth every 4 hours as needed for other (For optimal non-opioid multimodal pain management to improve pain control.)     amLODIPine (NORVASC) 10 MG tablet Take 1 tablet (10 mg) by mouth At Bedtime     aspirin (ASA) 81 MG EC tablet Take 1 tablet (81 mg) by mouth daily     atorvastatin (LIPITOR) 20 MG tablet Take 1 tablet (20 mg) by mouth daily     blood glucose (NO BRAND SPECIFIED) test strip Use to test blood sugar 3 times daily or as directed. To accompany: Blood Glucose Monitor Brands: per insurance.     blood glucose monitoring (ONE TOUCH ULTRA 2) meter device kit USE TO TEST BLOOD SUGAR THREE TIMES DAILY OR AS DIRECTED.     carvedilol (COREG) 25 MG tablet Take 0.5 tablets (12.5 mg) by mouth 2 times daily (with meals)     gabapentin (NEURONTIN) 100 MG capsule Take 1-3 capsules (100-300 mg) by mouth 2 times daily     insulin aspart (NOVOLOG PEN) 100  "UNIT/ML pen Before breaskfast and lunch, give 7 units. Before dinner give 8 units. (Patient taking differently: Before breaskfast and lunch, give 7 units. Before dinner give 9 units.)     loratadine (CLARITIN) 10 MG tablet Take 1 tablet (10 mg) by mouth daily as needed for allergies (itchy/rash)     magnesium oxide (MAG-OX) 400 MG tablet Take 1 tablet (400 mg) by mouth 2 times daily     mycophenolate (GENERIC EQUIVALENT) 250 MG capsule Take 1 capsule (250 mg) by mouth 2 times daily     nitroGLYcerin (NITROSTAT) 0.4 MG sublingual tablet One tablet under the tongue every 5 minutes if needed for chest pain. May repeat every 5 minutes for a maximum of 3 doses in 15 minutes\"     sulfamethoxazole-trimethoprim (BACTRIM) 400-80 MG tablet Take 1 tablet by mouth daily     tacrolimus (GENERIC EQUIVALENT) 0.5 MG capsule HOLD FOR FUTURE DOSE CHANGES.  Profile Rx: patient will contact pharmacy when needed     tacrolimus (GENERIC EQUIVALENT) 1 MG capsule Take 2 capsules (2 mg) by mouth 2 times daily     thin (NO BRAND SPECIFIED) lancets Tests 3x daily Use with lanceting device. To accompany: Blood Glucose Monitor Brands: per insurance.     Vitamin D3 (CHOLECALCIFEROL) 25 mcg (1000 units) tablet Take 2 tablets (50 mcg) by mouth daily     No current facility-administered medications for this visit.     Medications Discontinued During This Encounter   Medication Reason     insulin detemir (LEVEMIR PEN) 100 UNIT/ML pen Reorder (No AVS)       Physical Exam  Vital Signs: BP (!) 155/63   Pulse 64   Ht 1.626 m (5' 4\")   Wt 64.3 kg (141 lb 12.8 oz)   SpO2 99%   BMI 24.34 kg/m      GENERAL APPEARANCE: alert and no distress  HENT: mouth without ulcers or lesions  LYMPHATICS: no cervical or supraclavicular nodes  RESP: lungs clear to auscultation - no rales, rhonchi or wheezes  CV: regular rhythm, normal rate, no rub, 2.6 systolic murmur  EDEMA: no LE edema bilaterally  ABDOMEN: soft, nondistended, nontender, bowel sounds normal  MS: " extremities normal - no gross deformities noted, no evidence of inflammation in joints, no muscle tenderness  SKIN: no rash  TX KIDNEY: normal  DIALYSIS ACCESS: none    Data        Latest Ref Rng & Units 8/21/2023     8:46 AM 8/17/2023    12:22 PM 8/14/2023     9:12 AM   Renal   Sodium 136 - 145 mmol/L 136  139  135    K 3.4 - 5.3 mmol/L 5.0  4.5  5.0    Cl 98 - 107 mmol/L 103  106  103    Cl (external) 98 - 107 mmol/L 103  106  103    CO2 22 - 29 mmol/L 23  25  23    Urea Nitrogen 6.0 - 20.0 mg/dL 27.7  18.2  28.5    Creatinine 0.67 - 1.17 mg/dL 1.54  1.28  1.79    Glucose 70 - 99 mg/dL 160  144  168    Calcium 8.6 - 10.0 mg/dL 9.1  9.3  9.7    Magnesium 1.7 - 2.3 mg/dL   1.9          Latest Ref Rng & Units 8/7/2023     8:57 AM 7/31/2023     8:44 AM 7/5/2023     7:43 AM   Bone Health   Phosphorus 2.5 - 4.5 mg/dL 3.7  4.1  3.7          Latest Ref Rng & Units 8/21/2023     8:46 AM 8/14/2023     9:12 AM 8/7/2023     8:57 AM   Heme   WBC 4.0 - 11.0 10e3/uL 4.1  4.6  3.5    Hgb 13.3 - 17.7 g/dL 10.5  11.5  9.9    Plt 150 - 450 10e3/uL 96  109  107          Latest Ref Rng & Units 4/7/2023     8:10 AM 4/3/2023     8:10 AM 3/30/2023     7:35 AM   Liver   AP 45 - 120 U/L 74  69  66    TBili 0.0 - 1.0 mg/dL 0.7  0.8  0.7    Bilirubin Direct 0.00 - 0.30 mg/dL  0.22  <0.20    ALT 0 - 45 U/L 10  12  15    AST 0 - 40 U/L 12  14  14    Tot Protein 6.0 - 8.0 g/dL 6.3  6.1  5.7    Albumin 3.5 - 5.0 g/dL 3.7      Albumin 3.5 - 5.2 g/dL  3.8  3.9          Latest Ref Rng & Units 6/26/2023     8:08 AM 2/22/2023     2:03 AM 2/21/2023     7:42 PM   Pancreas   A1C <5.7 % 7.5  6.8  6.8          Latest Ref Rng & Units 8/14/2023     9:12 AM 6/26/2023     8:08 AM 6/19/2023     9:45 AM   Iron studies   Iron 61 - 157 ug/dL 40  47  54    Iron Sat Index 15 - 46 % 22  28  31    Ferritin 31 - 409 ng/mL 412  545  540          Latest Ref Rng & Units 7/5/2023     7:43 AM 5/1/2023     7:15 AM 2/21/2023     1:00 PM   [facility] Txp Virology   CMV QUANT IU/ML  Not Detected IU/mL   Not Detected    EBV DNA COPIES/ML Not Detected copies/mL <500  <500     EBV DNA LOG OF COPIES  <2.7  <2.7          Recent Labs   Lab Test 07/31/23  0844 08/07/23  0857 08/14/23  0912 08/21/23  0846   DOSTAC 7/30/2023 8/6/2023  --  8/20/2023   TACROL 8.5 7.8 8.1 7.4     Recent Labs   Lab Test 03/02/23  0833 03/18/23  0913 05/01/23  0715 05/16/23  0902 08/14/23  0912   DOSMPA 3/1/2023   8:00 PM  --  4/30/2023   8:00 AM  --   --    MPACID 1.48   < > 1.60 2.30 1.76   MPAG 41.0   < > 65.3 53.7 38.6    < > = values in this interval not displayed.         Again, thank you for allowing me to participate in the care of your patient.        Sincerely,        Rome Kim MD

## 2023-08-28 NOTE — PROGRESS NOTES
Anemia Management Note  SUBJECTIVE/OBJECTIVE:  Referred by Dr. Rome Kim on 04/10/2023  Primary Diagnosis: Anemia in Chronic Kidney Disease (N18.3, D63.1)   3a  Secondary Diagnosis:  Chronic Kidney Disease, Stage 3 (N18.3)  3a  Kidney Tx: 2023  Hgb goal range:  9-10  Epo/Darbo: TBD: Hgb >9.0.  *Hx of HTN.   Iron regimen:  NA  Labs : 4/10/2024  Recent NERY use, transfusion, IV iron: NA  RX/TX plans : TBD     Labs and Cuco's.         No history of stroke, MI and blood clots or cancers.     Contact: OK to speak with Yanick Barbosa (daughter) and Edy Barbosa (son) regarding Medical, Billing, and Scheduling Informaiton per consent to communicate dated 2021.        Latest Ref Rng & Units 2023     9:07 AM 2023     8:40 AM 2023     8:44 AM 2023     8:57 AM 2023     9:12 AM 2023     8:46 AM 2023     9:03 AM   Anemia   Hemoglobin 13.3 - 17.7 g/dL 9.8  10.4  10.9  9.9  11.5  10.5  10.8    Ferritin 31 - 409 ng/mL     412        BP Readings from Last 3 Encounters:   23 (!) 149/64   23 (!) 148/67   23 122/67     Wt Readings from Last 2 Encounters:   23 63.3 kg (139 lb 9.6 oz)   23 63.1 kg (139 lb 3.2 oz)           ASSESSMENT:  Hgb:at goal - continue to monitor  TSat: not at goal of >30% Ferritin: At goal (>100ng/mL)    PLAN:  RTC for Anemia Labs in 2 week(s)    Orders needed to be renewed (for next follow-up date) in EPIC: None    Iron labs due:  Mid 2023    Plan discussed with:  No call, chart review       NEXT FOLLOW-UP DATE:  23    Nimo Banda RN   Anemia Services  [facility]  [email or web address]   Office : [phone]  Fax: [phone]

## 2023-08-28 NOTE — NURSING NOTE
"Chief Complaint   Patient presents with    RECHECK     Kidney transplant     Vital signs:      BP: (!) 155/63 Pulse: 64     SpO2: 99 %     Height: 162.6 cm (5' 4\") Weight: 64.3 kg (141 lb 12.8 oz)  Estimated body mass index is 24.34 kg/m  as calculated from the following:    Height as of this encounter: 1.626 m (5' 4\").    Weight as of this encounter: 64.3 kg (141 lb 12.8 oz).      Donna Lara CMA   8/28/2023 9:21 AM    "

## 2023-08-28 NOTE — PATIENT INSTRUCTIONS
Patient Recommendations:  - Kidney transplant ultrasound  -Labs once weekly  -Will arrange for IV fluids once weekly for the next 2 weeks and if creatinine does not decrease to less than 1.2 consistently will recommend kidney transplant biopsy  -Recommend COVID booster, flu shot, Shingrix (shingles vaccine). Only get two vaccines at once    Transplant Patient Information  Your Post Transplant Coordinator is: Shalonda Roy  For non urgent items, we encourage you to contact your coordinator/care team online via [employer]  You and your care team can also contact your transplant coordinator Monday - Friday, 8am - 5pm at [phone] (Option 2 to reach the coordinator or Option 4 to schedule an appointment).  After hours for urgent matters, please call [facility] at [phone].

## 2023-08-28 NOTE — TELEPHONE ENCOUNTER
Rome Kim MD Huepfel, Shalonda GONZALES RN  Please arrange for IVF (1L LR) once weekly. I have ordered U/S. If Scr doesn't improve with weekly fluids x2 will biopsy. Continue labs 2x/wk for the next 2 weeks.    If BK increases on this check would change tac to CsA.

## 2023-08-28 NOTE — PROGRESS NOTES
TRANSPLANT NEPHROLOGY CHRONIC POST TRANSPLANT VISIT    Assessment & Plan   # LDKT: Trend up in creatinine a few days after fluids.    -Arrange for fluids once weekly    - Baseline Creatinine: ~ 1-1.2   - Proteinuria: Normal (<0.2 grams),    - Date DSA Last Checked: Aug/2023      Latest DSA: No cPRA: 27%   - BK Viremia: No   - Kidney Tx Biopsy: No    -Obtain U/S kidney txp. U/A negative 8/17/23.    -If U/S negative and Scr doesn't improve with weekly fluids will biopsy    # Immunosuppression: Tacrolimus immediate release (goal 6-8) and Mycophenolate mofetil (dose 250 mg every 12 hours)   - Continue with intensive monitoring of immunosuppression for efficacy and toxicity.   - Changes: Not at this time    # Infection Prophylaxis:   - PJP: Sulfa/TMP (Bactrim)    # Hypertension: Borderline control;  Goal BP: < 130/80   - Volume status: Euvolemic to slightly hypovolemic    - Changes: Not at this time. Continue amlodipine 10mg daily and carvedilol 12.5mg bid    # Diabetes: Borderline control (HbA1c 7-9%) Last HbA1c: 7.5%   - Management as per primary care    # Anemia in Chronic Renal Disease: Hgb: Stable      NERY: No   - Iron studies: Replete    # BK viremia:   -Trend up to 22k on 8/14, repeat today    -IgG 975   -If BK trend up today despite decrease in MMF to 250mg bid would change tac to CsA    # Thrombocytopenia: Stable, mildly low platelet level in the ~ 100-140s.  Patient does have splenomegaly.    # Mineral Bone Disorder:   - Secondary renal hyperparathyroidism; PTH level: Normal (15-65 pg/ml)        On treatment: None  - Vitamin D; level: Normal        On supplement: Yes  - Calcium; level: Normal        On supplement: Yes    # Electrolytes:   - Potassium; level: High normal        On supplement: No  - Magnesium; level: Normal        On supplement: Yes, mag ox 400mg bid  - Bicarbonate; level: Normal        On supplement: Yes, bicarb 1300mg bid    # CAD, s/p PCI: Asymptomatic.    # Asymmetric Left Ventricular  Hypertrophy (HOCM): Stable. No history of sustained ventricular arrhythmias.     # Hepatitis B Core Ab +: Patient with core Ab positive, but negative Ag and also negative surface Ab.  Hep B DNA was negative at time of transplant, as well as repeat at 4 months post transplant.     # Peripheral Neuropathy: Stable symptoms.    # Medical Compliance: Yes     # Skin Cancer Risk:    - Discussed sun protection and recommend regular follow up with Dermatology.    # Health Maintenance and Vaccination Review: Recommend:  COVID booster, flu shot,  Shingrix    # Transplant History:  Etiology of Kidney Failure: Diabetes mellitus type 2  Tx: LDKT  Transplant: 2/21/2023 (Kidney)  Significant changes in immunosuppression: None  Significant transplant-related complications: BK Viremia    Transplant Office Phone Number: [phone]    Assessment and plan was discussed with the patient and he voiced his understanding and agreement.    Return visit: Return in about 3 months (around 11/28/2023).    Rome Kim MD     I spent 49 minutes on the date of the encounter doing chart review, review of test results, interpretation of tests, patient visit, documentation, and discussion with family    Chief Complaint   Mr. Barbosa is a 57 year old here for routine follow up, kidney transplant and immunosuppression management.     History of Present Illness   The patient overall feels well. He denies any recent hospitalizations. He denies nausea, vomiting, diarrhea, fever, chills, shortness of breath, chest pain, LE edema, unintentional weight loss, nights sweats, dysuria, hematuria.       Home BP:  130-150 systolic, frequently 130/50s with no lightheadedness.    Problem List   Patient Active Problem List   Diagnosis    Type 2 Diabetes Mellitus    Dyslipidemia, goal LDL below 70    HTN, kidney transplant related    Metabolic acidosis    Coronary artery disease involving native coronary artery of native heart without angina pectoris     Thrombocytopenia (H)    Immunosuppressed status (H)    Kidney replaced by transplant    Anemia in chronic renal disease    Hypocalcemia    Hypomagnesemia    Hepatitis B core antibody positive    Dehydration    Abnormal ankle brachial index    Aftercare following organ transplant    Need for pneumocystis prophylaxis    CKD (chronic kidney disease) stage 2, GFR 60-89 ml/min    Vitamin D deficiency    Secondary renal hyperparathyroidism (H)       Allergies   Allergies   Allergen Reactions    Tegaderm Transparent Dressing (Informational Only) Blisters       Medications   Current Outpatient Medications   Medication Sig    insulin detemir (LEVEMIR PEN) 100 UNIT/ML pen 27 units at bedtime    sodium bicarbonate 650 MG tablet Take 2 tablets (1,300 mg) by mouth 2 times daily    acetaminophen (TYLENOL) 325 MG tablet Take 2 tablets (650 mg) by mouth every 4 hours as needed for other (For optimal non-opioid multimodal pain management to improve pain control.)    amLODIPine (NORVASC) 10 MG tablet Take 1 tablet (10 mg) by mouth At Bedtime    aspirin (ASA) 81 MG EC tablet Take 1 tablet (81 mg) by mouth daily    atorvastatin (LIPITOR) 20 MG tablet Take 1 tablet (20 mg) by mouth daily    blood glucose (NO BRAND SPECIFIED) test strip Use to test blood sugar 3 times daily or as directed. To accompany: Blood Glucose Monitor Brands: per insurance.    blood glucose monitoring (ONE TOUCH ULTRA 2) meter device kit USE TO TEST BLOOD SUGAR THREE TIMES DAILY OR AS DIRECTED.    carvedilol (COREG) 25 MG tablet Take 0.5 tablets (12.5 mg) by mouth 2 times daily (with meals)    gabapentin (NEURONTIN) 100 MG capsule Take 1-3 capsules (100-300 mg) by mouth 2 times daily    insulin aspart (NOVOLOG PEN) 100 UNIT/ML pen Before breaskfast and lunch, give 7 units. Before dinner give 8 units. (Patient taking differently: Before breaskfast and lunch, give 7 units. Before dinner give 9 units.)    loratadine (CLARITIN) 10 MG tablet Take 1 tablet (10 mg) by  "mouth daily as needed for allergies (itchy/rash)    magnesium oxide (MAG-OX) 400 MG tablet Take 1 tablet (400 mg) by mouth 2 times daily    mycophenolate (GENERIC EQUIVALENT) 250 MG capsule Take 1 capsule (250 mg) by mouth 2 times daily    nitroGLYcerin (NITROSTAT) 0.4 MG sublingual tablet One tablet under the tongue every 5 minutes if needed for chest pain. May repeat every 5 minutes for a maximum of 3 doses in 15 minutes\"    sulfamethoxazole-trimethoprim (BACTRIM) 400-80 MG tablet Take 1 tablet by mouth daily    tacrolimus (GENERIC EQUIVALENT) 0.5 MG capsule HOLD FOR FUTURE DOSE CHANGES.  Profile Rx: patient will contact pharmacy when needed    tacrolimus (GENERIC EQUIVALENT) 1 MG capsule Take 2 capsules (2 mg) by mouth 2 times daily    thin (NO BRAND SPECIFIED) lancets Tests 3x daily Use with lanceting device. To accompany: Blood Glucose Monitor Brands: per insurance.    Vitamin D3 (CHOLECALCIFEROL) 25 mcg (1000 units) tablet Take 2 tablets (50 mcg) by mouth daily     No current facility-administered medications for this visit.     Medications Discontinued During This Encounter   Medication Reason    insulin detemir (LEVEMIR PEN) 100 UNIT/ML pen Reorder (No AVS)       Physical Exam   Vital Signs: BP (!) 155/63   Pulse 64   Ht 1.626 m (5' 4\")   Wt 64.3 kg (141 lb 12.8 oz)   SpO2 99%   BMI 24.34 kg/m      GENERAL APPEARANCE: alert and no distress  HENT: mouth without ulcers or lesions  LYMPHATICS: no cervical or supraclavicular nodes  RESP: lungs clear to auscultation - no rales, rhonchi or wheezes  CV: regular rhythm, normal rate, no rub, 2.6 systolic murmur  EDEMA: no LE edema bilaterally  ABDOMEN: soft, nondistended, nontender, bowel sounds normal  MS: extremities normal - no gross deformities noted, no evidence of inflammation in joints, no muscle tenderness  SKIN: no rash  TX KIDNEY: normal  DIALYSIS ACCESS: none    Data         Latest Ref Rng & Units 8/21/2023     8:46 AM 8/17/2023    12:22 PM 8/14/2023 "     9:12 AM   Renal   Sodium 136 - 145 mmol/L 136  139  135    K 3.4 - 5.3 mmol/L 5.0  4.5  5.0    Cl 98 - 107 mmol/L 103  106  103    Cl (external) 98 - 107 mmol/L 103  106  103    CO2 22 - 29 mmol/L 23  25  23    Urea Nitrogen 6.0 - 20.0 mg/dL 27.7  18.2  28.5    Creatinine 0.67 - 1.17 mg/dL 1.54  1.28  1.79    Glucose 70 - 99 mg/dL 160  144  168    Calcium 8.6 - 10.0 mg/dL 9.1  9.3  9.7    Magnesium 1.7 - 2.3 mg/dL   1.9          Latest Ref Rng & Units 8/7/2023     8:57 AM 7/31/2023     8:44 AM 7/5/2023     7:43 AM   Bone Health   Phosphorus 2.5 - 4.5 mg/dL 3.7  4.1  3.7          Latest Ref Rng & Units 8/21/2023     8:46 AM 8/14/2023     9:12 AM 8/7/2023     8:57 AM   Heme   WBC 4.0 - 11.0 10e3/uL 4.1  4.6  3.5    Hgb 13.3 - 17.7 g/dL 10.5  11.5  9.9    Plt 150 - 450 10e3/uL 96  109  107          Latest Ref Rng & Units 4/7/2023     8:10 AM 4/3/2023     8:10 AM 3/30/2023     7:35 AM   Liver   AP 45 - 120 U/L 74  69  66    TBili 0.0 - 1.0 mg/dL 0.7  0.8  0.7    Bilirubin Direct 0.00 - 0.30 mg/dL  0.22  <0.20    ALT 0 - 45 U/L 10  12  15    AST 0 - 40 U/L 12  14  14    Tot Protein 6.0 - 8.0 g/dL 6.3  6.1  5.7    Albumin 3.5 - 5.0 g/dL 3.7      Albumin 3.5 - 5.2 g/dL  3.8  3.9          Latest Ref Rng & Units 6/26/2023     8:08 AM 2/22/2023     2:03 AM 2/21/2023     7:42 PM   Pancreas   A1C <5.7 % 7.5  6.8  6.8          Latest Ref Rng & Units 8/14/2023     9:12 AM 6/26/2023     8:08 AM 6/19/2023     9:45 AM   Iron studies   Iron 61 - 157 ug/dL 40  47  54    Iron Sat Index 15 - 46 % 22  28  31    Ferritin 31 - 409 ng/mL 412  545  540          Latest Ref Rng & Units 7/5/2023     7:43 AM 5/1/2023     7:15 AM 2/21/2023     1:00 PM   [facility] Txp Virology   CMV QUANT IU/ML Not Detected IU/mL   Not Detected    EBV DNA COPIES/ML Not Detected copies/mL <500  <500     EBV DNA LOG OF COPIES  <2.7  <2.7          Recent Labs   Lab Test 07/31/23  0844 08/07/23  0857 08/14/23  0912 08/21/23  0846   DOSTAC 7/30/2023 8/6/2023  --   8/20/2023   TACROL 8.5 7.8 8.1 7.4     Recent Labs   Lab Test 03/02/23  0833 03/18/23  0913 05/01/23  0715 05/16/23  0902 08/14/23  0912   DOSMPA 3/1/2023   8:00 PM  --  4/30/2023   8:00 AM  --   --    MPACID 1.48   < > 1.60 2.30 1.76   MPAG 41.0   < > 65.3 53.7 38.6    < > = values in this interval not displayed.

## 2023-08-29 LAB
BK VIRUS DNA COPIES/ML, INSTRUMENT: ABNORMAL COPIES/ML
BKV DNA SPEC NAA+PROBE-LOG#: 5 {LOG_COPIES}/ML

## 2023-08-29 RX ORDER — HEPARIN SODIUM (PORCINE) LOCK FLUSH IV SOLN 100 UNIT/ML 100 UNIT/ML
5 SOLUTION INTRAVENOUS
Status: CANCELLED | OUTPATIENT
Start: 2023-08-29

## 2023-08-29 RX ORDER — EPINEPHRINE 1 MG/ML
0.3 INJECTION, SOLUTION, CONCENTRATE INTRAVENOUS EVERY 5 MIN PRN
Status: CANCELLED | OUTPATIENT
Start: 2023-08-29

## 2023-08-29 RX ORDER — MEPERIDINE HYDROCHLORIDE 25 MG/ML
25 INJECTION INTRAMUSCULAR; INTRAVENOUS; SUBCUTANEOUS EVERY 30 MIN PRN
Status: CANCELLED | OUTPATIENT
Start: 2023-08-29

## 2023-08-29 RX ORDER — ALBUTEROL SULFATE 90 UG/1
1-2 AEROSOL, METERED RESPIRATORY (INHALATION)
Status: CANCELLED
Start: 2023-08-29

## 2023-08-29 RX ORDER — ALBUTEROL SULFATE 0.83 MG/ML
2.5 SOLUTION RESPIRATORY (INHALATION)
Status: CANCELLED | OUTPATIENT
Start: 2023-08-29

## 2023-08-29 RX ORDER — DIPHENHYDRAMINE HYDROCHLORIDE 50 MG/ML
50 INJECTION INTRAMUSCULAR; INTRAVENOUS
Status: CANCELLED
Start: 2023-08-29

## 2023-08-29 RX ORDER — METHYLPREDNISOLONE SODIUM SUCCINATE 125 MG/2ML
125 INJECTION, POWDER, LYOPHILIZED, FOR SOLUTION INTRAMUSCULAR; INTRAVENOUS
Status: CANCELLED
Start: 2023-08-29

## 2023-08-29 RX ORDER — HEPARIN SODIUM,PORCINE 10 UNIT/ML
5-20 VIAL (ML) INTRAVENOUS DAILY PRN
Status: CANCELLED | OUTPATIENT
Start: 2023-08-29

## 2023-08-30 ENCOUNTER — ALLIED HEALTH/NURSE VISIT (OUTPATIENT)
Dept: EDUCATION SERVICES | Facility: CLINIC | Age: 58
End: 2023-08-30
Payer: COMMERCIAL

## 2023-08-30 ENCOUNTER — TELEPHONE (OUTPATIENT)
Dept: TRANSPLANT | Facility: CLINIC | Age: 58
End: 2023-08-30

## 2023-08-30 DIAGNOSIS — Z94.0 KIDNEY REPLACED BY TRANSPLANT: Primary | ICD-10-CM

## 2023-08-30 DIAGNOSIS — E11.9 DIABETES MELLITUS (H): Primary | ICD-10-CM

## 2023-08-30 PROCEDURE — G0108 DIAB MANAGE TRN  PER INDIV: HCPCS | Performed by: DIETITIAN, REGISTERED

## 2023-08-30 NOTE — TELEPHONE ENCOUNTER
Issue increase BK     Rome Kim MD Huepfel, Mary K, RN; Patrick Phan MD  Yes would start 100mg bid please. Thanks          Previous Messages       ----- Message -----  From: Shalonda Roy RN  Sent: 8/29/2023  11:44 AM CDT  To: Patrick Phan MD; Rome Kim MD  Subject: starting dose of CSA  2 per kilo   75 mg twi*    Patrick Phan MD Huepfel, Mary K, RN  Increased BK viremia and recommend changing tacrolimus to cyclosporine with goal level 100-125.    Requesting a starting dose of cyclosporine ?  I will be sending all  25 mg capsules  If no overlap  tacrolimus  -

## 2023-08-30 NOTE — TELEPHONE ENCOUNTER
Modesto Barbosa Mary K RN  Phone Number: [phone]     Sounds good Shalonda. I can just tell my dad about the IV fluid appointment on 8/31/23 and wait to hear from then on the next few IV appointments. No phone call needed. Thanks for scheduling them for [facility] to be closer for us.    Have a great day!

## 2023-08-30 NOTE — LETTER
2023         RE: Modesto Barbosa  [address] [place] [address]        Dear Colleague,    Thank you for referring your patient, Modesto Barbosa, to the [facility]. Please see a copy of my visit note below.      Diabetes Self-Management Education & Support    Presents for:      Type of Service: In Person Visit    Assessment Type:   ASSESSMENT:  Follow up diabetes education visit, conducted with the help of a professional .  Has great family support, especially from his daughter, Chelsey who is a public health nurse and does home care visits.  PMH: ESRD from T2D; s/p kidney transplant on 23. is followed by endo and MTM                  Component Ref Range & Units 2 d ago  (23) 2 mo ago  (23) 6 mo ago  (23) 6 mo ago  (23) 6 mo ago  (23) 6 mo ago  (23) 2 yr ago  (21)    Hemoglobin A1C <5.7 % 5.8 High  7.5 High  CM 6.8 High  CM 6.8 High  CM 7.1 High  CM          Much improved control; pt has been focusing on making changes to his diet and intake.Has QUIT soda and the coffee drink (with sugar and ginseng) . We have also been making ongoing insulin adjustments - congratulated pt encouraged him to continue.    Insulin was increased Pt appropriately takin units of Levemir at bed time daily  7 units of novolog before lunch.   9 units of novolog before dinner.    Pt reports feeling well overall and also better appetite.   Denies any s/s of hyperglycemia, however, endorses symptomatic s/s hypoglycemia at least 2-3 times in the past 1 month, usually in the morning. Hence we will decrease the basal insulin today.     Per the SMBG data, pt's BG has improved significantly but he is also      141 () > 139.6 () >134 lb () < 150 lb (3/7) > 135 ( - was on lasix).     SMBG:  Continues to test 3x/d, BG 8/10 - :  FB, 121, 86, 121, 123, 117, 106, 92, 121, 130, 84, 144, 85, 104, 91, 101,102, 98, 131, 87  2 hours after lunch:118, 102,  101, 118, 111, 112, 113, 111, 118, 117, 101, 118, 111, 116, 102, 115, 114, 114, 116, 101  2 hours after dinner: 122, 126, 114, 114, 119, 122, 125, 119, 123, 124, 116, 126, 119, 123, 118, 128,132, 121, 121, 116   Lows: symptomatic hypoglycemia 2-3 times in the past 1 month, felt shaky/weak, treated with soda      We will go ahead and decrease basal insulin today to avoid the risk of hypoglycemia   Updated insulin prescription:  25 units of Levemir at bed time daily  7 units of novolog before lunch.   9 units of novolog before dinner.    Improved appetite  Consumes 2 meals daily with brown rice, veg, beef/fish  Continues to drink ~ 2 litres water daily  ++ We have used fake food/food models to review healthy, well proportioned meals at previous visits.      Regular with activity: uses his exercise bike twice daily/goes for walks whenever he can      Education/Discussion: Reviewed diabetes basics, AIC and BS goals, sx and tx of hypo and hyperglycemia, general activity and diet guidelines, CHO foods, insulin basics and injection technique: pt expressed understanding.    Patient's most recent   Lab Results   Component Value Date    A1C 5.8 08/28/2023     is meeting goal of <7.0    PLAN    Take meds as prescribed.   Here is your updated insulin prescription:  25 units of Levemir at bed time daily.  7 units of novolog before lunch.   9 units of novolog before dinner.     Continue to test BG 3x/d:  Fasting blood sugar and 2 hours after am and pm meals.     Continue to drink lots of water. NO sugary beverages     Always bring your meter, BG log and med vials to all clinic visits      Please call if 3 high or 2 low BS (or s/s of lows) in a 7- day period.     Topics to cover at upcoming visits: Healthy Eating, Monitoring, Taking Medication, Problem Solving and Reducing Risks     Follow-up: 4-6 weeks (or sooner if concerns)  Instructed pt to always bring their meter, BG log and med vials to all clinic visits - pt expressed  "understanding     See Care Plan for co-developed, patient-state behavior change goals.  AVS provided for patient today.    SUBJECTIVE/OBJECTIVE:     Cultural Influences/Ethnic Background:  Not  or     Diabetes Symptoms & Complications     Patient Problem List and Family Medical History reviewed for relevant medical history, current medical status, and diabetes risk factors.    Vitals:  There were no vitals taken for this visit.  Estimated body mass index is 24.34 kg/m  as calculated from the following:    Height as of 8/28/23: 1.626 m (5' 4\").    Weight as of 8/28/23: 64.3 kg (141 lb 12.8 oz).   Last 3 BP:   BP Readings from Last 3 Encounters:   08/28/23 (!) 155/63   08/17/23 (!) 149/64   06/28/23 (!) 148/67       History   Smoking Status    Never   Smokeless Tobacco    Never       Labs:  Lab Results   Component Value Date    A1C 5.8 08/28/2023     Lab Results   Component Value Date     08/28/2023     04/07/2023     04/07/2023     Lab Results   Component Value Date    LDL 26 08/28/2023     Direct Measure HDL   Date Value Ref Range Status   08/28/2023 42 >=40 mg/dL Final   ]  GFR Estimate   Date Value Ref Range Status   08/28/2023 51 (L) >60 mL/min/1.73m2 Final   06/14/2021 15 (L) >60 mL/min/1.73m2 Final     GFR Estimate If Black   Date Value Ref Range Status   06/14/2021 18 (L) >60 mL/min/1.73m2 Final     Lab Results   Component Value Date    CR 1.57 08/28/2023     No results found for: MICROALBUMIN    Taking Medications:  Diabetes Medication(s)       Insulin       insulin aspart (NOVOLOG PEN) 100 UNIT/ML pen    Before breaskfast and lunch, give 7 units. Before dinner give 8 units.     Patient taking differently: Before breaskfast and lunch, give 7 units. Before dinner give 9 units.     insulin detemir (LEVEMIR PEN) 100 UNIT/ML pen    27 units at bedtime             Patient Activation Measure Survey Score:       No data to display                Stacey Barrios RDN, DANIEL, CDCES  Diabetes " "Care and Education     Time Spent: {:782311} minutes  Encounter Type: Individual    Any diabetes medication dose changes were made via the CDE Protocol per the patient's {:[id number]}. A copy of this encounter was shared with the provider.        Diabetes Self-Management Education & Support    Presents for:      Type of Service: In Person Visit    Assessment Type:   ASSESSMENT:  Follow up diabetes education visit, conducted with the help of a professional .  Has great family support, especially from his daughter, Chelsey who is a public health nurse and does home care visits.  PMH: ESRD from T2D; s/p kidney transplant on 23. is followed by endo and MTM                  Component Ref Range & Units 2 d ago  (23) 2 mo ago  (23) 6 mo ago  (23) 6 mo ago  (23) 6 mo ago  (23) 6 mo ago  (23) 2 yr ago  (21)    Hemoglobin A1C <5.7 % 5.8 High  7.5 High  CM 6.8 High  CM 6.8 High  CM 7.1 High  CM          Much improved control; pt has been very diligently focusing on making changes to his diet and intake.Has QUIT soda and the coffee drink (with sugar and ginseng). \"I do everything that you have asked me to do.\"  We have also been making ongoing insulin adjustments. Congratulated pt encouraged him to continue.    Pt currently takin units of Levemir at bed time daily  7 units of novolog before lunch.   9 units of novolog before dinner.    Pt reports feeling well overall and also better appetite.   Denies any s/s of hyperglycemia, however, endorses symptomatic s/s hypoglycemia usually in the morning (at least 2-3 times in the past 1 month). Hence we will decrease the basal insulin today. Given the marked improvement in A1C and to simplify things, it would also be reasonable to replace meal time insulin with a GLP-1 to further lower the risk of hypoglycemia and for renal and CVD benefits. With most recent GFR of 51, we could also assess for a low dose of metformin for added control. " I have communicated with Dr. Cooper via staff message about this.      SMBG:  Continues to test 3x/d, presents with his BG log.  BG 8/10 - :  FB, 121, 86, 121, 123, 117, 106, 92, 121, 130, 84, 144, 85, 104, 91, 101,102, 98, 131, 87  2 hours after lunch:118, 102, 101, 118, 111, 112, 113, 111, 118, 117, 101, 118, 111, 116, 102, 115, 114, 114, 116, 101  2 hours after dinner: 122, 126, 114, 114, 119, 122, 125, 119, 123, 124, 116, 126, 119, 123, 118, 128,132, 121, 121, 116   Lows: symptomatic hypoglycemia 2-3 times in the past 1 month, felt shaky/weak, treated with soda      We will go ahead and decrease basal insulin today to avoid the risk of hypoglycemia   Updated insulin prescription:  25 units of Levemir at bed time daily  7 units of novolog before lunch.   9 units of novolog before dinner.    141 () > 139.6 () >134 lb () < 150 lb (3/7) > 135 ( - was on lasix).    Improved appetite  Consumes 2 meals daily with brown rice, veg, beef/fish  Continues to drink ~ 2 litres water daily  ++ We have used fake food/food models to review healthy, well proportioned meals at previous visits.      Regular with activity: uses his exercise bike twice daily/goes for walks whenever he can      Education/Discussion: Reviewed diabetes basics, AIC and BS goals, sx and tx of hypo and hyperglycemia, general activity and diet guidelines, CHO foods, insulin basics and injection technique: pt expressed understanding.    Patient's most recent   Lab Results   Component Value Date    A1C 5.8 2023     is meeting goal of <7.0    PLAN    To continue to take meds as prescribed.   Updated insulin prescription:  25 units of Levemir at bed time daily.  7 units of novolog before lunch.   9 units of novolog before dinner.    Therapy adjustment recommendations (see above) have been communicated to Dr. Cooper via staff message.      To continue to test BG 3x/d:  Fasting blood sugar and 2 hours after am and pm meals.     To  "continue to watch high CHO foods and portions.  To continue to drink lots of water. NO sugary beverages     To always bring meter, BG log and med vials to all clinic visits      To call if 3 high or 2 low BS (or s/s of lows) in a 7- day period.     Topics to cover at upcoming visits: Healthy Eating, Monitoring, Taking Medication, Problem Solving and Reducing Risks     Follow-up: 4-6 weeks (or sooner if concerns)  PCP:10/5  Instructed pt to always bring their meter, BG log and med vials to all clinic visits - pt expressed understanding     See Care Plan for co-developed, patient-state behavior change goals.  AVS provided for patient today.    SUBJECTIVE/OBJECTIVE:     Cultural Influences/Ethnic Background:  Not  or     Diabetes Symptoms & Complications     Patient Problem List and Family Medical History reviewed for relevant medical history, current medical status, and diabetes risk factors.    Vitals:  There were no vitals taken for this visit.  Estimated body mass index is 24.34 kg/m  as calculated from the following:    Height as of 8/28/23: 1.626 m (5' 4\").    Weight as of 8/28/23: 64.3 kg (141 lb 12.8 oz).   Last 3 BP:   BP Readings from Last 3 Encounters:   08/28/23 (!) 155/63   08/17/23 (!) 149/64   06/28/23 (!) 148/67       History   Smoking Status    Never   Smokeless Tobacco    Never       Labs:  Lab Results   Component Value Date    A1C 5.8 08/28/2023     Lab Results   Component Value Date     08/28/2023     04/07/2023     04/07/2023     Lab Results   Component Value Date    LDL 26 08/28/2023     Direct Measure HDL   Date Value Ref Range Status   08/28/2023 42 >=40 mg/dL Final   ]  GFR Estimate   Date Value Ref Range Status   08/28/2023 51 (L) >60 mL/min/1.73m2 Final   06/14/2021 15 (L) >60 mL/min/1.73m2 Final     GFR Estimate If Black   Date Value Ref Range Status   06/14/2021 18 (L) >60 mL/min/1.73m2 Final     Lab Results   Component Value Date    CR 1.57 08/28/2023 "     No results found for: MICROALBUMIN    Taking Medications:  Diabetes Medication(s)       Insulin       insulin aspart (NOVOLOG PEN) 100 UNIT/ML pen    Before breaskfast and lunch, give 7 units. Before dinner give 8 units.     Patient taking differently: Before breaskfast and lunch, give 7 units. Before dinner give 9 units.     insulin detemir (LEVEMIR PEN) 100 UNIT/ML pen    27 units at bedtime             Patient Activation Measure Survey Score:       No data to display                Stacey Barrios RDN, DANIEL, [facility]  Diabetes Care and Education     Time Spent: 60 minutes  Encounter Type: Individual    Any diabetes medication dose changes were made via the CDE Protocol per the patient's referring provider. A copy of this encounter was shared with the provider.

## 2023-08-30 NOTE — PROGRESS NOTES
"  Diabetes Self-Management Education & Support    Presents for:      Type of Service: In Person Visit    Assessment Type:   ASSESSMENT:  Follow up diabetes education visit, conducted with the help of a professional .  Has great family support, especially from his daughter, Chelsey who is a public health nurse and does home care visits.  PMH: ESRD from T2D; s/p kidney transplant on 23. is followed by endo and MTM                  Component Ref Range & Units 2 d ago  (23) 2 mo ago  (23) 6 mo ago  (23) 6 mo ago  (23) 6 mo ago  (23) 6 mo ago  (23) 2 yr ago  (21)    Hemoglobin A1C <5.7 % 5.8 High  7.5 High  CM 6.8 High  CM 6.8 High  CM 7.1 High  CM          Much improved control; pt has been very diligently focusing on making changes to his diet and intake.Has QUIT soda and the coffee drink (with sugar and ginseng). \"I do everything that you have asked me to do.\"  We have also been making ongoing insulin adjustments. Congratulated pt encouraged him to continue.    Pt currently takin units of Levemir at bed time daily  7 units of novolog before lunch.   9 units of novolog before dinner.    Pt reports feeling well overall and also better appetite.   Denies any s/s of hyperglycemia, however, endorses symptomatic s/s hypoglycemia usually in the morning (at least 2-3 times in the past 1 month). Hence we will decrease the basal insulin today. Given the marked improvement in A1C and to simplify things, it would also be reasonable to replace meal time insulin with a GLP-1 to further lower the risk of hypoglycemia and for renal and CVD benefits. With most recent GFR of 51, we could also assess for a low dose of metformin for added control. I have communicated with Dr. Cooper via staff message about this.      SMBG:  Continues to test 3x/d, presents with his BG log.  BG 8/10 - :  FB, 121, 86, 121, 123, 117, 106, 92, 121, 130, 84, 144, 85, 104, 91, 101,102, 98, 131, " 87  2 hours after lunch:118, 102, 101, 118, 111, 112, 113, 111, 118, 117, 101, 118, 111, 116, 102, 115, 114, 114, 116, 101  2 hours after dinner: 122, 126, 114, 114, 119, 122, 125, 119, 123, 124, 116, 126, 119, 123, 118, 128,132, 121, 121, 116   Lows: symptomatic hypoglycemia 2-3 times in the past 1 month, felt shaky/weak, treated with soda      We will go ahead and decrease basal insulin today to avoid the risk of hypoglycemia   Updated insulin prescription:  25 units of Levemir at bed time daily  7 units of novolog before lunch.   9 units of novolog before dinner.    141 (8/28) > 139.6 (7/5) >134 lb (5/1) < 150 lb (3/7) > 135 (2/21 - was on lasix).    Improved appetite  Consumes 2 meals daily with brown rice, veg, beef/fish  Continues to drink ~ 2 litres water daily  ++ We have used fake food/food models to review healthy, well proportioned meals at previous visits.      Regular with activity: uses his exercise bike twice daily/goes for walks whenever he can      Education/Discussion: Reviewed diabetes basics, AIC and BS goals, sx and tx of hypo and hyperglycemia, general activity and diet guidelines, CHO foods, insulin basics and injection technique: pt expressed understanding.    Patient's most recent   Lab Results   Component Value Date    A1C 5.8 08/28/2023     is meeting goal of <7.0    PLAN    To continue to take meds as prescribed.   Updated insulin prescription:  25 units of Levemir at bed time daily.  7 units of novolog before lunch.   9 units of novolog before dinner.    Therapy adjustment recommendations (see above) have been communicated to Dr. Cooper via staff message.      To continue to test BG 3x/d:  Fasting blood sugar and 2 hours after am and pm meals.     To continue to watch high CHO foods and portions.  To continue to drink lots of water. NO sugary beverages     To always bring meter, BG log and med vials to all clinic visits      To call if 3 high or 2 low BS (or s/s of lows) in a 7- day  "period.     Topics to cover at upcoming visits: Healthy Eating, Monitoring, Taking Medication, Problem Solving and Reducing Risks     Follow-up: 4-6 weeks (or sooner if concerns)  PCP:10/5  Instructed pt to always bring their meter, BG log and med vials to all clinic visits - pt expressed understanding     See Care Plan for co-developed, patient-state behavior change goals.  AVS provided for patient today.    SUBJECTIVE/OBJECTIVE:     Cultural Influences/Ethnic Background:  Not  or     Diabetes Symptoms & Complications     Patient Problem List and Family Medical History reviewed for relevant medical history, current medical status, and diabetes risk factors.    Vitals:  There were no vitals taken for this visit.  Estimated body mass index is 24.34 kg/m  as calculated from the following:    Height as of 8/28/23: 1.626 m (5' 4\").    Weight as of 8/28/23: 64.3 kg (141 lb 12.8 oz).   Last 3 BP:   BP Readings from Last 3 Encounters:   08/28/23 (!) 155/63   08/17/23 (!) 149/64   06/28/23 (!) 148/67       History   Smoking Status    Never   Smokeless Tobacco    Never       Labs:  Lab Results   Component Value Date    A1C 5.8 08/28/2023     Lab Results   Component Value Date     08/28/2023     04/07/2023     04/07/2023     Lab Results   Component Value Date    LDL 26 08/28/2023     Direct Measure HDL   Date Value Ref Range Status   08/28/2023 42 >=40 mg/dL Final   ]  GFR Estimate   Date Value Ref Range Status   08/28/2023 51 (L) >60 mL/min/1.73m2 Final   06/14/2021 15 (L) >60 mL/min/1.73m2 Final     GFR Estimate If Black   Date Value Ref Range Status   06/14/2021 18 (L) >60 mL/min/1.73m2 Final     Lab Results   Component Value Date    CR 1.57 08/28/2023     No results found for: MICROALBUMIN    Taking Medications:  Diabetes Medication(s)       Insulin       insulin aspart (NOVOLOG PEN) 100 UNIT/ML pen    Before breaskfast and lunch, give 7 units. Before dinner give 8 units.     Patient " taking differently: Before breaskfast and lunch, give 7 units. Before dinner give 9 units.     insulin detemir (LEVEMIR PEN) 100 UNIT/ML pen    27 units at bedtime             Patient Activation Measure Survey Score:       No data to display                Stacey Barrios RDN, DANIEL, [facility]  Diabetes Care and Education     Time Spent: 60 minutes  Encounter Type: Individual    Any diabetes medication dose changes were made via the CDE Protocol per the patient's referring provider. A copy of this encounter was shared with the provider.

## 2023-08-30 NOTE — LETTER
"    2023         RE: Modesto Barbosa  [address] [place] [address]        Dear Colleague,    Thank you for referring your patient, Modesto Barbosa, to the [facility]. Please see a copy of my visit note below.      Diabetes Self-Management Education & Support    Presents for:      Type of Service: In Person Visit    Assessment Type:   ASSESSMENT:  Follow up diabetes education visit, conducted with the help of a professional .  Has great family support, especially from his daughter, Chelsey who is a public health nurse and does home care visits.  PMH: ESRD from T2D; s/p kidney transplant on 23. is followed by endo and MTM                  Component Ref Range & Units 2 d ago  (23) 2 mo ago  (23) 6 mo ago  (23) 6 mo ago  (23) 6 mo ago  (23) 6 mo ago  (23) 2 yr ago  (21)    Hemoglobin A1C <5.7 % 5.8 High  7.5 High  CM 6.8 High  CM 6.8 High  CM 7.1 High  CM          Much improved control; pt has been very diligently focusing on making changes to his diet and intake.Has QUIT soda and the coffee drink (with sugar and ginseng). \"I do everything that you have asked me to do.\"  We have also been making ongoing insulin adjustments. Congratulated pt encouraged him to continue.    Pt currently takin units of Levemir at bed time daily  7 units of novolog before lunch.   9 units of novolog before dinner.    Pt reports feeling well overall and also better appetite.   Denies any s/s of hyperglycemia, however, endorses symptomatic s/s hypoglycemia usually in the morning (at least 2-3 times in the past 1 month). Hence we will decrease the basal insulin today. Given the marked improvement in A1C and to simplify things, it would also be reasonable to replace meal time insulin with a GLP-1 to further lower the risk of hypoglycemia and for renal and CVD benefits. With most recent GFR of 51, we could also assess for a low dose of metformin for added control. I " have communicated with Dr. Cooper via staff message about this.      SMBG:  Continues to test 3x/d, presents with his BG log.  BG 8/10 - :  FB, 121, 86, 121, 123, 117, 106, 92, 121, 130, 84, 144, 85, 104, 91, 101,102, 98, 131, 87  2 hours after lunch:118, 102, 101, 118, 111, 112, 113, 111, 118, 117, 101, 118, 111, 116, 102, 115, 114, 114, 116, 101  2 hours after dinner: 122, 126, 114, 114, 119, 122, 125, 119, 123, 124, 116, 126, 119, 123, 118, 128,132, 121, 121, 116   Lows: symptomatic hypoglycemia 2-3 times in the past 1 month, felt shaky/weak, treated with soda      We will go ahead and decrease basal insulin today to avoid the risk of hypoglycemia   Updated insulin prescription:  25 units of Levemir at bed time daily  7 units of novolog before lunch.   9 units of novolog before dinner.    141 () > 139.6 () >134 lb () < 150 lb (3/7) > 135 ( - was on lasix).    Improved appetite  Consumes 2 meals daily with brown rice, veg, beef/fish  Continues to drink ~ 2 litres water daily  ++ We have used fake food/food models to review healthy, well proportioned meals at previous visits.      Regular with activity: uses his exercise bike twice daily/goes for walks whenever he can      Education/Discussion: Reviewed diabetes basics, AIC and BS goals, sx and tx of hypo and hyperglycemia, general activity and diet guidelines, CHO foods, insulin basics and injection technique: pt expressed understanding.    Patient's most recent   Lab Results   Component Value Date    A1C 5.8 2023     is meeting goal of <7.0    PLAN    To continue to take meds as prescribed.   Updated insulin prescription:  25 units of Levemir at bed time daily.  7 units of novolog before lunch.   9 units of novolog before dinner.    Therapy adjustment recommendations (see above) have been communicated to Dr. Cooper via staff message.      To continue to test BG 3x/d:  Fasting blood sugar and 2 hours after am and pm meals.     To continue  "to watch high CHO foods and portions.  To continue to drink lots of water. NO sugary beverages     To always bring meter, BG log and med vials to all clinic visits      To call if 3 high or 2 low BS (or s/s of lows) in a 7- day period.     Topics to cover at upcoming visits: Healthy Eating, Monitoring, Taking Medication, Problem Solving and Reducing Risks     Follow-up: 4-6 weeks (or sooner if concerns)  PCP:10/5  Instructed pt to always bring their meter, BG log and med vials to all clinic visits - pt expressed understanding     See Care Plan for co-developed, patient-state behavior change goals.  AVS provided for patient today.    SUBJECTIVE/OBJECTIVE:     Cultural Influences/Ethnic Background:  Not  or     Diabetes Symptoms & Complications     Patient Problem List and Family Medical History reviewed for relevant medical history, current medical status, and diabetes risk factors.    Vitals:  There were no vitals taken for this visit.  Estimated body mass index is 24.34 kg/m  as calculated from the following:    Height as of 8/28/23: 1.626 m (5' 4\").    Weight as of 8/28/23: 64.3 kg (141 lb 12.8 oz).   Last 3 BP:   BP Readings from Last 3 Encounters:   08/28/23 (!) 155/63   08/17/23 (!) 149/64   06/28/23 (!) 148/67       History   Smoking Status    Never   Smokeless Tobacco    Never       Labs:  Lab Results   Component Value Date    A1C 5.8 08/28/2023     Lab Results   Component Value Date     08/28/2023     04/07/2023     04/07/2023     Lab Results   Component Value Date    LDL 26 08/28/2023     Direct Measure HDL   Date Value Ref Range Status   08/28/2023 42 >=40 mg/dL Final   ]  GFR Estimate   Date Value Ref Range Status   08/28/2023 51 (L) >60 mL/min/1.73m2 Final   06/14/2021 15 (L) >60 mL/min/1.73m2 Final     GFR Estimate If Black   Date Value Ref Range Status   06/14/2021 18 (L) >60 mL/min/1.73m2 Final     Lab Results   Component Value Date    CR 1.57 08/28/2023     No " results found for: MICROALBUMIN    Taking Medications:  Diabetes Medication(s)       Insulin       insulin aspart (NOVOLOG PEN) 100 UNIT/ML pen    Before breaskfast and lunch, give 7 units. Before dinner give 8 units.     Patient taking differently: Before breaskfast and lunch, give 7 units. Before dinner give 9 units.     insulin detemir (LEVEMIR PEN) 100 UNIT/ML pen    27 units at bedtime             Patient Activation Measure Survey Score:       No data to display                Stacey Barrios RDN, DANIEL, [facility]  Diabetes Care and Education     Time Spent: 60 minutes  Encounter Type: Individual    Any diabetes medication dose changes were made via the CDE Protocol per the patient's referring provider. A copy of this encounter was shared with the provider.

## 2023-08-30 NOTE — Clinical Note
2023         RE: Modesto Barbosa  [address] [place] [address]        Dear Colleague,    Thank you for referring your patient, Modesto Barbosa, to the [facility]. Please see a copy of my visit note below.      Diabetes Self-Management Education & Support    Presents for:      Type of Service: In Person Visit    Assessment Type:   ASSESSMENT:  Follow up diabetes education visit, conducted with the help of a professional .  Has great family support, especially from his daughter, Chelsey who is a public health nurse and does home care visits.  PMH: ESRD from T2D; s/p kidney transplant on 23. is followed by endo and MTM                  Component Ref Range & Units 2 d ago  (23) 2 mo ago  (23) 6 mo ago  (23) 6 mo ago  (23) 6 mo ago  (23) 6 mo ago  (23) 2 yr ago  (21)    Hemoglobin A1C <5.7 % 5.8 High  7.5 High  CM 6.8 High  CM 6.8 High  CM 7.1 High  CM          Much improved control; pt has been focusing on making changes to his diet and intake.Has QUIT soda and the coffee drink (with sugar and ginseng) . We have also been making ongoing insulin adjustments - congratulated pt encouraged him to continue.    Insulin was increased Pt appropriately takin units of Levemir at bed time daily  7 units of novolog before lunch.   9 units of novolog before dinner.    Pt reports feeling well overall and also better appetite.   Denies any s/s of hyperglycemia, however, endorses symptomatic s/s hypoglycemia at least 2-3 times in the past 1 month, usually in the morning. Hence we will decrease the basal insulin today.     Per the SMBG data, pt's BG has improved significantly but he is also      141 () > 139.6 () >134 lb () < 150 lb (3/7) > 135 ( - was on lasix).     SMBG:  Continues to test 3x/d, BG 8/10 - :  FB, 121, 86, 121, 123, 117, 106, 92, 121, 130, 84, 144, 85, 104, 91, 101,102, 98, 131, 87  2 hours after lunch:118, 102,  101, 118, 111, 112, 113, 111, 118, 117, 101, 118, 111, 116, 102, 115, 114, 114, 116, 101  2 hours after dinner: 122, 126, 114, 114, 119, 122, 125, 119, 123, 124, 116, 126, 119, 123, 118, 128,132, 121, 121, 116   Lows: symptomatic hypoglycemia 2-3 times in the past 1 month, felt shaky/weak, treated with soda      We will go ahead and decrease basal insulin today to avoid the risk of hypoglycemia   Updated insulin prescription:  25 units of Levemir at bed time daily  7 units of novolog before lunch.   9 units of novolog before dinner.    Improved appetite  Consumes 2 meals daily with brown rice, veg, beef/fish  Continues to drink ~ 2 litres water daily  ++ We have used fake food/food models to review healthy, well proportioned meals at previous visits.      Regular with activity: uses his exercise bike twice daily/goes for walks whenever he can      Education/Discussion: Reviewed diabetes basics, AIC and BS goals, sx and tx of hypo and hyperglycemia, general activity and diet guidelines, CHO foods, insulin basics and injection technique: pt expressed understanding.    Patient's most recent   Lab Results   Component Value Date    A1C 5.8 08/28/2023     is meeting goal of <7.0    PLAN    Take meds as prescribed.   Here is your updated insulin prescription:  25 units of Levemir at bed time daily.  7 units of novolog before lunch.   9 units of novolog before dinner.     Continue to test BG 3x/d:  Fasting blood sugar and 2 hours after am and pm meals.     Continue to drink lots of water. NO sugary beverages     Always bring your meter, BG log and med vials to all clinic visits      Please call if 3 high or 2 low BS (or s/s of lows) in a 7- day period.     Topics to cover at upcoming visits: Healthy Eating, Monitoring, Taking Medication, Problem Solving and Reducing Risks     Follow-up: 4-6 weeks (or sooner if concerns)  Instructed pt to always bring their meter, BG log and med vials to all clinic visits - pt expressed  "understanding     See Care Plan for co-developed, patient-state behavior change goals.  AVS provided for patient today.    SUBJECTIVE/OBJECTIVE:     Cultural Influences/Ethnic Background:  Not  or     Diabetes Symptoms & Complications     Patient Problem List and Family Medical History reviewed for relevant medical history, current medical status, and diabetes risk factors.    Vitals:  There were no vitals taken for this visit.  Estimated body mass index is 24.34 kg/m  as calculated from the following:    Height as of 8/28/23: 1.626 m (5' 4\").    Weight as of 8/28/23: 64.3 kg (141 lb 12.8 oz).   Last 3 BP:   BP Readings from Last 3 Encounters:   08/28/23 (!) 155/63   08/17/23 (!) 149/64   06/28/23 (!) 148/67       History   Smoking Status    Never   Smokeless Tobacco    Never       Labs:  Lab Results   Component Value Date    A1C 5.8 08/28/2023     Lab Results   Component Value Date     08/28/2023     04/07/2023     04/07/2023     Lab Results   Component Value Date    LDL 26 08/28/2023     Direct Measure HDL   Date Value Ref Range Status   08/28/2023 42 >=40 mg/dL Final   ]  GFR Estimate   Date Value Ref Range Status   08/28/2023 51 (L) >60 mL/min/1.73m2 Final   06/14/2021 15 (L) >60 mL/min/1.73m2 Final     GFR Estimate If Black   Date Value Ref Range Status   06/14/2021 18 (L) >60 mL/min/1.73m2 Final     Lab Results   Component Value Date    CR 1.57 08/28/2023     No results found for: MICROALBUMIN    Taking Medications:  Diabetes Medication(s)       Insulin       insulin aspart (NOVOLOG PEN) 100 UNIT/ML pen    Before breaskfast and lunch, give 7 units. Before dinner give 8 units.     Patient taking differently: Before breaskfast and lunch, give 7 units. Before dinner give 9 units.     insulin detemir (LEVEMIR PEN) 100 UNIT/ML pen    27 units at bedtime             Patient Activation Measure Survey Score:       No data to display                Stacey Barrios RDN, DANIEL, [facility]  Diabetes " "Care and Education     Time Spent: {:957487} minutes  Encounter Type: Individual    Any diabetes medication dose changes were made via the CDE Protocol per the patient's {:[id number]}. A copy of this encounter was shared with the provider.        Diabetes Self-Management Education & Support    Presents for:      Type of Service: In Person Visit    Assessment Type:   ASSESSMENT:  Follow up diabetes education visit, conducted with the help of a professional .  Has great family support, especially from his daughter, Chelsey who is a public health nurse and does home care visits.  PMH: ESRD from T2D; s/p kidney transplant on 23. is followed by endo and MTM                  Component Ref Range & Units 2 d ago  (23) 2 mo ago  (23) 6 mo ago  (23) 6 mo ago  (23) 6 mo ago  (23) 6 mo ago  (23) 2 yr ago  (21)    Hemoglobin A1C <5.7 % 5.8 High  7.5 High  CM 6.8 High  CM 6.8 High  CM 7.1 High  CM          Much improved control; pt has been very diligently focusing on making changes to his diet and intake.Has QUIT soda and the coffee drink (with sugar and ginseng). \"I do everything that you have asked me to do.\"  We have also been making ongoing insulin adjustments. Congratulated pt encouraged him to continue.    Pt currently takin units of Levemir at bed time daily  7 units of novolog before lunch.   9 units of novolog before dinner.    Pt reports feeling well overall and also better appetite.   Denies any s/s of hyperglycemia, however, endorses symptomatic s/s hypoglycemia usually in the morning (at least 2-3 times in the past 1 month). Hence we will decrease the basal insulin today. Given the marked improvement in A1C and to simplify things, it would also be reasonable to replace meal time insulin with a GLP-1 to further lower the risk of hypoglycemia and for renal and CVD benefits. With most recent GFR of 51, we could also assess for a low dose of metformin for added control. " I have communicated with Dr. Cooper via staff message about this.      SMBG:  Continues to test 3x/d, presents with his BG log.  BG 8/10 - :  FB, 121, 86, 121, 123, 117, 106, 92, 121, 130, 84, 144, 85, 104, 91, 101,102, 98, 131, 87  2 hours after lunch:118, 102, 101, 118, 111, 112, 113, 111, 118, 117, 101, 118, 111, 116, 102, 115, 114, 114, 116, 101  2 hours after dinner: 122, 126, 114, 114, 119, 122, 125, 119, 123, 124, 116, 126, 119, 123, 118, 128,132, 121, 121, 116   Lows: symptomatic hypoglycemia 2-3 times in the past 1 month, felt shaky/weak, treated with soda      We will go ahead and decrease basal insulin today to avoid the risk of hypoglycemia   Updated insulin prescription:  25 units of Levemir at bed time daily  7 units of novolog before lunch.   9 units of novolog before dinner.    141 () > 139.6 () >134 lb () < 150 lb (3/7) > 135 ( - was on lasix).    Improved appetite  Consumes 2 meals daily with brown rice, veg, beef/fish  Continues to drink ~ 2 litres water daily  ++ We have used fake food/food models to review healthy, well proportioned meals at previous visits.      Regular with activity: uses his exercise bike twice daily/goes for walks whenever he can      Education/Discussion: Reviewed diabetes basics, AIC and BS goals, sx and tx of hypo and hyperglycemia, general activity and diet guidelines, CHO foods, insulin basics and injection technique: pt expressed understanding.    Patient's most recent   Lab Results   Component Value Date    A1C 5.8 2023     is meeting goal of <7.0    PLAN    To continue to take meds as prescribed.   Updated insulin prescription:  25 units of Levemir at bed time daily.  7 units of novolog before lunch.   9 units of novolog before dinner.    Therapy adjustment recommendations (see above) have been communicated to Dr. Cooper via staff message.      To continue to test BG 3x/d:  Fasting blood sugar and 2 hours after am and pm meals.     To  "continue to watch high CHO foods and portions.  To continue to drink lots of water. NO sugary beverages     To always bring meter, BG log and med vials to all clinic visits      To call if 3 high or 2 low BS (or s/s of lows) in a 7- day period.     Topics to cover at upcoming visits: Healthy Eating, Monitoring, Taking Medication, Problem Solving and Reducing Risks     Follow-up: 4-6 weeks (or sooner if concerns)  PCP:10/5  Instructed pt to always bring their meter, BG log and med vials to all clinic visits - pt expressed understanding     See Care Plan for co-developed, patient-state behavior change goals.  AVS provided for patient today.    SUBJECTIVE/OBJECTIVE:     Cultural Influences/Ethnic Background:  Not  or     Diabetes Symptoms & Complications     Patient Problem List and Family Medical History reviewed for relevant medical history, current medical status, and diabetes risk factors.    Vitals:  There were no vitals taken for this visit.  Estimated body mass index is 24.34 kg/m  as calculated from the following:    Height as of 8/28/23: 1.626 m (5' 4\").    Weight as of 8/28/23: 64.3 kg (141 lb 12.8 oz).   Last 3 BP:   BP Readings from Last 3 Encounters:   08/28/23 (!) 155/63   08/17/23 (!) 149/64   06/28/23 (!) 148/67       History   Smoking Status     Never   Smokeless Tobacco     Never       Labs:  Lab Results   Component Value Date    A1C 5.8 08/28/2023     Lab Results   Component Value Date     08/28/2023     04/07/2023     04/07/2023     Lab Results   Component Value Date    LDL 26 08/28/2023     Direct Measure HDL   Date Value Ref Range Status   08/28/2023 42 >=40 mg/dL Final   ]  GFR Estimate   Date Value Ref Range Status   08/28/2023 51 (L) >60 mL/min/1.73m2 Final   06/14/2021 15 (L) >60 mL/min/1.73m2 Final     GFR Estimate If Black   Date Value Ref Range Status   06/14/2021 18 (L) >60 mL/min/1.73m2 Final     Lab Results   Component Value Date    CR 1.57 08/28/2023 "     No results found for: MICROALBUMIN    Taking Medications:  Diabetes Medication(s)       Insulin       insulin aspart (NOVOLOG PEN) 100 UNIT/ML pen    Before breaskfast and lunch, give 7 units. Before dinner give 8 units.     Patient taking differently: Before breaskfast and lunch, give 7 units. Before dinner give 9 units.     insulin detemir (LEVEMIR PEN) 100 UNIT/ML pen    27 units at bedtime             Patient Activation Measure Survey Score:       No data to display                Stacey Barrios RDN, DANIEL, [facility]  Diabetes Care and Education     Time Spent: 60 minutes  Encounter Type: Individual    Any diabetes medication dose changes were made via the CDE Protocol per the patient's referring provider. A copy of this encounter was shared with the provider.

## 2023-08-31 ENCOUNTER — INFUSION THERAPY VISIT (OUTPATIENT)
Dept: INFUSION THERAPY | Facility: HOSPITAL | Age: 58
End: 2023-08-31
Attending: INTERNAL MEDICINE
Payer: COMMERCIAL

## 2023-08-31 VITALS
SYSTOLIC BLOOD PRESSURE: 149 MMHG | DIASTOLIC BLOOD PRESSURE: 61 MMHG | OXYGEN SATURATION: 99 % | TEMPERATURE: 98 F | HEART RATE: 60 BPM | RESPIRATION RATE: 16 BRPM

## 2023-08-31 DIAGNOSIS — E86.0 DEHYDRATION: Primary | ICD-10-CM

## 2023-08-31 DIAGNOSIS — Z94.0 KIDNEY REPLACED BY TRANSPLANT: ICD-10-CM

## 2023-08-31 PROCEDURE — 96360 HYDRATION IV INFUSION INIT: CPT

## 2023-08-31 PROCEDURE — 258N000003 HC RX IP 258 OP 636: Performed by: INTERNAL MEDICINE

## 2023-08-31 RX ORDER — HEPARIN SODIUM (PORCINE) LOCK FLUSH IV SOLN 100 UNIT/ML 100 UNIT/ML
5 SOLUTION INTRAVENOUS
Status: CANCELLED | OUTPATIENT
Start: 2023-08-31

## 2023-08-31 RX ORDER — ALBUTEROL SULFATE 0.83 MG/ML
2.5 SOLUTION RESPIRATORY (INHALATION)
Status: DISCONTINUED | OUTPATIENT
Start: 2023-08-31 | End: 2023-08-31 | Stop reason: HOSPADM

## 2023-08-31 RX ORDER — ALBUTEROL SULFATE 0.83 MG/ML
2.5 SOLUTION RESPIRATORY (INHALATION)
Status: CANCELLED | OUTPATIENT
Start: 2023-08-31

## 2023-08-31 RX ORDER — MEPERIDINE HYDROCHLORIDE 25 MG/ML
25 INJECTION INTRAMUSCULAR; INTRAVENOUS; SUBCUTANEOUS EVERY 30 MIN PRN
Status: DISCONTINUED | OUTPATIENT
Start: 2023-08-31 | End: 2023-08-31 | Stop reason: HOSPADM

## 2023-08-31 RX ORDER — MEPERIDINE HYDROCHLORIDE 25 MG/ML
25 INJECTION INTRAMUSCULAR; INTRAVENOUS; SUBCUTANEOUS EVERY 30 MIN PRN
Status: CANCELLED | OUTPATIENT
Start: 2023-08-31

## 2023-08-31 RX ORDER — HEPARIN SODIUM,PORCINE 10 UNIT/ML
5-20 VIAL (ML) INTRAVENOUS DAILY PRN
Status: CANCELLED | OUTPATIENT
Start: 2023-08-31

## 2023-08-31 RX ORDER — EPINEPHRINE 1 MG/ML
0.3 INJECTION, SOLUTION INTRAMUSCULAR; SUBCUTANEOUS EVERY 5 MIN PRN
Status: DISCONTINUED | OUTPATIENT
Start: 2023-08-31 | End: 2023-08-31 | Stop reason: HOSPADM

## 2023-08-31 RX ORDER — DIPHENHYDRAMINE HYDROCHLORIDE 50 MG/ML
50 INJECTION INTRAMUSCULAR; INTRAVENOUS
Status: DISCONTINUED | OUTPATIENT
Start: 2023-08-31 | End: 2023-08-31 | Stop reason: HOSPADM

## 2023-08-31 RX ORDER — METHYLPREDNISOLONE SODIUM SUCCINATE 125 MG/2ML
125 INJECTION, POWDER, LYOPHILIZED, FOR SOLUTION INTRAMUSCULAR; INTRAVENOUS
Status: DISCONTINUED | OUTPATIENT
Start: 2023-08-31 | End: 2023-08-31 | Stop reason: HOSPADM

## 2023-08-31 RX ORDER — ALBUTEROL SULFATE 90 UG/1
1-2 AEROSOL, METERED RESPIRATORY (INHALATION)
Status: CANCELLED
Start: 2023-08-31

## 2023-08-31 RX ORDER — EPINEPHRINE 1 MG/ML
0.3 INJECTION, SOLUTION INTRAMUSCULAR; SUBCUTANEOUS EVERY 5 MIN PRN
Status: CANCELLED | OUTPATIENT
Start: 2023-08-31

## 2023-08-31 RX ORDER — ALBUTEROL SULFATE 90 UG/1
1-2 AEROSOL, METERED RESPIRATORY (INHALATION)
Status: DISCONTINUED | OUTPATIENT
Start: 2023-08-31 | End: 2023-08-31 | Stop reason: HOSPADM

## 2023-08-31 RX ORDER — DIPHENHYDRAMINE HYDROCHLORIDE 50 MG/ML
50 INJECTION INTRAMUSCULAR; INTRAVENOUS
Status: CANCELLED
Start: 2023-08-31

## 2023-08-31 RX ORDER — METHYLPREDNISOLONE SODIUM SUCCINATE 125 MG/2ML
125 INJECTION, POWDER, LYOPHILIZED, FOR SOLUTION INTRAMUSCULAR; INTRAVENOUS
Status: CANCELLED
Start: 2023-08-31

## 2023-08-31 RX ADMIN — SODIUM CHLORIDE, POTASSIUM CHLORIDE, SODIUM LACTATE AND CALCIUM CHLORIDE 1000 ML: 600; 310; 30; 20 INJECTION, SOLUTION INTRAVENOUS at 09:50

## 2023-08-31 ASSESSMENT — PAIN SCALES - GENERAL: PAINLEVEL: NO PAIN (0)

## 2023-08-31 NOTE — PROGRESS NOTES
Infusion Nursing Note:  Modesto Barbosa presents today for IVF LR  Patient seen by provider today: No   present during visit today: Yes, Language: Hmong.     Note: Modesto arrived for IVF in a stable condition, VSS. Plan of care reviewed, he verbalized understanding of his plan of care and return to clinic.    Intravenous Access:  Peripheral IV placed.    Treatment Conditions:  Not Applicable.    Post Infusion Assessment:  Patient tolerated infusion without incident.  Site patent and intact, free from redness, edema or discomfort.  No evidence of extravasations.  Access discontinued per protocol.     Discharge Plan:   Discharge instructions reviewed with: Patient.  Patient and/or family verbalized understanding of discharge instructions and all questions answered.  Patient discharged in stable condition accompanied by: self.  Departure Mode: Ambulatory.    Kristine Solorzano RN

## 2023-09-05 ENCOUNTER — LAB (OUTPATIENT)
Dept: LAB | Facility: HOSPITAL | Age: 58
End: 2023-09-05
Payer: COMMERCIAL

## 2023-09-05 ENCOUNTER — MYC MEDICAL ADVICE (OUTPATIENT)
Dept: TRANSPLANT | Facility: CLINIC | Age: 58
End: 2023-09-05

## 2023-09-05 ENCOUNTER — TELEPHONE (OUTPATIENT)
Dept: TRANSPLANT | Facility: CLINIC | Age: 58
End: 2023-09-05

## 2023-09-05 DIAGNOSIS — Z94.0 KIDNEY REPLACED BY TRANSPLANT: ICD-10-CM

## 2023-09-05 DIAGNOSIS — Z20.828 CONTACT WITH AND (SUSPECTED) EXPOSURE TO OTHER VIRAL COMMUNICABLE DISEASES: ICD-10-CM

## 2023-09-05 DIAGNOSIS — Z48.298 AFTERCARE FOLLOWING ORGAN TRANSPLANT: ICD-10-CM

## 2023-09-05 DIAGNOSIS — Z79.899 ENCOUNTER FOR LONG-TERM CURRENT USE OF MEDICATION: ICD-10-CM

## 2023-09-05 DIAGNOSIS — N18.30 ANEMIA OF CHRONIC RENAL FAILURE, STAGE 3 (MODERATE), UNSPECIFIED WHETHER STAGE 3A OR 3B CKD (H): ICD-10-CM

## 2023-09-05 DIAGNOSIS — D63.1 ANEMIA OF CHRONIC RENAL FAILURE, STAGE 3 (MODERATE), UNSPECIFIED WHETHER STAGE 3A OR 3B CKD (H): ICD-10-CM

## 2023-09-05 DIAGNOSIS — N18.30 STAGE 3 CHRONIC KIDNEY DISEASE, UNSPECIFIED WHETHER STAGE 3A OR 3B CKD (H): ICD-10-CM

## 2023-09-05 LAB
ANION GAP SERPL CALCULATED.3IONS-SCNC: 12 MMOL/L (ref 7–15)
BUN SERPL-MCNC: 26 MG/DL (ref 6–20)
CALCIUM SERPL-MCNC: 9.2 MG/DL (ref 8.6–10)
CHLORIDE SERPL-SCNC: 103 MMOL/L (ref 98–107)
CREAT SERPL-MCNC: 1.58 MG/DL (ref 0.67–1.17)
DEPRECATED HCO3 PLAS-SCNC: 23 MMOL/L (ref 22–29)
ERYTHROCYTE [DISTWIDTH] IN BLOOD BY AUTOMATED COUNT: 14.7 % (ref 10–15)
GFR SERPL CREATININE-BSD FRML MDRD: 50 ML/MIN/1.73M2
GLUCOSE SERPL-MCNC: 139 MG/DL (ref 70–99)
HCT VFR BLD AUTO: 31.8 % (ref 40–53)
HGB BLD-MCNC: 10.3 G/DL (ref 13.3–17.7)
MCH RBC QN AUTO: 26.1 PG (ref 26.5–33)
MCHC RBC AUTO-ENTMCNC: 32.4 G/DL (ref 31.5–36.5)
MCV RBC AUTO: 81 FL (ref 78–100)
PLATELET # BLD AUTO: 91 10E3/UL (ref 150–450)
POTASSIUM SERPL-SCNC: 4.7 MMOL/L (ref 3.4–5.3)
RBC # BLD AUTO: 3.94 10E6/UL (ref 4.4–5.9)
SODIUM SERPL-SCNC: 138 MMOL/L (ref 136–145)
WBC # BLD AUTO: 4.3 10E3/UL (ref 4–11)

## 2023-09-05 PROCEDURE — 80197 ASSAY OF TACROLIMUS: CPT

## 2023-09-05 PROCEDURE — 36415 COLL VENOUS BLD VENIPUNCTURE: CPT

## 2023-09-05 PROCEDURE — 87799 DETECT AGENT NOS DNA QUANT: CPT

## 2023-09-05 PROCEDURE — 85027 COMPLETE CBC AUTOMATED: CPT

## 2023-09-05 PROCEDURE — 82310 ASSAY OF CALCIUM: CPT

## 2023-09-05 RX ORDER — CYCLOSPORINE 25 MG/1
100 CAPSULE, LIQUID FILLED ORAL 2 TIMES DAILY
Qty: 240 CAPSULE | Refills: 3 | Status: SHIPPED | OUTPATIENT
Start: 2023-09-05 | End: 2023-09-24

## 2023-09-06 DIAGNOSIS — Z94.0 KIDNEY REPLACED BY TRANSPLANT: Primary | ICD-10-CM

## 2023-09-06 LAB
BK VIRUS DNA COPIES/ML, INSTRUMENT: ABNORMAL COPIES/ML
BKV DNA SPEC NAA+PROBE-LOG#: 5 {LOG_COPIES}/ML
TACROLIMUS BLD-MCNC: 6.5 UG/L (ref 5–15)
TME LAST DOSE: NORMAL H
TME LAST DOSE: NORMAL H

## 2023-09-07 ENCOUNTER — INFUSION THERAPY VISIT (OUTPATIENT)
Dept: INFUSION THERAPY | Facility: HOSPITAL | Age: 58
End: 2023-09-07
Attending: INTERNAL MEDICINE
Payer: COMMERCIAL

## 2023-09-07 VITALS
SYSTOLIC BLOOD PRESSURE: 158 MMHG | TEMPERATURE: 97.7 F | HEART RATE: 55 BPM | RESPIRATION RATE: 18 BRPM | DIASTOLIC BLOOD PRESSURE: 69 MMHG | OXYGEN SATURATION: 100 %

## 2023-09-07 DIAGNOSIS — E86.0 DEHYDRATION: Primary | ICD-10-CM

## 2023-09-07 DIAGNOSIS — Z94.0 KIDNEY REPLACED BY TRANSPLANT: ICD-10-CM

## 2023-09-07 PROCEDURE — 258N000003 HC RX IP 258 OP 636: Performed by: INTERNAL MEDICINE

## 2023-09-07 PROCEDURE — 96360 HYDRATION IV INFUSION INIT: CPT

## 2023-09-07 RX ORDER — DIPHENHYDRAMINE HYDROCHLORIDE 50 MG/ML
50 INJECTION INTRAMUSCULAR; INTRAVENOUS
Status: CANCELLED
Start: 2023-09-07

## 2023-09-07 RX ORDER — HEPARIN SODIUM,PORCINE 10 UNIT/ML
5-20 VIAL (ML) INTRAVENOUS DAILY PRN
Status: CANCELLED | OUTPATIENT
Start: 2023-09-07

## 2023-09-07 RX ORDER — METHYLPREDNISOLONE SODIUM SUCCINATE 125 MG/2ML
125 INJECTION, POWDER, LYOPHILIZED, FOR SOLUTION INTRAMUSCULAR; INTRAVENOUS
Status: CANCELLED
Start: 2023-09-07

## 2023-09-07 RX ORDER — MEPERIDINE HYDROCHLORIDE 25 MG/ML
25 INJECTION INTRAMUSCULAR; INTRAVENOUS; SUBCUTANEOUS EVERY 30 MIN PRN
Status: CANCELLED | OUTPATIENT
Start: 2023-09-07

## 2023-09-07 RX ORDER — ALBUTEROL SULFATE 0.83 MG/ML
2.5 SOLUTION RESPIRATORY (INHALATION)
Status: CANCELLED | OUTPATIENT
Start: 2023-09-07

## 2023-09-07 RX ORDER — EPINEPHRINE 1 MG/ML
0.3 INJECTION, SOLUTION INTRAMUSCULAR; SUBCUTANEOUS EVERY 5 MIN PRN
Status: CANCELLED | OUTPATIENT
Start: 2023-09-07

## 2023-09-07 RX ORDER — HEPARIN SODIUM (PORCINE) LOCK FLUSH IV SOLN 100 UNIT/ML 100 UNIT/ML
5 SOLUTION INTRAVENOUS
Status: CANCELLED | OUTPATIENT
Start: 2023-09-07

## 2023-09-07 RX ORDER — ALBUTEROL SULFATE 90 UG/1
1-2 AEROSOL, METERED RESPIRATORY (INHALATION)
Status: CANCELLED
Start: 2023-09-07

## 2023-09-07 RX ADMIN — SODIUM CHLORIDE, POTASSIUM CHLORIDE, SODIUM LACTATE AND CALCIUM CHLORIDE 1000 ML: 600; 310; 30; 20 INJECTION, SOLUTION INTRAVENOUS at 12:11

## 2023-09-07 ASSESSMENT — PAIN SCALES - GENERAL: PAINLEVEL: NO PAIN (0)

## 2023-09-07 NOTE — PROGRESS NOTES
Pt here for 1 liter LR.  IV started with good blood return and 1 liter of LR given without complications.  IV flushed and then discontinued. Site covered with guaze and coban.  Pt knows when to return and he left stable.

## 2023-09-08 NOTE — TELEPHONE ENCOUNTER
Increased BK viremia and recommend changing tacrolimus to cyclosporine with goal level 100-125

## 2023-09-10 NOTE — TELEPHONE ENCOUNTER
Due to the increasing BK Virus - The  transplant team  is recommending to change the  tacrolimus  to  cyclosporine  100 mg twice per day      The first action with patients with BK virus   Lowering Cellcept Mycophenolate mofetil  250 mg twice per day   IF the BK continues to increase after lowering Cellcept Mycophenolate mofetil   The next change to immunosuppression medications   Discontinue tacrolimus  and replace with  CYCLOSPORINE       I have sent the cyclosporine  RX [facility]

## 2023-09-11 ENCOUNTER — LAB (OUTPATIENT)
Dept: LAB | Facility: HOSPITAL | Age: 58
End: 2023-09-11
Payer: COMMERCIAL

## 2023-09-11 ENCOUNTER — PATIENT OUTREACH (OUTPATIENT)
Dept: CARE COORDINATION | Facility: CLINIC | Age: 58
End: 2023-09-11
Payer: COMMERCIAL

## 2023-09-11 DIAGNOSIS — Z20.828 CONTACT WITH AND (SUSPECTED) EXPOSURE TO OTHER VIRAL COMMUNICABLE DISEASES: ICD-10-CM

## 2023-09-11 DIAGNOSIS — Z79.899 ENCOUNTER FOR LONG-TERM CURRENT USE OF MEDICATION: ICD-10-CM

## 2023-09-11 DIAGNOSIS — Z94.0 KIDNEY REPLACED BY TRANSPLANT: ICD-10-CM

## 2023-09-11 DIAGNOSIS — Z48.298 AFTERCARE FOLLOWING ORGAN TRANSPLANT: ICD-10-CM

## 2023-09-11 LAB
ANION GAP SERPL CALCULATED.3IONS-SCNC: 9 MMOL/L (ref 7–15)
BUN SERPL-MCNC: 30.2 MG/DL (ref 6–20)
CALCIUM SERPL-MCNC: 9.3 MG/DL (ref 8.6–10)
CHLORIDE SERPL-SCNC: 104 MMOL/L (ref 98–107)
CREAT SERPL-MCNC: 1.53 MG/DL (ref 0.67–1.17)
DEPRECATED HCO3 PLAS-SCNC: 23 MMOL/L (ref 22–29)
EGFRCR SERPLBLD CKD-EPI 2021: 52 ML/MIN/1.73M2
ERYTHROCYTE [DISTWIDTH] IN BLOOD BY AUTOMATED COUNT: 14.8 % (ref 10–15)
GLUCOSE SERPL-MCNC: 94 MG/DL (ref 70–99)
HCT VFR BLD AUTO: 33.3 % (ref 40–53)
HGB BLD-MCNC: 11.2 G/DL (ref 13.3–17.7)
MCH RBC QN AUTO: 26.8 PG (ref 26.5–33)
MCHC RBC AUTO-ENTMCNC: 33.6 G/DL (ref 31.5–36.5)
MCV RBC AUTO: 80 FL (ref 78–100)
PLATELET # BLD AUTO: 90 10E3/UL (ref 150–450)
POTASSIUM SERPL-SCNC: 4.6 MMOL/L (ref 3.4–5.3)
RBC # BLD AUTO: 4.18 10E6/UL (ref 4.4–5.9)
SODIUM SERPL-SCNC: 136 MMOL/L (ref 136–145)
TACROLIMUS BLD-MCNC: 3.8 UG/L (ref 5–15)
TME LAST DOSE: ABNORMAL H
TME LAST DOSE: ABNORMAL H
WBC # BLD AUTO: 5.1 10E3/UL (ref 4–11)

## 2023-09-11 PROCEDURE — 87799 DETECT AGENT NOS DNA QUANT: CPT

## 2023-09-11 PROCEDURE — 80197 ASSAY OF TACROLIMUS: CPT

## 2023-09-11 PROCEDURE — 80048 BASIC METABOLIC PNL TOTAL CA: CPT

## 2023-09-11 PROCEDURE — 85027 COMPLETE CBC AUTOMATED: CPT

## 2023-09-11 PROCEDURE — 36415 COLL VENOUS BLD VENIPUNCTURE: CPT

## 2023-09-11 PROCEDURE — 80158 DRUG ASSAY CYCLOSPORINE: CPT

## 2023-09-11 NOTE — PROGRESS NOTES
Anemia Management Note  SUBJECTIVE/OBJECTIVE:  Referred by Dr. Rome Kim on 04/10/2023  Primary Diagnosis: Anemia in Chronic Kidney Disease (N18.3, D63.1)   3a  Secondary Diagnosis:  Chronic Kidney Disease, Stage 3 (N18.3)  3a  Kidney Tx: 2023  Hgb goal range:  9-10  Epo/Darbo: TBD: Hgb >9.0.  *Hx of HTN.   Iron regimen:  NA  Labs : 4/10/2024  Recent NERY use, transfusion, IV iron: NA  RX/TX plans : TBD     Labs and Cuco's.         No history of stroke, MI and blood clots or cancers.     Contact: OK to speak with Yanick Barbosa (daughter) and Edy Barbosa (son) regarding Medical, Billing, and Scheduling Informaiton per consent to communicate dated 2021.        Latest Ref Rng & Units 2023     8:40 AM 2023     8:44 AM 2023     8:57 AM 2023     9:12 AM 2023     8:46 AM 2023     9:03 AM 2023     9:12 AM   Anemia   Hemoglobin 13.3 - 17.7 g/dL 10.4  10.9  9.9  11.5  10.5  10.8  10.3    Ferritin 31 - 409 ng/mL    412         BP Readings from Last 3 Encounters:   23 (!) 158/69   23 (!) 149/61   23 (!) 155/63     Wt Readings from Last 2 Encounters:   23 64.3 kg (141 lb 12.8 oz)   23 63.3 kg (139 lb 9.6 oz)           ASSESSMENT:  Hgb:at goal - continue to monitor  TSat: not at goal of >30% Ferritin: At goal (>100ng/mL)    PLAN:  RTC for Anemia Labs in 2 week(s)    Orders needed to be renewed (for next follow-up date) in EPIC: None    Iron labs due:  Mid 2023    Plan discussed with:  No call, chart review      NEXT FOLLOW-UP DATE:  23    Nimo Banda RN   Anemia Services  [facility]  [email or web address]   Office : [phone]  Fax: [phone]

## 2023-09-12 ENCOUNTER — TELEPHONE (OUTPATIENT)
Dept: TRANSPLANT | Facility: CLINIC | Age: 58
End: 2023-09-12
Payer: COMMERCIAL

## 2023-09-12 DIAGNOSIS — Z94.0 KIDNEY REPLACED BY TRANSPLANT: Primary | ICD-10-CM

## 2023-09-12 LAB
BK VIRUS DNA COPIES/ML, INSTRUMENT: ABNORMAL COPIES/ML
BKV DNA SPEC NAA+PROBE-LOG#: 5.7 {LOG_COPIES}/ML

## 2023-09-12 NOTE — TELEPHONE ENCOUNTER
Message  Received: Today  Patrick Phan MD Huepfel, Shalonda GONZALES RN  Marked increase in BK viremia and recommend stopping mycophenolate mofetil and continue cyclosporine with goal level at lower end of 100-125.    Called Zong  - reviewed to stop Cellcept Mycophenolate mofetil   Continue on CYCLOSPORINE  100 mg twice per dya

## 2023-09-13 LAB
CYCLOSPORINE BLD LC/MS/MS-MCNC: 105 UG/L (ref 50–400)
TME LAST DOSE: NORMAL H
TME LAST DOSE: NORMAL H

## 2023-09-14 ENCOUNTER — INFUSION THERAPY VISIT (OUTPATIENT)
Dept: INFUSION THERAPY | Facility: HOSPITAL | Age: 58
End: 2023-09-14
Attending: INTERNAL MEDICINE
Payer: COMMERCIAL

## 2023-09-14 VITALS
HEART RATE: 64 BPM | SYSTOLIC BLOOD PRESSURE: 145 MMHG | RESPIRATION RATE: 18 BRPM | TEMPERATURE: 97.8 F | OXYGEN SATURATION: 99 % | DIASTOLIC BLOOD PRESSURE: 69 MMHG

## 2023-09-14 DIAGNOSIS — Z94.0 KIDNEY REPLACED BY TRANSPLANT: ICD-10-CM

## 2023-09-14 DIAGNOSIS — E86.0 DEHYDRATION: Primary | ICD-10-CM

## 2023-09-14 PROCEDURE — 258N000003 HC RX IP 258 OP 636: Performed by: INTERNAL MEDICINE

## 2023-09-14 PROCEDURE — 96360 HYDRATION IV INFUSION INIT: CPT

## 2023-09-14 RX ORDER — MEPERIDINE HYDROCHLORIDE 25 MG/ML
25 INJECTION INTRAMUSCULAR; INTRAVENOUS; SUBCUTANEOUS EVERY 30 MIN PRN
Status: CANCELLED | OUTPATIENT
Start: 2023-09-14

## 2023-09-14 RX ORDER — DIPHENHYDRAMINE HYDROCHLORIDE 50 MG/ML
50 INJECTION INTRAMUSCULAR; INTRAVENOUS
Status: DISCONTINUED | OUTPATIENT
Start: 2023-09-14 | End: 2023-09-14 | Stop reason: HOSPADM

## 2023-09-14 RX ORDER — HEPARIN SODIUM (PORCINE) LOCK FLUSH IV SOLN 100 UNIT/ML 100 UNIT/ML
5 SOLUTION INTRAVENOUS
Status: CANCELLED | OUTPATIENT
Start: 2023-09-14

## 2023-09-14 RX ORDER — ALBUTEROL SULFATE 0.83 MG/ML
2.5 SOLUTION RESPIRATORY (INHALATION)
Status: CANCELLED | OUTPATIENT
Start: 2023-09-14

## 2023-09-14 RX ORDER — HEPARIN SODIUM,PORCINE 10 UNIT/ML
5-20 VIAL (ML) INTRAVENOUS DAILY PRN
Status: CANCELLED | OUTPATIENT
Start: 2023-09-14

## 2023-09-14 RX ORDER — MEPERIDINE HYDROCHLORIDE 25 MG/ML
25 INJECTION INTRAMUSCULAR; INTRAVENOUS; SUBCUTANEOUS EVERY 30 MIN PRN
Status: DISCONTINUED | OUTPATIENT
Start: 2023-09-14 | End: 2023-09-14 | Stop reason: HOSPADM

## 2023-09-14 RX ORDER — METHYLPREDNISOLONE SODIUM SUCCINATE 125 MG/2ML
125 INJECTION, POWDER, LYOPHILIZED, FOR SOLUTION INTRAMUSCULAR; INTRAVENOUS
Status: CANCELLED
Start: 2023-09-14

## 2023-09-14 RX ORDER — ALBUTEROL SULFATE 0.83 MG/ML
2.5 SOLUTION RESPIRATORY (INHALATION)
Status: DISCONTINUED | OUTPATIENT
Start: 2023-09-14 | End: 2023-09-14 | Stop reason: HOSPADM

## 2023-09-14 RX ORDER — DIPHENHYDRAMINE HYDROCHLORIDE 50 MG/ML
50 INJECTION INTRAMUSCULAR; INTRAVENOUS
Status: CANCELLED
Start: 2023-09-14

## 2023-09-14 RX ORDER — METHYLPREDNISOLONE SODIUM SUCCINATE 125 MG/2ML
125 INJECTION, POWDER, LYOPHILIZED, FOR SOLUTION INTRAMUSCULAR; INTRAVENOUS
Status: DISCONTINUED | OUTPATIENT
Start: 2023-09-14 | End: 2023-09-14 | Stop reason: HOSPADM

## 2023-09-14 RX ORDER — EPINEPHRINE 1 MG/ML
0.3 INJECTION, SOLUTION INTRAMUSCULAR; SUBCUTANEOUS EVERY 5 MIN PRN
Status: CANCELLED | OUTPATIENT
Start: 2023-09-14

## 2023-09-14 RX ORDER — ALBUTEROL SULFATE 90 UG/1
1-2 AEROSOL, METERED RESPIRATORY (INHALATION)
Status: CANCELLED
Start: 2023-09-14

## 2023-09-14 RX ORDER — EPINEPHRINE 1 MG/ML
0.3 INJECTION, SOLUTION INTRAMUSCULAR; SUBCUTANEOUS EVERY 5 MIN PRN
Status: DISCONTINUED | OUTPATIENT
Start: 2023-09-14 | End: 2023-09-14 | Stop reason: HOSPADM

## 2023-09-14 RX ORDER — ALBUTEROL SULFATE 90 UG/1
1-2 AEROSOL, METERED RESPIRATORY (INHALATION)
Status: DISCONTINUED | OUTPATIENT
Start: 2023-09-14 | End: 2023-09-14 | Stop reason: HOSPADM

## 2023-09-14 RX ADMIN — SODIUM CHLORIDE, POTASSIUM CHLORIDE, SODIUM LACTATE AND CALCIUM CHLORIDE 1000 ML: 600; 310; 30; 20 INJECTION, SOLUTION INTRAVENOUS at 09:42

## 2023-09-14 NOTE — PROGRESS NOTES
Infusion Nursing Note:  Modesto Barbosa presents today for IV fluids.    Patient seen by provider today: No   present during visit today: Yes, Eugenio.    Note: Modesto comes to infusion ambulatory and in stable condition. Here for IV fluids. States feels like the fluids have been helping a lot. No new concerns or complaints today. A peripheral IV was placed in the left forearm x 1 attempt. The fluids were administered and then the line was removed and the site was covered with 2x2 gauze and secured in place with coban. Left infusion ambulatory and in stable condition. Will return next week.     Intravenous Access:  Peripheral IV placed.    Treatment Conditions:  Not Applicable.    Post Infusion Assessment:  Patient tolerated infusion without incident.  Site patent and intact, free from redness, edema or discomfort.  Access discontinued per protocol.     Discharge Plan:   Discharge instructions reviewed with: Patient.  Patient and/or family verbalized understanding of discharge instructions and all questions answered.  AVS to patient via [employer].  Patient will return 9/21 for next appointment.   Patient discharged in stable condition accompanied by: self.  Departure Mode: Ambulatory.    Rachel Tam RN

## 2023-09-18 ENCOUNTER — LAB (OUTPATIENT)
Dept: LAB | Facility: HOSPITAL | Age: 58
End: 2023-09-18
Payer: COMMERCIAL

## 2023-09-18 DIAGNOSIS — Z94.0 KIDNEY REPLACED BY TRANSPLANT: ICD-10-CM

## 2023-09-18 LAB
ANION GAP SERPL CALCULATED.3IONS-SCNC: 11 MMOL/L (ref 7–15)
BUN SERPL-MCNC: 25.5 MG/DL (ref 6–20)
CALCIUM SERPL-MCNC: 9.4 MG/DL (ref 8.6–10)
CHLORIDE SERPL-SCNC: 101 MMOL/L (ref 98–107)
CREAT SERPL-MCNC: 1.56 MG/DL (ref 0.67–1.17)
CYCLOSPORINE BLD LC/MS/MS-MCNC: 224 UG/L (ref 50–400)
DEPRECATED HCO3 PLAS-SCNC: 25 MMOL/L (ref 22–29)
EGFRCR SERPLBLD CKD-EPI 2021: 51 ML/MIN/1.73M2
ERYTHROCYTE [DISTWIDTH] IN BLOOD BY AUTOMATED COUNT: 15.3 % (ref 10–15)
GLUCOSE SERPL-MCNC: 96 MG/DL (ref 70–99)
HCT VFR BLD AUTO: 34.6 % (ref 40–53)
HGB BLD-MCNC: 11.6 G/DL (ref 13.3–17.7)
MCH RBC QN AUTO: 26.9 PG (ref 26.5–33)
MCHC RBC AUTO-ENTMCNC: 33.5 G/DL (ref 31.5–36.5)
MCV RBC AUTO: 80 FL (ref 78–100)
PLATELET # BLD AUTO: 92 10E3/UL (ref 150–450)
POTASSIUM SERPL-SCNC: 4.6 MMOL/L (ref 3.4–5.3)
RBC # BLD AUTO: 4.32 10E6/UL (ref 4.4–5.9)
SODIUM SERPL-SCNC: 137 MMOL/L (ref 136–145)
TME LAST DOSE: NORMAL H
TME LAST DOSE: NORMAL H
WBC # BLD AUTO: 4.7 10E3/UL (ref 4–11)

## 2023-09-18 PROCEDURE — 86832 HLA CLASS I HIGH DEFIN QUAL: CPT

## 2023-09-18 PROCEDURE — 87799 DETECT AGENT NOS DNA QUANT: CPT

## 2023-09-18 PROCEDURE — 86833 HLA CLASS II HIGH DEFIN QUAL: CPT

## 2023-09-18 PROCEDURE — 36415 COLL VENOUS BLD VENIPUNCTURE: CPT

## 2023-09-18 PROCEDURE — 85027 COMPLETE CBC AUTOMATED: CPT

## 2023-09-18 PROCEDURE — 80048 BASIC METABOLIC PNL TOTAL CA: CPT

## 2023-09-18 PROCEDURE — 80158 DRUG ASSAY CYCLOSPORINE: CPT

## 2023-09-19 ENCOUNTER — MYC MEDICAL ADVICE (OUTPATIENT)
Dept: TRANSPLANT | Facility: CLINIC | Age: 58
End: 2023-09-19
Payer: COMMERCIAL

## 2023-09-19 ENCOUNTER — TELEPHONE (OUTPATIENT)
Dept: TRANSPLANT | Facility: CLINIC | Age: 58
End: 2023-09-19
Payer: COMMERCIAL

## 2023-09-19 DIAGNOSIS — Z94.0 KIDNEY REPLACED BY TRANSPLANT: Primary | ICD-10-CM

## 2023-09-19 NOTE — TELEPHONE ENCOUNTER
Issue cyclosporine  level 224  above goal level  Cellcept Mycophenolate mofetil on  hold    Increasing BK         OUTCOME:   Spoke with daughter, they confirm accurate trough level and current [id number]  mg BID. Patient confirmed dose change to 75  mg BID and to repeat labs in 1 week. Orders sent to preferred pharmacy for dose change and lab for repeat labs. Patient voiced understanding of plan.

## 2023-09-21 ENCOUNTER — MYC MEDICAL ADVICE (OUTPATIENT)
Dept: FAMILY MEDICINE | Facility: CLINIC | Age: 58
End: 2023-09-21

## 2023-09-21 ENCOUNTER — INFUSION THERAPY VISIT (OUTPATIENT)
Dept: INFUSION THERAPY | Facility: HOSPITAL | Age: 58
End: 2023-09-21
Attending: INTERNAL MEDICINE
Payer: COMMERCIAL

## 2023-09-21 VITALS
SYSTOLIC BLOOD PRESSURE: 157 MMHG | RESPIRATION RATE: 18 BRPM | DIASTOLIC BLOOD PRESSURE: 70 MMHG | TEMPERATURE: 98.9 F | OXYGEN SATURATION: 99 % | HEART RATE: 64 BPM

## 2023-09-21 DIAGNOSIS — Z94.0 KIDNEY REPLACED BY TRANSPLANT: ICD-10-CM

## 2023-09-21 DIAGNOSIS — E86.0 DEHYDRATION: Primary | ICD-10-CM

## 2023-09-21 PROCEDURE — 96360 HYDRATION IV INFUSION INIT: CPT

## 2023-09-21 PROCEDURE — 258N000003 HC RX IP 258 OP 636: Performed by: INTERNAL MEDICINE

## 2023-09-21 RX ORDER — HEPARIN SODIUM (PORCINE) LOCK FLUSH IV SOLN 100 UNIT/ML 100 UNIT/ML
5 SOLUTION INTRAVENOUS
Status: DISCONTINUED | OUTPATIENT
Start: 2023-09-21 | End: 2023-09-21 | Stop reason: HOSPADM

## 2023-09-21 RX ORDER — ALBUTEROL SULFATE 90 UG/1
1-2 AEROSOL, METERED RESPIRATORY (INHALATION)
Status: CANCELLED
Start: 2023-09-21

## 2023-09-21 RX ORDER — ALBUTEROL SULFATE 0.83 MG/ML
2.5 SOLUTION RESPIRATORY (INHALATION)
Status: CANCELLED | OUTPATIENT
Start: 2023-09-21

## 2023-09-21 RX ORDER — HEPARIN SODIUM,PORCINE 10 UNIT/ML
5-20 VIAL (ML) INTRAVENOUS DAILY PRN
Status: DISCONTINUED | OUTPATIENT
Start: 2023-09-21 | End: 2023-09-21 | Stop reason: HOSPADM

## 2023-09-21 RX ORDER — ALBUTEROL SULFATE 0.83 MG/ML
2.5 SOLUTION RESPIRATORY (INHALATION)
Status: DISCONTINUED | OUTPATIENT
Start: 2023-09-21 | End: 2023-09-21 | Stop reason: HOSPADM

## 2023-09-21 RX ORDER — METHYLPREDNISOLONE SODIUM SUCCINATE 125 MG/2ML
125 INJECTION, POWDER, LYOPHILIZED, FOR SOLUTION INTRAMUSCULAR; INTRAVENOUS
Status: CANCELLED
Start: 2023-09-21

## 2023-09-21 RX ORDER — DIPHENHYDRAMINE HYDROCHLORIDE 50 MG/ML
50 INJECTION INTRAMUSCULAR; INTRAVENOUS
Status: DISCONTINUED | OUTPATIENT
Start: 2023-09-21 | End: 2023-09-21 | Stop reason: HOSPADM

## 2023-09-21 RX ORDER — METHYLPREDNISOLONE SODIUM SUCCINATE 125 MG/2ML
125 INJECTION, POWDER, LYOPHILIZED, FOR SOLUTION INTRAMUSCULAR; INTRAVENOUS
Status: DISCONTINUED | OUTPATIENT
Start: 2023-09-21 | End: 2023-09-21 | Stop reason: HOSPADM

## 2023-09-21 RX ORDER — ALBUTEROL SULFATE 90 UG/1
1-2 AEROSOL, METERED RESPIRATORY (INHALATION)
Status: DISCONTINUED | OUTPATIENT
Start: 2023-09-21 | End: 2023-09-21 | Stop reason: HOSPADM

## 2023-09-21 RX ORDER — HEPARIN SODIUM (PORCINE) LOCK FLUSH IV SOLN 100 UNIT/ML 100 UNIT/ML
5 SOLUTION INTRAVENOUS
Status: CANCELLED | OUTPATIENT
Start: 2023-09-21

## 2023-09-21 RX ORDER — MEPERIDINE HYDROCHLORIDE 25 MG/ML
25 INJECTION INTRAMUSCULAR; INTRAVENOUS; SUBCUTANEOUS EVERY 30 MIN PRN
Status: CANCELLED | OUTPATIENT
Start: 2023-09-21

## 2023-09-21 RX ORDER — HEPARIN SODIUM,PORCINE 10 UNIT/ML
5-20 VIAL (ML) INTRAVENOUS DAILY PRN
Status: CANCELLED | OUTPATIENT
Start: 2023-09-21

## 2023-09-21 RX ORDER — EPINEPHRINE 1 MG/ML
0.3 INJECTION, SOLUTION INTRAMUSCULAR; SUBCUTANEOUS EVERY 5 MIN PRN
Status: CANCELLED | OUTPATIENT
Start: 2023-09-21

## 2023-09-21 RX ORDER — EPINEPHRINE 1 MG/ML
0.3 INJECTION, SOLUTION INTRAMUSCULAR; SUBCUTANEOUS EVERY 5 MIN PRN
Status: DISCONTINUED | OUTPATIENT
Start: 2023-09-21 | End: 2023-09-21 | Stop reason: HOSPADM

## 2023-09-21 RX ORDER — DIPHENHYDRAMINE HYDROCHLORIDE 50 MG/ML
50 INJECTION INTRAMUSCULAR; INTRAVENOUS
Status: CANCELLED
Start: 2023-09-21

## 2023-09-21 RX ORDER — MEPERIDINE HYDROCHLORIDE 25 MG/ML
25 INJECTION INTRAMUSCULAR; INTRAVENOUS; SUBCUTANEOUS EVERY 30 MIN PRN
Status: DISCONTINUED | OUTPATIENT
Start: 2023-09-21 | End: 2023-09-21 | Stop reason: HOSPADM

## 2023-09-21 RX ADMIN — SODIUM CHLORIDE, POTASSIUM CHLORIDE, SODIUM LACTATE AND CALCIUM CHLORIDE 1000 ML: 600; 310; 30; 20 INJECTION, SOLUTION INTRAVENOUS at 10:31

## 2023-09-21 ASSESSMENT — PAIN SCALES - GENERAL: PAINLEVEL: NO PAIN (0)

## 2023-09-21 NOTE — PROGRESS NOTES
Infusion Nursing Note:  Modesto Barbosa presents today for IV hydration.    Patient seen by provider today: No   present during visit today: Yes, Eugenio     Note: Modesto comes to infusion ambulatory and in stable condition. Here for IV fluids. No new concerns or complaints today. A peripheral IV was placed in the right forearm x 1 attempt. The fluids were administered and then the line was removed and the site was covered with 2x2 gauze and secured in place with coban. Left infusion ambulatory and in stable condition. Currently does not have any appointments for infusion made.      Intravenous Access:  Peripheral IV placed.    Treatment Conditions:  Not Applicable.      Post Infusion Assessment:  Patient tolerated infusion without incident.  Blood return noted pre and post infusion.  No evidence of extravasations.  Access discontinued per protocol.       Discharge Plan:   Discharge instructions reviewed with: Patient.  Patient and/or family verbalized understanding of discharge instructions and all questions answered.  Patient discharged in stable condition accompanied by: self.  Departure Mode: Ambulatory.      Nimo Pendleton RN

## 2023-09-22 NOTE — TELEPHONE ENCOUNTER
Daughter is calling regarding low blood sugar.    Pt is on levimir 25 units, but has been taking only 20 units due to low blood sugar in the morning.    Last night his blood sugar was 200 (after dinner) and took 20 unit. He woke up with BS at 50 sweating and shaking. Daughter stated he drink some pop and is feeling better now. Unable to triage pt further as pt is not with daughter, but daughter stated she was on the phone with him earlier and he is doing better.    Pt just started cyclosporine last week. Pt is also currently having BK virus infection. Deena is unsure if this could be causing the low blood sugar. She would like some advise on what they should do.    Pt has appt with PCP on 10/5/23    Please call daughterYanick, at [phone].      Joseph Mooney, BSN RN  [facility]

## 2023-09-24 RX ORDER — CYCLOSPORINE 25 MG/1
75 CAPSULE, LIQUID FILLED ORAL 2 TIMES DAILY
Qty: 240 CAPSULE | Refills: 3 | Status: SHIPPED | OUTPATIENT
Start: 2023-09-24 | End: 2023-09-30

## 2023-09-24 NOTE — TELEPHONE ENCOUNTER
Modesto Barbosa, Shalonda GONZALES, RN  Phone Number: [phone]     Second week of October is better because I am on vacation all of next week. Thanks          Good afternoon  (Newest Message First)  View All Conversations on this Encounter  Modesto Barbosa  You4 days ago       Second week of October is better because I am on vacation all of next week. Thanks        You  Modesto Barbosa4 days ago       Good evening          Discussed  your question with acute kidney team this morning   At least 2 to 4 weeks before we have a drop in the BK - this individually base  Dr Rome Kim  or transplant  nephrology  recommend a follow up visit in the first or 2nd week  of Oct         Do you  have a preferred week in Oct      Thank you   Shalonda Kidney Transplant Coordinator            Modesto Barbosa  You5 days ago       His BK virus level went up too, usually how long before you see the levels go down when starting on the Cyclosporine? Thanks        Modesto Barbosa  You5 days ago       Ok sounds good, thanks        You  Modesto Barbosa5 days ago             Your father cyclosporine  level 224 above goal level      Please reduce his cyclosporine  from 100 mg twice per day to 75 mg twice per day      Continue on weekly labs and fluids      Thank you   Shalonda Kidney Transplant Coordinator

## 2023-09-25 ENCOUNTER — PATIENT OUTREACH (OUTPATIENT)
Dept: CARE COORDINATION | Facility: CLINIC | Age: 58
End: 2023-09-25
Payer: COMMERCIAL

## 2023-09-25 ENCOUNTER — LAB (OUTPATIENT)
Dept: LAB | Facility: HOSPITAL | Age: 58
End: 2023-09-25
Payer: COMMERCIAL

## 2023-09-25 DIAGNOSIS — Z20.828 CONTACT WITH AND (SUSPECTED) EXPOSURE TO OTHER VIRAL COMMUNICABLE DISEASES: ICD-10-CM

## 2023-09-25 DIAGNOSIS — Z94.0 KIDNEY REPLACED BY TRANSPLANT: ICD-10-CM

## 2023-09-25 DIAGNOSIS — Z79.899 ENCOUNTER FOR LONG-TERM CURRENT USE OF MEDICATION: ICD-10-CM

## 2023-09-25 DIAGNOSIS — Z48.298 AFTERCARE FOLLOWING ORGAN TRANSPLANT: ICD-10-CM

## 2023-09-25 LAB
ANION GAP SERPL CALCULATED.3IONS-SCNC: 11 MMOL/L (ref 7–15)
BUN SERPL-MCNC: 23.8 MG/DL (ref 6–20)
CALCIUM SERPL-MCNC: 9 MG/DL (ref 8.6–10)
CHLORIDE SERPL-SCNC: 107 MMOL/L (ref 98–107)
CREAT SERPL-MCNC: 1.42 MG/DL (ref 0.67–1.17)
CYCLOSPORINE BLD LC/MS/MS-MCNC: 158 UG/L (ref 50–400)
DEPRECATED HCO3 PLAS-SCNC: 23 MMOL/L (ref 22–29)
EGFRCR SERPLBLD CKD-EPI 2021: 57 ML/MIN/1.73M2
ERYTHROCYTE [DISTWIDTH] IN BLOOD BY AUTOMATED COUNT: 14.5 % (ref 10–15)
GLUCOSE SERPL-MCNC: 101 MG/DL (ref 70–99)
HCT VFR BLD AUTO: 32.9 % (ref 40–53)
HGB BLD-MCNC: 10.6 G/DL (ref 13.3–17.7)
MAGNESIUM SERPL-MCNC: 2.2 MG/DL (ref 1.7–2.3)
MCH RBC QN AUTO: 25.9 PG (ref 26.5–33)
MCHC RBC AUTO-ENTMCNC: 32.2 G/DL (ref 31.5–36.5)
MCV RBC AUTO: 80 FL (ref 78–100)
PLATELET # BLD AUTO: 106 10E3/UL (ref 150–450)
POTASSIUM SERPL-SCNC: 4.7 MMOL/L (ref 3.4–5.3)
RBC # BLD AUTO: 4.1 10E6/UL (ref 4.4–5.9)
SODIUM SERPL-SCNC: 141 MMOL/L (ref 136–145)
TME LAST DOSE: NORMAL H
TME LAST DOSE: NORMAL H
WBC # BLD AUTO: 3.7 10E3/UL (ref 4–11)

## 2023-09-25 PROCEDURE — 80180 DRUG SCRN QUAN MYCOPHENOLATE: CPT

## 2023-09-25 PROCEDURE — 85027 COMPLETE CBC AUTOMATED: CPT

## 2023-09-25 PROCEDURE — 80158 DRUG ASSAY CYCLOSPORINE: CPT

## 2023-09-25 PROCEDURE — 83735 ASSAY OF MAGNESIUM: CPT

## 2023-09-25 PROCEDURE — 87799 DETECT AGENT NOS DNA QUANT: CPT

## 2023-09-25 PROCEDURE — 36415 COLL VENOUS BLD VENIPUNCTURE: CPT

## 2023-09-25 PROCEDURE — 82310 ASSAY OF CALCIUM: CPT

## 2023-09-25 NOTE — PROGRESS NOTES
Referred by Dr. Rome Kim on 04/10/2023     Hgb has been stable since 06/05/23 with no intervention. NERY was never initiated.     Patient meets criteria for discharge from [facility] with at least 12 weeks without iron or NERY therapy.        Latest Ref Rng & Units 8/7/2023     8:57 AM 8/14/2023     9:12 AM 8/21/2023     8:46 AM 8/28/2023     9:03 AM 9/5/2023     9:12 AM 9/11/2023     9:05 AM 9/18/2023     9:28 AM   Anemia   Hemoglobin 13.3 - 17.7 g/dL 9.9  11.5  10.5  10.8  10.3  11.2  11.6    Ferritin 31 - 409 ng/mL  412             Anemia labs discontinued, therapy plans cancelled, removed from active patient list, and referring provider notified.    Nimo Banda RN   Anemia Services  [facility]  [email or web address]   Office : [phone]  Fax: [phone]

## 2023-09-26 ENCOUNTER — TELEPHONE (OUTPATIENT)
Dept: TRANSPLANT | Facility: CLINIC | Age: 58
End: 2023-09-26
Payer: COMMERCIAL

## 2023-09-26 LAB
DONOR IDENTIFICATION: NORMAL
DSA COMMENTS: NORMAL
DSA PRESENT: NO
DSA TEST METHOD: NORMAL
EBV DNA # SPEC NAA+PROBE: <500 COPIES/ML
EBV DNA SPEC NAA+PROBE-LOG#: <2.7 {LOG_COPIES}/ML
ORGAN: NORMAL
SA 1 CELL: NORMAL
SA 1 TEST METHOD: NORMAL
SA 2 CELL: NORMAL
SA 2 TEST METHOD: NORMAL
SA1 HI RISK ABY: NORMAL
SA1 MOD RISK ABY: NORMAL
SA2 HI RISK ABY: NORMAL
SA2 MOD RISK ABY: NORMAL
UNACCEPTABLE ANTIGENS: NORMAL
UNOS CPRA: 27
ZZZSA 1  COMMENTS: NORMAL
ZZZSA 2 COMMENTS: NORMAL

## 2023-09-26 NOTE — TELEPHONE ENCOUNTER
Provider to review lowering CYCLOSPORINE  from 75 mg twice per day to 50 mg twice per day (mono therapy ) due to BK   Ok will do, thanks Shalonda!        You  Modesto Barbosa4 days ago     MH  The cyclosporine  level is still too high for 13 level   Correct to lower to cyclosporine  50 mg twice per day        Modesto Barbosa  You4 days ago     \  Hi Shalonda     Yes the current dose is 75mg twice a day. My dad is taking it at 8am and 8pm. He usually gets his labs drawn 8:30-9am on Mondays. I just asked him and he said yesterday he got it drawn at 9am so it s more like a 13 hour trough level. Let me know if I should still lower his dose to 50mg twice a day or not.

## 2023-09-27 ENCOUNTER — TELEPHONE (OUTPATIENT)
Dept: TRANSPLANT | Facility: CLINIC | Age: 58
End: 2023-09-27
Payer: COMMERCIAL

## 2023-09-27 LAB
MYCOPHENOLATE SERPL LC/MS/MS-MCNC: <0.25 MG/L (ref 1–3.5)
MYCOPHENOLATE-G SERPL LC/MS/MS-MCNC: <6.5 MG/L (ref 30–95)
TME LAST DOSE: ABNORMAL H
TME LAST DOSE: ABNORMAL H

## 2023-09-27 NOTE — TELEPHONE ENCOUNTER
Post Kidney and Pancreas Transplant Team Conference  Date: 9/27/2023  Transplant Coordinator: Shalonda Roy     Attendees:  [x]  Dr. Phan [x] Shalonda Roy, RN [x] Julia Bower LPN     [x]  Dr. Patel [x] Diya Bautista, RN [] Reyna Llanos LPN    [x] Dr. Kim [x] Niyah Kee RN    [x] Dr. Carty [x] Rosalind Ayala, RN [x] Carlene Bell RN   [] Dr. Long [] Bijal Diez RN    [] Dr. Zhou [] Claire Dunn, ELIDIA    [x]  Dr. Saleh [] Patricia Paniagua RN    [] Dr. Walker [] Lalit Fofana RN    [x] Ritu Bonilla NP [] Neha Bhatti RN    [] Katrina Frederick NP [] Lita Tirado RN        Verbal Plan Read Back:   Ok to stop IV fluids    Routed to RN Coordinator   Julia Bower LPN

## 2023-09-29 ENCOUNTER — TELEPHONE (OUTPATIENT)
Dept: FAMILY MEDICINE | Facility: CLINIC | Age: 58
End: 2023-09-29
Payer: COMMERCIAL

## 2023-09-29 NOTE — TELEPHONE ENCOUNTER
Medication Question or Refill        What medication are you calling about (include dose and sig)?:  insulin detemir (LEVEMIR PEN) 100 UNIT/ML pen  BD shamar second gen pen  Alcohol prep pad    Preferred Pharmacy:   [id number] - [place], [place] - [address] AT [facility] [address]  [address] [place] [address]  Phone: [phone] Fax: [phone]    [facility] - [place], [place] - [address] [place] [id number]  Phone: [phone] Fax: [phone]      Controlled Substance Agreement on file:   CSA -- Patient Level:    CSA: None found at the patient level.       Who prescribed the medication?: Dr. SOLER    Do you need a refill? Yes    When did you use the medication last? N/a    Patient offered an appointment? No    Do you have any questions or concerns?  Yes: Per chart review, pt should still have refills. However pharmacy told them that they need new orders.      [facility] or would you prefer to receive a phone call?:   Patient would prefer a phone call       JULISSA Ragland Cem, RN  [facility]

## 2023-09-30 DIAGNOSIS — E11.22 TYPE 2 DIABETES MELLITUS WITH DIABETIC CHRONIC KIDNEY DISEASE, UNSPECIFIED CKD STAGE, UNSPECIFIED WHETHER LONG TERM INSULIN USE (H): Primary | ICD-10-CM

## 2023-10-02 ENCOUNTER — LAB (OUTPATIENT)
Dept: LAB | Facility: HOSPITAL | Age: 58
End: 2023-10-02
Payer: COMMERCIAL

## 2023-10-02 DIAGNOSIS — Z94.0 KIDNEY REPLACED BY TRANSPLANT: ICD-10-CM

## 2023-10-02 LAB
ANION GAP SERPL CALCULATED.3IONS-SCNC: 11 MMOL/L (ref 7–15)
BUN SERPL-MCNC: 29.7 MG/DL (ref 6–20)
CALCIUM SERPL-MCNC: 8.9 MG/DL (ref 8.6–10)
CHLORIDE SERPL-SCNC: 104 MMOL/L (ref 98–107)
CREAT SERPL-MCNC: 1.61 MG/DL (ref 0.67–1.17)
CYCLOSPORINE BLD LC/MS/MS-MCNC: 92 UG/L (ref 50–400)
DEPRECATED HCO3 PLAS-SCNC: 22 MMOL/L (ref 22–29)
EGFRCR SERPLBLD CKD-EPI 2021: 49 ML/MIN/1.73M2
ERYTHROCYTE [DISTWIDTH] IN BLOOD BY AUTOMATED COUNT: 14.9 % (ref 10–15)
GLUCOSE SERPL-MCNC: 82 MG/DL (ref 70–99)
HCT VFR BLD AUTO: 33.3 % (ref 40–53)
HGB BLD-MCNC: 11.2 G/DL (ref 13.3–17.7)
MCH RBC QN AUTO: 26.5 PG (ref 26.5–33)
MCHC RBC AUTO-ENTMCNC: 33.6 G/DL (ref 31.5–36.5)
MCV RBC AUTO: 79 FL (ref 78–100)
PLATELET # BLD AUTO: 109 10E3/UL (ref 150–450)
POTASSIUM SERPL-SCNC: 4.5 MMOL/L (ref 3.4–5.3)
RBC # BLD AUTO: 4.22 10E6/UL (ref 4.4–5.9)
SODIUM SERPL-SCNC: 137 MMOL/L (ref 135–145)
TME LAST DOSE: NORMAL H
TME LAST DOSE: NORMAL H
WBC # BLD AUTO: 6.5 10E3/UL (ref 4–11)

## 2023-10-02 PROCEDURE — 36415 COLL VENOUS BLD VENIPUNCTURE: CPT

## 2023-10-02 PROCEDURE — 80048 BASIC METABOLIC PNL TOTAL CA: CPT

## 2023-10-02 PROCEDURE — 85027 COMPLETE CBC AUTOMATED: CPT

## 2023-10-02 PROCEDURE — 80158 DRUG ASSAY CYCLOSPORINE: CPT

## 2023-10-02 PROCEDURE — 87799 DETECT AGENT NOS DNA QUANT: CPT

## 2023-10-02 PROCEDURE — 86832 HLA CLASS I HIGH DEFIN QUAL: CPT

## 2023-10-02 PROCEDURE — 86833 HLA CLASS II HIGH DEFIN QUAL: CPT

## 2023-10-02 RX ORDER — UBIQUINOL 100 MG
CAPSULE ORAL
Qty: 200 EACH | Refills: 3 | Status: SHIPPED | OUTPATIENT
Start: 2023-10-02

## 2023-10-02 RX ORDER — INSULIN DETEMIR 100 [IU]/ML
15-20 INJECTION, SOLUTION SUBCUTANEOUS AT BEDTIME
Qty: 18 ML | Refills: 3 | Status: SHIPPED | OUTPATIENT
Start: 2023-10-02 | End: 2024-07-15

## 2023-10-02 RX ORDER — PEN NEEDLE, DIABETIC 32GX 5/32"
NEEDLE, DISPOSABLE MISCELLANEOUS 2 TIMES DAILY
Qty: 180 EACH | Refills: 3 | Status: SHIPPED | OUTPATIENT
Start: 2023-10-02 | End: 2024-06-28

## 2023-10-02 NOTE — TELEPHONE ENCOUNTER
Orders placed in medication refill encounter.  Please call daughter to inform that all prescriptions requested have been approved.    Pal Cooper MD  [facility] [place] [address]  Phone: [phone]  Fax: [phone]    10/2/2023  4:38 PM

## 2023-10-02 NOTE — TELEPHONE ENCOUNTER
Orders placed as requested.     Pal Cooper MD  [facility]  10/2/2023  4:37 PM    Nhia was seen today for medication refill.    Diagnoses and all orders for this visit:    Type 2 diabetes mellitus with diabetic chronic kidney disease, unspecified CKD stage, unspecified whether long term insulin use (H)  -     insulin detemir (LEVEMIR FLEXTOUCH) 100 UNIT/ML pen; Inject 15-20 Units Subcutaneous At Bedtime  -     Alcohol Swabs (ALCOHOL PREP) 70 % PADS; USE 5X PER DAY  -     insulin pen needle (BD PEN NEEDLE ORESTES 2ND GEN) 32G X 4 MM miscellaneous; USE TWICE DAILY

## 2023-10-02 NOTE — TELEPHONE ENCOUNTER
Called back to [facility] to provide provider inquiry regarding medications and other diabetic supplies being sent.  Pharmacy supplies sent verified with daughter.  Future appt with pcp also verified.  No further action needed.    VANGIE PowellN, RN  [facility]

## 2023-10-03 LAB
BK VIRUS DNA COPIES/ML, INSTRUMENT: ABNORMAL COPIES/ML
BKV DNA SPEC NAA+PROBE-LOG#: 5.6 {LOG_COPIES}/ML

## 2023-10-04 ENCOUNTER — TELEPHONE (OUTPATIENT)
Dept: TRANSPLANT | Facility: CLINIC | Age: 58
End: 2023-10-04
Payer: COMMERCIAL

## 2023-10-04 NOTE — TELEPHONE ENCOUNTER
BK results reviewed with committee:   Rome Kim MD Huepfel, Shalonda GONZALES RN  BK decreasing. Repeat in 2 weeks please. Continue CSA goal 100-125, hold MPA.

## 2023-10-04 NOTE — TELEPHONE ENCOUNTER
Post Kidney and Pancreas Transplant Team Conference  Date: 10/4/2023  Transplant Coordinator: Shalonda Roy     Attendees:  [x]  Dr. Phan [] Shalonda Roy, RN [x] Julia Bower LPN     [x]  Dr. Patel [x] Diya Bautista, RN [] Reyna Llanos LPN    [x] Dr. Kim [x] Niyah Kee RN    [x] Dr. Carty [x] Rosalind Ayala, RN [x] Carlene Bell RN   [] Dr. Long [] Bijal Diez, RN    [] Dr. Zhou [] lCaire Dunn, ELIDIA    [x]  Dr. Saleh [] Patricia Paniagua RN    [] Dr. Walker [] Lalit Fofana RN    [x] Ritu Bonilla, NP [] Neha Bhatti RN    [x] Katrina Frederick NP [] Lita Tirado RN        Verbal Plan Read Back:   Kidney biopsy if Cr 1.7-1.8    Routed to RN Coordinator   Julia Bower LPN

## 2023-10-05 ENCOUNTER — TRANSFERRED RECORDS (OUTPATIENT)
Dept: HEALTH INFORMATION MANAGEMENT | Facility: CLINIC | Age: 58
End: 2023-10-05

## 2023-10-05 ENCOUNTER — OFFICE VISIT (OUTPATIENT)
Dept: FAMILY MEDICINE | Facility: CLINIC | Age: 58
End: 2023-10-05
Payer: COMMERCIAL

## 2023-10-05 VITALS
BODY MASS INDEX: 23.92 KG/M2 | OXYGEN SATURATION: 100 % | HEART RATE: 61 BPM | RESPIRATION RATE: 20 BRPM | SYSTOLIC BLOOD PRESSURE: 159 MMHG | TEMPERATURE: 98 F | WEIGHT: 140.08 LBS | HEIGHT: 64 IN | DIASTOLIC BLOOD PRESSURE: 68 MMHG

## 2023-10-05 DIAGNOSIS — E11.22 TYPE 2 DIABETES MELLITUS WITH DIABETIC CHRONIC KIDNEY DISEASE, UNSPECIFIED CKD STAGE, UNSPECIFIED WHETHER LONG TERM INSULIN USE (H): ICD-10-CM

## 2023-10-05 DIAGNOSIS — Z23 NEED FOR PROPHYLACTIC VACCINATION AGAINST HEPATITIS B VIRUS: ICD-10-CM

## 2023-10-05 DIAGNOSIS — G62.9 NEUROPATHY: ICD-10-CM

## 2023-10-05 DIAGNOSIS — Z94.0 HTN, KIDNEY TRANSPLANT RELATED: ICD-10-CM

## 2023-10-05 DIAGNOSIS — I15.1 HTN, KIDNEY TRANSPLANT RELATED: ICD-10-CM

## 2023-10-05 DIAGNOSIS — E78.5 DYSLIPIDEMIA, GOAL LDL BELOW 70: ICD-10-CM

## 2023-10-05 DIAGNOSIS — Z79.4 TYPE 2 DIABETES MELLITUS WITHOUT COMPLICATION, WITH LONG-TERM CURRENT USE OF INSULIN (H): ICD-10-CM

## 2023-10-05 DIAGNOSIS — Z94.0 KIDNEY REPLACED BY TRANSPLANT: ICD-10-CM

## 2023-10-05 DIAGNOSIS — N18.9 ANEMIA IN CHRONIC KIDNEY DISEASE, UNSPECIFIED CKD STAGE: ICD-10-CM

## 2023-10-05 DIAGNOSIS — R76.8 HEPATITIS B CORE ANTIBODY POSITIVE: ICD-10-CM

## 2023-10-05 DIAGNOSIS — D84.9 IMMUNOSUPPRESSED STATUS (H): ICD-10-CM

## 2023-10-05 DIAGNOSIS — D69.6 THROMBOCYTOPENIA (H): ICD-10-CM

## 2023-10-05 DIAGNOSIS — E11.9 TYPE 2 DIABETES MELLITUS WITHOUT COMPLICATION, WITH LONG-TERM CURRENT USE OF INSULIN (H): ICD-10-CM

## 2023-10-05 DIAGNOSIS — E11.42 DIABETIC PERIPHERAL NEUROPATHY (H): ICD-10-CM

## 2023-10-05 DIAGNOSIS — Z00.00 MEDICARE ANNUAL WELLNESS VISIT, SUBSEQUENT: Primary | ICD-10-CM

## 2023-10-05 DIAGNOSIS — D63.1 ANEMIA IN CHRONIC KIDNEY DISEASE, UNSPECIFIED CKD STAGE: ICD-10-CM

## 2023-10-05 PROCEDURE — 90746 HEPB VACCINE 3 DOSE ADULT IM: CPT | Performed by: FAMILY MEDICINE

## 2023-10-05 PROCEDURE — 99207 PR ANNUAL WELLNESS VISIT, PPS, SUBSEQUENT STAT: CPT | Performed by: FAMILY MEDICINE

## 2023-10-05 PROCEDURE — G0010 ADMIN HEPATITIS B VACCINE: HCPCS | Performed by: FAMILY MEDICINE

## 2023-10-05 PROCEDURE — 99207 PR FOOT EXAM NO CHARGE: CPT | Performed by: FAMILY MEDICINE

## 2023-10-05 PROCEDURE — 99214 OFFICE O/P EST MOD 30 MIN: CPT | Mod: 25 | Performed by: FAMILY MEDICINE

## 2023-10-05 ASSESSMENT — ENCOUNTER SYMPTOMS
NERVOUS/ANXIOUS: 0
WEAKNESS: 0
SORE THROAT: 0
ARTHRALGIAS: 0
SHORTNESS OF BREATH: 0
EYE PAIN: 0
DYSURIA: 0
CONSTIPATION: 0
DIZZINESS: 0
HEMATOCHEZIA: 0
COUGH: 0
FREQUENCY: 0
CHILLS: 0
FEVER: 0
HEADACHES: 0
HEARTBURN: 0
MYALGIAS: 0
PALPITATIONS: 0
JOINT SWELLING: 0
NAUSEA: 0
DIARRHEA: 0
PARESTHESIAS: 0
ABDOMINAL PAIN: 0
HEMATURIA: 0

## 2023-10-05 ASSESSMENT — ACTIVITIES OF DAILY LIVING (ADL): CURRENT_FUNCTION: NO ASSISTANCE NEEDED

## 2023-10-05 NOTE — PROGRESS NOTES
SUBJECTIVE:   [place] is a 57 year old who presents for Preventive Visit.      10/5/2023     2:29 PM   Additional Questions   Roomed by paw p   Accompanied by self     Currently, patient is taking levemir 10-15 units once daily. If the blood sugars are in the 170 or lower at night, he gives 10 units. If sugars are in the 200 range at night, patient would get 15 units.  This seems to help control the blood sugars in the morning time and reduce the risk of developing low blood sugars.  Patient has had some mild variations in renal functions.  He does get weekly blood tests completed.  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 Patient is taking NovoLog 7 units before breakfast and lunch and 9 units before dinner.  Overall, patient is fairly consistent with medications and blood sugar control. A1c was 5.8 in 08/28/2023.    He is interested in trying oral medications again at some point, though he wants to continue on the insulin for now until things are more stable.    Numbness: taking gabapentin 300mg BID for months now without improvement.  Folic acid and iron B12 were normal from 03/2023.  Patient did not have any symptoms during dialysis, though started having symptoms about a month after getting the kidney transplant.  Considering if it may be related to antirejection medications that patient is taking.  Could potentially be diabetic neuropathy as well.  After discussion, plan for trial of amitriptyline.    Discussed current COVID 19 case increasing in the broader community and precaution recommendations.  Patient does not go out to community events now due to the immunocompromise state.  Patient's daughters are helpful in reminding him that he needs to take extra precautions.  He is thankful for having his daughters to help him along the way.    See [employer] message regarding elevated blood pressures.    Are you in the first 12 months of your Medicare coverage?  No    Healthy  "Habits:     In general, how would you rate your overall health?  Good    Frequency of exercise:  6-7 days/week    Do you usually eat at least 4 servings of fruit and vegetables a day, include whole grains    & fiber and avoid regularly eating high fat or \"junk\" foods?  Yes    Ability to successfully perform activities of daily living:  No assistance needed    Home Safety:  No safety concerns identified    Hearing Impairment:  No hearing concerns    In the past 6 months, have you been bothered by leaking of urine?  No    In general, how would you rate your overall mental or emotional health?  Excellent    Additional concerns today:  No    Cognitive Screening   1) Repeat 3 items (Leader, Season, Table)    2) Clock draw: NORMAL  3) 3 item recall: Recalls 3 objects  Results: 3 items recalled: COGNITIVE IMPAIRMENT LESS LIKELY    Mini-CogTM Copyright S Louann. Licensed by the author for use in [facility] [employer]; reprinted with permission (dipti@[facility]). All rights reserved.      -Reviewed healthy eating and exercise.  -Skin cancer screening: No concerning skin changes expressed by patient.  -Smoking status:   Tobacco Use      Smoking status: Never      Smokeless tobacco: Never      -Family history:   Family History   Problem Relation Age of Onset    Diabetes Type 2  Mother     Other - See Comments Daughter         granular cell tumor of thigh    Heart Disease Other        Preventative health recommendations, evaluation options, and risk/benefits of each were discussed with patient. Accepted recommendations were ordered. Otherwise, patient declined.  Health Maintenance Due   Topic Date Due    DIABETIC FOOT EXAM  Never done    ADVANCE CARE PLANNING  Never done    EYE EXAM  Never done    MEDICARE ANNUAL WELLNESS VISIT  Never done    HEPATITIS B IMMUNIZATION (2 of 5 - Risk Dialysis 4-dose series) 07/24/2023    COVID-19 Vaccine (5 - 2023-24 season) 09/01/2023    ZOSTER IMMUNIZATION (2 of 2) 08/21/2023     Last eye " appointment: seen last year.     Hep B: Due for his second dose.  Nonimmune per titers from 2/21/2023.    Patient has completed the COVID 19 updated booster shot and flu shot already (on 9/18/2023)      -Immunizations due were reviewed.    Immunization History   Administered Date(s) Administered    COVID-19 Bivalent 12+ (Pfizer) 11/02/2022    COVID-19 Monovalent 18+ (Moderna) 03/17/2021, 04/14/2021, 11/08/2021    Flu, Unspecified 12/18/2007    HEPA 12/18/2007    Hepatitis B, Adult 06/26/2023    Influenza (H1N1) 02/11/2010    Influenza Vaccine >6 months (Alfuria,Fluzone) 09/16/2016, 10/19/2018, 09/28/2020, 10/05/2021, 10/14/2022    Influenza,INJ,MDCK,PF,Quad >6mo(Flucelvax) 03/12/2020    Pneumo Conj 13-V (2010&after) 10/10/2021    Pneumococcal 20 valent Conjugate (Prevnar 20) 06/26/2023    TDAP (Adacel,Boostrix) 06/26/2023    Typhoid IM 12/18/2007      Latest Reference Range & Units 08/23/21 12:43 02/21/23 13:00   Hepatitis B Surface Antibody Instrument Value <8.00 m[IU]/mL 5.68 4.52       -Labs: Laboratory recommendations reviewed with patient.    -Colon cancer screening: Last colonoscopy: 8/17/2021 at [facility]. Patient informed that everything was good. He is due for repeat colonoscopy in 10 years.       Reviewed and updated as needed this visit by clinical staff   Tobacco  Allergies  Meds              Reviewed and updated as needed this visit by Provider                 Social History     Tobacco Use    Smoking status: Never     Passive exposure: Never    Smokeless tobacco: Never   Substance Use Topics    Alcohol use: Not Currently           10/5/2023     2:28 PM   Alcohol Use   Prescreen: >3 drinks/day or >7 drinks/week? No     Current providers sharing in care for this patient include:   Patient Care Team:  Pal Cooper MD as PCP - General (Family Medicine)  Russell Choe DO (Nephrology)  Alfred Katz MD as Assigned Heart and Vascular Provider  Darryl Cortés MD as MD  (Hematology)  Santos Hussein [facility] as Pharmacist (Pharmacist)  Santos Hussein RPH as Assigned MTM Pharmacist  Talha Weinstein MD as Assigned Surgical Provider  Pal Cooper MD as Assigned PCP  Stacey Barrios RD as Registered Dietitian (Dietitian, Registered)  Rome Kim MD as MD (Nephrology)  Rome Kim MD as Assigned Nephrology Provider  Darryl Cortés MD as Assigned Cancer Care Provider    The following health maintenance items are reviewed in Epic and correct as of today:  Health Maintenance   Topic Date Due    ADVANCE CARE PLANNING  Never done    EYE EXAM  Never done    MEDICARE ANNUAL WELLNESS VISIT  Never done    HEPATITIS B IMMUNIZATION (2 of 5 - Risk Dialysis 4-dose series) 07/24/2023    COVID-19 Vaccine (5 - 2023-24 season) 09/01/2023    ZOSTER IMMUNIZATION (2 of 2) 08/21/2023    A1C  11/28/2023    MICROALBUMIN  02/09/2024    ANNUAL REVIEW OF HM ORDERS  06/26/2024    LIPID  08/28/2024    BMP  10/02/2024    DIABETIC FOOT EXAM  10/05/2024    COLORECTAL CANCER SCREENING  08/17/2031    DTAP/TDAP/TD IMMUNIZATION (2 - Td or Tdap) 06/26/2033    HEPATITIS C SCREENING  Completed    HIV SCREENING  Completed    PHQ-2 (once per calendar year)  Completed    INFLUENZA VACCINE  Completed    Pneumococcal Vaccine: Pediatrics (0 to 5 Years) and At-Risk Patients (6 to 64 Years)  Completed    URINALYSIS  Completed    IPV IMMUNIZATION  Aged Out    HPV IMMUNIZATION  Aged Out    MENINGITIS IMMUNIZATION  Aged Out       Review of Systems   Constitutional:  Negative for chills and fever.   HENT:  Negative for congestion, ear pain, hearing loss and sore throat.    Eyes:  Negative for pain and visual disturbance.   Respiratory:  Negative for cough and shortness of breath.    Cardiovascular:  Negative for chest pain, palpitations and peripheral edema.   Gastrointestinal:  Negative for abdominal pain, constipation, diarrhea, heartburn, hematochezia and nausea.   Genitourinary:  Negative for dysuria,  "frequency, genital sores, hematuria, impotence, penile discharge and urgency.   Musculoskeletal:  Negative for arthralgias, joint swelling and myalgias.   Skin:  Negative for rash.   Neurological:  Negative for dizziness, weakness, headaches and paresthesias.   Psychiatric/Behavioral:  Negative for mood changes. The patient is not nervous/anxious.        OBJECTIVE:   BP (!) 157/66   Pulse 61   Temp 98  F (36.7  C) (Oral)   Resp 20   Ht 1.626 m (5' 4\")   Wt 63.5 kg (140 lb 1.3 oz)   SpO2 100%   BMI 24.04 kg/m   Estimated body mass index is 24.04 kg/m  as calculated from the following:    Height as of this encounter: 1.626 m (5' 4\").    Weight as of this encounter: 63.5 kg (140 lb 1.3 oz).  Physical Exam  GENERAL: healthy, alert and no distress  EYES: Eyes grossly normal to inspection, PERRL and conjunctivae and sclerae normal  HENT: ear canals and TM's normal, nose and mouth without ulcers or lesions  NECK: no adenopathy, no asymmetry, masses, or scars and thyroid normal to palpation  RESP: lungs clear to auscultation - no rales, rhonchi or wheezes  CV: regular rate and rhythm, normal S1 S2, no S3 or S4, no murmur, click or rub, no peripheral edema and peripheral pulses strong  ABDOMEN: soft, nontender, no hepatosplenomegaly, no masses and bowel sounds normal  MS: no gross musculoskeletal defects noted, no edema  SKIN: no suspicious lesions or rashes  NEURO: Normal strength and tone, mentation intact and speech normal  PSYCH: mentation appears normal, affect normal/bright    Diagnostic Test Results:  Labs reviewed in Epic    ASSESSMENT / PLAN:   Patient Instructions:    -You are doing great.  -Continue to eat well.  Try to increase your servings of calcium as this can help your bones stay strong and healthy.  Follow a nutrition plan patient fruits and vegetables and low in fats and cholesterol.    -Be sure to eat 5-7 servings of fruits and vegetables each day.  -Find ways to stay active.  Try to get 150 " minutes of moderate activity (where you are breathing faster and slightly sweating) each week.  -Try to maintain a body mass index (BMI) of 18.5-25 as this is considered a healthier weight range.  -Brush your teeth twice daily.  See a dentist every 6-12 months.  -Be sure to use sunblock with SPF 15 or greater when going outside for extended periods of time.  Sunblock should be used even when it is a cloudy day.  Do intermittent skin checks for any concerning skin changes.  Wearing a wide brimmed hat and sunglasses can also be helpful to protect your skin from the sun.  -Monitor for any abnormal skin changes (such as new moles/spots, painful moles, changes in your old moles, wounds that will not heal, multiple colors noted in one lesion, lesions that are asymmetric or not circular, or anything that is concerning for you). If any of these are noted, please schedule an appointment to be seen.     -It is generally recommended for you to complete a health care directive or living will. These documents will be able to reflect your wishes and desire in the case that you are unable to express them yourself. Please let Dr. Cooper know if you would like some assistance with this process.    -For the preventive health procedure (such as colonoscopy, mammogram, etc) order from today, if you do not hear within a week's time from the specialist to schedule an appointment, please call the [facility] and speak with the specialty  to help you schedule the appointment. Depending on the specialist availability, it may be a number of weeks prior to your scheduled appointment.    -The Shingles/Shingrix vaccine is generally better covered by insurance companies if the vaccine is received at a pharmacy.  Please speak with your pharmacist regarding this vaccination as it is recommended for you.    -Please take your medications as prescribed.  -Check the blood sugars as recommended to help monitor how your blood sugars are  doing.  -Be sure to stay well-hydrated by drinking water each day to help your kidneys.  -Maintaining good blood sugar control will help your kidneys stay strong for longer.  -Be sure to complete an eye exam once a year to make sure your eyes are healthy.  -Check your feet a few times a week to monitor for any cuts/wounds or any changes.  If any concerning findings are noted, please return to clinic for reevaluation.  -If you feel unwell (such as dizziness, sweatiness, nausea, fast heartbeat, confused, etc.), be sure to check your blood sugar as it may be low (below 70 mg/dL).  If a low blood sugars noted, please eat or drink something sugary and repeat the blood sugar test in about 30 minutes.  Repeat until the blood sugar is above 70 mg/dL.  If blood sugars are persistently low, please seek immediate medical attention.    -STOP taking the gabapentin.  -Start taking amitriptyline to help treat for neuropathy.     -Continue to follow with the transplant team as you have been doing.    Please seek immediate medical attention (go to the emergency room or urgent care) for the following reasons: worsening symptoms, or any concerning changes.    [place] was seen today for physical.  Diagnoses and all orders for this visit:    Medicare annual wellness visit, subsequent    Need for prophylactic vaccination against hepatitis B virus  Hepatitis B core antibody positive  -     HEPATITIS B, ADULT 20+ (ENGERIX-B/RECOMBIVAX HB)    Dyslipidemia, goal LDL below 70  Type 2 diabetes mellitus without complication, with long-term current use of insulin (H): Overall doing well.  Reviewed insulin dosing and how insulin can affect sugars.  Patient seems to be fairly knowledgeable and he has daughters who are nurses who also have been providing assistance.  Overall, patient seems to be doing better.    -     FOOT EXAM  -     amitriptyline (ELAVIL) 25 MG tablet; Take 1 tablet (25 mg) by mouth at bedtime    Thrombocytopenia  (H24)  Immunosuppressed status (H24)  Kidney replaced by transplant  Neuropathy  Anemia in chronic kidney disease, unspecified CKD stage  HTN, kidney transplant related: Stable.  Patient will continue to follow with the renal transplant team.  See [facility] message about monitoring of home blood pressures.  -     amitriptyline (ELAVIL) 25 MG tablet; Take 1 tablet (25 mg) by mouth at bedtime        Patient has been advised of split billing requirements and indicates understanding: Yes (by nurse)    COUNSELING:  Reviewed preventive health recommendations.    He reports that he has never smoked. He has never been exposed to tobacco smoke. He has never used smokeless tobacco.      Appropriate preventive services were discussed with this patient, including applicable screening as appropriate for fall prevention, nutrition, physical activity, Tobacco-use cessation, weight loss and cognition.  Checklist reviewing preventive services available has been given to the patient.    Identified Health Risks:  I have reviewed Opioid Use Disorder and Substance Use Disorder risk factors and made any needed referrals.  Patient is at low risk.    Prior to immunization administration, verified patients identity using patient s name and date of birth. Please see Immunization Activity for additional information.     Screening Questionnaire for Adult Immunization    Are you sick today?   No   Do you have allergies to medications, food, a vaccine component or latex?   No   Have you ever had a serious reaction after receiving a vaccination?   No   Do you have a long-term health problem with heart, lung, kidney, or metabolic disease (e.g., diabetes), asthma, a blood disorder, no spleen, complement component deficiency, a cochlear implant, or a spinal fluid leak?  Are you on long-term aspirin therapy?   No   Do you have cancer, leukemia, HIV/AIDS, or any other immune system problem?   No   Do you have a parent, brother, or sister with an immune  system problem?   No   In the past 3 months, have you taken medications that affect  your immune system, such as prednisone, other steroids, or anticancer drugs; drugs for the treatment of rheumatoid arthritis, Crohn s disease, or psoriasis; or have you had radiation treatments?   No   Have you had a seizure, or a brain or other nervous system problem?   No   During the past year, have you received a transfusion of blood or blood    products, or been given immune (gamma) globulin or antiviral drug?   No   For women: Are you pregnant or is there a chance you could become       pregnant during the next month?   na   Have you received any vaccinations in the past 4 weeks?   No     Immunization questionnaire answers were all negative.      Patient instructed to remain in clinic for 15 minutes afterwards, and to report any adverse reactions.     Screening performed by Tania Farr MA on 10/5/2023 at 3:35 PM.        Pal Cooper MD  [facility] [place] [address]  Phone: [phone]  Fax: [phone]    10/6/2023  2:32 PM        no

## 2023-10-05 NOTE — PATIENT INSTRUCTIONS
-Thank you for choosing the [facility].  -It was a pleasure to see you today.  -Please take a look at the information below for more specific details regarding the treatment plan and recommendations.  -In this after visit summary is a list of your medications and specific instructions.  Please review this carefully as there may be changes made to your medication list.  -If there are any particular questions or concerns, please feel free to reach out to Dr. Cooper.  -If any labs have been completed, we will reach out to you about results.  If the results are normal or not concerning, a letter or MyChart message will be sent to you.  If any follow-up is needed, either Dr. Cooper or the nurse will give you a call.  If you have not heard regarding results after 2 weeks, please reach out to the clinic.    Patient Instructions:    -You are doing great.  -Continue to eat well.  Try to increase your servings of calcium as this can help your bones stay strong and healthy.  Follow a nutrition plan patient fruits and vegetables and low in fats and cholesterol.    -Be sure to eat 5-7 servings of fruits and vegetables each day.  -Find ways to stay active.  Try to get 150 minutes of moderate activity (where you are breathing faster and slightly sweating) each week.  -Try to maintain a body mass index (BMI) of 18.5-25 as this is considered a healthier weight range.  -Brush your teeth twice daily.  See a dentist every 6-12 months.  -Be sure to use sunblock with SPF 15 or greater when going outside for extended periods of time.  Sunblock should be used even when it is a cloudy day.  Do intermittent skin checks for any concerning skin changes.  Wearing a wide brimmed hat and sunglasses can also be helpful to protect your skin from the sun.  -Monitor for any abnormal skin changes (such as new moles/spots, painful moles, changes in your old moles, wounds that will not heal, multiple colors noted in one lesion,  lesions that are asymmetric or not circular, or anything that is concerning for you). If any of these are noted, please schedule an appointment to be seen.     -It is generally recommended for you to complete a health care directive or living will. These documents will be able to reflect your wishes and desire in the case that you are unable to express them yourself. Please let Dr. Cooper know if you would like some assistance with this process.    -For the preventive health procedure (such as colonoscopy, mammogram, etc) order from today, if you do not hear within a week's time from the specialist to schedule an appointment, please call the [facility] and speak with the specialty  to help you schedule the appointment. Depending on the specialist availability, it may be a number of weeks prior to your scheduled appointment.    -The Shingles/Shingrix vaccine is generally better covered by insurance companies if the vaccine is received at a pharmacy.  Please speak with your pharmacist regarding this vaccination as it is recommended for you.    -Please take your medications as prescribed.  -Check the blood sugars as recommended to help monitor how your blood sugars are doing.  -Be sure to stay well-hydrated by drinking water each day to help your kidneys.  -Maintaining good blood sugar control will help your kidneys stay strong for longer.  -Be sure to complete an eye exam once a year to make sure your eyes are healthy.  -Check your feet a few times a week to monitor for any cuts/wounds or any changes.  If any concerning findings are noted, please return to clinic for reevaluation.  -If you feel unwell (such as dizziness, sweatiness, nausea, fast heartbeat, confused, etc.), be sure to check your blood sugar as it may be low (below 70 mg/dL).  If a low blood sugars noted, please eat or drink something sugary and repeat the blood sugar test in about 30 minutes.  Repeat until the blood sugar is above 70 mg/dL.   If blood sugars are persistently low, please seek immediate medical attention.    -STOP taking the gabapentin.  -Start taking amitriptyline to help treat for neuropathy.     -Continue to follow with the transplant team as you have been doing.    Please seek immediate medical attention (go to the emergency room or urgent care) for the following reasons: worsening symptoms, or any concerning changes.      --------------------------------------------------------------------------------------------------------------------    -We are always looking for ways to improve.  You may be selected to receive a survey regarding your visit today.  We encourage you to complete the survey and provide specific, constructive feedback to help us improve our processes.  Thank you for your time!  -Please review the contact information listed on the after visit summary and in the electronic chart.  Below is the phone number that we have on file.  If there are any changes that are needed to be made, please reach out to the clinic.  [phone] (home)

## 2023-10-06 ENCOUNTER — APPOINTMENT (OUTPATIENT)
Dept: INTERPRETER SERVICES | Facility: CLINIC | Age: 58
End: 2023-10-06
Payer: COMMERCIAL

## 2023-10-06 PROBLEM — G62.9 NEUROPATHY: Status: ACTIVE | Noted: 2023-10-06

## 2023-10-09 ENCOUNTER — LAB (OUTPATIENT)
Dept: LAB | Facility: HOSPITAL | Age: 58
End: 2023-10-09
Payer: COMMERCIAL

## 2023-10-09 DIAGNOSIS — Z94.0 KIDNEY REPLACED BY TRANSPLANT: ICD-10-CM

## 2023-10-09 LAB
ANION GAP SERPL CALCULATED.3IONS-SCNC: 10 MMOL/L (ref 7–15)
BUN SERPL-MCNC: 34.1 MG/DL (ref 6–20)
CALCIUM SERPL-MCNC: 9.2 MG/DL (ref 8.6–10)
CHLORIDE SERPL-SCNC: 103 MMOL/L (ref 98–107)
CREAT SERPL-MCNC: 1.33 MG/DL (ref 0.67–1.17)
CYCLOSPORINE BLD LC/MS/MS-MCNC: 84 UG/L (ref 50–400)
DEPRECATED HCO3 PLAS-SCNC: 23 MMOL/L (ref 22–29)
EGFRCR SERPLBLD CKD-EPI 2021: 62 ML/MIN/1.73M2
ERYTHROCYTE [DISTWIDTH] IN BLOOD BY AUTOMATED COUNT: 14.8 % (ref 10–15)
GLUCOSE SERPL-MCNC: 155 MG/DL (ref 70–99)
HCT VFR BLD AUTO: 34.1 % (ref 40–53)
HGB BLD-MCNC: 11.2 G/DL (ref 13.3–17.7)
MCH RBC QN AUTO: 26 PG (ref 26.5–33)
MCHC RBC AUTO-ENTMCNC: 32.8 G/DL (ref 31.5–36.5)
MCV RBC AUTO: 79 FL (ref 78–100)
PLATELET # BLD AUTO: 99 10E3/UL (ref 150–450)
POTASSIUM SERPL-SCNC: 4.4 MMOL/L (ref 3.4–5.3)
RBC # BLD AUTO: 4.3 10E6/UL (ref 4.4–5.9)
SODIUM SERPL-SCNC: 136 MMOL/L (ref 135–145)
TME LAST DOSE: NORMAL H
TME LAST DOSE: NORMAL H
WBC # BLD AUTO: 6 10E3/UL (ref 4–11)

## 2023-10-09 PROCEDURE — 36415 COLL VENOUS BLD VENIPUNCTURE: CPT

## 2023-10-09 PROCEDURE — 80158 DRUG ASSAY CYCLOSPORINE: CPT

## 2023-10-09 PROCEDURE — 85027 COMPLETE CBC AUTOMATED: CPT

## 2023-10-09 PROCEDURE — 80048 BASIC METABOLIC PNL TOTAL CA: CPT

## 2023-10-10 ENCOUNTER — OFFICE VISIT (OUTPATIENT)
Dept: TRANSPLANT | Facility: CLINIC | Age: 58
End: 2023-10-10
Attending: INTERNAL MEDICINE
Payer: COMMERCIAL

## 2023-10-10 VITALS
WEIGHT: 139.7 LBS | DIASTOLIC BLOOD PRESSURE: 66 MMHG | OXYGEN SATURATION: 99 % | TEMPERATURE: 97.7 F | BODY MASS INDEX: 23.98 KG/M2 | SYSTOLIC BLOOD PRESSURE: 150 MMHG | HEART RATE: 56 BPM

## 2023-10-10 DIAGNOSIS — E87.20 METABOLIC ACIDOSIS: ICD-10-CM

## 2023-10-10 DIAGNOSIS — D84.9 IMMUNOSUPPRESSED STATUS (H): ICD-10-CM

## 2023-10-10 DIAGNOSIS — I15.1 HTN, KIDNEY TRANSPLANT RELATED: Primary | ICD-10-CM

## 2023-10-10 DIAGNOSIS — I42.1 HOCM (HYPERTROPHIC OBSTRUCTIVE CARDIOMYOPATHY) (H): ICD-10-CM

## 2023-10-10 DIAGNOSIS — N18.2 CKD (CHRONIC KIDNEY DISEASE) STAGE 2, GFR 60-89 ML/MIN: ICD-10-CM

## 2023-10-10 DIAGNOSIS — N18.2 ANEMIA IN STAGE 2 CHRONIC KIDNEY DISEASE: ICD-10-CM

## 2023-10-10 DIAGNOSIS — Z48.298 AFTERCARE FOLLOWING ORGAN TRANSPLANT: ICD-10-CM

## 2023-10-10 DIAGNOSIS — Z94.0 KIDNEY REPLACED BY TRANSPLANT: ICD-10-CM

## 2023-10-10 DIAGNOSIS — D63.1 ANEMIA IN STAGE 2 CHRONIC KIDNEY DISEASE: ICD-10-CM

## 2023-10-10 DIAGNOSIS — Z94.0 HTN, KIDNEY TRANSPLANT RELATED: Primary | ICD-10-CM

## 2023-10-10 DIAGNOSIS — E83.42 HYPOMAGNESEMIA: ICD-10-CM

## 2023-10-10 DIAGNOSIS — E55.9 VITAMIN D DEFICIENCY: ICD-10-CM

## 2023-10-10 PROCEDURE — G0463 HOSPITAL OUTPT CLINIC VISIT: HCPCS | Performed by: INTERNAL MEDICINE

## 2023-10-10 PROCEDURE — 99214 OFFICE O/P EST MOD 30 MIN: CPT | Performed by: INTERNAL MEDICINE

## 2023-10-10 RX ORDER — HYDRALAZINE HYDROCHLORIDE 25 MG/1
25 TABLET, FILM COATED ORAL 2 TIMES DAILY
Qty: 180 TABLET | Refills: 3 | Status: SHIPPED | OUTPATIENT
Start: 2023-10-10

## 2023-10-10 ASSESSMENT — PAIN SCALES - GENERAL: PAINLEVEL: NO PAIN (0)

## 2023-10-10 NOTE — LETTER
10/10/2023         RE: Modesto Barbosa  [address] [place] [address]        Dear Colleague,    Thank you for referring your patient, Modesto Barbosa, to the [facility]ANSPLANT [facility]. Please see a copy of my visit note below.    TRANSPLANT NEPHROLOGY CHRONIC POST TRANSPLANT VISIT    Assessment & Plan  # LDKT: Stable, slightly elevated creatinine above baseline at ~ 1.3.  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   - Baseline Creatinine: ~ 1.0-1.2   - Proteinuria: Minimal (0.2-0.5 grams),    - Date DSA Last Checked: Sep/2023      Latest DSA: No cPRA: 27%   - BK Viremia: Yes, significantly elevated (> 100K)   - Kidney Tx Biopsy: May 03, 2023; Result: No diagnostic evidence of acute rejection.   Acute tubular injury.  Severe arterial and arteriolar sclerosis.    # Immunosuppression: Cyclosporine (goal 100-125)   - On lower immunosuppression with stopping mycophenolate and changed tacrolimus to cyclosporine due to significantly elevated BK viremia.   - Continue with intensive monitoring of immunosuppression for efficacy and toxicity.   - Changes: Not at this time    # Infection Prophylaxis:   - PJP: Sulfa/TMP (Bactrim)    # Hypertension: Borderline control;  Goal BP: < 130/80   - Volume status: Euvolemic   - Changes: Yes - Will start hydralazine 25 mg bid.   - If kidney function stabilizes, would consider using ACEi/ARB    # Diabetes: Controlled (HbA1c <7%) Last HbA1c: 5.8%   - Management as per primary care    # Anemia in Chronic Renal Disease: Hgb: Stable      NERY: No   - Iron studies: Low iron saturation, but high ferritin    # Thrombocytopenia: Stable, mildly low platelet level in the ~ 100-140s.  Patient does have splenomegaly.    # Mineral Bone Disorder:   - Secondary renal hyperparathyroidism; PTH level: Normal (15-65 pg/ml)        On treatment: None  - Vitamin D; level: Normal        On supplement: Yes  - Calcium; level: Normal        On supplement: No    # Electrolytes:   - Potassium; level: Normal         On supplement: No  - Magnesium; level: Normal        On supplement: Yes  - Bicarbonate; level: Normal        On supplement: Yes    # BK Viremia: Slight trend down, but significantly elevated BK PCR at ~ 400K.  Peak BK PCR ~ 560K on Sep/2023.  Normal IgG level.  Now on lower immunosuppression with stopping mycophenolate mofetil and changing tacrolimus to cyclosporine.    - Will continue to check BK PCR q2 weeks.   - If BK PCR continues to decrease, will have to consider restarting low dose mycophenolate.    # CAD, s/p PCI: Asymptomatic.    # Asymmetric Left Ventricular Hypertrophy (HOCM): Stable. No history of sustained ventricular arrhythmias.     # Hepatitis B Core Ab +: Patient with core Ab positive, but negative Ag and also negative surface Ab.  Hep B DNA was negative at time of transplant, as well as repeat at 4 months post transplant.     # Peripheral Neuropathy: Stable symptoms on amitriptyline.    # Medical Compliance: Yes     # Skin Cancer Risk:    - Discussed sun protection and recommend regular follow up with Dermatology.    # Health Maintenance and Vaccination Review: Not Reviewed    # Transplant History:  Etiology of Kidney Failure: Diabetes mellitus type 2  Tx: LDKT  Transplant: 2/21/2023 (Kidney)  Significant changes in immunosuppression: None  Significant transplant-related complications: BK Viremia    Transplant Office Phone Number: [phone]    Assessment and plan was discussed with the patient and he voiced his understanding and agreement.    Return visit: Return for previously scheduled visit.    Patrick Phan MD    Chief Complaint  Mr. Barbosa is a 57 year old here for kidney transplant and immunosuppression management.     History of Present Illness   Mr. Barbosa reports feeling okay overall with some medical complaints.  Since last clinic visit, patient reports no hospitalizations or new medical complaints and has been doing well overall.  His energy level is okay and mostly  normal.  He is active and does get some exercise.  Denies any chest pain or shortness of breath with exertion.  He has had a stuffy nose for a couple of weeks, but no chest congestion or cough.  He will get a little leg swelling at time, which is unchanged.    Appetite is good and weight has been stable.  No nausea, vomiting or diarrhea.  No heartburn symptoms.  No fever, sweats or chills.  No night sweats.    Home BP:  140-150/60s    Problem List  Patient Active Problem List   Diagnosis    Type 2 diabetes mellitus without complication, with long-term current use of insulin (H)    Dyslipidemia, goal LDL below 70    HTN, kidney transplant related    Metabolic acidosis    Coronary artery disease involving native coronary artery of native heart without angina pectoris    Thrombocytopenia (H24)    Immunosuppressed status (H24)    Kidney replaced by transplant    Anemia in chronic renal disease    Hypomagnesemia    Hepatitis B core antibody positive    Aftercare following organ transplant    Need for pneumocystis prophylaxis    CKD (chronic kidney disease) stage 2, GFR 60-89 ml/min    Vitamin D deficiency    Secondary renal hyperparathyroidism (H24)    BK viremia    Need for prophylactic vaccination against hepatitis B virus    Peripheral neuropathy    HOCM (hypertrophic obstructive cardiomyopathy) (H)       Allergies  No Known Allergies      Medications  Current Outpatient Medications   Medication Sig    acetaminophen (TYLENOL) 325 MG tablet Take 2 tablets (650 mg) by mouth every 4 hours as needed for other (For optimal non-opioid multimodal pain management to improve pain control.)    Alcohol Swabs (ALCOHOL PREP) 70 % PADS USE 5X PER DAY    amitriptyline (ELAVIL) 25 MG tablet Take 1 tablet (25 mg) by mouth at bedtime    aspirin (ASA) 81 MG EC tablet Take 1 tablet (81 mg) by mouth daily    atorvastatin (LIPITOR) 20 MG tablet Take 1 tablet (20 mg) by mouth daily    blood glucose (NO BRAND SPECIFIED) test strip Use to  "test blood sugar 3 times daily or as directed. To accompany: Blood Glucose Monitor Brands: per insurance.    blood glucose monitoring (ONE TOUCH ULTRA 2) meter device kit USE TO TEST BLOOD SUGAR THREE TIMES DAILY OR AS DIRECTED.    carvedilol (COREG) 25 MG tablet Take 0.5 tablets (12.5 mg) by mouth 2 times daily (with meals)    hydrALAZINE (APRESOLINE) 25 MG tablet Take 1 tablet (25 mg) by mouth 2 times daily    insulin aspart (NOVOLOG PEN) 100 UNIT/ML pen Before breaskfast and lunch, give 7 units. Before dinner give 9 units.    insulin detemir (LEVEMIR FLEXTOUCH) 100 UNIT/ML pen Inject 15-20 Units Subcutaneous At Bedtime    insulin pen needle (BD PEN NEEDLE ORESTES 2ND GEN) 32G X 4 MM miscellaneous USE TWICE DAILY    loratadine (CLARITIN) 10 MG tablet Take 1 tablet (10 mg) by mouth daily as needed for allergies (itchy/rash)    magnesium oxide (MAG-OX) 400 MG tablet Take 1 tablet (400 mg) by mouth 2 times daily    nitroGLYcerin (NITROSTAT) 0.4 MG sublingual tablet One tablet under the tongue every 5 minutes if needed for chest pain. May repeat every 5 minutes for a maximum of 3 doses in 15 minutes\"    sodium bicarbonate 650 MG tablet Take 2 tablets (1,300 mg) by mouth 2 times daily    sulfamethoxazole-trimethoprim (BACTRIM) 400-80 MG tablet Take 1 tablet by mouth daily    tacrolimus (GENERIC EQUIVALENT) 0.5 MG capsule HOLD FOR FUTURE DOSE CHANGES.  Profile Rx: patient will contact pharmacy when needed    thin (NO BRAND SPECIFIED) lancets Tests 3x daily Use with lanceting device. To accompany: Blood Glucose Monitor Brands: per insurance.    Vitamin D3 (CHOLECALCIFEROL) 25 mcg (1000 units) tablet Take 2 tablets (50 mcg) by mouth daily    amLODIPine (NORVASC) 10 MG tablet Take 1 tablet (10 mg) by mouth at bedtime    amLODIPine (NORVASC) 10 MG tablet Take 1 tablet (10 mg) by mouth at bedtime    cycloSPORINE modified (GENERIC EQUIVALENT) 25 MG capsule Take 3 capsules (75 mg) by mouth 2 times daily     No current " facility-administered medications for this visit.     There are no discontinued medications.      Physical Exam  Vital Signs: BP (!) 150/66   Pulse 56   Temp 97.7  F (36.5  C) (Oral)   Wt 63.4 kg (139 lb 11.2 oz)   SpO2 99%   BMI 23.98 kg/m      GENERAL APPEARANCE: alert and no distress  HENT: mouth without ulcers or lesions  RESP: lungs clear to auscultation - no rales, rhonchi or wheezes  CV: regular rhythm, normal rate, no rub, 2.6 systolic murmur  EDEMA: trace LE edema bilaterally  ABDOMEN: soft, nondistended, nontender, bowel sounds normal  MS: extremities normal - no gross deformities noted, no evidence of inflammation in joints, no muscle tenderness  SKIN: no rash  TX KIDNEY: normal  DIALYSIS ACCESS: none    Data        Latest Ref Rng & Units 10/30/2023     8:55 AM 10/23/2023     9:06 AM 10/16/2023     9:06 AM   Renal   Sodium 135 - 145 mmol/L 134  133  135    K 3.4 - 5.3 mmol/L 4.3  4.7  4.6    Cl 98 - 107 mmol/L 100  101  101    Cl (external) 98 - 107 mmol/L 100  101  101    CO2 22 - 29 mmol/L 20  22  24    Urea Nitrogen 6.0 - 20.0 mg/dL 27.3  34.9  34.3    Creatinine 0.67 - 1.17 mg/dL 1.66  1.59  1.78    Glucose 70 - 99 mg/dL 146  153  124    Calcium 8.6 - 10.0 mg/dL 9.3  8.8  9.2    Magnesium 1.7 - 2.3 mg/dL 2.1            Latest Ref Rng & Units 10/30/2023     8:55 AM 8/7/2023     8:57 AM 7/31/2023     8:44 AM   Bone Health   Phosphorus 2.5 - 4.5 mg/dL 3.5  3.7  4.1          Latest Ref Rng & Units 10/30/2023     8:55 AM 10/23/2023     9:06 AM 10/16/2023     9:06 AM   Heme   WBC 4.0 - 11.0 10e3/uL 4.9  5.6  4.8    Hgb 13.3 - 17.7 g/dL 11.6  11.9  11.2    Plt 150 - 450 10e3/uL 103  111  103          Latest Ref Rng & Units 8/28/2023     9:03 AM 4/7/2023     8:10 AM 4/3/2023     8:10 AM   Liver   AP 40 - 129 U/L 88  74  69    TBili <=1.2 mg/dL 0.5  0.7  0.8    Bilirubin Direct 0.00 - 0.30 mg/dL <0.20   0.22    ALT 0 - 70 U/L 17  10  12    AST 0 - 45 U/L 26  12  14    Tot Protein 6.4 - 8.3 g/dL 6.8  6.3   6.1    Albumin 3.5 - 5.0 g/dL  3.7     Albumin 3.5 - 5.2 g/dL 4.6   3.8          Latest Ref Rng & Units 8/28/2023     9:03 AM 6/26/2023     8:08 AM 2/22/2023     2:03 AM   Pancreas   A1C <5.7 % 5.8  7.5  6.8          Latest Ref Rng & Units 8/14/2023     9:12 AM 6/26/2023     8:08 AM 6/19/2023     9:45 AM   Iron studies   Iron 61 - 157 ug/dL 40  47  54    Iron Sat Index 15 - 46 % 22  28  31    Ferritin 31 - 409 ng/mL 412  545  540          Latest Ref Rng & Units 9/25/2023     9:09 AM 7/5/2023     7:43 AM 5/1/2023     7:15 AM   [facility] Txp Virology   EBV DNA COPIES/ML Not Detected copies/mL <500  <500  <500    EBV DNA LOG OF COPIES  <2.7  <2.7  <2.7        Recent Labs   Lab Test 08/28/23  0903 09/05/23  0912 09/11/23  0905   DOSTAC 8/27/2023 9/4/2023 9/10/2023   TACROL 6.9 6.5 3.8*     Recent Labs   Lab Test 09/25/23  0909 10/16/23  0906 10/30/23  0855   DOSMPA 9/24/2023   8:00 PM 10/15/2023   8:00 PM  --    MPACID <0.25* <0.25* <0.25*   MPAG <6.5* <6.5* 7.7*         Patrick Phan MD

## 2023-10-10 NOTE — NURSING NOTE
Chief Complaint   Patient presents with    RECHECK       BP (!) 150/66   Pulse 56   Temp 97.7  F (36.5  C) (Oral)   Wt 63.4 kg (139 lb 11.2 oz)   SpO2 99%   BMI 23.98 kg/m      Ever Hartman on 10/10/2023 at 11:14 AM

## 2023-10-10 NOTE — PATIENT INSTRUCTIONS
Patient Recommendations:  - Start hydralazine 25 mg twice daily.    Transplant Patient Information  Your Post Transplant Coordinator is: Shalonda Roy  For non urgent items, we encourage you to contact your coordinator/care team online via [employer]  You and your care team can also contact your transplant coordinator Monday - Friday, 8am - 5pm at [phone] (Option 2 to reach the coordinator or Option 4 to schedule an appointment).  After hours for urgent matters, please call [facility] at [phone].

## 2023-10-10 NOTE — LETTER
10/10/2023      RE: Modesto JOE Barbosa  [address] [place] [address]       TRANSPLANT NEPHROLOGY CHRONIC POST TRANSPLANT VISIT    Assessment & Plan  # LDKT: Stable, slightly elevated creatinine above baseline at ~ 1.3.  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   - Baseline Creatinine: ~ 1.0-1.2   - Proteinuria: Minimal (0.2-0.5 grams),    - Date DSA Last Checked: Sep/2023      Latest DSA: No cPRA: 27%   - BK Viremia: Yes, significantly elevated (> 100K)   - Kidney Tx Biopsy: May 03, 2023; Result: No diagnostic evidence of acute rejection.   Acute tubular injury.  Severe arterial and arteriolar sclerosis.    # Immunosuppression: Cyclosporine (goal 100-125)   - On lower immunosuppression with stopping mycophenolate and changed tacrolimus to cyclosporine due to significantly elevated BK viremia.   - Continue with intensive monitoring of immunosuppression for efficacy and toxicity.   - Changes: Not at this time    # Infection Prophylaxis:   - PJP: Sulfa/TMP (Bactrim)    # Hypertension: Borderline control;  Goal BP: < 130/80   - Volume status: Euvolemic   - Changes: Yes - Will start hydralazine 25 mg bid.   - If kidney function stabilizes, would consider using ACEi/ARB    # Diabetes: Controlled (HbA1c <7%) Last HbA1c: 5.8%   - Management as per primary care    # Anemia in Chronic Renal Disease: Hgb: Stable      NERY: No   - Iron studies: Low iron saturation, but high ferritin    # Thrombocytopenia: Stable, mildly low platelet level in the ~ 100-140s.  Patient does have splenomegaly.    # Mineral Bone Disorder:   - Secondary renal hyperparathyroidism; PTH level: Normal (15-65 pg/ml)        On treatment: None  - Vitamin D; level: Normal        On supplement: Yes  - Calcium; level: Normal        On supplement: No    # Electrolytes:   - Potassium; level: Normal        On supplement: No  - Magnesium; level: Normal        On supplement: Yes  - Bicarbonate; level: Normal        On supplement: Yes    # BK Viremia: Slight trend  down, but significantly elevated BK PCR at ~ 400K.  Peak BK PCR ~ 560K on Sep/2023.  Normal IgG level.  Now on lower immunosuppression with stopping mycophenolate mofetil and changing tacrolimus to cyclosporine.    - Will continue to check BK PCR q2 weeks.   - If BK PCR continues to decrease, will have to consider restarting low dose mycophenolate.    # CAD, s/p PCI: Asymptomatic.    # Asymmetric Left Ventricular Hypertrophy (HOCM): Stable. No history of sustained ventricular arrhythmias.     # Hepatitis B Core Ab +: Patient with core Ab positive, but negative Ag and also negative surface Ab.  Hep B DNA was negative at time of transplant, as well as repeat at 4 months post transplant.     # Peripheral Neuropathy: Stable symptoms on amitriptyline.    # Medical Compliance: Yes     # Skin Cancer Risk:    - Discussed sun protection and recommend regular follow up with Dermatology.    # Health Maintenance and Vaccination Review: Not Reviewed    # Transplant History:  Etiology of Kidney Failure: Diabetes mellitus type 2  Tx: LDKT  Transplant: 2/21/2023 (Kidney)  Significant changes in immunosuppression: None  Significant transplant-related complications: BK Viremia    Transplant Office Phone Number: [phone]    Assessment and plan was discussed with the patient and he voiced his understanding and agreement.    Return visit: Return for previously scheduled visit.    Patrick Phan MD    Chief Complaint  Mr. Barbosa is a 57 year old here for kidney transplant and immunosuppression management.     History of Present Illness   Mr. Barbosa reports feeling okay overall with some medical complaints.  Since last clinic visit, patient reports no hospitalizations or new medical complaints and has been doing well overall.  His energy level is okay and mostly normal.  He is active and does get some exercise.  Denies any chest pain or shortness of breath with exertion.  He has had a stuffy nose for a couple of weeks, but no  chest congestion or cough.  He will get a little leg swelling at time, which is unchanged.    Appetite is good and weight has been stable.  No nausea, vomiting or diarrhea.  No heartburn symptoms.  No fever, sweats or chills.  No night sweats.    Home BP:  140-150/60s    Problem List  Patient Active Problem List   Diagnosis     Type 2 diabetes mellitus without complication, with long-term current use of insulin (H)     Dyslipidemia, goal LDL below 70     HTN, kidney transplant related     Metabolic acidosis     Coronary artery disease involving native coronary artery of native heart without angina pectoris     Thrombocytopenia (H24)     Immunosuppressed status (H24)     Kidney replaced by transplant     Anemia in chronic renal disease     Hypomagnesemia     Hepatitis B core antibody positive     Aftercare following organ transplant     Need for pneumocystis prophylaxis     CKD (chronic kidney disease) stage 2, GFR 60-89 ml/min     Vitamin D deficiency     Secondary renal hyperparathyroidism (H24)     BK viremia     Need for prophylactic vaccination against hepatitis B virus     Peripheral neuropathy     HOCM (hypertrophic obstructive cardiomyopathy) (H)       Allergies  No Known Allergies      Medications  Current Outpatient Medications   Medication Sig     acetaminophen (TYLENOL) 325 MG tablet Take 2 tablets (650 mg) by mouth every 4 hours as needed for other (For optimal non-opioid multimodal pain management to improve pain control.)     Alcohol Swabs (ALCOHOL PREP) 70 % PADS USE 5X PER DAY     amitriptyline (ELAVIL) 25 MG tablet Take 1 tablet (25 mg) by mouth at bedtime     aspirin (ASA) 81 MG EC tablet Take 1 tablet (81 mg) by mouth daily     atorvastatin (LIPITOR) 20 MG tablet Take 1 tablet (20 mg) by mouth daily     blood glucose (NO BRAND SPECIFIED) test strip Use to test blood sugar 3 times daily or as directed. To accompany: Blood Glucose Monitor Brands: per insurance.     blood glucose monitoring (ONE  "TOUCH ULTRA 2) meter device kit USE TO TEST BLOOD SUGAR THREE TIMES DAILY OR AS DIRECTED.     carvedilol (COREG) 25 MG tablet Take 0.5 tablets (12.5 mg) by mouth 2 times daily (with meals)     hydrALAZINE (APRESOLINE) 25 MG tablet Take 1 tablet (25 mg) by mouth 2 times daily     insulin aspart (NOVOLOG PEN) 100 UNIT/ML pen Before breaskfast and lunch, give 7 units. Before dinner give 9 units.     insulin detemir (LEVEMIR FLEXTOUCH) 100 UNIT/ML pen Inject 15-20 Units Subcutaneous At Bedtime     insulin pen needle (BD PEN NEEDLE ORESTES 2ND GEN) 32G X 4 MM miscellaneous USE TWICE DAILY     loratadine (CLARITIN) 10 MG tablet Take 1 tablet (10 mg) by mouth daily as needed for allergies (itchy/rash)     magnesium oxide (MAG-OX) 400 MG tablet Take 1 tablet (400 mg) by mouth 2 times daily     nitroGLYcerin (NITROSTAT) 0.4 MG sublingual tablet One tablet under the tongue every 5 minutes if needed for chest pain. May repeat every 5 minutes for a maximum of 3 doses in 15 minutes\"     sodium bicarbonate 650 MG tablet Take 2 tablets (1,300 mg) by mouth 2 times daily     sulfamethoxazole-trimethoprim (BACTRIM) 400-80 MG tablet Take 1 tablet by mouth daily     tacrolimus (GENERIC EQUIVALENT) 0.5 MG capsule HOLD FOR FUTURE DOSE CHANGES.  Profile Rx: patient will contact pharmacy when needed     thin (NO BRAND SPECIFIED) lancets Tests 3x daily Use with lanceting device. To accompany: Blood Glucose Monitor Brands: per insurance.     Vitamin D3 (CHOLECALCIFEROL) 25 mcg (1000 units) tablet Take 2 tablets (50 mcg) by mouth daily     amLODIPine (NORVASC) 10 MG tablet Take 1 tablet (10 mg) by mouth at bedtime     amLODIPine (NORVASC) 10 MG tablet Take 1 tablet (10 mg) by mouth at bedtime     cycloSPORINE modified (GENERIC EQUIVALENT) 25 MG capsule Take 3 capsules (75 mg) by mouth 2 times daily     No current facility-administered medications for this visit.     There are no discontinued medications.      Physical Exam  Vital Signs: BP (!) " 150/66   Pulse 56   Temp 97.7  F (36.5  C) (Oral)   Wt 63.4 kg (139 lb 11.2 oz)   SpO2 99%   BMI 23.98 kg/m      GENERAL APPEARANCE: alert and no distress  HENT: mouth without ulcers or lesions  RESP: lungs clear to auscultation - no rales, rhonchi or wheezes  CV: regular rhythm, normal rate, no rub, 2.6 systolic murmur  EDEMA: trace LE edema bilaterally  ABDOMEN: soft, nondistended, nontender, bowel sounds normal  MS: extremities normal - no gross deformities noted, no evidence of inflammation in joints, no muscle tenderness  SKIN: no rash  TX KIDNEY: normal  DIALYSIS ACCESS: none    Data        Latest Ref Rng & Units 10/30/2023     8:55 AM 10/23/2023     9:06 AM 10/16/2023     9:06 AM   Renal   Sodium 135 - 145 mmol/L 134  133  135    K 3.4 - 5.3 mmol/L 4.3  4.7  4.6    Cl 98 - 107 mmol/L 100  101  101    Cl (external) 98 - 107 mmol/L 100  101  101    CO2 22 - 29 mmol/L 20  22  24    Urea Nitrogen 6.0 - 20.0 mg/dL 27.3  34.9  34.3    Creatinine 0.67 - 1.17 mg/dL 1.66  1.59  1.78    Glucose 70 - 99 mg/dL 146  153  124    Calcium 8.6 - 10.0 mg/dL 9.3  8.8  9.2    Magnesium 1.7 - 2.3 mg/dL 2.1            Latest Ref Rng & Units 10/30/2023     8:55 AM 8/7/2023     8:57 AM 7/31/2023     8:44 AM   Bone Health   Phosphorus 2.5 - 4.5 mg/dL 3.5  3.7  4.1          Latest Ref Rng & Units 10/30/2023     8:55 AM 10/23/2023     9:06 AM 10/16/2023     9:06 AM   Heme   WBC 4.0 - 11.0 10e3/uL 4.9  5.6  4.8    Hgb 13.3 - 17.7 g/dL 11.6  11.9  11.2    Plt 150 - 450 10e3/uL 103  111  103          Latest Ref Rng & Units 8/28/2023     9:03 AM 4/7/2023     8:10 AM 4/3/2023     8:10 AM   Liver   AP 40 - 129 U/L 88  74  69    TBili <=1.2 mg/dL 0.5  0.7  0.8    Bilirubin Direct 0.00 - 0.30 mg/dL <0.20   0.22    ALT 0 - 70 U/L 17  10  12    AST 0 - 45 U/L 26  12  14    Tot Protein 6.4 - 8.3 g/dL 6.8  6.3  6.1    Albumin 3.5 - 5.0 g/dL  3.7     Albumin 3.5 - 5.2 g/dL 4.6   3.8          Latest Ref Rng & Units 8/28/2023     9:03 AM  6/26/2023     8:08 AM 2/22/2023     2:03 AM   Pancreas   A1C <5.7 % 5.8  7.5  6.8          Latest Ref Rng & Units 8/14/2023     9:12 AM 6/26/2023     8:08 AM 6/19/2023     9:45 AM   Iron studies   Iron 61 - 157 ug/dL 40  47  54    Iron Sat Index 15 - 46 % 22  28  31    Ferritin 31 - 409 ng/mL 412  545  540          Latest Ref Rng & Units 9/25/2023     9:09 AM 7/5/2023     7:43 AM 5/1/2023     7:15 AM   [facility] Txp Virology   EBV DNA COPIES/ML Not Detected copies/mL <500  <500  <500    EBV DNA LOG OF COPIES  <2.7  <2.7  <2.7        Recent Labs   Lab Test 08/28/23  0903 09/05/23  0912 09/11/23  0905   DOSTAC 8/27/2023 9/4/2023 9/10/2023   TACROL 6.9 6.5 3.8*     Recent Labs   Lab Test 09/25/23  0909 10/16/23  0906 10/30/23  0855   DOSMPA 9/24/2023   8:00 PM 10/15/2023   8:00 PM  --    MPACID <0.25* <0.25* <0.25*   MPAG <6.5* <6.5* 7.7*       Patrick Phan MD

## 2023-10-11 ENCOUNTER — APPOINTMENT (OUTPATIENT)
Dept: INTERPRETER SERVICES | Facility: CLINIC | Age: 58
End: 2023-10-11
Payer: COMMERCIAL

## 2023-10-15 NOTE — TELEPHONE ENCOUNTER
Spoke with Yanick about patient's evaluation process. He has dental clearance, Yanick will email the letter to me. He had a stent put in, so he has follow up June 1st for cardiology clearance.  We discussed hepatology consult, which was not scheduled. Yanick was given the appointment line to schedule the appointment and we are hoping this can be done before 6/1 so he can be presented for active status once his cardiology appt is complete.   Cont MVI, Thiamine, folic acid

## 2023-10-16 ENCOUNTER — LAB (OUTPATIENT)
Dept: LAB | Facility: HOSPITAL | Age: 58
End: 2023-10-16
Payer: COMMERCIAL

## 2023-10-16 DIAGNOSIS — Z94.0 KIDNEY REPLACED BY TRANSPLANT: ICD-10-CM

## 2023-10-16 LAB
ANION GAP SERPL CALCULATED.3IONS-SCNC: 10 MMOL/L (ref 7–15)
BUN SERPL-MCNC: 34.3 MG/DL (ref 6–20)
CALCIUM SERPL-MCNC: 9.2 MG/DL (ref 8.6–10)
CHLORIDE SERPL-SCNC: 101 MMOL/L (ref 98–107)
CREAT SERPL-MCNC: 1.78 MG/DL (ref 0.67–1.17)
CYCLOSPORINE BLD LC/MS/MS-MCNC: 76 UG/L (ref 50–400)
DEPRECATED HCO3 PLAS-SCNC: 24 MMOL/L (ref 22–29)
EGFRCR SERPLBLD CKD-EPI 2021: 44 ML/MIN/1.73M2
ERYTHROCYTE [DISTWIDTH] IN BLOOD BY AUTOMATED COUNT: 14.6 % (ref 10–15)
GLUCOSE SERPL-MCNC: 124 MG/DL (ref 70–99)
HCT VFR BLD AUTO: 34.3 % (ref 40–53)
HGB BLD-MCNC: 11.2 G/DL (ref 13.3–17.7)
MCH RBC QN AUTO: 25.9 PG (ref 26.5–33)
MCHC RBC AUTO-ENTMCNC: 32.7 G/DL (ref 31.5–36.5)
MCV RBC AUTO: 79 FL (ref 78–100)
PLATELET # BLD AUTO: 103 10E3/UL (ref 150–450)
POTASSIUM SERPL-SCNC: 4.6 MMOL/L (ref 3.4–5.3)
RBC # BLD AUTO: 4.32 10E6/UL (ref 4.4–5.9)
SODIUM SERPL-SCNC: 135 MMOL/L (ref 135–145)
TME LAST DOSE: NORMAL H
TME LAST DOSE: NORMAL H
WBC # BLD AUTO: 4.8 10E3/UL (ref 4–11)

## 2023-10-16 PROCEDURE — 80048 BASIC METABOLIC PNL TOTAL CA: CPT

## 2023-10-16 PROCEDURE — 85027 COMPLETE CBC AUTOMATED: CPT

## 2023-10-16 PROCEDURE — 36415 COLL VENOUS BLD VENIPUNCTURE: CPT

## 2023-10-16 PROCEDURE — 80158 DRUG ASSAY CYCLOSPORINE: CPT

## 2023-10-16 PROCEDURE — 87799 DETECT AGENT NOS DNA QUANT: CPT

## 2023-10-16 PROCEDURE — 80180 DRUG SCRN QUAN MYCOPHENOLATE: CPT

## 2023-10-17 ENCOUNTER — TELEPHONE (OUTPATIENT)
Dept: TRANSPLANT | Facility: CLINIC | Age: 58
End: 2023-10-17
Payer: COMMERCIAL

## 2023-10-17 LAB
BK VIRUS DNA COPIES/ML, INSTRUMENT: ABNORMAL COPIES/ML
BKV DNA SPEC NAA+PROBE-LOG#: 5.1 {LOG_COPIES}/ML

## 2023-10-17 NOTE — TELEPHONE ENCOUNTER
Rome Kim MD Huepfel, Shalonda GONZALES RN  Please see how he is doing. Scr up a bit with this check. Thanks    Called with     Mr Barbosa confirmed no fevers, no illness  no pain over kidney transplant    - Encouraged him to increase oral fluids   Weekly IV fluids discontinue  prior to these labs      Sent n[employer]reamst message to his daughter to increase cyclosporine

## 2023-10-19 ENCOUNTER — APPOINTMENT (OUTPATIENT)
Dept: INTERPRETER SERVICES | Facility: CLINIC | Age: 58
End: 2023-10-19
Payer: COMMERCIAL

## 2023-10-19 DIAGNOSIS — I15.1 HTN, KIDNEY TRANSPLANT RELATED: ICD-10-CM

## 2023-10-19 DIAGNOSIS — Z94.0 KIDNEY REPLACED BY TRANSPLANT: ICD-10-CM

## 2023-10-19 DIAGNOSIS — Z94.0 HTN, KIDNEY TRANSPLANT RELATED: ICD-10-CM

## 2023-10-19 RX ORDER — AMLODIPINE BESYLATE 10 MG/1
10 TABLET ORAL AT BEDTIME
Qty: 90 TABLET | Refills: 3 | Status: SHIPPED | OUTPATIENT
Start: 2023-10-19

## 2023-10-19 RX ORDER — CYCLOSPORINE 25 MG/1
75 CAPSULE, LIQUID FILLED ORAL 2 TIMES DAILY
Qty: 120 CAPSULE | Refills: 3 | Status: SHIPPED | OUTPATIENT
Start: 2023-10-19 | End: 2023-11-22

## 2023-10-19 RX ORDER — CYCLOSPORINE 25 MG/1
50 CAPSULE, LIQUID FILLED ORAL 2 TIMES DAILY
Qty: 120 CAPSULE | Refills: 3 | Status: CANCELLED | OUTPATIENT
Start: 2023-10-19

## 2023-10-19 RX ORDER — AMLODIPINE BESYLATE 10 MG/1
10 TABLET ORAL AT BEDTIME
Qty: 90 TABLET | Refills: 3 | Status: SHIPPED | OUTPATIENT
Start: 2023-10-19 | End: 2023-11-21

## 2023-10-19 NOTE — TELEPHONE ENCOUNTER
Pt states there was a strenght increase on the amlodipine    Amlodipine besylate 10mg tabs     Did not see on active med list please verify and send new rx    [facility] spec/mail pharmacy  [phone]

## 2023-10-23 ENCOUNTER — MYC MEDICAL ADVICE (OUTPATIENT)
Dept: TRANSPLANT | Facility: CLINIC | Age: 58
End: 2023-10-23

## 2023-10-23 ENCOUNTER — LAB (OUTPATIENT)
Dept: LAB | Facility: HOSPITAL | Age: 58
End: 2023-10-23
Payer: COMMERCIAL

## 2023-10-23 DIAGNOSIS — Z94.0 KIDNEY REPLACED BY TRANSPLANT: ICD-10-CM

## 2023-10-23 LAB
ANION GAP SERPL CALCULATED.3IONS-SCNC: 10 MMOL/L (ref 7–15)
BUN SERPL-MCNC: 34.9 MG/DL (ref 6–20)
CALCIUM SERPL-MCNC: 8.8 MG/DL (ref 8.6–10)
CHLORIDE SERPL-SCNC: 101 MMOL/L (ref 98–107)
CREAT SERPL-MCNC: 1.59 MG/DL (ref 0.67–1.17)
CYCLOSPORINE BLD LC/MS/MS-MCNC: 162 UG/L (ref 50–400)
DEPRECATED HCO3 PLAS-SCNC: 22 MMOL/L (ref 22–29)
EGFRCR SERPLBLD CKD-EPI 2021: 50 ML/MIN/1.73M2
ERYTHROCYTE [DISTWIDTH] IN BLOOD BY AUTOMATED COUNT: 14.6 % (ref 10–15)
GLUCOSE SERPL-MCNC: 153 MG/DL (ref 70–99)
HCT VFR BLD AUTO: 35.1 % (ref 40–53)
HGB BLD-MCNC: 11.9 G/DL (ref 13.3–17.7)
MCH RBC QN AUTO: 26.4 PG (ref 26.5–33)
MCHC RBC AUTO-ENTMCNC: 33.9 G/DL (ref 31.5–36.5)
MCV RBC AUTO: 78 FL (ref 78–100)
PLATELET # BLD AUTO: 111 10E3/UL (ref 150–450)
POTASSIUM SERPL-SCNC: 4.7 MMOL/L (ref 3.4–5.3)
RBC # BLD AUTO: 4.51 10E6/UL (ref 4.4–5.9)
SODIUM SERPL-SCNC: 133 MMOL/L (ref 135–145)
TME LAST DOSE: NORMAL H
TME LAST DOSE: NORMAL H
WBC # BLD AUTO: 5.6 10E3/UL (ref 4–11)

## 2023-10-23 PROCEDURE — 85027 COMPLETE CBC AUTOMATED: CPT

## 2023-10-23 PROCEDURE — 80158 DRUG ASSAY CYCLOSPORINE: CPT

## 2023-10-23 PROCEDURE — 36415 COLL VENOUS BLD VENIPUNCTURE: CPT

## 2023-10-23 PROCEDURE — 80048 BASIC METABOLIC PNL TOTAL CA: CPT

## 2023-10-24 NOTE — TELEPHONE ENCOUNTER
[place] Barbosa, Shalonda CHRISTIAN, RN  Phone Number: [phone]     Larry Liz I increased the  Cyclosporine to 75mg in the AM and PM last week after your message. The trough level should be about 12-13 hours from his last dose yesterday night.    I will go ahead and change it to 75mg in the AM and 50 mg in the PM.    Thanks          Good day  (Newest Message First)  View All Conversations on this Encounter  Modesto Barbosa      Issue CYCLOSPORINE  163    Above goal level  -   Hi Shalonda SALAS increased the  Cyclosporine to 75mg in the AM and PM last week after your message. The trough level should be about 12-13 hours from his last dose yesterday night.      I will go ahead and change it to 75mg in the AM and 50 mg in the PM.      Thanks        You  Modesto Barbosa1 )     JENNIFER Herndon      I attempted to call you to discuss today's cyclosporine  level      Please confirm that your fathers  cyclosporine   12 hour trough level   If the above is accurate lower CYCLOSPORINE  75 mg in am and 50 mg in pm         Thank you   Shalonda Kidney Transplant Coordinator

## 2023-10-25 ENCOUNTER — MYC MEDICAL ADVICE (OUTPATIENT)
Dept: TRANSPLANT | Facility: CLINIC | Age: 58
End: 2023-10-25

## 2023-10-30 ENCOUNTER — LAB (OUTPATIENT)
Dept: LAB | Facility: HOSPITAL | Age: 58
End: 2023-10-30
Payer: COMMERCIAL

## 2023-10-30 DIAGNOSIS — Z94.0 KIDNEY REPLACED BY TRANSPLANT: ICD-10-CM

## 2023-10-30 LAB
ANION GAP SERPL CALCULATED.3IONS-SCNC: 14 MMOL/L (ref 7–15)
BUN SERPL-MCNC: 27.3 MG/DL (ref 6–20)
CALCIUM SERPL-MCNC: 9.3 MG/DL (ref 8.6–10)
CHLORIDE SERPL-SCNC: 100 MMOL/L (ref 98–107)
CREAT SERPL-MCNC: 1.66 MG/DL (ref 0.67–1.17)
CYCLOSPORINE BLD LC/MS/MS-MCNC: 191 UG/L (ref 50–400)
DEPRECATED HCO3 PLAS-SCNC: 20 MMOL/L (ref 22–29)
EGFRCR SERPLBLD CKD-EPI 2021: 47 ML/MIN/1.73M2
ERYTHROCYTE [DISTWIDTH] IN BLOOD BY AUTOMATED COUNT: 14.9 % (ref 10–15)
GLUCOSE SERPL-MCNC: 146 MG/DL (ref 70–99)
HCT VFR BLD AUTO: 34.4 % (ref 40–53)
HGB BLD-MCNC: 11.6 G/DL (ref 13.3–17.7)
MAGNESIUM SERPL-MCNC: 2.1 MG/DL (ref 1.7–2.3)
MCH RBC QN AUTO: 26.4 PG (ref 26.5–33)
MCHC RBC AUTO-ENTMCNC: 33.7 G/DL (ref 31.5–36.5)
MCV RBC AUTO: 78 FL (ref 78–100)
PHOSPHATE SERPL-MCNC: 3.5 MG/DL (ref 2.5–4.5)
PLATELET # BLD AUTO: 103 10E3/UL (ref 150–450)
POTASSIUM SERPL-SCNC: 4.3 MMOL/L (ref 3.4–5.3)
RBC # BLD AUTO: 4.39 10E6/UL (ref 4.4–5.9)
SODIUM SERPL-SCNC: 134 MMOL/L (ref 135–145)
TME LAST DOSE: NORMAL H
TME LAST DOSE: NORMAL H
WBC # BLD AUTO: 4.9 10E3/UL (ref 4–11)

## 2023-10-30 PROCEDURE — 80180 DRUG SCRN QUAN MYCOPHENOLATE: CPT

## 2023-10-30 PROCEDURE — 84100 ASSAY OF PHOSPHORUS: CPT

## 2023-10-30 PROCEDURE — 85027 COMPLETE CBC AUTOMATED: CPT

## 2023-10-30 PROCEDURE — 83735 ASSAY OF MAGNESIUM: CPT

## 2023-10-30 PROCEDURE — 80048 BASIC METABOLIC PNL TOTAL CA: CPT

## 2023-10-30 PROCEDURE — 36415 COLL VENOUS BLD VENIPUNCTURE: CPT

## 2023-10-30 PROCEDURE — 80158 DRUG ASSAY CYCLOSPORINE: CPT

## 2023-10-30 NOTE — TELEPHONE ENCOUNTER
[place] Barbosa Mary K RN  Phone Number: [phone]     Good morning Shalonda  You can call me anytime this morning before 12pm or after 4pm. Thanks    Called Zong daughter      Modesto Barbosa missed his  cyclosporine  dose  times 2 doses  -  previous cyclosporine  level 162 above goal level   Hx of BK virus -     Plan to increase cyclosporine  to 75 mg times 2 days - then lower back to cyclosporine  75 mg and 50 mg twice per day

## 2023-10-31 LAB
MYCOPHENOLATE SERPL LC/MS/MS-MCNC: <0.25 MG/L (ref 1–3.5)
MYCOPHENOLATE-G SERPL LC/MS/MS-MCNC: 7.7 MG/L (ref 30–95)
TME LAST DOSE: ABNORMAL H
TME LAST DOSE: ABNORMAL H

## 2023-11-05 PROBLEM — E83.51 HYPOCALCEMIA: Status: RESOLVED | Noted: 2023-02-24 | Resolved: 2023-11-05

## 2023-11-05 PROBLEM — I42.1 HOCM (HYPERTROPHIC OBSTRUCTIVE CARDIOMYOPATHY) (H): Status: ACTIVE | Noted: 2023-11-05

## 2023-11-05 PROBLEM — Z23 NEED FOR PROPHYLACTIC VACCINATION AGAINST HEPATITIS B VIRUS: Status: RESOLVED | Noted: 2023-10-05 | Resolved: 2023-11-05

## 2023-11-05 PROBLEM — E86.0 DEHYDRATION: Status: RESOLVED | Noted: 2023-04-24 | Resolved: 2023-11-05

## 2023-11-05 NOTE — PROGRESS NOTES
TRANSPLANT NEPHROLOGY CHRONIC POST TRANSPLANT VISIT    Assessment & Plan   # LDKT: Stable, slightly elevated creatinine above baseline at ~ 1.3.  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   - Baseline Creatinine: ~ 1.0-1.2   - Proteinuria: Minimal (0.2-0.5 grams),    - Date DSA Last Checked: Sep/2023      Latest DSA: No cPRA: 27%   - BK Viremia: Yes, significantly elevated (> 100K)   - Kidney Tx Biopsy: May 03, 2023; Result: No diagnostic evidence of acute rejection.   Acute tubular injury.  Severe arterial and arteriolar sclerosis.    # Immunosuppression: Cyclosporine (goal 100-125)   - On lower immunosuppression with stopping mycophenolate and changed tacrolimus to cyclosporine due to significantly elevated BK viremia.   - Continue with intensive monitoring of immunosuppression for efficacy and toxicity.   - Changes: Not at this time    # Infection Prophylaxis:   - PJP: Sulfa/TMP (Bactrim)    # Hypertension: Borderline control;  Goal BP: < 130/80   - Volume status: Euvolemic   - Changes: Yes - Will start hydralazine 25 mg bid.   - If kidney function stabilizes, would consider using ACEi/ARB    # Diabetes: Controlled (HbA1c <7%) Last HbA1c: 5.8%   - Management as per primary care    # Anemia in Chronic Renal Disease: Hgb: Stable      NERY: No   - Iron studies: Low iron saturation, but high ferritin    # Thrombocytopenia: Stable, mildly low platelet level in the ~ 100-140s.  Patient does have splenomegaly.    # Mineral Bone Disorder:   - Secondary renal hyperparathyroidism; PTH level: Normal (15-65 pg/ml)        On treatment: None  - Vitamin D; level: Normal        On supplement: Yes  - Calcium; level: Normal        On supplement: No    # Electrolytes:   - Potassium; level: Normal        On supplement: No  - Magnesium; level: Normal        On supplement: Yes  - Bicarbonate; level: Normal        On supplement: Yes    # BK Viremia: Slight trend down, but significantly elevated BK PCR at ~ 400K.  Peak BK PCR ~ 560K on  Sep/2023.  Normal IgG level.  Now on lower immunosuppression with stopping mycophenolate mofetil and changing tacrolimus to cyclosporine.    - Will continue to check BK PCR q2 weeks.   - If BK PCR continues to decrease, will have to consider restarting low dose mycophenolate.    # CAD, s/p PCI: Asymptomatic.    # Asymmetric Left Ventricular Hypertrophy (HOCM): Stable. No history of sustained ventricular arrhythmias.     # Hepatitis B Core Ab +: Patient with core Ab positive, but negative Ag and also negative surface Ab.  Hep B DNA was negative at time of transplant, as well as repeat at 4 months post transplant.     # Peripheral Neuropathy: Stable symptoms on amitriptyline.    # Medical Compliance: Yes     # Skin Cancer Risk:    - Discussed sun protection and recommend regular follow up with Dermatology.    # Health Maintenance and Vaccination Review: Not Reviewed    # Transplant History:  Etiology of Kidney Failure: Diabetes mellitus type 2  Tx: LDKT  Transplant: 2/21/2023 (Kidney)  Significant changes in immunosuppression: None  Significant transplant-related complications: BK Viremia    Transplant Office Phone Number: [phone]    Assessment and plan was discussed with the patient and he voiced his understanding and agreement.    Return visit: Return for previously scheduled visit.    Patrick Phan MD    Chief Complaint   Mr. Barbosa is a 57 year old here for kidney transplant and immunosuppression management.     History of Present Illness    Mr. Barbosa reports feeling okay overall with some medical complaints.  Since last clinic visit, patient reports no hospitalizations or new medical complaints and has been doing well overall.  His energy level is okay and mostly normal.  He is active and does get some exercise.  Denies any chest pain or shortness of breath with exertion.  He has had a stuffy nose for a couple of weeks, but no chest congestion or cough.  He will get a little leg swelling at time,  which is unchanged.    Appetite is good and weight has been stable.  No nausea, vomiting or diarrhea.  No heartburn symptoms.  No fever, sweats or chills.  No night sweats.    Home BP:  140-150/60s    Problem List   Patient Active Problem List   Diagnosis    Type 2 diabetes mellitus without complication, with long-term current use of insulin (H)    Dyslipidemia, goal LDL below 70    HTN, kidney transplant related    Metabolic acidosis    Coronary artery disease involving native coronary artery of native heart without angina pectoris    Thrombocytopenia (H24)    Immunosuppressed status (H24)    Kidney replaced by transplant    Anemia in chronic renal disease    Hypomagnesemia    Hepatitis B core antibody positive    Aftercare following organ transplant    Need for pneumocystis prophylaxis    CKD (chronic kidney disease) stage 2, GFR 60-89 ml/min    Vitamin D deficiency    Secondary renal hyperparathyroidism (H24)    BK viremia    Need for prophylactic vaccination against hepatitis B virus    Peripheral neuropathy    HOCM (hypertrophic obstructive cardiomyopathy) (H)       Allergies   No Known Allergies      Medications   Current Outpatient Medications   Medication Sig    acetaminophen (TYLENOL) 325 MG tablet Take 2 tablets (650 mg) by mouth every 4 hours as needed for other (For optimal non-opioid multimodal pain management to improve pain control.)    Alcohol Swabs (ALCOHOL PREP) 70 % PADS USE 5X PER DAY    amitriptyline (ELAVIL) 25 MG tablet Take 1 tablet (25 mg) by mouth at bedtime    aspirin (ASA) 81 MG EC tablet Take 1 tablet (81 mg) by mouth daily    atorvastatin (LIPITOR) 20 MG tablet Take 1 tablet (20 mg) by mouth daily    blood glucose (NO BRAND SPECIFIED) test strip Use to test blood sugar 3 times daily or as directed. To accompany: Blood Glucose Monitor Brands: per insurance.    blood glucose monitoring (ONE TOUCH ULTRA 2) meter device kit USE TO TEST BLOOD SUGAR THREE TIMES DAILY OR AS DIRECTED.     "carvedilol (COREG) 25 MG tablet Take 0.5 tablets (12.5 mg) by mouth 2 times daily (with meals)    hydrALAZINE (APRESOLINE) 25 MG tablet Take 1 tablet (25 mg) by mouth 2 times daily    insulin aspart (NOVOLOG PEN) 100 UNIT/ML pen Before breaskfast and lunch, give 7 units. Before dinner give 9 units.    insulin detemir (LEVEMIR FLEXTOUCH) 100 UNIT/ML pen Inject 15-20 Units Subcutaneous At Bedtime    insulin pen needle (BD PEN NEEDLE ORESTES 2ND GEN) 32G X 4 MM miscellaneous USE TWICE DAILY    loratadine (CLARITIN) 10 MG tablet Take 1 tablet (10 mg) by mouth daily as needed for allergies (itchy/rash)    magnesium oxide (MAG-OX) 400 MG tablet Take 1 tablet (400 mg) by mouth 2 times daily    nitroGLYcerin (NITROSTAT) 0.4 MG sublingual tablet One tablet under the tongue every 5 minutes if needed for chest pain. May repeat every 5 minutes for a maximum of 3 doses in 15 minutes\"    sodium bicarbonate 650 MG tablet Take 2 tablets (1,300 mg) by mouth 2 times daily    sulfamethoxazole-trimethoprim (BACTRIM) 400-80 MG tablet Take 1 tablet by mouth daily    tacrolimus (GENERIC EQUIVALENT) 0.5 MG capsule HOLD FOR FUTURE DOSE CHANGES.  Profile Rx: patient will contact pharmacy when needed    thin (NO BRAND SPECIFIED) lancets Tests 3x daily Use with lanceting device. To accompany: Blood Glucose Monitor Brands: per insurance.    Vitamin D3 (CHOLECALCIFEROL) 25 mcg (1000 units) tablet Take 2 tablets (50 mcg) by mouth daily    amLODIPine (NORVASC) 10 MG tablet Take 1 tablet (10 mg) by mouth at bedtime    amLODIPine (NORVASC) 10 MG tablet Take 1 tablet (10 mg) by mouth at bedtime    cycloSPORINE modified (GENERIC EQUIVALENT) 25 MG capsule Take 3 capsules (75 mg) by mouth 2 times daily     No current facility-administered medications for this visit.     There are no discontinued medications.      Physical Exam   Vital Signs: BP (!) 150/66   Pulse 56   Temp 97.7  F (36.5  C) (Oral)   Wt 63.4 kg (139 lb 11.2 oz)   SpO2 99%   BMI 23.98 " kg/m      GENERAL APPEARANCE: alert and no distress  HENT: mouth without ulcers or lesions  RESP: lungs clear to auscultation - no rales, rhonchi or wheezes  CV: regular rhythm, normal rate, no rub, 2.6 systolic murmur  EDEMA: trace LE edema bilaterally  ABDOMEN: soft, nondistended, nontender, bowel sounds normal  MS: extremities normal - no gross deformities noted, no evidence of inflammation in joints, no muscle tenderness  SKIN: no rash  TX KIDNEY: normal  DIALYSIS ACCESS: none    Data         Latest Ref Rng & Units 10/30/2023     8:55 AM 10/23/2023     9:06 AM 10/16/2023     9:06 AM   Renal   Sodium 135 - 145 mmol/L 134  133  135    K 3.4 - 5.3 mmol/L 4.3  4.7  4.6    Cl 98 - 107 mmol/L 100  101  101    Cl (external) 98 - 107 mmol/L 100  101  101    CO2 22 - 29 mmol/L 20  22  24    Urea Nitrogen 6.0 - 20.0 mg/dL 27.3  34.9  34.3    Creatinine 0.67 - 1.17 mg/dL 1.66  1.59  1.78    Glucose 70 - 99 mg/dL 146  153  124    Calcium 8.6 - 10.0 mg/dL 9.3  8.8  9.2    Magnesium 1.7 - 2.3 mg/dL 2.1            Latest Ref Rng & Units 10/30/2023     8:55 AM 8/7/2023     8:57 AM 7/31/2023     8:44 AM   Bone Health   Phosphorus 2.5 - 4.5 mg/dL 3.5  3.7  4.1          Latest Ref Rng & Units 10/30/2023     8:55 AM 10/23/2023     9:06 AM 10/16/2023     9:06 AM   Heme   WBC 4.0 - 11.0 10e3/uL 4.9  5.6  4.8    Hgb 13.3 - 17.7 g/dL 11.6  11.9  11.2    Plt 150 - 450 10e3/uL 103  111  103          Latest Ref Rng & Units 8/28/2023     9:03 AM 4/7/2023     8:10 AM 4/3/2023     8:10 AM   Liver   AP 40 - 129 U/L 88  74  69    TBili <=1.2 mg/dL 0.5  0.7  0.8    Bilirubin Direct 0.00 - 0.30 mg/dL <0.20   0.22    ALT 0 - 70 U/L 17  10  12    AST 0 - 45 U/L 26  12  14    Tot Protein 6.4 - 8.3 g/dL 6.8  6.3  6.1    Albumin 3.5 - 5.0 g/dL  3.7     Albumin 3.5 - 5.2 g/dL 4.6   3.8          Latest Ref Rng & Units 8/28/2023     9:03 AM 6/26/2023     8:08 AM 2/22/2023     2:03 AM   Pancreas   A1C <5.7 % 5.8  7.5  6.8          Latest Ref Rng & Units  8/14/2023     9:12 AM 6/26/2023     8:08 AM 6/19/2023     9:45 AM   Iron studies   Iron 61 - 157 ug/dL 40  47  54    Iron Sat Index 15 - 46 % 22  28  31    Ferritin 31 - 409 ng/mL 412  545  540          Latest Ref Rng & Units 9/25/2023     9:09 AM 7/5/2023     7:43 AM 5/1/2023     7:15 AM   [facility] Txp Virology   EBV DNA COPIES/ML Not Detected copies/mL <500  <500  <500    EBV DNA LOG OF COPIES  <2.7  <2.7  <2.7        Recent Labs   Lab Test 08/28/23  0903 09/05/23  0912 09/11/23  0905   DOSTAC 8/27/2023 9/4/2023 9/10/2023   TACROL 6.9 6.5 3.8*     Recent Labs   Lab Test 09/25/23  0909 10/16/23  0906 10/30/23  0855   DOSMPA 9/24/2023   8:00 PM 10/15/2023   8:00 PM  --    MPACID <0.25* <0.25* <0.25*   MPAG <6.5* <6.5* 7.7*

## 2023-11-07 ENCOUNTER — LAB (OUTPATIENT)
Dept: LAB | Facility: HOSPITAL | Age: 58
End: 2023-11-07
Payer: COMMERCIAL

## 2023-11-07 ENCOUNTER — TELEPHONE (OUTPATIENT)
Dept: TRANSPLANT | Facility: CLINIC | Age: 58
End: 2023-11-07

## 2023-11-07 ENCOUNTER — MYC MEDICAL ADVICE (OUTPATIENT)
Dept: TRANSPLANT | Facility: CLINIC | Age: 58
End: 2023-11-07

## 2023-11-07 DIAGNOSIS — Z94.0 KIDNEY REPLACED BY TRANSPLANT: ICD-10-CM

## 2023-11-07 DIAGNOSIS — Z94.0 KIDNEY REPLACED BY TRANSPLANT: Primary | ICD-10-CM

## 2023-11-07 LAB
ANION GAP SERPL CALCULATED.3IONS-SCNC: 11 MMOL/L (ref 7–15)
BUN SERPL-MCNC: 34.8 MG/DL (ref 6–20)
CALCIUM SERPL-MCNC: 9.5 MG/DL (ref 8.6–10)
CHLORIDE SERPL-SCNC: 101 MMOL/L (ref 98–107)
CREAT SERPL-MCNC: 1.18 MG/DL (ref 0.67–1.17)
CYCLOSPORINE BLD LC/MS/MS-MCNC: 73 UG/L (ref 50–400)
DEPRECATED HCO3 PLAS-SCNC: 25 MMOL/L (ref 22–29)
EGFRCR SERPLBLD CKD-EPI 2021: 72 ML/MIN/1.73M2
ERYTHROCYTE [DISTWIDTH] IN BLOOD BY AUTOMATED COUNT: 15 % (ref 10–15)
GLUCOSE SERPL-MCNC: 151 MG/DL (ref 70–99)
HCT VFR BLD AUTO: 32.9 % (ref 40–53)
HGB BLD-MCNC: 11 G/DL (ref 13.3–17.7)
MCH RBC QN AUTO: 26.3 PG (ref 26.5–33)
MCHC RBC AUTO-ENTMCNC: 33.4 G/DL (ref 31.5–36.5)
MCV RBC AUTO: 79 FL (ref 78–100)
PLATELET # BLD AUTO: 91 10E3/UL (ref 150–450)
POTASSIUM SERPL-SCNC: 4.2 MMOL/L (ref 3.4–5.3)
RBC # BLD AUTO: 4.18 10E6/UL (ref 4.4–5.9)
SODIUM SERPL-SCNC: 137 MMOL/L (ref 135–145)
TME LAST DOSE: NORMAL H
TME LAST DOSE: NORMAL H
WBC # BLD AUTO: 4 10E3/UL (ref 4–11)

## 2023-11-07 PROCEDURE — 36415 COLL VENOUS BLD VENIPUNCTURE: CPT

## 2023-11-07 PROCEDURE — 82310 ASSAY OF CALCIUM: CPT

## 2023-11-07 PROCEDURE — 85014 HEMATOCRIT: CPT

## 2023-11-07 PROCEDURE — 80158 DRUG ASSAY CYCLOSPORINE: CPT

## 2023-11-09 ENCOUNTER — TELEPHONE (OUTPATIENT)
Dept: TRANSPLANT | Facility: CLINIC | Age: 58
End: 2023-11-09
Payer: COMMERCIAL

## 2023-11-09 NOTE — TELEPHONE ENCOUNTER
Cyclosporine  level 73  slightly below goal level   .        OUTCOME:   Spoke with patient  daughter , they confirm accurate trough level and current dose  CYCLOSPORINE  75  mg  and  50 mg  in pm . Patient confirmed dose change to 75  mg BID and to repeat labs in 1 week  weeks. Orders sent to preferred pharmacy for dose change and lab for repeat labs. Patient voiced understanding of plan.      Increased cyclosporine  75 mg twice per day

## 2023-11-13 ENCOUNTER — LAB (OUTPATIENT)
Dept: LAB | Facility: HOSPITAL | Age: 58
End: 2023-11-13
Payer: COMMERCIAL

## 2023-11-13 DIAGNOSIS — Z94.0 KIDNEY REPLACED BY TRANSPLANT: ICD-10-CM

## 2023-11-13 LAB
ANION GAP SERPL CALCULATED.3IONS-SCNC: 12 MMOL/L (ref 7–15)
BUN SERPL-MCNC: 29.3 MG/DL (ref 6–20)
CALCIUM SERPL-MCNC: 9.4 MG/DL (ref 8.6–10)
CHLORIDE SERPL-SCNC: 100 MMOL/L (ref 98–107)
CREAT SERPL-MCNC: 1.56 MG/DL (ref 0.67–1.17)
CYCLOSPORINE BLD LC/MS/MS-MCNC: 141 UG/L (ref 50–400)
DEPRECATED HCO3 PLAS-SCNC: 25 MMOL/L (ref 22–29)
EGFRCR SERPLBLD CKD-EPI 2021: 51 ML/MIN/1.73M2
ERYTHROCYTE [DISTWIDTH] IN BLOOD BY AUTOMATED COUNT: 15.6 % (ref 10–15)
GLUCOSE SERPL-MCNC: 102 MG/DL (ref 70–99)
HCT VFR BLD AUTO: 36.3 % (ref 40–53)
HGB BLD-MCNC: 12.2 G/DL (ref 13.3–17.7)
MCH RBC QN AUTO: 26.4 PG (ref 26.5–33)
MCHC RBC AUTO-ENTMCNC: 33.6 G/DL (ref 31.5–36.5)
MCV RBC AUTO: 79 FL (ref 78–100)
MYCOPHENOLATE SERPL LC/MS/MS-MCNC: <0.25 MG/L (ref 1–3.5)
MYCOPHENOLATE-G SERPL LC/MS/MS-MCNC: 8.6 MG/L (ref 30–95)
PLATELET # BLD AUTO: 92 10E3/UL (ref 150–450)
POTASSIUM SERPL-SCNC: 4.5 MMOL/L (ref 3.4–5.3)
RBC # BLD AUTO: 4.62 10E6/UL (ref 4.4–5.9)
SODIUM SERPL-SCNC: 137 MMOL/L (ref 135–145)
TME LAST DOSE: ABNORMAL H
TME LAST DOSE: ABNORMAL H
TME LAST DOSE: NORMAL H
TME LAST DOSE: NORMAL H
WBC # BLD AUTO: 4.1 10E3/UL (ref 4–11)

## 2023-11-13 PROCEDURE — 80048 BASIC METABOLIC PNL TOTAL CA: CPT

## 2023-11-13 PROCEDURE — 36415 COLL VENOUS BLD VENIPUNCTURE: CPT

## 2023-11-13 PROCEDURE — 85027 COMPLETE CBC AUTOMATED: CPT

## 2023-11-13 PROCEDURE — 86833 HLA CLASS II HIGH DEFIN QUAL: CPT

## 2023-11-13 PROCEDURE — 80158 DRUG ASSAY CYCLOSPORINE: CPT

## 2023-11-13 PROCEDURE — 80180 DRUG SCRN QUAN MYCOPHENOLATE: CPT

## 2023-11-13 PROCEDURE — 87799 DETECT AGENT NOS DNA QUANT: CPT

## 2023-11-13 PROCEDURE — 86832 HLA CLASS I HIGH DEFIN QUAL: CPT

## 2023-11-14 ENCOUNTER — TELEPHONE (OUTPATIENT)
Dept: TRANSPLANT | Facility: CLINIC | Age: 58
End: 2023-11-14
Payer: COMMERCIAL

## 2023-11-14 LAB
BK VIRUS DNA COPIES/ML, INSTRUMENT: ABNORMAL COPIES/ML
BKV DNA SPEC NAA+PROBE-LOG#: 4.4 {LOG_COPIES}/ML

## 2023-11-14 NOTE — TELEPHONE ENCOUNTER
Latest Reference Range & Units 10/16/23 09:06 11/13/23 08:51   BK Virus DNA Copies/mL, Instrument <1 copies/mL 122,596 (H) 27,891 (H)   BK VIRUS QUANTITATIVE, PCR  Rpt ! Rpt !        Rome Kim MD Huepfel, Shalonda GONZALES RN  Cc: Patrick Phan MD  Restart MMF 250mg bid please

## 2023-11-15 ENCOUNTER — TELEPHONE (OUTPATIENT)
Dept: TRANSPLANT | Facility: CLINIC | Age: 58
End: 2023-11-15
Payer: COMMERCIAL

## 2023-11-15 NOTE — TELEPHONE ENCOUNTER
[facility] Pancreas Transplant Team Conference  Date: 11/15/2023  Transplant Coordinator: Shalonda Roy     Attendees:  [x]  Dr. Phan [x] Shalonda Roy, RN [x] Julia Bower LPN     [x]  Dr. Patel [x] Diya Bautista, RN [] Reyna Llanos LPN    [x] Dr. Kim [x] Niyah Kee RN    [x] Dr. Carty [] Rosalind Ayala RN [x] Carlene Bell RN   [x] Dr. Long [] Bijal Diez, RN    [x] Dr. Zhou [] Claire Dunn RN    []  Dr. Saleh [] Patricia Paniagua RN    [] Dr. Walker [] Lalit Fofana RN    [x] Ritu Bonilla NP [] Neha Bhatti RN    [x] Katrina Frederick NP [] Lita Tirado RN        Verbal Plan Read Back:   Continue current treatment plan    Routed to RN Coordinator   Julia Bower LPN

## 2023-11-16 ENCOUNTER — APPOINTMENT (OUTPATIENT)
Dept: INTERPRETER SERVICES | Facility: CLINIC | Age: 58
End: 2023-11-16
Payer: COMMERCIAL

## 2023-11-17 NOTE — TELEPHONE ENCOUNTER
Called Yanick reviewed decrease in BK virus    Plan to restrart [employer]cept Mycophenolate mofetil 250 mg twice per daay

## 2023-11-20 ENCOUNTER — LAB (OUTPATIENT)
Dept: LAB | Facility: HOSPITAL | Age: 58
End: 2023-11-20
Payer: COMMERCIAL

## 2023-11-20 DIAGNOSIS — Z48.298 AFTERCARE FOLLOWING ORGAN TRANSPLANT: ICD-10-CM

## 2023-11-20 DIAGNOSIS — Z79.899 ENCOUNTER FOR LONG-TERM CURRENT USE OF MEDICATION: ICD-10-CM

## 2023-11-20 DIAGNOSIS — Z20.828 CONTACT WITH AND (SUSPECTED) EXPOSURE TO OTHER VIRAL COMMUNICABLE DISEASES: ICD-10-CM

## 2023-11-20 DIAGNOSIS — Z94.0 KIDNEY REPLACED BY TRANSPLANT: ICD-10-CM

## 2023-11-20 LAB
ANION GAP SERPL CALCULATED.3IONS-SCNC: 8 MMOL/L (ref 7–15)
BUN SERPL-MCNC: 21.4 MG/DL (ref 6–20)
CALCIUM SERPL-MCNC: 9.8 MG/DL (ref 8.6–10)
CHLORIDE SERPL-SCNC: 102 MMOL/L (ref 98–107)
CREAT SERPL-MCNC: 1.32 MG/DL (ref 0.67–1.17)
CYCLOSPORINE BLD LC/MS/MS-MCNC: 160 UG/L (ref 50–400)
DEPRECATED HCO3 PLAS-SCNC: 24 MMOL/L (ref 22–29)
EGFRCR SERPLBLD CKD-EPI 2021: 63 ML/MIN/1.73M2
ERYTHROCYTE [DISTWIDTH] IN BLOOD BY AUTOMATED COUNT: 15.5 % (ref 10–15)
GLUCOSE SERPL-MCNC: 70 MG/DL (ref 70–99)
HCT VFR BLD AUTO: 35.6 % (ref 40–53)
HGB BLD-MCNC: 12.1 G/DL (ref 13.3–17.7)
MCH RBC QN AUTO: 26.5 PG (ref 26.5–33)
MCHC RBC AUTO-ENTMCNC: 34 G/DL (ref 31.5–36.5)
MCV RBC AUTO: 78 FL (ref 78–100)
PLATELET # BLD AUTO: 102 10E3/UL (ref 150–450)
POTASSIUM SERPL-SCNC: 4 MMOL/L (ref 3.4–5.3)
RBC # BLD AUTO: 4.56 10E6/UL (ref 4.4–5.9)
SODIUM SERPL-SCNC: 134 MMOL/L (ref 135–145)
TME LAST DOSE: NORMAL H
TME LAST DOSE: NORMAL H
WBC # BLD AUTO: 4.6 10E3/UL (ref 4–11)

## 2023-11-20 PROCEDURE — 85027 COMPLETE CBC AUTOMATED: CPT

## 2023-11-20 PROCEDURE — 80048 BASIC METABOLIC PNL TOTAL CA: CPT

## 2023-11-20 PROCEDURE — 87799 DETECT AGENT NOS DNA QUANT: CPT

## 2023-11-20 PROCEDURE — 80158 DRUG ASSAY CYCLOSPORINE: CPT

## 2023-11-20 PROCEDURE — 36415 COLL VENOUS BLD VENIPUNCTURE: CPT

## 2023-11-21 ENCOUNTER — OFFICE VISIT (OUTPATIENT)
Dept: FAMILY MEDICINE | Facility: CLINIC | Age: 58
End: 2023-11-21
Payer: COMMERCIAL

## 2023-11-21 ENCOUNTER — TELEPHONE (OUTPATIENT)
Dept: FAMILY MEDICINE | Facility: CLINIC | Age: 58
End: 2023-11-21

## 2023-11-21 VITALS
WEIGHT: 143 LBS | DIASTOLIC BLOOD PRESSURE: 66 MMHG | HEIGHT: 64 IN | TEMPERATURE: 94.6 F | RESPIRATION RATE: 16 BRPM | HEART RATE: 64 BPM | OXYGEN SATURATION: 99 % | BODY MASS INDEX: 24.41 KG/M2 | SYSTOLIC BLOOD PRESSURE: 148 MMHG

## 2023-11-21 DIAGNOSIS — Z94.0 KIDNEY REPLACED BY TRANSPLANT: ICD-10-CM

## 2023-11-21 DIAGNOSIS — Z79.4 TYPE 2 DIABETES MELLITUS WITHOUT COMPLICATION, WITH LONG-TERM CURRENT USE OF INSULIN (H): ICD-10-CM

## 2023-11-21 DIAGNOSIS — R09.81 NASAL CONGESTION: ICD-10-CM

## 2023-11-21 DIAGNOSIS — I15.1 HTN, KIDNEY TRANSPLANT RELATED: Primary | ICD-10-CM

## 2023-11-21 DIAGNOSIS — Z94.0 HTN, KIDNEY TRANSPLANT RELATED: Primary | ICD-10-CM

## 2023-11-21 DIAGNOSIS — E11.9 TYPE 2 DIABETES MELLITUS WITHOUT COMPLICATION, WITH LONG-TERM CURRENT USE OF INSULIN (H): ICD-10-CM

## 2023-11-21 PROBLEM — N18.2 CKD (CHRONIC KIDNEY DISEASE) STAGE 2, GFR 60-89 ML/MIN: Status: RESOLVED | Noted: 2023-05-18 | Resolved: 2023-11-21

## 2023-11-21 PROCEDURE — 99214 OFFICE O/P EST MOD 30 MIN: CPT | Performed by: FAMILY MEDICINE

## 2023-11-21 RX ORDER — LISINOPRIL 10 MG/1
10 TABLET ORAL DAILY
Qty: 90 TABLET | Refills: 3 | Status: SHIPPED | OUTPATIENT
Start: 2023-11-21 | End: 2024-01-02

## 2023-11-21 RX ORDER — FLUTICASONE PROPIONATE 50 MCG
2 SPRAY, SUSPENSION (ML) NASAL DAILY
Qty: 48 G | OUTPATIENT
Start: 2023-11-21

## 2023-11-21 RX ORDER — FLUTICASONE PROPIONATE 50 MCG
2 SPRAY, SUSPENSION (ML) NASAL DAILY
Qty: 9.9 ML | Refills: 3 | Status: SHIPPED | OUTPATIENT
Start: 2023-11-21

## 2023-11-21 NOTE — PROGRESS NOTES
OFFICE VISIT    Assessment/Plan:     Patient Instructions:    -You are doing great overall.  -Please continue the medications as prescribed.  -Start taking the lisinopril 10 mg once daily.  Patient notes drowsiness, take the medication at nighttime.  -Use the Flonase to help with the nasal congestion.  It takes about a week for the medication to have its full effects. -Nasal spray medications: when using the nasal spray medication, put the tip of the medication bottle in about 1/2 inch into the nose, turn the tip so that it is pointing towards the outer eyebrow on that same side, and then spray. You do not have to sniff or do anything special. If you get a taste of the medication in the back of your throat right away, then you need to turn the tip of the bottle more outwards.     -Continue taking the diabetes medications as you have been.  -When checking the bedtime blood sugars, try to check at least 2 hours after you have finished dinner to have more reflective blood sugars.  Checking within 2 hours of a meal could have higher than expected blood sugar results.  -Your morning blood sugars look great!    -Return to clinic in 4-6 weeks for recheck of blood pressure and lab testing.    Please seek immediate medical attention (go to the emergency room or urgent care) for the following reasons: worsening symptoms, medication side effects, or any concerning changes.      [place] was seen today for hypertension.  Diagnoses and all orders for this visit:    HTN, kidney transplant related  Kidney replaced by transplant  Type 2 diabetes mellitus without complication, with long-term current use of insulin (H): On review of home blood pressures, most blood pressures are above goal.  After discussion, plan to initiate lisinopril as below due to the multiple positive effects (blood pressure control, kidney protective, history of diabetes).  Patient will continue to monitor blood pressures closely.  In regards to the blood sugars,  morning and prelunch sugars are in the good range.  The evening blood sugar results are 1 hour postmeal.  Discussed with patient importance of waiting for 2 hours prior to checking.  Considering that morning fasting blood sugars are in the good range, plan to continue on current insulin regimen.  Patient will continue to monitor going forward.  -     lisinopril (ZESTRIL) 10 MG tablet; Take 1 tablet (10 mg) by mouth daily    Nasal congestion: Patient endorsing nasal congestion on the left side primarily.  Both side appeared erythematous and is narrow.  On the left, there is complete obstruction with some clear mucus noted.  Considering this is rhinitis related.  Patient endorses frequent nosebleeds from the left nostril as a child.  If Flonase is not helping in the next 2-4 weeks, consider referral to ENT for further evaluation.  -     fluticasone (FLONASE) 50 MCG/ACT nasal spray; Spray 2 sprays into both nostrils daily        Return in about 4 weeks (around 12/19/2023) for Follow up, Hypertension.    The diagnoses, treatment options, risk, benefits, and recommendations were reviewed with patient/guardian.  Questions were answered to patient's/guardian satisfaction.  Red flag signs were reviewed.  Patient/guardian is in agreement with above plan.      Subjective: 57 year old male with history of kidney transplant (living unrelated donor on 02/21/2023), immunosuppressed state, diabetes mellitus type 2, CAD with history of NSTEMI, hypertension, dyslipidemia, thrombocytopenia, splenomegaly, hepatitis B core antibody positive (Hep B virus not detected on 06/12/2023) who presents to clinic for the following complaints:   Patient presents with:  Hypertension    Answers submitted by the patient for this visit:  General Questionnaire (Submitted on 11/21/2023)  Chief Complaint: Chronic problems general questions HPI Form  General Concern (Submitted on 11/21/2023)  Chief Complaint: Chronic problems general questions HPI  Form  What is the reason for your visit today?: hypertension    Patient is here for follow-up of high blood pressure.  Patient noted to have elevated blood pressure from the visit on 10/05/2023.  Patient was recommended to monitor blood pressures at home. Home BP have been mostly high.  See image below.  Patient taking amlodipine as prescribed.            On evaluation by nurse, initial blood pressure is 155/69.  On recheck by provider, blood pressure is 140/66. Patient had been on lisinopril previously in 2018, though at that time, patient was noted to have CKD stage 4 A3 and had resultant hyperkalemia, so lisinopril was discontinued.  Since renal transplant, creatinine values have primarily been around 1.3-1.59.  Potassium has been in the normal range.  Most recent lab was from yesterday.     8/17/2023  10:32 AM 8/17/2023  11:02 AM 8/28/2023  9:20 AM 8/31/2023  9:18 AM 9/7/2023  12:12 PM 9/7/2023  12:25 PM 9/7/2023  1:15 PM   Vital Signs          Systolic 149 !   155 !  149 !  151 !   158 !    Diastolic 64 !   63 !  61 !  67 !   69 !    Pulse 57   64  60  57   55       9/14/2023  9:35 AM 9/14/2023  9:47 AM 9/21/2023  10:30 AM 10/5/2023  2:30 PM 10/5/2023  3:34 PM 10/10/2023  11:12 AM   Vital Signs         Systolic 145 !   157 !  157 !  159 !  150 !    Diastolic 69 !   70 !  66 !  68 !  66 !    Pulse 64   64  61   56       11/21/2023  1:32 PM 11/21/2023  1:35 PM   Vital Signs     Systolic 158 !  155 !    Diastolic 68 !  69 !    Pulse 64          Diabetes: See blood sugar result photos below. Sugars are highest after dinner time usually. He usually waits for 1 hour and then checks the blood sugars.  Patient reports that he tends to have a late dinner and then will check his blood sugars afterwards.  He injects the Levemir at bedtime.    Stuffy nose: noted for one month now. No coughing, fevers, chills, or other symptoms.  He otherwise feels well.  Reports frequent nosebleeds from the left side of the nose since he  "was a child.        HM due was reviewed with patient/parent.  Recommendations, risk, benefits were reviewed.  Accepted recommendations were ordered.  Otherwise, patient/parent declined.    Health Maintenance Due   Topic Date Due    ADVANCE CARE PLANNING  Never done    EYE EXAM  Never done    COVID-19 Vaccine (5 - 2023-24 season) 09/01/2023    ZOSTER IMMUNIZATION (2 of 2) 08/21/2023    HEPATITIS B IMMUNIZATION (3 of 5 - Risk Dialysis 4-dose series) 11/02/2023    A1C  11/28/2023     Last eye appointment: seen last year.  Patient had previously been instructed to continue seeing eye specialist on a annual basis.      Patient has completed the COVID 19 updated booster shot and flu shot already (on 9/18/2023)    Immunization History   Administered Date(s) Administered    COVID-19 Bivalent 12+ (Pfizer) 11/02/2022    COVID-19 Monovalent 18+ (Moderna) 03/17/2021, 04/14/2021, 11/08/2021    Flu, Unspecified 12/18/2007    HEPA 12/18/2007    Hepatitis B, Adult 06/26/2023, 10/05/2023    Influenza (H1N1) 02/11/2010    Influenza Vaccine >6 months,quad, PF 09/16/2016, 10/19/2018, 09/28/2020, 10/05/2021, 10/14/2022    Influenza,INJ,MDCK,PF,Quad >6mo(Flucelvax) 03/12/2020    Pneumo Conj 13-V (2010&after) 10/10/2021    Pneumococcal 20 valent Conjugate (Prevnar 20) 06/26/2023    TDAP (Adacel,Boostrix) 06/26/2023    Typhoid IM 12/18/2007         The 10 point review of system is negative except as stated in the HPI.    Allergies were reviewed and updated.    Objective:   BP (!) 148/66 (BP Location: Right arm, Patient Position: Sitting, Cuff Size: Adult Regular)   Pulse 64   Temp (!) 94.6  F (34.8  C) (Oral)   Resp 16   Ht 1.615 m (5' 3.58\")   Wt 64.9 kg (143 lb)   SpO2 99%   BMI 24.87 kg/m    General: Active, alert, nontoxic-appearing.  No acute distress.  HEENT: Normocephalic, atraumatic.  Pupils are equal and round.  Sclera is clear.  Normal external ears. Nares: Left: Erythematous nasal mucosa with obstruction of the nasal " "passage by the nasal turbinate.  Clear mucus present.  Right: Erythematous nasal mucosa with narrow passage.  Nasal cavity.  Clear mucus also present.  Moist mucous membranes.    Cardiac: RRR.  S1, S2 present.  No murmurs, rubs, or gallops.  Respiratory/chest: Clear to auscultation bilaterally.  No wheezes, rales, rhonchi.  Breathing is not labored.  No accessory muscle usage.  Extremities: Voluntary movements intact.  Integumentary: No concerning rash or skin changes appreciated.        Pal Cooper MD  [facility] [place] [address]  Phone: [phone]  Fax: [phone]    11/21/2023  2:24 PM      Patient's home blood sugar results:   \"m\" = morning fasting sugar  \"n\" = lunch time (pre)  \"e\" = evening (1 hour after dinner)                Current Outpatient Medications   Medication    acetaminophen (TYLENOL) 325 MG tablet    Alcohol Swabs (ALCOHOL PREP) 70 % PADS    amitriptyline (ELAVIL) 25 MG tablet    amLODIPine (NORVASC) 10 MG tablet    aspirin (ASA) 81 MG EC tablet    atorvastatin (LIPITOR) 20 MG tablet    blood glucose (NO BRAND SPECIFIED) test strip    blood glucose monitoring (ONE TOUCH ULTRA 2) meter device kit    carvedilol (COREG) 25 MG tablet    cycloSPORINE modified (GENERIC EQUIVALENT) 25 MG capsule    fluticasone (FLONASE) 50 MCG/ACT nasal spray    hydrALAZINE (APRESOLINE) 25 MG tablet    insulin aspart (NOVOLOG PEN) 100 UNIT/ML pen    insulin detemir (LEVEMIR FLEXTOUCH) 100 UNIT/ML pen    insulin pen needle (BD PEN NEEDLE ORESTES 2ND GEN) 32G X 4 MM miscellaneous    lisinopril (ZESTRIL) 10 MG tablet    loratadine (CLARITIN) 10 MG tablet    magnesium oxide (MAG-OX) 400 MG tablet    mycophenolate (GENERIC EQUIVALENT) 250 MG capsule    nitroGLYcerin (NITROSTAT) 0.4 MG sublingual tablet    sodium bicarbonate 650 MG tablet    sulfamethoxazole-trimethoprim (BACTRIM) 400-80 MG tablet    tacrolimus (GENERIC EQUIVALENT) 0.5 MG capsule    thin (NO BRAND SPECIFIED) lancets    " Vitamin D3 (CHOLECALCIFEROL) 25 mcg (1000 units) tablet     No current facility-administered medications for this visit.       No Known Allergies    Patient Active Problem List    Diagnosis Date Noted    HOCM (hypertrophic obstructive cardiomyopathy) (H) 11/05/2023     Priority: Medium    Peripheral neuropathy 10/06/2023     Priority: Medium    BK viremia 08/28/2023     Priority: Medium    Aftercare following organ transplant 05/18/2023     Priority: Medium    Need for pneumocystis prophylaxis 05/18/2023     Priority: Medium    Vitamin D deficiency 05/18/2023     Priority: Medium    Secondary renal hyperparathyroidism (H24) 05/18/2023     Priority: Medium    Immunosuppressed status (H24) 02/24/2023     Priority: Medium    Anemia in chronic renal disease 02/24/2023     Priority: Medium    Hypomagnesemia 02/24/2023     Priority: Medium    Hepatitis B core antibody positive 02/24/2023     Priority: Medium    Kidney replaced by transplant 02/21/2023     Priority: Medium    Thrombocytopenia (H24) 06/02/2022     Priority: Medium    Coronary artery disease involving native coronary artery of native heart without angina pectoris 12/16/2021     Priority: Medium    Metabolic acidosis      Priority: Medium    HTN, kidney transplant related      Priority: Medium     Created by Conversion  Replacement Utility updated for latest IMO load        Type 2 diabetes mellitus without complication, with long-term current use of insulin (H)      Priority: Medium     Created by Conversion        Dyslipidemia, goal LDL below 70      Priority: Medium     Created by Conversion           Family History   Problem Relation Age of Onset    Diabetes Type 2  Mother     Other - See Comments Daughter         granular cell tumor of thigh    Heart Disease Other        Past Surgical History:   Procedure Laterality Date    BENCH KIDNEY  2/21/2023    Procedure: Bench kidney;  Surgeon: Talha Weinstein MD;  Location: [facility] OR    CV CORONARY ANGIOGRAM N/A  12/6/2021    Procedure: Coronary Angiogram;  Surgeon: Cristela Banks MD;  [facility] CATH LAB CV    CV LEFT HEART CATH N/A 12/6/2021    Procedure: Left Heart Cath;  Surgeon: Cristela Banks MD;  Location: [facility] LAB CV    CV PCI N/A 12/6/2021    Procedure: Percutaneous Coronary Intervention;  Surgeon: Cristela Banks MD;  Location: [facility] LAB CV    IR FINE NEEDLE ASPIRATION W ULTRASOUND  5/3/2023    IR RENAL BIOPSY RIGHT  5/3/2023    REMOVE CATHETER PERITONEAL N/A 2/21/2023    Procedure: Remove catheter peritoneal;  Surgeon: Talha Weinstein MD;  Location: [facility] OR        Social History     Socioeconomic History    Marital status: Patient Declined     Spouse name: Not on file    Number of children: Not on file    Years of education: Not on file    Highest education level: Not on file   Occupational History    Not on file   Tobacco Use    Smoking status: Never     Passive exposure: Never    Smokeless tobacco: Never   Vaping Use    Vaping Use: Never used   Substance and Sexual Activity    Alcohol use: Not Currently    Drug use: Never    Sexual activity: Not on file   Other Topics Concern    Parent/sibling w/ CABG, MI or angioplasty before 65F 55M? Not Asked   Social History Narrative    Not on file     Social Determinants of Health     Financial Resource Strain: Low Risk  (11/21/2023)    Financial Resource Strain     Within the past 12 months, have you or your family members you live with been unable to get utilities (heat, electricity) when it was really needed?: No   Food Insecurity: Low Risk  (11/21/2023)    Food Insecurity     Within the past 12 months, did you worry that your food would run out before you got money to buy more?: No     Within the past 12 months, did the food you bought just not last and you didn t have money to get more?: No   Transportation Needs: Low Risk  (11/21/2023)    Transportation Needs     Within the past 12 months, has lack of transportation kept you from  medical appointments, getting your medicines, non-medical meetings or appointments, work, or from getting things that you need?: No   Physical Activity: Not on file   Stress: Not on file   Social Connections: Not on file   Interpersonal Safety: Low Risk  (10/5/2023)    Interpersonal Safety     Do you feel physically and emotionally safe where you currently live?: Yes     Within the past 12 months, have you been hit, slapped, kicked or otherwise physically hurt by someone?: No     Within the past 12 months, have you been humiliated or emotionally abused in other ways by your partner or ex-partner?: No   Housing Stability: Low Risk  (11/21/2023)    Housing Stability     Do you have housing? : Yes     Are you worried about losing your housing?: No

## 2023-11-21 NOTE — TELEPHONE ENCOUNTER
Noted.  Please see visit note from today.    Pal Cooper MD  [facility] [place] [address]  Phone: [phone]  Fax: [phone]    11/21/2023  3:03 PM

## 2023-11-21 NOTE — PATIENT INSTRUCTIONS
-Thank you for choosing the [facility].  -It was a pleasure to see you today.  -Please take a look at the information below for more specific details regarding the treatment plan and recommendations.  -In this after visit summary is a list of your medications and specific instructions.  Please review this carefully as there may be changes made to your medication list.  -If there are any particular questions or concerns, please feel free to reach out to Dr. Cooper.  -If any labs have been completed, we will reach out to you about results.  If the results are normal or not concerning, a letter or [employer] message will be sent to you.  If any follow-up is needed, either Dr. Cooper or the nurse will give you a call.  If you have not heard regarding results after 2 weeks, please reach out to the clinic.    Patient Instructions:    -You are doing great overall.  -Please continue the medications as prescribed.  -Start taking the lisinopril 10 mg once daily.  Patient notes drowsiness, take the medication at nighttime.  -Use the Flonase to help with the nasal congestion.  It takes about a week for the medication to have its full effects. -Nasal spray medications: when using the nasal spray medication, put the tip of the medication bottle in about 1/2 inch into the nose, turn the tip so that it is pointing towards the outer eyebrow on that same side, and then spray. You do not have to sniff or do anything special. If you get a taste of the medication in the back of your throat right away, then you need to turn the tip of the bottle more outwards.     -Continue taking the diabetes medications as you have been.  -When checking the bedtime blood sugars, try to check at least 2 hours after you have finished dinner to have more reflective blood sugars.  Checking within 2 hours of a meal could have higher than expected blood sugar results.  -Your morning blood sugars look great!    -Return to clinic in 4-6 weeks  for recheck of blood pressure and lab testing.    Please seek immediate medical attention (go to the emergency room or urgent care) for the following reasons: worsening symptoms, medication side effects, or any concerning changes.      --------------------------------------------------------------------------------------------------------------------    -We are always looking for ways to improve.  You may be selected to receive a survey regarding your visit today.  We encourage you to complete the survey and provide specific, constructive feedback to help us improve our processes.  Thank you for your time!  -Please review the contact information listed on the after visit summary and in the electronic chart.  Below is the phone number that we have on file.  If there are any changes that are needed to be made, please reach out to the clinic.  [phone] (home)

## 2023-11-21 NOTE — TELEPHONE ENCOUNTER
FYI - Status Update    Who is Calling: nurse, [facility]    Update: [employer] [facility] did assessment on patient today with blood pressure reading on both arms with 161/70 and 160/75. Please advise patient when patient come to visit today with Dr. Cooper.    Does caller want a call/response back: No

## 2023-11-22 DIAGNOSIS — Z94.0 KIDNEY REPLACED BY TRANSPLANT: Primary | ICD-10-CM

## 2023-11-22 RX ORDER — CYCLOSPORINE 25 MG/1
CAPSULE, LIQUID FILLED ORAL
Qty: 450 CAPSULE | Refills: 3 | Status: SHIPPED | OUTPATIENT
Start: 2023-11-22 | End: 2024-01-21

## 2023-11-22 NOTE — TELEPHONE ENCOUNTER
ISSUE:   Cyclosporine level 160 on 11/20, goal 100-125, dose 75 mg BID.    PLAN:   Please call patient and confirm this was an accurate 12-hour trough. Verify Cyclosporine dose 75 mg BID. Confirm no new medications or illness. Confirm no missed doses. If accurate trough and accurate dose, decrease Cyclosporine dose to 75mg AM, 50 mg PM and repeat labs as scheduled.

## 2023-11-22 NOTE — TELEPHONE ENCOUNTER
Spoke to patients daughter Yanick who confirms this was an accurate 12-hour trough. Verified Cyclosporine dose 75 mg BID. Confirmed no new medications or illness. Confirmed no missed doses. Patient confirms decrease Cyclosporine dose to 75mg AM, 50 mg PM and repeat labs as scheduled.

## 2023-11-29 ENCOUNTER — MYC MEDICAL ADVICE (OUTPATIENT)
Dept: TRANSPLANT | Facility: CLINIC | Age: 58
End: 2023-11-29

## 2023-11-29 ENCOUNTER — OFFICE VISIT (OUTPATIENT)
Dept: TRANSPLANT | Facility: CLINIC | Age: 58
End: 2023-11-29
Attending: INTERNAL MEDICINE
Payer: COMMERCIAL

## 2023-11-29 ENCOUNTER — LAB (OUTPATIENT)
Dept: LAB | Facility: CLINIC | Age: 58
End: 2023-11-29
Attending: INTERNAL MEDICINE
Payer: COMMERCIAL

## 2023-11-29 VITALS
SYSTOLIC BLOOD PRESSURE: 166 MMHG | WEIGHT: 142 LBS | TEMPERATURE: 97.5 F | BODY MASS INDEX: 24.7 KG/M2 | HEART RATE: 65 BPM | OXYGEN SATURATION: 100 % | DIASTOLIC BLOOD PRESSURE: 72 MMHG

## 2023-11-29 DIAGNOSIS — Z94.0 KIDNEY REPLACED BY TRANSPLANT: Primary | ICD-10-CM

## 2023-11-29 DIAGNOSIS — I15.1 HTN, KIDNEY TRANSPLANT RELATED: ICD-10-CM

## 2023-11-29 DIAGNOSIS — Z48.298 AFTERCARE FOLLOWING ORGAN TRANSPLANT: ICD-10-CM

## 2023-11-29 DIAGNOSIS — D84.9 IMMUNOSUPPRESSED STATUS (H): ICD-10-CM

## 2023-11-29 DIAGNOSIS — Z20.828 CONTACT WITH AND (SUSPECTED) EXPOSURE TO OTHER VIRAL COMMUNICABLE DISEASES: ICD-10-CM

## 2023-11-29 DIAGNOSIS — Z76.82 ORGAN TRANSPLANT CANDIDATE: ICD-10-CM

## 2023-11-29 DIAGNOSIS — Z94.0 KIDNEY REPLACED BY TRANSPLANT: ICD-10-CM

## 2023-11-29 DIAGNOSIS — B34.8 BK VIREMIA: ICD-10-CM

## 2023-11-29 DIAGNOSIS — N18.6 ESRD (END STAGE RENAL DISEASE) (H): ICD-10-CM

## 2023-11-29 DIAGNOSIS — Z94.0 HTN, KIDNEY TRANSPLANT RELATED: ICD-10-CM

## 2023-11-29 DIAGNOSIS — Z79.899 ENCOUNTER FOR LONG-TERM CURRENT USE OF MEDICATION: ICD-10-CM

## 2023-11-29 PROBLEM — Z29.89 NEED FOR PNEUMOCYSTIS PROPHYLAXIS: Status: RESOLVED | Noted: 2023-05-18 | Resolved: 2023-11-29

## 2023-11-29 LAB
ALBUMIN MFR UR ELPH: 17.2 MG/DL
ANION GAP SERPL CALCULATED.3IONS-SCNC: 9 MMOL/L (ref 7–15)
BASOPHILS # BLD AUTO: 0 10E3/UL (ref 0–0.2)
BASOPHILS NFR BLD AUTO: 0 %
BUN SERPL-MCNC: 32.1 MG/DL (ref 6–20)
CALCIUM SERPL-MCNC: 10 MG/DL (ref 8.6–10)
CHLORIDE SERPL-SCNC: 103 MMOL/L (ref 98–107)
CMV DNA SPEC NAA+PROBE-ACNC: NOT DETECTED IU/ML
CREAT SERPL-MCNC: 1.44 MG/DL (ref 0.67–1.17)
CREAT UR-MCNC: 36.7 MG/DL
CYCLOSPORINE BLD LC/MS/MS-MCNC: 119 UG/L (ref 50–400)
DEPRECATED HCO3 PLAS-SCNC: 24 MMOL/L (ref 22–29)
EGFRCR SERPLBLD CKD-EPI 2021: 56 ML/MIN/1.73M2
EOSINOPHIL # BLD AUTO: 0.2 10E3/UL (ref 0–0.7)
EOSINOPHIL NFR BLD AUTO: 4 %
ERYTHROCYTE [DISTWIDTH] IN BLOOD BY AUTOMATED COUNT: 15.6 % (ref 10–15)
GLUCOSE SERPL-MCNC: 91 MG/DL (ref 70–99)
HCT VFR BLD AUTO: 36.6 % (ref 40–53)
HGB BLD-MCNC: 12.4 G/DL (ref 13.3–17.7)
IGG SERPL-MCNC: 1370 MG/DL (ref 610–1616)
IMM GRANULOCYTES # BLD: 0 10E3/UL
IMM GRANULOCYTES NFR BLD: 0 %
LYMPHOCYTES # BLD AUTO: 0.8 10E3/UL (ref 0.8–5.3)
LYMPHOCYTES NFR BLD AUTO: 16 %
MAGNESIUM SERPL-MCNC: 2.2 MG/DL (ref 1.7–2.3)
MCH RBC QN AUTO: 26.8 PG (ref 26.5–33)
MCHC RBC AUTO-ENTMCNC: 33.9 G/DL (ref 31.5–36.5)
MCV RBC AUTO: 79 FL (ref 78–100)
MONOCYTES # BLD AUTO: 0.5 10E3/UL (ref 0–1.3)
MONOCYTES NFR BLD AUTO: 10 %
MYCOPHENOLATE SERPL LC/MS/MS-MCNC: 0.57 MG/L (ref 1–3.5)
MYCOPHENOLATE-G SERPL LC/MS/MS-MCNC: 40.2 MG/L (ref 30–95)
NEUTROPHILS # BLD AUTO: 3.3 10E3/UL (ref 1.6–8.3)
NEUTROPHILS NFR BLD AUTO: 70 %
NRBC # BLD AUTO: 0 10E3/UL
NRBC BLD AUTO-RTO: 0 /100
PLATELET # BLD AUTO: 117 10E3/UL (ref 150–450)
POTASSIUM SERPL-SCNC: 5 MMOL/L (ref 3.4–5.3)
PROT/CREAT 24H UR: 0.47 MG/MG CR (ref 0–0.2)
RBC # BLD AUTO: 4.62 10E6/UL (ref 4.4–5.9)
SODIUM SERPL-SCNC: 136 MMOL/L (ref 135–145)
TME LAST DOSE: ABNORMAL H
TME LAST DOSE: ABNORMAL H
TME LAST DOSE: NORMAL H
TME LAST DOSE: NORMAL H
WBC # BLD AUTO: 4.7 10E3/UL (ref 4–11)
WBC # BLD AUTO: 4.7 10E3/UL (ref 4–11)

## 2023-11-29 PROCEDURE — 82784 ASSAY IGA/IGD/IGG/IGM EACH: CPT | Performed by: INTERNAL MEDICINE

## 2023-11-29 PROCEDURE — 36415 COLL VENOUS BLD VENIPUNCTURE: CPT | Performed by: PATHOLOGY

## 2023-11-29 PROCEDURE — 87799 DETECT AGENT NOS DNA QUANT: CPT | Performed by: INTERNAL MEDICINE

## 2023-11-29 PROCEDURE — 99000 SPECIMEN HANDLING OFFICE-LAB: CPT | Performed by: PATHOLOGY

## 2023-11-29 PROCEDURE — 86833 HLA CLASS II HIGH DEFIN QUAL: CPT | Performed by: SURGERY

## 2023-11-29 PROCEDURE — 86832 HLA CLASS I HIGH DEFIN QUAL: CPT | Performed by: SURGERY

## 2023-11-29 PROCEDURE — 84156 ASSAY OF PROTEIN URINE: CPT | Performed by: PATHOLOGY

## 2023-11-29 PROCEDURE — G0463 HOSPITAL OUTPT CLINIC VISIT: HCPCS | Performed by: INTERNAL MEDICINE

## 2023-11-29 PROCEDURE — 80048 BASIC METABOLIC PNL TOTAL CA: CPT | Performed by: PATHOLOGY

## 2023-11-29 PROCEDURE — 85025 COMPLETE CBC W/AUTO DIFF WBC: CPT | Performed by: PATHOLOGY

## 2023-11-29 PROCEDURE — 80158 DRUG ASSAY CYCLOSPORINE: CPT | Performed by: INTERNAL MEDICINE

## 2023-11-29 PROCEDURE — 83735 ASSAY OF MAGNESIUM: CPT

## 2023-11-29 PROCEDURE — 80180 DRUG SCRN QUAN MYCOPHENOLATE: CPT | Performed by: INTERNAL MEDICINE

## 2023-11-29 PROCEDURE — 99214 OFFICE O/P EST MOD 30 MIN: CPT | Performed by: INTERNAL MEDICINE

## 2023-11-29 ASSESSMENT — PAIN SCALES - GENERAL: PAINLEVEL: NO PAIN (0)

## 2023-11-29 NOTE — LETTER
11/29/2023         RE: Modesto Barbosa  [address] [place] [address]        Dear Colleague,    Thank you for referring your patient, Modesto Barbosa, to the [facility]PL[facility]. Please see a copy of my visit note below.    TRANSPLANT NEPHROLOGY CHRONIC POST TRANSPLANT VISIT    Assessment & Plan  # LDKT: Stable   - Baseline Creatinine: ~ 1.2-1.5, increased from prior likely 2/2 BK nephropathy   - Proteinuria: Minimal (0.2-0.5 grams),    - Date DSA Last Checked: Nov/2023      Latest DSA: No cPRA: 27%   - BK Viremia: Yes, moderately elevated (10-100K), trend down   - Kidney Tx Biopsy: May 03, 2023; Result: No diagnostic evidence of acute rejection.   Acute tubular injury.  Severe arterial and arteriolar sclerosis.   -Labs every 2 weeks    # Immunosuppression: Cyclosporine (goal 100-125) and Mycophenolate mofetil (dose 250 mg every 12 hours)   - On lower immunosuppression with stopping mycophenolate and changed tacrolimus to cyclosporine due to significantly elevated BK viremia.   - Continue with intensive monitoring of immunosuppression for efficacy and toxicity.   - Changes: Not at this time. MMF restarted at 250mg bid on 11/14/23 due to decreasing BK    # Infection Prophylaxis:   - PJP: Sulfa/TMP (Bactrim)    # Hypertension: Borderline control;  Goal BP: < 130/80   - Volume status: Euvolemic   - Changes: Not at this time. Continue amlodipine 10mg daily, coreg 12.5mg bid, hydralazine 25mg bid, and lisinopril 10mg daily.    -I have asked patient to check BP twice daily and will make sure his med list is up to date.     # Diabetes: Controlled (HbA1c <7%) Last HbA1c: 5.8%   - Management as per primary care    # Anemia in Chronic Renal Disease: Hgb: Trend up      NERY: No   - Iron studies: Low iron saturation, but high ferritin    # Thrombocytopenia: Stable, mildly low platelet level in the ~ 100-140s.  Patient does have splenomegaly.    # Mineral Bone Disorder:   - Secondary renal hyperparathyroidism;  PTH level: Normal (15-65 pg/ml)        On treatment: None  - Vitamin D; level: Normal        On supplement: Yes  - Calcium; level: Normal        On supplement: No    # Electrolytes:   - Potassium; level: High normal        On supplement: No  - Magnesium; level: Normal        On supplement: Yes  - Bicarbonate; level: Normal        On supplement: Yes, bicarb 1300mg bid    # BK Viremia:    - Peak BK PCR ~ 560K on Sep/2023.  Normal IgG level.   -BK decreased to 28k on 11/13/23 likely due to stopping MMF. MMF 250mg bid restarted ~11/14/23.    - Will continue to check BK PCR q2 weeks.    # CAD, s/p PCI: Asymptomatic.    # Asymmetric Left Ventricular Hypertrophy (HOCM): Stable. No history of sustained ventricular arrhythmias.     # Hepatitis B Core Ab +: Patient with core Ab positive, but negative Ag and also negative surface Ab.  Hep B DNA was negative at time of transplant, as well as at 4 months post transplant.     # Peripheral Neuropathy: Stable symptoms on amitriptyline.    # Medical Compliance: Yes     # Skin Cancer Risk:    - Discussed sun protection and recommend regular follow up with Dermatology.    # Health Maintenance and Vaccination Review: Recommend:  Shingrix dose 2 . Pt did receive COVID vaccine in September.     # Transplant History:  Etiology of Kidney Failure: Diabetes mellitus type 2  Tx: LDKT  Transplant: 2/21/2023 (Kidney)  Significant changes in immunosuppression: None  Significant transplant-related complications: BK Viremia    Transplant Office Phone Number: [phone]    Assessment and plan was discussed with the patient and he voiced his understanding and agreement.    Return visit: Return in 1 month (on 12/29/2023) for previously scheduled visit.    Rome Kim MD    Chief Complaint  Mr. Barbosa is a 57 year old here for kidney transplant and immunosuppression management.     History of Present Illness  The patient overall feels well. He denies any recent hospitalizations. He denies nausea,  vomiting, diarrhea, fever, chills, shortness of breath, chest pain, LE edema, unintentional weight loss, nights sweats, dysuria, hematuria.     He doesn't know what meds he takes as his daughter puts together his pill box.       Home BP:  120-150    Problem List  Patient Active Problem List   Diagnosis     Type 2 diabetes mellitus without complication, with long-term current use of insulin (H)     Dyslipidemia, goal LDL below 70     HTN, kidney transplant related     Metabolic acidosis     Coronary artery disease involving native coronary artery of native heart without angina pectoris     Thrombocytopenia (H24)     Immunosuppressed status (H24)     Kidney replaced by transplant     Anemia in chronic renal disease     Hypomagnesemia     Hepatitis B core antibody positive     Aftercare following organ transplant     Need for pneumocystis prophylaxis     Vitamin D deficiency     Secondary renal hyperparathyroidism (H24)     BK viremia     Peripheral neuropathy     HOCM (hypertrophic obstructive cardiomyopathy) (H)       Allergies  No Known Allergies      Medications  Current Outpatient Medications   Medication Sig     acetaminophen (TYLENOL) 325 MG tablet Take 2 tablets (650 mg) by mouth every 4 hours as needed for other (For optimal non-opioid multimodal pain management to improve pain control.)     Alcohol Swabs (ALCOHOL PREP) 70 % PADS USE 5X PER DAY     amitriptyline (ELAVIL) 25 MG tablet Take 1 tablet (25 mg) by mouth at bedtime     amLODIPine (NORVASC) 10 MG tablet Take 1 tablet (10 mg) by mouth at bedtime     aspirin (ASA) 81 MG EC tablet Take 1 tablet (81 mg) by mouth daily     atorvastatin (LIPITOR) 20 MG tablet Take 1 tablet (20 mg) by mouth daily     blood glucose (NO BRAND SPECIFIED) test strip Use to test blood sugar 3 times daily or as directed. To accompany: Blood Glucose Monitor Brands: per insurance.     blood glucose monitoring (ONE TOUCH ULTRA 2) meter device kit USE TO TEST BLOOD SUGAR THREE TIMES  "DAILY OR AS DIRECTED.     carvedilol (COREG) 25 MG tablet Take 0.5 tablets (12.5 mg) by mouth 2 times daily (with meals)     cycloSPORINE modified (GENERIC EQUIVALENT) 25 MG capsule Total dose = 75 mg in the AM and 50 mg in the PM     fluticasone (FLONASE) 50 MCG/ACT nasal spray Spray 2 sprays into both nostrils daily     hydrALAZINE (APRESOLINE) 25 MG tablet Take 1 tablet (25 mg) by mouth 2 times daily     insulin aspart (NOVOLOG PEN) 100 UNIT/ML pen Before breaskfast and lunch, give 7 units. Before dinner give 9 units.     insulin detemir (LEVEMIR FLEXTOUCH) 100 UNIT/ML pen Inject 15-20 Units Subcutaneous At Bedtime     insulin pen needle (BD PEN NEEDLE ORESTES 2ND GEN) 32G X 4 MM miscellaneous USE TWICE DAILY     lisinopril (ZESTRIL) 10 MG tablet Take 1 tablet (10 mg) by mouth daily     loratadine (CLARITIN) 10 MG tablet Take 1 tablet (10 mg) by mouth daily as needed for allergies (itchy/rash)     magnesium oxide (MAG-OX) 400 MG tablet Take 1 tablet (400 mg) by mouth 2 times daily     mycophenolate (GENERIC EQUIVALENT) 250 MG capsule Take 1 capsule (250 mg) by mouth 2 times daily     nitroGLYcerin (NITROSTAT) 0.4 MG sublingual tablet One tablet under the tongue every 5 minutes if needed for chest pain. May repeat every 5 minutes for a maximum of 3 doses in 15 minutes\"     sodium bicarbonate 650 MG tablet Take 2 tablets (1,300 mg) by mouth 2 times daily     sulfamethoxazole-trimethoprim (BACTRIM) 400-80 MG tablet Take 1 tablet by mouth daily     thin (NO BRAND SPECIFIED) lancets Tests 3x daily Use with lanceting device. To accompany: Blood Glucose Monitor Brands: per insurance.     Vitamin D3 (CHOLECALCIFEROL) 25 mcg (1000 units) tablet Take 2 tablets (50 mcg) by mouth daily     No current facility-administered medications for this visit.     There are no discontinued medications.      Physical Exam  Vital Signs: BP (!) 166/72   Pulse 65   Temp 97.5  F (36.4  C) (Oral)   Wt 64.4 kg (142 lb)   SpO2 100%   BMI " 24.70 kg/m      GENERAL APPEARANCE: alert and no distress  HENT: mouth without ulcers or lesions  RESP: lungs clear to auscultation - no rales, rhonchi or wheezes  CV: regular rhythm, normal rate, no rub, 2.6 systolic murmur  EDEMA: trace LE edema bilaterally  ABDOMEN: soft, nondistended, nontender, bowel sounds normal  MS: extremities normal - no gross deformities noted, no evidence of inflammation in joints, no muscle tenderness  SKIN: no rash  TX KIDNEY: normal  DIALYSIS ACCESS: none    Data        Latest Ref Rng & Units 11/20/2023     8:47 AM 11/13/2023     8:51 AM 11/7/2023     9:10 AM   Renal   Sodium 135 - 145 mmol/L 134  137  137    K 3.4 - 5.3 mmol/L 4.0  4.5  4.2    Cl 98 - 107 mmol/L 102  100  101    Cl (external) 98 - 107 mmol/L 102  100  101    CO2 22 - 29 mmol/L 24  25  25    Urea Nitrogen 6.0 - 20.0 mg/dL 21.4  29.3  34.8    Creatinine 0.67 - 1.17 mg/dL 1.32  1.56  1.18    Glucose 70 - 99 mg/dL 70  102  151    Calcium 8.6 - 10.0 mg/dL 9.8  9.4  9.5          Latest Ref Rng & Units 10/30/2023     8:55 AM 8/7/2023     8:57 AM 7/31/2023     8:44 AM   Bone Health   Phosphorus 2.5 - 4.5 mg/dL 3.5  3.7  4.1          Latest Ref Rng & Units 11/20/2023     8:47 AM 11/13/2023     8:51 AM 11/7/2023     9:10 AM   Heme   WBC 4.0 - 11.0 10e3/uL 4.6  4.1  4.0    Hgb 13.3 - 17.7 g/dL 12.1  12.2  11.0    Plt 150 - 450 10e3/uL 102  92  91          Latest Ref Rng & Units 8/28/2023     9:03 AM 4/7/2023     8:10 AM 4/3/2023     8:10 AM   Liver   AP 40 - 129 U/L 88  74  69    TBili <=1.2 mg/dL 0.5  0.7  0.8    Bilirubin Direct 0.00 - 0.30 mg/dL <0.20   0.22    ALT 0 - 70 U/L 17  10  12    AST 0 - 45 U/L 26  12  14    Tot Protein 6.4 - 8.3 g/dL 6.8  6.3  6.1    Albumin 3.5 - 5.0 g/dL  3.7     Albumin 3.5 - 5.2 g/dL 4.6   3.8          Latest Ref Rng & Units 8/28/2023     9:03 AM 6/26/2023     8:08 AM 2/22/2023     2:03 AM   Pancreas   A1C <5.7 % 5.8  7.5  6.8          Latest Ref Rng & Units 8/14/2023     9:12 AM 6/26/2023      8:08 AM 6/19/2023     9:45 AM   Iron studies   Iron 61 - 157 ug/dL 40  47  54    Iron Sat Index 15 - 46 % 22  28  31    Ferritin 31 - 409 ng/mL 412  545  540          Latest Ref Rng & Units 9/25/2023     9:09 AM 7/5/2023     7:43 AM 5/1/2023     7:15 AM   [facility] Txp Virology   EBV DNA COPIES/ML Not Detected copies/mL <500  <500  <500    EBV DNA LOG OF COPIES  <2.7  <2.7  <2.7         Recent Labs   Lab Test 08/28/23  0903 09/05/23  0912 09/11/23  0905   DOSTAC 8/27/2023 9/4/2023 9/10/2023   TACROL 6.9 6.5 3.8*     Recent Labs   Lab Test 10/16/23  0906 10/30/23  0855 11/13/23  0851   DOSMPA 10/15/2023   8:00 PM  --  11/12/2023   8:00 PM   MPACID <0.25* <0.25* <0.25*   MPAG <6.5* 7.7* 8.6*         Again, thank you for allowing me to participate in the care of your patient.        Sincerely,        Rome Kim MD

## 2023-11-29 NOTE — PROGRESS NOTES
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   Acute tubular injury.  Severe arterial and arteriolar sclerosis.   -Labs every 2 weeks    # Immunosuppression: Cyclosporine (goal 100-125) and Mycophenolate mofetil (dose 250 mg every 12 hours)   - On lower immunosuppression with stopping mycophenolate and changed tacrolimus to cyclosporine due to significantly elevated BK viremia.   - Continue with intensive monitoring of immunosuppression for efficacy and toxicity.   - Changes: Not at this time. MMF restarted at 250mg bid on 11/14/23 due to decreasing BK    # Infection Prophylaxis:   - PJP: Sulfa/TMP (Bactrim)    # Hypertension: Borderline control;  Goal BP: < 130/80   - Volume status: Euvolemic   - Changes: Not at this time. Continue amlodipine 10mg daily, coreg 12.5mg bid, hydralazine 25mg bid, and lisinopril 10mg daily.    -I have asked patient to check BP twice daily and will make sure his med list is up to date.     # Diabetes: Controlled (HbA1c <7%) Last HbA1c: 5.8%   - Management as per primary care    # Anemia in Chronic Renal Disease: Hgb: Trend up      NERY: No   - Iron studies: Low iron saturation, but high ferritin    # Thrombocytopenia: Stable, mildly low platelet level in the ~ 100-140s.  Patient does have splenomegaly.    # Mineral Bone Disorder:   - Secondary renal hyperparathyroidism; PTH level: Normal (15-65 pg/ml)        On treatment: None  - Vitamin D; level: Normal        On supplement: Yes  - Calcium; level: Normal        On supplement: No    # Electrolytes:   - Potassium; level: High normal        On supplement:  No  - Magnesium; level: Normal        On supplement: Yes  - Bicarbonate; level: Normal        On supplement: Yes, bicarb 1300mg bid    # BK Viremia:    - Peak BK PCR ~ 560K on Sep/2023.  Normal IgG level.   -BK decreased to 28k on 11/13/23 likely due to stopping MMF. MMF 250mg bid restarted ~11/14/23.    - Will continue to check BK PCR q2 weeks.    # CAD, s/p PCI: Asymptomatic.    # Asymmetric Left Ventricular Hypertrophy (HOCM): Stable. No history of sustained ventricular arrhythmias.     # Hepatitis B Core Ab +: Patient with core Ab positive, but negative Ag and also negative surface Ab.  Hep B DNA was negative at time of transplant, as well as at 4 months post transplant.     # Peripheral Neuropathy: Stable symptoms on amitriptyline.    # Medical Compliance: Yes     # Skin Cancer Risk:    - Discussed sun protection and recommend regular follow up with Dermatology.    # Health Maintenance and Vaccination Review: Recommend:  Shingrix dose 2 . Pt did receive COVID vaccine in September.     # Transplant History:  Etiology of Kidney Failure: Diabetes mellitus type 2  Tx: LDKT  Transplant: 2/21/2023 (Kidney)  Significant changes in immunosuppression: None  Significant transplant-related complications: BK Viremia    Transplant Office Phone Number: [phone]    Assessment and plan was discussed with the patient and he voiced his understanding and agreement.    Return visit: Return in 1 month (on 12/29/2023) for previously scheduled visit.    Rome Kim MD    Chief Complaint   Mr. Barbosa is a 57 year old here for kidney transplant and immunosuppression management.     History of Present Illness   The patient overall feels well. He denies any recent hospitalizations. He denies nausea, vomiting, diarrhea, fever, chills, shortness of breath, chest pain, LE edema, unintentional weight loss, nights sweats, dysuria, hematuria.     He doesn't know what meds he takes as his daughter puts together his pill box.       Home BP:   120-150    Problem List   Patient Active Problem List   Diagnosis    Type 2 diabetes mellitus without complication, with long-term current use of insulin (H)    Dyslipidemia, goal LDL below 70    HTN, kidney transplant related    Metabolic acidosis    Coronary artery disease involving native coronary artery of native heart without angina pectoris    Thrombocytopenia (H24)    Immunosuppressed status (H24)    Kidney replaced by transplant    Anemia in chronic renal disease    Hypomagnesemia    Hepatitis B core antibody positive    Aftercare following organ transplant    Need for pneumocystis prophylaxis    Vitamin D deficiency    Secondary renal hyperparathyroidism (H24)    BK viremia    Peripheral neuropathy    HOCM (hypertrophic obstructive cardiomyopathy) (H)       Allergies   No Known Allergies      Medications   Current Outpatient Medications   Medication Sig    acetaminophen (TYLENOL) 325 MG tablet Take 2 tablets (650 mg) by mouth every 4 hours as needed for other (For optimal non-opioid multimodal pain management to improve pain control.)    Alcohol Swabs (ALCOHOL PREP) 70 % PADS USE 5X PER DAY    amitriptyline (ELAVIL) 25 MG tablet Take 1 tablet (25 mg) by mouth at bedtime    amLODIPine (NORVASC) 10 MG tablet Take 1 tablet (10 mg) by mouth at bedtime    aspirin (ASA) 81 MG EC tablet Take 1 tablet (81 mg) by mouth daily    atorvastatin (LIPITOR) 20 MG tablet Take 1 tablet (20 mg) by mouth daily    blood glucose (NO BRAND SPECIFIED) test strip Use to test blood sugar 3 times daily or as directed. To accompany: Blood Glucose Monitor Brands: per insurance.    blood glucose monitoring (ONE TOUCH ULTRA 2) meter device kit USE TO TEST BLOOD SUGAR THREE TIMES DAILY OR AS DIRECTED.    carvedilol (COREG) 25 MG tablet Take 0.5 tablets (12.5 mg) by mouth 2 times daily (with meals)    cycloSPORINE modified (GENERIC EQUIVALENT) 25 MG capsule Total dose = 75 mg in the AM and 50 mg in the PM    fluticasone (FLONASE) 50  "MCG/ACT nasal spray Spray 2 sprays into both nostrils daily    hydrALAZINE (APRESOLINE) 25 MG tablet Take 1 tablet (25 mg) by mouth 2 times daily    insulin aspart (NOVOLOG PEN) 100 UNIT/ML pen Before breaskfast and lunch, give 7 units. Before dinner give 9 units.    insulin detemir (LEVEMIR FLEXTOUCH) 100 UNIT/ML pen Inject 15-20 Units Subcutaneous At Bedtime    insulin pen needle (BD PEN NEEDLE ORESTES 2ND GEN) 32G X 4 MM miscellaneous USE TWICE DAILY    lisinopril (ZESTRIL) 10 MG tablet Take 1 tablet (10 mg) by mouth daily    loratadine (CLARITIN) 10 MG tablet Take 1 tablet (10 mg) by mouth daily as needed for allergies (itchy/rash)    magnesium oxide (MAG-OX) 400 MG tablet Take 1 tablet (400 mg) by mouth 2 times daily    mycophenolate (GENERIC EQUIVALENT) 250 MG capsule Take 1 capsule (250 mg) by mouth 2 times daily    nitroGLYcerin (NITROSTAT) 0.4 MG sublingual tablet One tablet under the tongue every 5 minutes if needed for chest pain. May repeat every 5 minutes for a maximum of 3 doses in 15 minutes\"    sodium bicarbonate 650 MG tablet Take 2 tablets (1,300 mg) by mouth 2 times daily    sulfamethoxazole-trimethoprim (BACTRIM) 400-80 MG tablet Take 1 tablet by mouth daily    thin (NO BRAND SPECIFIED) lancets Tests 3x daily Use with lanceting device. To accompany: Blood Glucose Monitor Brands: per insurance.    Vitamin D3 (CHOLECALCIFEROL) 25 mcg (1000 units) tablet Take 2 tablets (50 mcg) by mouth daily     No current facility-administered medications for this visit.     There are no discontinued medications.      Physical Exam   Vital Signs: BP (!) 166/72   Pulse 65   Temp 97.5  F (36.4  C) (Oral)   Wt 64.4 kg (142 lb)   SpO2 100%   BMI 24.70 kg/m      GENERAL APPEARANCE: alert and no distress  HENT: mouth without ulcers or lesions  RESP: lungs clear to auscultation - no rales, rhonchi or wheezes  CV: regular rhythm, normal rate, no rub, 2.6 systolic murmur  EDEMA: trace LE edema bilaterally  ABDOMEN: soft, " nondistended, nontender, bowel sounds normal  MS: extremities normal - no gross deformities noted, no evidence of inflammation in joints, no muscle tenderness  SKIN: no rash  TX KIDNEY: normal  DIALYSIS ACCESS: none    Data         Latest Ref Rng & Units 11/20/2023     8:47 AM 11/13/2023     8:51 AM 11/7/2023     9:10 AM   Renal   Sodium 135 - 145 mmol/L 134  137  137    K 3.4 - 5.3 mmol/L 4.0  4.5  4.2    Cl 98 - 107 mmol/L 102  100  101    Cl (external) 98 - 107 mmol/L 102  100  101    CO2 22 - 29 mmol/L 24  25  25    Urea Nitrogen 6.0 - 20.0 mg/dL 21.4  29.3  34.8    Creatinine 0.67 - 1.17 mg/dL 1.32  1.56  1.18    Glucose 70 - 99 mg/dL 70  102  151    Calcium 8.6 - 10.0 mg/dL 9.8  9.4  9.5          Latest Ref Rng & Units 10/30/2023     8:55 AM 8/7/2023     8:57 AM 7/31/2023     8:44 AM   Bone Health   Phosphorus 2.5 - 4.5 mg/dL 3.5  3.7  4.1          Latest Ref Rng & Units 11/20/2023     8:47 AM 11/13/2023     8:51 AM 11/7/2023     9:10 AM   Heme   WBC 4.0 - 11.0 10e3/uL 4.6  4.1  4.0    Hgb 13.3 - 17.7 g/dL 12.1  12.2  11.0    Plt 150 - 450 10e3/uL 102  92  91          Latest Ref Rng & Units 8/28/2023     9:03 AM 4/7/2023     8:10 AM 4/3/2023     8:10 AM   Liver   AP 40 - 129 U/L 88  74  69    TBili <=1.2 mg/dL 0.5  0.7  0.8    Bilirubin Direct 0.00 - 0.30 mg/dL <0.20   0.22    ALT 0 - 70 U/L 17  10  12    AST 0 - 45 U/L 26  12  14    Tot Protein 6.4 - 8.3 g/dL 6.8  6.3  6.1    Albumin 3.5 - 5.0 g/dL  3.7     Albumin 3.5 - 5.2 g/dL 4.6   3.8          Latest Ref Rng & Units 8/28/2023     9:03 AM 6/26/2023     8:08 AM 2/22/2023     2:03 AM   Pancreas   A1C <5.7 % 5.8  7.5  6.8          Latest Ref Rng & Units 8/14/2023     9:12 AM 6/26/2023     8:08 AM 6/19/2023     9:45 AM   Iron studies   Iron 61 - 157 ug/dL 40  47  54    Iron Sat Index 15 - 46 % 22  28  31    Ferritin 31 - 409 ng/mL 412  545  540          Latest Ref Rng & Units 9/25/2023     9:09 AM 7/5/2023     7:43 AM 5/1/2023     7:15 AM   [facility] Txp Virology    EBV DNA COPIES/ML Not Detected copies/mL <500  <500  <500    EBV DNA LOG OF COPIES  <2.7  <2.7  <2.7         Recent Labs   Lab Test 08/28/23  0903 09/05/23  0912 09/11/23  0905   DOSTAC 8/27/2023 9/4/2023 9/10/2023   TACROL 6.9 6.5 3.8*     Recent Labs   Lab Test 10/16/23  0906 10/30/23  0855 11/13/23  0851   DOSA 10/15/2023   8:00 PM  --  11/12/2023   8:00 PM   MPACID <0.25* <0.25* <0.25*   MPAG <6.5* 7.7* 8.6*

## 2023-11-29 NOTE — NURSING NOTE
Chief Complaint   Patient presents with    RECHECK     3 mo f/u       BP (!) 166/72   Pulse 65   Temp 97.5  F (36.4  C) (Oral)   Wt 64.4 kg (142 lb)   SpO2 100%   BMI 24.70 kg/m      Ever Hartman on 11/29/2023 at 9:40 AM

## 2023-11-29 NOTE — PATIENT INSTRUCTIONS
Patient Recommendations:  - Check blood pressures twice daily (morning and night), write them down so a coordinator can call to get those values.   -Dose #2 of Shingrix  -Labs every 2 weeks now    Transplant Patient Information  Your Post Transplant Coordinator is: Shalonda Roy  For non urgent items, we encourage you to contact your coordinator/care team online via Personally  You and your care team can also contact your transplant coordinator Monday - Friday, 8am - 5pm at [phone] (Option 2 to reach the coordinator or Option 4 to schedule an appointment).  After hours for urgent matters, please call [facility] at [phone].

## 2023-12-06 ENCOUNTER — MYC MEDICAL ADVICE (OUTPATIENT)
Dept: TRANSPLANT | Facility: CLINIC | Age: 58
End: 2023-12-06
Payer: COMMERCIAL

## 2023-12-06 ENCOUNTER — TELEPHONE (OUTPATIENT)
Dept: TRANSPLANT | Facility: CLINIC | Age: 58
End: 2023-12-06
Payer: COMMERCIAL

## 2023-12-09 ENCOUNTER — TELEPHONE (OUTPATIENT)
Dept: TRANSPLANT | Facility: CLINIC | Age: 58
End: 2023-12-09
Payer: COMMERCIAL

## 2023-12-09 ASSESSMENT — ENCOUNTER SYMPTOMS: NEW SYMPTOMS OF CORONARY ARTERY DISEASE: 0

## 2023-12-11 ENCOUNTER — TELEPHONE (OUTPATIENT)
Dept: TRANSPLANT | Facility: CLINIC | Age: 58
End: 2023-12-11

## 2023-12-11 ENCOUNTER — LAB (OUTPATIENT)
Dept: LAB | Facility: HOSPITAL | Age: 58
End: 2023-12-11
Payer: COMMERCIAL

## 2023-12-11 DIAGNOSIS — Z94.0 KIDNEY REPLACED BY TRANSPLANT: Primary | ICD-10-CM

## 2023-12-11 DIAGNOSIS — Z48.298 AFTERCARE FOLLOWING ORGAN TRANSPLANT: ICD-10-CM

## 2023-12-11 DIAGNOSIS — Z79.899 ENCOUNTER FOR LONG-TERM CURRENT USE OF MEDICATION: ICD-10-CM

## 2023-12-11 DIAGNOSIS — Z94.0 KIDNEY REPLACED BY TRANSPLANT: ICD-10-CM

## 2023-12-11 DIAGNOSIS — Z20.828 CONTACT WITH AND (SUSPECTED) EXPOSURE TO OTHER VIRAL COMMUNICABLE DISEASES: ICD-10-CM

## 2023-12-11 LAB
ANION GAP SERPL CALCULATED.3IONS-SCNC: 11 MMOL/L (ref 7–15)
BUN SERPL-MCNC: 32.4 MG/DL (ref 6–20)
CALCIUM SERPL-MCNC: 9.5 MG/DL (ref 8.6–10)
CHLORIDE SERPL-SCNC: 99 MMOL/L (ref 98–107)
CREAT SERPL-MCNC: 1.79 MG/DL (ref 0.67–1.17)
CYCLOSPORINE BLD LC/MS/MS-MCNC: 114 UG/L (ref 50–400)
DEPRECATED HCO3 PLAS-SCNC: 22 MMOL/L (ref 22–29)
DONOR IDENTIFICATION: NORMAL
DSA COMMENTS: NORMAL
DSA PRESENT: NO
DSA TEST METHOD: NORMAL
EGFRCR SERPLBLD CKD-EPI 2021: 43 ML/MIN/1.73M2
ERYTHROCYTE [DISTWIDTH] IN BLOOD BY AUTOMATED COUNT: 15.3 % (ref 10–15)
GLUCOSE SERPL-MCNC: 142 MG/DL (ref 70–99)
HCT VFR BLD AUTO: 32.6 % (ref 40–53)
HGB BLD-MCNC: 11.1 G/DL (ref 13.3–17.7)
MCH RBC QN AUTO: 26.9 PG (ref 26.5–33)
MCHC RBC AUTO-ENTMCNC: 34 G/DL (ref 31.5–36.5)
MCV RBC AUTO: 79 FL (ref 78–100)
ORGAN: NORMAL
PLATELET # BLD AUTO: 108 10E3/UL (ref 150–450)
POTASSIUM SERPL-SCNC: 5.4 MMOL/L (ref 3.4–5.3)
RBC # BLD AUTO: 4.13 10E6/UL (ref 4.4–5.9)
SA 1 CELL: NORMAL
SA 1 TEST METHOD: NORMAL
SA 2 CELL: NORMAL
SA 2 TEST METHOD: NORMAL
SA1 HI RISK ABY: NORMAL
SA1 MOD RISK ABY: NORMAL
SA2 HI RISK ABY: NORMAL
SA2 MOD RISK ABY: NORMAL
SODIUM SERPL-SCNC: 132 MMOL/L (ref 135–145)
TME LAST DOSE: NORMAL H
TME LAST DOSE: NORMAL H
UNACCEPTABLE ANTIGENS: NORMAL
UNOS CPRA: 27
WBC # BLD AUTO: 5.2 10E3/UL (ref 4–11)
ZZZSA 1  COMMENTS: NORMAL
ZZZSA 2 COMMENTS: NORMAL

## 2023-12-11 PROCEDURE — 80158 DRUG ASSAY CYCLOSPORINE: CPT

## 2023-12-11 PROCEDURE — 36415 COLL VENOUS BLD VENIPUNCTURE: CPT

## 2023-12-11 PROCEDURE — 80048 BASIC METABOLIC PNL TOTAL CA: CPT

## 2023-12-11 PROCEDURE — 87799 DETECT AGENT NOS DNA QUANT: CPT

## 2023-12-11 PROCEDURE — 85027 COMPLETE CBC AUTOMATED: CPT

## 2023-12-11 NOTE — LETTER
PHYSICIAN ORDERS      DATE & TIME ISSUED: 2023 10:45 AM  PATIENT NAME: Modesto Barbosa   : 1965     [facility] MR# [if applicable]: [id number]     DIAGNOSIS:  Kidney Transplant  ICD-10 CODE: Z94.0     Please repeat the following labs in 1 week:  Cyclosporine drug level  CBC  BMP  Random urine protein/creatinine ratio    Any questions please call: [phone]    Please fax each result same day as resulted/available    Critical lab results page [phone]        Rome Kim MD

## 2023-12-11 NOTE — TELEPHONE ENCOUNTER
Rome Kim MD Huepfel, Mary K, RN  Cc: Patrick Phan MD  Let's repeat labs in 1 week. It is likely from the lisinopril. I want to see if it gets any better. If not will have to stop.

## 2023-12-11 NOTE — TELEPHONE ENCOUNTER
Rome Kim MD Huepfel, Shalonda GONZALES RN  Thanks.Bps look good. If K is not too high I will stop hydralazine and increase lisinopril dose. Can you please get repeat UPCR with those labs?

## 2023-12-12 LAB
BK VIRUS DNA COPIES/ML, INSTRUMENT: 5975 COPIES/ML
BKV DNA SPEC NAA+PROBE-LOG#: 3.8 {LOG_COPIES}/ML

## 2023-12-12 NOTE — TELEPHONE ENCOUNTER
Left message and sent mychart message to patient to repeat labs in 1 week w/UPC.  Lab orders updated.

## 2023-12-13 ENCOUNTER — TELEPHONE (OUTPATIENT)
Dept: TRANSPLANT | Facility: CLINIC | Age: 58
End: 2023-12-13
Payer: COMMERCIAL

## 2023-12-13 NOTE — TELEPHONE ENCOUNTER
Post Kidney and Pancreas Transplant Team Conference  Date: 12/13/2023  Transplant Coordinator: Shalonda Roy     Attendees:  [x]  Dr. Phan [x] Shalonda Roy, RN [x] Julia Bower LPN     [x]  Dr. Patel [x] Diya Bautista, RN [] Reyna Llanos LPN    [x] Dr. Kim [x] Niyah Kee RN    [x] Dr. Carty [x] Rosalind Ayala RN [] Carlene Bell RN   [] Dr. Long [] Bijal Diez, RN    [] Dr. Zhou [] Claire Dunn RN    [x]  Dr. Saleh [] Patricia Paniagua RN    [] Dr. Walker [] Lalit Fofana RN    [x] Ritu Bonilla, NP [] Neha Bhatti RN    [x] Katrina Frederick NP [] Lita Tirado RN        Verbal Plan Read Back:   Continue current treatment plan    Routed to RN Coordinator   Julia Bower LPN

## 2023-12-17 NOTE — TELEPHONE ENCOUNTER
Modesto sharif Mary K, RN  Phone Number: [phone]     Larry Liz,  All the meds on the med list above are correct.  He has been checking his BPs twice a day and these are the recent numbers for the last few days for mornings and evenings. They have gotten better with the additional recent Lisinopril being added:  12/4 are 134/64 (last saw him on Monday afternoon)  12/3 are 132/71 and 143/70  12/2 are 120/63 and 123/63  12/1 are 110/60 and 127/66  11/30 are 124/61 and 151/71  11/29 are 151/95 and 124/66    Please let me know if you need anything else from me.

## 2023-12-18 ENCOUNTER — TELEPHONE (OUTPATIENT)
Dept: TRANSPLANT | Facility: CLINIC | Age: 58
End: 2023-12-18

## 2023-12-18 ENCOUNTER — VIRTUAL VISIT (OUTPATIENT)
Dept: EDUCATION SERVICES | Facility: CLINIC | Age: 58
End: 2023-12-18
Payer: COMMERCIAL

## 2023-12-18 ENCOUNTER — LAB (OUTPATIENT)
Dept: LAB | Facility: HOSPITAL | Age: 58
End: 2023-12-18
Payer: COMMERCIAL

## 2023-12-18 DIAGNOSIS — Z94.0 KIDNEY REPLACED BY TRANSPLANT: ICD-10-CM

## 2023-12-18 DIAGNOSIS — E11.9 DIABETES MELLITUS (H): Primary | ICD-10-CM

## 2023-12-18 LAB
ANION GAP SERPL CALCULATED.3IONS-SCNC: 9 MMOL/L (ref 7–15)
BUN SERPL-MCNC: 39.6 MG/DL (ref 6–20)
CALCIUM SERPL-MCNC: 9.7 MG/DL (ref 8.6–10)
CHLORIDE SERPL-SCNC: 102 MMOL/L (ref 98–107)
CREAT SERPL-MCNC: 1.72 MG/DL (ref 0.67–1.17)
CYCLOSPORINE BLD LC/MS/MS-MCNC: 118 UG/L (ref 50–400)
DEPRECATED HCO3 PLAS-SCNC: 24 MMOL/L (ref 22–29)
EGFRCR SERPLBLD CKD-EPI 2021: 46 ML/MIN/1.73M2
ERYTHROCYTE [DISTWIDTH] IN BLOOD BY AUTOMATED COUNT: 15.1 % (ref 10–15)
GLUCOSE SERPL-MCNC: 125 MG/DL (ref 70–99)
HCT VFR BLD AUTO: 32.5 % (ref 40–53)
HGB BLD-MCNC: 10.9 G/DL (ref 13.3–17.7)
MCH RBC QN AUTO: 26.9 PG (ref 26.5–33)
MCHC RBC AUTO-ENTMCNC: 33.5 G/DL (ref 31.5–36.5)
MCV RBC AUTO: 80 FL (ref 78–100)
PLATELET # BLD AUTO: 100 10E3/UL (ref 150–450)
POTASSIUM SERPL-SCNC: 5 MMOL/L (ref 3.4–5.3)
RBC # BLD AUTO: 4.05 10E6/UL (ref 4.4–5.9)
SODIUM SERPL-SCNC: 135 MMOL/L (ref 135–145)
TME LAST DOSE: NORMAL H
TME LAST DOSE: NORMAL H
WBC # BLD AUTO: 6.2 10E3/UL (ref 4–11)

## 2023-12-18 PROCEDURE — 98968 PH1 ASSMT&MGMT NQHP 21-30: CPT | Mod: 95 | Performed by: NUTRITIONIST

## 2023-12-18 PROCEDURE — 36415 COLL VENOUS BLD VENIPUNCTURE: CPT

## 2023-12-18 PROCEDURE — 85027 COMPLETE CBC AUTOMATED: CPT

## 2023-12-18 PROCEDURE — 80048 BASIC METABOLIC PNL TOTAL CA: CPT

## 2023-12-18 PROCEDURE — 87799 DETECT AGENT NOS DNA QUANT: CPT

## 2023-12-18 PROCEDURE — 80158 DRUG ASSAY CYCLOSPORINE: CPT

## 2023-12-18 NOTE — LETTER
12/18/2023         RE: Modesto Barbosa  [address] [place] [address]        Dear Colleague,    Thank you for referring your patient, Modesto Barbosa, to the [facility]. Please see a copy of my visit note below.    Diabetes Self-Management Education & Support    Presents for: Follow-up    Type of Service: Telephone Visit    Originating Location (Patient Location): Home  Distant Location (Provider Location): Offsite  Mode of Communication:  Telephone    Telephone Visit Start Time:  3:02 PM  Telephone Visit End Time (telephone visit stop time): 3:25 PM    ASSESSMENT:  Spoke with [place] with professional .  He reports that he is doing well with his blood glucose levels, reports fasting blood sugar ranging 80-90 mg/dL and 2 hr after meals 160-180 mg/dL.  Has had an occasional low reading as he is waking up but knows how to treat.  He is watching his carbohydrate intake, rides a stationary bike 1-2 times/day and takes his diabetes medications without any issues.      Patient's most recent   Lab Results   Component Value Date    A1C 5.8 08/28/2023     is meeting goal of <7.0    Diabetes knowledge and skills assessment:   Patient is knowledgeable in diabetes management concepts related to: Healthy Eating, Being Active, Monitoring, Taking Medication, Problem Solving, Reducing Risks, and Healthy Coping    Continue education with the following diabetes management concepts: None at this time    Based on learning assessment above, most appropriate setting for further diabetes education would be: Individual setting.      PLAN  1) Continue to limit rice at meals to small bowl  2) Continue to monitor 2 times/day and bring results to all appointments  3) Continue to ride stationary bike 5-6 days/week    Topics to cover at upcoming visits: None at this time    Follow-up: PRN and annually    See Care Plan for co-developed, patient-state behavior change goals.  AVS provided for patient  "today.    Education Materials Provided:  No new materials provided today      SUBJECTIVE/OBJECTIVE:  Presents for: Follow-up  Accompanied by: , Self  Diabetes education in the past 24mo: Yes  Diabetes type: Type 2  Other concerns:: Language barrier  Cultural Influences/Ethnic Background:  Not  or     Diabetes Symptoms & Complications:     Complications assessed today?: Yes  Heart disease: Yes  Nephropathy: Yes    Patient Problem List and Family Medical History reviewed for relevant medical history, current medical status, and diabetes risk factors.    Vitals:  There were no vitals taken for this visit.  Estimated body mass index is 24.7 kg/m  as calculated from the following:    Height as of 11/21/23: 1.615 m (5' 3.58\").    Weight as of 11/29/23: 64.4 kg (142 lb).   Last 3 BP:   BP Readings from Last 3 Encounters:   11/29/23 (!) 166/72   11/21/23 (!) 148/66   10/10/23 (!) 150/66       History   Smoking Status     Never   Smokeless Tobacco     Never       Labs:  Lab Results   Component Value Date    A1C 5.8 08/28/2023     Lab Results   Component Value Date     12/18/2023     04/07/2023     04/07/2023     Lab Results   Component Value Date    LDL 26 08/28/2023     Direct Measure HDL   Date Value Ref Range Status   08/28/2023 42 >=40 mg/dL Final   ]  GFR Estimate   Date Value Ref Range Status   12/18/2023 46 (L) >60 mL/min/1.73m2 Final   06/14/2021 15 (L) >60 mL/min/1.73m2 Final     GFR Estimate If Black   Date Value Ref Range Status   06/14/2021 18 (L) >60 mL/min/1.73m2 Final     Lab Results   Component Value Date    CR 1.72 12/18/2023     No results found for: \"MICROALBUMIN\"    Healthy Eating:  Healthy Eating Assessed Today: Yes  Meal planning/habits: None  How many times a week on average do you eat food made away from home (restaurant/take-out)?: 0  Meals include: Breakfast, Dinner, Afternoon Snack  Breakfast: 10 AM:  rice with chicken/fish and vegetables  Dinner: 7PM: " beef, small amount of brown rice  Snacks: fuit during the day:  grapes, pineapple and banana  Beverages: Water  Has patient met with a dietitian in the past?: Yes    Being Active:  Being Active Assessed Today: Yes  Exercise:: Yes (rides stationary bike)  Days per week of moderate to strenuous exercise (like a brisk walk): 6  On average, minutes per day of exercise at this level: 20  How intense was your typical exercise? : Light (like stretching or slow walking)  Exercise Minutes per Week: 120  Barrier to exercise: None    Monitoring:  Monitoring Assessed Today: Yes  Blood Glucose Meter: One Touch  Times checking blood sugar at home (number): 2  Times checking blood sugar at home (per): Day  Blood glucose trend: No change      Taking Medications:  Diabetes Medication(s)       Insulin       insulin aspart (NOVOLOG PEN) 100 UNIT/ML pen    Before breaskfast and lunch, give 7 units. Before dinner give 9 units.     insulin detemir (LEVEMIR FLEXTOUCH) 100 UNIT/ML pen    Inject 15-20 Units Subcutaneous At Bedtime            Taking Medication Assessed Today: Yes  Current Treatments: Insulin Injections  Dose schedule: Pre-breakfast, Pre-dinner, At bedtime  Given by: Patient  Injection/Infusion sites: Abdomen  Problems taking diabetes medications regularly?: No  Diabetes medication side effects?: No    Problem Solving:  Problem Solving Assessed Today: Yes  Is the patient at risk for hypoglycemia?: Yes  Hypoglycemia Frequency: Monthly  Hypoglycemia Treatment: Juice  Medical ID: No  Does patient have glucagon emergency kit?: No  Does patient have severe weather/disaster plan for diabetes management?: No  Does patient have sick day plan for diabetes management?: No    Hypoglycemia symptoms  Confusion: No  Dizziness or Light-Headedness: No  Headaches: No  Hunger: No  Mood changes: No  Nervousness/Anxiety: No  Sleepiness: No  Speech difficulty: No  Sweats: Yes  Feeling shaky: Yes    Reducing Risks:  Reducing Risks Assessed Today:  Yes  Diabetes Risks: Age over 45 years, Hyperlipidemia, Ethnicity  CAD Risks: Hypertension, Male sex, Dyslipidemia, Diabetes Mellitus  Has dilated eye exam at least once a year?: Yes (Usually - is waiting now due to just having transplant)  Sees dentist every 6 months?: No  Feet checked by healthcare provider in the last year?: Yes    Healthy Coping:  Healthy Coping Assessed Today: Yes  Emotional response to diabetes: Acceptance  Stage of change: ACTION (Actively working towards change)      Care Plan and Education Provided:  Care Plan: Diabetes   Updates made by Chari Posadas RD since 12/18/2023 12:00 AM        Problem: Diabetes Self-Management Education Needed to Optimize Self-Care Behaviors         Goal: Healthy Eating - follow a healthy eating pattern for diabetes         Task: Provide education on portion control and consistency in amount, composition and timing of food intake Completed 12/18/2023   Responsible User: Stacey Barrios RD        Goal: Being Active - get regular physical activity, working up to at least 150 minutes per week         Task: Provide education on relationship of activity to glucose and precautions to take if at risk for low glucose Completed 12/18/2023   Responsible User: Stacey Barrios RD        Goal: Problem Solving - know how to prevent and manage short-term diabetes complications         Task: Provide education on low blood glucose - causes, signs/symptoms, prevention, treatment, carrying a carbohydrate source at all times, and medical identification Completed 12/18/2023   Responsible User: Stacey Barrios RD        Goal: Healthy Coping - use available resources to cope with the challenges of managing diabetes         Task: Provide education on the benefits of making appropriate lifestyle changes Completed 12/18/2023   Responsible User: Stacey aBrrios RD Michelle DiDio, RD, LD, [facility]     Time Spent: 22 minutes  Encounter Type: Individual    Any diabetes medication dose  changes were made via the CDE Protocol per the patient's referring provider. A copy of this encounter was shared with the provider.

## 2023-12-18 NOTE — TELEPHONE ENCOUNTER
Continue lisinopril at current dose. No changes at this time. Repeat labs in 2 weeks please     Continued trend down in BK viremia and can slightly increase mycophenolate mofetil to 500 mg twice daily.     Patrick Phan MD Huepfel, Mary K, RN  Yes.  At 1 year, then  for goal.

## 2023-12-18 NOTE — PROGRESS NOTES
Diabetes Self-Management Education & Support    Presents for: Follow-up    Type of Service: Telephone Visit    Originating Location (Patient Location): Home  Distant Location (Provider Location): Offsite  Mode of Communication:  Telephone    Telephone Visit Start Time:  3:02 PM  Telephone Visit End Time (telephone visit stop time): 3:25 PM    ASSESSMENT:  Spoke with [place] with professional .  He reports that he is doing well with his blood glucose levels, reports fasting blood sugar ranging 80-90 mg/dL and 2 hr after meals 160-180 mg/dL.  Has had an occasional low reading as he is waking up but knows how to treat.  He is watching his carbohydrate intake, rides a stationary bike 1-2 times/day and takes his diabetes medications without any issues.      Patient's most recent   Lab Results   Component Value Date    A1C 5.8 08/28/2023     is meeting goal of <7.0    Diabetes knowledge and skills assessment:   Patient is knowledgeable in diabetes management concepts related to: Healthy Eating, Being Active, Monitoring, Taking Medication, Problem Solving, Reducing Risks, and Healthy Coping    Continue education with the following diabetes management concepts: None at this time    Based on learning assessment above, most appropriate setting for further diabetes education would be: Individual setting.      PLAN  1) Continue to limit rice at meals to small bowl  2) Continue to monitor 2 times/day and bring results to all appointments  3) Continue to ride stationary bike 5-6 days/week    Topics to cover at upcoming visits: None at this time    Follow-up: PRN and annually    See Care Plan for co-developed, patient-state behavior change goals.  AVS provided for patient today.    Education Materials Provided:  No new materials provided today      SUBJECTIVE/OBJECTIVE:  Presents for: Follow-up  Accompanied by: , Self  Diabetes education in the past 24mo: Yes  Diabetes type: Type 2  Other concerns:: Language  "barrier  Cultural Influences/Ethnic Background:  Not  or     Diabetes Symptoms & Complications:     Complications assessed today?: Yes  Heart disease: Yes  Nephropathy: Yes    Patient Problem List and Family Medical History reviewed for relevant medical history, current medical status, and diabetes risk factors.    Vitals:  There were no vitals taken for this visit.  Estimated body mass index is 24.7 kg/m  as calculated from the following:    Height as of 11/21/23: 1.615 m (5' 3.58\").    Weight as of 11/29/23: 64.4 kg (142 lb).   Last 3 BP:   BP Readings from Last 3 Encounters:   11/29/23 (!) 166/72   11/21/23 (!) 148/66   10/10/23 (!) 150/66       History   Smoking Status    Never   Smokeless Tobacco    Never       Labs:  Lab Results   Component Value Date    A1C 5.8 08/28/2023     Lab Results   Component Value Date     12/18/2023     04/07/2023     04/07/2023     Lab Results   Component Value Date    LDL 26 08/28/2023     Direct Measure HDL   Date Value Ref Range Status   08/28/2023 42 >=40 mg/dL Final   ]  GFR Estimate   Date Value Ref Range Status   12/18/2023 46 (L) >60 mL/min/1.73m2 Final   06/14/2021 15 (L) >60 mL/min/1.73m2 Final     GFR Estimate If Black   Date Value Ref Range Status   06/14/2021 18 (L) >60 mL/min/1.73m2 Final     Lab Results   Component Value Date    CR 1.72 12/18/2023     No results found for: \"MICROALBUMIN\"    Healthy Eating:  Healthy Eating Assessed Today: Yes  Meal planning/habits: None  How many times a week on average do you eat food made away from home (restaurant/take-out)?: 0  Meals include: Breakfast, Dinner, Afternoon Snack  Breakfast: 10 AM:  rice with chicken/fish and vegetables  Dinner: 7PM: beef, small amount of brown rice  Snacks: fuit during the day:  grapes, pineapple and banana  Beverages: Water  Has patient met with a dietitian in the past?: Yes    Being Active:  Being Active Assessed Today: Yes  Exercise:: Yes (rides stationary " bike)  Days per week of moderate to strenuous exercise (like a brisk walk): 6  On average, minutes per day of exercise at this level: 20  How intense was your typical exercise? : Light (like stretching or slow walking)  Exercise Minutes per Week: 120  Barrier to exercise: None    Monitoring:  Monitoring Assessed Today: Yes  Blood Glucose Meter: One Touch  Times checking blood sugar at home (number): 2  Times checking blood sugar at home (per): Day  Blood glucose trend: No change      Taking Medications:  Diabetes Medication(s)       Insulin       insulin aspart (NOVOLOG PEN) 100 UNIT/ML pen    Before breaskfast and lunch, give 7 units. Before dinner give 9 units.     insulin detemir (LEVEMIR FLEXTOUCH) 100 UNIT/ML pen    Inject 15-20 Units Subcutaneous At Bedtime            Taking Medication Assessed Today: Yes  Current Treatments: Insulin Injections  Dose schedule: Pre-breakfast, Pre-dinner, At bedtime  Given by: Patient  Injection/Infusion sites: Abdomen  Problems taking diabetes medications regularly?: No  Diabetes medication side effects?: No    Problem Solving:  Problem Solving Assessed Today: Yes  Is the patient at risk for hypoglycemia?: Yes  Hypoglycemia Frequency: Monthly  Hypoglycemia Treatment: Juice  Medical ID: No  Does patient have glucagon emergency kit?: No  Does patient have severe weather/disaster plan for diabetes management?: No  Does patient have sick day plan for diabetes management?: No    Hypoglycemia symptoms  Confusion: No  Dizziness or Light-Headedness: No  Headaches: No  Hunger: No  Mood changes: No  Nervousness/Anxiety: No  Sleepiness: No  Speech difficulty: No  Sweats: Yes  Feeling shaky: Yes    Reducing Risks:  Reducing Risks Assessed Today: Yes  Diabetes Risks: Age over 45 years, Hyperlipidemia, Ethnicity  CAD Risks: Hypertension, Male sex, Dyslipidemia, Diabetes Mellitus  Has dilated eye exam at least once a year?: Yes (Usually - is waiting now due to just having transplant)  Sees  dentist every 6 months?: No  Feet checked by healthcare provider in the last year?: Yes    Healthy Coping:  Healthy Coping Assessed Today: Yes  Emotional response to diabetes: Acceptance  Stage of change: ACTION (Actively working towards change)      Care Plan and Education Provided:  Care Plan: Diabetes   Updates made by Chari Posadas RD since 12/18/2023 12:00 AM        Problem: Diabetes Self-Management Education Needed to Optimize Self-Care Behaviors         Goal: Healthy Eating - follow a healthy eating pattern for diabetes         Task: Provide education on portion control and consistency in amount, composition and timing of food intake Completed 12/18/2023   Responsible User: Stacey Barrios RD        Goal: Being Active - get regular physical activity, working up to at least 150 minutes per week         Task: Provide education on relationship of activity to glucose and precautions to take if at risk for low glucose Completed 12/18/2023   Responsible User: Stacey Barrios RD        Goal: Problem Solving - know how to prevent and manage short-term diabetes complications         Task: Provide education on low blood glucose - causes, signs/symptoms, prevention, treatment, carrying a carbohydrate source at all times, and medical identification Completed 12/18/2023   Responsible User: Stacey Barrios RD        Goal: Healthy Coping - use available resources to cope with the challenges of managing diabetes         Task: Provide education on the benefits of making appropriate lifestyle changes Completed 12/18/2023   Responsible User: Stacey Barrios RD Michelle DiDio, RD, LD, [facility]     Time Spent: 22 minutes  Encounter Type: Individual    Any diabetes medication dose changes were made via the CDE Protocol per the patient's referring provider. A copy of this encounter was shared with the provider.

## 2023-12-19 ENCOUNTER — MYC MEDICAL ADVICE (OUTPATIENT)
Dept: TRANSPLANT | Facility: CLINIC | Age: 58
End: 2023-12-19
Payer: COMMERCIAL

## 2023-12-19 DIAGNOSIS — Z94.0 KIDNEY REPLACED BY TRANSPLANT: Primary | ICD-10-CM

## 2023-12-19 LAB
BK VIRUS DNA COPIES/ML, INSTRUMENT: 3259 COPIES/ML
BKV DNA SPEC NAA+PROBE-LOG#: 3.5 {LOG_COPIES}/ML

## 2023-12-19 RX ORDER — MYCOPHENOLATE MOFETIL 250 MG/1
500 CAPSULE ORAL 2 TIMES DAILY
Qty: 120 CAPSULE | Refills: 6 | Status: SHIPPED | OUTPATIENT
Start: 2023-12-19 | End: 2024-06-06

## 2023-12-26 NOTE — TELEPHONE ENCOUNTER
[place] Barbosa Mary K RN  Phone Number: [phone]     Ok I received this message. Will increase the medication to 500mg twice a day          Good afternoon  (Newest Message First)  View All Conversations on this Encounter  You routed conversation to Patrick Phan MD7 days ago     Modesto Barbosa  You7 days ago       Ok I received this message. Will increase the medication to 500mg twice a day              [facility]     Patrick Phna MD     Improved, mild BK viremia, and recommend increasing mycophenolate mofetil to 500 mg twice daily.     Yanick  Please review the above message from Dr Phan   Please confirm you have received this message         [facility] Coordinator

## 2023-12-27 ENCOUNTER — OFFICE VISIT (OUTPATIENT)
Dept: FAMILY MEDICINE | Facility: CLINIC | Age: 58
End: 2023-12-27
Payer: COMMERCIAL

## 2023-12-27 VITALS
HEART RATE: 62 BPM | WEIGHT: 146.04 LBS | BODY MASS INDEX: 24.93 KG/M2 | TEMPERATURE: 97.2 F | HEIGHT: 64 IN | DIASTOLIC BLOOD PRESSURE: 60 MMHG | SYSTOLIC BLOOD PRESSURE: 126 MMHG | OXYGEN SATURATION: 100 % | RESPIRATION RATE: 16 BRPM

## 2023-12-27 DIAGNOSIS — I15.1 HTN, KIDNEY TRANSPLANT RELATED: Primary | ICD-10-CM

## 2023-12-27 DIAGNOSIS — Z94.0 HTN, KIDNEY TRANSPLANT RELATED: Primary | ICD-10-CM

## 2023-12-27 DIAGNOSIS — Z79.4 TYPE 2 DIABETES MELLITUS WITHOUT COMPLICATION, WITH LONG-TERM CURRENT USE OF INSULIN (H): ICD-10-CM

## 2023-12-27 DIAGNOSIS — E11.9 TYPE 2 DIABETES MELLITUS WITHOUT COMPLICATION, WITH LONG-TERM CURRENT USE OF INSULIN (H): ICD-10-CM

## 2023-12-27 PROCEDURE — 99214 OFFICE O/P EST MOD 30 MIN: CPT | Performed by: FAMILY MEDICINE

## 2023-12-27 NOTE — PROGRESS NOTES
OFFICE VISIT    Assessment/Plan:     Patient Instructions:    -Continue taking the lisinopril 10 mg once daily.  -Continue taking the other blood pressure medications as you have been.  -Continue completing the labs as recommended by the transplant team.  -Keep following a low-salt diet.  Continue to stay active.    -Please take your medications as prescribed.  -Check the blood sugars as recommended to help monitor how your blood sugars are doing.  -Be sure to stay well-hydrated by drinking water each day to help your kidneys.  -Maintaining good blood sugar control will help your kidneys stay strong for longer.  -Be sure to complete an eye exam once a year to make sure your eyes are healthy.  -Check your feet a few times a week to monitor for any cuts/wounds or any changes.  If any concerning findings are noted, please return to clinic for reevaluation.  -If you feel unwell (such as dizziness, sweatiness, nausea, fast heartbeat, confused, etc.), be sure to check your blood sugar as it may be low (below 70 mg/dL).  If a low blood sugars noted, please eat or drink something sugary and repeat the blood sugar test in about 30 minutes.  Repeat until the blood sugar is above 70 mg/dL.  If blood sugars are persistently low, please seek immediate medical attention.    -Labs were ordered for the diabetes, though these labs to be completed at your visit on Friday with the transplant team.    Please seek immediate medical attention (go to the emergency room or urgent care) for the following reasons: worsening symptoms, or any concerning changes.      [place] was seen today for hypertension and recheck medication.  Diagnoses and all orders for this visit:    HTN, kidney transplant related: improved. Continue BP medications as prescribed.     Type 2 diabetes mellitus without complication, with long-term current use of insulin (H): A1c=6.5. Patient has had increase in oral intake as he is feeling better after transplant. Continue  medications as prescribed.   -     Hemoglobin A1c; Future  -     Albumin Random Urine Quantitative with Creat Ratio; Future        Return in about 6 months (around 6/27/2024) for Medication follow up, Hypertension, Diabetes.    The diagnoses, treatment options, risk, benefits, and recommendations were reviewed with patient/guardian.  Questions were answered to patient's/guardian satisfaction.  Red flag signs were reviewed.  Patient/guardian is in agreement with above plan.      Subjective: 58 year old male with history of kidney transplant (living unrelated donor on 02/21/2023), immunosuppressed state, diabetes mellitus type 2, CAD with history of NSTEMI, hypertension, dyslipidemia, thrombocytopenia, splenomegaly, hepatitis B core antibody positive (Hep B virus not detected on 06/12/2023) who presents to clinic for the following complaints:   Patient presents with:  Hypertension  Recheck Medication    Answers submitted by the patient for this visit:  Hypertension Visit (Submitted on 12/27/2023)  Chief Complaint: Chronic problems general questions HPI Form  Do you check your blood pressure regularly outside of the clinic?: Yes  Are your blood pressures ever more than 140 on the top number (systolic) OR more than 90 on the bottom number (diastolic)? (For example, greater than 140/90): Yes  Are you following a low salt diet?: Yes  General Questionnaire (Submitted on 12/27/2023)  Chief Complaint: Chronic problems general questions HPI Form      HTN: Blood pressure today is noted to be 141/54.  On recheck, blood pressure is 126/60. Patient is currently taking amlodipine 10 mg once daily, carvedilol 25 mg once daily, hydralazine 25 mg twice daily, lisinopril 10 mg once daily.  Lisinopril was started on 11/12/2023 due to the home blood pressure readings being mostly above goal.    Since starting lisinopril, patient has not had any issues or side effects with the medication.    Patient did have a BMP completed on 12/18/2022  "(about a month after starting the lisinopril) and creatinine had to remain at baseline at 17.2.  Potassium was noted to be elevated initially, though had returned to normal range a week later without any intervention.  See labs below.     11/21/2023  1:32 PM 11/21/2023  1:35 PM 11/21/2023  2:10 PM 11/29/2023  9:37 AM 11/29/2023  9:39 AM 12/27/2023  4:34 PM   Vital Signs         Systolic 158 !  155 !  148 !  171 !  166 !  141 !    Diastolic 68 !  69 !  66 !  75 !  72 !  54 !       12/27/2023  4:54 PM   Vital Signs    Systolic 126    Diastolic 60       Legend:  ! Abnormal     Latest Reference Range & Units 12/11/23 09:22 12/18/23 08:56   Sodium 135 - 145 mmol/L 132 (L) 135   Potassium 3.4 - 5.3 mmol/L 5.4 (H) 5.0   Chloride 98 - 107 mmol/L 99 102   Carbon Dioxide (CO2) 22 - 29 mmol/L 22 24   Urea Nitrogen 6.0 - 20.0 mg/dL 32.4 (H) 39.6 (H)   Creatinine 0.67 - 1.17 mg/dL 1.79 (H) 1.72 (H)   GFR Estimate >60 mL/min/1.73m2 43 (L) 46 (L)   Calcium 8.6 - 10.0 mg/dL 9.5 9.7   Anion Gap 7 - 15 mmol/L 11 9   Glucose 70 - 99 mg/dL 142 (H) 125 (H)       Home BP: He did not bring his home BP numbers, though states that the BP are mostly in the good range.       HM due was reviewed with patient/parent.  Recommendations, risk, benefits were reviewed.  Accepted recommendations were ordered.  Otherwise, patient/parent declined.    Health Maintenance Due   Topic Date Due    ADVANCE CARE PLANNING  Never done    EYE EXAM  Never done    COVID-19 Vaccine (5 - 2023-24 season) 09/01/2023    HEPATITIS B IMMUNIZATION (3 of 5 - Risk Dialysis 4-dose series) 11/02/2023    A1C  11/28/2023    ZOSTER IMMUNIZATION (2 of 2) 08/21/2023       The 10 point review of system is negative except as stated in the HPI.    Allergies were reviewed and updated.    Objective:   /60   Pulse 62   Temp 97.2  F (36.2  C) (Temporal)   Resp 16   Ht 1.615 m (5' 3.58\")   Wt 66.2 kg (146 lb 0.6 oz)   SpO2 100%   BMI 25.40 kg/m    General: Active, alert, " nontoxic-appearing.  No acute distress.  HEENT: Normocephalic, atraumatic.  Pupils are equal and round.  Sclera is clear.  Normal external ears. Nares patent.  Moist mucous membranes.    Cardiac: RRR.  S1, S2 present.  No murmurs, rubs, or gallops.  Respiratory/chest: Clear to auscultation bilaterally.  No wheezes, rales, rhonchi.  Breathing is not labored.  No accessory muscle usage.  Extremities: Voluntary movements intact.  Integumentary: No concerning rash or skin changes appreciated.        Pal Cooper MD  [facility] [place] [address]  Phone: [phone]  Fax: [phone]    12/31/2023  8:44 AM          Current Outpatient Medications   Medication    acetaminophen (TYLENOL) 325 MG tablet    Alcohol Swabs (ALCOHOL PREP) 70 % PADS    amitriptyline (ELAVIL) 25 MG tablet    amLODIPine (NORVASC) 10 MG tablet    aspirin (ASA) 81 MG EC tablet    atorvastatin (LIPITOR) 20 MG tablet    blood glucose (NO BRAND SPECIFIED) test strip    blood glucose monitoring (ONE TOUCH ULTRA 2) meter device kit    carvedilol (COREG) 25 MG tablet    cycloSPORINE modified (GENERIC EQUIVALENT) 25 MG capsule    fluticasone (FLONASE) 50 MCG/ACT nasal spray    hydrALAZINE (APRESOLINE) 25 MG tablet    insulin aspart (NOVOLOG PEN) 100 UNIT/ML pen    insulin detemir (LEVEMIR FLEXTOUCH) 100 UNIT/ML pen    insulin pen needle (BD PEN NEEDLE ORESTES 2ND GEN) 32G X 4 MM miscellaneous    lisinopril (ZESTRIL) 10 MG tablet    loratadine (CLARITIN) 10 MG tablet    magnesium oxide (MAG-OX) 400 MG tablet    mycophenolate (GENERIC EQUIVALENT) 250 MG capsule    nitroGLYcerin (NITROSTAT) 0.4 MG sublingual tablet    sodium bicarbonate 650 MG tablet    sulfamethoxazole-trimethoprim (BACTRIM) 400-80 MG tablet    thin (NO BRAND SPECIFIED) lancets    Vitamin D3 (CHOLECALCIFEROL) 25 mcg (1000 units) tablet     No current facility-administered medications for this visit.       No Known Allergies    Patient Active Problem List     Diagnosis Date Noted    HOCM (hypertrophic obstructive cardiomyopathy) (H) 11/05/2023     Priority: Medium    Peripheral neuropathy 10/06/2023     Priority: Medium    BK viremia 08/28/2023     Priority: Medium    Aftercare following organ transplant 05/18/2023     Priority: Medium    Vitamin D deficiency 05/18/2023     Priority: Medium    Secondary renal hyperparathyroidism (H24) 05/18/2023     Priority: Medium    Immunosuppressed status (H24) 02/24/2023     Priority: Medium    Anemia in stage 3a chronic kidney disease (H) 02/24/2023     Priority: Medium    Hypomagnesemia 02/24/2023     Priority: Medium    Hepatitis B core antibody positive 02/24/2023     Priority: Medium    Kidney replaced by transplant 02/21/2023     Priority: Medium    Thrombocytopenia (H24) 06/02/2022     Priority: Medium    Coronary artery disease involving native coronary artery of native heart without angina pectoris 12/16/2021     Priority: Medium    Metabolic acidosis      Priority: Medium    HTN, kidney transplant related      Priority: Medium     Created by Conversion  Replacement Utility updated for latest IMO load        Type 2 diabetes mellitus without complication, with long-term current use of insulin (H)      Priority: Medium     Created by Conversion        Dyslipidemia, goal LDL below 70      Priority: Medium     Created by Conversion           Family History   Problem Relation Age of Onset    Diabetes Type 2  Mother     Other - See Comments Daughter         granular cell tumor of thigh    Heart Disease Other        Past Surgical History:   Procedure Laterality Date    BENCH KIDNEY  2/21/2023    Procedure: Bench kidney;  Surgeon: Talha Weinstein MD;  Location: [facility] OR    CV CORONARY ANGIOGRAM N/A 12/6/2021    Procedure: Coronary Angiogram;  Surgeon: Cristela Banks MD;  Location: [facility] CATH LAB CV    CV LEFT HEART CATH N/A 12/6/2021    Procedure: Left Heart Cath;  Surgeon: Cristela Banks MD;  Location: [facility] CATH LAB  CV    CV PCI N/A 12/6/2021    Procedure: Percutaneous Coronary Intervention;  Surgeon: Cristela Banks MD;  Location: [facility] CATH LAB CV    IR FINE NEEDLE ASPIRATION W ULTRASOUND  5/3/2023    IR RENAL BIOPSY RIGHT  5/3/2023    REMOVE CATHETER PERITONEAL N/A 2/21/2023    Procedure: Remove catheter peritoneal;  Surgeon: Talha Weinstein MD;  Location: [facility] OR        Social History     Socioeconomic History    Marital status: Patient Declined     Spouse name: Not on file    Number of children: Not on file    Years of education: Not on file    Highest education level: Not on file   Occupational History    Not on file   Tobacco Use    Smoking status: Never     Passive exposure: Never    Smokeless tobacco: Never   Vaping Use    Vaping Use: Never used   Substance and Sexual Activity    Alcohol use: Not Currently    Drug use: Never    Sexual activity: Not on file   Other Topics Concern    Parent/sibling w/ CABG, MI or angioplasty before 65F 55M? Not Asked   Social History Narrative    Not on file     Social Determinants of Health     Financial Resource Strain: Low Risk  (12/27/2023)    Financial Resource Strain     Within the past 12 months, have you or your family members you live with been unable to get utilities (heat, electricity) when it was really needed?: No   Food Insecurity: Low Risk  (12/27/2023)    Food Insecurity     Within the past 12 months, did you worry that your food would run out before you got money to buy more?: No     Within the past 12 months, did the food you bought just not last and you didn t have money to get more?: No   Transportation Needs: Low Risk  (12/27/2023)    Transportation Needs     Within the past 12 months, has lack of transportation kept you from medical appointments, getting your medicines, non-medical meetings or appointments, work, or from getting things that you need?: No   Physical Activity: Not on file   Stress: Not on file   Social Connections: Not on file   Interpersonal  Safety: Low Risk  (10/5/2023)    Interpersonal Safety     Do you feel physically and emotionally safe where you currently live?: Yes     Within the past 12 months, have you been hit, slapped, kicked or otherwise physically hurt by someone?: No     Within the past 12 months, have you been humiliated or emotionally abused in other ways by your partner or ex-partner?: No   Housing Stability: Low Risk  (12/27/2023)    Housing Stability     Do you have housing? : Yes     Are you worried about losing your housing?: No

## 2023-12-27 NOTE — PATIENT INSTRUCTIONS
-Thank you for choosing the [facility].  -It was a pleasure to see you today.  -Please take a look at the information below for more specific details regarding the treatment plan and recommendations.  -In this after visit summary is a list of your medications and specific instructions.  Please review this carefully as there may be changes made to your medication list.  -If there are any particular questions or concerns, please feel free to reach out to Dr. Cooper.  -If any labs have been completed, we will reach out to you about results.  If the results are normal or not concerning, a letter or MyChart message will be sent to you.  If any follow-up is needed, either Dr. Cooper or the nurse will give you a call.  If you have not heard regarding results after 2 weeks, please reach out to the clinic.    Patient Instructions:    -Continue taking the lisinopril 10 mg once daily.  -Continue taking the other blood pressure medications as you have been.  -Continue completing the labs as recommended by the transplant team.  -Keep following a low-salt diet.  Continue to stay active.    -Please take your medications as prescribed.  -Check the blood sugars as recommended to help monitor how your blood sugars are doing.  -Be sure to stay well-hydrated by drinking water each day to help your kidneys.  -Maintaining good blood sugar control will help your kidneys stay strong for longer.  -Be sure to complete an eye exam once a year to make sure your eyes are healthy.  -Check your feet a few times a week to monitor for any cuts/wounds or any changes.  If any concerning findings are noted, please return to clinic for reevaluation.  -If you feel unwell (such as dizziness, sweatiness, nausea, fast heartbeat, confused, etc.), be sure to check your blood sugar as it may be low (below 70 mg/dL).  If a low blood sugars noted, please eat or drink something sugary and repeat the blood sugar test in about 30 minutes.   Repeat until the blood sugar is above 70 mg/dL.  If blood sugars are persistently low, please seek immediate medical attention.    -Labs were ordered for the diabetes, though these labs to be completed at your visit on Friday with the transplant team.    Please seek immediate medical attention (go to the emergency room or urgent care) for the following reasons: worsening symptoms, or any concerning changes.      --------------------------------------------------------------------------------------------------------------------    -We are always looking for ways to improve.  You may be selected to receive a survey regarding your visit today.  We encourage you to complete the survey and provide specific, constructive feedback to help us improve our processes.  Thank you for your time!  -Please review the contact information listed on the after visit summary and in the electronic chart.  Below is the phone number that we have on file.  If there are any changes that are needed to be made, please reach out to the clinic.  [phone] (home)

## 2023-12-29 ENCOUNTER — MYC MEDICAL ADVICE (OUTPATIENT)
Dept: TRANSPLANT | Facility: CLINIC | Age: 58
End: 2023-12-29

## 2023-12-29 ENCOUNTER — OFFICE VISIT (OUTPATIENT)
Dept: TRANSPLANT | Facility: CLINIC | Age: 58
End: 2023-12-29
Attending: INTERNAL MEDICINE
Payer: COMMERCIAL

## 2023-12-29 ENCOUNTER — LAB (OUTPATIENT)
Dept: LAB | Facility: CLINIC | Age: 58
End: 2023-12-29
Payer: COMMERCIAL

## 2023-12-29 VITALS
HEART RATE: 61 BPM | HEIGHT: 64 IN | BODY MASS INDEX: 24.94 KG/M2 | DIASTOLIC BLOOD PRESSURE: 62 MMHG | WEIGHT: 146.1 LBS | SYSTOLIC BLOOD PRESSURE: 153 MMHG

## 2023-12-29 DIAGNOSIS — Z94.0 HTN, KIDNEY TRANSPLANT RELATED: ICD-10-CM

## 2023-12-29 DIAGNOSIS — Z94.0 KIDNEY REPLACED BY TRANSPLANT: Primary | ICD-10-CM

## 2023-12-29 DIAGNOSIS — E87.20 METABOLIC ACIDOSIS: ICD-10-CM

## 2023-12-29 DIAGNOSIS — D69.6 THROMBOCYTOPENIA (H): ICD-10-CM

## 2023-12-29 DIAGNOSIS — E55.9 VITAMIN D DEFICIENCY: ICD-10-CM

## 2023-12-29 DIAGNOSIS — Z29.89 NEED FOR PNEUMOCYSTIS PROPHYLAXIS: ICD-10-CM

## 2023-12-29 DIAGNOSIS — E11.9 TYPE 2 DIABETES MELLITUS WITHOUT COMPLICATION, WITH LONG-TERM CURRENT USE OF INSULIN (H): ICD-10-CM

## 2023-12-29 DIAGNOSIS — Z94.0 KIDNEY REPLACED BY TRANSPLANT: ICD-10-CM

## 2023-12-29 DIAGNOSIS — Z48.298 AFTERCARE FOLLOWING ORGAN TRANSPLANT: ICD-10-CM

## 2023-12-29 DIAGNOSIS — E83.42 HYPOMAGNESEMIA: ICD-10-CM

## 2023-12-29 DIAGNOSIS — Z79.4 TYPE 2 DIABETES MELLITUS WITHOUT COMPLICATION, WITH LONG-TERM CURRENT USE OF INSULIN (H): ICD-10-CM

## 2023-12-29 DIAGNOSIS — D63.1 ANEMIA IN STAGE 3A CHRONIC KIDNEY DISEASE (H): ICD-10-CM

## 2023-12-29 DIAGNOSIS — N18.31 STAGE 3A CHRONIC KIDNEY DISEASE (H): ICD-10-CM

## 2023-12-29 DIAGNOSIS — N18.31 ANEMIA IN STAGE 3A CHRONIC KIDNEY DISEASE (H): ICD-10-CM

## 2023-12-29 DIAGNOSIS — I15.1 HTN, KIDNEY TRANSPLANT RELATED: ICD-10-CM

## 2023-12-29 DIAGNOSIS — D84.9 IMMUNOSUPPRESSED STATUS (H): ICD-10-CM

## 2023-12-29 DIAGNOSIS — B34.8 BK VIREMIA: ICD-10-CM

## 2023-12-29 LAB
ALBUMIN MFR UR ELPH: <6 MG/DL
ANION GAP SERPL CALCULATED.3IONS-SCNC: 9 MMOL/L (ref 7–15)
BUN SERPL-MCNC: 34.1 MG/DL (ref 8–23)
CALCIUM SERPL-MCNC: 9.8 MG/DL (ref 8.6–10)
CHLORIDE SERPL-SCNC: 102 MMOL/L (ref 98–107)
CREAT SERPL-MCNC: 1.62 MG/DL (ref 0.67–1.17)
CREAT UR-MCNC: 22.3 MG/DL
CREAT UR-MCNC: 23.2 MG/DL
CYCLOSPORINE BLD LC/MS/MS-MCNC: 103 UG/L (ref 50–400)
DEPRECATED HCO3 PLAS-SCNC: 20 MMOL/L (ref 22–29)
EGFRCR SERPLBLD CKD-EPI 2021: 49 ML/MIN/1.73M2
ERYTHROCYTE [DISTWIDTH] IN BLOOD BY AUTOMATED COUNT: 15.2 % (ref 10–15)
GLUCOSE SERPL-MCNC: 127 MG/DL (ref 70–99)
HBA1C MFR BLD: 6.5 %
HCT VFR BLD AUTO: 31.9 % (ref 40–53)
HGB BLD-MCNC: 10.9 G/DL (ref 13.3–17.7)
MAGNESIUM SERPL-MCNC: 2.3 MG/DL (ref 1.7–2.3)
MCH RBC QN AUTO: 27.3 PG (ref 26.5–33)
MCHC RBC AUTO-ENTMCNC: 34.2 G/DL (ref 31.5–36.5)
MCV RBC AUTO: 80 FL (ref 78–100)
MICROALBUMIN UR-MCNC: 25 MG/L
MICROALBUMIN/CREAT UR: 107.76 MG/G CR (ref 0–17)
MYCOPHENOLATE SERPL LC/MS/MS-MCNC: 1.12 MG/L (ref 1–3.5)
MYCOPHENOLATE-G SERPL LC/MS/MS-MCNC: 75.4 MG/L (ref 30–95)
PLATELET # BLD AUTO: 101 10E3/UL (ref 150–450)
POTASSIUM SERPL-SCNC: 5.7 MMOL/L (ref 3.4–5.3)
PROT/CREAT 24H UR: NORMAL MG/G{CREAT}
RBC # BLD AUTO: 3.99 10E6/UL (ref 4.4–5.9)
SODIUM SERPL-SCNC: 131 MMOL/L (ref 135–145)
TME LAST DOSE: NORMAL H
WBC # BLD AUTO: 5.4 10E3/UL (ref 4–11)

## 2023-12-29 PROCEDURE — 84156 ASSAY OF PROTEIN URINE: CPT | Performed by: PATHOLOGY

## 2023-12-29 PROCEDURE — 99214 OFFICE O/P EST MOD 30 MIN: CPT | Performed by: INTERNAL MEDICINE

## 2023-12-29 PROCEDURE — 80158 DRUG ASSAY CYCLOSPORINE: CPT | Performed by: INTERNAL MEDICINE

## 2023-12-29 PROCEDURE — 99000 SPECIMEN HANDLING OFFICE-LAB: CPT | Performed by: PATHOLOGY

## 2023-12-29 PROCEDURE — G0463 HOSPITAL OUTPT CLINIC VISIT: HCPCS | Performed by: INTERNAL MEDICINE

## 2023-12-29 PROCEDURE — 86833 HLA CLASS II HIGH DEFIN QUAL: CPT | Performed by: INTERNAL MEDICINE

## 2023-12-29 PROCEDURE — 83036 HEMOGLOBIN GLYCOSYLATED A1C: CPT | Performed by: FAMILY MEDICINE

## 2023-12-29 PROCEDURE — 87799 DETECT AGENT NOS DNA QUANT: CPT | Performed by: INTERNAL MEDICINE

## 2023-12-29 PROCEDURE — 82043 UR ALBUMIN QUANTITATIVE: CPT | Performed by: FAMILY MEDICINE

## 2023-12-29 PROCEDURE — 86832 HLA CLASS I HIGH DEFIN QUAL: CPT | Performed by: INTERNAL MEDICINE

## 2023-12-29 PROCEDURE — 83735 ASSAY OF MAGNESIUM: CPT | Performed by: PATHOLOGY

## 2023-12-29 PROCEDURE — 80048 BASIC METABOLIC PNL TOTAL CA: CPT | Performed by: PATHOLOGY

## 2023-12-29 PROCEDURE — 85027 COMPLETE CBC AUTOMATED: CPT | Performed by: PATHOLOGY

## 2023-12-29 PROCEDURE — 80180 DRUG SCRN QUAN MYCOPHENOLATE: CPT | Performed by: INTERNAL MEDICINE

## 2023-12-29 PROCEDURE — 36415 COLL VENOUS BLD VENIPUNCTURE: CPT | Performed by: PATHOLOGY

## 2023-12-29 ASSESSMENT — PAIN SCALES - GENERAL: PAINLEVEL: NO PAIN (0)

## 2023-12-29 NOTE — LETTER
12/29/2023         RE: Modesto Barbosa  [address] [place] [address]        Dear Colleague,    Thank you for referring your patient, Modesto Barbosa, to the [facility]. Please see a copy of my visit note below.    TRANSPLANT NEPHROLOGY CHRONIC POST TRANSPLANT VISIT    Assessment & Plan  # LDKT: Stable creatinine in the ~ 1.5-1.8 range over the last 4 months, but higher than previous baseline closer to ~ 1.2-1.5.  Kidney transplant biopsy 5/2023 showed no acute rejection.  Patient likely has some damage from BK viremia with levels over a half million previously.  If creatinine increases to 2 or above, would consider a repeat kidney transplant biopsy.   - Baseline Creatinine: ~ 1.5-1.8   - Proteinuria: Minimal (0.2-0.5 grams),    - Date DSA Last Checked: Nov/2023      Latest DSA: No cPRA: 27%   - BK Viremia: Yes, mildly elevated (1-10K)   - Kidney Tx Biopsy: May 03, 2023; Result: No diagnostic evidence of acute rejection.   Acute tubular injury.  Severe arterial and arteriolar sclerosis.    # Immunosuppression: Cyclosporine (goal 100-125) and Mycophenolate mofetil (dose 500 mg every 12 hours)   - On lower immunosuppression with stopping mycophenolate and changed tacrolimus to cyclosporine due to significantly elevated BK viremia.   - Continue with intensive monitoring of immunosuppression for efficacy and toxicity.   - Changes: Not at this time    # Infection Prophylaxis:   - PJP: Sulfa/TMP (Bactrim)    # Hypertension: Borderline control, but variable;  Goal BP: < 130/80   - Volume status: Euvolemic   - Changes: Not at this time due to variability and some low BP.    # Diabetes: Controlled (HbA1c <7%) Last HbA1c: 5.8%   - Management as per primary care    # Anemia in Chronic Renal Disease: Hgb: Stable      NERY: No   - Iron studies: Low iron saturation, but high ferritin    # Thrombocytopenia: Stable, mildly low platelet level in the ~ 100-140s.  Patient does have splenomegaly.    # Mineral  Bone Disorder:   - Secondary renal hyperparathyroidism; PTH level: Normal (15-65 pg/ml)        On treatment: None  - Vitamin D; level: Normal        On supplement: Yes  - Calcium; level: Normal        On supplement: No    # Electrolytes:   - Potassium; level: High normal        On supplement: No  - Magnesium; level: Normal        On supplement: Yes  - Bicarbonate; level: Normal        On supplement: Yes    # BK Viremia: Trend down, mildly elevated BK PCR at ~ 3300 with last check Dec/2024.  This is down from peak BK PCR at ~ 560K in Sep/2023.  Normal IgG level.  Immunosuppression was significantly decreased and tacrolimus was changed to cyclosporine.  However, with trend down in BK, slight increase back up in mycophenolate mofetil to 500 mg bid.    # CAD, s/p PCI: Asymptomatic.    # Asymmetric Left Ventricular Hypertrophy (HOCM): Stable. No history of sustained ventricular arrhythmias.     # Hepatitis B Core Ab +: Patient with core Ab positive, but negative Ag and also negative surface Ab.  Hep B DNA was negative at time of transplant, as well as at 4 months post transplant.     # Peripheral Neuropathy: Slightly improved symptoms on amitriptyline.    # Skin Cancer Risk:    - Discussed sun protection and recommend regular follow up with Dermatology.    # Medical Compliance: Yes     # Skin Cancer Risk:    - Discussed sun protection and recommend regular follow up with Dermatology.    # Health Maintenance and Vaccination Review: Not Reviewed    # Transplant History:  Etiology of Kidney Failure: Diabetes mellitus type 2  Tx: LDKT  Transplant: 2/21/2023 (Kidney)  Significant changes in immunosuppression:  Decreased immunosuppression and tacrolimus changed to cyclosporine due to BK viremia.  Significant transplant-related complications: BK Viremia    Transplant Office Phone Number: [phone]    Assessment and plan was discussed with the patient and he voiced his understanding and agreement.    Return visit: Return in  about 2 months (around 2/29/2024).    Patrick Phan MD    Chief Complaint  Mr. Barboas is a 58 year old here for kidney transplant and immunosuppression management.     History of Present Illness   Mr. Barbosa reports feeling good overall with some medical complaints.  Since last clinic visit, patient reports no hospitalizations or new medical complaints and has been doing well overall.  His energy level is good and remains normal.  He is active and does get some exercise.  Denies any chest pain or shortness of breath with exertion.  No leg swelling.    Appetite is good and his weight is up a few pounds.  No nausea, vomiting or diarrhea.  No heartburn symptoms.  No fever, sweats or chills.  No night sweats.    Home BP:  120-130/50-70s, but variable from 100-150s systolic.   No lightheadedness.    Problem List  Patient Active Problem List   Diagnosis     Type 2 diabetes mellitus without complication, with long-term current use of insulin (H)     Dyslipidemia, goal LDL below 70     HTN, kidney transplant related     Metabolic acidosis     Coronary artery disease involving native coronary artery of native heart without angina pectoris     Thrombocytopenia (H24)     Immunosuppressed status (H24)     Kidney replaced by transplant     Anemia in chronic renal disease     Hypomagnesemia     Hepatitis B core antibody positive     Aftercare following organ transplant     Need for pneumocystis prophylaxis     Vitamin D deficiency     Secondary renal hyperparathyroidism (H24)     BK viremia     Peripheral neuropathy     HOCM (hypertrophic obstructive cardiomyopathy) (H)     Stage 3a chronic kidney disease (H)       Allergies  No Known Allergies      Medications  Current Outpatient Medications   Medication Sig     acetaminophen (TYLENOL) 325 MG tablet Take 2 tablets (650 mg) by mouth every 4 hours as needed for other (For optimal non-opioid multimodal pain management to improve pain control.)     Alcohol Swabs (ALCOHOL PREP)  "70 % PADS USE 5X PER DAY     amitriptyline (ELAVIL) 25 MG tablet Take 1 tablet (25 mg) by mouth at bedtime     amLODIPine (NORVASC) 10 MG tablet Take 1 tablet (10 mg) by mouth at bedtime     aspirin (ASA) 81 MG EC tablet Take 1 tablet (81 mg) by mouth daily     atorvastatin (LIPITOR) 20 MG tablet Take 1 tablet (20 mg) by mouth daily     blood glucose (NO BRAND SPECIFIED) test strip Use to test blood sugar 3 times daily or as directed. To accompany: Blood Glucose Monitor Brands: per insurance.     blood glucose monitoring (ONE TOUCH ULTRA 2) meter device kit USE TO TEST BLOOD SUGAR THREE TIMES DAILY OR AS DIRECTED.     carvedilol (COREG) 25 MG tablet Take 0.5 tablets (12.5 mg) by mouth 2 times daily (with meals)     cycloSPORINE modified (GENERIC EQUIVALENT) 25 MG capsule Total dose = 75 mg in the AM and 50 mg in the PM     fluticasone (FLONASE) 50 MCG/ACT nasal spray Spray 2 sprays into both nostrils daily     hydrALAZINE (APRESOLINE) 25 MG tablet Take 1 tablet (25 mg) by mouth 2 times daily     insulin aspart (NOVOLOG PEN) 100 UNIT/ML pen Before breaskfast and lunch, give 7 units. Before dinner give 9 units.     insulin detemir (LEVEMIR FLEXTOUCH) 100 UNIT/ML pen Inject 15-20 Units Subcutaneous At Bedtime     insulin pen needle (BD PEN NEEDLE ORESTES 2ND GEN) 32G X 4 MM miscellaneous USE TWICE DAILY     loratadine (CLARITIN) 10 MG tablet Take 1 tablet (10 mg) by mouth daily as needed for allergies (itchy/rash)     magnesium oxide (MAG-OX) 400 MG tablet Take 1 tablet (400 mg) by mouth 2 times daily     mycophenolate (GENERIC EQUIVALENT) 250 MG capsule Take 2 capsules (500 mg) by mouth 2 times daily     nitroGLYcerin (NITROSTAT) 0.4 MG sublingual tablet One tablet under the tongue every 5 minutes if needed for chest pain. May repeat every 5 minutes for a maximum of 3 doses in 15 minutes\"     sodium bicarbonate 650 MG tablet Take 2 tablets (1,300 mg) by mouth 2 times daily     sulfamethoxazole-trimethoprim (BACTRIM) " "400-80 MG tablet Take 1 tablet by mouth daily     thin (NO BRAND SPECIFIED) lancets Tests 3x daily Use with lanceting device. To accompany: Blood Glucose Monitor Brands: per insurance.     Vitamin D3 (CHOLECALCIFEROL) 25 mcg (1000 units) tablet Take 2 tablets (50 mcg) by mouth daily     lisinopril (ZESTRIL) 5 MG tablet Take 1 tablet (5 mg) by mouth daily     No current facility-administered medications for this visit.     There are no discontinued medications.      Physical Exam  Vital Signs: BP (!) 153/62   Pulse 61   Ht 1.626 m (5' 4\")   Wt 66.3 kg (146 lb 1.6 oz)   BMI 25.08 kg/m      GENERAL APPEARANCE: alert and no distress  HENT: mouth without ulcers or lesions  RESP: lungs clear to auscultation - no rales, rhonchi or wheezes  CV: regular rhythm, normal rate, no rub, 2/6 systolic murmur  EDEMA: trace LE edema bilaterally  ABDOMEN: soft, nondistended, nontender, bowel sounds normal  MS: extremities normal - no gross deformities noted, no evidence of inflammation in joints, no muscle tenderness  SKIN: no rash  TX KIDNEY: normal  DIALYSIS ACCESS: none    Data        Latest Ref Rng & Units 1/2/2024     9:11 AM 12/29/2023    10:41 AM 12/18/2023     8:56 AM   Renal   Sodium 135 - 145 mmol/L 137  131  135    K 3.4 - 5.3 mmol/L 4.7  5.7  5.0    Cl 98 - 107 mmol/L 103  102  102    Cl (external) 98 - 107 mmol/L 103  102  102    CO2 22 - 29 mmol/L 22  20  24    Urea Nitrogen 8.0 - 23.0 mg/dL 30.5  34.1  39.6    Creatinine 0.67 - 1.17 mg/dL 1.84  1.62  1.72    Glucose 70 - 99 mg/dL 104  127  125    Calcium 8.6 - 10.0 mg/dL 9.4  9.8  9.7    Magnesium 1.7 - 2.3 mg/dL  2.3           Latest Ref Rng & Units 10/30/2023     8:55 AM 8/7/2023     8:57 AM 7/31/2023     8:44 AM   Bone Health   Phosphorus 2.5 - 4.5 mg/dL 3.5  3.7  4.1          Latest Ref Rng & Units 1/2/2024     9:11 AM 12/29/2023    10:41 AM 12/18/2023     8:56 AM   Heme   WBC 4.0 - 11.0 10e3/uL 4.1  5.4  6.2    Hgb 13.3 - 17.7 g/dL 10.3  10.9  10.9    Plt 150 - " 450 10e3/uL 91  101  100          Latest Ref Rng & Units 8/28/2023     9:03 AM 4/7/2023     8:10 AM 4/3/2023     8:10 AM   Liver   AP 40 - 129 U/L 88  74  69    TBili <=1.2 mg/dL 0.5  0.7  0.8    Bilirubin Direct 0.00 - 0.30 mg/dL <0.20   0.22    ALT 0 - 70 U/L 17  10  12    AST 0 - 45 U/L 26  12  14    Tot Protein 6.4 - 8.3 g/dL 6.8  6.3  6.1    Albumin 3.5 - 5.0 g/dL  3.7     Albumin 3.5 - 5.2 g/dL 4.6   3.8          Latest Ref Rng & Units 12/29/2023    10:41 AM 8/28/2023     9:03 AM 6/26/2023     8:08 AM   Pancreas   A1C <5.7 % 6.5  5.8  7.5          Latest Ref Rng & Units 8/14/2023     9:12 AM 6/26/2023     8:08 AM 6/19/2023     9:45 AM   Iron studies   Iron 61 - 157 ug/dL 40  47  54    Iron Sat Index 15 - 46 % 22  28  31    Ferritin 31 - 409 ng/mL 412  545  540          Latest Ref Rng & Units 9/25/2023     9:09 AM 7/5/2023     7:43 AM 5/1/2023     7:15 AM   [facility] Txp Virology   EBV DNA COPIES/ML Not Detected copies/mL <500  <500  <500    EBV DNA LOG OF COPIES  <2.7  <2.7  <2.7        Recent Labs   Lab Test 08/28/23  0903 09/05/23  0912 09/11/23  0905   DOSTAC 8/27/2023 9/4/2023 9/10/2023   TACROL 6.9 6.5 3.8*     Recent Labs   Lab Test 11/13/23  0851 11/29/23  0927 12/29/23  1041   DOSMPA 11/12/2023   8:00 PM 11/28/2023   8:00 PM 12/28/2023   8:00 PM   MPACID <0.25* 0.57* 1.12   MPAG 8.6* 40.2 75.4         Again, thank you for allowing me to participate in the care of your patient.        Sincerely,        Patrick Phan MD

## 2023-12-29 NOTE — PATIENT INSTRUCTIONS
Patient Recommendations:  - No new recommendations at this time.    Transplant Patient Information  Your Post Transplant Coordinator is: Shalonda Roy  For non urgent items, we encourage you to contact your coordinator/care team online via [employer]  You and your care team can also contact your transplant coordinator Monday - Friday, 8am - 5pm at [phone] (Option 2 to reach the coordinator or Option 4 to schedule an appointment).  After hours for urgent matters, please call [facility] at [phone].

## 2023-12-29 NOTE — LETTER
12/29/2023      RE: Modesto JOE Barbosa  [address] [place] [address]       TRANSPLANT NEPHROLOGY CHRONIC POST TRANSPLANT VISIT    Assessment & Plan  # LDKT: Stable creatinine in the ~ 1.5-1.8 range over the last 4 months, but higher than previous baseline closer to ~ 1.2-1.5.  Kidney transplant biopsy 5/2023 showed no acute rejection.  Patient likely has some damage from BK viremia with levels over a half million previously.  If creatinine increases to 2 or above, would consider a repeat kidney transplant biopsy.   - Baseline Creatinine: ~ 1.5-1.8   - Proteinuria: Minimal (0.2-0.5 grams),    - Date DSA Last Checked: Nov/2023      Latest DSA: No cPRA: 27%   - BK Viremia: Yes, mildly elevated (1-10K)   - Kidney Tx Biopsy: May 03, 2023; Result: No diagnostic evidence of acute rejection.   Acute tubular injury.  Severe arterial and arteriolar sclerosis.    # Immunosuppression: Cyclosporine (goal 100-125) and Mycophenolate mofetil (dose 500 mg every 12 hours)   - On lower immunosuppression with stopping mycophenolate and changed tacrolimus to cyclosporine due to significantly elevated BK viremia.   - Continue with intensive monitoring of immunosuppression for efficacy and toxicity.   - Changes: Not at this time    # Infection Prophylaxis:   - PJP: Sulfa/TMP (Bactrim)    # Hypertension: Borderline control, but variable;  Goal BP: < 130/80   - Volume status: Euvolemic   - Changes: Not at this time due to variability and some low BP.    # Diabetes: Controlled (HbA1c <7%) Last HbA1c: 5.8%   - Management as per primary care    # Anemia in Chronic Renal Disease: Hgb: Stable      NERY: No   - Iron studies: Low iron saturation, but high ferritin    # Thrombocytopenia: Stable, mildly low platelet level in the ~ 100-140s.  Patient does have splenomegaly.    # Mineral Bone Disorder:   - Secondary renal hyperparathyroidism; PTH level: Normal (15-65 pg/ml)        On treatment: None  - Vitamin D; level: Normal        On supplement:  Yes  - Calcium; level: Normal        On supplement: No    # Electrolytes:   - Potassium; level: High normal        On supplement: No  - Magnesium; level: Normal        On supplement: Yes  - Bicarbonate; level: Normal        On supplement: Yes    # BK Viremia: Trend down, mildly elevated BK PCR at ~ 3300 with last check Dec/2024.  This is down from peak BK PCR at ~ 560K in Sep/2023.  Normal IgG level.  Immunosuppression was significantly decreased and tacrolimus was changed to cyclosporine.  However, with trend down in BK, slight increase back up in mycophenolate mofetil to 500 mg bid.    # CAD, s/p PCI: Asymptomatic.    # Asymmetric Left Ventricular Hypertrophy (HOCM): Stable. No history of sustained ventricular arrhythmias.     # Hepatitis B Core Ab +: Patient with core Ab positive, but negative Ag and also negative surface Ab.  Hep B DNA was negative at time of transplant, as well as at 4 months post transplant.     # Peripheral Neuropathy: Slightly improved symptoms on amitriptyline.    # Skin Cancer Risk:    - Discussed sun protection and recommend regular follow up with Dermatology.    # Medical Compliance: Yes     # Skin Cancer Risk:    - Discussed sun protection and recommend regular follow up with Dermatology.    # Health Maintenance and Vaccination Review: Not Reviewed    # Transplant History:  Etiology of Kidney Failure: Diabetes mellitus type 2  Tx: LDKT  Transplant: 2/21/2023 (Kidney)  Significant changes in immunosuppression:  Decreased immunosuppression and tacrolimus changed to cyclosporine due to BK viremia.  Significant transplant-related complications: BK Viremia    Transplant Office Phone Number: [phone]    Assessment and plan was discussed with the patient and he voiced his understanding and agreement.    Return visit: Return in about 2 months (around 2/29/2024).    Patrick Phan MD    Chief Complaint  Mr. Barbosa is a 58 year old here for kidney transplant and immunosuppression  management.     History of Present Illness   Mr. Barbosa reports feeling good overall with some medical complaints.  Since last clinic visit, patient reports no hospitalizations or new medical complaints and has been doing well overall.  His energy level is good and remains normal.  He is active and does get some exercise.  Denies any chest pain or shortness of breath with exertion.  No leg swelling.    Appetite is good and his weight is up a few pounds.  No nausea, vomiting or diarrhea.  No heartburn symptoms.  No fever, sweats or chills.  No night sweats.    Home BP:  120-130/50-70s, but variable from 100-150s systolic.   No lightheadedness.    Problem List  Patient Active Problem List   Diagnosis     Type 2 diabetes mellitus without complication, with long-term current use of insulin (H)     Dyslipidemia, goal LDL below 70     HTN, kidney transplant related     Metabolic acidosis     Coronary artery disease involving native coronary artery of native heart without angina pectoris     Thrombocytopenia (H24)     Immunosuppressed status (H24)     Kidney replaced by transplant     Anemia in chronic renal disease     Hypomagnesemia     Hepatitis B core antibody positive     Aftercare following organ transplant     Need for pneumocystis prophylaxis     Vitamin D deficiency     Secondary renal hyperparathyroidism (H24)     BK viremia     Peripheral neuropathy     HOCM (hypertrophic obstructive cardiomyopathy) (H)     Stage 3a chronic kidney disease (H)       Allergies  No Known Allergies      Medications  Current Outpatient Medications   Medication Sig     acetaminophen (TYLENOL) 325 MG tablet Take 2 tablets (650 mg) by mouth every 4 hours as needed for other (For optimal non-opioid multimodal pain management to improve pain control.)     Alcohol Swabs (ALCOHOL PREP) 70 % PADS USE 5X PER DAY     amitriptyline (ELAVIL) 25 MG tablet Take 1 tablet (25 mg) by mouth at bedtime     amLODIPine (NORVASC) 10 MG tablet Take 1 tablet  "(10 mg) by mouth at bedtime     aspirin (ASA) 81 MG EC tablet Take 1 tablet (81 mg) by mouth daily     atorvastatin (LIPITOR) 20 MG tablet Take 1 tablet (20 mg) by mouth daily     blood glucose (NO BRAND SPECIFIED) test strip Use to test blood sugar 3 times daily or as directed. To accompany: Blood Glucose Monitor Brands: per insurance.     blood glucose monitoring (ONE TOUCH ULTRA 2) meter device kit USE TO TEST BLOOD SUGAR THREE TIMES DAILY OR AS DIRECTED.     carvedilol (COREG) 25 MG tablet Take 0.5 tablets (12.5 mg) by mouth 2 times daily (with meals)     cycloSPORINE modified (GENERIC EQUIVALENT) 25 MG capsule Total dose = 75 mg in the AM and 50 mg in the PM     fluticasone (FLONASE) 50 MCG/ACT nasal spray Spray 2 sprays into both nostrils daily     hydrALAZINE (APRESOLINE) 25 MG tablet Take 1 tablet (25 mg) by mouth 2 times daily     insulin aspart (NOVOLOG PEN) 100 UNIT/ML pen Before breaskfast and lunch, give 7 units. Before dinner give 9 units.     insulin detemir (LEVEMIR FLEXTOUCH) 100 UNIT/ML pen Inject 15-20 Units Subcutaneous At Bedtime     insulin pen needle (BD PEN NEEDLE ORESTES 2ND GEN) 32G X 4 MM miscellaneous USE TWICE DAILY     loratadine (CLARITIN) 10 MG tablet Take 1 tablet (10 mg) by mouth daily as needed for allergies (itchy/rash)     magnesium oxide (MAG-OX) 400 MG tablet Take 1 tablet (400 mg) by mouth 2 times daily     mycophenolate (GENERIC EQUIVALENT) 250 MG capsule Take 2 capsules (500 mg) by mouth 2 times daily     nitroGLYcerin (NITROSTAT) 0.4 MG sublingual tablet One tablet under the tongue every 5 minutes if needed for chest pain. May repeat every 5 minutes for a maximum of 3 doses in 15 minutes\"     sodium bicarbonate 650 MG tablet Take 2 tablets (1,300 mg) by mouth 2 times daily     sulfamethoxazole-trimethoprim (BACTRIM) 400-80 MG tablet Take 1 tablet by mouth daily     thin (NO BRAND SPECIFIED) lancets Tests 3x daily Use with lanceting device. To accompany: Blood Glucose Monitor " "Brands: per insurance.     Vitamin D3 (CHOLECALCIFEROL) 25 mcg (1000 units) tablet Take 2 tablets (50 mcg) by mouth daily     lisinopril (ZESTRIL) 5 MG tablet Take 1 tablet (5 mg) by mouth daily     No current facility-administered medications for this visit.     There are no discontinued medications.      Physical Exam  Vital Signs: BP (!) 153/62   Pulse 61   Ht 1.626 m (5' 4\")   Wt 66.3 kg (146 lb 1.6 oz)   BMI 25.08 kg/m      GENERAL APPEARANCE: alert and no distress  HENT: mouth without ulcers or lesions  RESP: lungs clear to auscultation - no rales, rhonchi or wheezes  CV: regular rhythm, normal rate, no rub, 2/6 systolic murmur  EDEMA: trace LE edema bilaterally  ABDOMEN: soft, nondistended, nontender, bowel sounds normal  MS: extremities normal - no gross deformities noted, no evidence of inflammation in joints, no muscle tenderness  SKIN: no rash  TX KIDNEY: normal  DIALYSIS ACCESS: none    Data        Latest Ref Rng & Units 1/2/2024     9:11 AM 12/29/2023    10:41 AM 12/18/2023     8:56 AM   Renal   Sodium 135 - 145 mmol/L 137  131  135    K 3.4 - 5.3 mmol/L 4.7  5.7  5.0    Cl 98 - 107 mmol/L 103  102  102    Cl (external) 98 - 107 mmol/L 103  102  102    CO2 22 - 29 mmol/L 22  20  24    Urea Nitrogen 8.0 - 23.0 mg/dL 30.5  34.1  39.6    Creatinine 0.67 - 1.17 mg/dL 1.84  1.62  1.72    Glucose 70 - 99 mg/dL 104  127  125    Calcium 8.6 - 10.0 mg/dL 9.4  9.8  9.7    Magnesium 1.7 - 2.3 mg/dL  2.3           Latest Ref Rng & Units 10/30/2023     8:55 AM 8/7/2023     8:57 AM 7/31/2023     8:44 AM   Bone Health   Phosphorus 2.5 - 4.5 mg/dL 3.5  3.7  4.1          Latest Ref Rng & Units 1/2/2024     9:11 AM 12/29/2023    10:41 AM 12/18/2023     8:56 AM   Heme   WBC 4.0 - 11.0 10e3/uL 4.1  5.4  6.2    Hgb 13.3 - 17.7 g/dL 10.3  10.9  10.9    Plt 150 - 450 10e3/uL 91  101  100          Latest Ref Rng & Units 8/28/2023     9:03 AM 4/7/2023     8:10 AM 4/3/2023     8:10 AM   Liver   AP 40 - 129 U/L 88  74  69  "   TBili <=1.2 mg/dL 0.5  0.7  0.8    Bilirubin Direct 0.00 - 0.30 mg/dL <0.20   0.22    ALT 0 - 70 U/L 17  10  12    AST 0 - 45 U/L 26  12  14    Tot Protein 6.4 - 8.3 g/dL 6.8  6.3  6.1    Albumin 3.5 - 5.0 g/dL  3.7     Albumin 3.5 - 5.2 g/dL 4.6   3.8          Latest Ref Rng & Units 12/29/2023    10:41 AM 8/28/2023     9:03 AM 6/26/2023     8:08 AM   Pancreas   A1C <5.7 % 6.5  5.8  7.5          Latest Ref Rng & Units 8/14/2023     9:12 AM 6/26/2023     8:08 AM 6/19/2023     9:45 AM   Iron studies   Iron 61 - 157 ug/dL 40  47  54    Iron Sat Index 15 - 46 % 22  28  31    Ferritin 31 - 409 ng/mL 412  545  540          Latest Ref Rng & Units 9/25/2023     9:09 AM 7/5/2023     7:43 AM 5/1/2023     7:15 AM   [facility] Txp Virology   EBV DNA COPIES/ML Not Detected copies/mL <500  <500  <500    EBV DNA LOG OF COPIES  <2.7  <2.7  <2.7        Recent Labs   Lab Test 08/28/23  0903 09/05/23  0912 09/11/23  0905   DOSTAC 8/27/2023 9/4/2023 9/10/2023   TACROL 6.9 6.5 3.8*     Recent Labs   Lab Test 11/13/23  0851 11/29/23  0927 12/29/23  1041   DOSMPA 11/12/2023   8:00 PM 11/28/2023   8:00 PM 12/28/2023   8:00 PM   MPACID <0.25* 0.57* 1.12   MPAG 8.6* 40.2 75.4       Patrick Phan MD

## 2023-12-29 NOTE — NURSING NOTE
"Chief Complaint   Patient presents with    RECHECK     Follow up with kidney transplant     BP (!) 153/62   Pulse 61   Ht 1.626 m (5' 4\")   Wt 66.3 kg (146 lb 1.6 oz)   BMI 25.08 kg/m    Meena Phillips, Lead CMA  12/29/2023 10:59 AM    " 10

## 2024-01-02 ENCOUNTER — LAB (OUTPATIENT)
Dept: LAB | Facility: HOSPITAL | Age: 59
End: 2024-01-02
Payer: COMMERCIAL

## 2024-01-02 DIAGNOSIS — Z94.0 KIDNEY REPLACED BY TRANSPLANT: ICD-10-CM

## 2024-01-02 PROBLEM — N18.31 STAGE 3A CHRONIC KIDNEY DISEASE (H): Status: ACTIVE | Noted: 2024-01-02

## 2024-01-02 PROBLEM — Z29.89 NEED FOR PNEUMOCYSTIS PROPHYLAXIS: Status: ACTIVE | Noted: 2023-05-18

## 2024-01-02 PROBLEM — D63.1 ANEMIA IN CHRONIC RENAL DISEASE: Status: ACTIVE | Noted: 2023-02-24

## 2024-01-02 PROBLEM — N18.9 ANEMIA IN CHRONIC RENAL DISEASE: Status: ACTIVE | Noted: 2023-02-24

## 2024-01-02 LAB
ALBUMIN MFR UR ELPH: 11.1 MG/DL
ANION GAP SERPL CALCULATED.3IONS-SCNC: 12 MMOL/L (ref 7–15)
BK VIRUS DNA COPIES/ML, INSTRUMENT: 3824 COPIES/ML
BKV DNA SPEC NAA+PROBE-LOG#: 3.6 {LOG_COPIES}/ML
BUN SERPL-MCNC: 30.5 MG/DL (ref 8–23)
CALCIUM SERPL-MCNC: 9.4 MG/DL (ref 8.6–10)
CHLORIDE SERPL-SCNC: 103 MMOL/L (ref 98–107)
CREAT SERPL-MCNC: 1.84 MG/DL (ref 0.67–1.17)
CREAT UR-MCNC: 72.9 MG/DL
CYCLOSPORINE BLD LC/MS/MS-MCNC: 123 UG/L (ref 50–400)
DEPRECATED HCO3 PLAS-SCNC: 22 MMOL/L (ref 22–29)
EGFRCR SERPLBLD CKD-EPI 2021: 42 ML/MIN/1.73M2
ERYTHROCYTE [DISTWIDTH] IN BLOOD BY AUTOMATED COUNT: 15.4 % (ref 10–15)
GLUCOSE SERPL-MCNC: 104 MG/DL (ref 70–99)
HCT VFR BLD AUTO: 29.8 % (ref 40–53)
HGB BLD-MCNC: 10.3 G/DL (ref 13.3–17.7)
MCH RBC QN AUTO: 27.5 PG (ref 26.5–33)
MCHC RBC AUTO-ENTMCNC: 34.6 G/DL (ref 31.5–36.5)
MCV RBC AUTO: 80 FL (ref 78–100)
PLATELET # BLD AUTO: 91 10E3/UL (ref 150–450)
POTASSIUM SERPL-SCNC: 4.7 MMOL/L (ref 3.4–5.3)
PROT/CREAT 24H UR: 0.15 MG/MG CR (ref 0–0.2)
RBC # BLD AUTO: 3.75 10E6/UL (ref 4.4–5.9)
SODIUM SERPL-SCNC: 137 MMOL/L (ref 135–145)
TME LAST DOSE: NORMAL H
TME LAST DOSE: NORMAL H
WBC # BLD AUTO: 4.1 10E3/UL (ref 4–11)

## 2024-01-02 PROCEDURE — 36415 COLL VENOUS BLD VENIPUNCTURE: CPT

## 2024-01-02 PROCEDURE — 85027 COMPLETE CBC AUTOMATED: CPT

## 2024-01-02 PROCEDURE — 84156 ASSAY OF PROTEIN URINE: CPT

## 2024-01-02 PROCEDURE — 80158 DRUG ASSAY CYCLOSPORINE: CPT

## 2024-01-02 PROCEDURE — 87799 DETECT AGENT NOS DNA QUANT: CPT

## 2024-01-02 PROCEDURE — 80048 BASIC METABOLIC PNL TOTAL CA: CPT

## 2024-01-02 RX ORDER — LISINOPRIL 5 MG/1
5 TABLET ORAL DAILY
Qty: 90 TABLET | Refills: 3 | Status: SHIPPED | OUTPATIENT
Start: 2024-01-02 | End: 2024-01-21

## 2024-01-02 NOTE — PROGRESS NOTES
TRANSPLANT NEPHROLOGY CHRONIC POST TRANSPLANT VISIT    Assessment & Plan   # LDKT: Stable creatinine in the ~ 1.5-1.8 range over the last 4 months, but higher than previous baseline closer to ~ 1.2-1.5.  Kidney transplant biopsy 5/2023 showed no acute rejection.  Patient likely has some damage from BK viremia with levels over a half million previously.  If creatinine increases to 2 or above, would consider a repeat kidney transplant biopsy.   - Baseline Creatinine: ~ 1.5-1.8   - Proteinuria: Minimal (0.2-0.5 grams),    - Date DSA Last Checked: Nov/2023      Latest DSA: No cPRA: 27%   - BK Viremia: Yes, mildly elevated (1-10K)   - Kidney Tx Biopsy: May 03, 2023; Result: No diagnostic evidence of acute rejection.   Acute tubular injury.  Severe arterial and arteriolar sclerosis.    # Immunosuppression: Cyclosporine (goal 100-125) and Mycophenolate mofetil (dose 500 mg every 12 hours)   - On lower immunosuppression with stopping mycophenolate and changed tacrolimus to cyclosporine due to significantly elevated BK viremia.   - Continue with intensive monitoring of immunosuppression for efficacy and toxicity.   - Changes: Not at this time    # Infection Prophylaxis:   - PJP: Sulfa/TMP (Bactrim)    # Hypertension: Borderline control, but variable;  Goal BP: < 130/80   - Volume status: Euvolemic   - Changes: Not at this time due to variability and some low BP.    # Diabetes: Controlled (HbA1c <7%) Last HbA1c: 5.8%   - Management as per primary care    # Anemia in Chronic Renal Disease: Hgb: Stable      NERY: No   - Iron studies: Low iron saturation, but high ferritin    # Thrombocytopenia: Stable, mildly low platelet level in the ~ 100-140s.  Patient does have splenomegaly.    # Mineral Bone Disorder:   - Secondary renal hyperparathyroidism; PTH level: Normal (15-65 pg/ml)        On treatment: None  - Vitamin D; level: Normal        On supplement: Yes  - Calcium; level: Normal        On supplement: No    # Electrolytes:   -  Potassium; level: High normal        On supplement: No  - Magnesium; level: Normal        On supplement: Yes  - Bicarbonate; level: Normal        On supplement: Yes    # BK Viremia: Trend down, mildly elevated BK PCR at ~ 3300 with last check Dec/2024.  This is down from peak BK PCR at ~ 560K in Sep/2023.  Normal IgG level.  Immunosuppression was significantly decreased and tacrolimus was changed to cyclosporine.  However, with trend down in BK, slight increase back up in mycophenolate mofetil to 500 mg bid.    # CAD, s/p PCI: Asymptomatic.    # Asymmetric Left Ventricular Hypertrophy (HOCM): Stable. No history of sustained ventricular arrhythmias.     # Hepatitis B Core Ab +: Patient with core Ab positive, but negative Ag and also negative surface Ab.  Hep B DNA was negative at time of transplant, as well as at 4 months post transplant.     # Peripheral Neuropathy: Slightly improved symptoms on amitriptyline.    # Skin Cancer Risk:    - Discussed sun protection and recommend regular follow up with Dermatology.    # Medical Compliance: Yes     # Skin Cancer Risk:    - Discussed sun protection and recommend regular follow up with Dermatology.    # Health Maintenance and Vaccination Review: Not Reviewed    # Transplant History:  Etiology of Kidney Failure: Diabetes mellitus type 2  Tx: LDKT  Transplant: 2/21/2023 (Kidney)  Significant changes in immunosuppression:  Decreased immunosuppression and tacrolimus changed to cyclosporine due to BK viremia.  Significant transplant-related complications: BK Viremia    Transplant Office Phone Number: [phone]    Assessment and plan was discussed with the patient and he voiced his understanding and agreement.    Return visit: Return in about 2 months (around 2/29/2024).    Patrick Phan MD    Chief Complaint   Mr. Barbosa is a 58 year old here for kidney transplant and immunosuppression management.     History of Present Illness    Mr. Barbosa reports feeling good  overall with some medical complaints.  Since last clinic visit, patient reports no hospitalizations or new medical complaints and has been doing well overall.  His energy level is good and remains normal.  He is active and does get some exercise.  Denies any chest pain or shortness of breath with exertion.  No leg swelling.    Appetite is good and his weight is up a few pounds.  No nausea, vomiting or diarrhea.  No heartburn symptoms.  No fever, sweats or chills.  No night sweats.    Home BP:  120-130/50-70s, but variable from 100-150s systolic.   No lightheadedness.    Problem List   Patient Active Problem List   Diagnosis    Type 2 diabetes mellitus without complication, with long-term current use of insulin (H)    Dyslipidemia, goal LDL below 70    HTN, kidney transplant related    Metabolic acidosis    Coronary artery disease involving native coronary artery of native heart without angina pectoris    Thrombocytopenia (H24)    Immunosuppressed status (H24)    Kidney replaced by transplant    Anemia in chronic renal disease    Hypomagnesemia    Hepatitis B core antibody positive    Aftercare following organ transplant    Need for pneumocystis prophylaxis    Vitamin D deficiency    Secondary renal hyperparathyroidism (H24)    BK viremia    Peripheral neuropathy    HOCM (hypertrophic obstructive cardiomyopathy) (H)    Stage 3a chronic kidney disease (H)       Allergies   No Known Allergies      Medications   Current Outpatient Medications   Medication Sig    acetaminophen (TYLENOL) 325 MG tablet Take 2 tablets (650 mg) by mouth every 4 hours as needed for other (For optimal non-opioid multimodal pain management to improve pain control.)    Alcohol Swabs (ALCOHOL PREP) 70 % PADS USE 5X PER DAY    amitriptyline (ELAVIL) 25 MG tablet Take 1 tablet (25 mg) by mouth at bedtime    amLODIPine (NORVASC) 10 MG tablet Take 1 tablet (10 mg) by mouth at bedtime    aspirin (ASA) 81 MG EC tablet Take 1 tablet (81 mg) by mouth  "daily    atorvastatin (LIPITOR) 20 MG tablet Take 1 tablet (20 mg) by mouth daily    blood glucose (NO BRAND SPECIFIED) test strip Use to test blood sugar 3 times daily or as directed. To accompany: Blood Glucose Monitor Brands: per insurance.    blood glucose monitoring (ONE TOUCH ULTRA 2) meter device kit USE TO TEST BLOOD SUGAR THREE TIMES DAILY OR AS DIRECTED.    carvedilol (COREG) 25 MG tablet Take 0.5 tablets (12.5 mg) by mouth 2 times daily (with meals)    cycloSPORINE modified (GENERIC EQUIVALENT) 25 MG capsule Total dose = 75 mg in the AM and 50 mg in the PM    fluticasone (FLONASE) 50 MCG/ACT nasal spray Spray 2 sprays into both nostrils daily    hydrALAZINE (APRESOLINE) 25 MG tablet Take 1 tablet (25 mg) by mouth 2 times daily    insulin aspart (NOVOLOG PEN) 100 UNIT/ML pen Before breaskfast and lunch, give 7 units. Before dinner give 9 units.    insulin detemir (LEVEMIR FLEXTOUCH) 100 UNIT/ML pen Inject 15-20 Units Subcutaneous At Bedtime    insulin pen needle (BD PEN NEEDLE ORESTES 2ND GEN) 32G X 4 MM miscellaneous USE TWICE DAILY    loratadine (CLARITIN) 10 MG tablet Take 1 tablet (10 mg) by mouth daily as needed for allergies (itchy/rash)    magnesium oxide (MAG-OX) 400 MG tablet Take 1 tablet (400 mg) by mouth 2 times daily    mycophenolate (GENERIC EQUIVALENT) 250 MG capsule Take 2 capsules (500 mg) by mouth 2 times daily    nitroGLYcerin (NITROSTAT) 0.4 MG sublingual tablet One tablet under the tongue every 5 minutes if needed for chest pain. May repeat every 5 minutes for a maximum of 3 doses in 15 minutes\"    sodium bicarbonate 650 MG tablet Take 2 tablets (1,300 mg) by mouth 2 times daily    sulfamethoxazole-trimethoprim (BACTRIM) 400-80 MG tablet Take 1 tablet by mouth daily    thin (NO BRAND SPECIFIED) lancets Tests 3x daily Use with lanceting device. To accompany: Blood Glucose Monitor Brands: per insurance.    Vitamin D3 (CHOLECALCIFEROL) 25 mcg (1000 units) tablet Take 2 tablets (50 mcg) by " "mouth daily    lisinopril (ZESTRIL) 5 MG tablet Take 1 tablet (5 mg) by mouth daily     No current facility-administered medications for this visit.     There are no discontinued medications.      Physical Exam   Vital Signs: BP (!) 153/62   Pulse 61   Ht 1.626 m (5' 4\")   Wt 66.3 kg (146 lb 1.6 oz)   BMI 25.08 kg/m      GENERAL APPEARANCE: alert and no distress  HENT: mouth without ulcers or lesions  RESP: lungs clear to auscultation - no rales, rhonchi or wheezes  CV: regular rhythm, normal rate, no rub, 2/6 systolic murmur  EDEMA: trace LE edema bilaterally  ABDOMEN: soft, nondistended, nontender, bowel sounds normal  MS: extremities normal - no gross deformities noted, no evidence of inflammation in joints, no muscle tenderness  SKIN: no rash  TX KIDNEY: normal  DIALYSIS ACCESS: none    Data         Latest Ref Rng & Units 1/2/2024     9:11 AM 12/29/2023    10:41 AM 12/18/2023     8:56 AM   Renal   Sodium 135 - 145 mmol/L 137  131  135    K 3.4 - 5.3 mmol/L 4.7  5.7  5.0    Cl 98 - 107 mmol/L 103  102  102    Cl (external) 98 - 107 mmol/L 103  102  102    CO2 22 - 29 mmol/L 22  20  24    Urea Nitrogen 8.0 - 23.0 mg/dL 30.5  34.1  39.6    Creatinine 0.67 - 1.17 mg/dL 1.84  1.62  1.72    Glucose 70 - 99 mg/dL 104  127  125    Calcium 8.6 - 10.0 mg/dL 9.4  9.8  9.7    Magnesium 1.7 - 2.3 mg/dL  2.3           Latest Ref Rng & Units 10/30/2023     8:55 AM 8/7/2023     8:57 AM 7/31/2023     8:44 AM   Bone Health   Phosphorus 2.5 - 4.5 mg/dL 3.5  3.7  4.1          Latest Ref Rng & Units 1/2/2024     9:11 AM 12/29/2023    10:41 AM 12/18/2023     8:56 AM   Heme   WBC 4.0 - 11.0 10e3/uL 4.1  5.4  6.2    Hgb 13.3 - 17.7 g/dL 10.3  10.9  10.9    Plt 150 - 450 10e3/uL 91  101  100          Latest Ref Rng & Units 8/28/2023     9:03 AM 4/7/2023     8:10 AM 4/3/2023     8:10 AM   Liver   AP 40 - 129 U/L 88  74  69    TBili <=1.2 mg/dL 0.5  0.7  0.8    Bilirubin Direct 0.00 - 0.30 mg/dL <0.20   0.22    ALT 0 - 70 U/L 17  10  " 12    AST 0 - 45 U/L 26 12 14    Tot Protein 6.4 - 8.3 g/dL 6.8  6.3  6.1    Albumin 3.5 - 5.0 g/dL  3.7     Albumin 3.5 - 5.2 g/dL 4.6   3.8          Latest Ref Rng & Units 12/29/2023    10:41 AM 8/28/2023     9:03 AM 6/26/2023     8:08 AM   Pancreas   A1C <5.7 % 6.5  5.8  7.5          Latest Ref Rng & Units 8/14/2023     9:12 AM 6/26/2023     8:08 AM 6/19/2023     9:45 AM   Iron studies   Iron 61 - 157 ug/dL 40  47  54    Iron Sat Index 15 - 46 % 22  28  31    Ferritin 31 - 409 ng/mL 412  545  540          Latest Ref Rng & Units 9/25/2023     9:09 AM 7/5/2023     7:43 AM 5/1/2023     7:15 AM   [facility] Txp Virology   EBV DNA COPIES/ML Not Detected copies/mL <500  <500  <500    EBV DNA LOG OF COPIES  <2.7  <2.7  <2.7        Recent Labs   Lab Test 08/28/23  0903 09/05/23  0912 09/11/23  0905   DOSTAC 8/27/2023 9/4/2023 9/10/2023   TACROL 6.9 6.5 3.8*     Recent Labs   Lab Test 11/13/23  0851 11/29/23  0927 12/29/23  1041   DOSMPA 11/12/2023   8:00 PM 11/28/2023   8:00 PM 12/28/2023   8:00 PM   MPACID <0.25* 0.57* 1.12   MPAG 8.6* 40.2 75.4

## 2024-01-02 NOTE — TELEPHONE ENCOUNTER
Modesto Barbosa Mary K, RN  Phone Number: [phone]     I received the message and I can make the changes to his Lisinopril.  He just saw the doctor this morning too. He has been eating a lot of squash soup these last few days and will cut back on that too. Thanks          Good day  (Newest Message First)  View All Conversations on this Encounter  Modesto Barbosa  You4 days ago       I received the message and I can make the changes to his Lisinopril.  He just saw the doctor this morning too. He has been eating a lot of squash soup these last few days and will cut back on that too. Thanks        You  Modesto Barbosa4 days ago     JENNIFER Kim MD  12/29/2023 11:12 AM CST        With hyperK decrease lisinopril to 5mg daily, repeat labs in 1 week         Zong   Due to your father potassium of 5.7 requesting the decrease of his lisinopril dose  5mg   Lisinopril may increase potassium  but we are attempting to continue this medication   Lisinopril benefits the kidney transplant  along with decreasing BP      Please indicate your have received this message or indicate if I should call you    [phone]  [facility] Kidney Transplant Coordinator

## 2024-01-03 LAB
BK VIRUS DNA COPIES/ML, INSTRUMENT: 2141 COPIES/ML
BKV DNA SPEC NAA+PROBE-LOG#: 3.3 {LOG_COPIES}/ML

## 2024-01-05 ENCOUNTER — TELEPHONE (OUTPATIENT)
Dept: TRANSPLANT | Facility: CLINIC | Age: 59
End: 2024-01-05
Payer: COMMERCIAL

## 2024-01-05 DIAGNOSIS — Z94.0 KIDNEY REPLACED BY TRANSPLANT: ICD-10-CM

## 2024-01-05 DIAGNOSIS — E86.0 DEHYDRATION: Primary | ICD-10-CM

## 2024-01-05 RX ORDER — ALBUTEROL SULFATE 90 UG/1
1-2 AEROSOL, METERED RESPIRATORY (INHALATION)
Status: CANCELLED
Start: 2024-01-05

## 2024-01-05 RX ORDER — MEPERIDINE HYDROCHLORIDE 25 MG/ML
25 INJECTION INTRAMUSCULAR; INTRAVENOUS; SUBCUTANEOUS EVERY 30 MIN PRN
Status: CANCELLED | OUTPATIENT
Start: 2024-01-05

## 2024-01-05 RX ORDER — DIPHENHYDRAMINE HYDROCHLORIDE 50 MG/ML
50 INJECTION INTRAMUSCULAR; INTRAVENOUS
Status: CANCELLED
Start: 2024-01-05

## 2024-01-05 RX ORDER — HEPARIN SODIUM,PORCINE 10 UNIT/ML
5-20 VIAL (ML) INTRAVENOUS DAILY PRN
Status: CANCELLED | OUTPATIENT
Start: 2024-01-05

## 2024-01-05 RX ORDER — ALBUTEROL SULFATE 0.83 MG/ML
2.5 SOLUTION RESPIRATORY (INHALATION)
Status: CANCELLED | OUTPATIENT
Start: 2024-01-05

## 2024-01-05 RX ORDER — METHYLPREDNISOLONE SODIUM SUCCINATE 125 MG/2ML
125 INJECTION, POWDER, LYOPHILIZED, FOR SOLUTION INTRAMUSCULAR; INTRAVENOUS
Status: CANCELLED
Start: 2024-01-05

## 2024-01-05 RX ORDER — EPINEPHRINE 1 MG/ML
0.3 INJECTION, SOLUTION, CONCENTRATE INTRAVENOUS EVERY 5 MIN PRN
Status: CANCELLED | OUTPATIENT
Start: 2024-01-05

## 2024-01-05 RX ORDER — HEPARIN SODIUM (PORCINE) LOCK FLUSH IV SOLN 100 UNIT/ML 100 UNIT/ML
5 SOLUTION INTRAVENOUS
Status: CANCELLED | OUTPATIENT
Start: 2024-01-05

## 2024-01-05 NOTE — TELEPHONE ENCOUNTER
Rome Kim MD Huepfel, Mary K, RN  Creatinine remains elevated. Can we get him fluids please?    Orders updated in Therapy Plan for IV fluids

## 2024-01-16 ENCOUNTER — MYC MEDICAL ADVICE (OUTPATIENT)
Dept: TRANSPLANT | Facility: CLINIC | Age: 59
End: 2024-01-16

## 2024-01-16 ENCOUNTER — TELEPHONE (OUTPATIENT)
Dept: TRANSPLANT | Facility: CLINIC | Age: 59
End: 2024-01-16

## 2024-01-16 ENCOUNTER — LAB (OUTPATIENT)
Dept: LAB | Facility: HOSPITAL | Age: 59
End: 2024-01-16
Payer: COMMERCIAL

## 2024-01-16 DIAGNOSIS — Z94.0 KIDNEY REPLACED BY TRANSPLANT: ICD-10-CM

## 2024-01-16 DIAGNOSIS — Z94.0 KIDNEY REPLACED BY TRANSPLANT: Primary | ICD-10-CM

## 2024-01-16 LAB
ANION GAP SERPL CALCULATED.3IONS-SCNC: 9 MMOL/L (ref 7–15)
BUN SERPL-MCNC: 49.7 MG/DL (ref 8–23)
CALCIUM SERPL-MCNC: 10 MG/DL (ref 8.6–10)
CHLORIDE SERPL-SCNC: 106 MMOL/L (ref 98–107)
CREAT SERPL-MCNC: 2.21 MG/DL (ref 0.67–1.17)
CYCLOSPORINE BLD LC/MS/MS-MCNC: 81 UG/L (ref 50–400)
DEPRECATED HCO3 PLAS-SCNC: 23 MMOL/L (ref 22–29)
EGFRCR SERPLBLD CKD-EPI 2021: 33 ML/MIN/1.73M2
ERYTHROCYTE [DISTWIDTH] IN BLOOD BY AUTOMATED COUNT: 15 % (ref 10–15)
GLUCOSE SERPL-MCNC: 138 MG/DL (ref 70–99)
HCT VFR BLD AUTO: 32.5 % (ref 40–53)
HGB BLD-MCNC: 11.1 G/DL (ref 13.3–17.7)
MCH RBC QN AUTO: 27.8 PG (ref 26.5–33)
MCHC RBC AUTO-ENTMCNC: 34.2 G/DL (ref 31.5–36.5)
MCV RBC AUTO: 81 FL (ref 78–100)
PLATELET # BLD AUTO: 104 10E3/UL (ref 150–450)
POTASSIUM SERPL-SCNC: 4.7 MMOL/L (ref 3.4–5.3)
RBC # BLD AUTO: 4 10E6/UL (ref 4.4–5.9)
SODIUM SERPL-SCNC: 138 MMOL/L (ref 135–145)
TME LAST DOSE: NORMAL H
TME LAST DOSE: NORMAL H
WBC # BLD AUTO: 4.2 10E3/UL (ref 4–11)

## 2024-01-16 PROCEDURE — 80158 DRUG ASSAY CYCLOSPORINE: CPT

## 2024-01-16 PROCEDURE — 80048 BASIC METABOLIC PNL TOTAL CA: CPT

## 2024-01-16 PROCEDURE — 36415 COLL VENOUS BLD VENIPUNCTURE: CPT

## 2024-01-16 PROCEDURE — 87799 DETECT AGENT NOS DNA QUANT: CPT

## 2024-01-16 PROCEDURE — 80180 DRUG SCRN QUAN MYCOPHENOLATE: CPT

## 2024-01-16 PROCEDURE — 85027 COMPLETE CBC AUTOMATED: CPT

## 2024-01-16 NOTE — TELEPHONE ENCOUNTER
Issue increase creatinine - dehydration   Rome Kim MD Huepfel, Shalonda GONZALES RN  Weekly IV NS 1 L fluids x4 weeks          Called Nhia JOE Barbosa with  reviewed dehydration   Reviewed weekly IV fluids times 4   Reviewed to obtain weekly labs - to monitor creatinine   Encouraged hydration (too cold outside )   Nhia verbalized understanding     Contacted [facility]  regarding IV fluids - - they will contact patient with the appointment      Contacted Yanick wilkins RN regarding the above plan     Mr Barbosa has required weekly  IV fluids for hydration

## 2024-01-17 LAB
BK VIRUS DNA IU/ML, INSTRUMENT (6800): 569 IU/ML
BK VIRUS SPECIMEN TYPE: ABNORMAL
BKV DNA SPEC NAA+PROBE-LOG#: 2.8 {LOG_COPIES}/ML
MYCOPHENOLATE SERPL LC/MS/MS-MCNC: 1.22 MG/L (ref 1–3.5)
MYCOPHENOLATE-G SERPL LC/MS/MS-MCNC: 69.6 MG/L (ref 30–95)
TME LAST DOSE: NORMAL H
TME LAST DOSE: NORMAL H

## 2024-01-21 RX ORDER — CYCLOSPORINE 25 MG/1
75 CAPSULE, LIQUID FILLED ORAL 2 TIMES DAILY
Qty: 180 CAPSULE | Refills: 3 | Status: SHIPPED | OUTPATIENT
Start: 2024-01-21 | End: 2024-03-08

## 2024-01-21 NOTE — TELEPHONE ENCOUNTER
Thanks for the follow-up.     1.) I will stop the Lisinopril   2.) Cyclosporine is 75 AM and 50mg PM current dose, I can increase it to 75mg AM and 75mg PM  3.) I will make sure my dad gets labs done on the same day of infusion.   4.) Just talked to infusion and got his appointments made for Mondays at 1pm 1/22/24 to 2/12/24.      Also he should take his meds at regular times before labs/infusion right?  His infusions are scheduled for 1pm, he should take his meds regularly at 8am and not wait for labs that day first. Just thinking this may throw off the Cyclosporine level.      Thanks!   Me   to Rome Kohli MD Huepfel, Shalonda GONZALES, JACKIE stop lisinopril at this point. Please stop, continue IV Fluids and see if creatinine recovers

## 2024-01-22 ENCOUNTER — INFUSION THERAPY VISIT (OUTPATIENT)
Dept: INFUSION THERAPY | Facility: HOSPITAL | Age: 59
End: 2024-01-22
Payer: COMMERCIAL

## 2024-01-22 DIAGNOSIS — E86.0 DEHYDRATION: Primary | ICD-10-CM

## 2024-01-22 DIAGNOSIS — Z94.0 KIDNEY REPLACED BY TRANSPLANT: ICD-10-CM

## 2024-01-22 LAB
ANION GAP SERPL CALCULATED.3IONS-SCNC: 7 MMOL/L (ref 7–15)
BUN SERPL-MCNC: 32.3 MG/DL (ref 8–23)
CALCIUM SERPL-MCNC: 9 MG/DL (ref 8.6–10)
CHLORIDE SERPL-SCNC: 105 MMOL/L (ref 98–107)
CREAT SERPL-MCNC: 1.74 MG/DL (ref 0.67–1.17)
DEPRECATED HCO3 PLAS-SCNC: 26 MMOL/L (ref 22–29)
EGFRCR SERPLBLD CKD-EPI 2021: 45 ML/MIN/1.73M2
ERYTHROCYTE [DISTWIDTH] IN BLOOD BY AUTOMATED COUNT: 15.3 % (ref 10–15)
GLUCOSE SERPL-MCNC: 78 MG/DL (ref 70–99)
HCT VFR BLD AUTO: 27.1 % (ref 40–53)
HGB BLD-MCNC: 9.4 G/DL (ref 13.3–17.7)
MCH RBC QN AUTO: 27.8 PG (ref 26.5–33)
MCHC RBC AUTO-ENTMCNC: 34.7 G/DL (ref 31.5–36.5)
MCV RBC AUTO: 80 FL (ref 78–100)
PLATELET # BLD AUTO: 95 10E3/UL (ref 150–450)
POTASSIUM SERPL-SCNC: 4.8 MMOL/L (ref 3.4–5.3)
RBC # BLD AUTO: 3.38 10E6/UL (ref 4.4–5.9)
SODIUM SERPL-SCNC: 138 MMOL/L (ref 135–145)
WBC # BLD AUTO: 3.9 10E3/UL (ref 4–11)

## 2024-01-22 PROCEDURE — 80048 BASIC METABOLIC PNL TOTAL CA: CPT

## 2024-01-22 PROCEDURE — 85027 COMPLETE CBC AUTOMATED: CPT

## 2024-01-22 PROCEDURE — 80158 DRUG ASSAY CYCLOSPORINE: CPT

## 2024-01-22 PROCEDURE — 36415 COLL VENOUS BLD VENIPUNCTURE: CPT

## 2024-01-22 PROCEDURE — 258N000003 HC RX IP 258 OP 636: Performed by: INTERNAL MEDICINE

## 2024-01-22 RX ORDER — MEPERIDINE HYDROCHLORIDE 25 MG/ML
25 INJECTION INTRAMUSCULAR; INTRAVENOUS; SUBCUTANEOUS EVERY 30 MIN PRN
Status: CANCELLED | OUTPATIENT
Start: 2024-01-31

## 2024-01-22 RX ORDER — METHYLPREDNISOLONE SODIUM SUCCINATE 125 MG/2ML
125 INJECTION, POWDER, LYOPHILIZED, FOR SOLUTION INTRAMUSCULAR; INTRAVENOUS
Status: CANCELLED
Start: 2024-01-31

## 2024-01-22 RX ORDER — DIPHENHYDRAMINE HYDROCHLORIDE 50 MG/ML
50 INJECTION INTRAMUSCULAR; INTRAVENOUS
Status: CANCELLED
Start: 2024-01-31

## 2024-01-22 RX ORDER — ALBUTEROL SULFATE 90 UG/1
1-2 AEROSOL, METERED RESPIRATORY (INHALATION)
Status: CANCELLED
Start: 2024-01-31

## 2024-01-22 RX ORDER — EPINEPHRINE 1 MG/ML
0.3 INJECTION, SOLUTION INTRAMUSCULAR; SUBCUTANEOUS EVERY 5 MIN PRN
Status: CANCELLED | OUTPATIENT
Start: 2024-01-31

## 2024-01-22 RX ORDER — ALBUTEROL SULFATE 0.83 MG/ML
2.5 SOLUTION RESPIRATORY (INHALATION)
Status: CANCELLED | OUTPATIENT
Start: 2024-01-31

## 2024-01-22 RX ORDER — HEPARIN SODIUM,PORCINE 10 UNIT/ML
5-20 VIAL (ML) INTRAVENOUS DAILY PRN
Status: CANCELLED | OUTPATIENT
Start: 2024-01-31

## 2024-01-22 RX ORDER — HEPARIN SODIUM (PORCINE) LOCK FLUSH IV SOLN 100 UNIT/ML 100 UNIT/ML
5 SOLUTION INTRAVENOUS
Status: CANCELLED | OUTPATIENT
Start: 2024-01-31

## 2024-01-22 RX ADMIN — SODIUM CHLORIDE 1000 ML: 9 INJECTION, SOLUTION INTRAVENOUS at 13:09

## 2024-01-22 NOTE — PROGRESS NOTES
Infusion Nursing Note:  Modesto Barbosa presents today for Labs and IV fluids.    Patient seen by provider today: No   present during visit today: Yes, Larsong.    Note: Patient was given 1 L of IV fluids. Labs drawn.      Intravenous Access:  Peripheral IV placed.    Treatment Conditions:  Not Applicable.      Post Infusion Assessment:  Patient tolerated infusion without incident.       Discharge Plan:   Patient discharged in stable condition accompanied by: rj Reeves RN

## 2024-01-23 LAB
CYCLOSPORINE BLD LC/MS/MS-MCNC: 230 UG/L (ref 50–400)
TME LAST DOSE: NORMAL H
TME LAST DOSE: NORMAL H

## 2024-01-26 ENCOUNTER — LAB (OUTPATIENT)
Dept: LAB | Facility: HOSPITAL | Age: 59
End: 2024-01-26
Payer: COMMERCIAL

## 2024-01-26 DIAGNOSIS — Z94.0 KIDNEY REPLACED BY TRANSPLANT: ICD-10-CM

## 2024-01-26 LAB
ANION GAP SERPL CALCULATED.3IONS-SCNC: 9 MMOL/L (ref 7–15)
BUN SERPL-MCNC: 22 MG/DL (ref 8–23)
CALCIUM SERPL-MCNC: 9.3 MG/DL (ref 8.6–10)
CHLORIDE SERPL-SCNC: 105 MMOL/L (ref 98–107)
CREAT SERPL-MCNC: 1.33 MG/DL (ref 0.67–1.17)
CYCLOSPORINE BLD LC/MS/MS-MCNC: 119 UG/L (ref 50–400)
DEPRECATED HCO3 PLAS-SCNC: 25 MMOL/L (ref 22–29)
EGFRCR SERPLBLD CKD-EPI 2021: 62 ML/MIN/1.73M2
ERYTHROCYTE [DISTWIDTH] IN BLOOD BY AUTOMATED COUNT: 15.5 % (ref 10–15)
GLUCOSE SERPL-MCNC: 148 MG/DL (ref 70–99)
HCT VFR BLD AUTO: 31.4 % (ref 40–53)
HGB BLD-MCNC: 10.7 G/DL (ref 13.3–17.7)
MCH RBC QN AUTO: 27.5 PG (ref 26.5–33)
MCHC RBC AUTO-ENTMCNC: 34.1 G/DL (ref 31.5–36.5)
MCV RBC AUTO: 81 FL (ref 78–100)
PLATELET # BLD AUTO: 95 10E3/UL (ref 150–450)
POTASSIUM SERPL-SCNC: 4.6 MMOL/L (ref 3.4–5.3)
RBC # BLD AUTO: 3.89 10E6/UL (ref 4.4–5.9)
SODIUM SERPL-SCNC: 139 MMOL/L (ref 135–145)
TME LAST DOSE: NORMAL H
TME LAST DOSE: NORMAL H
WBC # BLD AUTO: 4.8 10E3/UL (ref 4–11)

## 2024-01-26 PROCEDURE — 36415 COLL VENOUS BLD VENIPUNCTURE: CPT

## 2024-01-26 PROCEDURE — 80158 DRUG ASSAY CYCLOSPORINE: CPT

## 2024-01-26 PROCEDURE — 80048 BASIC METABOLIC PNL TOTAL CA: CPT

## 2024-01-26 PROCEDURE — 85027 COMPLETE CBC AUTOMATED: CPT

## 2024-01-29 ENCOUNTER — INFUSION THERAPY VISIT (OUTPATIENT)
Dept: INFUSION THERAPY | Facility: HOSPITAL | Age: 59
End: 2024-01-29
Attending: INTERNAL MEDICINE
Payer: COMMERCIAL

## 2024-01-29 VITALS
RESPIRATION RATE: 16 BRPM | OXYGEN SATURATION: 95 % | SYSTOLIC BLOOD PRESSURE: 154 MMHG | DIASTOLIC BLOOD PRESSURE: 69 MMHG | HEART RATE: 50 BPM | TEMPERATURE: 97.5 F

## 2024-01-29 DIAGNOSIS — E86.0 DEHYDRATION: Primary | ICD-10-CM

## 2024-01-29 DIAGNOSIS — Z94.0 KIDNEY REPLACED BY TRANSPLANT: ICD-10-CM

## 2024-01-29 LAB
ANION GAP SERPL CALCULATED.3IONS-SCNC: 6 MMOL/L (ref 7–15)
BK VIRUS DNA IU/ML, INSTRUMENT (6800): 1670 IU/ML
BK VIRUS SPECIMEN TYPE: ABNORMAL
BKV DNA SPEC NAA+PROBE-LOG#: 3.2 {LOG_COPIES}/ML
BUN SERPL-MCNC: 25.4 MG/DL (ref 8–23)
CALCIUM SERPL-MCNC: 9.3 MG/DL (ref 8.6–10)
CHLORIDE SERPL-SCNC: 104 MMOL/L (ref 98–107)
CREAT SERPL-MCNC: 1.37 MG/DL (ref 0.67–1.17)
CYCLOSPORINE BLD LC/MS/MS-MCNC: 120 UG/L (ref 50–400)
DEPRECATED HCO3 PLAS-SCNC: 27 MMOL/L (ref 22–29)
EGFRCR SERPLBLD CKD-EPI 2021: 59 ML/MIN/1.73M2
ERYTHROCYTE [DISTWIDTH] IN BLOOD BY AUTOMATED COUNT: 15.2 % (ref 10–15)
GLUCOSE SERPL-MCNC: 133 MG/DL (ref 70–99)
HCT VFR BLD AUTO: 30.3 % (ref 40–53)
HGB BLD-MCNC: 10.3 G/DL (ref 13.3–17.7)
MAGNESIUM SERPL-MCNC: 2 MG/DL (ref 1.7–2.3)
MCH RBC QN AUTO: 27.5 PG (ref 26.5–33)
MCHC RBC AUTO-ENTMCNC: 34 G/DL (ref 31.5–36.5)
MCV RBC AUTO: 81 FL (ref 78–100)
PLATELET # BLD AUTO: 87 10E3/UL (ref 150–450)
POTASSIUM SERPL-SCNC: 4.5 MMOL/L (ref 3.4–5.3)
RBC # BLD AUTO: 3.75 10E6/UL (ref 4.4–5.9)
SODIUM SERPL-SCNC: 137 MMOL/L (ref 135–145)
TME LAST DOSE: NORMAL H
TME LAST DOSE: NORMAL H
WBC # BLD AUTO: 3.5 10E3/UL (ref 4–11)

## 2024-01-29 PROCEDURE — 87799 DETECT AGENT NOS DNA QUANT: CPT

## 2024-01-29 PROCEDURE — 80158 DRUG ASSAY CYCLOSPORINE: CPT

## 2024-01-29 PROCEDURE — 258N000003 HC RX IP 258 OP 636: Performed by: INTERNAL MEDICINE

## 2024-01-29 PROCEDURE — 96361 HYDRATE IV INFUSION ADD-ON: CPT

## 2024-01-29 PROCEDURE — 96360 HYDRATION IV INFUSION INIT: CPT

## 2024-01-29 PROCEDURE — 36415 COLL VENOUS BLD VENIPUNCTURE: CPT

## 2024-01-29 PROCEDURE — 80048 BASIC METABOLIC PNL TOTAL CA: CPT

## 2024-01-29 PROCEDURE — 86832 HLA CLASS I HIGH DEFIN QUAL: CPT

## 2024-01-29 PROCEDURE — 83735 ASSAY OF MAGNESIUM: CPT

## 2024-01-29 PROCEDURE — 86833 HLA CLASS II HIGH DEFIN QUAL: CPT

## 2024-01-29 PROCEDURE — 80180 DRUG SCRN QUAN MYCOPHENOLATE: CPT

## 2024-01-29 PROCEDURE — 85027 COMPLETE CBC AUTOMATED: CPT

## 2024-01-29 RX ORDER — MEPERIDINE HYDROCHLORIDE 25 MG/ML
25 INJECTION INTRAMUSCULAR; INTRAVENOUS; SUBCUTANEOUS EVERY 30 MIN PRN
Status: CANCELLED | OUTPATIENT
Start: 2024-02-05

## 2024-01-29 RX ORDER — METHYLPREDNISOLONE SODIUM SUCCINATE 125 MG/2ML
125 INJECTION, POWDER, LYOPHILIZED, FOR SOLUTION INTRAMUSCULAR; INTRAVENOUS
Status: CANCELLED
Start: 2024-02-05

## 2024-01-29 RX ORDER — EPINEPHRINE 1 MG/ML
0.3 INJECTION, SOLUTION INTRAMUSCULAR; SUBCUTANEOUS EVERY 5 MIN PRN
Status: CANCELLED | OUTPATIENT
Start: 2024-02-05

## 2024-01-29 RX ORDER — ALBUTEROL SULFATE 90 UG/1
1-2 AEROSOL, METERED RESPIRATORY (INHALATION)
Status: CANCELLED
Start: 2024-02-05

## 2024-01-29 RX ORDER — HEPARIN SODIUM,PORCINE 10 UNIT/ML
5-20 VIAL (ML) INTRAVENOUS DAILY PRN
Status: CANCELLED | OUTPATIENT
Start: 2024-02-05

## 2024-01-29 RX ORDER — DIPHENHYDRAMINE HYDROCHLORIDE 50 MG/ML
50 INJECTION INTRAMUSCULAR; INTRAVENOUS
Status: CANCELLED
Start: 2024-02-05

## 2024-01-29 RX ORDER — ALBUTEROL SULFATE 0.83 MG/ML
2.5 SOLUTION RESPIRATORY (INHALATION)
Status: CANCELLED | OUTPATIENT
Start: 2024-02-05

## 2024-01-29 RX ORDER — HEPARIN SODIUM (PORCINE) LOCK FLUSH IV SOLN 100 UNIT/ML 100 UNIT/ML
5 SOLUTION INTRAVENOUS
Status: CANCELLED | OUTPATIENT
Start: 2024-02-05

## 2024-01-29 RX ADMIN — SODIUM CHLORIDE 1000 ML: 9 INJECTION, SOLUTION INTRAVENOUS at 09:40

## 2024-01-29 NOTE — PROGRESS NOTES
Infusion Nursing Note:  Modesto Barbosa presents today for labs and IV fluids.    Patient seen by provider today: No   present during visit today: Yes, Language: Hmong.     Note: Modesto arrived ambulatory in stable condition. IV placed and labs drawn. BP initially elevated above parameters to give IV fluids. With retake BP decreased to 160/68. One liter NS infused over 1.5 hours due to higher BP. BP after infusion 154/69. Unable to obtain urine sample from patient today.  Modesto is scheduled to return for labs and IV fluids 2/5/24.       Intravenous Access:  Peripheral IV placed.    Treatment Conditions:  Lab Results   Component Value Date    HGB 10.3 (L) 01/29/2024    WBC 3.5 (L) 01/29/2024    ANEU 0.7 (L) 05/16/2023    ANEUTAUTO 3.3 11/29/2023    PLT 87 (L) 01/29/2024        Lab Results   Component Value Date     01/29/2024    POTASSIUM 4.5 01/29/2024    MAG 2.0 01/29/2024    CR 1.37 (H) 01/29/2024    REYMUNDO 9.3 01/29/2024    BILITOTAL 0.5 08/28/2023    ALBUMIN 4.6 08/28/2023    ALT 17 08/28/2023    AST 26 08/28/2023         Post Infusion Assessment:  Patient tolerated infusion without incident.  Site patent and intact, free from redness, edema or discomfort.  Access discontinued per protocol.       Discharge Plan:   Patient discharged in stable condition accompanied by: self.  Departure Mode: Ambulatory.      Lilia Toth RN

## 2024-01-30 LAB
MYCOPHENOLATE SERPL LC/MS/MS-MCNC: 1.32 MG/L (ref 1–3.5)
MYCOPHENOLATE-G SERPL LC/MS/MS-MCNC: 56.6 MG/L (ref 30–95)
TME LAST DOSE: NORMAL H
TME LAST DOSE: NORMAL H

## 2024-02-05 ENCOUNTER — INFUSION THERAPY VISIT (OUTPATIENT)
Dept: INFUSION THERAPY | Facility: HOSPITAL | Age: 59
End: 2024-02-05
Attending: INTERNAL MEDICINE
Payer: COMMERCIAL

## 2024-02-05 VITALS
RESPIRATION RATE: 16 BRPM | HEART RATE: 59 BPM | SYSTOLIC BLOOD PRESSURE: 154 MMHG | DIASTOLIC BLOOD PRESSURE: 71 MMHG | OXYGEN SATURATION: 100 % | TEMPERATURE: 97.4 F

## 2024-02-05 DIAGNOSIS — Z79.899 ENCOUNTER FOR LONG-TERM CURRENT USE OF MEDICATION: ICD-10-CM

## 2024-02-05 DIAGNOSIS — Z20.828 CONTACT WITH AND (SUSPECTED) EXPOSURE TO OTHER VIRAL COMMUNICABLE DISEASES: ICD-10-CM

## 2024-02-05 DIAGNOSIS — Z94.0 KIDNEY REPLACED BY TRANSPLANT: ICD-10-CM

## 2024-02-05 DIAGNOSIS — Z48.298 AFTERCARE FOLLOWING ORGAN TRANSPLANT: ICD-10-CM

## 2024-02-05 DIAGNOSIS — E86.0 DEHYDRATION: Primary | ICD-10-CM

## 2024-02-05 LAB
ANION GAP SERPL CALCULATED.3IONS-SCNC: 8 MMOL/L (ref 7–15)
BUN SERPL-MCNC: 21.5 MG/DL (ref 8–23)
CALCIUM SERPL-MCNC: 9.2 MG/DL (ref 8.6–10)
CHLORIDE SERPL-SCNC: 102 MMOL/L (ref 98–107)
CREAT SERPL-MCNC: 1.51 MG/DL (ref 0.67–1.17)
DEPRECATED HCO3 PLAS-SCNC: 26 MMOL/L (ref 22–29)
EGFRCR SERPLBLD CKD-EPI 2021: 53 ML/MIN/1.73M2
ERYTHROCYTE [DISTWIDTH] IN BLOOD BY AUTOMATED COUNT: 14.8 % (ref 10–15)
GLUCOSE SERPL-MCNC: 119 MG/DL (ref 70–99)
HCT VFR BLD AUTO: 31.2 % (ref 40–53)
HGB BLD-MCNC: 10.4 G/DL (ref 13.3–17.7)
MCH RBC QN AUTO: 26.9 PG (ref 26.5–33)
MCHC RBC AUTO-ENTMCNC: 33.3 G/DL (ref 31.5–36.5)
MCV RBC AUTO: 81 FL (ref 78–100)
MYCOPHENOLATE SERPL LC/MS/MS-MCNC: 1.63 MG/L (ref 1–3.5)
MYCOPHENOLATE-G SERPL LC/MS/MS-MCNC: 57.1 MG/L (ref 30–95)
PLATELET # BLD AUTO: 98 10E3/UL (ref 150–450)
POTASSIUM SERPL-SCNC: 4.6 MMOL/L (ref 3.4–5.3)
RBC # BLD AUTO: 3.86 10E6/UL (ref 4.4–5.9)
SODIUM SERPL-SCNC: 136 MMOL/L (ref 135–145)
TME LAST DOSE: NORMAL H
TME LAST DOSE: NORMAL H
WBC # BLD AUTO: 3.6 10E3/UL (ref 4–11)

## 2024-02-05 PROCEDURE — 258N000003 HC RX IP 258 OP 636: Performed by: INTERNAL MEDICINE

## 2024-02-05 PROCEDURE — 80048 BASIC METABOLIC PNL TOTAL CA: CPT

## 2024-02-05 PROCEDURE — 36415 COLL VENOUS BLD VENIPUNCTURE: CPT

## 2024-02-05 PROCEDURE — 80180 DRUG SCRN QUAN MYCOPHENOLATE: CPT

## 2024-02-05 PROCEDURE — 96360 HYDRATION IV INFUSION INIT: CPT

## 2024-02-05 PROCEDURE — 85027 COMPLETE CBC AUTOMATED: CPT

## 2024-02-05 RX ORDER — ALBUTEROL SULFATE 0.83 MG/ML
2.5 SOLUTION RESPIRATORY (INHALATION)
Status: CANCELLED | OUTPATIENT
Start: 2024-02-12

## 2024-02-05 RX ORDER — METHYLPREDNISOLONE SODIUM SUCCINATE 125 MG/2ML
125 INJECTION, POWDER, LYOPHILIZED, FOR SOLUTION INTRAMUSCULAR; INTRAVENOUS
Status: DISCONTINUED | OUTPATIENT
Start: 2024-02-05 | End: 2024-02-05 | Stop reason: HOSPADM

## 2024-02-05 RX ORDER — DIPHENHYDRAMINE HYDROCHLORIDE 50 MG/ML
50 INJECTION INTRAMUSCULAR; INTRAVENOUS
Status: DISCONTINUED | OUTPATIENT
Start: 2024-02-05 | End: 2024-02-05 | Stop reason: HOSPADM

## 2024-02-05 RX ORDER — DIPHENHYDRAMINE HYDROCHLORIDE 50 MG/ML
50 INJECTION INTRAMUSCULAR; INTRAVENOUS
Status: CANCELLED
Start: 2024-02-12

## 2024-02-05 RX ORDER — MEPERIDINE HYDROCHLORIDE 25 MG/ML
25 INJECTION INTRAMUSCULAR; INTRAVENOUS; SUBCUTANEOUS EVERY 30 MIN PRN
Status: CANCELLED | OUTPATIENT
Start: 2024-02-12

## 2024-02-05 RX ORDER — HEPARIN SODIUM,PORCINE 10 UNIT/ML
5-20 VIAL (ML) INTRAVENOUS DAILY PRN
Status: CANCELLED | OUTPATIENT
Start: 2024-02-12

## 2024-02-05 RX ORDER — MEPERIDINE HYDROCHLORIDE 25 MG/ML
25 INJECTION INTRAMUSCULAR; INTRAVENOUS; SUBCUTANEOUS EVERY 30 MIN PRN
Status: DISCONTINUED | OUTPATIENT
Start: 2024-02-05 | End: 2024-02-05 | Stop reason: HOSPADM

## 2024-02-05 RX ORDER — METHYLPREDNISOLONE SODIUM SUCCINATE 125 MG/2ML
125 INJECTION, POWDER, LYOPHILIZED, FOR SOLUTION INTRAMUSCULAR; INTRAVENOUS
Status: CANCELLED
Start: 2024-02-12

## 2024-02-05 RX ORDER — EPINEPHRINE 1 MG/ML
0.3 INJECTION, SOLUTION INTRAMUSCULAR; SUBCUTANEOUS EVERY 5 MIN PRN
Status: CANCELLED | OUTPATIENT
Start: 2024-02-12

## 2024-02-05 RX ORDER — ALBUTEROL SULFATE 90 UG/1
1-2 AEROSOL, METERED RESPIRATORY (INHALATION)
Status: DISCONTINUED | OUTPATIENT
Start: 2024-02-05 | End: 2024-02-05 | Stop reason: HOSPADM

## 2024-02-05 RX ORDER — ALBUTEROL SULFATE 90 UG/1
1-2 AEROSOL, METERED RESPIRATORY (INHALATION)
Status: CANCELLED
Start: 2024-02-12

## 2024-02-05 RX ORDER — ALBUTEROL SULFATE 0.83 MG/ML
2.5 SOLUTION RESPIRATORY (INHALATION)
Status: DISCONTINUED | OUTPATIENT
Start: 2024-02-05 | End: 2024-02-05 | Stop reason: HOSPADM

## 2024-02-05 RX ORDER — EPINEPHRINE 1 MG/ML
0.3 INJECTION, SOLUTION INTRAMUSCULAR; SUBCUTANEOUS EVERY 5 MIN PRN
Status: DISCONTINUED | OUTPATIENT
Start: 2024-02-05 | End: 2024-02-05 | Stop reason: HOSPADM

## 2024-02-05 RX ORDER — HEPARIN SODIUM (PORCINE) LOCK FLUSH IV SOLN 100 UNIT/ML 100 UNIT/ML
5 SOLUTION INTRAVENOUS
Status: CANCELLED | OUTPATIENT
Start: 2024-02-12

## 2024-02-05 RX ADMIN — SODIUM CHLORIDE 1000 ML: 9 INJECTION, SOLUTION INTRAVENOUS at 09:02

## 2024-02-05 ASSESSMENT — PAIN SCALES - GENERAL: PAINLEVEL: NO PAIN (0)

## 2024-02-05 NOTE — PROGRESS NOTES
Infusion Nursing Note:  Modesto Barbosa presents today for labs and IV hydration.    Patient seen by provider today: No   present during visit today: Not Applicable.    Note: Modesto arrived ambulatory in stable condition. IV placed and labs drawn. One liter NS infused.  Modesto is scheduled to return for labs and IV fluids 2/21.       Intravenous Access:  Labs drawn without difficulty.  Peripheral IV placed.    Treatment Conditions:  Lab Results   Component Value Date    HGB 10.4 (L) 02/05/2024    WBC 3.6 (L) 02/05/2024    ANEU 0.7 (L) 05/16/2023    ANEUTAUTO 3.3 11/29/2023    PLT 98 (L) 02/05/2024        Lab Results   Component Value Date     02/05/2024    POTASSIUM 4.6 02/05/2024    MAG 2.0 01/29/2024    CR 1.51 (H) 02/05/2024    REYMUNDO 9.2 02/05/2024    BILITOTAL 0.5 08/28/2023    ALBUMIN 4.6 08/28/2023    ALT 17 08/28/2023    AST 26 08/28/2023             Post Infusion Assessment:  Patient tolerated infusion without incident.  No evidence of extravasations.  Access discontinued per protocol.       Discharge Plan:   Discharge instructions reviewed with: Patient.  Patient and/or family verbalized understanding of discharge instructions and all questions answered.  Patient discharged in stable condition accompanied by: self.  Departure Mode: Ambulatory.      Nimo Pendleton RN

## 2024-02-12 ENCOUNTER — INFUSION THERAPY VISIT (OUTPATIENT)
Dept: INFUSION THERAPY | Facility: HOSPITAL | Age: 59
End: 2024-02-12
Attending: INTERNAL MEDICINE
Payer: COMMERCIAL

## 2024-02-12 VITALS
DIASTOLIC BLOOD PRESSURE: 70 MMHG | RESPIRATION RATE: 16 BRPM | HEART RATE: 62 BPM | OXYGEN SATURATION: 97 % | SYSTOLIC BLOOD PRESSURE: 130 MMHG

## 2024-02-12 DIAGNOSIS — Z94.0 KIDNEY REPLACED BY TRANSPLANT: ICD-10-CM

## 2024-02-12 DIAGNOSIS — E86.0 DEHYDRATION: Primary | ICD-10-CM

## 2024-02-12 PROCEDURE — 258N000003 HC RX IP 258 OP 636: Performed by: INTERNAL MEDICINE

## 2024-02-12 PROCEDURE — 96360 HYDRATION IV INFUSION INIT: CPT

## 2024-02-12 RX ORDER — ALBUTEROL SULFATE 90 UG/1
1-2 AEROSOL, METERED RESPIRATORY (INHALATION)
Status: DISCONTINUED | OUTPATIENT
Start: 2024-02-12 | End: 2024-02-12

## 2024-02-12 RX ORDER — HEPARIN SODIUM,PORCINE 10 UNIT/ML
5-20 VIAL (ML) INTRAVENOUS DAILY PRN
Status: CANCELLED | OUTPATIENT
Start: 2024-02-19

## 2024-02-12 RX ORDER — ALBUTEROL SULFATE 90 UG/1
1-2 AEROSOL, METERED RESPIRATORY (INHALATION)
Status: CANCELLED
Start: 2024-02-19

## 2024-02-12 RX ORDER — METHYLPREDNISOLONE SODIUM SUCCINATE 125 MG/2ML
125 INJECTION, POWDER, LYOPHILIZED, FOR SOLUTION INTRAMUSCULAR; INTRAVENOUS
Status: CANCELLED
Start: 2024-02-19

## 2024-02-12 RX ORDER — METHYLPREDNISOLONE SODIUM SUCCINATE 125 MG/2ML
125 INJECTION, POWDER, LYOPHILIZED, FOR SOLUTION INTRAMUSCULAR; INTRAVENOUS
Status: DISCONTINUED | OUTPATIENT
Start: 2024-02-12 | End: 2024-02-12

## 2024-02-12 RX ORDER — ALBUTEROL SULFATE 0.83 MG/ML
2.5 SOLUTION RESPIRATORY (INHALATION)
Status: DISCONTINUED | OUTPATIENT
Start: 2024-02-12 | End: 2024-02-12

## 2024-02-12 RX ORDER — EPINEPHRINE 1 MG/ML
0.3 INJECTION, SOLUTION INTRAMUSCULAR; SUBCUTANEOUS EVERY 5 MIN PRN
Status: CANCELLED | OUTPATIENT
Start: 2024-02-19

## 2024-02-12 RX ORDER — DIPHENHYDRAMINE HYDROCHLORIDE 50 MG/ML
50 INJECTION INTRAMUSCULAR; INTRAVENOUS
Status: CANCELLED
Start: 2024-02-19

## 2024-02-12 RX ORDER — EPINEPHRINE 1 MG/ML
0.3 INJECTION, SOLUTION INTRAMUSCULAR; SUBCUTANEOUS EVERY 5 MIN PRN
Status: DISCONTINUED | OUTPATIENT
Start: 2024-02-12 | End: 2024-02-12

## 2024-02-12 RX ORDER — ALBUTEROL SULFATE 0.83 MG/ML
2.5 SOLUTION RESPIRATORY (INHALATION)
Status: CANCELLED | OUTPATIENT
Start: 2024-02-19

## 2024-02-12 RX ORDER — MEPERIDINE HYDROCHLORIDE 25 MG/ML
25 INJECTION INTRAMUSCULAR; INTRAVENOUS; SUBCUTANEOUS EVERY 30 MIN PRN
Status: CANCELLED | OUTPATIENT
Start: 2024-02-19

## 2024-02-12 RX ORDER — MEPERIDINE HYDROCHLORIDE 25 MG/ML
25 INJECTION INTRAMUSCULAR; INTRAVENOUS; SUBCUTANEOUS EVERY 30 MIN PRN
Status: DISCONTINUED | OUTPATIENT
Start: 2024-02-12 | End: 2024-02-12

## 2024-02-12 RX ORDER — HEPARIN SODIUM (PORCINE) LOCK FLUSH IV SOLN 100 UNIT/ML 100 UNIT/ML
5 SOLUTION INTRAVENOUS
Status: CANCELLED | OUTPATIENT
Start: 2024-02-19

## 2024-02-12 RX ORDER — DIPHENHYDRAMINE HYDROCHLORIDE 50 MG/ML
50 INJECTION INTRAMUSCULAR; INTRAVENOUS
Status: DISCONTINUED | OUTPATIENT
Start: 2024-02-12 | End: 2024-02-12

## 2024-02-12 RX ADMIN — SODIUM CHLORIDE 1000 ML: 9 INJECTION, SOLUTION INTRAVENOUS at 09:30

## 2024-02-12 NOTE — PROGRESS NOTES
Infusion Nursing Note:  Modesto Barbosa presents today for IV hydration.    Patient seen by provider today: No   present during visit today: Not Applicable.    Note: Modesto arrived ambulatory in stable condition. IV placed One liter NS infused after manual BP was less than 160/100.    This was final order in his Therapy Plan    Intravenous Access:  Peripheral IV placed.    Treatment Conditions:  Hydration        Post Infusion Assessment:  Patient tolerated infusion without incident.  No evidence of extravasations.  Access discontinued per protocol.       Discharge Plan:   Discharge instructions reviewed with: Patient.  Patient and/or family verbalized understanding of discharge instructions and all questions answered.  Patient discharged in stable condition accompanied by: self.  Departure Mode: Ambulatory.      Nimo Ty RN

## 2024-02-14 ENCOUNTER — MYC MEDICAL ADVICE (OUTPATIENT)
Dept: TRANSPLANT | Facility: CLINIC | Age: 59
End: 2024-02-14
Payer: COMMERCIAL

## 2024-02-14 DIAGNOSIS — Z94.0 KIDNEY REPLACED BY TRANSPLANT: ICD-10-CM

## 2024-02-15 ENCOUNTER — TELEPHONE (OUTPATIENT)
Dept: TRANSPLANT | Facility: CLINIC | Age: 59
End: 2024-02-15
Payer: COMMERCIAL

## 2024-02-15 DIAGNOSIS — Z94.0 KIDNEY REPLACED BY TRANSPLANT: ICD-10-CM

## 2024-02-15 RX ORDER — SULFAMETHOXAZOLE AND TRIMETHOPRIM 400; 80 MG/1; MG/1
1 TABLET ORAL DAILY
Qty: 30 TABLET | Refills: 11 | Status: SHIPPED | OUTPATIENT
Start: 2024-02-15 | End: 2024-09-17

## 2024-02-15 ASSESSMENT — ENCOUNTER SYMPTOMS: NEW SYMPTOMS OF CORONARY ARTERY DISEASE: 0

## 2024-02-15 NOTE — TELEPHONE ENCOUNTER
MyChart message from daughter     Updated  refilled sulfamethoxazole-trimethoprim (BACTRIM) 400-80       Previous plan for   weekly IV fluids times 4  - creatinine has improved     He has now completed his 4th infusion this week   Plan to recheck  his transplant  labs - determine if he will need more IV Fluids       One year post kidney appointment  with Dr Phan in March 2024       Daughter continues management of medication box     Updated UNOS

## 2024-02-20 ENCOUNTER — LAB (OUTPATIENT)
Dept: LAB | Facility: HOSPITAL | Age: 59
End: 2024-02-20
Payer: COMMERCIAL

## 2024-02-20 DIAGNOSIS — Z94.0 KIDNEY REPLACED BY TRANSPLANT: ICD-10-CM

## 2024-02-20 DIAGNOSIS — Z20.828 CONTACT WITH AND (SUSPECTED) EXPOSURE TO OTHER VIRAL COMMUNICABLE DISEASES: ICD-10-CM

## 2024-02-20 DIAGNOSIS — Z79.899 ENCOUNTER FOR LONG-TERM CURRENT USE OF MEDICATION: ICD-10-CM

## 2024-02-20 DIAGNOSIS — Z48.298 AFTERCARE FOLLOWING ORGAN TRANSPLANT: ICD-10-CM

## 2024-02-20 LAB
ANION GAP SERPL CALCULATED.3IONS-SCNC: 9 MMOL/L (ref 7–15)
BK VIRUS DNA IU/ML, INSTRUMENT (6800): 1590 IU/ML
BK VIRUS SPECIMEN TYPE: ABNORMAL
BKV DNA SPEC NAA+PROBE-LOG#: 3.2 {LOG_COPIES}/ML
BUN SERPL-MCNC: 21.7 MG/DL (ref 8–23)
CALCIUM SERPL-MCNC: 9.2 MG/DL (ref 8.6–10)
CHLORIDE SERPL-SCNC: 103 MMOL/L (ref 98–107)
CMV DNA SPEC NAA+PROBE-ACNC: NOT DETECTED IU/ML
CREAT SERPL-MCNC: 1.25 MG/DL (ref 0.67–1.17)
CYCLOSPORINE BLD LC/MS/MS-MCNC: 135 UG/L (ref 50–400)
DEPRECATED HCO3 PLAS-SCNC: 25 MMOL/L (ref 22–29)
EGFRCR SERPLBLD CKD-EPI 2021: 66 ML/MIN/1.73M2
ERYTHROCYTE [DISTWIDTH] IN BLOOD BY AUTOMATED COUNT: 14.6 % (ref 10–15)
GLUCOSE SERPL-MCNC: 185 MG/DL (ref 70–99)
HCT VFR BLD AUTO: 31.2 % (ref 40–53)
HGB BLD-MCNC: 10.5 G/DL (ref 13.3–17.7)
MCH RBC QN AUTO: 27.3 PG (ref 26.5–33)
MCHC RBC AUTO-ENTMCNC: 33.7 G/DL (ref 31.5–36.5)
MCV RBC AUTO: 81 FL (ref 78–100)
MYCOPHENOLATE SERPL LC/MS/MS-MCNC: 1.47 MG/L (ref 1–3.5)
MYCOPHENOLATE-G SERPL LC/MS/MS-MCNC: 50.1 MG/L (ref 30–95)
PLATELET # BLD AUTO: 96 10E3/UL (ref 150–450)
POTASSIUM SERPL-SCNC: 4.3 MMOL/L (ref 3.4–5.3)
RBC # BLD AUTO: 3.84 10E6/UL (ref 4.4–5.9)
SODIUM SERPL-SCNC: 137 MMOL/L (ref 135–145)
TME LAST DOSE: NORMAL H
WBC # BLD AUTO: 4.5 10E3/UL (ref 4–11)

## 2024-02-20 PROCEDURE — 80048 BASIC METABOLIC PNL TOTAL CA: CPT

## 2024-02-20 PROCEDURE — 36415 COLL VENOUS BLD VENIPUNCTURE: CPT

## 2024-02-20 PROCEDURE — 86833 HLA CLASS II HIGH DEFIN QUAL: CPT

## 2024-02-20 PROCEDURE — 80180 DRUG SCRN QUAN MYCOPHENOLATE: CPT

## 2024-02-20 PROCEDURE — 85027 COMPLETE CBC AUTOMATED: CPT

## 2024-02-20 PROCEDURE — 86832 HLA CLASS I HIGH DEFIN QUAL: CPT

## 2024-02-20 PROCEDURE — 80158 DRUG ASSAY CYCLOSPORINE: CPT

## 2024-02-20 PROCEDURE — 87799 DETECT AGENT NOS DNA QUANT: CPT

## 2024-02-27 ENCOUNTER — TELEPHONE (OUTPATIENT)
Dept: PHARMACY | Facility: CLINIC | Age: 59
End: 2024-02-27
Payer: COMMERCIAL

## 2024-02-27 NOTE — TELEPHONE ENCOUNTER
Clinical Pharmacy Consult:                                                      Transplant Specific: 12 month post transplant medication review  Date of Transplant: 02/21/2023  Type of Transplant: kidney  First Transplant: yes  History of rejection: no    Immunosuppression Regimen   CSA 75 mg qAM & 75 mg qPM and  mg qAM & 500 mg qPM  Patient specific goal: 100-125  Most recent level: 135 on 2/20/24  Immunosuppressant Levels:  supraTherapeutic  Pt adherent to lab draws: yes  Scr:   Lab Results   Component Value Date    CR 0.84 03/27/2023     Side effects: mild edema    Prophylactic Medications  Antibacterial:  Bactrim 400-80 once daily  Scheduled Discontinue Date: Lifelong    Antifungal: Not needed thus far  Scheduled Discontinue Date: N/A    Antiviral: CrCl >/=60 mL/minute: Valcyte 900mg daily   Scheduled Discontinue Date: 3 months completed    Acid Reducer: Not needed  Scheduled Reviewed Date: N/A    Thrombosis Prevention: Aspirin 81 mg PO daily  Scheduled Discontinue Date:  to be determined    Blood Pressure Management  Frequency of home Blood Pressure checks: daily  Most recent home BP: 130-140's/80's  Patient Blood pressure goal: <140/90  Patient blood pressure at goal:  Yes    Hospitalizations/ER visits since last assessment: 0    Med rec/DUR performed: yes  Med Rec Discrepancies: no    Spoke with daughterYanick today via phone regarding Nhia. He is doing really good, Yanick still manages everything for him. Denied any missed dose, side effects, fever or pain. He does get a bit of swelling in the feet occasionally, but not often enough to be concerned at this time. Yanick will monitor and report if edema worsen.    BP: checking once daily. Readings mostly at goal.    No other questions or concerns today. Follow up in 1 year.    Leonard Hayward, Pharm D  [facility]

## 2024-03-04 ENCOUNTER — LAB (OUTPATIENT)
Dept: LAB | Facility: HOSPITAL | Age: 59
End: 2024-03-04
Payer: COMMERCIAL

## 2024-03-04 DIAGNOSIS — Z94.0 KIDNEY REPLACED BY TRANSPLANT: ICD-10-CM

## 2024-03-04 LAB
ANION GAP SERPL CALCULATED.3IONS-SCNC: 9 MMOL/L (ref 7–15)
BK VIRUS DNA IU/ML, INSTRUMENT (6800): 3000 IU/ML
BK VIRUS SPECIMEN TYPE: ABNORMAL
BKV DNA SPEC NAA+PROBE-LOG#: 3.5 {LOG_COPIES}/ML
BUN SERPL-MCNC: 29 MG/DL (ref 8–23)
CALCIUM SERPL-MCNC: 9.2 MG/DL (ref 8.6–10)
CHLORIDE SERPL-SCNC: 101 MMOL/L (ref 98–107)
CREAT SERPL-MCNC: 1.38 MG/DL (ref 0.67–1.17)
CYCLOSPORINE BLD LC/MS/MS-MCNC: 145 UG/L (ref 50–400)
DEPRECATED HCO3 PLAS-SCNC: 25 MMOL/L (ref 22–29)
EGFRCR SERPLBLD CKD-EPI 2021: 59 ML/MIN/1.73M2
ERYTHROCYTE [DISTWIDTH] IN BLOOD BY AUTOMATED COUNT: 14.5 % (ref 10–15)
GLUCOSE SERPL-MCNC: 121 MG/DL (ref 70–99)
HCT VFR BLD AUTO: 33.5 % (ref 40–53)
HGB BLD-MCNC: 11.5 G/DL (ref 13.3–17.7)
MCH RBC QN AUTO: 27.6 PG (ref 26.5–33)
MCHC RBC AUTO-ENTMCNC: 34.3 G/DL (ref 31.5–36.5)
MCV RBC AUTO: 81 FL (ref 78–100)
PLATELET # BLD AUTO: 92 10E3/UL (ref 150–450)
POTASSIUM SERPL-SCNC: 4.3 MMOL/L (ref 3.4–5.3)
RBC # BLD AUTO: 4.16 10E6/UL (ref 4.4–5.9)
SODIUM SERPL-SCNC: 135 MMOL/L (ref 135–145)
TME LAST DOSE: NORMAL H
TME LAST DOSE: NORMAL H
WBC # BLD AUTO: 4.8 10E3/UL (ref 4–11)

## 2024-03-04 PROCEDURE — 82565 ASSAY OF CREATININE: CPT

## 2024-03-04 PROCEDURE — 87799 DETECT AGENT NOS DNA QUANT: CPT

## 2024-03-04 PROCEDURE — 80158 DRUG ASSAY CYCLOSPORINE: CPT

## 2024-03-04 PROCEDURE — 36415 COLL VENOUS BLD VENIPUNCTURE: CPT

## 2024-03-04 PROCEDURE — 85027 COMPLETE CBC AUTOMATED: CPT

## 2024-03-04 PROCEDURE — 80180 DRUG SCRN QUAN MYCOPHENOLATE: CPT

## 2024-03-05 LAB
MYCOPHENOLATE SERPL LC/MS/MS-MCNC: 1.31 MG/L (ref 1–3.5)
MYCOPHENOLATE-G SERPL LC/MS/MS-MCNC: 53.7 MG/L (ref 30–95)
TME LAST DOSE: NORMAL H
TME LAST DOSE: NORMAL H

## 2024-03-08 DIAGNOSIS — Z94.0 KIDNEY REPLACED BY TRANSPLANT: Primary | ICD-10-CM

## 2024-03-08 RX ORDER — CYCLOSPORINE 25 MG/1
CAPSULE, LIQUID FILLED ORAL
Qty: 450 CAPSULE | Refills: 3 | Status: SHIPPED | OUTPATIENT
Start: 2024-03-08

## 2024-03-08 NOTE — TELEPHONE ENCOUNTER
Issue CYCLOSPORINE  level 145  above goal level 1 year post transplant           PLAN:   Call Patient  daughter  and confirm this was an accurate 12-hour trough.   Verify Cyclosporine dose 75 mg BID.   Confirm no new medications or illness.   Confirm no missed doses.   If accurate trough and accurate dose, decrease Cyclosporine dose to 75  mg  in am and 50 mg in pm      Is this more than a 50% increase or decrease in current IS dose: No       Repeat labs in 2 weeks.

## 2024-03-08 NOTE — TELEPHONE ENCOUNTER
Patient's daughter confirms this was an accurate 13-hour trough.   Verified Cyclosporine dose 75 mg BID.   Confirmed no new medications or illness.   Confirmed no missed doses.   Patient confirms decrease Cyclosporine dose to 75  mg  in am and 50 mg in pm and repeat labs in 2 weeks.

## 2024-03-08 NOTE — TELEPHONE ENCOUNTER
Left message and sent [facility] message to patient regarding:  CYCLOSPORINE  level 145  above goal level 1 year post transplant             PLAN:   Call Patient  daughter  and confirm this was an accurate 12-hour trough.   Verify Cyclosporine dose 75 mg BID.   Confirm no new medications or illness.   Confirm no missed doses.   If accurate trough and accurate dose, decrease Cyclosporine dose to 75  mg  in am and 50 mg in pm       Is this more than a 50% increase or decrease in current IS dose: No        Repeat labs in 2 weeks.

## 2024-03-10 NOTE — PROGRESS NOTES
TRANSPLANT NEPHROLOGY CHRONIC POST TRANSPLANT VISIT    Assessment & Plan   # LDKT: Trend down in creatinine slightly below the ~ 1.5-1.8 range.  Kidney transplant biopsy 5/2023 showed no acute rejection.   - Baseline Creatinine: ~ 1.5-1.8   - Proteinuria: Normal (<0.2 grams),    - Date DSA Last Checked: Feb/2024      Latest DSA: No cPRA: 27%   - BK Viremia: Yes, mildly elevated (1-10K)   - Kidney Tx Biopsy: May 03, 2023; Result: No diagnostic evidence of acute rejection.   Acute tubular injury.  Severe arterial and arteriolar sclerosis.    # Immunosuppression: Cyclosporine (goal 100-125) and Mycophenolate mofetil (dose 500 mg every 12 hours)   - Continue with intensive monitoring of immunosuppression for efficacy and toxicity.   - On lower immunosuppression with stopping mycophenolate and changed tacrolimus to cyclosporine due to significantly elevated BK viremia.   - Changes: Yes - Now that patient is one year post transplant, would decrease cyclosporine goal to ~ .    # Infection Prophylaxis:   Last CD4 Level: Not checked  - PJP: Sulfa/TMP (Bactrim)    # Hypertension: Borderline control, but variable;  Goal BP: < 130/80   - Volume status: Euvolemic   - Changes: Yes - Will start losartan 25 mg nightly.   - If patient tolerates losartan and no significant change in serum creatinine or potassium, may look to increase dosing and taper off hydralazine.    # Diabetes: Controlled (HbA1c <7%) Last HbA1c: 6.5%   - Management as per primary care    # Anemia in Chronic Renal Disease: Hgb: Stable      NERY: No   - Iron studies: Low iron saturation, but high ferritin    # Thrombocytopenia: Stable, mildly low platelet level in the ~ 90-140s.  Patient does have splenomegaly.    # Mineral Bone Disorder:   - Secondary renal hyperparathyroidism; PTH level: Normal (15-65 pg/ml)        On treatment: None  - Vitamin D; level: Normal        On supplement: Yes  - Calcium; level: Normal        On supplement: No    # Electrolytes:    - Potassium; level: Normal        On supplement: No  - Magnesium; level: Normal        On supplement: Yes  - Bicarbonate; level: Normal        On supplement: Yes    # BK Viremia: Stable, mildly elevated BK PCR at ~ 3K with last check Mar/2024 and has generally has been ~ 1-6K for the last 3 months.  This is down from peak BK PCR at ~ 560K in Sep/2023.  IgG level is normal.  Immunosuppression was significantly decreased and tacrolimus was changed to cyclosporine.   - Will continue to follow BK PCR every qmonth.    # CAD, s/p PCI: Asymptomatic.    # Asymmetric Left Ventricular Hypertrophy (HOCM): Stable. No history of sustained ventricular arrhythmias.     # Hepatitis B Core Ab +: Patient with core Ab positive, but negative Ag and also negative surface Ab.  Hep B DNA was negative at time of transplant, as well as at 4 months post transplant.     # Peripheral Neuropathy: Slightly improved symptoms on amitriptyline.    # Skin Cancer Risk:    - Discussed sun protection and recommend regular follow up with Dermatology.    # Medical Compliance: Yes    # Health Maintenance and Vaccination Review: Not Reviewed    # Transplant History:  Etiology of Kidney Failure: Diabetes mellitus type 2  Tx: LDKT  Transplant: 2/21/2023 (Kidney)  Significant changes in immunosuppression:  Decreased immunosuppression and tacrolimus changed to cyclosporine due to BK viremia.  Significant transplant-related complications: BK Viremia    Transplant Office Phone Number: [phone]    Assessment and plan was discussed with the patient and he voiced his understanding and agreement.    Return visit: Return for 6 month post transplant visit.    Patrick Phan MD    The longitudinal plan of care for kidney transplant was addressed during this visit. Due to the added complexity in care, I will continue to support Modesto Barbosa in the subsequent management of this condition(s) and with the ongoing continuity of care of this  condition(s).    Chief Complaint   Mr. Barbosa is a 58 year old here for kidney transplant and immunosuppression management.     History of Present Illness    Mr. Barbosa reports feeling good overall with some medical complaints.  Since last clinic visit, patient reports no hospitalizations or new medical complaints and has been doing well overall.  He has been bothered by a cold the last few days with sinus congestion and a cough, although symptoms are improving.  No fever.  In addition, patient does tend to have chronic sinus congestion without much improvement after starting Flonase.  He is going to discuss ENT referral with his PCP.    His energy level is improved, now pretty much normal.  He is active and does get some exercise.  Denies any chest pain or shortness of breath with exertion.  No leg swelling.  His peripheral neuropathy seems a bit better after starting on amitriptyline.    Appetite is good and weight is stable.  No nausea, vomiting or diarrhea.  No heartburn symptoms.  No fever, sweats or chills.  No night sweats.    Home BP:  140-160s systolic.    Problem List   Patient Active Problem List   Diagnosis    Type 2 diabetes mellitus without complication, with long-term current use of insulin (H)    Dyslipidemia, goal LDL below 70    HTN, kidney transplant related    Metabolic acidosis    Coronary artery disease involving native coronary artery of native heart without angina pectoris    Thrombocytopenia (H24)    Immunosuppressed status (H24)    Kidney replaced by transplant    Anemia in chronic renal disease    Hypomagnesemia    Hepatitis B core antibody positive    Aftercare following organ transplant    Need for pneumocystis prophylaxis    Vitamin D deficiency    Secondary renal hyperparathyroidism (H24)    BK viremia    Peripheral neuropathy    HOCM (hypertrophic obstructive cardiomyopathy) (H)    Stage 3a chronic kidney disease (H)       Allergies   No Known Allergies      Medications   Current  Outpatient Medications   Medication Sig    acetaminophen (TYLENOL) 325 MG tablet Take 2 tablets (650 mg) by mouth every 4 hours as needed for other (For optimal non-opioid multimodal pain management to improve pain control.)    Alcohol Swabs (ALCOHOL PREP) 70 % PADS USE 5X PER DAY    amitriptyline (ELAVIL) 25 MG tablet Take 1 tablet (25 mg) by mouth at bedtime    amLODIPine (NORVASC) 10 MG tablet Take 1 tablet (10 mg) by mouth at bedtime    aspirin (ASA) 81 MG EC tablet Take 1 tablet (81 mg) by mouth daily    atorvastatin (LIPITOR) 20 MG tablet Take 1 tablet (20 mg) by mouth daily    blood glucose (NO BRAND SPECIFIED) test strip Use to test blood sugar 3 times daily or as directed. To accompany: Blood Glucose Monitor Brands: per insurance.    blood glucose monitoring (ONE TOUCH ULTRA 2) meter device kit USE TO TEST BLOOD SUGAR THREE TIMES DAILY OR AS DIRECTED.    carvedilol (COREG) 25 MG tablet Take 0.5 tablets (12.5 mg) by mouth 2 times daily (with meals)    cycloSPORINE modified (GENERIC EQUIVALENT) 25 MG capsule Take 3 capsules (75 mg) by mouth every morning AND 2 capsules (50 mg) every evening.    fluticasone (FLONASE) 50 MCG/ACT nasal spray Spray 2 sprays into both nostrils daily    hydrALAZINE (APRESOLINE) 25 MG tablet Take 1 tablet (25 mg) by mouth 2 times daily    insulin aspart (NOVOLOG PEN) 100 UNIT/ML pen Before breaskfast and lunch, give 7 units. Before dinner give 9 units.    insulin detemir (LEVEMIR FLEXTOUCH) 100 UNIT/ML pen Inject 15-20 Units Subcutaneous At Bedtime    insulin pen needle (BD PEN NEEDLE ORESTES 2ND GEN) 32G X 4 MM miscellaneous USE TWICE DAILY    loratadine (CLARITIN) 10 MG tablet Take 1 tablet (10 mg) by mouth daily as needed for allergies (itchy/rash)    losartan (COZAAR) 25 MG tablet Take 1 tablet (25 mg) by mouth at bedtime    magnesium oxide (MAG-OX) 400 MG tablet Take 1 tablet (400 mg) by mouth 2 times daily    mycophenolate (GENERIC EQUIVALENT) 250 MG capsule Take 2 capsules (500  "mg) by mouth 2 times daily    nitroGLYcerin (NITROSTAT) 0.4 MG sublingual tablet One tablet under the tongue every 5 minutes if needed for chest pain. May repeat every 5 minutes for a maximum of 3 doses in 15 minutes\"    sodium bicarbonate 650 MG tablet Take 2 tablets (1,300 mg) by mouth 2 times daily    sulfamethoxazole-trimethoprim (BACTRIM) 400-80 MG tablet Take 1 tablet by mouth daily    sulfamethoxazole-trimethoprim (BACTRIM) 400-80 MG tablet Take 1 tablet by mouth daily    thin (NO BRAND SPECIFIED) lancets Tests 3x daily Use with lanceting device. To accompany: Blood Glucose Monitor Brands: per insurance.    Vitamin D3 (CHOLECALCIFEROL) 25 mcg (1000 units) tablet Take 2 tablets (50 mcg) by mouth daily     No current facility-administered medications for this visit.     There are no discontinued medications.      Physical Exam   Vital Signs: BP (!) 162/69   Pulse 60   Temp 97.9  F (36.6  C) (Oral)   Wt 65.2 kg (143 lb 12.8 oz)   SpO2 98%   BMI 24.68 kg/m      GENERAL APPEARANCE: alert and no distress  HENT: mouth without ulcers or lesions  RESP: lungs clear to auscultation - no rales, rhonchi or wheezes  CV: regular rhythm, normal rate, no rub, 2/6 systolic murmur  EDEMA: trace LE edema bilaterally  ABDOMEN: soft, nondistended, nontender, bowel sounds normal  MS: extremities normal - no gross deformities noted, no evidence of inflammation in joints, no muscle tenderness  SKIN: no rash  TX KIDNEY: normal  DIALYSIS ACCESS: none    Data         Latest Ref Rng & Units 3/4/2024     8:46 AM 2/20/2024     8:53 AM 2/5/2024     8:50 AM   Renal   Sodium 135 - 145 mmol/L 135  137  136    K 3.4 - 5.3 mmol/L 4.3  4.3  4.6    Cl 98 - 107 mmol/L 101  103  102    Cl (external) 98 - 107 mmol/L 101  103  102    CO2 22 - 29 mmol/L 25  25  26    Urea Nitrogen 8.0 - 23.0 mg/dL 29.0  21.7  21.5    Creatinine 0.67 - 1.17 mg/dL 1.38  1.25  1.51    Glucose 70 - 99 mg/dL 121  185  119    Calcium 8.6 - 10.0 mg/dL 9.2  9.2  9.2  "         Latest Ref Rng & Units 10/30/2023     8:55 AM 8/7/2023     8:57 AM 7/31/2023     8:44 AM   Bone Health   Phosphorus 2.5 - 4.5 mg/dL 3.5  3.7  4.1          Latest Ref Rng & Units 3/4/2024     8:46 AM 2/20/2024     8:53 AM 2/5/2024     8:50 AM   Heme   WBC 4.0 - 11.0 10e3/uL 4.8  4.5  3.6    Hgb 13.3 - 17.7 g/dL 11.5  10.5  10.4    Plt 150 - 450 10e3/uL 92  96  98          Latest Ref Rng & Units 8/28/2023     9:03 AM 4/7/2023     8:10 AM 4/3/2023     8:10 AM   Liver   AP 40 - 129 U/L 88  74  69    TBili <=1.2 mg/dL 0.5  0.7  0.8    Bilirubin Direct 0.00 - 0.30 mg/dL <0.20   0.22    ALT 0 - 70 U/L 17  10  12    AST 0 - 45 U/L 26  12  14    Tot Protein 6.4 - 8.3 g/dL 6.8  6.3  6.1    Albumin 3.5 - 5.0 g/dL  3.7     Albumin 3.5 - 5.2 g/dL 4.6   3.8          Latest Ref Rng & Units 12/29/2023    10:41 AM 8/28/2023     9:03 AM 6/26/2023     8:08 AM   Pancreas   A1C <5.7 % 6.5  5.8  7.5          Latest Ref Rng & Units 8/14/2023     9:12 AM 6/26/2023     8:08 AM 6/19/2023     9:45 AM   Iron studies   Iron 61 - 157 ug/dL 40  47  54    Iron Sat Index 15 - 46 % 22  28  31    Ferritin 31 - 409 ng/mL 412  545  540          Latest Ref Rng & Units 9/25/2023     9:09 AM 7/5/2023     7:43 AM 5/1/2023     7:15 AM   [facility] Txp Virology   EBV DNA COPIES/ML Not Detected copies/mL <500  <500  <500    EBV DNA LOG OF COPIES  <2.7  <2.7  <2.7      Failed to redirect to the Timeline version of the REVFS SmartLink.  Recent Labs   Lab Test 08/28/23  0903 09/05/23  0912 09/11/23  0905   DOSTAC 8/27/2023 9/4/2023 9/10/2023   TACROL 6.9 6.5 3.8*     Recent Labs   Lab Test 02/05/24  0850 02/20/24  0853 03/04/24  0846   DOSMPA 2/4/2024   8:00 PM  --  3/3/2024   8:00 PM   MPACID 1.63 1.47 1.31   MPAG 57.1 50.1 53.7

## 2024-03-11 ENCOUNTER — OFFICE VISIT (OUTPATIENT)
Dept: TRANSPLANT | Facility: CLINIC | Age: 59
End: 2024-03-11
Attending: INTERNAL MEDICINE
Payer: COMMERCIAL

## 2024-03-11 ENCOUNTER — APPOINTMENT (OUTPATIENT)
Dept: LAB | Facility: CLINIC | Age: 59
End: 2024-03-11
Attending: INTERNAL MEDICINE
Payer: COMMERCIAL

## 2024-03-11 ENCOUNTER — TELEPHONE (OUTPATIENT)
Dept: TRANSPLANT | Facility: CLINIC | Age: 59
End: 2024-03-11

## 2024-03-11 VITALS
SYSTOLIC BLOOD PRESSURE: 162 MMHG | DIASTOLIC BLOOD PRESSURE: 69 MMHG | BODY MASS INDEX: 24.68 KG/M2 | HEART RATE: 60 BPM | WEIGHT: 143.8 LBS | OXYGEN SATURATION: 98 % | TEMPERATURE: 97.9 F

## 2024-03-11 DIAGNOSIS — D84.9 IMMUNOSUPPRESSED STATUS (H): ICD-10-CM

## 2024-03-11 DIAGNOSIS — N18.31 ANEMIA IN STAGE 3A CHRONIC KIDNEY DISEASE (H): ICD-10-CM

## 2024-03-11 DIAGNOSIS — D63.1 ANEMIA IN STAGE 3A CHRONIC KIDNEY DISEASE (H): ICD-10-CM

## 2024-03-11 DIAGNOSIS — Z94.0 KIDNEY REPLACED BY TRANSPLANT: ICD-10-CM

## 2024-03-11 DIAGNOSIS — E83.42 HYPOMAGNESEMIA: ICD-10-CM

## 2024-03-11 DIAGNOSIS — Z94.0 KIDNEY TRANSPLANTED: Primary | ICD-10-CM

## 2024-03-11 DIAGNOSIS — I15.1 HTN, KIDNEY TRANSPLANT RELATED: Primary | ICD-10-CM

## 2024-03-11 DIAGNOSIS — Z48.298 AFTERCARE FOLLOWING ORGAN TRANSPLANT: ICD-10-CM

## 2024-03-11 DIAGNOSIS — E87.20 METABOLIC ACIDOSIS: ICD-10-CM

## 2024-03-11 DIAGNOSIS — Z79.899 ENCOUNTER FOR LONG-TERM CURRENT USE OF MEDICATION: ICD-10-CM

## 2024-03-11 DIAGNOSIS — Z29.89 NEED FOR PNEUMOCYSTIS PROPHYLAXIS: ICD-10-CM

## 2024-03-11 DIAGNOSIS — E55.9 VITAMIN D DEFICIENCY: ICD-10-CM

## 2024-03-11 DIAGNOSIS — Z94.0 HTN, KIDNEY TRANSPLANT RELATED: Primary | ICD-10-CM

## 2024-03-11 DIAGNOSIS — N18.31 STAGE 3A CHRONIC KIDNEY DISEASE (H): ICD-10-CM

## 2024-03-11 DIAGNOSIS — Z98.890 OTHER SPECIFIED POSTPROCEDURAL STATES: ICD-10-CM

## 2024-03-11 PROBLEM — E86.0 DEHYDRATION: Status: RESOLVED | Noted: 2023-04-24 | Resolved: 2024-03-11

## 2024-03-11 LAB
BK VIRUS DNA IU/ML, INSTRUMENT (6800): 420 IU/ML
BK VIRUS SPECIMEN TYPE: ABNORMAL
BKV DNA SPEC NAA+PROBE-LOG#: 2.6 {LOG_COPIES}/ML

## 2024-03-11 PROCEDURE — 36415 COLL VENOUS BLD VENIPUNCTURE: CPT

## 2024-03-11 PROCEDURE — G2211 COMPLEX E/M VISIT ADD ON: HCPCS | Performed by: INTERNAL MEDICINE

## 2024-03-11 PROCEDURE — 99214 OFFICE O/P EST MOD 30 MIN: CPT | Performed by: INTERNAL MEDICINE

## 2024-03-11 PROCEDURE — 87799 DETECT AGENT NOS DNA QUANT: CPT | Performed by: INTERNAL MEDICINE

## 2024-03-11 PROCEDURE — G0463 HOSPITAL OUTPT CLINIC VISIT: HCPCS | Performed by: INTERNAL MEDICINE

## 2024-03-11 RX ORDER — LOSARTAN POTASSIUM 25 MG/1
25 TABLET ORAL AT BEDTIME
Qty: 90 TABLET | Refills: 3 | Status: SHIPPED | OUTPATIENT
Start: 2024-03-11 | End: 2024-09-17

## 2024-03-11 ASSESSMENT — PAIN SCALES - GENERAL: PAINLEVEL: NO PAIN (0)

## 2024-03-11 NOTE — NURSING NOTE
Chief Complaint   Patient presents with    RECHECK       BP (!) 162/69   Pulse 60   Temp 97.9  F (36.6  C) (Oral)   Wt 65.2 kg (143 lb 12.8 oz)   SpO2 98%   BMI 24.68 kg/m      Ever Hartman on 3/11/2024 at 11:09 AM

## 2024-03-11 NOTE — TELEPHONE ENCOUNTER
----- Message from Patrick Phan MD sent at 3/11/2024  1:01 PM CDT -----  Regarding: Clinic visit  Cyclosporine goal .    Check CD4 level.      Task   One year post kidney transplant    Please update EPIC orders and send lab letter via [employer] (daughter )

## 2024-03-11 NOTE — LETTER
3/11/2024      RE: Modesto JOE Barbosa  [address] [place] [address]       TRANSPLANT NEPHROLOGY CHRONIC POST TRANSPLANT VISIT    Assessment & Plan  # LDKT: Trend down in creatinine slightly below the ~ 1.5-1.8 range.  Kidney transplant biopsy 5/2023 showed no acute rejection.   - Baseline Creatinine: ~ 1.5-1.8   - Proteinuria: Normal (<0.2 grams),    - Date DSA Last Checked: Feb/2024      Latest DSA: No cPRA: 27%   - BK Viremia: Yes, mildly elevated (1-10K)   - Kidney Tx Biopsy: May 03, 2023; Result: No diagnostic evidence of acute rejection.   Acute tubular injury.  Severe arterial and arteriolar sclerosis.    # Immunosuppression: Cyclosporine (goal 100-125) and Mycophenolate mofetil (dose 500 mg every 12 hours)   - Continue with intensive monitoring of immunosuppression for efficacy and toxicity.   - On lower immunosuppression with stopping mycophenolate and changed tacrolimus to cyclosporine due to significantly elevated BK viremia.   - Changes: Yes - Now that patient is one year post transplant, would decrease cyclosporine goal to ~ .    # Infection Prophylaxis:   Last CD4 Level: Not checked  - PJP: Sulfa/TMP (Bactrim)    # Hypertension: Borderline control, but variable;  Goal BP: < 130/80   - Volume status: Euvolemic   - Changes: Yes - Will start losartan 25 mg nightly.   - If patient tolerates losartan and no significant change in serum creatinine or potassium, may look to increase dosing and taper off hydralazine.    # Diabetes: Controlled (HbA1c <7%) Last HbA1c: 6.5%   - Management as per primary care    # Anemia in Chronic Renal Disease: Hgb: Stable      NERY: No   - Iron studies: Low iron saturation, but high ferritin    # Thrombocytopenia: Stable, mildly low platelet level in the ~ 90-140s.  Patient does have splenomegaly.    # Mineral Bone Disorder:   - Secondary renal hyperparathyroidism; PTH level: Normal (15-65 pg/ml)        On treatment: None  - Vitamin D; level: Normal        On supplement:  Yes  - Calcium; level: Normal        On supplement: No    # Electrolytes:   - Potassium; level: Normal        On supplement: No  - Magnesium; level: Normal        On supplement: Yes  - Bicarbonate; level: Normal        On supplement: Yes    # BK Viremia: Stable, mildly elevated BK PCR at ~ 3K with last check Mar/2024 and has generally has been ~ 1-6K for the last 3 months.  This is down from peak BK PCR at ~ 560K in Sep/2023.  IgG level is normal.  Immunosuppression was significantly decreased and tacrolimus was changed to cyclosporine.   - Will continue to follow BK PCR every qmonth.    # CAD, s/p PCI: Asymptomatic.    # Asymmetric Left Ventricular Hypertrophy (HOCM): Stable. No history of sustained ventricular arrhythmias.     # Hepatitis B Core Ab +: Patient with core Ab positive, but negative Ag and also negative surface Ab.  Hep B DNA was negative at time of transplant, as well as at 4 months post transplant.     # Peripheral Neuropathy: Slightly improved symptoms on amitriptyline.    # Skin Cancer Risk:    - Discussed sun protection and recommend regular follow up with Dermatology.    # Medical Compliance: Yes    # Health Maintenance and Vaccination Review: Not Reviewed    # Transplant History:  Etiology of Kidney Failure: Diabetes mellitus type 2  Tx: LDKT  Transplant: 2/21/2023 (Kidney)  Significant changes in immunosuppression:  Decreased immunosuppression and tacrolimus changed to cyclosporine due to BK viremia.  Significant transplant-related complications: BK Viremia    Transplant Office Phone Number: [phone]    Assessment and plan was discussed with the patient and he voiced his understanding and agreement.    Return visit: Return for 6 month post transplant visit.    Patrick Phan MD    The longitudinal plan of care for kidney transplant was addressed during this visit. Due to the added complexity in care, I will continue to support Modesto Barbosa in the subsequent management of this  condition(s) and with the ongoing continuity of care of this condition(s).    Chief Complaint  Mr. Barbosa is a 58 year old here for kidney transplant and immunosuppression management.     History of Present Illness   Mr. Barbosa reports feeling good overall with some medical complaints.  Since last clinic visit, patient reports no hospitalizations or new medical complaints and has been doing well overall.  He has been bothered by a cold the last few days with sinus congestion and a cough, although symptoms are improving.  No fever.  In addition, patient does tend to have chronic sinus congestion without much improvement after starting Flonase.  He is going to discuss ENT referral with his PCP.    His energy level is improved, now pretty much normal.  He is active and does get some exercise.  Denies any chest pain or shortness of breath with exertion.  No leg swelling.  His peripheral neuropathy seems a bit better after starting on amitriptyline.    Appetite is good and weight is stable.  No nausea, vomiting or diarrhea.  No heartburn symptoms.  No fever, sweats or chills.  No night sweats.    Home BP:  140-160s systolic.    Problem List  Patient Active Problem List   Diagnosis     Type 2 diabetes mellitus without complication, with long-term current use of insulin (H)     Dyslipidemia, goal LDL below 70     HTN, kidney transplant related     Metabolic acidosis     Coronary artery disease involving native coronary artery of native heart without angina pectoris     Thrombocytopenia (H24)     Immunosuppressed status (H24)     Kidney replaced by transplant     Anemia in chronic renal disease     Hypomagnesemia     Hepatitis B core antibody positive     Aftercare following organ transplant     Need for pneumocystis prophylaxis     Vitamin D deficiency     Secondary renal hyperparathyroidism (H24)     BK viremia     Peripheral neuropathy     HOCM (hypertrophic obstructive cardiomyopathy) (H)     Stage 3a chronic kidney  disease (H)       Allergies  No Known Allergies      Medications  Current Outpatient Medications   Medication Sig     acetaminophen (TYLENOL) 325 MG tablet Take 2 tablets (650 mg) by mouth every 4 hours as needed for other (For optimal non-opioid multimodal pain management to improve pain control.)     Alcohol Swabs (ALCOHOL PREP) 70 % PADS USE 5X PER DAY     amitriptyline (ELAVIL) 25 MG tablet Take 1 tablet (25 mg) by mouth at bedtime     amLODIPine (NORVASC) 10 MG tablet Take 1 tablet (10 mg) by mouth at bedtime     aspirin (ASA) 81 MG EC tablet Take 1 tablet (81 mg) by mouth daily     atorvastatin (LIPITOR) 20 MG tablet Take 1 tablet (20 mg) by mouth daily     blood glucose (NO BRAND SPECIFIED) test strip Use to test blood sugar 3 times daily or as directed. To accompany: Blood Glucose Monitor Brands: per insurance.     blood glucose monitoring (ONE TOUCH ULTRA 2) meter device kit USE TO TEST BLOOD SUGAR THREE TIMES DAILY OR AS DIRECTED.     carvedilol (COREG) 25 MG tablet Take 0.5 tablets (12.5 mg) by mouth 2 times daily (with meals)     cycloSPORINE modified (GENERIC EQUIVALENT) 25 MG capsule Take 3 capsules (75 mg) by mouth every morning AND 2 capsules (50 mg) every evening.     fluticasone (FLONASE) 50 MCG/ACT nasal spray Spray 2 sprays into both nostrils daily     hydrALAZINE (APRESOLINE) 25 MG tablet Take 1 tablet (25 mg) by mouth 2 times daily     insulin aspart (NOVOLOG PEN) 100 UNIT/ML pen Before breaskfast and lunch, give 7 units. Before dinner give 9 units.     insulin detemir (LEVEMIR FLEXTOUCH) 100 UNIT/ML pen Inject 15-20 Units Subcutaneous At Bedtime     insulin pen needle (BD PEN NEEDLE ORESTES 2ND GEN) 32G X 4 MM miscellaneous USE TWICE DAILY     loratadine (CLARITIN) 10 MG tablet Take 1 tablet (10 mg) by mouth daily as needed for allergies (itchy/rash)     losartan (COZAAR) 25 MG tablet Take 1 tablet (25 mg) by mouth at bedtime     magnesium oxide (MAG-OX) 400 MG tablet Take 1 tablet (400 mg) by  "mouth 2 times daily     mycophenolate (GENERIC EQUIVALENT) 250 MG capsule Take 2 capsules (500 mg) by mouth 2 times daily     nitroGLYcerin (NITROSTAT) 0.4 MG sublingual tablet One tablet under the tongue every 5 minutes if needed for chest pain. May repeat every 5 minutes for a maximum of 3 doses in 15 minutes\"     sodium bicarbonate 650 MG tablet Take 2 tablets (1,300 mg) by mouth 2 times daily     sulfamethoxazole-trimethoprim (BACTRIM) 400-80 MG tablet Take 1 tablet by mouth daily     sulfamethoxazole-trimethoprim (BACTRIM) 400-80 MG tablet Take 1 tablet by mouth daily     thin (NO BRAND SPECIFIED) lancets Tests 3x daily Use with lanceting device. To accompany: Blood Glucose Monitor Brands: per insurance.     Vitamin D3 (CHOLECALCIFEROL) 25 mcg (1000 units) tablet Take 2 tablets (50 mcg) by mouth daily     No current facility-administered medications for this visit.     There are no discontinued medications.      Physical Exam  Vital Signs: BP (!) 162/69   Pulse 60   Temp 97.9  F (36.6  C) (Oral)   Wt 65.2 kg (143 lb 12.8 oz)   SpO2 98%   BMI 24.68 kg/m      GENERAL APPEARANCE: alert and no distress  HENT: mouth without ulcers or lesions  RESP: lungs clear to auscultation - no rales, rhonchi or wheezes  CV: regular rhythm, normal rate, no rub, 2/6 systolic murmur  EDEMA: trace LE edema bilaterally  ABDOMEN: soft, nondistended, nontender, bowel sounds normal  MS: extremities normal - no gross deformities noted, no evidence of inflammation in joints, no muscle tenderness  SKIN: no rash  TX KIDNEY: normal  DIALYSIS ACCESS: none    Data        Latest Ref Rng & Units 3/4/2024     8:46 AM 2/20/2024     8:53 AM 2/5/2024     8:50 AM   Renal   Sodium 135 - 145 mmol/L 135  137  136    K 3.4 - 5.3 mmol/L 4.3  4.3  4.6    Cl 98 - 107 mmol/L 101  103  102    Cl (external) 98 - 107 mmol/L 101  103  102    CO2 22 - 29 mmol/L 25  25  26    Urea Nitrogen 8.0 - 23.0 mg/dL 29.0  21.7  21.5    Creatinine 0.67 - 1.17 mg/dL 1.38 "  1.25  1.51    Glucose 70 - 99 mg/dL 121  185  119    Calcium 8.6 - 10.0 mg/dL 9.2  9.2  9.2          Latest Ref Rng & Units 10/30/2023     8:55 AM 8/7/2023     8:57 AM 7/31/2023     8:44 AM   Bone Health   Phosphorus 2.5 - 4.5 mg/dL 3.5  3.7  4.1          Latest Ref Rng & Units 3/4/2024     8:46 AM 2/20/2024     8:53 AM 2/5/2024     8:50 AM   Heme   WBC 4.0 - 11.0 10e3/uL 4.8  4.5  3.6    Hgb 13.3 - 17.7 g/dL 11.5  10.5  10.4    Plt 150 - 450 10e3/uL 92  96  98          Latest Ref Rng & Units 8/28/2023     9:03 AM 4/7/2023     8:10 AM 4/3/2023     8:10 AM   Liver   AP 40 - 129 U/L 88  74  69    TBili <=1.2 mg/dL 0.5  0.7  0.8    Bilirubin Direct 0.00 - 0.30 mg/dL <0.20   0.22    ALT 0 - 70 U/L 17  10  12    AST 0 - 45 U/L 26  12  14    Tot Protein 6.4 - 8.3 g/dL 6.8  6.3  6.1    Albumin 3.5 - 5.0 g/dL  3.7     Albumin 3.5 - 5.2 g/dL 4.6   3.8          Latest Ref Rng & Units 12/29/2023    10:41 AM 8/28/2023     9:03 AM 6/26/2023     8:08 AM   Pancreas   A1C <5.7 % 6.5  5.8  7.5          Latest Ref Rng & Units 8/14/2023     9:12 AM 6/26/2023     8:08 AM 6/19/2023     9:45 AM   Iron studies   Iron 61 - 157 ug/dL 40  47  54    Iron Sat Index 15 - 46 % 22  28  31    Ferritin 31 - 409 ng/mL 412  545  540          Latest Ref Rng & Units 9/25/2023     9:09 AM 7/5/2023     7:43 AM 5/1/2023     7:15 AM   [facility] Virology   EBV DNA COPIES/ML Not Detected copies/mL <500  <500  <500    EBV DNA LOG OF COPIES  <2.7  <2.7  <2.7      Failed to redirect to the Timeline version of the REVFS SmartLink.  Recent Labs   Lab Test 08/28/23  0903 09/05/23  0912 09/11/23  0905   DOSTAC 8/27/2023 9/4/2023 9/10/2023   TACROL 6.9 6.5 3.8*     Recent Labs   Lab Test 02/05/24  0850 02/20/24  0853 03/04/24  0846   DOSMPA 2/4/2024   8:00 PM  --  3/3/2024   8:00 PM   MPACID 1.63 1.47 1.31   MPAG 57.1 50.1 53.7       Patrick Phan MD

## 2024-03-11 NOTE — PATIENT INSTRUCTIONS
Patient Recommendations:  - Start losartan 25 mg nightly.  - Recommend checking routine transplant labs every month.   - Recommend routine follow up with Dr. Boland in 3 months or so to resume care with a goal of ongoing co-management between your primary Nephrologist and the Transplant Team.     Transplant Patient Information  Your Post Transplant Coordinator is: Shalonda Roy  For non urgent items, we encourage you to contact your coordinator/care team online via [employer]  You and your care team can also contact your transplant coordinator Monday - Friday, 8am - 5pm at [phone] (Option 2 to reach the coordinator or Option 4 to schedule an appointment).  After hours for urgent matters, please call [facility] at [phone].

## 2024-03-11 NOTE — LETTER
3/11/2024         RE: Modesto Barbosa  [address] [place] [address]        Dear Colleague,    Thank you for referring your patient, Modesto aBrbosa, to the [facility]PL[facility]. Please see a copy of my visit note below.    TRANSPLANT NEPHROLOGY CHRONIC POST TRANSPLANT VISIT    Assessment & Plan  # LDKT: Trend down in creatinine slightly below the ~ 1.5-1.8 range.  Kidney transplant biopsy 5/2023 showed no acute rejection.   - Baseline Creatinine: ~ 1.5-1.8   - Proteinuria: Normal (<0.2 grams),    - Date DSA Last Checked: Feb/2024      Latest DSA: No cPRA: 27%   - BK Viremia: Yes, mildly elevated (1-10K)   - Kidney Tx Biopsy: May 03, 2023; Result: No diagnostic evidence of acute rejection.   Acute tubular injury.  Severe arterial and arteriolar sclerosis.    # Immunosuppression: Cyclosporine (goal 100-125) and Mycophenolate mofetil (dose 500 mg every 12 hours)   - Continue with intensive monitoring of immunosuppression for efficacy and toxicity.   - On lower immunosuppression with stopping mycophenolate and changed tacrolimus to cyclosporine due to significantly elevated BK viremia.   - Changes: Yes - Now that patient is one year post transplant, would decrease cyclosporine goal to ~ .    # Infection Prophylaxis:   Last CD4 Level: Not checked  - PJP: Sulfa/TMP (Bactrim)    # Hypertension: Borderline control, but variable;  Goal BP: < 130/80   - Volume status: Euvolemic   - Changes: Yes - Will start losartan 25 mg nightly.   - If patient tolerates losartan and no significant change in serum creatinine or potassium, may look to increase dosing and taper off hydralazine.    # Diabetes: Controlled (HbA1c <7%) Last HbA1c: 6.5%   - Management as per primary care    # Anemia in Chronic Renal Disease: Hgb: Stable      NERY: No   - Iron studies: Low iron saturation, but high ferritin    # Thrombocytopenia: Stable, mildly low platelet level in the ~ 90-140s.  Patient does have splenomegaly.    # Mineral  Bone Disorder:   - Secondary renal hyperparathyroidism; PTH level: Normal (15-65 pg/ml)        On treatment: None  - Vitamin D; level: Normal        On supplement: Yes  - Calcium; level: Normal        On supplement: No    # Electrolytes:   - Potassium; level: Normal        On supplement: No  - Magnesium; level: Normal        On supplement: Yes  - Bicarbonate; level: Normal        On supplement: Yes    # BK Viremia: Stable, mildly elevated BK PCR at ~ 3K with last check Mar/2024 and has generally has been ~ 1-6K for the last 3 months.  This is down from peak BK PCR at ~ 560K in Sep/2023.  IgG level is normal.  Immunosuppression was significantly decreased and tacrolimus was changed to cyclosporine.   - Will continue to follow BK PCR every qmonth.    # CAD, s/p PCI: Asymptomatic.    # Asymmetric Left Ventricular Hypertrophy (HOCM): Stable. No history of sustained ventricular arrhythmias.     # Hepatitis B Core Ab +: Patient with core Ab positive, but negative Ag and also negative surface Ab.  Hep B DNA was negative at time of transplant, as well as at 4 months post transplant.     # Peripheral Neuropathy: Slightly improved symptoms on amitriptyline.    # Skin Cancer Risk:    - Discussed sun protection and recommend regular follow up with Dermatology.    # Medical Compliance: Yes    # Health Maintenance and Vaccination Review: Not Reviewed    # Transplant History:  Etiology of Kidney Failure: Diabetes mellitus type 2  Tx: LDKT  Transplant: 2/21/2023 (Kidney)  Significant changes in immunosuppression:  Decreased immunosuppression and tacrolimus changed to cyclosporine due to BK viremia.  Significant transplant-related complications: BK Viremia    Transplant Office Phone Number: [phone]    Assessment and plan was discussed with the patient and he voiced his understanding and agreement.    Return visit: Return for 6 month post transplant visit.    Patrick Phan MD    The longitudinal plan of care for kidney  transplant was addressed during this visit. Due to the added complexity in care, I will continue to support Modesto Barbosa in the subsequent management of this condition(s) and with the ongoing continuity of care of this condition(s).    Chief Complaint  Mr. Barbosa is a 58 year old here for kidney transplant and immunosuppression management.     History of Present Illness   Mr. Barbosa reports feeling good overall with some medical complaints.  Since last clinic visit, patient reports no hospitalizations or new medical complaints and has been doing well overall.  He has been bothered by a cold the last few days with sinus congestion and a cough, although symptoms are improving.  No fever.  In addition, patient does tend to have chronic sinus congestion without much improvement after starting Flonase.  He is going to discuss ENT referral with his PCP.    His energy level is improved, now pretty much normal.  He is active and does get some exercise.  Denies any chest pain or shortness of breath with exertion.  No leg swelling.  His peripheral neuropathy seems a bit better after starting on amitriptyline.    Appetite is good and weight is stable.  No nausea, vomiting or diarrhea.  No heartburn symptoms.  No fever, sweats or chills.  No night sweats.    Home BP:  140-160s systolic.    Problem List  Patient Active Problem List   Diagnosis     Type 2 diabetes mellitus without complication, with long-term current use of insulin (H)     Dyslipidemia, goal LDL below 70     HTN, kidney transplant related     Metabolic acidosis     Coronary artery disease involving native coronary artery of native heart without angina pectoris     Thrombocytopenia (H24)     Immunosuppressed status (H24)     Kidney replaced by transplant     Anemia in chronic renal disease     Hypomagnesemia     Hepatitis B core antibody positive     Aftercare following organ transplant     Need for pneumocystis prophylaxis     Vitamin D deficiency     Secondary  renal hyperparathyroidism (H24)     BK viremia     Peripheral neuropathy     HOCM (hypertrophic obstructive cardiomyopathy) (H)     Stage 3a chronic kidney disease (H)       Allergies  No Known Allergies      Medications  Current Outpatient Medications   Medication Sig     acetaminophen (TYLENOL) 325 MG tablet Take 2 tablets (650 mg) by mouth every 4 hours as needed for other (For optimal non-opioid multimodal pain management to improve pain control.)     Alcohol Swabs (ALCOHOL PREP) 70 % PADS USE 5X PER DAY     amitriptyline (ELAVIL) 25 MG tablet Take 1 tablet (25 mg) by mouth at bedtime     amLODIPine (NORVASC) 10 MG tablet Take 1 tablet (10 mg) by mouth at bedtime     aspirin (ASA) 81 MG EC tablet Take 1 tablet (81 mg) by mouth daily     atorvastatin (LIPITOR) 20 MG tablet Take 1 tablet (20 mg) by mouth daily     blood glucose (NO BRAND SPECIFIED) test strip Use to test blood sugar 3 times daily or as directed. To accompany: Blood Glucose Monitor Brands: per insurance.     blood glucose monitoring (ONE TOUCH ULTRA 2) meter device kit USE TO TEST BLOOD SUGAR THREE TIMES DAILY OR AS DIRECTED.     carvedilol (COREG) 25 MG tablet Take 0.5 tablets (12.5 mg) by mouth 2 times daily (with meals)     cycloSPORINE modified (GENERIC EQUIVALENT) 25 MG capsule Take 3 capsules (75 mg) by mouth every morning AND 2 capsules (50 mg) every evening.     fluticasone (FLONASE) 50 MCG/ACT nasal spray Spray 2 sprays into both nostrils daily     hydrALAZINE (APRESOLINE) 25 MG tablet Take 1 tablet (25 mg) by mouth 2 times daily     insulin aspart (NOVOLOG PEN) 100 UNIT/ML pen Before breaskfast and lunch, give 7 units. Before dinner give 9 units.     insulin detemir (LEVEMIR FLEXTOUCH) 100 UNIT/ML pen Inject 15-20 Units Subcutaneous At Bedtime     insulin pen needle (BD PEN NEEDLE ORESTES 2ND GEN) 32G X 4 MM miscellaneous USE TWICE DAILY     loratadine (CLARITIN) 10 MG tablet Take 1 tablet (10 mg) by mouth daily as needed for allergies  "(itchy/rash)     losartan (COZAAR) 25 MG tablet Take 1 tablet (25 mg) by mouth at bedtime     magnesium oxide (MAG-OX) 400 MG tablet Take 1 tablet (400 mg) by mouth 2 times daily     mycophenolate (GENERIC EQUIVALENT) 250 MG capsule Take 2 capsules (500 mg) by mouth 2 times daily     nitroGLYcerin (NITROSTAT) 0.4 MG sublingual tablet One tablet under the tongue every 5 minutes if needed for chest pain. May repeat every 5 minutes for a maximum of 3 doses in 15 minutes\"     sodium bicarbonate 650 MG tablet Take 2 tablets (1,300 mg) by mouth 2 times daily     sulfamethoxazole-trimethoprim (BACTRIM) 400-80 MG tablet Take 1 tablet by mouth daily     sulfamethoxazole-trimethoprim (BACTRIM) 400-80 MG tablet Take 1 tablet by mouth daily     thin (NO BRAND SPECIFIED) lancets Tests 3x daily Use with lanceting device. To accompany: Blood Glucose Monitor Brands: per insurance.     Vitamin D3 (CHOLECALCIFEROL) 25 mcg (1000 units) tablet Take 2 tablets (50 mcg) by mouth daily     No current facility-administered medications for this visit.     There are no discontinued medications.      Physical Exam  Vital Signs: BP (!) 162/69   Pulse 60   Temp 97.9  F (36.6  C) (Oral)   Wt 65.2 kg (143 lb 12.8 oz)   SpO2 98%   BMI 24.68 kg/m      GENERAL APPEARANCE: alert and no distress  HENT: mouth without ulcers or lesions  RESP: lungs clear to auscultation - no rales, rhonchi or wheezes  CV: regular rhythm, normal rate, no rub, 2/6 systolic murmur  EDEMA: trace LE edema bilaterally  ABDOMEN: soft, nondistended, nontender, bowel sounds normal  MS: extremities normal - no gross deformities noted, no evidence of inflammation in joints, no muscle tenderness  SKIN: no rash  TX KIDNEY: normal  DIALYSIS ACCESS: none    Data        Latest Ref Rng & Units 3/4/2024     8:46 AM 2/20/2024     8:53 AM 2/5/2024     8:50 AM   Renal   Sodium 135 - 145 mmol/L 135  137  136    K 3.4 - 5.3 mmol/L 4.3  4.3  4.6    Cl 98 - 107 mmol/L 101  103  102    Cl " (external) 98 - 107 mmol/L 101  103  102    CO2 22 - 29 mmol/L 25  25  26    Urea Nitrogen 8.0 - 23.0 mg/dL 29.0  21.7  21.5    Creatinine 0.67 - 1.17 mg/dL 1.38  1.25  1.51    Glucose 70 - 99 mg/dL 121  185  119    Calcium 8.6 - 10.0 mg/dL 9.2  9.2  9.2          Latest Ref Rng & Units 10/30/2023     8:55 AM 8/7/2023     8:57 AM 7/31/2023     8:44 AM   Bone Health   Phosphorus 2.5 - 4.5 mg/dL 3.5  3.7  4.1          Latest Ref Rng & Units 3/4/2024     8:46 AM 2/20/2024     8:53 AM 2/5/2024     8:50 AM   Heme   WBC 4.0 - 11.0 10e3/uL 4.8  4.5  3.6    Hgb 13.3 - 17.7 g/dL 11.5  10.5  10.4    Plt 150 - 450 10e3/uL 92  96  98          Latest Ref Rng & Units 8/28/2023     9:03 AM 4/7/2023     8:10 AM 4/3/2023     8:10 AM   Liver   AP 40 - 129 U/L 88  74  69    TBili <=1.2 mg/dL 0.5  0.7  0.8    Bilirubin Direct 0.00 - 0.30 mg/dL <0.20   0.22    ALT 0 - 70 U/L 17  10  12    AST 0 - 45 U/L 26  12  14    Tot Protein 6.4 - 8.3 g/dL 6.8  6.3  6.1    Albumin 3.5 - 5.0 g/dL  3.7     Albumin 3.5 - 5.2 g/dL 4.6   3.8          Latest Ref Rng & Units 12/29/2023    10:41 AM 8/28/2023     9:03 AM 6/26/2023     8:08 AM   Pancreas   A1C <5.7 % 6.5  5.8  7.5          Latest Ref Rng & Units 8/14/2023     9:12 AM 6/26/2023     8:08 AM 6/19/2023     9:45 AM   Iron studies   Iron 61 - 157 ug/dL 40  47  54    Iron Sat Index 15 - 46 % 22  28  31    Ferritin 31 - 409 ng/mL 412  545  540          Latest Ref Rng & Units 9/25/2023     9:09 AM 7/5/2023     7:43 AM 5/1/2023     7:15 AM   [facility] Txp Virology   EBV DNA COPIES/ML Not Detected copies/mL <500  <500  <500    EBV DNA LOG OF COPIES  <2.7  <2.7  <2.7      Failed to redirect to the Timeline version of the REVFS SmartLink.  Recent Labs   Lab Test 08/28/23  0903 09/05/23  0912 09/11/23  0905   DOSTAC 8/27/2023 9/4/2023 9/10/2023   TACROL 6.9 6.5 3.8*     Recent Labs   Lab Test 02/05/24  0850 02/20/24  0853 03/04/24  0846   DOSMPA 2/4/2024   8:00 PM  --  3/3/2024   8:00 PM   MPACID 1.63 1.47 1.31    MPAG 57.1 50.1 53.7         Again, thank you for allowing me to participate in the care of your patient.        Sincerely,        Patrick Phan MD

## 2024-03-11 NOTE — LETTER
OUTPATIENT LABORATORY TEST ORDER   Patient Name: Modesto Barbosa   YOB: 1965     [facility] MR# [if applicable]: [id number]   Date & Time: March 11, 2024  1:12 PM  Expiration Date: 1 year after date issued    Diagnoses: Kidney Transplant (ICD-10 Z94.0)   Long term use of medications (ICD-10 Z79.899)             Contact with or exposure to viral disease (Z20.828)            Aftercare following organ transplant (Z48.298)   Other specified postprocedural states (Z98.890)     We ask your assistance in obtaining the following laboratory tests, which are part of our routine surveillance program for Solid Organ Transplant patients.     Please fax each result to [phone], same day as resulted/available    Critical lab results page [phone]    Monthly  CBC with platelets  Basic Metabolic Panel (Sodium, Potassium, Chloride, Creatinine, CO2, Urea Nitrogen, glucose, Calcium)  Cyclosporine drug level - 12-hour trough, please document time of last dose   BK (Polyoma Virus) PCR Quantitative/Plasma    At month 13 (3/2024)   PRA/DSA (patient to provide )  T Cell Subset (CD4)    At month 18 (8/2024)   PRA / DSA (patient to provide )    Every 6 Months   Urine for protein/creatinine    If you have any questions, please call [facility] [phone] or [phone], Fax [phone].    .

## 2024-03-18 ENCOUNTER — LAB (OUTPATIENT)
Dept: LAB | Facility: HOSPITAL | Age: 59
End: 2024-03-18
Payer: COMMERCIAL

## 2024-03-18 DIAGNOSIS — Z94.0 KIDNEY TRANSPLANTED: ICD-10-CM

## 2024-03-18 DIAGNOSIS — Z94.0 KIDNEY REPLACED BY TRANSPLANT: ICD-10-CM

## 2024-03-18 DIAGNOSIS — Z48.298 AFTERCARE FOLLOWING ORGAN TRANSPLANT: ICD-10-CM

## 2024-03-18 DIAGNOSIS — Z79.899 ENCOUNTER FOR LONG-TERM CURRENT USE OF MEDICATION: ICD-10-CM

## 2024-03-18 DIAGNOSIS — Z98.890 OTHER SPECIFIED POSTPROCEDURAL STATES: ICD-10-CM

## 2024-03-18 LAB
CD3 CELLS # BLD: 557 CELLS/UL (ref 603–2990)
CD3 CELLS NFR BLD: 66 % (ref 49–84)
CD3+CD4+ CELLS # BLD: 253 CELLS/UL (ref 441–2156)
CD3+CD4+ CELLS NFR BLD: 30 % (ref 28–63)
CD3+CD4+ CELLS/CD3+CD8+ CLL BLD: 0.86 % (ref 1.4–2.6)
CD3+CD8+ CELLS # BLD: 295 CELLS/UL (ref 125–1312)
CD3+CD8+ CELLS NFR BLD: 35 % (ref 10–40)
T CELL COMMENT: ABNORMAL

## 2024-03-18 PROCEDURE — 86360 T CELL ABSOLUTE COUNT/RATIO: CPT

## 2024-03-18 PROCEDURE — 86359 T CELLS TOTAL COUNT: CPT

## 2024-03-18 PROCEDURE — 86832 HLA CLASS I HIGH DEFIN QUAL: CPT

## 2024-03-18 PROCEDURE — 86833 HLA CLASS II HIGH DEFIN QUAL: CPT

## 2024-03-18 PROCEDURE — 36415 COLL VENOUS BLD VENIPUNCTURE: CPT

## 2024-03-21 ENCOUNTER — OFFICE VISIT (OUTPATIENT)
Dept: FAMILY MEDICINE | Facility: CLINIC | Age: 59
End: 2024-03-21
Payer: COMMERCIAL

## 2024-03-21 VITALS
RESPIRATION RATE: 16 BRPM | BODY MASS INDEX: 25.12 KG/M2 | OXYGEN SATURATION: 98 % | SYSTOLIC BLOOD PRESSURE: 146 MMHG | WEIGHT: 147.13 LBS | HEART RATE: 58 BPM | HEIGHT: 64 IN | TEMPERATURE: 97.4 F | DIASTOLIC BLOOD PRESSURE: 61 MMHG

## 2024-03-21 DIAGNOSIS — R09.81 NASAL CONGESTION: Primary | ICD-10-CM

## 2024-03-21 PROCEDURE — 99213 OFFICE O/P EST LOW 20 MIN: CPT | Performed by: FAMILY MEDICINE

## 2024-03-21 RX ORDER — IPRATROPIUM BROMIDE 42 UG/1
2 SPRAY, METERED NASAL 4 TIMES DAILY PRN
Qty: 15 ML | Refills: 0 | Status: SHIPPED | OUTPATIENT
Start: 2024-03-21

## 2024-03-21 NOTE — PROGRESS NOTES
"  Assessment & Plan     1. Nasal congestion  - Adult ENT  Referral; Future  - ipratropium (ATROVENT) 0.06 % nasal spray; Spray 2 sprays into both nostrils 4 times daily as needed for other (nasal congestion)  Dispense: 15 mL; Refill: 0      Continue Flonase and antihistamine, add nasal Atrovent.  Referral placed for ENT as requested.  If symptoms not improving, see ENT for further evaluation.  Wonder if there is possible nasal septal deviation causing obstructive symptoms.    Subjective   Modesto is a 58 year old, presenting for the following health issues:  Nasal Congestion (ENT referral)        3/21/2024     8:19 AM   Additional Questions   Roomed by Patricia POWELL CMA   Accompanied by Self     HPI     NASAL CONGESTION: Patient reports stuffy nose for 5 months now.   Has used nasal spray (Flonase) daily, not helping.  Also using Claritin 10mg daily, not helping.  Left side seems to be more stuffy, right side not as bad.   When sleeping, right side also bothersome.   After kidney transplant, had some pain left ear radiating under his eye. Pain improved, but then nose became stuffy.     No rhinorrhea, no mucus when he blows his nose.   No fevers or chills. No sore throat.     Kidney transplant February 2023.       HYPERTENSION:   BP Readings from Last 6 Encounters:   03/21/24 (!) 146/61   03/11/24 (!) 162/69   02/12/24 130/70   02/05/24 (!) 154/71   01/29/24 (!) 154/69   12/29/23 (!) 153/62     Checks blood pressure at home. 120s systolic at home, sometimes up to 140s.       Review of Systems  See HPI above.         Objective    BP (!) 146/61 (BP Location: Left arm, Patient Position: Sitting, Cuff Size: Adult Regular)   Pulse 58   Temp 97.4  F (36.3  C) (Oral)   Resp 16   Ht 1.626 m (5' 4\")   Wt 66.7 kg (147 lb 2 oz)   SpO2 98%   BMI 25.25 kg/m    Body mass index is 25.25 kg/m .  Physical Exam   GENERAL: alert and no distress  HENT: Sclera white, tympanic membranes normal bilaterally, internal nares " erythematous and boggy bilaterally.  NECK: no adenopathy, no asymmetry, masses, or scars  RESP: lungs clear to auscultation - no rales, rhonchi or wheezes  CV: regular rate and rhythm, no murmurs   ABDOMEN: soft, nontender, no hepatosplenomegaly, no masses and bowel sounds normal          Signed Electronically by: Ashlee Hendrickson, DO

## 2024-03-25 NOTE — TELEPHONE ENCOUNTER
FUTURE VISIT INFORMATION      FUTURE VISIT INFORMATION:  Date: 6/6/24  Time: 1 PM  Location: [facility]  REFERRAL INFORMATION:  Referring provider:  Ashlee Hendrickson DO   Referring providers clinic:  [facility]  Reason for visit/diagnosis:  R09.81 (ICD-10-CM) - Nasal congestion, Referral from Ashlee Hendrickson, Referral notes in EPIC. Pt daughter made appt for [facility] location     RECORDS REQUESTED FROM      Clinic name Comments Records Status Imaging Status   [facility] 3/21/24 referral/ OV with Ashlee Hendrickson DO  Epic

## 2024-04-09 ENCOUNTER — LAB (OUTPATIENT)
Dept: LAB | Facility: HOSPITAL | Age: 59
End: 2024-04-09
Payer: COMMERCIAL

## 2024-04-09 DIAGNOSIS — Z94.0 KIDNEY TRANSPLANTED: ICD-10-CM

## 2024-04-09 DIAGNOSIS — Z79.899 ENCOUNTER FOR LONG-TERM CURRENT USE OF MEDICATION: ICD-10-CM

## 2024-04-09 DIAGNOSIS — Z98.890 OTHER SPECIFIED POSTPROCEDURAL STATES: ICD-10-CM

## 2024-04-09 DIAGNOSIS — Z94.0 KIDNEY REPLACED BY TRANSPLANT: ICD-10-CM

## 2024-04-09 DIAGNOSIS — Z48.298 AFTERCARE FOLLOWING ORGAN TRANSPLANT: ICD-10-CM

## 2024-04-09 LAB
ANION GAP SERPL CALCULATED.3IONS-SCNC: 9 MMOL/L (ref 7–15)
BUN SERPL-MCNC: 21.9 MG/DL (ref 8–23)
CALCIUM SERPL-MCNC: 9 MG/DL (ref 8.6–10)
CHLORIDE SERPL-SCNC: 100 MMOL/L (ref 98–107)
CREAT SERPL-MCNC: 1.34 MG/DL (ref 0.67–1.17)
CYCLOSPORINE BLD LC/MS/MS-MCNC: 102 UG/L (ref 50–400)
DEPRECATED HCO3 PLAS-SCNC: 25 MMOL/L (ref 22–29)
EGFRCR SERPLBLD CKD-EPI 2021: 61 ML/MIN/1.73M2
ERYTHROCYTE [DISTWIDTH] IN BLOOD BY AUTOMATED COUNT: 13.8 % (ref 10–15)
GLUCOSE SERPL-MCNC: 80 MG/DL (ref 70–99)
HCT VFR BLD AUTO: 33.3 % (ref 40–53)
HGB BLD-MCNC: 11 G/DL (ref 13.3–17.7)
MCH RBC QN AUTO: 26.7 PG (ref 26.5–33)
MCHC RBC AUTO-ENTMCNC: 33 G/DL (ref 31.5–36.5)
MCV RBC AUTO: 81 FL (ref 78–100)
PLATELET # BLD AUTO: 101 10E3/UL (ref 150–450)
POTASSIUM SERPL-SCNC: 4.5 MMOL/L (ref 3.4–5.3)
RBC # BLD AUTO: 4.12 10E6/UL (ref 4.4–5.9)
SODIUM SERPL-SCNC: 134 MMOL/L (ref 135–145)
TME LAST DOSE: NORMAL H
TME LAST DOSE: NORMAL H
WBC # BLD AUTO: 3 10E3/UL (ref 4–11)

## 2024-04-09 PROCEDURE — 85027 COMPLETE CBC AUTOMATED: CPT

## 2024-04-09 PROCEDURE — 80158 DRUG ASSAY CYCLOSPORINE: CPT

## 2024-04-09 PROCEDURE — 87799 DETECT AGENT NOS DNA QUANT: CPT

## 2024-04-09 PROCEDURE — 80048 BASIC METABOLIC PNL TOTAL CA: CPT

## 2024-04-09 PROCEDURE — 36415 COLL VENOUS BLD VENIPUNCTURE: CPT

## 2024-04-10 LAB
BK VIRUS DNA IU/ML, INSTRUMENT (6800): 729 IU/ML
BK VIRUS SPECIMEN TYPE: ABNORMAL
BKV DNA SPEC NAA+PROBE-LOG#: 2.9 {LOG_COPIES}/ML

## 2024-04-18 DIAGNOSIS — Z94.0 KIDNEY REPLACED BY TRANSPLANT: ICD-10-CM

## 2024-04-19 RX ORDER — ATORVASTATIN CALCIUM 20 MG/1
20 TABLET, FILM COATED ORAL DAILY
Qty: 30 TABLET | Refills: 11 | Status: SHIPPED | OUTPATIENT
Start: 2024-04-19

## 2024-04-19 RX ORDER — ASPIRIN 81 MG/1
81 TABLET ORAL DAILY
Qty: 90 TABLET | Refills: 2 | Status: SHIPPED | OUTPATIENT
Start: 2024-04-19

## 2024-04-19 RX ORDER — VITAMIN B COMPLEX
50 TABLET ORAL DAILY
Qty: 60 TABLET | Refills: 11 | Status: SHIPPED | OUTPATIENT
Start: 2024-04-19

## 2024-04-22 ENCOUNTER — LAB (OUTPATIENT)
Dept: LAB | Facility: HOSPITAL | Age: 59
End: 2024-04-22
Payer: COMMERCIAL

## 2024-04-22 DIAGNOSIS — Z98.890 OTHER SPECIFIED POSTPROCEDURAL STATES: ICD-10-CM

## 2024-04-22 DIAGNOSIS — Z94.0 KIDNEY REPLACED BY TRANSPLANT: ICD-10-CM

## 2024-04-22 DIAGNOSIS — Z48.298 AFTERCARE FOLLOWING ORGAN TRANSPLANT: ICD-10-CM

## 2024-04-22 DIAGNOSIS — Z94.0 KIDNEY TRANSPLANTED: ICD-10-CM

## 2024-04-22 DIAGNOSIS — Z79.899 ENCOUNTER FOR LONG-TERM CURRENT USE OF MEDICATION: ICD-10-CM

## 2024-04-22 LAB
ANION GAP SERPL CALCULATED.3IONS-SCNC: 10 MMOL/L (ref 7–15)
BUN SERPL-MCNC: 29.3 MG/DL (ref 8–23)
CALCIUM SERPL-MCNC: 9.1 MG/DL (ref 8.6–10)
CHLORIDE SERPL-SCNC: 104 MMOL/L (ref 98–107)
CREAT SERPL-MCNC: 1.55 MG/DL (ref 0.67–1.17)
CYCLOSPORINE BLD LC/MS/MS-MCNC: 104 UG/L (ref 50–400)
DEPRECATED HCO3 PLAS-SCNC: 23 MMOL/L (ref 22–29)
EGFRCR SERPLBLD CKD-EPI 2021: 51 ML/MIN/1.73M2
ERYTHROCYTE [DISTWIDTH] IN BLOOD BY AUTOMATED COUNT: 14 % (ref 10–15)
GLUCOSE SERPL-MCNC: 161 MG/DL (ref 70–99)
HCT VFR BLD AUTO: 31.1 % (ref 40–53)
HGB BLD-MCNC: 10.4 G/DL (ref 13.3–17.7)
MCH RBC QN AUTO: 27 PG (ref 26.5–33)
MCHC RBC AUTO-ENTMCNC: 33.4 G/DL (ref 31.5–36.5)
MCV RBC AUTO: 81 FL (ref 78–100)
PLATELET # BLD AUTO: 96 10E3/UL (ref 150–450)
POTASSIUM SERPL-SCNC: 4.5 MMOL/L (ref 3.4–5.3)
RBC # BLD AUTO: 3.85 10E6/UL (ref 4.4–5.9)
SODIUM SERPL-SCNC: 137 MMOL/L (ref 135–145)
TME LAST DOSE: NORMAL H
TME LAST DOSE: NORMAL H
WBC # BLD AUTO: 3.9 10E3/UL (ref 4–11)

## 2024-04-22 PROCEDURE — 82374 ASSAY BLOOD CARBON DIOXIDE: CPT

## 2024-04-22 PROCEDURE — 36415 COLL VENOUS BLD VENIPUNCTURE: CPT

## 2024-04-22 PROCEDURE — 85027 COMPLETE CBC AUTOMATED: CPT

## 2024-04-22 PROCEDURE — 80158 DRUG ASSAY CYCLOSPORINE: CPT

## 2024-05-13 ENCOUNTER — LAB (OUTPATIENT)
Dept: LAB | Facility: HOSPITAL | Age: 59
End: 2024-05-13
Payer: COMMERCIAL

## 2024-05-13 DIAGNOSIS — Z48.298 AFTERCARE FOLLOWING ORGAN TRANSPLANT: ICD-10-CM

## 2024-05-13 DIAGNOSIS — Z98.890 OTHER SPECIFIED POSTPROCEDURAL STATES: ICD-10-CM

## 2024-05-13 DIAGNOSIS — Z94.0 KIDNEY TRANSPLANTED: ICD-10-CM

## 2024-05-13 DIAGNOSIS — Z94.0 KIDNEY REPLACED BY TRANSPLANT: ICD-10-CM

## 2024-05-13 DIAGNOSIS — Z79.899 ENCOUNTER FOR LONG-TERM CURRENT USE OF MEDICATION: ICD-10-CM

## 2024-05-13 LAB
ANION GAP SERPL CALCULATED.3IONS-SCNC: 12 MMOL/L (ref 7–15)
BUN SERPL-MCNC: 44.1 MG/DL (ref 8–23)
CALCIUM SERPL-MCNC: 9 MG/DL (ref 8.6–10)
CHLORIDE SERPL-SCNC: 103 MMOL/L (ref 98–107)
CREAT SERPL-MCNC: 1.99 MG/DL (ref 0.67–1.17)
CYCLOSPORINE BLD LC/MS/MS-MCNC: 112 UG/L (ref 50–400)
DEPRECATED HCO3 PLAS-SCNC: 21 MMOL/L (ref 22–29)
EGFRCR SERPLBLD CKD-EPI 2021: 38 ML/MIN/1.73M2
ERYTHROCYTE [DISTWIDTH] IN BLOOD BY AUTOMATED COUNT: 14.7 % (ref 10–15)
GLUCOSE SERPL-MCNC: 167 MG/DL (ref 70–99)
HCT VFR BLD AUTO: 34.8 % (ref 40–53)
HGB BLD-MCNC: 11.6 G/DL (ref 13.3–17.7)
MCH RBC QN AUTO: 27.1 PG (ref 26.5–33)
MCHC RBC AUTO-ENTMCNC: 33.3 G/DL (ref 31.5–36.5)
MCV RBC AUTO: 81 FL (ref 78–100)
PLATELET # BLD AUTO: 109 10E3/UL (ref 150–450)
POTASSIUM SERPL-SCNC: 5 MMOL/L (ref 3.4–5.3)
RBC # BLD AUTO: 4.28 10E6/UL (ref 4.4–5.9)
SODIUM SERPL-SCNC: 136 MMOL/L (ref 135–145)
TME LAST DOSE: NORMAL H
TME LAST DOSE: NORMAL H
WBC # BLD AUTO: 4.9 10E3/UL (ref 4–11)

## 2024-05-13 PROCEDURE — 80158 DRUG ASSAY CYCLOSPORINE: CPT

## 2024-05-13 PROCEDURE — 82374 ASSAY BLOOD CARBON DIOXIDE: CPT

## 2024-05-13 PROCEDURE — 85014 HEMATOCRIT: CPT

## 2024-05-13 PROCEDURE — 36415 COLL VENOUS BLD VENIPUNCTURE: CPT

## 2024-05-13 PROCEDURE — 87799 DETECT AGENT NOS DNA QUANT: CPT

## 2024-05-14 ENCOUNTER — TELEPHONE (OUTPATIENT)
Dept: TRANSPLANT | Facility: CLINIC | Age: 59
End: 2024-05-14
Payer: COMMERCIAL

## 2024-05-14 DIAGNOSIS — Z94.0 KIDNEY REPLACED BY TRANSPLANT: Primary | ICD-10-CM

## 2024-05-14 LAB
BK VIRUS DNA IU/ML, INSTRUMENT (6800): 532 IU/ML
BK VIRUS SPECIMEN TYPE: ABNORMAL
BKV DNA SPEC NAA+PROBE-LOG#: 2.7 {LOG_COPIES}/ML

## 2024-05-14 RX ORDER — ALBUTEROL SULFATE 0.83 MG/ML
2.5 SOLUTION RESPIRATORY (INHALATION)
Status: CANCELLED | OUTPATIENT
Start: 2024-05-14

## 2024-05-14 RX ORDER — MEPERIDINE HYDROCHLORIDE 25 MG/ML
25 INJECTION INTRAMUSCULAR; INTRAVENOUS; SUBCUTANEOUS EVERY 30 MIN PRN
Status: CANCELLED | OUTPATIENT
Start: 2024-05-14

## 2024-05-14 RX ORDER — EPINEPHRINE 1 MG/ML
0.3 INJECTION, SOLUTION, CONCENTRATE INTRAVENOUS EVERY 5 MIN PRN
Status: CANCELLED | OUTPATIENT
Start: 2024-05-14

## 2024-05-14 RX ORDER — METHYLPREDNISOLONE SODIUM SUCCINATE 125 MG/2ML
125 INJECTION, POWDER, LYOPHILIZED, FOR SOLUTION INTRAMUSCULAR; INTRAVENOUS
Status: CANCELLED
Start: 2024-05-14

## 2024-05-14 RX ORDER — HEPARIN SODIUM (PORCINE) LOCK FLUSH IV SOLN 100 UNIT/ML 100 UNIT/ML
5 SOLUTION INTRAVENOUS
Status: CANCELLED | OUTPATIENT
Start: 2024-05-14

## 2024-05-14 RX ORDER — ALBUTEROL SULFATE 90 UG/1
1-2 AEROSOL, METERED RESPIRATORY (INHALATION)
Status: CANCELLED
Start: 2024-05-14

## 2024-05-14 RX ORDER — HEPARIN SODIUM,PORCINE 10 UNIT/ML
5-20 VIAL (ML) INTRAVENOUS DAILY PRN
Status: CANCELLED | OUTPATIENT
Start: 2024-05-14

## 2024-05-14 RX ORDER — DIPHENHYDRAMINE HYDROCHLORIDE 50 MG/ML
50 INJECTION INTRAMUSCULAR; INTRAVENOUS
Status: CANCELLED
Start: 2024-05-14

## 2024-05-17 NOTE — TELEPHONE ENCOUNTER
Increased Creatinine   Therapy plan  placed for  1 L  IV NS  at [facility]  Please review MyChart message

## 2024-05-18 ENCOUNTER — HEALTH MAINTENANCE LETTER (OUTPATIENT)
Age: 59
End: 2024-05-18

## 2024-05-23 ENCOUNTER — INFUSION THERAPY VISIT (OUTPATIENT)
Dept: INFUSION THERAPY | Facility: HOSPITAL | Age: 59
End: 2024-05-23
Attending: INTERNAL MEDICINE
Payer: COMMERCIAL

## 2024-05-23 VITALS
RESPIRATION RATE: 16 BRPM | TEMPERATURE: 97.4 F | SYSTOLIC BLOOD PRESSURE: 151 MMHG | DIASTOLIC BLOOD PRESSURE: 72 MMHG | OXYGEN SATURATION: 100 % | HEART RATE: 59 BPM

## 2024-05-23 DIAGNOSIS — Z94.0 KIDNEY REPLACED BY TRANSPLANT: Primary | ICD-10-CM

## 2024-05-23 DIAGNOSIS — Z94.0 KIDNEY TRANSPLANTED: ICD-10-CM

## 2024-05-23 LAB
ANION GAP SERPL CALCULATED.3IONS-SCNC: 9 MMOL/L (ref 7–15)
BUN SERPL-MCNC: 26.9 MG/DL (ref 8–23)
CALCIUM SERPL-MCNC: 8.4 MG/DL (ref 8.6–10)
CHLORIDE SERPL-SCNC: 108 MMOL/L (ref 98–107)
CREAT SERPL-MCNC: 1.48 MG/DL (ref 0.67–1.17)
DEPRECATED HCO3 PLAS-SCNC: 21 MMOL/L (ref 22–29)
EGFRCR SERPLBLD CKD-EPI 2021: 54 ML/MIN/1.73M2
ERYTHROCYTE [DISTWIDTH] IN BLOOD BY AUTOMATED COUNT: 14.8 % (ref 10–15)
GLUCOSE SERPL-MCNC: 113 MG/DL (ref 70–99)
HCT VFR BLD AUTO: 30 % (ref 40–53)
HGB BLD-MCNC: 10 G/DL (ref 13.3–17.7)
MCH RBC QN AUTO: 26.8 PG (ref 26.5–33)
MCHC RBC AUTO-ENTMCNC: 33.3 G/DL (ref 31.5–36.5)
MCV RBC AUTO: 80 FL (ref 78–100)
PLATELET # BLD AUTO: 94 10E3/UL (ref 150–450)
POTASSIUM SERPL-SCNC: 5 MMOL/L (ref 3.4–5.3)
RBC # BLD AUTO: 3.73 10E6/UL (ref 4.4–5.9)
SODIUM SERPL-SCNC: 138 MMOL/L (ref 135–145)
WBC # BLD AUTO: 4.3 10E3/UL (ref 4–11)

## 2024-05-23 PROCEDURE — 258N000003 HC RX IP 258 OP 636: Performed by: INTERNAL MEDICINE

## 2024-05-23 PROCEDURE — T1013 SIGN LANG/ORAL INTERPRETER: HCPCS | Mod: GT | Performed by: INTERPRETER

## 2024-05-23 PROCEDURE — 36415 COLL VENOUS BLD VENIPUNCTURE: CPT

## 2024-05-23 PROCEDURE — 85014 HEMATOCRIT: CPT

## 2024-05-23 PROCEDURE — 96360 HYDRATION IV INFUSION INIT: CPT

## 2024-05-23 PROCEDURE — 80048 BASIC METABOLIC PNL TOTAL CA: CPT

## 2024-05-23 RX ORDER — DIPHENHYDRAMINE HYDROCHLORIDE 50 MG/ML
50 INJECTION INTRAMUSCULAR; INTRAVENOUS
Start: 2024-05-23

## 2024-05-23 RX ORDER — ALBUTEROL SULFATE 90 UG/1
1-2 AEROSOL, METERED RESPIRATORY (INHALATION)
Start: 2024-05-23

## 2024-05-23 RX ORDER — MEPERIDINE HYDROCHLORIDE 25 MG/ML
25 INJECTION INTRAMUSCULAR; INTRAVENOUS; SUBCUTANEOUS EVERY 30 MIN PRN
OUTPATIENT
Start: 2024-05-23

## 2024-05-23 RX ORDER — EPINEPHRINE 1 MG/ML
0.3 INJECTION, SOLUTION INTRAMUSCULAR; SUBCUTANEOUS EVERY 5 MIN PRN
OUTPATIENT
Start: 2024-05-23

## 2024-05-23 RX ORDER — METHYLPREDNISOLONE SODIUM SUCCINATE 125 MG/2ML
125 INJECTION, POWDER, LYOPHILIZED, FOR SOLUTION INTRAMUSCULAR; INTRAVENOUS
Start: 2024-05-23

## 2024-05-23 RX ORDER — ALBUTEROL SULFATE 0.83 MG/ML
2.5 SOLUTION RESPIRATORY (INHALATION)
OUTPATIENT
Start: 2024-05-23

## 2024-05-23 RX ORDER — HEPARIN SODIUM (PORCINE) LOCK FLUSH IV SOLN 100 UNIT/ML 100 UNIT/ML
5 SOLUTION INTRAVENOUS
OUTPATIENT
Start: 2024-05-23

## 2024-05-23 RX ORDER — HEPARIN SODIUM,PORCINE 10 UNIT/ML
5-20 VIAL (ML) INTRAVENOUS DAILY PRN
OUTPATIENT
Start: 2024-05-23

## 2024-05-23 RX ADMIN — SODIUM CHLORIDE 1000 ML: 9 INJECTION, SOLUTION INTRAVENOUS at 08:27

## 2024-05-23 NOTE — PROGRESS NOTES
Infusion Nursing Note:  Modesto Barbosa presents today for IVF and post IVF labs.    Patient seen by provider today: No   present during visit today: Yes; Language: Hmong.    Note: Modesto arrived ambulatory and in stable condition. States feeling well. PIV placed in left forearm and good blood return noted. IVF infused. BMP and CBC drawn off of IV start following IVF administration. PIV removed and site covered with gauze and secured with coban.      Intravenous Access:  Labs drawn without difficulty.  Peripheral IV placed.    Treatment Conditions:  Lab Results   Component Value Date    HGB 10.0 (L) 05/23/2024    WBC 4.3 05/23/2024    ANEU 0.7 (L) 05/16/2023    ANEUTAUTO 3.3 11/29/2023    PLT 94 (L) 05/23/2024        Lab Results   Component Value Date     05/23/2024    POTASSIUM 5.0 05/23/2024    MAG 2.0 01/29/2024    CR 1.48 (H) 05/23/2024    REYMUNDO 8.4 (L) 05/23/2024    BILITOTAL 0.5 08/28/2023    ALBUMIN 4.6 08/28/2023    ALT 17 08/28/2023    AST 26 08/28/2023         Post Infusion Assessment:  Patient tolerated infusion without incident.  Blood return noted pre and post infusion.  Site patent and intact, free from redness, edema or discomfort.  No evidence of extravasations.  Access discontinued per protocol.       Discharge Plan:   Patient and/or family verbalized understanding of discharge instructions and all questions answered.  AVS to patient via [employer].  Patient will return as needed for next appointment.   Patient discharged in stable condition accompanied by: self.  Departure Mode: Ambulatory.      Miya Haddad RN

## 2024-06-04 DIAGNOSIS — E83.42 HYPOMAGNESEMIA: Primary | ICD-10-CM

## 2024-06-04 RX ORDER — MAGNESIUM OXIDE 400 MG/1
400 TABLET ORAL 2 TIMES DAILY
Qty: 180 TABLET | Refills: 0 | Status: SHIPPED | OUTPATIENT
Start: 2024-06-04 | End: 2024-09-03

## 2024-06-06 ENCOUNTER — OFFICE VISIT (OUTPATIENT)
Dept: OTOLARYNGOLOGY | Facility: CLINIC | Age: 59
End: 2024-06-06
Attending: FAMILY MEDICINE
Payer: COMMERCIAL

## 2024-06-06 ENCOUNTER — PREP FOR PROCEDURE (OUTPATIENT)
Dept: OTOLARYNGOLOGY | Facility: CLINIC | Age: 59
End: 2024-06-06

## 2024-06-06 ENCOUNTER — TELEPHONE (OUTPATIENT)
Dept: OTOLARYNGOLOGY | Facility: CLINIC | Age: 59
End: 2024-06-06

## 2024-06-06 ENCOUNTER — PRE VISIT (OUTPATIENT)
Dept: OTOLARYNGOLOGY | Facility: CLINIC | Age: 59
End: 2024-06-06

## 2024-06-06 ENCOUNTER — TELEPHONE (OUTPATIENT)
Dept: TRANSPLANT | Facility: CLINIC | Age: 59
End: 2024-06-06

## 2024-06-06 VITALS
SYSTOLIC BLOOD PRESSURE: 137 MMHG | HEIGHT: 64 IN | DIASTOLIC BLOOD PRESSURE: 55 MMHG | HEART RATE: 58 BPM | WEIGHT: 148.2 LBS | BODY MASS INDEX: 25.3 KG/M2

## 2024-06-06 DIAGNOSIS — J33.9 NASAL POLYPOSIS: Primary | ICD-10-CM

## 2024-06-06 DIAGNOSIS — R09.81 NASAL CONGESTION: ICD-10-CM

## 2024-06-06 DIAGNOSIS — Z94.0 KIDNEY REPLACED BY TRANSPLANT: ICD-10-CM

## 2024-06-06 PROCEDURE — T1013 SIGN LANG/ORAL INTERPRETER: HCPCS | Mod: U3 | Performed by: INTERPRETER

## 2024-06-06 PROCEDURE — 99203 OFFICE O/P NEW LOW 30 MIN: CPT | Mod: 25 | Performed by: STUDENT IN AN ORGANIZED HEALTH CARE EDUCATION/TRAINING PROGRAM

## 2024-06-06 PROCEDURE — 31231 NASAL ENDOSCOPY DX: CPT | Performed by: STUDENT IN AN ORGANIZED HEALTH CARE EDUCATION/TRAINING PROGRAM

## 2024-06-06 RX ORDER — DEXAMETHASONE SODIUM PHOSPHATE 4 MG/ML
10 INJECTION, SOLUTION INTRA-ARTICULAR; INTRALESIONAL; INTRAMUSCULAR; INTRAVENOUS; SOFT TISSUE ONCE
Status: CANCELLED | OUTPATIENT
Start: 2024-06-06 | End: 2024-06-06

## 2024-06-06 RX ORDER — MYCOPHENOLATE MOFETIL 250 MG/1
CAPSULE ORAL
Status: SHIPPED
Start: 2024-06-06 | End: 2024-07-01

## 2024-06-06 RX ORDER — CEFAZOLIN SODIUM 2 G/50ML
2 SOLUTION INTRAVENOUS
Status: CANCELLED | OUTPATIENT
Start: 2024-06-06

## 2024-06-06 RX ORDER — PREDNISONE 10 MG/1
TABLET ORAL
Qty: 46 TABLET | Refills: 0 | Status: SHIPPED | OUTPATIENT
Start: 2024-06-06 | End: 2024-07-04

## 2024-06-06 RX ORDER — CEFAZOLIN SODIUM 2 G/50ML
2 SOLUTION INTRAVENOUS SEE ADMIN INSTRUCTIONS
Status: CANCELLED | OUTPATIENT
Start: 2024-06-06

## 2024-06-06 ASSESSMENT — PAIN SCALES - GENERAL: PAINLEVEL: NO PAIN (0)

## 2024-06-06 NOTE — TELEPHONE ENCOUNTER
From: Patrick Phan MD   Sent: 6/6/2024   2:02 PM CDT   To: Bhaskar Kingston MD; Shalonda Roy RN; *     That is fine.  Let us know that exact dates.  When he is on 30, 20 and 10 mg of prednisone, I would decrease his mycophenolate to 250 mg bid so that we don't over immunosuppress him.     Spoke with patient's daughter Yanick, voiced understanding of MMD reduction x3 weeks while on prednisone taper.

## 2024-06-06 NOTE — NURSING NOTE
"Chief Complaint   Patient presents with    Consult   Blood pressure 137/55, pulse 58, height 1.626 m (5' 4\"), weight 67.2 kg (148 lb 3.2 oz). Coleman Hooper, EMT    "

## 2024-06-06 NOTE — NURSING NOTE
Teaching Flowsheet - ENT   Relevant Diagnosis: nasal congestion  Teaching Topic:Person(s) involved in teaching: patient       Motivation Level:  Asks Questions:   Yes  Eager to Learn:   Yes  Cooperative:   Yes  Receptive (willing/able to accept information):   Yes  Comments: Reviewed pre-op H and P,  NPO prior to  surgery,  pre-op scrub (given Hibiclens)  Reviewed post-op  cares , activity and pain.     Patient demonstrates understanding of the following:  Reason for the appointment, diagnosis and treatment plan:   Yes  Knowledge of proper use of medications and conditions for which they are ordered (with special attention to potential side effects or drug interactions):  stop aspirin products 1 week before surgery Yes  Which situations necessitate calling provider and whom to contact:   Yes  Nutritional needs and diet plan:   Yes  Pain management techniques:   Yes  Patient instructed on hand hygiene:  Yes  How and/when to access community resources:   Yes     Infection Prevention:  Patient   demonstrates understanding of the following:  Surgical procedure site care taught Yes  Signs and symptoms of infection taught Yes  Wound care taught Yes  Instructional Materials Used/Given: pre- op booklet,verbal instruction.

## 2024-06-06 NOTE — LETTER
6/6/2024       RE: Modesto Barbosa  [address] [place] [address]     Dear Colleague,    Thank you for referring your patient, Modesto Barbosa, to the [facility] EAR NOSE AND [facility]ROAT CLINIC [place] [facility]. Please see a copy of my visit note below.      [facility]  New Patient Visit        Encounter date:   June 6, 2024    Referring Provider:   Ashlee Hendrickson DO  [address] [place] [address]    Reason for Visit: New Patient      History of Present Illness:      Modesto Barbosa is a 58 year old male who presents for consultation regarding left greater than right nasal congestion that has been persistent for the last three months. Has been on Flonase that entire time without any improvement, has also tried multiple over the counter solutions. States that three months ago he noted a left sided frontal headache which has since improved. But ever since that headache, has not been able to breathe out the left side. Patient denies any history of significant nasal trauma. Denies history of allergies. Denies any history of prior sinus surgeries. Has a history of a kidney transplant. Otherwise, doing well. Has never tried Peter Med sinus rinses.       Of note, history was obtained using a remote [employer] .       Sino-Nasal Outcome Test (SNOT - 22)  1. Need to Blow Nose: (P) None, Moderate  2. Nasal Blockage: (P) Bad as it can be  3. Sneezing: (P) None  4. Runny Nose: (P) None  5. Cough: (P) None  6. Post-nasal discharge: (P) Very mild  7. Thick nasal discharge: (P) None  8. Ear fullness: (P) None  9. Dizziness: (P) None  10. Ear Pain: (P) None  11. Facial pain/pressure: (P) None  12. Decreased Sense of Smell/Taste: (P) None  13. Difficulty falling asleep: (P) Bad as it can be  14. Wake up at night: (P) Bad as it can be  15. Lack of a good night's sleep: (P) Bad as it can be  16. Wake up tired: (P) Bad as it can be  17. Fatigue: (P)  Severe  18. Reduced Productivity: (P) Mild or slight  19. Reduced Concentration: (P) Severe  20. Frustrated/restless/irritable: (P) Severe  21. Sad: (P) Severe  22. Embarrassed: (P) Moderate  Total Score: (P) 47       Review of Systems:  A comprehensive 14-point review of systems was performed with positives and pertinent negatives listed in the HPI.    Past Medical/Surgical History:  Reviewed today with the patient    Allergies:     No Known Allergies    Medical History:  Past Medical History:   Diagnosis Date    Acute kidney injury (H24) 6/10/2021    CKD (chronic kidney disease) stage 5, GFR less than 15 ml/min (H)     Dyslipidemia     Metabolic acidosis     Type 2 diabetes mellitus (H)         Surgical History:   Past Surgical History:   Procedure Laterality Date    BENCH KIDNEY  2/21/2023    Procedure: Bench kidney;  Surgeon: Talha Weinstein MD;  Location: [facility] OR    CV CORONARY ANGIOGRAM N/A 12/6/2021    Procedure: Coronary Angiogram;  Surgeon: Cristela Banks MD;  Location: [facility] CATH LAB CV    CV LEFT HEART CATH N/A 12/6/2021    Procedure: Left Heart Cath;  Surgeon: Cristela Banks MD;  Location: [facility] LAB CV    CV PCI N/A 12/6/2021    Procedure: Percutaneous Coronary Intervention;  Surgeon: Cristela Banks MD;  Location: [facility] CATH LAB CV    IR FINE NEEDLE ASPIRATION W ULTRASOUND  5/3/2023    IR RENAL BIOPSY RIGHT  5/3/2023    REMOVE CATHETER PERITONEAL N/A 2/21/2023    Procedure: Remove catheter peritoneal;  Surgeon: Talha Weinstein MD;  Location: [facility] OR        Family History:  Family History   Problem Relation Age of Onset    Diabetes Type 2  Mother     Other - See Comments Daughter         granular cell tumor of thigh    Heart Disease Other         Social History:   Social History     Socioeconomic History    Marital status: Patient Declined   Tobacco Use    Smoking status: Never     Passive exposure: Never    Smokeless tobacco: Never   Vaping Use    Vaping status: Never Used  "  Substance and Sexual Activity    Alcohol use: Not Currently    Drug use: Never     Social Determinants of Health     Financial Resource Strain: Low Risk  (12/27/2023)    Financial Resource Strain     Within the past 12 months, have you or your family members you live with been unable to get utilities (heat, electricity) when it was really needed?: No   Food Insecurity: Low Risk  (12/27/2023)    Food Insecurity     Within the past 12 months, did you worry that your food would run out before you got money to buy more?: No     Within the past 12 months, did the food you bought just not last and you didn t have money to get more?: No   Transportation Needs: Low Risk  (12/27/2023)    Transportation Needs     Within the past 12 months, has lack of transportation kept you from medical appointments, getting your medicines, non-medical meetings or appointments, work, or from getting things that you need?: No   Interpersonal Safety: Low Risk  (10/5/2023)    Interpersonal Safety     Do you feel physically and emotionally safe where you currently live?: Yes     Within the past 12 months, have you been hit, slapped, kicked or otherwise physically hurt by someone?: No     Within the past 12 months, have you been humiliated or emotionally abused in other ways by your partner or ex-partner?: No   Housing Stability: Low Risk  (12/27/2023)    Housing Stability     Do you have housing? : Yes     Are you worried about losing your housing?: No            Family History:  Reviewed with patient. No pertinent positives.    Physical Examination:  /55 (BP Location: Left arm, Patient Position: Sitting, Cuff Size: Adult Regular)   Pulse 58   Ht 1.626 m (5' 4\")   Wt 67.2 kg (148 lb 3.2 oz)   BMI 25.44 kg/m      Constitutional/Psychiatric: This is a well-appearing, well-dressed patient in no acute distress. male communicates easily with no obvious speech issues. Oriented to self and environment with appropriate conversation. Does not " appear depressed, anxious, or agitated.  Head: Normocephalic. Overall inspection reveals no prior scars, lesions, or masses. Facial motion is symmetric. No sinus tenderness.  Eyes: Extra-ocular motions are intact with symmetric gaze. Conjunctiva clear. No eyelid lesions. Pupils round, symmetric, and reactive to light.  Ears: Auricles without masses or lesions bilaterally. External auditory canals clear with minimal non-obstructive cerumen. Tympanic membranes intact without middle ear fluid or retraction. Hearing intact grossly at conversational volume.  Neck/Lymphatic: Flat  Respiratory: No increased work of breathing or respiratory distress. No audible stridor or stertor.  Peripheral/Cardiovascular: Brisk capillary refill bilaterally.   Neurologic: Cranial nerves II-XII grossly intact as tested.    Nasal examination: No lesions, masses, or scars of the external nose are visualized. I do not appreciate any external deviation of the bony nasal vault. External valves patent without inspiratory alar collapse. Columella is midline.      Given the patient's symptoms and the incomplete visualization of critical sinonasal areas with anterior rhinoscopy, a separately performed diagnostic nasal endoscopy procedure is indicated for a complete rhinologic evaluation per American Rhinologic Society recommendations (https://www.american-rhinologic.org/position_31231).    Procedure Note: Diagnostic Nasal Endoscopy, CPT 56754  Date of Service: June 6, 2024  Provider: Bhaskar Kingston MD   Presumptive Diagnosis: No primary diagnosis found.  Anesthesia: Topical lidocaine and oxymetazoline via atomizer    Indication:  Evaluation of nasal/sinus complaints; inadequate visualization with anterior rhinoscopy    Description of procedure:  After obtaining consent for the procedure from the patient, the sinonasal cavity was sprayed with topical anesthetic. A rigid 30-degree nasal endoscope was used to first used to visualize the nasal floor and  the nasopharynx on the left. A second pass was then made to visualize the middle meatus and sphenoethmoid recess. Finally, the scope was turned 90-degrees and used to visualize the olfactory cleft and frontal outflow tract. A similar approach was used for the contralateral side. male tolerated the procedure well without difficulty.    Endoscopic Findings:    Leslie-Kai Endoscopic Scoring System  Endoscopic observation Right Left   Polyps in middle meatus (0 = absent, 1 = restricted to middle meatus, 2 = Beyond middle meatus) 0 2   Discharge (0 = absent, 1 = thin and clear, 2 = thick, purulent) 0    Edema (0 = absent, 1 = mild-moderate, 2 = moderate-severe) 0    Crusting (0 = absent, 1 = mild-moderate, 2 = moderate-severe) 0    Scarring (0= absent, 1 = mild-moderate, 2 = moderate-severe) 0    Total 0      Findings:  RT: Significant nasal polyposis filling the entire nasal cavity, limiting evaluation.  LT: Bilateral sinonasal passages patent without evidence of infection, polyps, or mass.       Final Assessment/Plan:  58 year old male who presents with new onset of left greater than right sided nasal congestion who was found to have left sided nasal polyposis       1.  Discussed with patient that our next step is getting a sinus CT to evaluate the extent of his nasal polyps.     2.  Also discussed with patient that based on the degree of growth, I would recommend surgery to remove this as I do not think sinus rinses or oral steroids would significantly help in the long term. Will reach out to his transplant nephrology team to see if it is acceptable to start him on a course of oral steroids.    3.  We'll work to get him scheduled for left sided possible right total endoscopic sinus surgery for nasal polyposis.    I discussed the risks, benefits, and alternatives to endoscopic sinonasal surgery, including but not limited to: pain, bleeding, infection, CSF leak, orbital injury resulting in vision changes and/or vision  loss, septal perforation and/or hematoma, dental hypesthesia, facial numbness, need for continued medical management after surgery, and need for additional procedures, among others. He was allowed to ask questions, which I answered to the best of my ability. After this discussion, surgery was elected.         Scribe Disclosure:   I, Boris Baker, am serving as a scribe; to document services personally performed by Bhaskar Kingston MD -based on data collection and the provider's statements to me.     Provider Disclosure:  I agree with above History, Review of Systems, Physical exam and Plan.  I have reviewed the content of the documentation and have edited it as needed. I have personally performed the services documented here and the documentation accurately represents those services and the decisions I have made.      Electronically signed by:  Bhaskar Kingston MD    [facility]  Rhinology  Endoscopic Skull Base Surgery  [facility]  Department of Otolaryngology - Head & Neck Surgery

## 2024-06-06 NOTE — PROGRESS NOTES
[facility]  New Patient Visit        Encounter date:   June 6, 2024    Referring Provider:   Ashlee Hendrickson, DO  [address] [place] [address]    Reason for Visit: New Patient      History of Present Illness:      Modesto Barbosa is a 58 year old male who presents for consultation regarding left greater than right nasal congestion that has been persistent for the last three months. Has been on Flonase that entire time without any improvement, has also tried multiple over the counter solutions. States that three months ago he noted a left sided frontal headache which has since improved. But ever since that headache, has not been able to breathe out the left side. Patient denies any history of significant nasal trauma. Denies history of allergies. Denies any history of prior sinus surgeries. Has a history of a kidney transplant. Otherwise, doing well. Has never tried Peter Med sinus rinses.       Of note, history was obtained using a remote [employer] .       Sino-Nasal Outcome Test (SNOT - 22)  1. Need to Blow Nose: (P) None, Moderate  2. Nasal Blockage: (P) Bad as it can be  3. Sneezing: (P) None  4. Runny Nose: (P) None  5. Cough: (P) None  6. Post-nasal discharge: (P) Very mild  7. Thick nasal discharge: (P) None  8. Ear fullness: (P) None  9. Dizziness: (P) None  10. Ear Pain: (P) None  11. Facial pain/pressure: (P) None  12. Decreased Sense of Smell/Taste: (P) None  13. Difficulty falling asleep: (P) Bad as it can be  14. Wake up at night: (P) Bad as it can be  15. Lack of a good night's sleep: (P) Bad as it can be  16. Wake up tired: (P) Bad as it can be  17. Fatigue: (P) Severe  18. Reduced Productivity: (P) Mild or slight  19. Reduced Concentration: (P) Severe  20. Frustrated/restless/irritable: (P) Severe  21. Sad: (P) Severe  22. Embarrassed: (P) Moderate  Total Score: (P) 47       Review of Systems:  A comprehensive 14-point review of systems was performed with positives and  pertinent negatives listed in the HPI.    Past Medical/Surgical History:  Reviewed today with the patient    Allergies:     No Known Allergies    Medical History:  Past Medical History:   Diagnosis Date    Acute kidney injury (H24) 6/10/2021    CKD (chronic kidney disease) stage 5, GFR less than 15 ml/min (H)     Dyslipidemia     Metabolic acidosis     Type 2 diabetes mellitus (H)         Surgical History:   Past Surgical History:   Procedure Laterality Date    BENCH KIDNEY  2/21/2023    Procedure: Bench kidney;  Surgeon: Talha Weinstein MD;  Location: [facility] OR    CV CORONARY ANGIOGRAM N/A 12/6/2021    Procedure: Coronary Angiogram;  Surgeon: Cristela Banks MD;  Location: [facility] CATH LAB CV    CV LEFT HEART CATH N/A 12/6/2021    Procedure: Left Heart Cath;  Surgeon: Cristela Banks MD;  Location: [facility] CATH LAB CV    CV PCI N/A 12/6/2021    Procedure: Percutaneous Coronary Intervention;  Surgeon: Cristela Banks MD;  Location: [facility] CATH LAB CV    IR FINE NEEDLE ASPIRATION W ULTRASOUND  5/3/2023    IR RENAL BIOPSY RIGHT  5/3/2023    REMOVE CATHETER PERITONEAL N/A 2/21/2023    Procedure: Remove catheter peritoneal;  Surgeon: Talha Weinstein MD;  Location: [facility] OR        Family History:  Family History   Problem Relation Age of Onset    Diabetes Type 2  Mother     Other - See Comments Daughter         granular cell tumor of thigh    Heart Disease Other         Social History:   Social History     Socioeconomic History    Marital status: Patient Declined   Tobacco Use    Smoking status: Never     Passive exposure: Never    Smokeless tobacco: Never   Vaping Use    Vaping status: Never Used   Substance and Sexual Activity    Alcohol use: Not Currently    Drug use: Never     Social Determinants of Health     Financial Resource Strain: Low Risk  (12/27/2023)    Financial Resource Strain     Within the past 12 months, have you or your family members you live with been unable to get utilities (heat,  "electricity) when it was really needed?: No   Food Insecurity: Low Risk  (12/27/2023)    Food Insecurity     Within the past 12 months, did you worry that your food would run out before you got money to buy more?: No     Within the past 12 months, did the food you bought just not last and you didn t have money to get more?: No   Transportation Needs: Low Risk  (12/27/2023)    Transportation Needs     Within the past 12 months, has lack of transportation kept you from medical appointments, getting your medicines, non-medical meetings or appointments, work, or from getting things that you need?: No   Interpersonal Safety: Low Risk  (10/5/2023)    Interpersonal Safety     Do you feel physically and emotionally safe where you currently live?: Yes     Within the past 12 months, have you been hit, slapped, kicked or otherwise physically hurt by someone?: No     Within the past 12 months, have you been humiliated or emotionally abused in other ways by your partner or ex-partner?: No   Housing Stability: Low Risk  (12/27/2023)    Housing Stability     Do you have housing? : Yes     Are you worried about losing your housing?: No            Family History:  Reviewed with patient. No pertinent positives.    Physical Examination:  /55 (BP Location: Left arm, Patient Position: Sitting, Cuff Size: Adult Regular)   Pulse 58   Ht 1.626 m (5' 4\")   Wt 67.2 kg (148 lb 3.2 oz)   BMI 25.44 kg/m      Constitutional/Psychiatric: This is a well-appearing, well-dressed patient in no acute distress. male communicates easily with no obvious speech issues. Oriented to self and environment with appropriate conversation. Does not appear depressed, anxious, or agitated.  Head: Normocephalic. Overall inspection reveals no prior scars, lesions, or masses. Facial motion is symmetric. No sinus tenderness.  Eyes: Extra-ocular motions are intact with symmetric gaze. Conjunctiva clear. No eyelid lesions. Pupils round, symmetric, and reactive " to light.  Ears: Auricles without masses or lesions bilaterally. External auditory canals clear with minimal non-obstructive cerumen. Tympanic membranes intact without middle ear fluid or retraction. Hearing intact grossly at conversational volume.  Neck/Lymphatic: Flat  Respiratory: No increased work of breathing or respiratory distress. No audible stridor or stertor.  Peripheral/Cardiovascular: Brisk capillary refill bilaterally.   Neurologic: Cranial nerves II-XII grossly intact as tested.    Nasal examination: No lesions, masses, or scars of the external nose are visualized. I do not appreciate any external deviation of the bony nasal vault. External valves patent without inspiratory alar collapse. Columella is midline.      Given the patient's symptoms and the incomplete visualization of critical sinonasal areas with anterior rhinoscopy, a separately performed diagnostic nasal endoscopy procedure is indicated for a complete rhinologic evaluation per American Rhinologic Society recommendations (https://www.american-rhinologic.org/position_31231).    Procedure Note: Diagnostic Nasal Endoscopy, CPT 02723  Date of Service: June 6, 2024  Provider: Bhaskar Kingston MD   Presumptive Diagnosis: No primary diagnosis found.  Anesthesia: Topical lidocaine and oxymetazoline via atomizer    Indication:  Evaluation of nasal/sinus complaints; inadequate visualization with anterior rhinoscopy    Description of procedure:  After obtaining consent for the procedure from the patient, the sinonasal cavity was sprayed with topical anesthetic. A rigid 30-degree nasal endoscope was used to first used to visualize the nasal floor and the nasopharynx on the left. A second pass was then made to visualize the middle meatus and sphenoethmoid recess. Finally, the scope was turned 90-degrees and used to visualize the olfactory cleft and frontal outflow tract. A similar approach was used for the contralateral side. male tolerated the procedure  well without difficulty.    Endoscopic Findings:    Leslie-Kai Endoscopic Scoring System  Endoscopic observation Right Left   Polyps in middle meatus (0 = absent, 1 = restricted to middle meatus, 2 = Beyond middle meatus) 0 2   Discharge (0 = absent, 1 = thin and clear, 2 = thick, purulent) 0    Edema (0 = absent, 1 = mild-moderate, 2 = moderate-severe) 0    Crusting (0 = absent, 1 = mild-moderate, 2 = moderate-severe) 0    Scarring (0= absent, 1 = mild-moderate, 2 = moderate-severe) 0    Total 0      Findings:  RT: Significant nasal polyposis filling the entire nasal cavity, limiting evaluation.  LT: Bilateral sinonasal passages patent without evidence of infection, polyps, or mass.       Final Assessment/Plan:  58 year old male who presents with new onset of left greater than right sided nasal congestion who was found to have left sided nasal polyposis       1.  Discussed with patient that our next step is getting a sinus CT to evaluate the extent of his nasal polyps.     2.  Also discussed with patient that based on the degree of growth, I would recommend surgery to remove this as I do not think sinus rinses or oral steroids would significantly help in the long term. Will reach out to his transplant nephrology team to see if it is acceptable to start him on a course of oral steroids.    3.  We'll work to get him scheduled for left sided possible right total endoscopic sinus surgery for nasal polyposis.    I discussed the risks, benefits, and alternatives to endoscopic sinonasal surgery, including but not limited to: pain, bleeding, infection, CSF leak, orbital injury resulting in vision changes and/or vision loss, septal perforation and/or hematoma, dental hypesthesia, facial numbness, need for continued medical management after surgery, and need for additional procedures, among others. He was allowed to ask questions, which I answered to the best of my ability. After this discussion, surgery was elected.          Scribe Disclosure:   I, Dominicmalázaro Baker, am serving as a scribe; to document services personally performed by Bhaskar Kingston MD -based on data collection and the provider's statements to me.     Provider Disclosure:  I agree with above History, Review of Systems, Physical exam and Plan.  I have reviewed the content of the documentation and have edited it as needed. I have personally performed the services documented here and the documentation accurately represents those services and the decisions I have made.      Electronically signed by:  Bhaskar Kingston MD    [facility]  Rhinology  Endoscopic Skull Base Surgery  [facility]  Department of Otolaryngology - Head & Neck Surgery

## 2024-06-07 DIAGNOSIS — Z94.0 KIDNEY REPLACED BY TRANSPLANT: Primary | ICD-10-CM

## 2024-06-13 ENCOUNTER — HOSPITAL ENCOUNTER (OUTPATIENT)
Dept: CT IMAGING | Facility: HOSPITAL | Age: 59
Discharge: HOME | End: 2024-06-13
Attending: STUDENT IN AN ORGANIZED HEALTH CARE EDUCATION/TRAINING PROGRAM | Admitting: STUDENT IN AN ORGANIZED HEALTH CARE EDUCATION/TRAINING PROGRAM
Payer: COMMERCIAL

## 2024-06-13 DIAGNOSIS — J33.9 NASAL POLYPOSIS: ICD-10-CM

## 2024-06-13 DIAGNOSIS — R09.81 NASAL CONGESTION: ICD-10-CM

## 2024-06-13 PROCEDURE — 70486 CT MAXILLOFACIAL W/O DYE: CPT

## 2024-06-17 ENCOUNTER — PREP FOR PROCEDURE (OUTPATIENT)
Dept: OTOLARYNGOLOGY | Facility: CLINIC | Age: 59
End: 2024-06-17
Payer: COMMERCIAL

## 2024-06-17 ENCOUNTER — LAB (OUTPATIENT)
Dept: LAB | Facility: HOSPITAL | Age: 59
End: 2024-06-17
Payer: COMMERCIAL

## 2024-06-17 DIAGNOSIS — Z48.298 AFTERCARE FOLLOWING ORGAN TRANSPLANT: ICD-10-CM

## 2024-06-17 DIAGNOSIS — Z94.0 KIDNEY REPLACED BY TRANSPLANT: ICD-10-CM

## 2024-06-17 DIAGNOSIS — Z98.890 OTHER SPECIFIED POSTPROCEDURAL STATES: ICD-10-CM

## 2024-06-17 DIAGNOSIS — Z94.0 KIDNEY TRANSPLANTED: ICD-10-CM

## 2024-06-17 DIAGNOSIS — Z79.899 ENCOUNTER FOR LONG-TERM CURRENT USE OF MEDICATION: ICD-10-CM

## 2024-06-17 LAB
ANION GAP SERPL CALCULATED.3IONS-SCNC: 10 MMOL/L (ref 7–15)
BK VIRUS DNA IU/ML, INSTRUMENT (6800): 858 IU/ML
BK VIRUS SPECIMEN TYPE: ABNORMAL
BKV DNA SPEC NAA+PROBE-LOG#: 2.9 {LOG_COPIES}/ML
BUN SERPL-MCNC: 32.6 MG/DL (ref 8–23)
CALCIUM SERPL-MCNC: 9.2 MG/DL (ref 8.6–10)
CHLORIDE SERPL-SCNC: 99 MMOL/L (ref 98–107)
CREAT SERPL-MCNC: 1.31 MG/DL (ref 0.67–1.17)
CYCLOSPORINE BLD LC/MS/MS-MCNC: 87 UG/L (ref 50–400)
DEPRECATED HCO3 PLAS-SCNC: 23 MMOL/L (ref 22–29)
EGFRCR SERPLBLD CKD-EPI 2021: 63 ML/MIN/1.73M2
ERYTHROCYTE [DISTWIDTH] IN BLOOD BY AUTOMATED COUNT: 14.8 % (ref 10–15)
GLUCOSE SERPL-MCNC: 170 MG/DL (ref 70–99)
HCT VFR BLD AUTO: 35.2 % (ref 40–53)
HGB BLD-MCNC: 11.9 G/DL (ref 13.3–17.7)
MCH RBC QN AUTO: 27.6 PG (ref 26.5–33)
MCHC RBC AUTO-ENTMCNC: 33.8 G/DL (ref 31.5–36.5)
MCV RBC AUTO: 82 FL (ref 78–100)
PLATELET # BLD AUTO: 110 10E3/UL (ref 150–450)
POTASSIUM SERPL-SCNC: 4.5 MMOL/L (ref 3.4–5.3)
RBC # BLD AUTO: 4.31 10E6/UL (ref 4.4–5.9)
SODIUM SERPL-SCNC: 132 MMOL/L (ref 135–145)
TME LAST DOSE: NORMAL H
TME LAST DOSE: NORMAL H
WBC # BLD AUTO: 6.8 10E3/UL (ref 4–11)

## 2024-06-17 PROCEDURE — 80158 DRUG ASSAY CYCLOSPORINE: CPT

## 2024-06-17 PROCEDURE — 87799 DETECT AGENT NOS DNA QUANT: CPT

## 2024-06-17 PROCEDURE — 82374 ASSAY BLOOD CARBON DIOXIDE: CPT

## 2024-06-17 PROCEDURE — 85027 COMPLETE CBC AUTOMATED: CPT

## 2024-06-17 PROCEDURE — 36415 COLL VENOUS BLD VENIPUNCTURE: CPT

## 2024-06-17 PROCEDURE — 82565 ASSAY OF CREATININE: CPT

## 2024-06-20 ENCOUNTER — TELEPHONE (OUTPATIENT)
Dept: OTOLARYNGOLOGY | Facility: CLINIC | Age: 59
End: 2024-06-20
Payer: COMMERCIAL

## 2024-06-20 NOTE — TELEPHONE ENCOUNTER
Scheduled surgery with Dr. Dickson on 8/2/2024    Spoke with: Yanick Barbosa (Daughter)     Surgery is located at [facility]    Patient will be seen for their H&P by their PCP Dr. Cooper within 30 days of surgery - Confirmed PCP on file is up to date     Does patient need a consult before upcoming surgery? No: already done    Anesthesia type: General    Requested Imaging required for surgery: Yes: CT Sinus - completed on 6/13    Patient is scheduled for their 1 week post op on 8/8/2024 at 240pm & 4 week post op on 8/29/2024 at 240pm with Dr. Kingston    Patient will receive their surgery packet via [employer] per their preference    Patient was not provided a start time for surgery & is aware they will receive this information 2-3 days before surgery    Additional comments: Patient was instructed to call back with any further questions or concerns.     Vandana Moore on 6/20/2024 at 3:38 PM

## 2024-06-28 ENCOUNTER — MYC MEDICAL ADVICE (OUTPATIENT)
Dept: FAMILY MEDICINE | Facility: CLINIC | Age: 59
End: 2024-06-28
Payer: COMMERCIAL

## 2024-06-28 DIAGNOSIS — E11.22 TYPE 2 DIABETES MELLITUS WITH DIABETIC CHRONIC KIDNEY DISEASE, UNSPECIFIED CKD STAGE, UNSPECIFIED WHETHER LONG TERM INSULIN USE (H): ICD-10-CM

## 2024-06-28 RX ORDER — PEN NEEDLE, DIABETIC 32GX 5/32"
NEEDLE, DISPOSABLE MISCELLANEOUS 3 TIMES DAILY
Qty: 100 EACH | Refills: 11 | Status: SHIPPED | OUTPATIENT
Start: 2024-06-28

## 2024-06-28 NOTE — TELEPHONE ENCOUNTER
Patient requesting Karon pen needle to use for two different insulin using TID.  Karon Pen needle pen along with pharmacy below.      Please review and approve as requested.    Kennedy Hull RN  [facility]

## 2024-07-01 ENCOUNTER — MYC MEDICAL ADVICE (OUTPATIENT)
Dept: TRANSPLANT | Facility: CLINIC | Age: 59
End: 2024-07-01
Payer: COMMERCIAL

## 2024-07-01 DIAGNOSIS — Z94.0 KIDNEY REPLACED BY TRANSPLANT: Primary | ICD-10-CM

## 2024-07-01 RX ORDER — MYCOPHENOLATE MOFETIL 250 MG/1
500 CAPSULE ORAL 2 TIMES DAILY
Qty: 120 CAPSULE | Refills: 11 | Status: SHIPPED | OUTPATIENT
Start: 2024-07-01

## 2024-07-01 NOTE — TELEPHONE ENCOUNTER
"Modesto Barbosa, Shalonda GONZALES, RN  Phone Number: [phone]     Ok thanks for your help! Enjoy your 4th of July holiday.          Mycophenolate prescription  (Newest Message First)  View All Conversations on this Encounter  Modesto Barbosa  You31 minutes ago (11:10 AM)       Ok thanks for your help! Enjoy your 4th of July holiday.        You  Modesto Barbosa1 hour ago (10:42 AM)     MH  I have update the RX for   mycophenolic   sent for 11 refills  to the pharmacy         Mycophenolate mofetil appears to be active on the MAR - although \"notes  on the RX \" so may have been confusing to the pharmacy  -   I will contact the pharmacy for follow up on the words \"discontinue \" -       Thank you for contacting me -   Be Well   Shalonda Kidney Transplant Coordinator            Modesto Barbosa  You1 hour ago (10:12 AM)     \  Hi Shalonda,     My dad will run out of his Mycophenolate meds by next week. I requested more but specialty mail order pharmacy said Dr Phan canceled the proscription. My dad is back to taking 500mg twice a day this week with his last week of Prednisone 5mg once a day. Could you have him send a new prescription to the pharmacy if my dad is to continue with it?     Thank you        "

## 2024-07-15 ENCOUNTER — LAB (OUTPATIENT)
Dept: LAB | Facility: HOSPITAL | Age: 59
End: 2024-07-15
Payer: COMMERCIAL

## 2024-07-15 ENCOUNTER — TRANSFERRED RECORDS (OUTPATIENT)
Dept: HEALTH INFORMATION MANAGEMENT | Facility: CLINIC | Age: 59
End: 2024-07-15

## 2024-07-15 ENCOUNTER — OFFICE VISIT (OUTPATIENT)
Dept: FAMILY MEDICINE | Facility: CLINIC | Age: 59
End: 2024-07-15
Payer: COMMERCIAL

## 2024-07-15 VITALS
DIASTOLIC BLOOD PRESSURE: 55 MMHG | OXYGEN SATURATION: 98 % | BODY MASS INDEX: 25.44 KG/M2 | SYSTOLIC BLOOD PRESSURE: 132 MMHG | HEIGHT: 64 IN | RESPIRATION RATE: 16 BRPM | TEMPERATURE: 97.5 F | HEART RATE: 54 BPM | WEIGHT: 149.04 LBS

## 2024-07-15 DIAGNOSIS — Z48.298 AFTERCARE FOLLOWING ORGAN TRANSPLANT: ICD-10-CM

## 2024-07-15 DIAGNOSIS — J33.9 NASAL POLYPOSIS: ICD-10-CM

## 2024-07-15 DIAGNOSIS — D84.9 IMMUNOSUPPRESSED STATUS (H): ICD-10-CM

## 2024-07-15 DIAGNOSIS — R09.81 CHRONIC NASAL CONGESTION: ICD-10-CM

## 2024-07-15 DIAGNOSIS — Z94.0 KIDNEY REPLACED BY TRANSPLANT: ICD-10-CM

## 2024-07-15 DIAGNOSIS — Z79.4 TYPE 2 DIABETES MELLITUS WITH CHRONIC KIDNEY DISEASE, WITH LONG-TERM CURRENT USE OF INSULIN, UNSPECIFIED CKD STAGE (H): ICD-10-CM

## 2024-07-15 DIAGNOSIS — Z79.4 TYPE 2 DIABETES MELLITUS WITHOUT COMPLICATION, WITH LONG-TERM CURRENT USE OF INSULIN (H): ICD-10-CM

## 2024-07-15 DIAGNOSIS — E11.22 TYPE 2 DIABETES MELLITUS WITH CHRONIC KIDNEY DISEASE, WITH LONG-TERM CURRENT USE OF INSULIN, UNSPECIFIED CKD STAGE (H): ICD-10-CM

## 2024-07-15 DIAGNOSIS — Z79.899 ENCOUNTER FOR LONG-TERM CURRENT USE OF MEDICATION: ICD-10-CM

## 2024-07-15 DIAGNOSIS — E11.9 TYPE 2 DIABETES MELLITUS WITHOUT COMPLICATION, WITH LONG-TERM CURRENT USE OF INSULIN (H): ICD-10-CM

## 2024-07-15 DIAGNOSIS — Z01.818 PRE-OP EXAM: Primary | ICD-10-CM

## 2024-07-15 DIAGNOSIS — Z98.890 OTHER SPECIFIED POSTPROCEDURAL STATES: ICD-10-CM

## 2024-07-15 DIAGNOSIS — Z23 NEED FOR PROPHYLACTIC VACCINATION AGAINST HEPATITIS B VIRUS: ICD-10-CM

## 2024-07-15 DIAGNOSIS — Z94.0 KIDNEY TRANSPLANTED: ICD-10-CM

## 2024-07-15 LAB
ANION GAP SERPL CALCULATED.3IONS-SCNC: 11 MMOL/L (ref 7–15)
ATRIAL RATE - MUSE: 55 BPM
BK VIRUS DNA IU/ML, INSTRUMENT (6800): 1140 IU/ML
BK VIRUS SPECIMEN TYPE: ABNORMAL
BKV DNA SPEC NAA+PROBE-LOG#: 3.1 {LOG_COPIES}/ML
BUN SERPL-MCNC: 24.6 MG/DL (ref 8–23)
CALCIUM SERPL-MCNC: 9 MG/DL (ref 8.6–10)
CHLORIDE SERPL-SCNC: 102 MMOL/L (ref 98–107)
CREAT SERPL-MCNC: 1.34 MG/DL (ref 0.67–1.17)
CYCLOSPORINE BLD LC/MS/MS-MCNC: 84 UG/L (ref 50–400)
DEPRECATED HCO3 PLAS-SCNC: 23 MMOL/L (ref 22–29)
DIASTOLIC BLOOD PRESSURE - MUSE: NORMAL MMHG
EGFRCR SERPLBLD CKD-EPI 2021: 61 ML/MIN/1.73M2
ERYTHROCYTE [DISTWIDTH] IN BLOOD BY AUTOMATED COUNT: 13.9 % (ref 10–15)
GLUCOSE SERPL-MCNC: 85 MG/DL (ref 70–99)
HBA1C MFR BLD: 7.1 %
HCT VFR BLD AUTO: 32.9 % (ref 40–53)
HGB BLD-MCNC: 11.4 G/DL (ref 13.3–17.7)
INTERPRETATION ECG - MUSE: NORMAL
MCH RBC QN AUTO: 27.9 PG (ref 26.5–33)
MCHC RBC AUTO-ENTMCNC: 34.7 G/DL (ref 31.5–36.5)
MCV RBC AUTO: 81 FL (ref 78–100)
P AXIS - MUSE: 6 DEGREES
PLATELET # BLD AUTO: 95 10E3/UL (ref 150–450)
POTASSIUM SERPL-SCNC: 4.5 MMOL/L (ref 3.4–5.3)
PR INTERVAL - MUSE: 182 MS
QRS DURATION - MUSE: 86 MS
QT - MUSE: 442 MS
QTC - MUSE: 422 MS
R AXIS - MUSE: -19 DEGREES
RBC # BLD AUTO: 4.08 10E6/UL (ref 4.4–5.9)
SODIUM SERPL-SCNC: 136 MMOL/L (ref 135–145)
SYSTOLIC BLOOD PRESSURE - MUSE: NORMAL MMHG
T AXIS - MUSE: 41 DEGREES
TME LAST DOSE: NORMAL H
TME LAST DOSE: NORMAL H
VENTRICULAR RATE- MUSE: 55 BPM
WBC # BLD AUTO: 4.6 10E3/UL (ref 4–11)

## 2024-07-15 PROCEDURE — 93005 ELECTROCARDIOGRAM TRACING: CPT | Performed by: FAMILY MEDICINE

## 2024-07-15 PROCEDURE — 99214 OFFICE O/P EST MOD 30 MIN: CPT | Performed by: FAMILY MEDICINE

## 2024-07-15 PROCEDURE — 36415 COLL VENOUS BLD VENIPUNCTURE: CPT

## 2024-07-15 PROCEDURE — G2211 COMPLEX E/M VISIT ADD ON: HCPCS | Performed by: FAMILY MEDICINE

## 2024-07-15 PROCEDURE — 80158 DRUG ASSAY CYCLOSPORINE: CPT

## 2024-07-15 PROCEDURE — 80048 BASIC METABOLIC PNL TOTAL CA: CPT

## 2024-07-15 PROCEDURE — 85027 COMPLETE CBC AUTOMATED: CPT

## 2024-07-15 PROCEDURE — 83036 HEMOGLOBIN GLYCOSYLATED A1C: CPT

## 2024-07-15 PROCEDURE — 93010 ELECTROCARDIOGRAM REPORT: CPT | Performed by: INTERNAL MEDICINE

## 2024-07-15 PROCEDURE — 87799 DETECT AGENT NOS DNA QUANT: CPT

## 2024-07-15 NOTE — PROGRESS NOTES
Preoperative Evaluation  [address]  [place] [address]  Phone: [phone]  Fax: [phone]  Primary Provider: Pal Cooper MD  Pre-op Performing Provider: Pal Cooper MD  Jul 15, 2024           7/15/2024   Surgical Information   What procedure is being done? stealth assisted left maxillary antrostomy, total ethmoidectomy, possible sphenoidotomy, possible frontal sinususotomy   Facility or Hospital where procedure/surgery will be performed: [facility]   Who is doing the procedure / surgery? Bhaskar Kingston MD   Date of surgery / procedure: 8/2/2024   Time of surgery / procedure: 10:35 Am   Where do you plan to recover after surgery? at home with family        Fax number for surgical facility: Note does not need to be faxed, will be available electronically in Epic.    Assessment & Plan     The proposed surgical procedure is considered LOW risk.    Recommendation  Approval given to proceed with proposed procedure.    Patient is cleared for the procedure at this time.    [place] was seen today for pre-op exam.  Diagnoses and all orders for this visit:    Pre-op exam  Nasal polyposis  Chronic nasal congestion  Kidney replaced by transplant  Immunosuppressed status: stable. Cleared for surgery.  -     EKG 12-lead, tracing only    Type 2 diabetes mellitus with chronic kidney disease, with long-term current use of insulin, unspecified CKD stage (H)  -     HEMOGLOBIN A1C; Future  -     insulin glargine (LANTUS PEN) 100 UNIT/ML pen; Inject 25 Units subcutaneously at bedtime    Patient Instructions:  -You are cleared for surgery at this time.   -Hold the following medications from 7/23/2024 to the surgery: aspirin, vitamin D  -Limit/avoid usage of NSAIDs (such as ibuprofen/naproxen) within 4 days of the procedure.  -It is okay to use acetaminophen/Tylenol as needed up to the procedure.  -On the night before the surgery, inject 20 units of determir insulin.   -On  the day of the procedure, hold the following medications: aspart/novolog insulin, losartan.  -Otherwise, continue your prescribed medications as usual on the morning of the procedure. Take these medications with just sips of water, just enough for the medications to go down.  -Do not eat or drink anything after midnight the night before the procedure.   -Follow any instructions given to you from your surgeon.  If there is any conflicting recommendations, please follow the recommendations given to you from your surgery team.    -Dr. Cooper and his team will send the preoperative clearance note to the surgery team once completed.     Need for prophylactic vaccination against hepatitis B virus  -The third dose of the hepatitis B vaccine is recommended for you though it looks like the cost of the vaccination is cheapest if you get at the pharmacy.  Please connect with your pharmacist to complete the third dose when you are able.    Please seek immediate medical attention (go to the emergency room or urgent care) for the following reasons: flu like symptoms, feeling ill, or any concerning changes.    Please return to clinic as needed.      No follow-ups on file.        Venkat Salvador is a 58 year old, presenting for the following:  Pre-Op Exam          7/15/2024    11:58 AM   Additional Questions   Roomed by quinten lopez   Accompanied by self     HPI related to upcoming procedure: History of nasal congestion and noted to have nasal polyposis.  After his discussion with the surgical team, plan is for the above procedure.    Uses 25 units of determir each night.   A1c is 7.1 today. Doing well.     Mets: able to do light housework, stairs, 4 mph walk, etc. Without difficulty.          7/15/2024   Pre-Op Questionnaire   Have you ever had a heart attack or stroke? No   Have you ever had surgery on your heart or blood vessels, such as a stent placement, a coronary artery bypass, or surgery on an artery in your head, neck, heart,  or legs? (!) YES -had coronary angiogram and CAD PCI completed in 12/2021. No chest pains since.    Do you have chest pain with activity? No   Do you have a history of heart failure? No   Do you currently have a cold, bronchitis or symptoms of other infection? No   Do you have a cough, shortness of breath, or wheezing? No   Do you or anyone in your family have previous history of blood clots? No   Do you or does anyone in your family have a serious bleeding problem such as prolonged bleeding following surgeries or cuts? No   Have you ever had problems with anemia or been told to take iron pills? No   Have you had any abnormal blood loss such as black, tarry or bloody stools? No   Have you ever had a blood transfusion? No   Are you willing to have a blood transfusion if it is medically needed before, during, or after your surgery? Yes   Have you or any of your relatives ever had problems with anesthesia? No   Do you have sleep apnea, excessive snoring or daytime drowsiness? No   Do you have any artifical heart valves or other implanted medical devices like a pacemaker, defibrillator, or continuous glucose monitor? No   Do you have artificial joints? No   Are you allergic to latex? No          Patient Active Problem List    Diagnosis Date Noted    Stage 3a chronic kidney disease (H) 01/02/2024     Priority: Medium    HOCM (hypertrophic obstructive cardiomyopathy) (H) 11/05/2023     Priority: Medium    Peripheral neuropathy 10/06/2023     Priority: Medium    BK viremia 08/28/2023     Priority: Medium    Aftercare following organ transplant 05/18/2023     Priority: Medium    Need for pneumocystis prophylaxis 05/18/2023     Priority: Medium    Vitamin D deficiency 05/18/2023     Priority: Medium    Secondary renal hyperparathyroidism (H24) 05/18/2023     Priority: Medium    Immunosuppressed status (H24) 02/24/2023     Priority: Medium    Anemia in chronic renal disease 02/24/2023     Priority: Medium    Hypomagnesemia  02/24/2023     Priority: Medium    Hepatitis B core antibody positive 02/24/2023     Priority: Medium    Kidney replaced by transplant 02/21/2023     Priority: Medium    Thrombocytopenia (H24) 06/02/2022     Priority: Medium    Coronary artery disease involving native coronary artery of native heart without angina pectoris 12/16/2021     Priority: Medium    Metabolic acidosis      Priority: Medium    HTN, kidney transplant related      Priority: Medium     Created by Conversion  Replacement Utility updated for latest IMO load        Type 2 diabetes mellitus without complication, with long-term current use of insulin (H)      Priority: Medium     Created by Conversion        Dyslipidemia, goal LDL below 70      Priority: Medium     Created by Conversion          Past Medical History:   Diagnosis Date    Acute kidney injury (H24) 6/10/2021    CKD (chronic kidney disease) stage 5, GFR less than 15 ml/min (H)     Dyslipidemia     Metabolic acidosis     Type 2 diabetes mellitus (H)      Past Surgical History:   Procedure Laterality Date    BENCH KIDNEY  2/21/2023    Procedure: Bench kidney;  Surgeon: Talha Weinstein MD;  Location: [facility] OR    CV CORONARY ANGIOGRAM N/A 12/6/2021    Procedure: Coronary Angiogram;  Surgeon: Cristela Banks MD;  Location: [facility] CATH LAB CV    CV LEFT HEART CATH N/A 12/6/2021    Procedure: Left Heart Cath;  Surgeon: Cristela Banks MD;  Location: [facility] CATH LAB CV    CV PCI N/A 12/6/2021    Procedure: Percutaneous Coronary Intervention;  Surgeon: Cristela Banks MD;  Location: [facility] CATH LAB CV    IR FINE NEEDLE ASPIRATION W ULTRASOUND  5/3/2023    IR RENAL BIOPSY RIGHT  5/3/2023    REMOVE CATHETER PERITONEAL N/A 2/21/2023    Procedure: Remove catheter peritoneal;  Surgeon: Talha Weinstein MD;  Location: [facility] OR     Current Outpatient Medications   Medication Sig Dispense Refill    Alcohol Swabs (ALCOHOL PREP) 70 % PADS USE 5X PER  each 3    amitriptyline (ELAVIL) 25  MG tablet Take 1 tablet (25 mg) by mouth at bedtime 90 tablet 1    amLODIPine (NORVASC) 10 MG tablet Take 1 tablet (10 mg) by mouth at bedtime 90 tablet 3    aspirin 81 MG EC tablet Take 1 tablet (81 mg) by mouth daily 90 tablet 2    atorvastatin (LIPITOR) 20 MG tablet Take 1 tablet (20 mg) by mouth daily 30 tablet 11    blood glucose (NO BRAND SPECIFIED) test strip Use to test blood sugar 3 times daily or as directed. To accompany: Blood Glucose Monitor Brands: per insurance. 200 strip 3    blood glucose monitoring (ONE TOUCH ULTRA 2) meter device kit USE TO TEST BLOOD SUGAR THREE TIMES DAILY OR AS DIRECTED. 1 kit 0    carvedilol (COREG) 25 MG tablet Take 0.5 tablets (12.5 mg) by mouth 2 times daily (with meals) 30 tablet 11    cycloSPORINE modified (GENERIC EQUIVALENT) 25 MG capsule Take 3 capsules (75 mg) by mouth every morning AND 2 capsules (50 mg) every evening. 450 capsule 3    fluticasone (FLONASE) 50 MCG/ACT nasal spray Spray 2 sprays into both nostrils daily 9.9 mL 3    hydrALAZINE (APRESOLINE) 25 MG tablet Take 1 tablet (25 mg) by mouth 2 times daily 180 tablet 3    insulin aspart (NOVOLOG PEN) 100 UNIT/ML pen Before breaskfast and lunch, give 7 units. Before dinner give 9 units. 15 mL 3    insulin detemir (LEVEMIR FLEXTOUCH) 100 UNIT/ML pen Inject 15-20 Units Subcutaneous At Bedtime 18 mL 3    insulin glargine (LANTUS PEN) 100 UNIT/ML pen Inject 20 Units Subcutaneous at bedtime 15 mL 11    insulin pen needle (BD PEN NEEDLE ORESTES 2ND GEN) 32G X 4 MM miscellaneous Inject Subcutaneous 3 times daily 100 each 11    ipratropium (ATROVENT) 0.06 % nasal spray Spray 2 sprays into both nostrils 4 times daily as needed for other (nasal congestion) 15 mL 0    loratadine (CLARITIN) 10 MG tablet Take 1 tablet (10 mg) by mouth daily as needed for allergies (itchy/rash) 90 tablet 3    losartan (COZAAR) 25 MG tablet Take 1 tablet (25 mg) by mouth at bedtime 90 tablet 3    magnesium oxide (MAG-OX) 400 MG tablet Take 1  "tablet (400 mg) by mouth 2 times daily 180 tablet 0    mycophenolate (GENERIC EQUIVALENT) 250 MG capsule Take 2 capsules (500 mg) by mouth 2 times daily 120 capsule 11    sodium bicarbonate 650 MG tablet Take 2 tablets (1,300 mg) by mouth 2 times daily 120 tablet 11    sulfamethoxazole-trimethoprim (BACTRIM) 400-80 MG tablet Take 1 tablet by mouth daily 30 tablet 11    thin (NO BRAND SPECIFIED) lancets Tests 3x daily Use with lanceting device. To accompany: Blood Glucose Monitor Brands: per insurance. 200 each 3    Vitamin D3 (CHOLECALCIFEROL) 25 mcg (1000 units) tablet Take 2 tablets (50 mcg) by mouth daily 60 tablet 11    acetaminophen (TYLENOL) 325 MG tablet Take 2 tablets (650 mg) by mouth every 4 hours as needed for other (For optimal non-opioid multimodal pain management to improve pain control.) (Patient not taking: Reported on 3/21/2024) 30 tablet 0    nitroGLYcerin (NITROSTAT) 0.4 MG sublingual tablet One tablet under the tongue every 5 minutes if needed for chest pain. May repeat every 5 minutes for a maximum of 3 doses in 15 minutes\" (Patient not taking: Reported on 7/15/2024) 25 tablet 3    sulfamethoxazole-trimethoprim (BACTRIM) 400-80 MG tablet Take 1 tablet by mouth daily (Patient not taking: Reported on 3/21/2024) 30 tablet 11       No Known Allergies     Social History     Tobacco Use    Smoking status: Never     Passive exposure: Never    Smokeless tobacco: Never   Substance Use Topics    Alcohol use: Not Currently     Family History   Problem Relation Age of Onset    Diabetes Type 2  Mother     Other - See Comments Daughter         granular cell tumor of thigh    Heart Disease Other      History   Drug Use Unknown           The 10 point review of system was negative unless otherwise stated in the HPI.      Objective    /55   Pulse 54   Temp 97.5  F (36.4  C) (Oral)   Resp 16   Ht 1.626 m (5' 4\")   Wt 67.6 kg (149 lb 0.6 oz)   SpO2 98%   BMI 25.58 kg/m     Estimated body mass index is " "25.58 kg/m  as calculated from the following:    Height as of this encounter: 1.626 m (5' 4\").    Weight as of this encounter: 67.6 kg (149 lb 0.6 oz).  Physical Exam  GENERAL: alert and no distress  EYES: Eyes grossly normal to inspection, PERRL and conjunctivae and sclerae normal  HENT: Bilateral nasal congestion noted with mild erythema of the nasal mucosa.  Obstructed nasal passage appreciated bilaterally.  Otherwise, ear canals and TM's normal, nose and mouth without ulcers or lesions  NECK: no adenopathy, no asymmetry, masses, or scars  RESP: lungs clear to auscultation - no rales, rhonchi or wheezes  CV: regular rate and rhythm, normal S1 S2, no S3 or S4, no murmur, click or rub, no peripheral edema  ABDOMEN: soft, nontender, no hepatosplenomegaly, no masses and bowel sounds normal  MS: no gross musculoskeletal defects noted, no edema  SKIN: no suspicious lesions or rashes  NEURO: Normal strength and tone, mentation intact and speech normal  PSYCH: mentation appears normal, affect normal/bright    Recent Labs   Lab Test 07/15/24  0857 06/17/24  0915 01/02/24  0911 12/29/23  1041 09/05/23  0912 08/28/23  0903   HGB 11.4* 11.9*   < > 10.9*   < > 10.8*   PLT 95* 110*   < > 101*   < > 92*    132*   < > 131*   < > 136   POTASSIUM 4.5 4.5   < > 5.7*   < > 5.1   CR 1.34* 1.31*   < > 1.62*   < > 1.57*   A1C  --   --   --  6.5*  --  5.8*    < > = values in this interval not displayed.        Diagnostics  Labs ordered.   Results for orders placed or performed in visit on 07/15/24   EKG 12-lead, tracing only     Status: None   Result Value Ref Range    Systolic Blood Pressure  mmHg    Diastolic Blood Pressure  mmHg    Ventricular Rate 55 BPM    Atrial Rate 55 BPM    WV Interval 182 ms    QRS Duration 86 ms     ms    QTc 422 ms    P Axis 6 degrees    R AXIS -19 degrees    T Axis 41 degrees    Interpretation ECG       Sinus bradycardia  Inferior infarct (cited on or before 16-FEB-2023)  Peaked T-waves in the " precordial leads  Abnormal ECG  When compared with ECG of 16-FEB-2023 10:06,  ST elevation now present in Lateral leads  Confirmed by KEELY CONDON MD LOC: ([id number]) on 7/15/2024 4:32:04 PM     Results for orders placed or performed in visit on 07/15/24   CBC with platelets     Status: Abnormal   Result Value Ref Range    WBC Count 4.6 4.0 - 11.0 10e3/uL    RBC Count 4.08 (L) 4.40 - 5.90 10e6/uL    Hemoglobin 11.4 (L) 13.3 - 17.7 g/dL    Hematocrit 32.9 (L) 40.0 - 53.0 %    MCV 81 78 - 100 fL    MCH 27.9 26.5 - 33.0 pg    MCHC 34.7 31.5 - 36.5 g/dL    RDW 13.9 10.0 - 15.0 %    Platelet Count 95 (L) 150 - 450 10e3/uL   Basic metabolic panel     Status: Abnormal   Result Value Ref Range    Sodium 136 135 - 145 mmol/L    Potassium 4.5 3.4 - 5.3 mmol/L    Chloride 102 98 - 107 mmol/L    Carbon Dioxide (CO2) 23 22 - 29 mmol/L    Anion Gap 11 7 - 15 mmol/L    Urea Nitrogen 24.6 (H) 8.0 - 23.0 mg/dL    Creatinine 1.34 (H) 0.67 - 1.17 mg/dL    GFR Estimate 61 >60 mL/min/1.73m2    Calcium 9.0 8.6 - 10.0 mg/dL    Glucose 85 70 - 99 mg/dL   Cyclosporine by Tandem Mass Spectrometry     Status: Abnormal   Result Value Ref Range    Cyclosporine 84 50 - 400 ug/L    Cyclosporine Last Dose Date 7/14/2024     Cyclosporine Last Dose Time  8:00 PM     Narrative    This test was developed and its performance characteristics determined by the [facility],  Special Chemistry Laboratory. It has not been cleared or approved by the FDA. The laboratory is regulated under CLIA as qualified to perform high-complexity testing. This test is used for clinical purposes. It should not be regarded as investigational or for research.   HEMOGLOBIN A1C     Status: Abnormal   Result Value Ref Range    Hemoglobin A1C 7.1 (H) <5.7 %   BK virus PCR quantitative     Status: Abnormal    Specimen: Arm, Right; Blood   Result Value Ref Range    BK Virus Log 3.1     BK Virus DNA IU/mL, Instrument 1,140 (H) Not Detected IU/mL    BK Virus  Specimen Type Blood     Narrative    The bill  BKV assay is an FDA-approved, in vitro nucleic acid amplification test for the quantification of BK virus (BKV) DNA in human EDTA plasma and urine stabilized in bill  PCR Media, using the Roche bill  instrument system for automated viral nucleic acid extraction, purification, amplification, and detection of the viral nucleic acid target. This dual-target polymerase chain reaction (PCR) assay amplifies 2 highly conserved target regions within the BKV genome (small t-antigen and VP2 regions) to detect and quantify, but not discriminate BKV subtypes I, II, III and IV. The test is intended for use as an aid in the management of BKV in transplant patients. The assay is calibrated to the World Health Organization International Standard for BKV DNA. Titer results are reported in IU/mL.           EKG: Normal sinus rhythm.  No concerning ST or T wave changes.  Overall unchanged compared to previous.      Last tetanus booster:   Td/Tdap 06/26/2023 1 of 4 Boostrix 10+ yrs Full       Revised Cardiac Risk Index (RCRI)  The patient has the following serious cardiovascular risks for perioperative complications:   - Coronary Artery Disease (MI, positive stress test, angina, Qs on EKG) = 1 point   - Diabetes Mellitus (on Insulin) = 1 point     RCRI Interpretation: 2 points: Class III (moderate risk - 6.6% complication rate)     Estimated Functional Capacity: Performs 4 METS exercise without symptoms (e.g., light housework, stairs, 4 mph walk, 7 mph bike, slow step dance)       Signed Electronically by:     Pal Cooper MD  [facility] [place] [address]  Phone: [phone]  Fax: [phone]    7/15/2024  6:25 PM    Copy of this evaluation report is provided to requesting physician.

## 2024-07-15 NOTE — PATIENT INSTRUCTIONS
-Thank you for choosing the [facility].  -It was a pleasure to see you today.  -Please take a look at the information below for more specific details regarding the treatment plan and recommendations.  -At the end of this after visit summary is a list of your medications and specific instructions.  Please review this carefully as there may be changes made to your medication list.  -If there are any particular questions or concerns, please feel free to reach out to Dr. Cooper.  -If any labs have been completed, we will reach out to you about results.  If the results are normal or not concerning, a letter or MyChart message will be sent to you.  If any follow-up is needed, either Dr. Cooper or the nurse will give you a call.  If you have not heard regarding results after 2 weeks, please reach out to the clinic.    Patient is cleared for the procedure at this time.    [place] was seen today for pre-op exam.  Diagnoses and all orders for this visit:    Pre-op exam  Nasal polyposis  Chronic nasal congestion  -     EKG 12-lead, tracing only    Type 2 diabetes mellitus with chronic kidney disease, with long-term current use of insulin, unspecified CKD stage (H)  -     HEMOGLOBIN A1C; Future  -     insulin glargine (LANTUS PEN) 100 UNIT/ML pen; Inject 25 Units subcutaneously at bedtime    Patient Instructions:  -You are cleared for surgery at this time.   -Hold the following medications from 7/23/2024 to the surgery: aspirin, vitamin D  -Limit/avoid usage of NSAIDs (such as ibuprofen/naproxen) within 4 days of the procedure.  -It is okay to use acetaminophen/Tylenol as needed up to the procedure.  -On the night before the surgery, inject 20 units of determir insulin.   -On the day of the procedure, hold the following medications: aspart/novolog insulin, losartan.  -Otherwise, continue your prescribed medications as usual on the morning of the procedure. Take these medications with just sips of water, just  enough for the medications to go down.  -Do not eat or drink anything after midnight the night before the procedure.   -Follow any instructions given to you from your surgeon.  If there is any conflicting recommendations, please follow the recommendations given to you from your surgery team.    -Dr. Cooper and his team will send the preoperative clearance note to the surgery team once completed.     Need for prophylactic vaccination against hepatitis B virus  -The third dose of the hepatitis B vaccine is recommended for you though it looks like the cost of the vaccination is cheapest if you get at the pharmacy.  Please connect with your pharmacist to complete the third dose when you are able.    Please seek immediate medical attention (go to the emergency room or urgent care) for the following reasons: flu like symptoms, feeling ill, or any concerning changes.    Please return to clinic as needed.        --------------------------------------------------------------------------------------------------------------------    -We are always looking for ways to improve.  You may be selected to receive a survey regarding your visit today.  We encourage you to complete the survey and provide specific, constructive feedback to help us improve our processes.  Thank you for your time!  -Please review the contact information listed on the after visit summary and in the electronic chart.  Below is the phone number that we have on file.  If there are any changes that are needed to be made, please reach out to the clinic.  [phone] (home)

## 2024-07-16 ENCOUNTER — MYC MEDICAL ADVICE (OUTPATIENT)
Dept: TRANSPLANT | Facility: CLINIC | Age: 59
End: 2024-07-16
Payer: COMMERCIAL

## 2024-07-18 DIAGNOSIS — Z94.0 KIDNEY REPLACED BY TRANSPLANT: ICD-10-CM

## 2024-07-18 RX ORDER — SODIUM BICARBONATE 650 MG/1
1300 TABLET ORAL 2 TIMES DAILY
Qty: 120 TABLET | Refills: 11 | Status: SHIPPED | OUTPATIENT
Start: 2024-07-18

## 2024-07-30 ENCOUNTER — APPOINTMENT (OUTPATIENT)
Dept: INTERPRETER SERVICES | Facility: CLINIC | Age: 59
End: 2024-07-30
Payer: COMMERCIAL

## 2024-08-01 ENCOUNTER — ANESTHESIA EVENT (OUTPATIENT)
Dept: SURGERY | Facility: CLINIC | Age: 59
End: 2024-08-01
Payer: COMMERCIAL

## 2024-08-01 RX ORDER — ONDANSETRON 2 MG/ML
4 INJECTION INTRAMUSCULAR; INTRAVENOUS EVERY 30 MIN PRN
Status: CANCELLED | OUTPATIENT
Start: 2024-08-01

## 2024-08-01 RX ORDER — OXYCODONE HYDROCHLORIDE 10 MG/1
10 TABLET ORAL
Status: CANCELLED | OUTPATIENT
Start: 2024-08-01

## 2024-08-01 RX ORDER — OXYCODONE HYDROCHLORIDE 5 MG/1
5 TABLET ORAL
Status: CANCELLED | OUTPATIENT
Start: 2024-08-01

## 2024-08-01 RX ORDER — NALOXONE HYDROCHLORIDE 0.4 MG/ML
0.1 INJECTION, SOLUTION INTRAMUSCULAR; INTRAVENOUS; SUBCUTANEOUS
Status: CANCELLED | OUTPATIENT
Start: 2024-08-01

## 2024-08-01 RX ORDER — PHENYLEPHRINE HCL IN 0.9% NACL 50MG/250ML
.1-1.25 PLASTIC BAG, INJECTION (ML) INTRAVENOUS CONTINUOUS
Status: CANCELLED | OUTPATIENT
Start: 2024-08-01

## 2024-08-01 RX ORDER — DEXAMETHASONE SODIUM PHOSPHATE 4 MG/ML
4 INJECTION, SOLUTION INTRA-ARTICULAR; INTRALESIONAL; INTRAMUSCULAR; INTRAVENOUS; SOFT TISSUE
Status: CANCELLED | OUTPATIENT
Start: 2024-08-01

## 2024-08-01 RX ORDER — ONDANSETRON 4 MG/1
4 TABLET, ORALLY DISINTEGRATING ORAL EVERY 30 MIN PRN
Status: CANCELLED | OUTPATIENT
Start: 2024-08-01

## 2024-08-01 NOTE — ANESTHESIA PREPROCEDURE EVALUATION
Anesthesia Pre-Procedure Evaluation    Patient: Modesto Barbosa   MRN: [id number] : 1965        Procedure : Procedure(s):  stealth assisted left maxillary antrostomy, total ethmoidectomy, possible sphenoidotomy, possible frontal sinususotomy          Past Medical History:   Diagnosis Date    Acute kidney injury (H24) 6/10/2021    CKD (chronic kidney disease) stage 5, GFR less than 15 ml/min (H)     Dyslipidemia     Metabolic acidosis     Type 2 diabetes mellitus (H)       Past Surgical History:   Procedure Laterality Date    BENCH KIDNEY  2023    Procedure: Bench kidney;  Surgeon: Talha Weinstein MD;  Location: [facility] OR    CV CORONARY ANGIOGRAM N/A 2021    Procedure: Coronary Angiogram;  Surgeon: Cristela Banks MD;  Location: [facility] CATH LAB CV    CV LEFT HEART CATH N/A 2021    Procedure: Left Heart Cath;  Surgeon: Cristela Banks MD;  Location: [facility] CATH LAB CV    CV PCI N/A 2021    Procedure: Percutaneous Coronary Intervention;  Surgeon: Cristela Banks MD;  Location: [facility] CATH LAB CV    IR FINE NEEDLE ASPIRATION W ULTRASOUND  5/3/2023    IR RENAL BIOPSY RIGHT  5/3/2023    REMOVE CATHETER PERITONEAL N/A 2023    Procedure: Remove catheter peritoneal;  Surgeon: Talha Weinstein MD;  Location: [facility] OR      No Known Allergies   Social History     Tobacco Use    Smoking status: Never     Passive exposure: Never    Smokeless tobacco: Never   Substance Use Topics    Alcohol use: Not Currently      Wt Readings from Last 1 Encounters:   07/15/24 67.6 kg (149 lb 0.6 oz)        Anesthesia Evaluation   Pt has had prior anesthetic. Type: General.        ROS/MED HX  ENT/Pulmonary:       Neurologic:     (+)    peripheral neuropathy, - Peripheral neuropathy.                           Cardiovascular:     (+) Dyslipidemia hypertension- -  CAD -  - -                                      METS/Exercise Tolerance:     Hematologic:       Musculoskeletal:       GI/Hepatic:     (+) GERD,   "                 Renal/Genitourinary:     (+) renal disease, type: CRI and ARF,   Pt has history of transplant, date: 2/21/2023,        Endo:     (+) type I DM, type II DM, Last HgA1c: 7.1, date: 7/15/24, Using insulin,                 Psychiatric/Substance Use:       Infectious Disease:       Malignancy:       Other:            Physical Exam    Airway        Mallampati: I   TM distance: > 3 FB   Neck ROM: full   Mouth opening: > 3 cm    Respiratory Devices and Support         Dental       (+) Modest Abnormalities - crowns, retainers, 1 or 2 missing teeth    B=Bridge, C=Chipped, L=Loose, M=Missing    Cardiovascular   cardiovascular exam normal       Rhythm and rate: regular and normal     Pulmonary   pulmonary exam normal        breath sounds clear to auscultation       OUTSIDE LABS:  CBC:   Lab Results   Component Value Date    WBC 4.6 07/15/2024    WBC 6.8 06/17/2024    HGB 11.4 (L) 07/15/2024    HGB 11.9 (L) 06/17/2024    HCT 32.9 (L) 07/15/2024    HCT 35.2 (L) 06/17/2024    PLT 95 (L) 07/15/2024     (L) 06/17/2024     BMP:   Lab Results   Component Value Date     07/15/2024     (L) 06/17/2024    POTASSIUM 4.5 07/15/2024    POTASSIUM 4.5 06/17/2024    CHLORIDE 102 07/15/2024    CHLORIDE 99 06/17/2024    CO2 23 07/15/2024    CO2 23 06/17/2024    BUN 24.6 (H) 07/15/2024    BUN 32.6 (H) 06/17/2024    CR 1.34 (H) 07/15/2024    CR 1.31 (H) 06/17/2024    GLC 85 07/15/2024     (H) 06/17/2024     COAGS:   Lab Results   Component Value Date    PTT 42 (H) 11/17/2022    INR 1.16 (H) 05/03/2023     POC: No results found for: \"BGM\", \"HCG\", \"HCGS\"  HEPATIC:   Lab Results   Component Value Date    ALBUMIN 4.6 08/28/2023    PROTTOTAL 6.8 08/28/2023    ALT 17 08/28/2023    AST 26 08/28/2023    ALKPHOS 88 08/28/2023    BILITOTAL 0.5 08/28/2023     OTHER:   Lab Results   Component Value Date    PH 7.41 02/21/2023    LACT 3.8 (H) 02/21/2023    A1C 7.1 (H) 07/15/2024    REYMUNDO 9.0 07/15/2024    PHOS 3.5 " "10/30/2023    MAG 2.0 01/29/2024    LIPASE 124 (H) 06/10/2021       Anesthesia Plan    ASA Status:  3    NPO Status:  NPO Appropriate    Anesthesia Type: General.     - Airway: ETT   Induction: Intravenous, Propofol.   Maintenance: Balanced.   Techniques and Equipment:     - Lines/Monitors: BIS (BP/CO monitoring with Clear Sight)     Consents    Anesthesia Plan(s) and associated risks, benefits, and realistic alternatives discussed. Questions answered and patient/representative(s) expressed understanding.     - Discussed: Risks, Benefits and Alternatives for the PROCEDURE were discussed     - Discussed with:  Patient,       - Extended Intubation/Ventilatory Support Discussed: No.      - Patient is DNR/DNI Status: No     Use of blood products discussed: No .     Postoperative Care    Pain management: IV analgesics, Oral pain medications, Multi-modal analgesia.   PONV prophylaxis: Ondansetron (or other 5HT-3), Dexamethasone or Solumedrol     Comments:               Alfa Spaulding MD    I have reviewed the pertinent notes and labs in the chart from the past 30 days and (re)examined the patient.  Any updates or changes from those notes are reflected in this note.             # Thrombocytopenia: Lowest platelets = 95 in last 30 days, will monitor for bleeding  # DMII: A1C = 7.1 % (Ref range: <5.7 %) within past 6 months  # Overweight: Estimated body mass index is 25.58 kg/m  as calculated from the following:    Height as of 7/15/24: 1.626 m (5' 4\").    Weight as of 7/15/24: 67.6 kg (149 lb 0.6 oz).      "

## 2024-08-02 ENCOUNTER — ANESTHESIA (OUTPATIENT)
Dept: SURGERY | Facility: CLINIC | Age: 59
End: 2024-08-02
Payer: COMMERCIAL

## 2024-08-02 ENCOUNTER — HOSPITAL ENCOUNTER (OUTPATIENT)
Facility: CLINIC | Age: 59
Discharge: HOME | End: 2024-08-02
Attending: STUDENT IN AN ORGANIZED HEALTH CARE EDUCATION/TRAINING PROGRAM | Admitting: STUDENT IN AN ORGANIZED HEALTH CARE EDUCATION/TRAINING PROGRAM
Payer: COMMERCIAL

## 2024-08-02 ENCOUNTER — OFFICE VISIT (OUTPATIENT)
Dept: INTERPRETER SERVICES | Facility: CLINIC | Age: 59
End: 2024-08-02
Attending: STUDENT IN AN ORGANIZED HEALTH CARE EDUCATION/TRAINING PROGRAM
Payer: COMMERCIAL

## 2024-08-02 VITALS
WEIGHT: 149.91 LBS | TEMPERATURE: 98.2 F | SYSTOLIC BLOOD PRESSURE: 143 MMHG | HEART RATE: 66 BPM | HEIGHT: 64 IN | OXYGEN SATURATION: 97 % | BODY MASS INDEX: 25.59 KG/M2 | DIASTOLIC BLOOD PRESSURE: 64 MMHG | RESPIRATION RATE: 17 BRPM

## 2024-08-02 DIAGNOSIS — Z98.890 POST-OPERATIVE STATE: ICD-10-CM

## 2024-08-02 DIAGNOSIS — J33.9 NASAL POLYPOSIS: Primary | ICD-10-CM

## 2024-08-02 LAB
GLUCOSE BLDC GLUCOMTR-MCNC: 151 MG/DL (ref 70–99)
GLUCOSE BLDC GLUCOMTR-MCNC: 183 MG/DL (ref 70–99)

## 2024-08-02 PROCEDURE — 710N000009 HC RECOVERY PHASE 1, LEVEL 1, PER MIN: Performed by: STUDENT IN AN ORGANIZED HEALTH CARE EDUCATION/TRAINING PROGRAM

## 2024-08-02 PROCEDURE — 258N000003 HC RX IP 258 OP 636

## 2024-08-02 PROCEDURE — 31267 ENDOSCOPY MAXILLARY SINUS: CPT | Mod: 50 | Performed by: STUDENT IN AN ORGANIZED HEALTH CARE EDUCATION/TRAINING PROGRAM

## 2024-08-02 PROCEDURE — 250N000009 HC RX 250

## 2024-08-02 PROCEDURE — 31259 NSL/SINS NDSC SPHN TISS RMVL: CPT | Performed by: ANESTHESIOLOGY

## 2024-08-02 PROCEDURE — 250N000011 HC RX IP 250 OP 636: Performed by: STUDENT IN AN ORGANIZED HEALTH CARE EDUCATION/TRAINING PROGRAM

## 2024-08-02 PROCEDURE — 82962 GLUCOSE BLOOD TEST: CPT

## 2024-08-02 PROCEDURE — 31259 NSL/SINS NDSC SPHN TISS RMVL: CPT | Mod: LT | Performed by: STUDENT IN AN ORGANIZED HEALTH CARE EDUCATION/TRAINING PROGRAM

## 2024-08-02 PROCEDURE — 360N000079 HC SURGERY LEVEL 6, PER MIN: Performed by: STUDENT IN AN ORGANIZED HEALTH CARE EDUCATION/TRAINING PROGRAM

## 2024-08-02 PROCEDURE — 272N000002 HC OR SUPPLY OTHER OPNP: Performed by: STUDENT IN AN ORGANIZED HEALTH CARE EDUCATION/TRAINING PROGRAM

## 2024-08-02 PROCEDURE — 88305 TISSUE EXAM BY PATHOLOGIST: CPT | Mod: 26 | Performed by: PATHOLOGY

## 2024-08-02 PROCEDURE — T1013 SIGN LANG/ORAL INTERPRETER: HCPCS | Mod: U3 | Performed by: INTERPRETER

## 2024-08-02 PROCEDURE — 370N000017 HC ANESTHESIA TECHNICAL FEE, PER MIN: Performed by: STUDENT IN AN ORGANIZED HEALTH CARE EDUCATION/TRAINING PROGRAM

## 2024-08-02 PROCEDURE — 88311 DECALCIFY TISSUE: CPT | Mod: TC | Performed by: STUDENT IN AN ORGANIZED HEALTH CARE EDUCATION/TRAINING PROGRAM

## 2024-08-02 PROCEDURE — 88311 DECALCIFY TISSUE: CPT | Mod: 26 | Performed by: PATHOLOGY

## 2024-08-02 PROCEDURE — 31259 NSL/SINS NDSC SPHN TISS RMVL: CPT | Performed by: NURSE ANESTHETIST, CERTIFIED REGISTERED

## 2024-08-02 PROCEDURE — 272N000001 HC OR GENERAL SUPPLY STERILE: Performed by: STUDENT IN AN ORGANIZED HEALTH CARE EDUCATION/TRAINING PROGRAM

## 2024-08-02 PROCEDURE — 999N000141 HC STATISTIC PRE-PROCEDURE NURSING ASSESSMENT: Performed by: STUDENT IN AN ORGANIZED HEALTH CARE EDUCATION/TRAINING PROGRAM

## 2024-08-02 PROCEDURE — 710N000012 HC RECOVERY PHASE 2, PER MINUTE: Performed by: STUDENT IN AN ORGANIZED HEALTH CARE EDUCATION/TRAINING PROGRAM

## 2024-08-02 PROCEDURE — 250N000011 HC RX IP 250 OP 636

## 2024-08-02 PROCEDURE — 250N000025 HC SEVOFLURANE, PER MIN: Performed by: STUDENT IN AN ORGANIZED HEALTH CARE EDUCATION/TRAINING PROGRAM

## 2024-08-02 PROCEDURE — 61782 SCAN PROC CRANIAL EXTRA: CPT | Mod: GC | Performed by: STUDENT IN AN ORGANIZED HEALTH CARE EDUCATION/TRAINING PROGRAM

## 2024-08-02 RX ORDER — FENTANYL CITRATE 50 UG/ML
25 INJECTION, SOLUTION INTRAMUSCULAR; INTRAVENOUS EVERY 5 MIN PRN
Status: DISCONTINUED | OUTPATIENT
Start: 2024-08-02 | End: 2024-08-02 | Stop reason: HOSPADM

## 2024-08-02 RX ORDER — CEFAZOLIN SODIUM/WATER 2 G/20 ML
2 SYRINGE (ML) INTRAVENOUS
Status: DISCONTINUED | OUTPATIENT
Start: 2024-08-02 | End: 2024-08-02 | Stop reason: HOSPADM

## 2024-08-02 RX ORDER — NALOXONE HYDROCHLORIDE 0.4 MG/ML
0.1 INJECTION, SOLUTION INTRAMUSCULAR; INTRAVENOUS; SUBCUTANEOUS
Status: DISCONTINUED | OUTPATIENT
Start: 2024-08-02 | End: 2024-08-02 | Stop reason: HOSPADM

## 2024-08-02 RX ORDER — DEXAMETHASONE SODIUM PHOSPHATE 10 MG/ML
10 INJECTION, SOLUTION INTRAMUSCULAR; INTRAVENOUS ONCE
Status: DISCONTINUED | OUTPATIENT
Start: 2024-08-02 | End: 2024-08-02 | Stop reason: HOSPADM

## 2024-08-02 RX ORDER — CEFAZOLIN SODIUM/WATER 2 G/20 ML
2 SYRINGE (ML) INTRAVENOUS SEE ADMIN INSTRUCTIONS
Status: DISCONTINUED | OUTPATIENT
Start: 2024-08-02 | End: 2024-08-02 | Stop reason: HOSPADM

## 2024-08-02 RX ORDER — FENTANYL CITRATE 50 UG/ML
50 INJECTION, SOLUTION INTRAMUSCULAR; INTRAVENOUS EVERY 5 MIN PRN
Status: DISCONTINUED | OUTPATIENT
Start: 2024-08-02 | End: 2024-08-02 | Stop reason: HOSPADM

## 2024-08-02 RX ORDER — ONDANSETRON 2 MG/ML
INJECTION INTRAMUSCULAR; INTRAVENOUS PRN
Status: DISCONTINUED | OUTPATIENT
Start: 2024-08-02 | End: 2024-08-02

## 2024-08-02 RX ORDER — PROPOFOL 10 MG/ML
INJECTION, EMULSION INTRAVENOUS PRN
Status: DISCONTINUED | OUTPATIENT
Start: 2024-08-02 | End: 2024-08-02

## 2024-08-02 RX ORDER — LIDOCAINE 40 MG/G
CREAM TOPICAL
Status: DISCONTINUED | OUTPATIENT
Start: 2024-08-02 | End: 2024-08-02 | Stop reason: HOSPADM

## 2024-08-02 RX ORDER — OXYCODONE HYDROCHLORIDE 5 MG/1
5 TABLET ORAL EVERY 6 HOURS PRN
Qty: 15 TABLET | Refills: 0 | Status: SHIPPED | OUTPATIENT
Start: 2024-08-02 | End: 2024-08-07

## 2024-08-02 RX ORDER — SODIUM CHLORIDE, SODIUM LACTATE, POTASSIUM CHLORIDE, CALCIUM CHLORIDE 600; 310; 30; 20 MG/100ML; MG/100ML; MG/100ML; MG/100ML
INJECTION, SOLUTION INTRAVENOUS CONTINUOUS
Status: DISCONTINUED | OUTPATIENT
Start: 2024-08-02 | End: 2024-08-02 | Stop reason: HOSPADM

## 2024-08-02 RX ORDER — EPINEPHRINE 1 MG/ML
INJECTION INTRAMUSCULAR; INTRAVENOUS; SUBCUTANEOUS PRN
Status: DISCONTINUED | OUTPATIENT
Start: 2024-08-02 | End: 2024-08-02 | Stop reason: HOSPADM

## 2024-08-02 RX ORDER — ONDANSETRON 4 MG/1
4 TABLET, ORALLY DISINTEGRATING ORAL EVERY 30 MIN PRN
Status: DISCONTINUED | OUTPATIENT
Start: 2024-08-02 | End: 2024-08-02 | Stop reason: HOSPADM

## 2024-08-02 RX ORDER — FENTANYL CITRATE 50 UG/ML
INJECTION, SOLUTION INTRAMUSCULAR; INTRAVENOUS PRN
Status: DISCONTINUED | OUTPATIENT
Start: 2024-08-02 | End: 2024-08-02

## 2024-08-02 RX ORDER — HYDROMORPHONE HCL IN WATER/PF 6 MG/30 ML
0.2 PATIENT CONTROLLED ANALGESIA SYRINGE INTRAVENOUS EVERY 5 MIN PRN
Status: DISCONTINUED | OUTPATIENT
Start: 2024-08-02 | End: 2024-08-02 | Stop reason: HOSPADM

## 2024-08-02 RX ORDER — HYDROMORPHONE HCL IN WATER/PF 6 MG/30 ML
0.4 PATIENT CONTROLLED ANALGESIA SYRINGE INTRAVENOUS EVERY 5 MIN PRN
Status: DISCONTINUED | OUTPATIENT
Start: 2024-08-02 | End: 2024-08-02 | Stop reason: HOSPADM

## 2024-08-02 RX ORDER — ONDANSETRON 2 MG/ML
4 INJECTION INTRAMUSCULAR; INTRAVENOUS EVERY 30 MIN PRN
Status: DISCONTINUED | OUTPATIENT
Start: 2024-08-02 | End: 2024-08-02 | Stop reason: HOSPADM

## 2024-08-02 RX ORDER — LIDOCAINE HYDROCHLORIDE 20 MG/ML
INJECTION, SOLUTION INFILTRATION; PERINEURAL PRN
Status: DISCONTINUED | OUTPATIENT
Start: 2024-08-02 | End: 2024-08-02

## 2024-08-02 RX ORDER — DEXAMETHASONE SODIUM PHOSPHATE 4 MG/ML
4 INJECTION, SOLUTION INTRA-ARTICULAR; INTRALESIONAL; INTRAMUSCULAR; INTRAVENOUS; SOFT TISSUE
Status: DISCONTINUED | OUTPATIENT
Start: 2024-08-02 | End: 2024-08-02 | Stop reason: HOSPADM

## 2024-08-02 RX ORDER — DEXAMETHASONE SODIUM PHOSPHATE 4 MG/ML
INJECTION, SOLUTION INTRA-ARTICULAR; INTRALESIONAL; INTRAMUSCULAR; INTRAVENOUS; SOFT TISSUE PRN
Status: DISCONTINUED | OUTPATIENT
Start: 2024-08-02 | End: 2024-08-02

## 2024-08-02 RX ADMIN — SODIUM CHLORIDE, POTASSIUM CHLORIDE, SODIUM LACTATE AND CALCIUM CHLORIDE: 600; 310; 30; 20 INJECTION, SOLUTION INTRAVENOUS at 12:35

## 2024-08-02 RX ADMIN — FENTANYL CITRATE 50 MCG: 50 INJECTION INTRAMUSCULAR; INTRAVENOUS at 12:46

## 2024-08-02 RX ADMIN — PHENYLEPHRINE HYDROCHLORIDE 100 MCG: 10 INJECTION INTRAVENOUS at 11:06

## 2024-08-02 RX ADMIN — PHENYLEPHRINE HYDROCHLORIDE 100 MCG: 10 INJECTION INTRAVENOUS at 11:05

## 2024-08-02 RX ADMIN — ONDANSETRON 4 MG: 2 INJECTION INTRAMUSCULAR; INTRAVENOUS at 13:08

## 2024-08-02 RX ADMIN — PROPOFOL 60 MG: 10 INJECTION, EMULSION INTRAVENOUS at 10:53

## 2024-08-02 RX ADMIN — DEXAMETHASONE SODIUM PHOSPHATE 10 MG: 4 INJECTION, SOLUTION INTRA-ARTICULAR; INTRALESIONAL; INTRAMUSCULAR; INTRAVENOUS; SOFT TISSUE at 10:54

## 2024-08-02 RX ADMIN — Medication 50 MG: at 10:54

## 2024-08-02 RX ADMIN — DEXMEDETOMIDINE HYDROCHLORIDE 4 MCG: 100 INJECTION, SOLUTION INTRAVENOUS at 13:08

## 2024-08-02 RX ADMIN — SODIUM CHLORIDE, POTASSIUM CHLORIDE, SODIUM LACTATE AND CALCIUM CHLORIDE: 600; 310; 30; 20 INJECTION, SOLUTION INTRAVENOUS at 10:42

## 2024-08-02 RX ADMIN — SODIUM CHLORIDE, POTASSIUM CHLORIDE, SODIUM LACTATE AND CALCIUM CHLORIDE: 600; 310; 30; 20 INJECTION, SOLUTION INTRAVENOUS at 14:36

## 2024-08-02 RX ADMIN — MIDAZOLAM 2 MG: 1 INJECTION INTRAMUSCULAR; INTRAVENOUS at 10:45

## 2024-08-02 RX ADMIN — DEXMEDETOMIDINE HYDROCHLORIDE 4 MCG: 100 INJECTION, SOLUTION INTRAVENOUS at 13:13

## 2024-08-02 RX ADMIN — Medication 200 MG: at 13:33

## 2024-08-02 RX ADMIN — LIDOCAINE HYDROCHLORIDE 100 MG: 20 INJECTION, SOLUTION INFILTRATION; PERINEURAL at 10:52

## 2024-08-02 RX ADMIN — DEXMEDETOMIDINE HYDROCHLORIDE 4 MCG: 100 INJECTION, SOLUTION INTRAVENOUS at 13:33

## 2024-08-02 RX ADMIN — FENTANYL CITRATE 100 MCG: 50 INJECTION INTRAMUSCULAR; INTRAVENOUS at 10:53

## 2024-08-02 RX ADMIN — PROPOFOL 40 MG: 10 INJECTION, EMULSION INTRAVENOUS at 10:56

## 2024-08-02 RX ADMIN — DEXMEDETOMIDINE HYDROCHLORIDE 4 MCG: 100 INJECTION, SOLUTION INTRAVENOUS at 13:23

## 2024-08-02 RX ADMIN — PHENYLEPHRINE HYDROCHLORIDE 0.5 MCG/KG/MIN: 10 INJECTION INTRAVENOUS at 11:06

## 2024-08-02 RX ADMIN — DEXMEDETOMIDINE HYDROCHLORIDE 4 MCG: 100 INJECTION, SOLUTION INTRAVENOUS at 13:18

## 2024-08-02 RX ADMIN — Medication 2 G: at 10:56

## 2024-08-02 ASSESSMENT — ACTIVITIES OF DAILY LIVING (ADL)
ADLS_ACUITY_SCORE: 34
ADLS_ACUITY_SCORE: 33
ADLS_ACUITY_SCORE: 33
ADLS_ACUITY_SCORE: 34
ADLS_ACUITY_SCORE: 33

## 2024-08-02 NOTE — OR NURSING
Writer paged Dr. Yeh regarding patient blood glucose of 183. MD prefers not to treat it at this time since the patient is a same day patient and will be discharge to home soon.    Herbert BRENNER

## 2024-08-02 NOTE — OP NOTE
DATE OF PROCEDURE:  August 2, 2024   ATTENDING SURGEON: Bhaskar Kingston MD  ASSISTANT SURGEON: Getachew Quintanilla MD     PREOPERATIVE DIAGNOSES:  1. Chronic maxillary sinusitis  2. Chronic ethmoid sinusitis  3. Chronic sphenoid sinusitis            POSTOPERATIVE DIAGNOSES:  1. Chronic maxillary sinusitis  2. Chronic ethmoid sinusitis  3. Chronic sphenoid sinusitis  4. Allergic fungal sinusitis         PROCEDURES PERFORMED:  1. Bilateral endoscopic maxillary antrostomy with tissue removal, 22 modifier  2. Left endoscopic total sphenoethmoidectomy with tissue removal  3. Bilateral outfracture of the inferior turbinates  4. Stereotactic image-guided surgery, CPT 94682.     FINDINGS: Significant fungal debris in bilateral maxillary sinuses and left ethmoid/sphenoid sinuses     ANESTHESIA: General.  EBL: 150 cc.  SPECIMEN: Bilateral sinus contents  COMPLICATIONS: None     INDICATIONS: The patient is a 58 year old male with long-standing chronic rhinosinusitis. Prior therapy has included both systemic and topical steroids, antibiotics, antihistamines, and nasal saline irrigation; however, these therapies have failed to bring long-standing relief of symptoms. Physical examination with nasal endoscopy revealed widespread sinonasal edema as well as discolored nasal discharge. Evidence of polyposis in the left sinus cavity. Imaging of the sinuses revealed mucoperiosteal thickening in the sinuses. The risks and expectations of endoscopic sinus surgery were reviewed with the patient who agreed to proceed.     JUSTIFICATION FOR CPT 39073: Sphenoid & posterior ethmoid surgery, frontal sinus surgery and skull base disease     PROCEDURE:  After informed consent was given, the patient was placed under satisfactory anesthesia by the anesthesiologist. The nose was topically decongested with pledgets moistened with 1:1,000 epinephrine. The bed was rotated, and the patient was prepped and draped in the usual fashion. The patient was identified  and a surgical time out was performed.      STEREOTACTIC IMAGE-GUIDED SURGERY: The Fusion navigation device was used to perform stereotactic navigation. The tracking instruments were calibrated appropriately with the headgear, and topical landmarks were confirmed to assure accuracy. During the case, the navigation probes were used to confirm our location along the skull base.            ENDOSCOPIC SINUS SURGERY: We first began with the 0  nasal endoscope on the left.  The pledgets were removed and the inferior turbinate was gently infractured and then outfractured with a [place] elevator.  There was significant nasal polyposis extending from the ethmoid and maxillary sinus into the nasal cavity and extending posterior into the nasopharynx.  All of this polyp disease was carefully microdebrider up to the level of the middle meatus.  The middle turbinate was then medialized with the [place] elevator. The uncinate process was reflected anteriorly with a Alexia probe and taken down with the backbiter at the junction of the vertical and horizontal components.  Next through-cutting instruments and the microdebrider were used to resect the uncinate process.  The natural os of the maxillary sinus was then identified and the straight through cutting forceps were used to create our antrostomy.  There was significant fungal disease within the maxillary sinus which had caused enlargement of the maxillary opening with extension of fungal debris.  There is significant fungal debris anteriorly which required a significant amount of time and care to remove.  This took an extra 25% of time to complete.  This required a 30 degree and 70 degree endoscope along with curved instrumentation in order to reach all of the fungus that was present.  Polyposis present within the sinus was removed with a rad 40 microdebrider.  Multiple irrigations were performed in order to rinse out all visible fungal debris.     Next the basal lamella was  identified and the ethmoid bulla cells were taken down with the J curette and microdebrider.  The basal lamella was then penetrated at the level of the natural ostia of the maxillary sinus and the superior turbinate was identified.  There is significant polyposis at the entrance of the sphenoid which was carefully removed with the microdebrider.  The inferior portion of the superior turbinate was subsequently taken down revealing the natural ostia of the sphenoid sinus.  A straight mushroom punch and Kerrison was then used to widen the sphenoid os laterally towards the orbit and superiorly towards the skull base.  The entirety of the sphenoid was filled with polyposis and fungal debris.  This was carefully removed by widening the sphenoid opening and rinsing into the lateral portion of the sphenoid gently with a curved suction.      Residual posterior ethmoid cells were then taken down using a 45  through-cutting forcep and the microdebrider.  There were pockets of fungal debris and polyposis throughout the entirety of the posterior ethmoids once the posterior ethmoid skull base was identified we switched to the 30  endoscope and curved instrumentation.  We then performed a completion ethmoidectomy from posterior to anterior taking down residual ethmoid septations with a 45  through-cutting forcep and the curved microdebrider.  Areas of significant marshall-osteogenesis were taken down using the Cobra forcep.  Care was taken to avoid injury to the anterior and posterior ethmoid arteries.      The nose was then copiously irrigated and all debris and bone chips were removed.  We then turned our attention to the opposing side where a  maxillary antrostomy with tissue removal was performed.  This maxillary sinus similarly had extensive amounts of fungal debris present which required an extra 25% of time to completely removed.    Please note use of the 22-modifier for the maxillary sinusotomy bilaterally due to extensive  inflammatory and fungal debris requiring increased work and effort approximately twice as long as what is typically required for this portion of the procedure.     At the termination of the case, hemostasis was assured. Posisep was placed into bilateral sinus cavities. All counts were correct times two. An orogastric tube was used to suction gastric and pharyngeal contents. The patient was then returned to the anesthesiologist for awakening.         I was present for the entire procedure and performed all critical portions    Bhaskar Kingston MD    [facility]  Rhinology  Endoscopic Skull Base Surgery  [facility]  Department of Otolaryngology - Head & Neck Surgery

## 2024-08-02 NOTE — ANESTHESIA CARE TRANSFER NOTE
Patient: Modesto Barbosa    Procedure: Procedure(s):  stealth assisted left maxillary antrostomy, total ethmoidectomy, sphenoidotomy, right maxillary antrostomy       Diagnosis: Nasal polyposis [J33.9]  Diagnosis Additional Information: No value filed.    Anesthesia Type:   General     Note:    Oropharynx: oral airway in place  Level of Consciousness: drowsy    Level of Supplemental Oxygen (L/min / FiO2): 6  Independent Airway: airway patency satisfactory and stable  Dentition: dentition unchanged  Vital Signs Stable: post-procedure vital signs reviewed and stable  Report to RN Given: handoff report given  Patient transferred to: PACU    Handoff Report: Identifed the Patient, Identified the Reponsible Provider, Reviewed the pertinent medical history, Discussed the surgical course, Reviewed Intra-OP anesthesia mangement and issues during anesthesia, Set expectations for post-procedure period and Allowed opportunity for questions and acknowledgement of understanding      Vitals:  Vitals Value Taken Time   /72    Temp 36.5    Pulse 66 08/02/24 1352   Resp 23 08/02/24 1352   SpO2 96 % 08/02/24 1352   Vitals shown include unfiled device data.    Electronically Signed By: Anna Gonzalez  August 2, 2024  1:53 PM

## 2024-08-02 NOTE — ANESTHESIA PROCEDURE NOTES
Airway       Patient location during procedure: OR       Procedure Start/Stop Times: 8/2/2024 10:57 AM  Staff -        Anesthesiologist:  Petar Lizarraga MD       CRNA: Dona Romo APRN CRNA       Other Anesthesia Staff: Anna Gonzalez       Performed By: SRNAIndications and Patient Condition       Indications for airway management: edwige-procedural       Induction type:intravenous       Mask difficulty assessment: 2 - vent by mask + OA or adjuvant +/- NMBA    Final Airway Details       Final airway type: endotracheal airway       Successful airway: ETT - single  Endotracheal Airway Details        ETT size (mm): 8.0       Cuffed: yes       Cuff volume (mL): 9       Successful intubation technique: direct laryngoscopy       DL Blade Type: Wright 2       Grade View of Cords: 3       Adjucts: stylet       Position: Left       Measured from: gums/teeth       Secured at (cm): 23       Bite block used: None    Post intubation assessment        Placement verified by: capnometry, equal breath sounds and chest rise        Number of attempts at approach: 2       Number of other approaches attempted: 0       Secured with: tape       Ease of procedure: easy       Dentition: Intact and Unchanged    Medication(s) Administered   Medication Administration Time: 8/2/2024 10:57 AM    Additional Comments       Short, anterior airway.

## 2024-08-02 NOTE — ANESTHESIA POSTPROCEDURE EVALUATION
Patient: Modesto Barbosa    Procedure: Procedure(s):  stealth assisted left maxillary antrostomy, total ethmoidectomy, sphenoidotomy, right maxillary antrostomy       Anesthesia Type:  General    Note:  Disposition: Outpatient   Postop Pain Control: Uneventful            Sign Out: Well controlled pain   PONV: No   Neuro/Psych: Uneventful            Sign Out: Acceptable/Baseline neuro status   Airway/Respiratory: Uneventful            Sign Out: Acceptable/Baseline resp. status   CV/Hemodynamics: Uneventful            Sign Out: Acceptable CV status; No obvious hypovolemia; No obvious fluid overload   Other NRE: NONE   DID A NON-ROUTINE EVENT OCCUR? No           Last vitals:  Vitals Value Taken Time   /51 08/02/24 1400   Temp     Pulse 65 08/02/24 1414   Resp 19 08/02/24 1414   SpO2 100 % 08/02/24 1414   Vitals shown include unfiled device data.    Electronically Signed By: Petar Lizarraga MD  August 2, 2024  2:15 PM

## 2024-08-02 NOTE — BRIEF OP NOTE
[facility]    Brief Operative Note    Pre-operative diagnosis: Nasal polyposis [J33.9]  Post-operative diagnosis Same as pre-operative diagnosis    Procedure: stealth assisted left maxillary antrostomy, total ethmoidectomy, sphenoidotomy, right maxillary antrostomy, Bilateral - Head    Surgeon: Surgeons and Role:     * Bhaskar Kingston MD - Primary     * Jessica Quintanilla MD - Resident - Assisting  Anesthesia: General   Estimated Blood Loss: Less than 100 ml    Drains: None  Specimens:   ID Type Source Tests Collected by Time Destination   1 : Bilateral sinus contents Sinus Contents Sinus SURGICAL PATHOLOGY EXAM Bhaskar Kingston MD 8/2/2024 11:46 AM      Findings:   Green yellow, mucus found in bilateral maxillary sinuses. Left sided ethmoid and left sided sphenoid sinuses. Consistent with allergic fungal sinusitis.   Complications: None.  Implants: * No implants in log *

## 2024-08-05 ENCOUNTER — TELEPHONE (OUTPATIENT)
Dept: OTOLARYNGOLOGY | Facility: CLINIC | Age: 59
End: 2024-08-05
Payer: COMMERCIAL

## 2024-08-05 ENCOUNTER — APPOINTMENT (OUTPATIENT)
Dept: INTERPRETER SERVICES | Facility: CLINIC | Age: 59
End: 2024-08-05
Payer: COMMERCIAL

## 2024-08-05 ENCOUNTER — TELEPHONE (OUTPATIENT)
Dept: TRANSPLANT | Facility: CLINIC | Age: 59
End: 2024-08-05
Payer: COMMERCIAL

## 2024-08-05 DIAGNOSIS — Z94.0 KIDNEY REPLACED BY TRANSPLANT: Primary | ICD-10-CM

## 2024-08-05 NOTE — TELEPHONE ENCOUNTER
"Writer called kacie fajardo to pts daughter. Pts daughter stated has been doing well since surgery, pt took \"a couple of the pain medication\". Pts daughter stated that she saw his yesterday   "

## 2024-08-05 NOTE — TELEPHONE ENCOUNTER
Writer called using  services. Pt stated he is doing well after surgery, pt stated he only needed to take the oxycodone twice. Pt stated he has been using the ocean nasal spray and doing sinus rinses twice a day. Pt has no further questions or concerns.    Gregoria Quintana RN

## 2024-08-08 ENCOUNTER — OFFICE VISIT (OUTPATIENT)
Dept: OTOLARYNGOLOGY | Facility: CLINIC | Age: 59
End: 2024-08-08
Payer: COMMERCIAL

## 2024-08-08 VITALS
OXYGEN SATURATION: 100 % | HEART RATE: 63 BPM | SYSTOLIC BLOOD PRESSURE: 113 MMHG | BODY MASS INDEX: 25.15 KG/M2 | HEIGHT: 64 IN | WEIGHT: 147.3 LBS | DIASTOLIC BLOOD PRESSURE: 58 MMHG

## 2024-08-08 DIAGNOSIS — B49 ALLERGIC FUNGAL SINUSITIS: ICD-10-CM

## 2024-08-08 DIAGNOSIS — J33.9 NASAL POLYPOSIS: Primary | ICD-10-CM

## 2024-08-08 DIAGNOSIS — J30.89 ALLERGIC FUNGAL SINUSITIS: ICD-10-CM

## 2024-08-08 DIAGNOSIS — Z98.890 S/P SINUS SURGERY: ICD-10-CM

## 2024-08-08 PROCEDURE — 99212 OFFICE O/P EST SF 10 MIN: CPT | Mod: 25 | Performed by: STUDENT IN AN ORGANIZED HEALTH CARE EDUCATION/TRAINING PROGRAM

## 2024-08-08 PROCEDURE — 31237 NSL/SINS NDSC SURG BX POLYPC: CPT | Mod: 50 | Performed by: STUDENT IN AN ORGANIZED HEALTH CARE EDUCATION/TRAINING PROGRAM

## 2024-08-08 ASSESSMENT — PAIN SCALES - GENERAL: PAINLEVEL: NO PAIN (0)

## 2024-08-08 NOTE — PATIENT INSTRUCTIONS
You were seen in the ENT Clinic today by Dr. Kingston. If you have any questions or concerns after your appointment, please contact us (see below)       2.   The following recommendations have been made based upon your appointment today:   -Continue sinus rinses.      3.   Plan to return to the ENT clinic as scheduled on August 29th.           How to Contact Us:  Send a [employer] message to your provider. Our team will respond to you via [employer]. Occasionally, we will need to call you to get further information.  For urgent matters (Monday-Friday), call the ENT Clinic: [phone] and speak with a call center team member - they will route your call appropriately.   If you'd like to speak directly with a nurse, please find our contact information below. We do our best to check voicemail frequently throughout the day, and will work to call you back within 1-2 days. For urgent matters, please use the general clinic phone numbers listed above.     Gregoria WINN RN  Direct: [phone]  Neha THACKER LPN  Direct: [phone]         [facility]  Department of Otolaryngology

## 2024-08-08 NOTE — NURSING NOTE
"Chief Complaint   Patient presents with    RECHECK   Blood pressure 113/58, pulse 63, height 1.626 m (5' 4\"), weight 66.8 kg (147 lb 4.8 oz), SpO2 100%. Coleman Hooper, EMT    "

## 2024-08-08 NOTE — LETTER
8/8/2024       RE: Modesto Barbosa  [address] [place] [address]     Dear Colleague,    Thank you for referring your patient, Modesto Barbosa, to the [facility] [place] [facility]. Please see a copy of my visit note below.      [facility]  Return Visit  Encounter date:   August 8, 2024    Chief Complaint:   Follow up    ID: S/p stealth assisted left maxillary antrostomy, total ethmoidectomy, sphenoidotomy, right maxillary antrostomy 8/2/24    History of Present Illness:      Modesto Barbosa is a 58 year old male who presents for consultation regarding left greater than right nasal congestion that has been persistent for the last three months. Has been on Flonase that entire time without any improvement, has also tried multiple over the counter solutions. States that three months ago he noted a left sided frontal headache which has since improved. But ever since that headache, has not been able to breathe out the left side. Patient denies any history of significant nasal trauma. Denies history of allergies. Denies any history of prior sinus surgeries. Has a history of a kidney transplant. Otherwise, doing well. Has never tried Peter Med sinus rinses.      Of note, history was obtained using a remote [employer] .     Interval History:   Modesto Barbosa is a 58 year old male who presents for follow up;  present over the phone. Patient states he can breathe much better. He has been doing the sinus rinses. No major issues    Sino-Nasal Outcome Test (SNOT - 22)   DNT       [place] [facility]  S/p stealth assisted left maxillary antrostomy, total ethmoidectomy, sphenoidotomy, right maxillary antrostomy 8/2/24  DATE OF PROCEDURE:  August 2, 2024   ATTENDING SURGEON: Bhaskar Kingston MD  ASSISTANT SURGEON: Getachew Quintanilla MD     PREOPERATIVE DIAGNOSES:  1. Chronic maxillary sinusitis  2. Chronic ethmoid  "sinusitis  3. Chronic sphenoid sinusitis     POSTOPERATIVE DIAGNOSES:  1. Chronic maxillary sinusitis  2. Chronic ethmoid sinusitis  3. Chronic sphenoid sinusitis  4. Allergic fungal sinusitis     PROCEDURES PERFORMED:  1. Bilateral endoscopic maxillary antrostomy with tissue removal, 22 modifier  2. Left endoscopic total sphenoethmoidectomy with tissue removal  3. Bilateral outfracture of the inferior turbinates  4. Stereotactic image-guided surgery, CPT 39174.     FINDINGS: Significant fungal debris in bilateral maxillary sinuses and left ethmoid/sphenoid sinuses      Review of systems: A 14-point review of systems has been conducted and is negative for any notable symptoms, except as dictated in the history of present illness.     Physical Exam:  Vital signs: /58 (BP Location: Left arm, Patient Position: Sitting, Cuff Size: Adult Regular)   Pulse 63   Ht 5' 4\" (1.626 m)   Wt 147 lb 4.8 oz (66.8 kg)   SpO2 100%   BMI 25.28 kg/m     General Appearance: No acute distress, appropriate demeanor, conversant  Eyes: moist conjunctivae; EOMI; pupils symmetric; visual acuity grossly intact; no proptosis  Head: normocephalic; overall symmetric appearance without deformity  Face: overall symmetric without deformity  Ears: Normal appearance of external ear  Nose: No external deformity  Oral Cavity/oropharynx: Normal appearance of mucosa  Neck: no palpable lymphadenopathy; thyroid without palpable nodules  Lungs: symmetric chest rise; no wheezing  CV: Good distal perfusion; normal hear rate  Extremities: No deformity  Neurologic Exam: Cranial nerves II-XII are grossly intact      Procedure Note  Procedure performed: Rigid nasal endoscopy  Indication: To evaluate for sinonasal pathology not visualized on routine anterior rhinoscopy  Anesthesia: 4% topical lidocaine with 0.05 % oxymetazoline  Description of procedure: A 30 degree, 3 mm rigid endoscope was inserted into bilateral nasal cavities and the nasal valves, " nasal cavity, middle meatus, sphenoethmoid recess, nasopharynx were evaluated for evidence of obstruction, edema, purulence, polyps and/or mass/lesion.     Findings  RT/LT: extensive clot and dissolvable packing suctioned from the nasal passage. No evidence of underlying infection healing well at this time.    The patient tolerated the procedure well without complication.     Laboratory Review:  N/a    Imaging Review:  N/a    Pathology Review:  N/a    Assessment/Medical Decision Makin year old man present for follow up s/p stealth assisted left maxillary antrostomy, total ethmoidectomy, sphenoidotomy, right maxillary antrostomy 24.      Findings most consistent with allergic fungal sinusitis      Plan:  Patient is doing well.  Continue sinus rinses. Discussed that after he's healed a little more will start budesonide sinus rinses.    Follow up with me scheduled for 24.    Scribe Disclosure:   I, ALEXANDRE VALDEZ, am serving as a scribe; to document services personally performed by Bhaskar Kingston MD -based on data collection and the provider's statements to me.     Provider Disclosure:  I agree with above History, Review of Systems, Physical exam and Plan.  I have reviewed the content of the documentation and have edited it as needed. I have personally performed the services documented here and the documentation accurately represents those services and the decisions I have made.      Electronically signed by:  Bhaskar Kingston MD    [facility]  Rhinology, Endoscopic Skull Base Surgery  [facility]  Department of Otolaryngology - Head & Neck Surgery    ~~~~~~~~~~~~~~~~~~~~~~~~~~~~~~~~~~~~~~~~~~~~~~~~~~~~~~~~~~~~~~~~~~~~~~~~~~~~~~~~~~~~~~~~~~~~~~~~~~~~~~~~~~~~~~~~~~~~~~~~~~~~~~~~~~~~~~~    Past Medical History:   Diagnosis Date     Acute kidney injury (H24) 6/10/2021     CKD (chronic kidney disease) stage 5, GFR less than 15 ml/min (H)      Dyslipidemia      Metabolic  acidosis      Type 2 diabetes mellitus (H)         Past Surgical History:   Procedure Laterality Date     BENCH KIDNEY  2/21/2023    Procedure: Bench kidney;  Surgeon: Talha Weinstein MD;  Location: [facility] OR     CV CORONARY ANGIOGRAM N/A 12/6/2021    Procedure: Coronary Angiogram;  Surgeon: Cristela Banks MD;  Location: [facility] CATH LAB CV     CV LEFT HEART CATH N/A 12/6/2021    Procedure: Left Heart Cath;  Surgeon: Cristela Banks MD;  Location: [facility] CATH LAB CV     CV PCI N/A 12/6/2021    Procedure: Percutaneous Coronary Intervention;  Surgeon: Cristela Banks MD;  Location: [facility] CATH LAB CV     IR FINE NEEDLE ASPIRATION W ULTRASOUND  5/3/2023     IR RENAL BIOPSY RIGHT  5/3/2023     OPTICAL TRACKING SYSTEM ENDOSCOPIC SINUS SURGERY Bilateral 8/2/2024    Procedure: stealth assisted left maxillary antrostomy, total ethmoidectomy, sphenoidotomy, right maxillary antrostomy;  Surgeon: Bhaskar Kingston MD;  Location: [facility] OR     REMOVE CATHETER PERITONEAL N/A 2/21/2023    Procedure: Remove catheter peritoneal;  Surgeon: Talha Weinstein MD;  Location: [facility] OR        Family History   Problem Relation Age of Onset     Diabetes Type 2  Mother      Other - See Comments Daughter         granular cell tumor of thigh     Heart Disease Other      Anesthesia Reaction No family hx of      Deep Vein Thrombosis (DVT) No family hx of      Pulmonary Embolism No family hx of         Social History     Socioeconomic History     Marital status: Patient Declined   Tobacco Use     Smoking status: Never     Passive exposure: Never     Smokeless tobacco: Never   Vaping Use     Vaping status: Never Used   Substance and Sexual Activity     Alcohol use: Not Currently     Drug use: Never     Social Determinants of Health     Financial Resource Strain: Low Risk  (12/27/2023)    Financial Resource Strain      Within the past 12 months, have you or your family members you live with been unable to get utilities (heat, electricity) when  it was really needed?: No   Food Insecurity: Low Risk  (12/27/2023)    Food Insecurity      Within the past 12 months, did you worry that your food would run out before you got money to buy more?: No      Within the past 12 months, did the food you bought just not last and you didn t have money to get more?: No   Transportation Needs: Low Risk  (12/27/2023)    Transportation Needs      Within the past 12 months, has lack of transportation kept you from medical appointments, getting your medicines, non-medical meetings or appointments, work, or from getting things that you need?: No   Interpersonal Safety: Low Risk  (7/15/2024)    Interpersonal Safety      Do you feel physically and emotionally safe where you currently live?: Yes      Within the past 12 months, have you been hit, slapped, kicked or otherwise physically hurt by someone?: No      Within the past 12 months, have you been humiliated or emotionally abused in other ways by your partner or ex-partner?: No   Housing Stability: Low Risk  (12/27/2023)    Housing Stability      Do you have housing? : Yes      Are you worried about losing your housing?: No          Again, thank you for allowing me to participate in the care of your patient.      Sincerely,    Bhaskar Kingston MD

## 2024-08-08 NOTE — PROGRESS NOTES
[facility]  Return Visit  Encounter date:   August 8, 2024    Chief Complaint:   Follow up    ID: S/p stealth assisted left maxillary antrostomy, total ethmoidectomy, sphenoidotomy, right maxillary antrostomy 8/2/24    History of Present Illness:      Modesto Barbosa is a 58 year old male who presents for consultation regarding left greater than right nasal congestion that has been persistent for the last three months. Has been on Flonase that entire time without any improvement, has also tried multiple over the counter solutions. States that three months ago he noted a left sided frontal headache which has since improved. But ever since that headache, has not been able to breathe out the left side. Patient denies any history of significant nasal trauma. Denies history of allergies. Denies any history of prior sinus surgeries. Has a history of a kidney transplant. Otherwise, doing well. Has never tried [facility] sinus rinses.      Of note, history was obtained using a remote [employer] .     Interval History:   Modesto Barbosa is a 58 year old male who presents for follow up;  present over the phone. Patient states he can breathe much better. He has been doing the sinus rinses. No major issues    Sino-Nasal Outcome Test (SNOT - 22)   DNT       [place] Oper[facility]  S/p stealth assisted left maxillary antrostomy, total ethmoidectomy, sphenoidotomy, right maxillary antrostomy 8/2/24  DATE OF PROCEDURE:  August 2, 2024   ATTENDING SURGEON: Bhaskar Kingston MD  ASSISTANT SURGEON: Getachew Quintanilla MD     PREOPERATIVE DIAGNOSES:  1. Chronic maxillary sinusitis  2. Chronic ethmoid sinusitis  3. Chronic sphenoid sinusitis     POSTOPERATIVE DIAGNOSES:  1. Chronic maxillary sinusitis  2. Chronic ethmoid sinusitis  3. Chronic sphenoid sinusitis  4. Allergic fungal sinusitis     PROCEDURES PERFORMED:  1. Bilateral endoscopic maxillary antrostomy with tissue removal, 22 modifier  2. Left endoscopic total  "sphenoethmoidectomy with tissue removal  3. Bilateral outfracture of the inferior turbinates  4. Stereotactic image-guided surgery, CPT 26435.     FINDINGS: Significant fungal debris in bilateral maxillary sinuses and left ethmoid/sphenoid sinuses      Review of systems: A 14-point review of systems has been conducted and is negative for any notable symptoms, except as dictated in the history of present illness.     Physical Exam:  Vital signs: /58 (BP Location: Left arm, Patient Position: Sitting, Cuff Size: Adult Regular)   Pulse 63   Ht 5' 4\" (1.626 m)   Wt 147 lb 4.8 oz (66.8 kg)   SpO2 100%   BMI 25.28 kg/m     General Appearance: No acute distress, appropriate demeanor, conversant  Eyes: moist conjunctivae; EOMI; pupils symmetric; visual acuity grossly intact; no proptosis  Head: normocephalic; overall symmetric appearance without deformity  Face: overall symmetric without deformity  Ears: Normal appearance of external ear  Nose: No external deformity  Oral Cavity/oropharynx: Normal appearance of mucosa  Neck: no palpable lymphadenopathy; thyroid without palpable nodules  Lungs: symmetric chest rise; no wheezing  CV: Good distal perfusion; normal hear rate  Extremities: No deformity  Neurologic Exam: Cranial nerves II-XII are grossly intact      Procedure Note  Procedure performed: Rigid nasal endoscopy  Indication: To evaluate for sinonasal pathology not visualized on routine anterior rhinoscopy  Anesthesia: 4% topical lidocaine with 0.05 % oxymetazoline  Description of procedure: A 30 degree, 3 mm rigid endoscope was inserted into bilateral nasal cavities and the nasal valves, nasal cavity, middle meatus, sphenoethmoid recess, nasopharynx were evaluated for evidence of obstruction, edema, purulence, polyps and/or mass/lesion.     Findings  RT/LT: extensive clot and dissolvable packing suctioned from the nasal passage. No evidence of underlying infection healing well at this time.    The patient " tolerated the procedure well without complication.     Laboratory Review:  N/a    Imaging Review:  N/a    Pathology Review:  N/a    Assessment/Medical Decision Makin year old man present for follow up s/p stealth assisted left maxillary antrostomy, total ethmoidectomy, sphenoidotomy, right maxillary antrostomy 24.      Findings most consistent with allergic fungal sinusitis      Plan:  Patient is doing well.  Continue sinus rinses. Discussed that after he's healed a little more will start budesonide sinus rinses.    Follow up with me scheduled for 24.    Scribe Disclosure:   I, ALEXANDRE COURTNEY, am serving as a scribe; to document services personally performed by Bhaskar Kingston MD -based on data collection and the provider's statements to me.     Provider Disclosure:  I agree with above History, Review of Systems, Physical exam and Plan.  I have reviewed the content of the documentation and have edited it as needed. I have personally performed the services documented here and the documentation accurately represents those services and the decisions I have made.      Electronically signed by:  Bhaskar Kingston MD    [facility]  Rhinology, Endoscopic Skull Base Surgery  [facility]  Department of Otolaryngology - Head & Neck Surgery    ~~~~~~~~~~~~~~~~~~~~~~~~~~~~~~~~~~~~~~~~~~~~~~~~~~~~~~~~~~~~~~~~~~~~~~~~~~~~~~~~~~~~~~~~~~~~~~~~~~~~~~~~~~~~~~~~~~~~~~~~~~~~~~~~~~~~~~~    Past Medical History:   Diagnosis Date    Acute kidney injury (H24) 6/10/2021    CKD (chronic kidney disease) stage 5, GFR less than 15 ml/min (H)     Dyslipidemia     Metabolic acidosis     Type 2 diabetes mellitus (H)         Past Surgical History:   Procedure Laterality Date    BENCH KIDNEY  2023    Procedure: Bench kidney;  Surgeon: Talha Weinstein MD;  Location: [facility] OR    CV CORONARY ANGIOGRAM N/A 2021    Procedure: Coronary Angiogram;  Surgeon: Cristela Banks MD;  [facility]: [facility] CATH LAB CV    CV LEFT HEART CATH N/A 12/6/2021    Procedure: Left Heart Cath;  Surgeon: Cristela Banks MD;  Location: [facility] CATH LAB CV    CV PCI N/A 12/6/2021    Procedure: Percutaneous Coronary Intervention;  Surgeon: Cristela Banks MD;  [facility]: [facility] LAB CV    IR FINE NEEDLE ASPIRATION W ULTRASOUND  5/3/2023    IR RENAL BIOPSY RIGHT  5/3/2023    OPTICAL TRACKING SYSTEM ENDOSCOPIC SINUS SURGERY Bilateral 8/2/2024    Procedure: stealth assisted left maxillary antrostomy, total ethmoidectomy, sphenoidotomy, right maxillary antrostomy;  Surgeon: Bhaskar Kingston MD;  Location: [facility] OR    REMOVE CATHETER PERITONEAL N/A 2/21/2023    Procedure: Remove catheter peritoneal;  Surgeon: Talha Weinstein MD;  Location: [facility] OR        Family History   Problem Relation Age of Onset    Diabetes Type 2  Mother     Other - See Comments Daughter         granular cell tumor of thigh    Heart Disease Other     Anesthesia Reaction No family hx of     Deep Vein Thrombosis (DVT) No family hx of     Pulmonary Embolism No family hx of         Social History     Socioeconomic History    Marital status: Patient Declined   Tobacco Use    Smoking status: Never     Passive exposure: Never    Smokeless tobacco: Never   Vaping Use    Vaping status: Never Used   Substance and Sexual Activity    Alcohol use: Not Currently    Drug use: Never     Social Determinants of Health     Financial Resource Strain: Low Risk  (12/27/2023)    Financial Resource Strain     Within the past 12 months, have you or your family members you live with been unable to get utilities (heat, electricity) when it was really needed?: No   Food Insecurity: Low Risk  (12/27/2023)    Food Insecurity     Within the past 12 months, did you worry that your food would run out before you got money to buy more?: No     Within the past 12 months, did the food you bought just not last and you didn t have money to get more?: No   Transportation Needs: Low Risk   (12/27/2023)    Transportation Needs     Within the past 12 months, has lack of transportation kept you from medical appointments, getting your medicines, non-medical meetings or appointments, work, or from getting things that you need?: No   Interpersonal Safety: Low Risk  (7/15/2024)    Interpersonal Safety     Do you feel physically and emotionally safe where you currently live?: Yes     Within the past 12 months, have you been hit, slapped, kicked or otherwise physically hurt by someone?: No     Within the past 12 months, have you been humiliated or emotionally abused in other ways by your partner or ex-partner?: No   Housing Stability: Low Risk  (12/27/2023)    Housing Stability     Do you have housing? : Yes     Are you worried about losing your housing?: No

## 2024-08-09 LAB
PATH REPORT.COMMENTS IMP SPEC: NORMAL
PATH REPORT.COMMENTS IMP SPEC: NORMAL
PATH REPORT.FINAL DX SPEC: NORMAL
PATH REPORT.GROSS SPEC: NORMAL
PATH REPORT.MICROSCOPIC SPEC OTHER STN: NORMAL
PATH REPORT.RELEVANT HX SPEC: NORMAL
PHOTO IMAGE: NORMAL

## 2024-08-12 NOTE — PATIENT INSTRUCTIONS
-Thank you for choosing the [facility].  -It was a pleasure to see you today.  -Please take a look at the information below for more specific details regarding the treatment plan and recommendations.  -In this after visit summary is a list of your medications and specific instructions.  Please review this carefully as there may be changes made to your medication list.  -If there are any particular questions or concerns, please feel free to reach out to Dr. Cooper.  -If any labs have been completed, we will reach out to you about results.  If the results are normal or not concerning, a letter or MyChart message will be sent to you.  If any follow-up is needed, either Dr. Cooper or the nurse will give you a call.  If you have not heard regarding results after 2 weeks, please reach out to the clinic.    Patient Instructions:    -Monitor for any triggers of the rash.   -Continue to see the kidney specialist as scheduled.   -Continue to see the heart specialist as scheduled.     -Dr. Cooper has referred you to see the diabetes educator. If you do not hear from the specialist to schedule an appointment within a week's time from today, please call the [facility] and speak with the specialty  to help you schedule the appointment to see the specialist.  Depending on the specialist availability, it may be a number of weeks prior to your scheduled appointment.  -Check your blood sugars when you wake up, before lunch and dinner, and before bedtime. Write down the blood sugars and bring these with you to the next appointment.   -Please take your medications as prescribed.  -Check the blood sugars as recommended to help monitor how your blood sugars are doing.  -Be sure to stay well-hydrated by drinking water each day to help your kidneys.  -Maintaining good blood sugar control will help your kidneys stay strong for longer.  -Be sure to complete an eye exam once a year to make sure your eyes are  Requested Prescriptions     Pending Prescriptions Disp Refills    metFORMIN (GLUCOPHAGE) 1000 MG tablet [Pharmacy Med Name: METFORMIN HCL 1,000 MG TABLET] 180 tablet 1     Sig: TAKE 1 TABLET BY MOUTH TWICE A DAY WITH MEALS     Last seen:  5/28/24    Last filled:    metFORMIN (GLUCOPHAGE) 1000 MG tablet [[id number]]    Order Details  Dose: 1,000 mg Route: Oral Frequency: 2 TIMES DAILY WITH MEALS   Dispense Quantity: 180 tablet Refills: 1          Sig: Take 1 tablet by mouth 2 times daily (with meals)         Start Date: 05/13/24         I have called [facility] and spoke to Geno.  She confirms that this patient picked up his last refill for this medication on 8/7/24 for #180 tablets (90 day supply).  He is on automatic refills and this request was sent because he filled his last RX.  I have advised Geno that we can not refill this medication so soon, they will need to request a refill closer to time for it to be due.  Geno verbalizes understanding.   healthy.  -Check your feet a few times a week to monitor for any cuts/wounds or any changes.  If any concerning findings are noted, please return to clinic for reevaluation.  -If you feel unwell (such as dizziness, sweatiness, nausea, fast heartbeat, confused, etc.), be sure to check your blood sugar as it may be low (below 70 mg/dL).  If a low blood sugars noted, please eat or drink something sugary and repeat the blood sugar test in about 30 minutes.  Repeat until the blood sugar is above 70 mg/dL.  If blood sugars are persistently low, please seek immediate medical attention.      Please seek immediate medical attention (go to the emergency room or urgent care) for the following reasons: worsening symptoms, or any concerning changes.    Please return to clinic in 1 month for follow up of diabetes, or sooner as needed.        --------------------------------------------------------------------------------------------------------------------    -We are always looking for ways to improve.  You may be selected to receive a survey regarding your visit today.  We encourage you to complete the survey and provide specific, constructive feedback to help us improve our processes.  Thank you for your time!  -Please review the contact information listed on the after visit summary and in the electronic chart.  Below is the phone number that we have on file.  If there are any changes that are needed to be made, please reach out to the clinic.  [phone] (home)

## 2024-08-19 ENCOUNTER — LAB (OUTPATIENT)
Dept: LAB | Facility: HOSPITAL | Age: 59
End: 2024-08-19
Payer: COMMERCIAL

## 2024-08-19 ENCOUNTER — TELEPHONE (OUTPATIENT)
Dept: TRANSPLANT | Facility: CLINIC | Age: 59
End: 2024-08-19

## 2024-08-19 DIAGNOSIS — Z98.890 OTHER SPECIFIED POSTPROCEDURAL STATES: ICD-10-CM

## 2024-08-19 DIAGNOSIS — Z94.0 KIDNEY TRANSPLANTED: ICD-10-CM

## 2024-08-19 DIAGNOSIS — Z48.298 AFTERCARE FOLLOWING ORGAN TRANSPLANT: ICD-10-CM

## 2024-08-19 DIAGNOSIS — Z79.899 ENCOUNTER FOR LONG-TERM CURRENT USE OF MEDICATION: ICD-10-CM

## 2024-08-19 DIAGNOSIS — Z94.0 KIDNEY REPLACED BY TRANSPLANT: ICD-10-CM

## 2024-08-19 LAB
ANION GAP SERPL CALCULATED.3IONS-SCNC: 11 MMOL/L (ref 7–15)
BK VIRUS DNA IU/ML, INSTRUMENT (6800): 157 IU/ML
BK VIRUS SPECIMEN TYPE: ABNORMAL
BKV DNA SPEC NAA+PROBE-LOG#: 2.2 {LOG_COPIES}/ML
BUN SERPL-MCNC: 38.7 MG/DL (ref 8–23)
CALCIUM SERPL-MCNC: 8.6 MG/DL (ref 8.8–10.4)
CHLORIDE SERPL-SCNC: 100 MMOL/L (ref 98–107)
CREAT SERPL-MCNC: 1.96 MG/DL (ref 0.67–1.17)
CYCLOSPORINE BLD LC/MS/MS-MCNC: 110 UG/L (ref 50–400)
EGFRCR SERPLBLD CKD-EPI 2021: 39 ML/MIN/1.73M2
ERYTHROCYTE [DISTWIDTH] IN BLOOD BY AUTOMATED COUNT: 14.6 % (ref 10–15)
GLUCOSE SERPL-MCNC: 170 MG/DL (ref 70–99)
HCO3 SERPL-SCNC: 22 MMOL/L (ref 22–29)
HCT VFR BLD AUTO: 28.5 % (ref 40–53)
HGB BLD-MCNC: 9.5 G/DL (ref 13.3–17.7)
MCH RBC QN AUTO: 28.1 PG (ref 26.5–33)
MCHC RBC AUTO-ENTMCNC: 33.3 G/DL (ref 31.5–36.5)
MCV RBC AUTO: 84 FL (ref 78–100)
PLATELET # BLD AUTO: 99 10E3/UL (ref 150–450)
POTASSIUM SERPL-SCNC: 4.8 MMOL/L (ref 3.4–5.3)
RBC # BLD AUTO: 3.38 10E6/UL (ref 4.4–5.9)
SODIUM SERPL-SCNC: 133 MMOL/L (ref 135–145)
TME LAST DOSE: NORMAL H
TME LAST DOSE: NORMAL H
WBC # BLD AUTO: 4.6 10E3/UL (ref 4–11)

## 2024-08-19 PROCEDURE — 85027 COMPLETE CBC AUTOMATED: CPT

## 2024-08-19 PROCEDURE — 80158 DRUG ASSAY CYCLOSPORINE: CPT

## 2024-08-19 PROCEDURE — 80048 BASIC METABOLIC PNL TOTAL CA: CPT

## 2024-08-19 PROCEDURE — 87799 DETECT AGENT NOS DNA QUANT: CPT

## 2024-08-19 PROCEDURE — 36415 COLL VENOUS BLD VENIPUNCTURE: CPT

## 2024-08-19 NOTE — TELEPHONE ENCOUNTER
ISSUE: cr elevated to 1.96    PLAN/LPN TASK:   Please call pt to ensure pt is drinking 2-3L/day, no new illness/fever/malaise/abdominal pain/edema, medication changes, or normal UOP. If the above is normal, encourage continued hydration and repeat labs next week. Please place order for BMP.

## 2024-08-20 ENCOUNTER — TELEPHONE (OUTPATIENT)
Dept: TRANSPLANT | Facility: CLINIC | Age: 59
End: 2024-08-20
Payer: COMMERCIAL

## 2024-08-20 ENCOUNTER — MYC MEDICAL ADVICE (OUTPATIENT)
Dept: TRANSPLANT | Facility: CLINIC | Age: 59
End: 2024-08-20
Payer: COMMERCIAL

## 2024-08-20 DIAGNOSIS — Z94.0 KIDNEY REPLACED BY TRANSPLANT: Primary | ICD-10-CM

## 2024-08-20 NOTE — TELEPHONE ENCOUNTER
Issue  increase creatinine     Spoke to daughter ELIDIA Herndon     Confirmed that her father able to  increase oral hydration   Plan to increase oral hydration repeat transplant  labs   Aggred iif creatinine remains above baseline  will give IV fluids         ++++++++++++++++++++++++++++++=     Patrick Phan MD Huepfel, Mary K, RN  Okay to give a liter of IV fluids      Question ok to give him IV  fluids if unable  drink fluids  - do I hold the losartan  due to increase creatinine          Previous Messages       ----- Message -----          Message from  his daughter    Modesto Barbosa  Shalonda Roy RN  Phone Number: [phone]    Just talked with my dad. He did not think he drank enough water after surgery due to pain, nose bleeding and tiredness. He has been spending more time outdoor taking care of his yard too.  His weight, BPs and blood pressures are all the same as last month. He will try to drink more water.     Thanks

## 2024-08-21 ENCOUNTER — MYC MEDICAL ADVICE (OUTPATIENT)
Dept: TRANSPLANT | Facility: CLINIC | Age: 59
End: 2024-08-21
Payer: COMMERCIAL

## 2024-08-23 NOTE — TELEPHONE ENCOUNTER
Larry Marcelino  I talked with Shalonda about an hour ago about his labs. We will try having my dad drink 2-3L of water each day and check labs again next week.      Thanks     Yanick Salvador,     Your creatinine is elevated at 1.96.     We want to make sure you are drinking 2-3L/day and not having any new illness/fever/malaise/abdominal pain/swelling, medication changes, or abnormal urine output.      If the above is normal, please continue to hydrate and repeat labs next week.      Please reply to this message or return my call at [phone].     Thank you,  Chari GONZALES LPN

## 2024-08-27 ENCOUNTER — LAB (OUTPATIENT)
Dept: LAB | Facility: HOSPITAL | Age: 59
End: 2024-08-27
Payer: COMMERCIAL

## 2024-08-27 DIAGNOSIS — Z94.0 KIDNEY REPLACED BY TRANSPLANT: ICD-10-CM

## 2024-08-27 LAB
ANION GAP SERPL CALCULATED.3IONS-SCNC: 11 MMOL/L (ref 7–15)
BUN SERPL-MCNC: 22.5 MG/DL (ref 8–23)
CALCIUM SERPL-MCNC: 9.2 MG/DL (ref 8.8–10.4)
CHLORIDE SERPL-SCNC: 100 MMOL/L (ref 98–107)
CREAT SERPL-MCNC: 1.43 MG/DL (ref 0.67–1.17)
EGFRCR SERPLBLD CKD-EPI 2021: 56 ML/MIN/1.73M2
ERYTHROCYTE [DISTWIDTH] IN BLOOD BY AUTOMATED COUNT: 14.4 % (ref 10–15)
GLUCOSE SERPL-MCNC: 132 MG/DL (ref 70–99)
HCO3 SERPL-SCNC: 22 MMOL/L (ref 22–29)
HCT VFR BLD AUTO: 30.4 % (ref 40–53)
HGB BLD-MCNC: 10.2 G/DL (ref 13.3–17.7)
MCH RBC QN AUTO: 27.6 PG (ref 26.5–33)
MCHC RBC AUTO-ENTMCNC: 33.6 G/DL (ref 31.5–36.5)
MCV RBC AUTO: 82 FL (ref 78–100)
PLATELET # BLD AUTO: 108 10E3/UL (ref 150–450)
POTASSIUM SERPL-SCNC: 4.6 MMOL/L (ref 3.4–5.3)
RBC # BLD AUTO: 3.7 10E6/UL (ref 4.4–5.9)
SODIUM SERPL-SCNC: 133 MMOL/L (ref 135–145)
WBC # BLD AUTO: 4.2 10E3/UL (ref 4–11)

## 2024-08-27 PROCEDURE — 85027 COMPLETE CBC AUTOMATED: CPT

## 2024-08-27 PROCEDURE — 80048 BASIC METABOLIC PNL TOTAL CA: CPT

## 2024-08-27 PROCEDURE — 36415 COLL VENOUS BLD VENIPUNCTURE: CPT

## 2024-08-29 ENCOUNTER — OFFICE VISIT (OUTPATIENT)
Dept: OTOLARYNGOLOGY | Facility: CLINIC | Age: 59
End: 2024-08-29
Payer: COMMERCIAL

## 2024-08-29 VITALS
BODY MASS INDEX: 25.88 KG/M2 | OXYGEN SATURATION: 100 % | HEART RATE: 61 BPM | WEIGHT: 151.6 LBS | SYSTOLIC BLOOD PRESSURE: 138 MMHG | HEIGHT: 64 IN | DIASTOLIC BLOOD PRESSURE: 68 MMHG

## 2024-08-29 DIAGNOSIS — Z98.890 S/P SINUS SURGERY: ICD-10-CM

## 2024-08-29 DIAGNOSIS — J45.40 MODERATE PERSISTENT REACTIVE AIRWAY DISEASE WITHOUT COMPLICATION: ICD-10-CM

## 2024-08-29 DIAGNOSIS — J33.9 NASAL POLYPOSIS: Primary | ICD-10-CM

## 2024-08-29 PROCEDURE — T1013 SIGN LANG/ORAL INTERPRETER: HCPCS | Mod: U3

## 2024-08-29 PROCEDURE — 99212 OFFICE O/P EST SF 10 MIN: CPT | Mod: 25 | Performed by: STUDENT IN AN ORGANIZED HEALTH CARE EDUCATION/TRAINING PROGRAM

## 2024-08-29 PROCEDURE — 31231 NASAL ENDOSCOPY DX: CPT | Performed by: STUDENT IN AN ORGANIZED HEALTH CARE EDUCATION/TRAINING PROGRAM

## 2024-08-29 RX ORDER — BUDESONIDE 0.5 MG/2ML
INHALANT ORAL
Qty: 120 ML | Refills: 3 | Status: SHIPPED | OUTPATIENT
Start: 2024-08-29

## 2024-08-29 RX ORDER — PREDNISONE 10 MG/1
30 TABLET ORAL DAILY
Qty: 21 TABLET | Refills: 0 | Status: SHIPPED | OUTPATIENT
Start: 2024-08-29 | End: 2024-09-05

## 2024-08-29 ASSESSMENT — PAIN SCALES - GENERAL: PAINLEVEL: NO PAIN (0)

## 2024-08-29 NOTE — PATIENT INSTRUCTIONS
You were seen in the ENT Clinic today by Dr. Kingston. If you have any questions or concerns after your appointment, please contact us (see below)       2.   The following recommendations have been made based upon your appointment today:   -Prednisone: 30 mg by mouth for 7 days.   -Budesonide rinses twice daily. Additional instructions are below.      3.   Plan to return to the ENT clinic in 3-4 months.           How to Contact Us:  Send a [employer] message to your provider. Our team will respond to you via [employer]. Occasionally, we will need to call you to get further information.  For urgent matters (Monday-Friday), call the ENT Clinic: [phone] and speak with a call center team member - they will route your call appropriately.   If you'd like to speak directly with a nurse, please find our contact information below. We do our best to check voicemail frequently throughout the day, and will work to call you back within 1-2 days. For urgent matters, please use the general clinic phone numbers listed above.     Gregoria WINN RN  Direct: [phone]  Neha THACKER LPN  Direct: [phone]         [facility]  Department of Otolaryngology         Cleaning of the Nasal or Sinus Cavity    Budesonide (Pulmicort)  -Budesonide is an anti-inflammatory steroid medication used to decrease nasal and sinus inflammation.   -It is dispensed in liquid form in a 2 mL respules (small plastic pouch).   -Although it is manufactured for use with a nebulizer, we intend for you to use it with the NeilMed Sinus Rinse bottle (preferred).   -This medication is filled at your preferred pharmacy for you.      Please follow all steps. Nasal irrigation (cleaning) should be done 2 times/day.    Preparation:  1.  Wash your hands  2.  We suggest that you buy the NeilMed Sinus Rinse Kit  3.  Use distilled water or tap water that has been boiled and brought to room temperature. This is important because serious infections can result from using tap water  that is not clean. These infections are very rare, but it's better to be absolutely safe.  4.  Fill the irrigation bottle with room temperature water (distilled or boiled tap water) and add mixture (pre made packet or homemade recipe).        If you wish to make a homemade recipe:        Mix 1/4 teaspoon salt (kosher,non-iodine salt) with 1/4 teaspoon baking soda in each bottle.  5. Add one respule of budesonide. Mix well to ensure that everything is dissolved.  Cleansing Irrigation/Sinus Rinse:    1.  Lean forward over a sink and insert the rinse bottle applicator into the right side of your nose. Make sure to point the applicator towards the back of your head.     2.  Tilt head down and to the left side.  With your mouth open (breathing through your mouth), gently direct the water around the inside of your nose until clear fluid starts to drain from the opposite nostril.  This is called flushing or irrigation.    3.  When you have used 1/4 to 1/2 or the solution, switch to the left nostril.    4.  To irrigate the left nostril, tilt your head down and to the right side.  With your mouth open (breathing through your mouth),  gently direct the water around the inside of your nose until clear fluid starts to drain from the opposite nostril.     Cleaning the Equipment:  1.  Throw away any leftover solution  2.  Clean the rinse bottle and cap with clear water. Air dry.      Call the ENT Clinic if you have any questions or concerns at [phone]

## 2024-08-29 NOTE — PROGRESS NOTES
[facility]  Return Visit  Encounter date:   August 29, 2024    Chief Complaint:   Follow-up    ID: s/p stealth assisted left maxillary antrostomy, total ethmoidectomy, sphenoidotomy, right maxillary antrostomy 8/2/24     History of Present Illness 6/624:   Modesto Barbosa is a 58 year old male who presents for consultation regarding left greater than right nasal congestion that has been persistent for the last three months. Has been on Flonase that entire time without any improvement, has also tried multiple over the counter solutions. States that three months ago he noted a left sided frontal headache which has since improved. But ever since that headache, has not been able to breathe out the left side. Patient denies any history of significant nasal trauma. Denies history of allergies. Denies any history of prior sinus surgeries. Has a history of a kidney transplant. Otherwise, doing well. Has never tried Peter Med sinus rinses.      Of note, history was obtained using a remote [facility] .      Interval History 8/8/24:   Modesto Barbosa is a 58 year old male who presents for follow up;  present over the phone. Patient states he can breathe much better. He has been doing the sinus rinses. No major issues    Interval History 8/29/24:   Modesto Barbosa is a 58 year old male accompanied by an in-person  who presents for follow up. He is doing well and does not have any complaints.    [place] [facility]  8/2/24 Dr. Bhaskar Kingston  PROCEDURES PERFORMED:  1. Bilateral endoscopic maxillary antrostomy with tissue removal, 22 modifier  2. Left endoscopic total sphenoethmoidectomy with tissue removal  3. Bilateral outfracture of the inferior turbinates  4. Stereotactic image-guided surgery, CPT 58937    Review of systems: A 14-point review of systems has been conducted and is negative for any notable symptoms, except as dictated in the history of present illness.     Physical  "Exam:  Vital signs: /68 (BP Location: Left arm, Patient Position: Sitting, Cuff Size: Adult Regular)   Pulse 61   Ht 1.626 m (5' 4\")   Wt 68.8 kg (151 lb 9.6 oz)   SpO2 100%   BMI 26.02 kg/m     General Appearance: No acute distress, appropriate demeanor, conversant  Eyes: moist conjunctivae; EOMI; pupils symmetric; visual acuity grossly intact; no proptosis  Head: normocephalic; overall symmetric appearance without deformity  Face: overall symmetric without deformity  Ears: Normal appearance of external ear  Nose: No external deformity  Oral Cavity/oropharynx: Normal appearance of mucosa  Neck: no palpable lymphadenopathy; thyroid without palpable nodules  Lungs: symmetric chest rise; no wheezing  CV: Good distal perfusion; normal hear rate  Extremities: No deformity  Neurologic Exam: Cranial nerves II-XII are grossly intact      Procedure Note  Procedure performed: Rigid nasal endoscopy  Indication: To evaluate for sinonasal pathology not visualized on routine anterior rhinoscopy  Anesthesia: 4% topical lidocaine with 0.05 % oxymetazoline  Description of procedure: A 30 degree, 3 mm rigid endoscope was inserted into bilateral nasal cavities and the nasal valves, nasal cavity, middle meatus, sphenoethmoid recess, nasopharynx were evaluated for evidence of obstruction, edema, purulence, polyps and/or mass/lesion.     Findings  Continues to heal well from surgery.    Slightly more inflammation than prior, but no evidence of underlying infection or return of polyps.    The patient tolerated the procedure well without complication.       Assessment/Medical Decision Making:  Modesto Barbosa is a 58 year old male accompanied by an in-person  who presents for follow up s/p s/p stealth assisted left maxillary antrostomy, total ethmoidectomy, sphenoidotomy, right maxillary antrostomy 8/2/24. On today's exam, there is slightly more inflammation appreciated than prior exam, and post-operative inflammation " is expected. Overall, patient is healing well.       Plan:  To help continue to reduce inflammation, start prednisone: 30 mg PO QD x 7 days.  Start budesonide nasal rinses BID. If there is improvement at next visit, will consider reducing to QD.  Follow up in clinic in 3-4 months to reassess after a few months of budesonide use to see if inflammation is reduced/resolved.    Scribe Disclosure:   I, Claire Zimmerman, am serving as a scribe to document services personally performed by Bhaskar Kingston MD based on data collection and the provider's statements to me.     Provider Disclosure:  I agree with above History, Review of Systems, Physical exam and Plan. I have reviewed the content of the documentation and have edited it as needed. I have personally performed the services documented here and the documentation accurately represents those services and the decisions I have made.      Electronically signed by:    Bhaskar Kingston MD    [facility]  Rhinology, Endoscopic Skull Base Surgery  [facility]  Department of Otolaryngology - Head & Neck Surgery    ~~~~~~~~~~~~~~~~~~~~~~~~~~~~~~~~~~~~~~~~~~~~~~~~~~~~~~~~~~~~~~~~~~~~~~~~~~~~~~~~~~~~~~~~~~~~~~~~~~~~~~~~~~~~~~~~~~~~~~~~~~~~~~~~~~~~~~~    Past Medical History:   Diagnosis Date    Acute kidney injury (H24) 6/10/2021    CKD (chronic kidney disease) stage 5, GFR less than 15 ml/min (H)     Dyslipidemia     Metabolic acidosis     Type 2 diabetes mellitus (H)         Past Surgical History:   Procedure Laterality Date    BENCH KIDNEY  2/21/2023    Procedure: Bench kidney;  Surgeon: Talha Weinstein MD;  Location: [facility] OR    CV CORONARY ANGIOGRAM N/A 12/6/2021    Procedure: Coronary Angiogram;  Surgeon: Cristela Banks MD;  Location: [facility] LAB CV    CV LEFT HEART CATH N/A 12/6/2021    Procedure: Left Heart Cath;  Surgeon: Cristela Banks MD;  Location: [facility] LAB CV    CV PCI N/A 12/6/2021    Procedure: Percutaneous  Coronary Intervention;  Surgeon: Cristela Banks MD;  Location: [facility] CATH LAB CV    IR FINE NEEDLE ASPIRATION W ULTRASOUND  5/3/2023    IR RENAL BIOPSY RIGHT  5/3/2023    OPTICAL TRACKING SYSTEM ENDOSCOPIC SINUS SURGERY Bilateral 8/2/2024    Procedure: stealth assisted left maxillary antrostomy, total ethmoidectomy, sphenoidotomy, right maxillary antrostomy;  Surgeon: Bhaskar Kingston MD;  Location: [facility] OR    REMOVE CATHETER PERITONEAL N/A 2/21/2023    Procedure: Remove catheter peritoneal;  Surgeon: Talha Weinstein MD;  Location: [facility] OR        Family History   Problem Relation Age of Onset    Diabetes Type 2  Mother     Other - See Comments Daughter         granular cell tumor of thigh    Heart Disease Other     Anesthesia Reaction No family hx of     Deep Vein Thrombosis (DVT) No family hx of     Pulmonary Embolism No family hx of         Social History     Socioeconomic History    Marital status: Patient Declined   Tobacco Use    Smoking status: Never     Passive exposure: Never    Smokeless tobacco: Never   Vaping Use    Vaping status: Never Used   Substance and Sexual Activity    Alcohol use: Not Currently    Drug use: Never     Social Determinants of Health     Financial Resource Strain: Low Risk  (12/27/2023)    Financial Resource Strain     Within the past 12 months, have you or your family members you live with been unable to get utilities (heat, electricity) when it was really needed?: No   Food Insecurity: Low Risk  (12/27/2023)    Food Insecurity     Within the past 12 months, did you worry that your food would run out before you got money to buy more?: No     Within the past 12 months, did the food you bought just not last and you didn t have money to get more?: No   Transportation Needs: Low Risk  (12/27/2023)    Transportation Needs     Within the past 12 months, has lack of transportation kept you from medical appointments, getting your medicines, non-medical meetings or appointments, work, or  from getting things that you need?: No   Interpersonal Safety: Low Risk  (7/15/2024)    Interpersonal Safety     Do you feel physically and emotionally safe where you currently live?: Yes     Within the past 12 months, have you been hit, slapped, kicked or otherwise physically hurt by someone?: No     Within the past 12 months, have you been humiliated or emotionally abused in other ways by your partner or ex-partner?: No   Housing Stability: Low Risk  (12/27/2023)    Housing Stability     Do you have housing? : Yes     Are you worried about losing your housing?: No

## 2024-08-29 NOTE — NURSING NOTE
"Chief Complaint   Patient presents with    RECHECK   Blood pressure 138/68, pulse 61, height 1.626 m (5' 4\"), weight 68.8 kg (151 lb 9.6 oz), SpO2 100%. Coleman Hooper, EMT    "

## 2024-08-29 NOTE — LETTER
8/29/2024       RE: Modesto Barbosa  [address] [place] [address]     Dear Colleague,    Thank you for referring your patient, Modesto Barbosa, to the [facility] [place] [facility]. Please see a copy of my visit note below.      [facility]  Return Visit  Encounter date:   August 29, 2024    Chief Complaint:   Follow-up    ID: s/p stealth assisted left maxillary antrostomy, total ethmoidectomy, sphenoidotomy, right maxillary antrostomy 8/2/24     History of Present Illness 6/624:   Modesto Barbosa is a 58 year old male who presents for consultation regarding left greater than right nasal congestion that has been persistent for the last three months. Has been on Flonase that entire time without any improvement, has also tried multiple over the counter solutions. States that three months ago he noted a left sided frontal headache which has since improved. But ever since that headache, has not been able to breathe out the left side. Patient denies any history of significant nasal trauma. Denies history of allergies. Denies any history of prior sinus surgeries. Has a history of a kidney transplant. Otherwise, doing well. Has never tried Peter Med sinus rinses.      Of note, history was obtained using a remote [facility] .      Interval History 8/8/24:   Modesto Barbosa is a 58 year old male who presents for follow up;  present over the phone. Patient states he can breathe much better. He has been doing the sinus rinses. No major issues    Interval History 8/29/24:   Modesto Barbosa is a 58 year old male accompanied by an in-person  who presents for follow up. He is doing well and does not have any complaints.    [place] Operative History  8/2/24 Dr. Bhaskar Kingston  PROCEDURES PERFORMED:  1. Bilateral endoscopic maxillary antrostomy with tissue removal, 22 modifier  2. Left endoscopic total  "sphenoethmoidectomy with tissue removal  3. Bilateral outfracture of the inferior turbinates  4. Stereotactic image-guided surgery, CPT 19769    Review of systems: A 14-point review of systems has been conducted and is negative for any notable symptoms, except as dictated in the history of present illness.     Physical Exam:  Vital signs: /68 (BP Location: Left arm, Patient Position: Sitting, Cuff Size: Adult Regular)   Pulse 61   Ht 1.626 m (5' 4\")   Wt 68.8 kg (151 lb 9.6 oz)   SpO2 100%   BMI 26.02 kg/m     General Appearance: No acute distress, appropriate demeanor, conversant  Eyes: moist conjunctivae; EOMI; pupils symmetric; visual acuity grossly intact; no proptosis  Head: normocephalic; overall symmetric appearance without deformity  Face: overall symmetric without deformity  Ears: Normal appearance of external ear  Nose: No external deformity  Oral Cavity/oropharynx: Normal appearance of mucosa  Neck: no palpable lymphadenopathy; thyroid without palpable nodules  Lungs: symmetric chest rise; no wheezing  CV: Good distal perfusion; normal hear rate  Extremities: No deformity  Neurologic Exam: Cranial nerves II-XII are grossly intact      Procedure Note  Procedure performed: Rigid nasal endoscopy  Indication: To evaluate for sinonasal pathology not visualized on routine anterior rhinoscopy  Anesthesia: 4% topical lidocaine with 0.05 % oxymetazoline  Description of procedure: A 30 degree, 3 mm rigid endoscope was inserted into bilateral nasal cavities and the nasal valves, nasal cavity, middle meatus, sphenoethmoid recess, nasopharynx were evaluated for evidence of obstruction, edema, purulence, polyps and/or mass/lesion.     Findings  Continues to heal well from surgery.    Slightly more inflammation than prior, but no evidence of underlying infection or return of polyps.    The patient tolerated the procedure well without complication.       Assessment/Medical Decision Making:  Modesto Barbosa is a " 58 year old male accompanied by an in-person  who presents for follow up s/p s/p stealth assisted left maxillary antrostomy, total ethmoidectomy, sphenoidotomy, right maxillary antrostomy 8/2/24. On today's exam, there is slightly more inflammation appreciated than prior exam, and post-operative inflammation is expected. Overall, patient is healing well.       Plan:  To help continue to reduce inflammation, start prednisone: 30 mg PO QD x 7 days.  Start budesonide nasal rinses BID. If there is improvement at next visit, will consider reducing to QD.  Follow up in clinic in 3-4 months to reassess after a few months of budesonide use to see if inflammation is reduced/resolved.    Scribe Disclosure:   I, Claire Zimmerman, am serving as a scribe to document services personally performed by Bhaskar Kingston MD based on data collection and the provider's statements to me.     Provider Disclosure:  I agree with above History, Review of Systems, Physical exam and Plan. I have reviewed the content of the documentation and have edited it as needed. I have personally performed the services documented here and the documentation accurately represents those services and the decisions I have made.      Electronically signed by:    Bhaskar Kingston MD    [facility]  Rhinology, Endoscopic Skull Base Surgery  [facility]  Department of Otolaryngology - Head & Neck Surgery    ~~~~~~~~~~~~~~~~~~~~~~~~~~~~~~~~~~~~~~~~~~~~~~~~~~~~~~~~~~~~~~~~~~~~~~~~~~~~~~~~~~~~~~~~~~~~~~~~~~~~~~~~~~~~~~~~~~~~~~~~~~~~~~~~~~~~~~~    Past Medical History:   Diagnosis Date     Acute kidney injury (H24) 6/10/2021     CKD (chronic kidney disease) stage 5, GFR less than 15 ml/min (H)      Dyslipidemia      Metabolic acidosis      Type 2 diabetes mellitus (H)         Past Surgical History:   Procedure Laterality Date     BENCH KIDNEY  2/21/2023    Procedure: Bench kidney;  Surgeon: Talha Weinstein MD;  Location:  [facility] OR     CV CORONARY ANGIOGRAM N/A 12/6/2021    Procedure: Coronary Angiogram;  Surgeon: Cristela Banks MD;  Location: [facility] CATH LAB CV     CV LEFT HEART CATH N/A 12/6/2021    Procedure: Left Heart Cath;  Surgeon: Cristela Banks MD;  Location: [facility] CATH LAB CV     CV PCI N/A 12/6/2021    Procedure: Percutaneous Coronary Intervention;  Surgeon: Cristela Banks MD;  Location: [facility] CATH LAB CV     IR FINE NEEDLE ASPIRATION W ULTRASOUND  5/3/2023     IR RENAL BIOPSY RIGHT  5/3/2023     OPTICAL TRACKING SYSTEM ENDOSCOPIC SINUS SURGERY Bilateral 8/2/2024    Procedure: stealth assisted left maxillary antrostomy, total ethmoidectomy, sphenoidotomy, right maxillary antrostomy;  Surgeon: Bhaskar Kingston MD;  Location: [facility] OR     REMOVE CATHETER PERITONEAL N/A 2/21/2023    Procedure: Remove catheter peritoneal;  Surgeon: Talha Weinstein MD;  Location: [facility] OR        Family History   Problem Relation Age of Onset     Diabetes Type 2  Mother      Other - See Comments Daughter         granular cell tumor of thigh     Heart Disease Other      Anesthesia Reaction No family hx of      Deep Vein Thrombosis (DVT) No family hx of      Pulmonary Embolism No family hx of         Social History     Socioeconomic History     Marital status: Patient Declined   Tobacco Use     Smoking status: Never     Passive exposure: Never     Smokeless tobacco: Never   Vaping Use     Vaping status: Never Used   Substance and Sexual Activity     Alcohol use: Not Currently     Drug use: Never     Social Determinants of Health     Financial Resource Strain: Low Risk  (12/27/2023)    Financial Resource Strain      Within the past 12 months, have you or your family members you live with been unable to get utilities (heat, electricity) when it was really needed?: No   Food Insecurity: Low Risk  (12/27/2023)    Food Insecurity      Within the past 12 months, did you worry that your food would run out before you got money to buy more?: No       Within the past 12 months, did the food you bought just not last and you didn t have money to get more?: No   Transportation Needs: Low Risk  (12/27/2023)    Transportation Needs      Within the past 12 months, has lack of transportation kept you from medical appointments, getting your medicines, non-medical meetings or appointments, work, or from getting things that you need?: No   Interpersonal Safety: Low Risk  (7/15/2024)    Interpersonal Safety      Do you feel physically and emotionally safe where you currently live?: Yes      Within the past 12 months, have you been hit, slapped, kicked or otherwise physically hurt by someone?: No      Within the past 12 months, have you been humiliated or emotionally abused in other ways by your partner or ex-partner?: No   Housing Stability: Low Risk  (12/27/2023)    Housing Stability      Do you have housing? : Yes      Are you worried about losing your housing?: No           Again, thank you for allowing me to participate in the care of your patient.      Sincerely,    Bhaskar Kingston MD

## 2024-09-03 DIAGNOSIS — Z94.0 KIDNEY REPLACED BY TRANSPLANT: ICD-10-CM

## 2024-09-03 DIAGNOSIS — E83.42 HYPOMAGNESEMIA: ICD-10-CM

## 2024-09-03 RX ORDER — CARVEDILOL 25 MG/1
TABLET ORAL
Qty: 30 TABLET | Refills: 11 | Status: SHIPPED | OUTPATIENT
Start: 2024-09-03

## 2024-09-03 RX ORDER — MAGNESIUM OXIDE 400 MG/1
400 TABLET ORAL 2 TIMES DAILY
Qty: 180 TABLET | Refills: 0 | Status: SHIPPED | OUTPATIENT
Start: 2024-09-03

## 2024-09-10 ENCOUNTER — TELEPHONE (OUTPATIENT)
Dept: FAMILY MEDICINE | Facility: CLINIC | Age: 59
End: 2024-09-10
Payer: COMMERCIAL

## 2024-09-10 NOTE — TELEPHONE ENCOUNTER
Patient Quality Outreach    Patient is due for the following:   Diabetes -  Eye Exam and Foot Exam  Asthma  -  ACT needed, Asthma follow-up visit, and AAP  Physical Annual Wellness Visit      Topic Date Due    Hepatitis B Vaccine (3 of 5 - Risk Dialysis 4-dose series) 11/02/2023    Flu Vaccine (1) 09/01/2024    COVID-19 Vaccine (5 - 2023-24 season) 09/01/2024       Next Steps:   Schedule a Annual Wellness Visit    Type of outreach:    Phone, left message for patient/parent to call back.    Next Steps:  Reach out within 30 days via e[employer].    Max number of attempts reached: No. Will try again in 90 days if patient still on fail list.    Questions for provider review:    None           Ritu Montes MA

## 2024-09-16 NOTE — PROGRESS NOTES
TRANSPLANT NEPHROLOGY CLINIC VISIT     Assessment & Plan   # LDKT: CKD Stage 3a - Stable   - Baseline Creatinine: ~ 1.5-1.8   - Proteinuria: Normal (<0.2 grams)   - DSA Hx: No DSA   - Last cPRA: 27%   - BK Viremia: Yes, detectable, but less than quantifiable   - Kidney Tx Biopsy Hx: No history of acute rejection, Severe vascular changes, and Acute tubular injury .    # Immunosuppression: Cyclosporine (goal ) and Mycophenolate mofetil (dose 500 mg every 12 hours)   - Induction with Recent Transplant:  Intermediate Intensity Protocol   - Continue with intensive monitoring of immunosuppression for efficacy and toxicity.   - Historical Changes in Immunosuppression:  Decreased immunosuppression and change from tacrolimus to cyclosporine due to BK viremia.   - Changes: Not at this time after recent increase in mycophenolate.    # Infection Prevention:      Last CD4 Level: 253 (Mar/2024)  - PJP: Sulfa/TMP (Bactrim); Will stop Bactrim.  - CMV IgG Ab High Risk Discordance (D+/R-): No  - EBV IgG Ab High Risk Discordance (D+/R-): No    # Hypertension: Borderline control;  Goal BP: < 130/80   - Changes: Yes - Will increase losartan to 50 mg daily.   - If potassium level remains normal, may consider tapering off hydralazine and increasing losartan dosing.    # Diabetes: Borderline control (HbA1c 7-9%) Last HbA1c: 7.1%    # Anemia in Chronic Renal Disease: Hgb: Stable      NERY: No   - Iron studies: Not checked recently    # Thrombocytopenia: Stable, mildly low platelet level generally in the 90-140s.  No overt evidence of bleeding.    # Mineral Bone Disorder:    - Vitamin D; level: Not checked recently        On supplement: Yes  - Calcium; level: Normal        On supplement: No    # Electrolytes:   - Potassium; level: Normal        On supplement: No  - Magnesium; level: Not checked recently        On supplement: Yes  - Bicarbonate; level: Normal        On supplement: Yes    # BK Viremia: Trend down, minimal BK PCR at ~ 160  with last check Aug/2024.  IgG level is normal.   - Will continue to follow BK PCR every month.    # Other Significant PMH:   - CAD, s/p PCI: Asymptomatic.  - Asymmetric Left Ventricular Hypertrophy (HOCM): Stable. No history of sustained ventricular arrhythmias.  - Hepatitis B Core Ab +: Patient with core Ab positive, but negative Ag and also negative surface Ab.  Hep B DNA was negative at time of transplant, as well as at 4 months post transplant.  - Peripheral Neuropathy: Slightly improved symptoms on amitriptyline.     # Skin Cancer Risk:    - Discussed sun protection and recommend regular follow up with Dermatology.    # Transplant History:  Etiology of Kidney Failure: Diabetes mellitus type 2  Tx: LDKT  Transplant: 2/21/2023 (Kidney)  Significant transplant-related complications: BK Viremia    Transplant Office Phone Number: [phone]    Assessment and plan was discussed with the patient and he voiced his understanding and agreement.    Return visit: Return in about 6 months (around 3/17/2025).    Patrick Phan MD    The longitudinal plan of care for the diagnosis(es)/condition(s) as documented were addressed during this visit. Due to the added complexity in care, I will continue to support [facility] in the subsequent management and with ongoing continuity of care.      Chief Complaint   Mr. Barbosa is a 58 year old here for kidney transplant and immunosuppression management.     History of Present Illness    Mr. Barbosa reports feeling good overall.  Since last clinic visit:   Hospitalizations: No   New Medical Issues: Yes; Patient had chronic sinusitis symptoms and now s/p sinus surgery by ENT with symptoms markedly improved per patient.  Chest pain or shortness of breath: No  Lower extremity swelling: No  Weight change: Yes; Weight is up a few pounds.  Nausea and vomiting: No  Diarrhea: No  Heartburn symptoms: No  Fever, sweats or chills: No  Urinary complaints: No    Home BP:  130/50-60s    Problem List    Patient Active Problem List   Diagnosis    Dyslipidemia, goal LDL below 70    HTN, kidney transplant related    Metabolic acidosis    Coronary artery disease involving native coronary artery of native heart without angina pectoris    Thrombocytopenia (H24)    Immunosuppressed status (H24)    Kidney replaced by transplant    Anemia in chronic renal disease    Hypomagnesemia    Hepatitis B core antibody positive    Aftercare following organ transplant    Need for pneumocystis prophylaxis    Vitamin D deficiency    Secondary renal hyperparathyroidism (H24)    BK viremia    Peripheral neuropathy    HOCM (hypertrophic obstructive cardiomyopathy) (H)    Stage 3a chronic kidney disease (H)    Nasal polyposis    Chronic nasal congestion    Diabetes mellitus, type 2 (H)    Allergic fungal sinusitis       Allergies   No Known Allergies    Medications   Current Outpatient Medications   Medication Sig Dispense Refill    acetaminophen (TYLENOL) 325 MG tablet Take 2 tablets (650 mg) by mouth every 4 hours as needed for other (For optimal non-opioid multimodal pain management to improve pain control.) 30 tablet 0    Alcohol Swabs (ALCOHOL PREP) 70 % PADS USE 5X PER  each 3    amitriptyline (ELAVIL) 25 MG tablet Take 1 tablet (25 mg) by mouth at bedtime 90 tablet 1    amLODIPine (NORVASC) 10 MG tablet Take 1 tablet (10 mg) by mouth at bedtime 90 tablet 3    aspirin 81 MG EC tablet Take 1 tablet (81 mg) by mouth daily 90 tablet 2    atorvastatin (LIPITOR) 20 MG tablet Take 1 tablet (20 mg) by mouth daily 30 tablet 11    blood glucose (NO BRAND SPECIFIED) test strip Use to test blood sugar 3 times daily or as directed. To accompany: Blood Glucose Monitor Brands: per insurance. 200 strip 3    blood glucose monitoring (ONE TOUCH ULTRA 2) meter device kit USE TO TEST BLOOD SUGAR THREE TIMES DAILY OR AS DIRECTED. 1 kit 0    budesonide (PULMICORT) 0.5 MG/2ML neb solution Empty contents of ampule into 240mL of saline solution and  rinse both nasal cavities as instructed twice daily. 120 mL 3    carvedilol (COREG) 25 MG tablet TAKE 0.5 TABLETS BY MOUTH 2 TIMES DAILY (WITH MEALS) 30 tablet 11    cycloSPORINE modified (GENERIC EQUIVALENT) 25 MG capsule Take 3 capsules (75 mg) by mouth every morning AND 2 capsules (50 mg) every evening. 450 capsule 3    fluticasone (FLONASE) 50 MCG/ACT nasal spray Spray 2 sprays into both nostrils daily 9.9 mL 3    hydrALAZINE (APRESOLINE) 25 MG tablet Take 1 tablet (25 mg) by mouth 2 times daily 180 tablet 3    insulin aspart (NOVOLOG PEN) 100 UNIT/ML pen Before breaskfast and lunch, give 7 units. Before dinner give 9 units. 15 mL 3    insulin glargine (LANTUS PEN) 100 UNIT/ML pen Inject 25 Units subcutaneously at bedtime 15 mL 11    insulin pen needle (BD PEN NEEDLE ORESTES 2ND GEN) 32G X 4 MM miscellaneous Inject Subcutaneous 3 times daily 100 each 11    ipratropium (ATROVENT) 0.06 % nasal spray Spray 2 sprays into both nostrils 4 times daily as needed for other (nasal congestion) 15 mL 0    loratadine (CLARITIN) 10 MG tablet Take 1 tablet (10 mg) by mouth daily as needed for allergies (itchy/rash) 90 tablet 3    losartan (COZAAR) 50 MG tablet Take 1 tablet (50 mg) by mouth daily. 90 tablet 3    magnesium oxide (MAG-OX) 400 MG tablet TAKE ONE TABLET BY MOUTH TWICE A  tablet 0    mycophenolate (GENERIC EQUIVALENT) 250 MG capsule Take 2 capsules (500 mg) by mouth 2 times daily 120 capsule 11    sodium bicarbonate 650 MG tablet Take 2 tablets (1,300 mg) by mouth 2 times daily 120 tablet 11    sodium chloride (OCEAN) 0.65 % nasal spray Please use in both sides of the nose as needed. 50 mL 1    thin (NO BRAND SPECIFIED) lancets Tests 3x daily Use with lanceting device. To accompany: Blood Glucose Monitor Brands: per insurance. 200 each 3    Vitamin D3 (CHOLECALCIFEROL) 25 mcg (1000 units) tablet Take 2 tablets (50 mcg) by mouth daily 60 tablet 11    nitroGLYcerin (NITROSTAT) 0.4 MG sublingual tablet One tablet  "under the tongue every 5 minutes if needed for chest pain. May repeat every 5 minutes for a maximum of 3 doses in 15 minutes\" (Patient not taking: Reported on 9/17/2024) 25 tablet 3     No current facility-administered medications for this visit.     Medications Discontinued During This Encounter   Medication Reason    losartan (COZAAR) 25 MG tablet     sulfamethoxazole-trimethoprim (BACTRIM) 400-80 MG tablet     sulfamethoxazole-trimethoprim (BACTRIM) 400-80 MG tablet        Physical Exam   Vital Signs: BP (!) 166/74 (BP Location: Right arm, Patient Position: Sitting, Cuff Size: Adult Regular)   Pulse 59   Temp 97.5  F (36.4  C) (Oral)   Wt 68.7 kg (151 lb 6.4 oz)   SpO2 98%   BMI 25.99 kg/m      GENERAL APPEARANCE: alert and no distress  HENT: mouth without ulcers or lesions  RESP: lungs clear to auscultation - no rales, rhonchi or wheezes  CV: regular rhythm, normal rate, no rub, no murmur  EDEMA: none to trace LE edema bilaterally  ABDOMEN: soft, nondistended, nontender, bowel sounds normal  MS: extremities normal - no gross deformities noted, no evidence of inflammation in joints, no muscle tenderness  SKIN: no rash  TX KIDNEY: normal  DIALYSIS ACCESS: none    Data         Latest Ref Rng & Units 9/17/2024     8:46 AM 8/27/2024     8:40 AM 8/19/2024     8:14 AM   Renal   Sodium 135 - 145 mmol/L 136  133  133    K 3.4 - 5.3 mmol/L 4.3  4.6  4.8    Cl 98 - 107 mmol/L 104  100  100    Cl (external) 98 - 107 mmol/L 104  100  100    CO2 22 - 29 mmol/L 21  22  22    Urea Nitrogen 8.0 - 23.0 mg/dL 16.1  22.5  38.7    Creatinine 0.67 - 1.17 mg/dL 1.19  1.43  1.96    Glucose 70 - 99 mg/dL 78  132  170    Calcium 8.8 - 10.4 mg/dL 9.3  9.2  8.6          Latest Ref Rng & Units 10/30/2023     8:55 AM 8/7/2023     8:57 AM 7/31/2023     8:44 AM   Bone Health   Phosphorus 2.5 - 4.5 mg/dL 3.5  3.7  4.1          Latest Ref Rng & Units 9/17/2024     8:46 AM 8/27/2024     8:40 AM 8/19/2024     8:14 AM   Heme   WBC 4.0 - 11.0 " 10e3/uL 6.5  4.2  4.6    Hgb 13.3 - 17.7 g/dL 11.3  10.2  9.5    Plt 150 - 450 10e3/uL 88  108  99          Latest Ref Rng & Units 8/28/2023     9:03 AM 4/7/2023     8:10 AM 4/3/2023     8:10 AM   Liver   AP 40 - 129 U/L 88  74  69    TBili <=1.2 mg/dL 0.5  0.7  0.8    Bilirubin Direct 0.00 - 0.30 mg/dL <0.20   0.22    ALT 0 - 70 U/L 17  10  12    AST 0 - 45 U/L 26  12  14    Tot Protein 6.4 - 8.3 g/dL 6.8  6.3  6.1    Albumin 3.5 - 5.0 g/dL  3.7     Albumin 3.5 - 5.2 g/dL 4.6   3.8          Latest Ref Rng & Units 7/15/2024     8:57 AM 12/29/2023    10:41 AM 8/28/2023     9:03 AM   Pancreas   A1C <5.7 % 7.1  6.5  5.8          Latest Ref Rng & Units 8/14/2023     9:12 AM 6/26/2023     8:08 AM 6/19/2023     9:45 AM   Iron studies   Iron 61 - 157 ug/dL 40  47  54    Iron Sat Index 15 - 46 % 22  28  31    Ferritin 31 - 409 ng/mL 412  545  540          Latest Ref Rng & Units 9/25/2023     9:09 AM 7/5/2023     7:43 AM 5/1/2023     7:15 AM   [facility] Txp Virology   EBV DNA COPIES/ML Not Detected copies/mL <500  <500  <500    EBV DNA LOG OF COPIES  <2.7  <2.7  <2.7      Failed to redirect to the Timeline version of the REVFS SmartLink.  Recent Labs   Lab Test 08/28/23  0903 09/05/23  0912 09/11/23  0905   DOSTAC 8/27/2023 9/4/2023 9/10/2023   TACROL 6.9 6.5 3.8*     Recent Labs   Lab Test 02/05/24  0850 02/20/24  0853 03/04/24  0846   DOSMPA 2/4/2024   8:00 PM  --  3/3/2024   8:00 PM   MPACID 1.63 1.47 1.31   MPAG 57.1 50.1 53.7

## 2024-09-17 ENCOUNTER — LAB (OUTPATIENT)
Dept: LAB | Facility: CLINIC | Age: 59
End: 2024-09-17
Payer: COMMERCIAL

## 2024-09-17 ENCOUNTER — OFFICE VISIT (OUTPATIENT)
Dept: TRANSPLANT | Facility: CLINIC | Age: 59
End: 2024-09-17
Attending: INTERNAL MEDICINE
Payer: COMMERCIAL

## 2024-09-17 VITALS
HEART RATE: 59 BPM | BODY MASS INDEX: 25.99 KG/M2 | WEIGHT: 151.4 LBS | OXYGEN SATURATION: 98 % | TEMPERATURE: 97.5 F | DIASTOLIC BLOOD PRESSURE: 74 MMHG | SYSTOLIC BLOOD PRESSURE: 166 MMHG

## 2024-09-17 DIAGNOSIS — I15.1 HTN, KIDNEY TRANSPLANT RELATED: Primary | ICD-10-CM

## 2024-09-17 DIAGNOSIS — Z79.899 ENCOUNTER FOR LONG-TERM CURRENT USE OF MEDICATION: ICD-10-CM

## 2024-09-17 DIAGNOSIS — D63.1 ANEMIA IN STAGE 3A CHRONIC KIDNEY DISEASE (H): ICD-10-CM

## 2024-09-17 DIAGNOSIS — Z29.89 NEED FOR PNEUMOCYSTIS PROPHYLAXIS: ICD-10-CM

## 2024-09-17 DIAGNOSIS — N18.31 STAGE 3A CHRONIC KIDNEY DISEASE (H): ICD-10-CM

## 2024-09-17 DIAGNOSIS — Z94.0 KIDNEY REPLACED BY TRANSPLANT: ICD-10-CM

## 2024-09-17 DIAGNOSIS — E87.20 METABOLIC ACIDOSIS: ICD-10-CM

## 2024-09-17 DIAGNOSIS — Z48.298 AFTERCARE FOLLOWING ORGAN TRANSPLANT: ICD-10-CM

## 2024-09-17 DIAGNOSIS — Z98.890 OTHER SPECIFIED POSTPROCEDURAL STATES: ICD-10-CM

## 2024-09-17 DIAGNOSIS — Z94.0 HTN, KIDNEY TRANSPLANT RELATED: Primary | ICD-10-CM

## 2024-09-17 DIAGNOSIS — E83.42 HYPOMAGNESEMIA: ICD-10-CM

## 2024-09-17 DIAGNOSIS — D84.9 IMMUNOSUPPRESSED STATUS (H): ICD-10-CM

## 2024-09-17 DIAGNOSIS — B34.8 BK VIREMIA: ICD-10-CM

## 2024-09-17 DIAGNOSIS — N18.31 ANEMIA IN STAGE 3A CHRONIC KIDNEY DISEASE (H): ICD-10-CM

## 2024-09-17 DIAGNOSIS — E55.9 VITAMIN D DEFICIENCY: ICD-10-CM

## 2024-09-17 DIAGNOSIS — Z94.0 KIDNEY TRANSPLANTED: ICD-10-CM

## 2024-09-17 PROBLEM — E11.9 DIABETES MELLITUS, TYPE 2 (H): Status: ACTIVE | Noted: 2024-07-15

## 2024-09-17 LAB
ALBUMIN MFR UR ELPH: 6.2 MG/DL
ANION GAP SERPL CALCULATED.3IONS-SCNC: 11 MMOL/L (ref 7–15)
BK VIRUS DNA IU/ML, INSTRUMENT (6800): 620 IU/ML
BK VIRUS SPECIMEN TYPE: ABNORMAL
BKV DNA SPEC NAA+PROBE-LOG#: 2.8 {LOG_COPIES}/ML
BUN SERPL-MCNC: 16.1 MG/DL (ref 8–23)
CALCIUM SERPL-MCNC: 9.3 MG/DL (ref 8.8–10.4)
CHLORIDE SERPL-SCNC: 104 MMOL/L (ref 98–107)
CREAT SERPL-MCNC: 1.19 MG/DL (ref 0.67–1.17)
CREAT UR-MCNC: 8.4 MG/DL
CYCLOSPORINE BLD LC/MS/MS-MCNC: 78 UG/L (ref 50–400)
EGFRCR SERPLBLD CKD-EPI 2021: 70 ML/MIN/1.73M2
ERYTHROCYTE [DISTWIDTH] IN BLOOD BY AUTOMATED COUNT: 13.8 % (ref 10–15)
GLUCOSE SERPL-MCNC: 78 MG/DL (ref 70–99)
HCO3 SERPL-SCNC: 21 MMOL/L (ref 22–29)
HCT VFR BLD AUTO: 33.5 % (ref 40–53)
HGB BLD-MCNC: 11.3 G/DL (ref 13.3–17.7)
MCH RBC QN AUTO: 27.4 PG (ref 26.5–33)
MCHC RBC AUTO-ENTMCNC: 33.7 G/DL (ref 31.5–36.5)
MCV RBC AUTO: 81 FL (ref 78–100)
PLATELET # BLD AUTO: 88 10E3/UL (ref 150–450)
POTASSIUM SERPL-SCNC: 4.3 MMOL/L (ref 3.4–5.3)
PROT/CREAT 24H UR: 0.74 MG/MG CR (ref 0–0.2)
RBC # BLD AUTO: 4.12 10E6/UL (ref 4.4–5.9)
SODIUM SERPL-SCNC: 136 MMOL/L (ref 135–145)
TME LAST DOSE: NORMAL H
TME LAST DOSE: NORMAL H
WBC # BLD AUTO: 6.5 10E3/UL (ref 4–11)

## 2024-09-17 PROCEDURE — 99214 OFFICE O/P EST MOD 30 MIN: CPT | Performed by: INTERNAL MEDICINE

## 2024-09-17 PROCEDURE — G2211 COMPLEX E/M VISIT ADD ON: HCPCS | Performed by: INTERNAL MEDICINE

## 2024-09-17 PROCEDURE — 36415 COLL VENOUS BLD VENIPUNCTURE: CPT | Performed by: PATHOLOGY

## 2024-09-17 PROCEDURE — 80048 BASIC METABOLIC PNL TOTAL CA: CPT | Performed by: PATHOLOGY

## 2024-09-17 PROCEDURE — 80158 DRUG ASSAY CYCLOSPORINE: CPT | Performed by: INTERNAL MEDICINE

## 2024-09-17 PROCEDURE — 86833 HLA CLASS II HIGH DEFIN QUAL: CPT | Performed by: INTERNAL MEDICINE

## 2024-09-17 PROCEDURE — 84156 ASSAY OF PROTEIN URINE: CPT | Performed by: PATHOLOGY

## 2024-09-17 PROCEDURE — G0463 HOSPITAL OUTPT CLINIC VISIT: HCPCS | Performed by: INTERNAL MEDICINE

## 2024-09-17 PROCEDURE — 85027 COMPLETE CBC AUTOMATED: CPT | Performed by: PATHOLOGY

## 2024-09-17 PROCEDURE — 86832 HLA CLASS I HIGH DEFIN QUAL: CPT | Performed by: INTERNAL MEDICINE

## 2024-09-17 PROCEDURE — 87799 DETECT AGENT NOS DNA QUANT: CPT | Performed by: INTERNAL MEDICINE

## 2024-09-17 PROCEDURE — 99000 SPECIMEN HANDLING OFFICE-LAB: CPT | Performed by: PATHOLOGY

## 2024-09-17 RX ORDER — LOSARTAN POTASSIUM 50 MG/1
50 TABLET ORAL DAILY
Qty: 90 TABLET | Refills: 3 | Status: SHIPPED | OUTPATIENT
Start: 2024-09-17

## 2024-09-17 ASSESSMENT — PAIN SCALES - GENERAL: PAINLEVEL: NO PAIN (0)

## 2024-09-17 NOTE — LETTER
9/17/2024      Modesto Barbosa  [address] [place] [address]      Dear Colleague,    Thank you for referring your patient, Modesto Barbosa, to the [facility]. Please see a copy of my visit note below.    TRANSPLANT NEPHROLOGY CLINIC VISIT     Assessment & Plan  # LDKT: CKD Stage 3a - Stable   - Baseline Creatinine: ~ 1.5-1.8   - Proteinuria: Normal (<0.2 grams)   - DSA Hx: No DSA   - Last cPRA: 27%   - BK Viremia: Yes, detectable, but less than quantifiable   - Kidney Tx Biopsy Hx: No history of acute rejection, Severe vascular changes, and Acute tubular injury .    # Immunosuppression: Cyclosporine (goal ) and Mycophenolate mofetil (dose 500 mg every 12 hours)   - Induction with Recent Transplant:  Intermediate Intensity Protocol   - Continue with intensive monitoring of immunosuppression for efficacy and toxicity.   - Historical Changes in Immunosuppression:  Decreased immunosuppression and change from tacrolimus to cyclosporine due to BK viremia.   - Changes: Not at this time after recent increase in mycophenolate.    # Infection Prevention:      Last CD4 Level: 253 (Mar/2024)  - PJP: Sulfa/TMP (Bactrim); Will stop Bactrim.  - CMV IgG Ab High Risk Discordance (D+/R-): No  - EBV IgG Ab High Risk Discordance (D+/R-): No    # Hypertension: Borderline control;  Goal BP: < 130/80   - Changes: Yes - Will increase losartan to 50 mg daily.   - If potassium level remains normal, may consider tapering off hydralazine and increasing losartan dosing.    # Diabetes: Borderline control (HbA1c 7-9%) Last HbA1c: 7.1%    # Anemia in Chronic Renal Disease: Hgb: Stable      NERY: No   - Iron studies: Not checked recently    # Thrombocytopenia: Stable, mildly low platelet level generally in the 90-140s.  No overt evidence of bleeding.    # Mineral Bone Disorder:    - Vitamin D; level: Not checked recently        On supplement: Yes  - Calcium; level: Normal        On supplement: No    # Electrolytes:   -  Potassium; level: Normal        On supplement: No  - Magnesium; level: Not checked recently        On supplement: Yes  - Bicarbonate; level: Normal        On supplement: Yes    # BK Viremia: Trend down, minimal BK PCR at ~ 160 with last check Aug/2024.  IgG level is normal.   - Will continue to follow BK PCR every month.    # Other Significant PMH:   - CAD, s/p PCI: Asymptomatic.  - Asymmetric Left Ventricular Hypertrophy (HOCM): Stable. No history of sustained ventricular arrhythmias.  - Hepatitis B Core Ab +: Patient with core Ab positive, but negative Ag and also negative surface Ab.  Hep B DNA was negative at time of transplant, as well as at 4 months post transplant.  - Peripheral Neuropathy: Slightly improved symptoms on amitriptyline.     # Skin Cancer Risk:    - Discussed sun protection and recommend regular follow up with Dermatology.    # Transplant History:  Etiology of Kidney Failure: Diabetes mellitus type 2  Tx: LDKT  Transplant: 2/21/2023 (Kidney)  Significant transplant-related complications: BK Viremia    Transplant Office Phone Number: [phone]    Assessment and plan was discussed with the patient and he voiced his understanding and agreement.    Return visit: Return in about 6 months (around 3/17/2025).    Patrick Phan MD    The longitudinal plan of care for the diagnosis(es)/condition(s) as documented were addressed during this visit. Due to the added complexity in care, I will continue to support [facility] in the subsequent management and with ongoing continuity of care.      Chief Complaint  Mr. Barbosa is a 58 year old here for kidney transplant and immunosuppression management.     History of Present Illness   Mr. Barbosa reports feeling good overall.  Since last clinic visit:   Hospitalizations: No   New Medical Issues: Yes; Patient had chronic sinusitis symptoms and now s/p sinus surgery by ENT with symptoms markedly improved per patient.  Chest pain or shortness of breath: No  Lower  extremity swelling: No  Weight change: Yes; Weight is up a few pounds.  Nausea and vomiting: No  Diarrhea: No  Heartburn symptoms: No  Fever, sweats or chills: No  Urinary complaints: No    Home BP:  130/50-60s    Problem List  Patient Active Problem List   Diagnosis     Dyslipidemia, goal LDL below 70     HTN, kidney transplant related     Metabolic acidosis     Coronary artery disease involving native coronary artery of native heart without angina pectoris     Thrombocytopenia (H24)     Immunosuppressed status (H24)     Kidney replaced by transplant     Anemia in chronic renal disease     Hypomagnesemia     Hepatitis B core antibody positive     Aftercare following organ transplant     Need for pneumocystis prophylaxis     Vitamin D deficiency     Secondary renal hyperparathyroidism (H24)     BK viremia     Peripheral neuropathy     HOCM (hypertrophic obstructive cardiomyopathy) (H)     Stage 3a chronic kidney disease (H)     Nasal polyposis     Chronic nasal congestion     Diabetes mellitus, type 2 (H)     Allergic fungal sinusitis       Allergies  No Known Allergies    Medications  Current Outpatient Medications   Medication Sig Dispense Refill     acetaminophen (TYLENOL) 325 MG tablet Take 2 tablets (650 mg) by mouth every 4 hours as needed for other (For optimal non-opioid multimodal pain management to improve pain control.) 30 tablet 0     Alcohol Swabs (ALCOHOL PREP) 70 % PADS USE 5X PER  each 3     amitriptyline (ELAVIL) 25 MG tablet Take 1 tablet (25 mg) by mouth at bedtime 90 tablet 1     amLODIPine (NORVASC) 10 MG tablet Take 1 tablet (10 mg) by mouth at bedtime 90 tablet 3     aspirin 81 MG EC tablet Take 1 tablet (81 mg) by mouth daily 90 tablet 2     atorvastatin (LIPITOR) 20 MG tablet Take 1 tablet (20 mg) by mouth daily 30 tablet 11     blood glucose (NO BRAND SPECIFIED) test strip Use to test blood sugar 3 times daily or as directed. To accompany: Blood Glucose Monitor Brands: per insurance.  200 strip 3     blood glucose monitoring (ONE TOUCH ULTRA 2) meter device kit USE TO TEST BLOOD SUGAR THREE TIMES DAILY OR AS DIRECTED. 1 kit 0     budesonide (PULMICORT) 0.5 MG/2ML neb solution Empty contents of ampule into 240mL of saline solution and rinse both nasal cavities as instructed twice daily. 120 mL 3     carvedilol (COREG) 25 MG tablet TAKE 0.5 TABLETS BY MOUTH 2 TIMES DAILY (WITH MEALS) 30 tablet 11     cycloSPORINE modified (GENERIC EQUIVALENT) 25 MG capsule Take 3 capsules (75 mg) by mouth every morning AND 2 capsules (50 mg) every evening. 450 capsule 3     fluticasone (FLONASE) 50 MCG/ACT nasal spray Spray 2 sprays into both nostrils daily 9.9 mL 3     hydrALAZINE (APRESOLINE) 25 MG tablet Take 1 tablet (25 mg) by mouth 2 times daily 180 tablet 3     insulin aspart (NOVOLOG PEN) 100 UNIT/ML pen Before breaskfast and lunch, give 7 units. Before dinner give 9 units. 15 mL 3     insulin glargine (LANTUS PEN) 100 UNIT/ML pen Inject 25 Units subcutaneously at bedtime 15 mL 11     insulin pen needle (BD PEN NEEDLE ORESTES 2ND GEN) 32G X 4 MM miscellaneous Inject Subcutaneous 3 times daily 100 each 11     ipratropium (ATROVENT) 0.06 % nasal spray Spray 2 sprays into both nostrils 4 times daily as needed for other (nasal congestion) 15 mL 0     loratadine (CLARITIN) 10 MG tablet Take 1 tablet (10 mg) by mouth daily as needed for allergies (itchy/rash) 90 tablet 3     losartan (COZAAR) 50 MG tablet Take 1 tablet (50 mg) by mouth daily. 90 tablet 3     magnesium oxide (MAG-OX) 400 MG tablet TAKE ONE TABLET BY MOUTH TWICE A  tablet 0     mycophenolate (GENERIC EQUIVALENT) 250 MG capsule Take 2 capsules (500 mg) by mouth 2 times daily 120 capsule 11     sodium bicarbonate 650 MG tablet Take 2 tablets (1,300 mg) by mouth 2 times daily 120 tablet 11     sodium chloride (OCEAN) 0.65 % nasal spray Please use in both sides of the nose as needed. 50 mL 1     thin (NO BRAND SPECIFIED) lancets Tests 3x daily Use  "with lanceting device. To accompany: Blood Glucose Monitor Brands: per insurance. 200 each 3     Vitamin D3 (CHOLECALCIFEROL) 25 mcg (1000 units) tablet Take 2 tablets (50 mcg) by mouth daily 60 tablet 11     nitroGLYcerin (NITROSTAT) 0.4 MG sublingual tablet One tablet under the tongue every 5 minutes if needed for chest pain. May repeat every 5 minutes for a maximum of 3 doses in 15 minutes\" (Patient not taking: Reported on 9/17/2024) 25 tablet 3     No current facility-administered medications for this visit.     Medications Discontinued During This Encounter   Medication Reason     losartan (COZAAR) 25 MG tablet      sulfamethoxazole-trimethoprim (BACTRIM) 400-80 MG tablet      sulfamethoxazole-trimethoprim (BACTRIM) 400-80 MG tablet        Physical Exam  Vital Signs: BP (!) 166/74 (BP Location: Right arm, Patient Position: Sitting, Cuff Size: Adult Regular)   Pulse 59   Temp 97.5  F (36.4  C) (Oral)   Wt 68.7 kg (151 lb 6.4 oz)   SpO2 98%   BMI 25.99 kg/m      GENERAL APPEARANCE: alert and no distress  HENT: mouth without ulcers or lesions  RESP: lungs clear to auscultation - no rales, rhonchi or wheezes  CV: regular rhythm, normal rate, no rub, no murmur  EDEMA: none to trace LE edema bilaterally  ABDOMEN: soft, nondistended, nontender, bowel sounds normal  MS: extremities normal - no gross deformities noted, no evidence of inflammation in joints, no muscle tenderness  SKIN: no rash  TX KIDNEY: normal  DIALYSIS ACCESS: none    Data        Latest Ref Rng & Units 9/17/2024     8:46 AM 8/27/2024     8:40 AM 8/19/2024     8:14 AM   Renal   Sodium 135 - 145 mmol/L 136  133  133    K 3.4 - 5.3 mmol/L 4.3  4.6  4.8    Cl 98 - 107 mmol/L 104  100  100    Cl (external) 98 - 107 mmol/L 104  100  100    CO2 22 - 29 mmol/L 21  22  22    Urea Nitrogen 8.0 - 23.0 mg/dL 16.1  22.5  38.7    Creatinine 0.67 - 1.17 mg/dL 1.19  1.43  1.96    Glucose 70 - 99 mg/dL 78  132  170    Calcium 8.8 - 10.4 mg/dL 9.3  9.2  8.6  "         Latest Ref Rng & Units 10/30/2023     8:55 AM 8/7/2023     8:57 AM 7/31/2023     8:44 AM   Bone Health   Phosphorus 2.5 - 4.5 mg/dL 3.5  3.7  4.1          Latest Ref Rng & Units 9/17/2024     8:46 AM 8/27/2024     8:40 AM 8/19/2024     8:14 AM   Heme   WBC 4.0 - 11.0 10e3/uL 6.5  4.2  4.6    Hgb 13.3 - 17.7 g/dL 11.3  10.2  9.5    Plt 150 - 450 10e3/uL 88  108  99          Latest Ref Rng & Units 8/28/2023     9:03 AM 4/7/2023     8:10 AM 4/3/2023     8:10 AM   Liver   AP 40 - 129 U/L 88  74  69    TBili <=1.2 mg/dL 0.5  0.7  0.8    Bilirubin Direct 0.00 - 0.30 mg/dL <0.20   0.22    ALT 0 - 70 U/L 17  10  12    AST 0 - 45 U/L 26  12  14    Tot Protein 6.4 - 8.3 g/dL 6.8  6.3  6.1    Albumin 3.5 - 5.0 g/dL  3.7     Albumin 3.5 - 5.2 g/dL 4.6   3.8          Latest Ref Rng & Units 7/15/2024     8:57 AM 12/29/2023    10:41 AM 8/28/2023     9:03 AM   Pancreas   A1C <5.7 % 7.1  6.5  5.8          Latest Ref Rng & Units 8/14/2023     9:12 AM 6/26/2023     8:08 AM 6/19/2023     9:45 AM   Iron studies   Iron 61 - 157 ug/dL 40  47  54    Iron Sat Index 15 - 46 % 22  28  31    Ferritin 31 - 409 ng/mL 412  545  540          Latest Ref Rng & Units 9/25/2023     9:09 AM 7/5/2023     7:43 AM 5/1/2023     7:15 AM   [facility] Txp Virology   EBV DNA COPIES/ML Not Detected copies/mL <500  <500  <500    EBV DNA LOG OF COPIES  <2.7  <2.7  <2.7      Failed to redirect to the Timeline version of the REVFS SmartLink.  Recent Labs   Lab Test 08/28/23  0903 09/05/23  0912 09/11/23  0905   DOSTAC 8/27/2023 9/4/2023 9/10/2023   TACROL 6.9 6.5 3.8*     Recent Labs   Lab Test 02/05/24  0850 02/20/24  0853 03/04/24  0846   DOSMPA 2/4/2024   8:00 PM  --  3/3/2024   8:00 PM   MPACID 1.63 1.47 1.31   MPAG 57.1 50.1 53.7          Again, thank you for allowing me to participate in the care of your patient.        Sincerely,        Patrick Phan MD

## 2024-09-17 NOTE — PATIENT INSTRUCTIONS
Patient Recommendations:  - Increase losartan to 50 mg daily.  - Stop sulfamethoxazole-trimethoprim (Bactrim)    Transplant Patient Information  Your Post Transplant Coordinator is: Shalonda Roy  For non urgent items, we encourage you to contact your coordinator/care team online via [employer]  You and your care team can also contact your transplant coordinator Monday - Friday, 8am - 5pm at [phone] (Option 2 to reach the coordinator or Option 4 to schedule an appointment).  After hours for urgent matters, please call [facility] at [phone].

## 2024-09-17 NOTE — LETTER
9/17/2024      RE: Modesto JOE Barbosa  [address] [place] [address]       TRANSPLANT NEPHROLOGY CLINIC VISIT     Assessment & Plan  # LDKT: CKD Stage 3a - Stable   - Baseline Creatinine: ~ 1.5-1.8   - Proteinuria: Normal (<0.2 grams)   - DSA Hx: No DSA   - Last cPRA: 27%   - BK Viremia: Yes, detectable, but less than quantifiable   - Kidney Tx Biopsy Hx: No history of acute rejection, Severe vascular changes, and Acute tubular injury .    # Immunosuppression: Cyclosporine (goal ) and Mycophenolate mofetil (dose 500 mg every 12 hours)   - Induction with Recent Transplant:  Intermediate Intensity Protocol   - Continue with intensive monitoring of immunosuppression for efficacy and toxicity.   - Historical Changes in Immunosuppression:  Decreased immunosuppression and change from tacrolimus to cyclosporine due to BK viremia.   - Changes: Not at this time after recent increase in mycophenolate.    # Infection Prevention:      Last CD4 Level: 253 (Mar/2024)  - PJP: Sulfa/TMP (Bactrim); Will stop Bactrim.  - CMV IgG Ab High Risk Discordance (D+/R-): No  - EBV IgG Ab High Risk Discordance (D+/R-): No    # Hypertension: Borderline control;  Goal BP: < 130/80   - Changes: Yes - Will increase losartan to 50 mg daily.   - If potassium level remains normal, may consider tapering off hydralazine and increasing losartan dosing.    # Diabetes: Borderline control (HbA1c 7-9%) Last HbA1c: 7.1%    # Anemia in Chronic Renal Disease: Hgb: Stable      NERY: No   - Iron studies: Not checked recently    # Thrombocytopenia: Stable, mildly low platelet level generally in the 90-140s.  No overt evidence of bleeding.    # Mineral Bone Disorder:    - Vitamin D; level: Not checked recently        On supplement: Yes  - Calcium; level: Normal        On supplement: No    # Electrolytes:   - Potassium; level: Normal        On supplement: No  - Magnesium; level: Not checked recently        On supplement: Yes  - Bicarbonate; level: Normal         On supplement: Yes    # BK Viremia: Trend down, minimal BK PCR at ~ 160 with last check Aug/2024.  IgG level is normal.   - Will continue to follow BK PCR every month.    # Other Significant PMH:   - CAD, s/p PCI: Asymptomatic.  - Asymmetric Left Ventricular Hypertrophy (HOCM): Stable. No history of sustained ventricular arrhythmias.  - Hepatitis B Core Ab +: Patient with core Ab positive, but negative Ag and also negative surface Ab.  Hep B DNA was negative at time of transplant, as well as at 4 months post transplant.  - Peripheral Neuropathy: Slightly improved symptoms on amitriptyline.     # Skin Cancer Risk:    - Discussed sun protection and recommend regular follow up with Dermatology.    # Transplant History:  Etiology of Kidney Failure: Diabetes mellitus type 2  Tx: LDKT  Transplant: 2/21/2023 (Kidney)  Significant transplant-related complications: BK Viremia    Transplant Office Phone Number: [phone]    Assessment and plan was discussed with the patient and he voiced his understanding and agreement.    Return visit: Return in about 6 months (around 3/17/2025).    Patrick Phan MD    The longitudinal plan of care for the diagnosis(es)/condition(s) as documented were addressed during this visit. Due to the added complexity in care, I will continue to support [facility] in the subsequent management and with ongoing continuity of care.      Chief Complaint  Mr. Barbosa is a 58 year old here for kidney transplant and immunosuppression management.     History of Present Illness   Mr. Barbosa reports feeling good overall.  Since last clinic visit:   Hospitalizations: No   New Medical Issues: Yes; Patient had chronic sinusitis symptoms and now s/p sinus surgery by ENT with symptoms markedly improved per patient.  Chest pain or shortness of breath: No  Lower extremity swelling: No  Weight change: Yes; Weight is up a few pounds.  Nausea and vomiting: No  Diarrhea: No  Heartburn symptoms: No  Fever, sweats or  chills: No  Urinary complaints: No    Home BP:  130/50-60s    Problem List  Patient Active Problem List   Diagnosis     Dyslipidemia, goal LDL below 70     HTN, kidney transplant related     Metabolic acidosis     Coronary artery disease involving native coronary artery of native heart without angina pectoris     Thrombocytopenia (H24)     Immunosuppressed status (H24)     Kidney replaced by transplant     Anemia in chronic renal disease     Hypomagnesemia     Hepatitis B core antibody positive     Aftercare following organ transplant     Need for pneumocystis prophylaxis     Vitamin D deficiency     Secondary renal hyperparathyroidism (H24)     BK viremia     Peripheral neuropathy     HOCM (hypertrophic obstructive cardiomyopathy) (H)     Stage 3a chronic kidney disease (H)     Nasal polyposis     Chronic nasal congestion     Diabetes mellitus, type 2 (H)     Allergic fungal sinusitis       Allergies  No Known Allergies    Medications  Current Outpatient Medications   Medication Sig Dispense Refill     acetaminophen (TYLENOL) 325 MG tablet Take 2 tablets (650 mg) by mouth every 4 hours as needed for other (For optimal non-opioid multimodal pain management to improve pain control.) 30 tablet 0     Alcohol Swabs (ALCOHOL PREP) 70 % PADS USE 5X PER  each 3     amitriptyline (ELAVIL) 25 MG tablet Take 1 tablet (25 mg) by mouth at bedtime 90 tablet 1     amLODIPine (NORVASC) 10 MG tablet Take 1 tablet (10 mg) by mouth at bedtime 90 tablet 3     aspirin 81 MG EC tablet Take 1 tablet (81 mg) by mouth daily 90 tablet 2     atorvastatin (LIPITOR) 20 MG tablet Take 1 tablet (20 mg) by mouth daily 30 tablet 11     blood glucose (NO BRAND SPECIFIED) test strip Use to test blood sugar 3 times daily or as directed. To accompany: Blood Glucose Monitor Brands: per insurance. 200 strip 3     blood glucose monitoring (ONE TOUCH ULTRA 2) meter device kit USE TO TEST BLOOD SUGAR THREE TIMES DAILY OR AS DIRECTED. 1 kit 0      budesonide (PULMICORT) 0.5 MG/2ML neb solution Empty contents of ampule into 240mL of saline solution and rinse both nasal cavities as instructed twice daily. 120 mL 3     carvedilol (COREG) 25 MG tablet TAKE 0.5 TABLETS BY MOUTH 2 TIMES DAILY (WITH MEALS) 30 tablet 11     cycloSPORINE modified (GENERIC EQUIVALENT) 25 MG capsule Take 3 capsules (75 mg) by mouth every morning AND 2 capsules (50 mg) every evening. 450 capsule 3     fluticasone (FLONASE) 50 MCG/ACT nasal spray Spray 2 sprays into both nostrils daily 9.9 mL 3     hydrALAZINE (APRESOLINE) 25 MG tablet Take 1 tablet (25 mg) by mouth 2 times daily 180 tablet 3     insulin aspart (NOVOLOG PEN) 100 UNIT/ML pen Before breaskfast and lunch, give 7 units. Before dinner give 9 units. 15 mL 3     insulin glargine (LANTUS PEN) 100 UNIT/ML pen Inject 25 Units subcutaneously at bedtime 15 mL 11     insulin pen needle (BD PEN NEEDLE ORESTES 2ND GEN) 32G X 4 MM miscellaneous Inject Subcutaneous 3 times daily 100 each 11     ipratropium (ATROVENT) 0.06 % nasal spray Spray 2 sprays into both nostrils 4 times daily as needed for other (nasal congestion) 15 mL 0     loratadine (CLARITIN) 10 MG tablet Take 1 tablet (10 mg) by mouth daily as needed for allergies (itchy/rash) 90 tablet 3     losartan (COZAAR) 50 MG tablet Take 1 tablet (50 mg) by mouth daily. 90 tablet 3     magnesium oxide (MAG-OX) 400 MG tablet TAKE ONE TABLET BY MOUTH TWICE A  tablet 0     mycophenolate (GENERIC EQUIVALENT) 250 MG capsule Take 2 capsules (500 mg) by mouth 2 times daily 120 capsule 11     sodium bicarbonate 650 MG tablet Take 2 tablets (1,300 mg) by mouth 2 times daily 120 tablet 11     sodium chloride (OCEAN) 0.65 % nasal spray Please use in both sides of the nose as needed. 50 mL 1     thin (NO BRAND SPECIFIED) lancets Tests 3x daily Use with lanceting device. To accompany: Blood Glucose Monitor Brands: per insurance. 200 each 3     Vitamin D3 (CHOLECALCIFEROL) 25 mcg (1000 units)  "tablet Take 2 tablets (50 mcg) by mouth daily 60 tablet 11     nitroGLYcerin (NITROSTAT) 0.4 MG sublingual tablet One tablet under the tongue every 5 minutes if needed for chest pain. May repeat every 5 minutes for a maximum of 3 doses in 15 minutes\" (Patient not taking: Reported on 9/17/2024) 25 tablet 3     No current facility-administered medications for this visit.     Medications Discontinued During This Encounter   Medication Reason     losartan (COZAAR) 25 MG tablet      sulfamethoxazole-trimethoprim (BACTRIM) 400-80 MG tablet      sulfamethoxazole-trimethoprim (BACTRIM) 400-80 MG tablet        Physical Exam  Vital Signs: BP (!) 166/74 (BP Location: Right arm, Patient Position: Sitting, Cuff Size: Adult Regular)   Pulse 59   Temp 97.5  F (36.4  C) (Oral)   Wt 68.7 kg (151 lb 6.4 oz)   SpO2 98%   BMI 25.99 kg/m      GENERAL APPEARANCE: alert and no distress  HENT: mouth without ulcers or lesions  RESP: lungs clear to auscultation - no rales, rhonchi or wheezes  CV: regular rhythm, normal rate, no rub, no murmur  EDEMA: none to trace LE edema bilaterally  ABDOMEN: soft, nondistended, nontender, bowel sounds normal  MS: extremities normal - no gross deformities noted, no evidence of inflammation in joints, no muscle tenderness  SKIN: no rash  TX KIDNEY: normal  DIALYSIS ACCESS: none    Data        Latest Ref Rng & Units 9/17/2024     8:46 AM 8/27/2024     8:40 AM 8/19/2024     8:14 AM   Renal   Sodium 135 - 145 mmol/L 136  133  133    K 3.4 - 5.3 mmol/L 4.3  4.6  4.8    Cl 98 - 107 mmol/L 104  100  100    Cl (external) 98 - 107 mmol/L 104  100  100    CO2 22 - 29 mmol/L 21  22  22    Urea Nitrogen 8.0 - 23.0 mg/dL 16.1  22.5  38.7    Creatinine 0.67 - 1.17 mg/dL 1.19  1.43  1.96    Glucose 70 - 99 mg/dL 78  132  170    Calcium 8.8 - 10.4 mg/dL 9.3  9.2  8.6          Latest Ref Rng & Units 10/30/2023     8:55 AM 8/7/2023     8:57 AM 7/31/2023     8:44 AM   Bone Health   Phosphorus 2.5 - 4.5 mg/dL 3.5  3.7  " 4.1          Latest Ref Rng & Units 9/17/2024     8:46 AM 8/27/2024     8:40 AM 8/19/2024     8:14 AM   Heme   WBC 4.0 - 11.0 10e3/uL 6.5  4.2  4.6    Hgb 13.3 - 17.7 g/dL 11.3  10.2  9.5    Plt 150 - 450 10e3/uL 88  108  99          Latest Ref Rng & Units 8/28/2023     9:03 AM 4/7/2023     8:10 AM 4/3/2023     8:10 AM   Liver   AP 40 - 129 U/L 88  74  69    TBili <=1.2 mg/dL 0.5  0.7  0.8    Bilirubin Direct 0.00 - 0.30 mg/dL <0.20   0.22    ALT 0 - 70 U/L 17  10  12    AST 0 - 45 U/L 26  12  14    Tot Protein 6.4 - 8.3 g/dL 6.8  6.3  6.1    Albumin 3.5 - 5.0 g/dL  3.7     Albumin 3.5 - 5.2 g/dL 4.6   3.8          Latest Ref Rng & Units 7/15/2024     8:57 AM 12/29/2023    10:41 AM 8/28/2023     9:03 AM   Pancreas   A1C <5.7 % 7.1  6.5  5.8          Latest Ref Rng & Units 8/14/2023     9:12 AM 6/26/2023     8:08 AM 6/19/2023     9:45 AM   Iron studies   Iron 61 - 157 ug/dL 40  47  54    Iron Sat Index 15 - 46 % 22  28  31    Ferritin 31 - 409 ng/mL 412  545  540          Latest Ref Rng & Units 9/25/2023     9:09 AM 7/5/2023     7:43 AM 5/1/2023     7:15 AM   [facility] Txp Virology   EBV DNA COPIES/ML Not Detected copies/mL <500  <500  <500    EBV DNA LOG OF COPIES  <2.7  <2.7  <2.7      Failed to redirect to the Timeline version of the REVFS SmartLink.  Recent Labs   Lab Test 08/28/23  0903 09/05/23  0912 09/11/23  0905   DOSTAC 8/27/2023 9/4/2023 9/10/2023   TACROL 6.9 6.5 3.8*     Recent Labs   Lab Test 02/05/24  0850 02/20/24  0853 03/04/24  0846   DOSMPA 2/4/2024   8:00 PM  --  3/3/2024   8:00 PM   MPACID 1.63 1.47 1.31   MPAG 57.1 50.1 53.7        Patrick Phan MD

## 2024-09-17 NOTE — NURSING NOTE
Chief Complaint   Patient presents with    RECHECK     Follow up.      Vitals:    09/17/24 0919 09/17/24 0923   BP: (!) 165/65 (!) 166/74   BP Location: Right arm Right arm   Patient Position: Sitting Sitting   Cuff Size: Adult Regular Adult Regular   Pulse: 59    Temp: 97.5  F (36.4  C)    TempSrc: Oral    SpO2: 98%    Weight: 68.7 kg (151 lb 6.4 oz)        BP Readings from Last 3 Encounters:   09/17/24 (!) 166/74   08/29/24 138/68   08/08/24 113/58       BP (!) 166/74 (BP Location: Right arm, Patient Position: Sitting, Cuff Size: Adult Regular)   Pulse 59   Temp 97.5  F (36.4  C) (Oral)   Wt 68.7 kg (151 lb 6.4 oz)   SpO2 98%   BMI 25.99 kg/m       Sunshine Heymans

## 2024-09-19 ENCOUNTER — TELEPHONE (OUTPATIENT)
Dept: TRANSPLANT | Facility: CLINIC | Age: 59
End: 2024-09-19
Payer: COMMERCIAL

## 2024-09-19 DIAGNOSIS — D64.9 ANEMIA: ICD-10-CM

## 2024-09-19 DIAGNOSIS — Z94.0 KIDNEY REPLACED BY TRANSPLANT: Primary | ICD-10-CM

## 2024-09-19 NOTE — TELEPHONE ENCOUNTER
Emilie, MD Manuela Nguyen, Shalonda GONZALES RN  I increased his losartan.  Would also recommend checking magnesium level, iron panel and DSA.    Finally, it looks like Dr. Darrell Boland felt this gentleman was too complex for him to follow.  Dr. Stacy said he would follow him in clinic at [facility].  Can we get the patient an appointment with [facility] for ~ 3 months from now.  I'll see him back in 6 months.

## 2024-09-27 LAB
DONOR IDENTIFICATION: NORMAL
DSA COMMENTS: NORMAL
DSA PRESENT: NO
DSA TEST METHOD: NORMAL
ORGAN: NORMAL
SA 1  COMMENTS: NORMAL
SA 1 CELL: NORMAL
SA 1 TEST METHOD: NORMAL
SA 2 CELL: NORMAL
SA 2 COMMENTS: NORMAL
SA 2 TEST METHOD: NORMAL
SA1 HI RISK ABY: NORMAL
SA1 MOD RISK ABY: NORMAL
SA2 HI RISK ABY: NORMAL
SA2 MOD RISK ABY: NORMAL
UNACCEPTABLE ANTIGENS: NORMAL
UNOS CPRA: 27

## 2024-10-11 DIAGNOSIS — I15.1 HTN, KIDNEY TRANSPLANT RELATED: ICD-10-CM

## 2024-10-11 DIAGNOSIS — Z94.0 HTN, KIDNEY TRANSPLANT RELATED: ICD-10-CM

## 2024-10-11 RX ORDER — HYDRALAZINE HYDROCHLORIDE 25 MG/1
25 TABLET, FILM COATED ORAL 2 TIMES DAILY
Qty: 180 TABLET | Refills: 2 | Status: SHIPPED | OUTPATIENT
Start: 2024-10-11

## 2024-10-16 ENCOUNTER — LAB (OUTPATIENT)
Dept: LAB | Facility: HOSPITAL | Age: 59
End: 2024-10-16
Payer: COMMERCIAL

## 2024-10-16 DIAGNOSIS — Z48.298 AFTERCARE FOLLOWING ORGAN TRANSPLANT: ICD-10-CM

## 2024-10-16 DIAGNOSIS — Z98.890 OTHER SPECIFIED POSTPROCEDURAL STATES: ICD-10-CM

## 2024-10-16 DIAGNOSIS — Z94.0 KIDNEY TRANSPLANTED: ICD-10-CM

## 2024-10-16 DIAGNOSIS — Z94.0 KIDNEY REPLACED BY TRANSPLANT: ICD-10-CM

## 2024-10-16 DIAGNOSIS — Z79.899 ENCOUNTER FOR LONG-TERM CURRENT USE OF MEDICATION: ICD-10-CM

## 2024-10-16 DIAGNOSIS — D64.9 ANEMIA: ICD-10-CM

## 2024-10-16 LAB
ALBUMIN MFR UR ELPH: 12.1 MG/DL
ANION GAP SERPL CALCULATED.3IONS-SCNC: 10 MMOL/L (ref 7–15)
BK VIRUS DNA IU/ML, INSTRUMENT (6800): 183 IU/ML
BK VIRUS SPECIMEN TYPE: ABNORMAL
BKV DNA SPEC NAA+PROBE-LOG#: 2.3 {LOG_COPIES}/ML
BUN SERPL-MCNC: 22.6 MG/DL (ref 8–23)
CALCIUM SERPL-MCNC: 9.1 MG/DL (ref 8.8–10.4)
CHLORIDE SERPL-SCNC: 102 MMOL/L (ref 98–107)
CREAT SERPL-MCNC: 1.05 MG/DL (ref 0.67–1.17)
CREAT UR-MCNC: 33.6 MG/DL
CYCLOSPORINE BLD LC/MS/MS-MCNC: 111 UG/L (ref 50–400)
EGFRCR SERPLBLD CKD-EPI 2021: 82 ML/MIN/1.73M2
ERYTHROCYTE [DISTWIDTH] IN BLOOD BY AUTOMATED COUNT: 14.3 % (ref 10–15)
FERRITIN SERPL-MCNC: 53 NG/ML (ref 31–409)
GLUCOSE SERPL-MCNC: 174 MG/DL (ref 70–99)
HCO3 SERPL-SCNC: 23 MMOL/L (ref 22–29)
HCT VFR BLD AUTO: 33.3 % (ref 40–53)
HGB BLD-MCNC: 11.3 G/DL (ref 13.3–17.7)
IRON BINDING CAPACITY (ROCHE): 269 UG/DL (ref 240–430)
IRON SATN MFR SERPL: 20 % (ref 15–46)
IRON SERPL-MCNC: 54 UG/DL (ref 61–157)
MAGNESIUM SERPL-MCNC: 1.8 MG/DL (ref 1.7–2.3)
MCH RBC QN AUTO: 27.2 PG (ref 26.5–33)
MCHC RBC AUTO-ENTMCNC: 33.9 G/DL (ref 31.5–36.5)
MCV RBC AUTO: 80 FL (ref 78–100)
PLATELET # BLD AUTO: 103 10E3/UL (ref 150–450)
POTASSIUM SERPL-SCNC: 4.6 MMOL/L (ref 3.4–5.3)
PROT/CREAT 24H UR: 0.36 MG/MG CR (ref 0–0.2)
RBC # BLD AUTO: 4.16 10E6/UL (ref 4.4–5.9)
SODIUM SERPL-SCNC: 135 MMOL/L (ref 135–145)
TME LAST DOSE: NORMAL H
TME LAST DOSE: NORMAL H
WBC # BLD AUTO: 4.8 10E3/UL (ref 4–11)

## 2024-10-16 PROCEDURE — 87799 DETECT AGENT NOS DNA QUANT: CPT

## 2024-10-16 PROCEDURE — 86832 HLA CLASS I HIGH DEFIN QUAL: CPT

## 2024-10-16 PROCEDURE — 83735 ASSAY OF MAGNESIUM: CPT

## 2024-10-16 PROCEDURE — 82728 ASSAY OF FERRITIN: CPT

## 2024-10-16 PROCEDURE — 80048 BASIC METABOLIC PNL TOTAL CA: CPT

## 2024-10-16 PROCEDURE — 86833 HLA CLASS II HIGH DEFIN QUAL: CPT

## 2024-10-16 PROCEDURE — 85027 COMPLETE CBC AUTOMATED: CPT

## 2024-10-16 PROCEDURE — 80158 DRUG ASSAY CYCLOSPORINE: CPT

## 2024-10-16 PROCEDURE — 36415 COLL VENOUS BLD VENIPUNCTURE: CPT

## 2024-10-16 PROCEDURE — 83550 IRON BINDING TEST: CPT

## 2024-10-16 PROCEDURE — 84156 ASSAY OF PROTEIN URINE: CPT

## 2024-11-15 DIAGNOSIS — E11.22 TYPE 2 DIABETES MELLITUS WITH DIABETIC CHRONIC KIDNEY DISEASE, UNSPECIFIED CKD STAGE, UNSPECIFIED WHETHER LONG TERM INSULIN USE (H): ICD-10-CM

## 2024-11-15 DIAGNOSIS — E11.42 DIABETIC PERIPHERAL NEUROPATHY (H): ICD-10-CM

## 2024-11-16 ENCOUNTER — LAB (OUTPATIENT)
Dept: LAB | Facility: HOSPITAL | Age: 59
End: 2024-11-16
Payer: COMMERCIAL

## 2024-11-16 DIAGNOSIS — Z94.0 KIDNEY REPLACED BY TRANSPLANT: ICD-10-CM

## 2024-11-16 DIAGNOSIS — Z94.0 KIDNEY TRANSPLANTED: ICD-10-CM

## 2024-11-16 DIAGNOSIS — Z98.890 OTHER SPECIFIED POSTPROCEDURAL STATES: ICD-10-CM

## 2024-11-16 DIAGNOSIS — Z48.298 AFTERCARE FOLLOWING ORGAN TRANSPLANT: ICD-10-CM

## 2024-11-16 DIAGNOSIS — Z79.899 ENCOUNTER FOR LONG-TERM CURRENT USE OF MEDICATION: ICD-10-CM

## 2024-11-16 LAB
ALBUMIN MFR UR ELPH: <6 MG/DL
ANION GAP SERPL CALCULATED.3IONS-SCNC: 9 MMOL/L (ref 7–15)
BUN SERPL-MCNC: 25.8 MG/DL (ref 8–23)
CALCIUM SERPL-MCNC: 9.2 MG/DL (ref 8.8–10.4)
CHLORIDE SERPL-SCNC: 102 MMOL/L (ref 98–107)
CREAT SERPL-MCNC: 1.12 MG/DL (ref 0.67–1.17)
CREAT UR-MCNC: 9.4 MG/DL
CYCLOSPORINE BLD LC/MS/MS-MCNC: 114 UG/L (ref 50–400)
EGFRCR SERPLBLD CKD-EPI 2021: 76 ML/MIN/1.73M2
ERYTHROCYTE [DISTWIDTH] IN BLOOD BY AUTOMATED COUNT: 14.1 % (ref 10–15)
GLUCOSE SERPL-MCNC: 108 MG/DL (ref 70–99)
HCO3 SERPL-SCNC: 24 MMOL/L (ref 22–29)
HCT VFR BLD AUTO: 36.7 % (ref 40–53)
HGB BLD-MCNC: 12.4 G/DL (ref 13.3–17.7)
MCH RBC QN AUTO: 26.4 PG (ref 26.5–33)
MCHC RBC AUTO-ENTMCNC: 33.8 G/DL (ref 31.5–36.5)
MCV RBC AUTO: 78 FL (ref 78–100)
PLATELET # BLD AUTO: 112 10E3/UL (ref 150–450)
POTASSIUM SERPL-SCNC: 4.6 MMOL/L (ref 3.4–5.3)
PROT/CREAT 24H UR: NORMAL MG/G{CREAT}
RBC # BLD AUTO: 4.7 10E6/UL (ref 4.4–5.9)
SODIUM SERPL-SCNC: 135 MMOL/L (ref 135–145)
TME LAST DOSE: NORMAL H
TME LAST DOSE: NORMAL H
WBC # BLD AUTO: 6.1 10E3/UL (ref 4–11)

## 2024-11-16 PROCEDURE — 85018 HEMOGLOBIN: CPT

## 2024-11-16 PROCEDURE — 87799 DETECT AGENT NOS DNA QUANT: CPT

## 2024-11-16 PROCEDURE — 84156 ASSAY OF PROTEIN URINE: CPT | Performed by: INTERNAL MEDICINE

## 2024-11-16 PROCEDURE — 82310 ASSAY OF CALCIUM: CPT

## 2024-11-16 PROCEDURE — 80158 DRUG ASSAY CYCLOSPORINE: CPT

## 2024-11-16 PROCEDURE — 80048 BASIC METABOLIC PNL TOTAL CA: CPT

## 2024-11-16 PROCEDURE — 36415 COLL VENOUS BLD VENIPUNCTURE: CPT

## 2024-11-17 LAB
BK VIRUS DNA IU/ML, INSTRUMENT (6800): 174 IU/ML
BK VIRUS SPECIMEN TYPE: ABNORMAL
BKV DNA SPEC NAA+PROBE-LOG#: 2.2 {LOG_COPIES}/ML

## 2024-11-19 RX ORDER — INSULIN ASPART 100 [IU]/ML
INJECTION, SOLUTION INTRAVENOUS; SUBCUTANEOUS
Qty: 15 ML | Refills: 3 | Status: SHIPPED | OUTPATIENT
Start: 2024-11-19

## 2024-11-20 DIAGNOSIS — Z94.0 HTN, KIDNEY TRANSPLANT RELATED: Primary | ICD-10-CM

## 2024-11-20 DIAGNOSIS — I15.1 HTN, KIDNEY TRANSPLANT RELATED: Primary | ICD-10-CM

## 2024-11-20 DIAGNOSIS — B34.8 BK VIREMIA: ICD-10-CM

## 2024-12-05 ENCOUNTER — OFFICE VISIT (OUTPATIENT)
Dept: OTOLARYNGOLOGY | Facility: CLINIC | Age: 59
End: 2024-12-05
Attending: STUDENT IN AN ORGANIZED HEALTH CARE EDUCATION/TRAINING PROGRAM
Payer: COMMERCIAL

## 2024-12-05 VITALS
WEIGHT: 151.1 LBS | SYSTOLIC BLOOD PRESSURE: 146 MMHG | BODY MASS INDEX: 25.8 KG/M2 | DIASTOLIC BLOOD PRESSURE: 54 MMHG | HEIGHT: 64 IN | HEART RATE: 61 BPM | OXYGEN SATURATION: 96 %

## 2024-12-05 DIAGNOSIS — J33.9 NASAL POLYPOSIS: ICD-10-CM

## 2024-12-05 DIAGNOSIS — Z98.890 S/P SINUS SURGERY: ICD-10-CM

## 2024-12-05 ASSESSMENT — PAIN SCALES - GENERAL: PAINLEVEL_OUTOF10: NO PAIN (0)

## 2024-12-05 NOTE — NURSING NOTE
"Chief Complaint   Patient presents with    RECHECK   Blood pressure (!) 146/54, pulse 61, height 1.626 m (5' 4\"), weight 68.5 kg (151 lb 1.6 oz), SpO2 96%. Coleman Hooper, EMT    "

## 2024-12-05 NOTE — LETTER
12/5/2024       RE: Modesto Barbosa  [address] [place] [address]     Dear Colleague,    Thank you for referring your patient, Modesto Barbosa, to the [facility] [place] at [facility]. Please see a copy of my visit note below.      [facility]  Return Visit  Encounter date:   December 5, 2024    Chief Complaint:   Follow-up    ID: s/p stealth assisted left maxillary antrostomy, total ethmoidectomy, sphenoidotomy, right maxillary antrostomy 8/2/24     History of Present Illness 6/6/24:   Modesto Barbosa is a 58 year old male who presents for consultation regarding left greater than right nasal congestion that has been persistent for the last three months. Has been on Flonase that entire time without any improvement, has also tried multiple over the counter solutions. States that three months ago he noted a left sided frontal headache which has since improved. But ever since that headache, has not been able to breathe out the left side. Patient denies any history of significant nasal trauma. Denies history of allergies. Denies any history of prior sinus surgeries. Has a history of a kidney transplant. Otherwise, doing well. Has never tried [facility] sinus rinses.      Of note, history was obtained using a remote [facility] [place]      Interval History 8/8/24:   Modesto Barbosa is a 58 year old male who presents for follow up;  present over the phone. Patient states he can breathe much better. He has been doing the sinus rinses. No major issues     Interval History 8/29/24:   Modesto Barbosa is a 58 year old male accompanied by an in-person  who presents for follow up. He is doing well and does not have any complaints.    Interval History 12/5/24:   Modesto Barbosa is a 58 year old male who presents for follow up. Accompanied by an in-person . He feels much better and denies any issues with his nose. Has  "been doing budesonide rinses. Does notice that he potentially has some worse nasal obstruction       [facility]  8/2/24 Dr. Bhaskar Kingston  1. Bilateral endoscopic maxillary antrostomy with tissue removal  2. Left endoscopic total sphenoethmoidectomy with tissue removal  3. Bilateral outfracture of the inferior turbinates  4. Stereotactic image-guided surgery      Review of systems: A 14-point review of systems has been conducted and is negative for any notable symptoms, except as dictated in the history of present illness.     Physical Exam:  Vital signs: BP (!) 146/54 (BP Location: Right arm, Patient Position: Sitting, Cuff Size: Adult Regular)   Pulse 61   Ht 1.626 m (5' 4\")   Wt 68.5 kg (151 lb 1.6 oz)   SpO2 96%   BMI 25.94 kg/m     General Appearance: No acute distress, appropriate demeanor, conversant  Eyes: moist conjunctivae; EOMI; pupils symmetric; visual acuity grossly intact; no proptosis  Head: normocephalic; overall symmetric appearance without deformity  Face: overall symmetric without deformity  Ears: Normal appearance of external ear  Nose: No external deformity  Oral Cavity/oropharynx: Normal appearance of mucosa  Neck: no palpable lymphadenopathy; thyroid without palpable nodules  Lungs: symmetric chest rise; no wheezing  CV: Good distal perfusion; normal hear rate  Extremities: No deformity  Neurologic Exam: Cranial nerves II-XII are grossly intact      Procedure Note  Procedure performed: Rigid nasal endoscopy  Indication: To evaluate for sinonasal pathology not visualized on routine anterior rhinoscopy  Anesthesia: 4% topical lidocaine with 0.05 % oxymetazoline  Description of procedure: A 30 degree, 3 mm rigid endoscope was inserted into bilateral nasal cavities and the nasal valves, nasal cavity, middle meatus, sphenoethmoid recess, nasopharynx were evaluated for evidence of obstruction, edema, purulence, polyps and/or mass/lesion.     Leslie-Kai Endoscopic Scoring " System  Endoscopic observation Right Left   Polyps in middle meatus (0 = absent, 1 = restricted to middle meatus, 2 = Beyond middle meatus) 1 2   Discharge (0 = absent, 1 = thin and clear, 2 = thick, purulent) 0 0   Edema (0 = absent, 1 = mild-moderate, 2 = moderate-severe) 1 0   Crusting (0 = absent, 1 = mild-moderate, 2 = moderate-severe) 0 0   Scarring (0= absent, 1 = mild-moderate, 2 = moderate-severe) 0 0   Total 2 2     Findings  RT: Polyps present in the ethmoid and frontal cavities, but not beyond the middle meatus. Grade 2  LT: Return of polyps, primarily in the ethmoid cavity, with extension beyond the middle meatus. Grade 4    The patient tolerated the procedure well without complication.       Assessment/Medical Decision Making:  Modesto Barbosa is a 58 year old male who presents for follow up s/p stealth assisted left maxillary antrostomy, total ethmoidectomy, sphenoidotomy, right maxillary antrostomy 8/2/24. Patient denies any present nasal symptoms and has been compliant with budesonide rinses, however on today's exam, polyps are slowly returning. Therefore, it is appropriate to consider treatment with Xhance or Dupixent, as all other measures, including topical steroids and surgery, have been exhausted, and considering patient's comorbidities of CKD stage 3a s/p kidney transplant with associated anemia and HTN, further surgical treatments and long-term oral steroid use is not appropriate, as that would increase his risk of immunosuppression.     Plan:  I will reach out to his transplant team to discuss Dupixent, and then will update the patient.   If able to start will have him follow-up with me 3 months after initiation.     Scribe Disclosure:   I, Claire Zimmerman, am serving as a scribe to document services personally performed by Bhaskar Kingston MD based on data collection and the provider's statements to me.     Provider Disclosure:  I agree with above History, Review of Systems, Physical exam and Plan. I  have reviewed the content of the documentation and have edited it as needed. I have personally performed the services documented here and the documentation accurately represents those services and the decisions I have made.      Electronically signed by:    Bhaskar Kingston MD    [facility]  Rhinology, Endoscopic Skull Base Surgery  [facility]  Department of Otolaryngology - Head & Neck Surgery    ~~~~~~~~~~~~~~~~~~~~~~~~~~~~~~~~~~~~~~~~~~~~~~~~~~~~~~~~~~~~~~~~~~~~~~~~~~~~~~~~~~~~~~~~~~~~~~~~~~~~~~~~~~~~~~~~~~~~~~~~~~~~~~~~~~~~~~~    Past Medical History:   Diagnosis Date     Acute kidney injury (H) 6/10/2021     CKD (chronic kidney disease) stage 5, GFR less than 15 ml/min (H)      Dyslipidemia      Metabolic acidosis      Type 2 diabetes mellitus (H)         Past Surgical History:   Procedure Laterality Date     BENCH KIDNEY  2/21/2023    Procedure: Bench kidney;  Surgeon: Talha Weinstein MD;  Location: [facility] OR     CV CORONARY ANGIOGRAM N/A 12/6/2021    Procedure: Coronary Angiogram;  Surgeon: Cristela Banks MD;  Location: [facility] CV     CV LEFT HEART CATH N/A 12/6/2021    Procedure: Left Heart Cath;  Surgeon: Cristela Banks MD;  Location: [facility] CV     CV PCI N/A 12/6/2021    Procedure: Percutaneous Coronary Intervention;  Surgeon: Cristela Banks MD;  Location: [facility] CV     IR FINE NEEDLE ASPIRATION W ULTRASOUND  5/3/2023     IR RENAL BIOPSY RIGHT  5/3/2023     OPTICAL TRACKING SYSTEM ENDOSCOPIC SINUS SURGERY Bilateral 8/2/2024    Procedure: stealth assisted left maxillary antrostomy, total ethmoidectomy, sphenoidotomy, right maxillary antrostomy;  Surgeon: Bhaskar Kingston MD;  Location: [facility] OR     REMOVE CATHETER PERITONEAL N/A 2/21/2023    Procedure: Remove catheter peritoneal;  Surgeon: Talha Weinstein MD;  Location: [facility] OR        Family History   Problem Relation Age of Onset     Diabetes Type 2  Mother      Other - See  Comments Daughter         granular cell tumor of thigh     Heart Disease Other      Anesthesia Reaction No family hx of      Deep Vein Thrombosis (DVT) No family hx of      Pulmonary Embolism No family hx of         Social History     Socioeconomic History     Marital status: Patient Declined   Tobacco Use     Smoking status: Never     Passive exposure: Never     Smokeless tobacco: Never   Vaping Use     Vaping status: Never Used   Substance and Sexual Activity     Alcohol use: Not Currently     Drug use: Never     Social Drivers of Health     Financial Resource Strain: Low Risk  (12/27/2023)    Financial Resource Strain      Within the past 12 months, have you or your family members you live with been unable to get utilities (heat, electricity) when it was really needed?: No   Food Insecurity: Low Risk  (12/27/2023)    Food Insecurity      Within the past 12 months, did you worry that your food would run out before you got money to buy more?: No      Within the past 12 months, did the food you bought just not last and you didn t have money to get more?: No   Transportation Needs: Low Risk  (12/27/2023)    Transportation Needs      Within the past 12 months, has lack of transportation kept you from medical appointments, getting your medicines, non-medical meetings or appointments, work, or from getting things that you need?: No   Interpersonal Safety: Low Risk  (7/15/2024)    Interpersonal Safety      Do you feel physically and emotionally safe where you currently live?: Yes      Within the past 12 months, have you been hit, slapped, kicked or otherwise physically hurt by someone?: No      Within the past 12 months, have you been humiliated or emotionally abused in other ways by your partner or ex-partner?: No   Housing Stability: Low Risk  (12/27/2023)    Housing Stability      Do you have housing? : Yes      Are you worried about losing your housing?: No         Again, thank you for allowing me to participate in  the care of your patient.      Sincerely,    Bhaskar Kingston MD

## 2024-12-05 NOTE — PROGRESS NOTES
[facility]  Return Visit  Encounter date:   December 5, 2024    Chief Complaint:   Follow-up    ID: s/p stealth assisted left maxillary antrostomy, total ethmoidectomy, sphenoidotomy, right maxillary antrostomy 8/2/24     History of Present Illness 6/6/24:   Modesto Barbosa is a 58 year old male who presents for consultation regarding left greater than right nasal congestion that has been persistent for the last three months. Has been on Flonase that entire time without any improvement, has also tried multiple over the counter solutions. States that three months ago he noted a left sided frontal headache which has since improved. But ever since that headache, has not been able to breathe out the left side. Patient denies any history of significant nasal trauma. Denies history of allergies. Denies any history of prior sinus surgeries. Has a history of a kidney transplant. Otherwise, doing well. Has never tried [facility] sinus rinses.      Of note, history was obtained using a remote [facility] .      Interval History 8/8/24:   Modesto Barbosa is a 58 year old male who presents for follow up;  present over the phone. Patient states he can breathe much better. He has been doing the sinus rinses. No major issues     Interval History 8/29/24:   Modesto Barbosa is a 58 year old male accompanied by an in-person  who presents for follow up. He is doing well and does not have any complaints.    Interval History 12/5/24:   Modesto Barbosa is a 58 year old male who presents for follow up. Accompanied by an in-person . He feels much better and denies any issues with his nose. Has been doing budesonide rinses. Does notice that he potentially has some worse nasal obstruction       [facility]  8/2/24 Dr. Bhaskar Kingston  1. Bilateral endoscopic maxillary antrostomy with tissue removal  2. Left endoscopic total sphenoethmoidectomy with tissue removal  3. Bilateral outfracture of  "the inferior turbinates  4. Stereotactic image-guided surgery      Review of systems: A 14-point review of systems has been conducted and is negative for any notable symptoms, except as dictated in the history of present illness.     Physical Exam:  Vital signs: BP (!) 146/54 (BP Location: Right arm, Patient Position: Sitting, Cuff Size: Adult Regular)   Pulse 61   Ht 1.626 m (5' 4\")   Wt 68.5 kg (151 lb 1.6 oz)   SpO2 96%   BMI 25.94 kg/m     General Appearance: No acute distress, appropriate demeanor, conversant  Eyes: moist conjunctivae; EOMI; pupils symmetric; visual acuity grossly intact; no proptosis  Head: normocephalic; overall symmetric appearance without deformity  Face: overall symmetric without deformity  Ears: Normal appearance of external ear  Nose: No external deformity  Oral Cavity/oropharynx: Normal appearance of mucosa  Neck: no palpable lymphadenopathy; thyroid without palpable nodules  Lungs: symmetric chest rise; no wheezing  CV: Good distal perfusion; normal hear rate  Extremities: No deformity  Neurologic Exam: Cranial nerves II-XII are grossly intact      Procedure Note  Procedure performed: Rigid nasal endoscopy  Indication: To evaluate for sinonasal pathology not visualized on routine anterior rhinoscopy  Anesthesia: 4% topical lidocaine with 0.05 % oxymetazoline  Description of procedure: A 30 degree, 3 mm rigid endoscope was inserted into bilateral nasal cavities and the nasal valves, nasal cavity, middle meatus, sphenoethmoid recess, nasopharynx were evaluated for evidence of obstruction, edema, purulence, polyps and/or mass/lesion.     Leslie-Kai Endoscopic Scoring System  Endoscopic observation Right Left   Polyps in middle meatus (0 = absent, 1 = restricted to middle meatus, 2 = Beyond middle meatus) 1 2   Discharge (0 = absent, 1 = thin and clear, 2 = thick, purulent) 0 0   Edema (0 = absent, 1 = mild-moderate, 2 = moderate-severe) 1 0   Crusting (0 = absent, 1 = " mild-moderate, 2 = moderate-severe) 0 0   Scarring (0= absent, 1 = mild-moderate, 2 = moderate-severe) 0 0   Total 2 2     Findings  RT: Polyps present in the ethmoid and frontal cavities, but not beyond the middle meatus. Grade 2  LT: Return of polyps, primarily in the ethmoid cavity, with extension beyond the middle meatus. Grade 4    The patient tolerated the procedure well without complication.       Assessment/Medical Decision Making:  Modesto Barbosa is a 58 year old male who presents for follow up s/p stealth assisted left maxillary antrostomy, total ethmoidectomy, sphenoidotomy, right maxillary antrostomy 8/2/24. Patient denies any present nasal symptoms and has been compliant with budesonide rinses, however on today's exam, polyps are slowly returning. Therefore, it is appropriate to consider treatment with Xhance or Dupixent, as all other measures, including topical steroids and surgery, have been exhausted, and considering patient's comorbidities of CKD stage 3a s/p kidney transplant with associated anemia and HTN, further surgical treatments and long-term oral steroid use is not appropriate, as that would increase his risk of immunosuppression.     Plan:  I will reach out to his transplant team to discuss Dupixent, and then will update the patient.   If able to start will have him follow-up with me 3 months after initiation.     Scribe Disclosure:   I, Claire Zimmerman, am serving as a scribe to document services personally performed by Bhaskar Kingston MD based on data collection and the provider's statements to me.     Provider Disclosure:  I agree with above History, Review of Systems, Physical exam and Plan. I have reviewed the content of the documentation and have edited it as needed. I have personally performed the services documented here and the documentation accurately represents those services and the decisions I have made.      Electronically signed by:    Bhaskar Kingston MD  [facility]  Rhinology, Endoscopic Skull Base Surgery  [facility]  Department of Otolaryngology - Head & Neck Surgery    ~~~~~~~~~~~~~~~~~~~~~~~~~~~~~~~~~~~~~~~~~~~~~~~~~~~~~~~~~~~~~~~~~~~~~~~~~~~~~~~~~~~~~~~~~~~~~~~~~~~~~~~~~~~~~~~~~~~~~~~~~~~~~~~~~~~~~~~    Past Medical History:   Diagnosis Date    Acute kidney injury (H) 6/10/2021    CKD (chronic kidney disease) stage 5, GFR less than 15 ml/min (H)     Dyslipidemia     Metabolic acidosis     Type 2 diabetes mellitus (H)         Past Surgical History:   Procedure Laterality Date    BENCH KIDNEY  2/21/2023    Procedure: Bench kidney;  Surgeon: Talha Weinstein MD;  Location: [facility] OR    CV CORONARY ANGIOGRAM N/A 12/6/2021    Procedure: Coronary Angiogram;  Surgeon: Cristela Banks MD;  Location: [facility] CATH LAB CV    CV LEFT HEART CATH N/A 12/6/2021    Procedure: Left Heart Cath;  Surgeon: Cristela Banks MD;  Location: [facility] LAB CV    CV PCI N/A 12/6/2021    Procedure: Percutaneous Coronary Intervention;  Surgeon: Cristela Banks MD;  Location: [facility] CV    IR FINE NEEDLE ASPIRATION W ULTRASOUND  5/3/2023    IR RENAL BIOPSY RIGHT  5/3/2023    OPTICAL TRACKING SYSTEM ENDOSCOPIC SINUS SURGERY Bilateral 8/2/2024    Procedure: stealth assisted left maxillary antrostomy, total ethmoidectomy, sphenoidotomy, right maxillary antrostomy;  Surgeon: Bhaskar Kingston MD;  Location: [facility] OR    REMOVE CATHETER PERITONEAL N/A 2/21/2023    Procedure: Remove catheter peritoneal;  Surgeon: Talha Weinstein MD;  Location: [facility] OR        Family History   Problem Relation Age of Onset    Diabetes Type 2  Mother     Other - See Comments Daughter         granular cell tumor of thigh    Heart Disease Other     Anesthesia Reaction No family hx of     Deep Vein Thrombosis (DVT) No family hx of     Pulmonary Embolism No family hx of         Social History     Socioeconomic History    Marital status: Patient Declined   Tobacco Use    Smoking status:  Never     Passive exposure: Never    Smokeless tobacco: Never   Vaping Use    Vaping status: Never Used   Substance and Sexual Activity    Alcohol use: Not Currently    Drug use: Never     Social Drivers of Health     Financial Resource Strain: Low Risk  (12/27/2023)    Financial Resource Strain     Within the past 12 months, have you or your family members you live with been unable to get utilities (heat, electricity) when it was really needed?: No   Food Insecurity: Low Risk  (12/27/2023)    Food Insecurity     Within the past 12 months, did you worry that your food would run out before you got money to buy more?: No     Within the past 12 months, did the food you bought just not last and you didn t have money to get more?: No   Transportation Needs: Low Risk  (12/27/2023)    Transportation Needs     Within the past 12 months, has lack of transportation kept you from medical appointments, getting your medicines, non-medical meetings or appointments, work, or from getting things that you need?: No   Interpersonal Safety: Low Risk  (7/15/2024)    Interpersonal Safety     Do you feel physically and emotionally safe where you currently live?: Yes     Within the past 12 months, have you been hit, slapped, kicked or otherwise physically hurt by someone?: No     Within the past 12 months, have you been humiliated or emotionally abused in other ways by your partner or ex-partner?: No   Housing Stability: Low Risk  (12/27/2023)    Housing Stability     Do you have housing? : Yes     Are you worried about losing your housing?: No

## 2024-12-10 ENCOUNTER — MYC MEDICAL ADVICE (OUTPATIENT)
Dept: OTOLARYNGOLOGY | Facility: CLINIC | Age: 59
End: 2024-12-10
Payer: COMMERCIAL

## 2024-12-10 NOTE — TELEPHONE ENCOUNTER
Left Voicemail (1st Attempt) for the patient to call back and schedule the following:    Appointment type: Return ENT  Provider: Dr. Quinn  Return date: March 2025  Specialty phone number: [phone]  Additional appointment(s) needed: No  Additonal Notes: 3 month follow up

## 2024-12-11 DIAGNOSIS — E83.42 HYPOMAGNESEMIA: ICD-10-CM

## 2024-12-11 LAB
DONOR IDENTIFICATION: NORMAL
DSA COMMENTS: NORMAL
DSA PRESENT: NO
DSA TEST METHOD: NORMAL
ORGAN: NORMAL
SA 1  COMMENTS: NORMAL
SA 1 CELL: NORMAL
SA 1 TEST METHOD: NORMAL
SA 2 CELL: NORMAL
SA 2 COMMENTS: NORMAL
SA 2 TEST METHOD: NORMAL
SA1 HI RISK ABY: NORMAL
SA1 MOD RISK ABY: NORMAL
SA2 HI RISK ABY: NORMAL
SA2 MOD RISK ABY: NORMAL
UNACCEPTABLE ANTIGENS: NORMAL
UNOS CPRA: 28

## 2024-12-11 RX ORDER — MAGNESIUM OXIDE 400 MG/1
400 TABLET ORAL 2 TIMES DAILY
Qty: 180 TABLET | Refills: 0 | Status: SHIPPED | OUTPATIENT
Start: 2024-12-11

## 2024-12-12 ENCOUNTER — TELEPHONE (OUTPATIENT)
Dept: OTOLARYNGOLOGY | Facility: CLINIC | Age: 59
End: 2024-12-12
Payer: COMMERCIAL

## 2024-12-14 ENCOUNTER — HEALTH MAINTENANCE LETTER (OUTPATIENT)
Age: 59
End: 2024-12-14

## 2024-12-17 ENCOUNTER — LAB (OUTPATIENT)
Dept: LAB | Facility: HOSPITAL | Age: 59
End: 2024-12-17
Payer: COMMERCIAL

## 2024-12-17 DIAGNOSIS — Z48.298 AFTERCARE FOLLOWING ORGAN TRANSPLANT: ICD-10-CM

## 2024-12-17 DIAGNOSIS — Z79.899 ENCOUNTER FOR LONG-TERM CURRENT USE OF MEDICATION: ICD-10-CM

## 2024-12-17 DIAGNOSIS — Z94.0 KIDNEY REPLACED BY TRANSPLANT: ICD-10-CM

## 2024-12-17 DIAGNOSIS — Z94.0 HTN, KIDNEY TRANSPLANT RELATED: ICD-10-CM

## 2024-12-17 DIAGNOSIS — Z98.890 OTHER SPECIFIED POSTPROCEDURAL STATES: ICD-10-CM

## 2024-12-17 DIAGNOSIS — Z94.0 KIDNEY TRANSPLANTED: ICD-10-CM

## 2024-12-17 DIAGNOSIS — I15.1 HTN, KIDNEY TRANSPLANT RELATED: ICD-10-CM

## 2024-12-17 LAB
ANION GAP SERPL CALCULATED.3IONS-SCNC: 9 MMOL/L (ref 7–15)
BUN SERPL-MCNC: 21.6 MG/DL (ref 8–23)
CALCIUM SERPL-MCNC: 9.4 MG/DL (ref 8.8–10.4)
CHLORIDE SERPL-SCNC: 101 MMOL/L (ref 98–107)
CREAT SERPL-MCNC: 1.21 MG/DL (ref 0.67–1.17)
CYCLOSPORINE BLD LC/MS/MS-MCNC: 124 UG/L (ref 50–400)
EGFRCR SERPLBLD CKD-EPI 2021: 69 ML/MIN/1.73M2
ERYTHROCYTE [DISTWIDTH] IN BLOOD BY AUTOMATED COUNT: 14.1 % (ref 10–15)
GLUCOSE SERPL-MCNC: 88 MG/DL (ref 70–99)
HCO3 SERPL-SCNC: 23 MMOL/L (ref 22–29)
HCT VFR BLD AUTO: 36.8 % (ref 40–53)
HGB BLD-MCNC: 12.6 G/DL (ref 13.3–17.7)
IGG SERPL-MCNC: 1123 MG/DL (ref 610–1616)
MCH RBC QN AUTO: 27 PG (ref 26.5–33)
MCHC RBC AUTO-ENTMCNC: 34.2 G/DL (ref 31.5–36.5)
MCV RBC AUTO: 79 FL (ref 78–100)
PLATELET # BLD AUTO: 106 10E3/UL (ref 150–450)
POTASSIUM SERPL-SCNC: 4.3 MMOL/L (ref 3.4–5.3)
RBC # BLD AUTO: 4.66 10E6/UL (ref 4.4–5.9)
SODIUM SERPL-SCNC: 133 MMOL/L (ref 135–145)
TME LAST DOSE: NORMAL H
TME LAST DOSE: NORMAL H
WBC # BLD AUTO: 5.4 10E3/UL (ref 4–11)

## 2024-12-17 PROCEDURE — 85048 AUTOMATED LEUKOCYTE COUNT: CPT

## 2024-12-17 PROCEDURE — 82784 ASSAY IGA/IGD/IGG/IGM EACH: CPT

## 2024-12-17 PROCEDURE — 85014 HEMATOCRIT: CPT

## 2024-12-17 PROCEDURE — 80048 BASIC METABOLIC PNL TOTAL CA: CPT

## 2024-12-17 PROCEDURE — 36415 COLL VENOUS BLD VENIPUNCTURE: CPT

## 2024-12-17 PROCEDURE — 82565 ASSAY OF CREATININE: CPT

## 2024-12-17 PROCEDURE — 80158 DRUG ASSAY CYCLOSPORINE: CPT

## 2024-12-17 PROCEDURE — 82374 ASSAY BLOOD CARBON DIOXIDE: CPT

## 2024-12-17 PROCEDURE — 87799 DETECT AGENT NOS DNA QUANT: CPT

## 2024-12-18 LAB
BK VIRUS DNA IU/ML, INSTRUMENT (6800): 83 IU/ML
BK VIRUS SPECIMEN TYPE: ABNORMAL
BKV DNA SPEC NAA+PROBE-LOG#: 1.9 {LOG_COPIES}/ML

## 2025-01-07 DIAGNOSIS — Z94.0 KIDNEY REPLACED BY TRANSPLANT: ICD-10-CM

## 2025-01-07 DIAGNOSIS — B34.8 BK VIREMIA: ICD-10-CM

## 2025-01-07 DIAGNOSIS — Z94.0 HTN, KIDNEY TRANSPLANT RELATED: Primary | ICD-10-CM

## 2025-01-07 DIAGNOSIS — Z94.0 HTN, KIDNEY TRANSPLANT RELATED: ICD-10-CM

## 2025-01-07 DIAGNOSIS — I15.1 HTN, KIDNEY TRANSPLANT RELATED: Primary | ICD-10-CM

## 2025-01-07 DIAGNOSIS — I15.1 HTN, KIDNEY TRANSPLANT RELATED: ICD-10-CM

## 2025-01-07 RX ORDER — ASPIRIN 81 MG/1
81 TABLET ORAL DAILY
Qty: 90 TABLET | Refills: 2 | Status: SHIPPED | OUTPATIENT
Start: 2025-01-07

## 2025-01-07 RX ORDER — AMLODIPINE BESYLATE 10 MG/1
10 TABLET ORAL AT BEDTIME
Qty: 90 TABLET | Refills: 3 | Status: SHIPPED | OUTPATIENT
Start: 2025-01-07

## 2025-01-09 ENCOUNTER — OFFICE VISIT (OUTPATIENT)
Dept: FAMILY MEDICINE | Facility: CLINIC | Age: 60
End: 2025-01-09
Payer: COMMERCIAL

## 2025-01-09 VITALS
BODY MASS INDEX: 25.06 KG/M2 | RESPIRATION RATE: 18 BRPM | WEIGHT: 146.8 LBS | TEMPERATURE: 99 F | SYSTOLIC BLOOD PRESSURE: 138 MMHG | HEART RATE: 60 BPM | DIASTOLIC BLOOD PRESSURE: 60 MMHG | HEIGHT: 64 IN | OXYGEN SATURATION: 99 %

## 2025-01-09 DIAGNOSIS — D84.9 IMMUNOSUPPRESSED STATUS: ICD-10-CM

## 2025-01-09 DIAGNOSIS — Z94.0 KIDNEY REPLACED BY TRANSPLANT: ICD-10-CM

## 2025-01-09 DIAGNOSIS — I25.10 CORONARY ARTERY DISEASE INVOLVING NATIVE CORONARY ARTERY OF NATIVE HEART WITHOUT ANGINA PECTORIS: ICD-10-CM

## 2025-01-09 DIAGNOSIS — Z94.0 HTN, KIDNEY TRANSPLANT RELATED: ICD-10-CM

## 2025-01-09 DIAGNOSIS — E11.9 TYPE 2 DIABETES MELLITUS WITHOUT COMPLICATION, WITH LONG-TERM CURRENT USE OF INSULIN (H): Primary | ICD-10-CM

## 2025-01-09 DIAGNOSIS — N18.31 STAGE 3A CHRONIC KIDNEY DISEASE (H): ICD-10-CM

## 2025-01-09 DIAGNOSIS — E78.5 DYSLIPIDEMIA, GOAL LDL BELOW 70: ICD-10-CM

## 2025-01-09 DIAGNOSIS — I15.1 HTN, KIDNEY TRANSPLANT RELATED: ICD-10-CM

## 2025-01-09 DIAGNOSIS — Z79.4 TYPE 2 DIABETES MELLITUS WITHOUT COMPLICATION, WITH LONG-TERM CURRENT USE OF INSULIN (H): Primary | ICD-10-CM

## 2025-01-09 DIAGNOSIS — Z29.11 NEED FOR VACCINATION AGAINST RESPIRATORY SYNCYTIAL VIRUS: ICD-10-CM

## 2025-01-09 DIAGNOSIS — Z23 NEED FOR PROPHYLACTIC VACCINATION AGAINST HEPATITIS B VIRUS: ICD-10-CM

## 2025-01-09 DIAGNOSIS — L85.3 DRY SKIN: ICD-10-CM

## 2025-01-09 RX ORDER — CLOTRIMAZOLE AND BETAMETHASONE DIPROPIONATE 10; .64 MG/G; MG/G
CREAM TOPICAL 2 TIMES DAILY
Qty: 45 G | Refills: 1 | Status: SHIPPED | OUTPATIENT
Start: 2025-01-09

## 2025-01-09 ASSESSMENT — ASTHMA QUESTIONNAIRES
QUESTION_2 LAST FOUR WEEKS HOW OFTEN HAVE YOU HAD SHORTNESS OF BREATH: NOT AT ALL
QUESTION_1 LAST FOUR WEEKS HOW MUCH OF THE TIME DID YOUR ASTHMA KEEP YOU FROM GETTING AS MUCH DONE AT WORK, SCHOOL OR AT HOME: NONE OF THE TIME
QUESTION_4 LAST FOUR WEEKS HOW OFTEN HAVE YOU USED YOUR RESCUE INHALER OR NEBULIZER MEDICATION (SUCH AS ALBUTEROL): NOT AT ALL
ACT_TOTALSCORE: 25
ACT_TOTALSCORE: 25
QUESTION_5 LAST FOUR WEEKS HOW WOULD YOU RATE YOUR ASTHMA CONTROL: COMPLETELY CONTROLLED
QUESTION_3 LAST FOUR WEEKS HOW OFTEN DID YOUR ASTHMA SYMPTOMS (WHEEZING, COUGHING, SHORTNESS OF BREATH, CHEST TIGHTNESS OR PAIN) WAKE YOU UP AT NIGHT OR EARLIER THAN USUAL IN THE MORNING: NOT AT ALL

## 2025-01-09 NOTE — PATIENT INSTRUCTIONS
-Thank you for choosing the [facility].  -It was a pleasure to see you today.  -Please take a look at the information below for more specific details regarding the treatment plan and recommendations.  -In this after visit summary is a list of your medications and specific instructions.  Please review this carefully as there may be changes made to your medication list.  -If there are any particular questions or concerns, please feel free to reach out to Dr. Cooper.  -If any labs have been completed, we will reach out to you about results.  If the results are normal or not concerning, a letter or MyChart message will be sent to you.  If any follow-up is needed, either Dr. Cooper or the nurse will give you a call.  If you have not heard regarding results after 2 weeks, please reach out to the clinic.    Patient Instructions:    -Please take your medications as prescribed.  -Check the blood sugars as recommended to help monitor how your blood sugars are doing.  -Be sure to stay well-hydrated by drinking water each day to help your kidneys.  -Maintaining good blood sugar control will help your kidneys stay strong for longer.  -Be sure to complete an eye exam once a year to make sure your eyes are healthy.  -Check your feet a few times a week to monitor for any cuts/wounds or any changes.  If any concerning findings are noted, please return to clinic for reevaluation.  -If you feel unwell (such as dizziness, sweatiness, nausea, fast heartbeat, confused, etc.), be sure to check your blood sugar as it may be low (below 70 mg/dL).  If a low blood sugars noted, please eat or drink something sugary and repeat the blood sugar test in about 30 minutes.  Repeat until the blood sugar is above 70 mg/dL.  If blood sugars are persistently low, please seek immediate medical attention.    -It is important to moisturize the skin on a daily basis, especially during wintertime as the air is dry and can worsen the  underlying skin dryness.  Common moisturizing over-the-counter options include: Aquaphor, Vaseline, Vanicream, night balms, various lotions.         -Topical steroid medications can be utilized to help control severe eczema or dry skin, though be cautious with usage of the medication as the topical steroids can cause the skin to become darker/lighter, thin the skin, melt/reduce the fat underneath, etc. Only apply the topical steroid medications on areas that have severely dried and thickened skin.  Avoid use of the medication on normal skin or on the face/groin/armpits unless directed otherwise.  -When bathing, try to limit bathing to 2-3 times a week (or when visibly dirty) and to 10 minutes or less with each bathing session.  It is best to use warm water when bathing as water can worsen the dry skin.  -Try oatmeal baths as this may help with moisturization as well.  -Right after bathing, it is recommended to apply the moisturizer.    -The hepatitis B dose #3, RSV vaccine is generally better covered by insurance companies if the vaccine is received at a pharmacy.  Please speak with your pharmacist regarding this vaccination as it is recommended for you.    Please seek immediate medical attention (go to the emergency room or urgent care) for the following reasons: worsening symptoms, or any concerning changes.      --------------------------------------------------------------------------------------------------------------------    -We are always looking for ways to improve.  You may be selected to receive a survey regarding your visit today.  We encourage you to complete the survey and provide specific, constructive feedback to help us improve our processes.  Thank you for your time!  -Please review the contact information listed on the after visit summary and in the electronic chart.  Below is the phone number that we have on file.  If there are any changes that are needed to be made, please reach out to the  clinic.  [phone] (home)

## 2025-01-09 NOTE — PROGRESS NOTES
OFFICE VISIT    Assessment/Plan:     Patient Instructions:    -Please take your medications as prescribed.  -Check the blood sugars as recommended to help monitor how your blood sugars are doing.  -Be sure to stay well-hydrated by drinking water each day to help your kidneys.  -Maintaining good blood sugar control will help your kidneys stay strong for longer.  -Be sure to complete an eye exam once a year to make sure your eyes are healthy.  -Check your feet a few times a week to monitor for any cuts/wounds or any changes.  If any concerning findings are noted, please return to clinic for reevaluation.  -If you feel unwell (such as dizziness, sweatiness, nausea, fast heartbeat, confused, etc.), be sure to check your blood sugar as it may be low (below 70 mg/dL).  If a low blood sugars noted, please eat or drink something sugary and repeat the blood sugar test in about 30 minutes.  Repeat until the blood sugar is above 70 mg/dL.  If blood sugars are persistently low, please seek immediate medical attention.    -It is important to moisturize the skin on a daily basis, especially during wintertime as the air is dry and can worsen the underlying skin dryness.  Common moisturizing over-the-counter options include: Aquaphor, Vaseline, Vanicream, night balms, various lotions.         -Topical steroid medications can be utilized to help control severe eczema or dry skin, though be cautious with usage of the medication as the topical steroids can cause the skin to become darker/lighter, thin the skin, melt/reduce the fat underneath, etc. Only apply the topical steroid medications on areas that have severely dried and thickened skin.  Avoid use of the medication on normal skin or on the face/groin/armpits unless directed otherwise.  -When bathing, try to limit bathing to 2-3 times a week (or when visibly dirty) and to 10 minutes or less with each bathing session.  It is best to use warm water when bathing as water can worsen  the dry skin.  -Try oatmeal baths as this may help with moisturization as well.  -Right after bathing, it is recommended to apply the moisturizer.    -The hepatitis B dose #3, RSV vaccine is generally better covered by insurance companies if the vaccine is received at a pharmacy.  Please speak with your pharmacist regarding this vaccination as it is recommended for you.    Please seek immediate medical attention (go to the emergency room or urgent care) for the following reasons: worsening symptoms, or any concerning changes.      [place] was seen today for hypertension.    Diagnoses and all orders for this visit:    Type 2 diabetes mellitus without complication, with long-term current use of insulin (H)  Stage 3a chronic kidney disease (H)  Kidney replaced by transplant  Immunosuppressed status  HTN, kidney transplant related: stable. Recheck labs below when patient goes in for monthly labs for transplant team.   -     HEMOGLOBIN A1C; Future  -     FOOT EXAM  -     Albumin Random Urine Quantitative with Creat Ratio; Future    Coronary artery disease involving native coronary artery of native heart without angina pectoris  Dyslipidemia, goal LDL below 70: doing well. Recheck as below.   -     Lipid panel reflex to direct LDL Non-fasting; Future    Dry skin: Both feet. Not improving with conservative management.   Orders:  -     clotrimazole-betamethasone (LOTRISONE) 1-0.05 % external cream; Apply topically 2 times daily. As needed for dry skin on the feet.    Need for prophylactic vaccination against hepatitis B virus  Need for vaccination against respiratory syncytial virus: patient will check in with his pharmacy.         Return in about 3 months (around 4/9/2025) for Medication follow up, Diabetes, Hypertension.    The diagnoses, treatment options, risk, benefits, and recommendations were reviewed with patient/guardian.  Questions were answered to patient's/guardian satisfaction.  Red flag signs were reviewed.   Patient/guardian is in agreement with above plan.      Subjective: 59 year old male with history of kidney transplant (living unrelated donor on 02/21/2023), immunosuppressed state, diabetes mellitus type 2, CAD with history of NSTEMI, hypertension, dyslipidemia, thrombocytopenia, splenomegaly, hepatitis B core antibody positive (Hep B virus not detected on 06/12/2023) who presents to clinic for the following complaints:   Patient presents with:  Hypertension    Answers submitted by the patient for this visit:  Hypertension Visit (Submitted on 1/9/2025)  Chief Complaint: Chronic problems general questions HPI Form  Do you check your blood pressure regularly outside of the clinic?: Yes  Are your blood pressures ever more than 140 on the top number (systolic) OR more than 90 on the bottom number (diastolic)? (For example, greater than 140/90): Yes  Are you following a low salt diet?: Yes  General Questionnaire (Submitted on 1/9/2025)  Chief Complaint: Chronic problems general questions HPI Form  Questionnaire about: Chronic problems general questions HPI Form (Submitted on 1/9/2025)  Chief Complaint: Chronic problems general questions HPI Form      Hypertension: Blood pressure noted to be 130/60.  Currently taking amlodipine 10 mg once daily, carvedilol 25 mg twice daily, hydralazine 25 mg twice daily, losartan 50 mg once daily.  Stable.  Continue medications. At home, BP is mostly 110-120 for the most part.   One time, the BP was 150-160 range. Discussed potential causes.     Has a lab appointment on 01/15/2025.    The nose surgery was okay, though the tissue in the nose came back after a few months only.  No complications noted from the surgery. He is getting an injectable medication for about a month now for treat for the nasal polyps.       Diabetes:  Hemoglobin A1C   Date Value Ref Range Status   07/15/2024 7.1 (H) <5.7 % Final     Comment:     Normal <5.7%   Prediabetes 5.7-6.4%    Diabetes 6.5% or higher      Note: Adopted from ADA consensus guidelines.   12/29/2023 6.5 (H) <5.7 % Final     Comment:     Normal <5.7%   Prediabetes 5.7-6.4%    Diabetes 6.5% or higher     Note: Adopted from ADA consensus guidelines.   08/28/2023 5.8 (H) <5.7 % Final     Comment:     Normal <5.7%   Prediabetes 5.7-6.4%    Diabetes 6.5% or higher     Note: Adopted from ADA consensus guidelines.        Blood glucoses are in the 70's (symptomatic) 1-2 times a month. After eating, the sugar can go up to 300's.     Patient is currently taking the following medications:  Diabetes Medication(s)       Insulin       Insulin Aspart FlexPen 100 UNIT/ML SOPN INJECT 7 UNITS UNDER THE SKIN BEFORE BREAKFAST AND LUNCH. INJECT 9 UNITS BEFORE DINNER     insulin glargine (LANTUS PEN) 100 UNIT/ML pen Inject 25 Units subcutaneously at bedtime            Statin medication: prescribed atorvastatin 20 mg  ASA medication: prescribed aspirin 81 mg tablet    Reviewed hypoglycemia protocol.  Reviewed recommendations for patient to complete annual eye exam.  Reviewed recommendations for routine feet examination and reasons to seek medical attention.     Last Comprehensive Metabolic Panel:  Sodium   Date Value Ref Range Status   12/17/2024 133 (L) 135 - 145 mmol/L Final     Potassium   Date Value Ref Range Status   12/17/2024 4.3 3.4 - 5.3 mmol/L Final   04/07/2023 5.1 (H) 3.5 - 5.0 mmol/L Final   04/07/2023 5.1 (H) 3.5 - 5.0 mmol/L Final     Chloride   Date Value Ref Range Status   12/17/2024 101 98 - 107 mmol/L Final   04/07/2023 102 98 - 107 mmol/L Final   04/07/2023 102 98 - 107 mmol/L Final     Carbon Dioxide (CO2)   Date Value Ref Range Status   12/17/2024 23 22 - 29 mmol/L Final   04/07/2023 21 (L) 22 - 31 mmol/L Final   04/07/2023 21 (L) 22 - 31 mmol/L Final     Anion Gap   Date Value Ref Range Status   12/17/2024 9 7 - 15 mmol/L Final   04/07/2023 10 5 - 18 mmol/L Final   04/07/2023 10 5 - 18 mmol/L Final     Glucose   Date Value Ref Range Status   12/17/2024  88 70 - 99 mg/dL Final   04/07/2023 183 (H) 70 - 125 mg/dL Final   04/07/2023 183 (H) 70 - 125 mg/dL Final     GLUCOSE BY METER POCT   Date Value Ref Range Status   08/02/2024 183 (H) 70 - 99 mg/dL Final     Urea Nitrogen   Date Value Ref Range Status   12/17/2024 21.6 8.0 - 23.0 mg/dL Final   04/07/2023 11 8 - 22 mg/dL Final   04/07/2023 11 8 - 22 mg/dL Final     Creatinine   Date Value Ref Range Status   12/17/2024 1.21 (H) 0.67 - 1.17 mg/dL Final     GFR Estimate   Date Value Ref Range Status   12/17/2024 69 >60 mL/min/1.73m2 Final     Comment:     eGFR calculated using 2021 CKD-EPI equation.   06/14/2021 15 (L) >60 mL/min/1.73m2 Final     Calcium   Date Value Ref Range Status   12/17/2024 9.4 8.8 - 10.4 mg/dL Final     Comment:     Reference intervals for this test were updated on 7/16/2024 to reflect our healthy population more accurately. There may be differences in the flagging of prior results with similar values performed with this method. Those prior results can be interpreted in the context of the updated reference intervals.         HM due was reviewed with patient/parent.  Recommendations, risk, benefits were reviewed.  Accepted recommendations were ordered.  Otherwise, patient/parent declined.    Health Maintenance Due   Topic Date Due    ASTHMA ACTION PLAN  Never done    ADVANCE CARE PLANNING  Never done    EYE EXAM  Never done    HEPATITIS B IMMUNIZATION (3 of 5 - Risk Dialysis 4-dose series) 11/02/2023    ANNUAL REVIEW OF HM ORDERS  06/26/2024    LIPID  08/28/2024    MEDICARE ANNUAL WELLNESS VISIT  10/05/2024    DIABETIC FOOT EXAM  10/05/2024    A1C  10/15/2024    RSV VACCINE (1 - Risk 60-74 years 1-dose series) Never done    COVID-19 Vaccine (6 - 2024-25 season) 12/27/2024    MICROALBUMIN  12/29/2024         Immunization History   Administered Date(s) Administered    COVID-19 12+ (Pfizer) 11/01/2024    COVID-19 Bivalent 12+ (Pfizer) 11/02/2022    COVID-19 Monovalent 18+ (Moderna) 03/17/2021,  "04/14/2021, 11/08/2021    Flu, Unspecified 12/18/2007    HEPA 12/18/2007    Hepatitis B, Adult 06/26/2023, 10/05/2023    Influenza (H1N1) 02/11/2010    Influenza (IIV3) PF 11/01/2024    Influenza Vaccine >6 months,quad, PF 09/16/2016, 10/19/2018, 09/28/2020, 10/05/2021, 10/14/2022, 09/18/2023    Influenza,INJ,MDCK,PF,Quad >6mo(Flucelvax) 03/12/2020    Pneumo Conj 13-V (2010&after) 10/10/2021    Pneumococcal 20 valent Conjugate (Prevnar 20) 06/26/2023    TDAP (Adacel,Boostrix) 06/26/2023    Typhoid IM 12/18/2007    Zoster recombinant adjuvanted (SHINGRIX) 06/26/2023, 04/16/2024         The 10 point review of system is negative except as stated in the HPI.    Allergies were reviewed and updated.    Objective:   /60   Pulse 60   Temp 99  F (37.2  C) (Oral)   Resp 18   Ht 1.615 m (5' 3.58\")   Wt 66.6 kg (146 lb 12.8 oz)   SpO2 99%   BMI 25.53 kg/m    General: Active, alert, nontoxic-appearing.  No acute distress.  HEENT: Normocephalic, atraumatic.  Pupils are equal and round.  Sclera is clear.  Normal external ears. Nares patent.  Moist mucous membranes.    Cardiac: RRR.  S1, S2 present.  No murmurs, rubs, or gallops.  Respiratory/chest: Clear to auscultation bilaterally.  No wheezes, rales, rhonchi.  Breathing is not labored.  No accessory muscle usage.  Extremities: Voluntary movements intact.  Integumentary: No concerning rash or skin changes appreciated.        Pal Cooper MD  [facility] [place] [address]  Phone: [phone]  Fax: [phone]    1/13/2025  7:39 AM          Current Outpatient Medications   Medication Sig Dispense Refill    acetaminophen (TYLENOL) 325 MG tablet Take 2 tablets (650 mg) by mouth every 4 hours as needed for other (For optimal non-opioid multimodal pain management to improve pain control.) 30 tablet 0    Alcohol Swabs (ALCOHOL PREP) 70 % PADS USE 5X PER  each 3    amitriptyline (ELAVIL) 25 MG tablet Take 1 tablet (25 mg) by mouth at " bedtime 90 tablet 1    amLODIPine (NORVASC) 10 MG tablet TAKE ONE TABLET BY MOUTH EVERY NIGHT AT BEDTIME 90 tablet 3    aspirin 81 MG EC tablet Take 1 tablet (81 mg) by mouth daily. 90 tablet 2    atorvastatin (LIPITOR) 20 MG tablet Take 1 tablet (20 mg) by mouth daily 30 tablet 11    blood glucose (NO BRAND SPECIFIED) test strip Use to test blood sugar 3 times daily or as directed. To accompany: Blood Glucose Monitor Brands: per insurance. 200 strip 3    blood glucose monitoring (ONE TOUCH ULTRA 2) meter device kit USE TO TEST BLOOD SUGAR THREE TIMES DAILY OR AS DIRECTED. 1 kit 0    budesonide (PULMICORT) 0.5 MG/2ML neb solution Empty contents of ampule into 240mL of saline solution and rinse both nasal cavities as instructed twice daily. 120 mL 3    carvedilol (COREG) 25 MG tablet TAKE 0.5 TABLETS BY MOUTH 2 TIMES DAILY (WITH MEALS) 30 tablet 11    clotrimazole-betamethasone (LOTRISONE) 1-0.05 % external cream Apply topically 2 times daily. As needed for dry skin on the feet. 45 g 1    cycloSPORINE modified (GENERIC EQUIVALENT) 25 MG capsule Take 3 capsules (75 mg) by mouth every morning AND 2 capsules (50 mg) every evening. 450 capsule 3    dupilumab (DUPIXENT) 300 MG/2ML prefilled pen Inject 2 mLs (300 mg) subcutaneously every 14 days. 12 mL 1    fluticasone (FLONASE) 50 MCG/ACT nasal spray Spray 2 sprays into both nostrils daily 9.9 mL 3    hydrALAZINE (APRESOLINE) 25 MG tablet TAKE 1 TABLET (25 MG) BY MOUTH 2 TIMES DAILY 180 tablet 2    Insulin Aspart FlexPen 100 UNIT/ML SOPN INJECT 7 UNITS UNDER THE SKIN BEFORE BREAKFAST AND LUNCH. INJECT 9 UNITS BEFORE DINNER 15 mL 3    insulin glargine (LANTUS PEN) 100 UNIT/ML pen Inject 25 Units subcutaneously at bedtime 15 mL 11    insulin pen needle (BD PEN NEEDLE ORESTES 2ND GEN) 32G X 4 MM miscellaneous Inject Subcutaneous 3 times daily 100 each 11    ipratropium (ATROVENT) 0.06 % nasal spray Spray 2 sprays into both nostrils 4 times daily as needed for other (nasal  "congestion) 15 mL 0    loratadine (CLARITIN) 10 MG tablet Take 1 tablet (10 mg) by mouth daily as needed for allergies (itchy/rash) 90 tablet 3    losartan (COZAAR) 50 MG tablet Take 1 tablet (50 mg) by mouth daily. 90 tablet 3    magnesium oxide (MAG-OX) 400 MG tablet TAKE ONE TABLET BY MOUTH TWICE A  tablet 0    mycophenolate (GENERIC EQUIVALENT) 250 MG capsule Take 2 capsules (500 mg) by mouth 2 times daily 120 capsule 11    nitroGLYcerin (NITROSTAT) 0.4 MG sublingual tablet One tablet under the tongue every 5 minutes if needed for chest pain. May repeat every 5 minutes for a maximum of 3 doses in 15 minutes\" 25 tablet 3    sodium bicarbonate 650 MG tablet Take 2 tablets (1,300 mg) by mouth 2 times daily 120 tablet 11    sodium chloride (OCEAN) 0.65 % nasal spray Please use in both sides of the nose as needed. 50 mL 1    thin (NO BRAND SPECIFIED) lancets Tests 3x daily Use with lanceting device. To accompany: Blood Glucose Monitor Brands: per insurance. 200 each 3    Vitamin D3 (CHOLECALCIFEROL) 25 mcg (1000 units) tablet Take 2 tablets (50 mcg) by mouth daily 60 tablet 11     No current facility-administered medications for this visit.       No Known Allergies    Patient Active Problem List    Diagnosis Date Noted    Allergic fungal sinusitis 08/08/2024     Priority: Medium    Nasal polyposis 07/15/2024     Priority: Medium    Chronic nasal congestion 07/15/2024     Priority: Medium    Diabetes mellitus, type 2 (H) 07/15/2024     Priority: Medium    Stage 3a chronic kidney disease (H) 01/02/2024     Priority: Medium    HOCM (hypertrophic obstructive cardiomyopathy) (H) 11/05/2023     Priority: Medium    Peripheral neuropathy 10/06/2023     Priority: Medium    BK viremia 08/28/2023     Priority: Medium    Aftercare following organ transplant 05/18/2023     Priority: Medium    Need for pneumocystis prophylaxis 05/18/2023     Priority: Medium    Vitamin D deficiency 05/18/2023     Priority: Medium    " Secondary renal hyperparathyroidism 05/18/2023     Priority: Medium    Immunosuppressed status 02/24/2023     Priority: Medium    Anemia in chronic renal disease 02/24/2023     Priority: Medium    Hypomagnesemia 02/24/2023     Priority: Medium    Hepatitis B core antibody positive 02/24/2023     Priority: Medium    Kidney replaced by transplant 02/21/2023     Priority: Medium    Thrombocytopenia 06/02/2022     Priority: Medium    Coronary artery disease involving native coronary artery of native heart without angina pectoris 12/16/2021     Priority: Medium    Metabolic acidosis      Priority: Medium    HTN, kidney transplant related      Priority: Medium     Created by Conversion  Replacement Utility updated for latest IMO load        Dyslipidemia, goal LDL below 70      Priority: Medium     Created by Conversion           Family History   Problem Relation Age of Onset    Diabetes Type 2  Mother     Other - See Comments Daughter         granular cell tumor of thigh    Heart Disease Other     Anesthesia Reaction No family hx of     Deep Vein Thrombosis (DVT) No family hx of     Pulmonary Embolism No family hx of        Past Surgical History:   Procedure Laterality Date    BENCH KIDNEY  2/21/2023    Procedure: Bench kidney;  Surgeon: Talha Weinstein MD;  Location: [facility] OR    CV CORONARY ANGIOGRAM N/A 12/6/2021    Procedure: Coronary Angiogram;  Surgeon: Cristela Banks MD;  Location: [facility]    CV LEFT HEART CATH N/A 12/6/2021    Procedure: Left Heart Cath;  Surgeon: Cristela Banks MD;  Location: [facility]    CV PCI N/A 12/6/2021    Procedure: Percutaneous Coronary Intervention;  Surgeon: Cristela Banks MD;  Location: [facility]    IR FINE NEEDLE ASPIRATION W ULTRASOUND  5/3/2023    IR RENAL BIOPSY RIGHT  5/3/2023    OPTICAL TRACKING SYSTEM ENDOSCOPIC SINUS SURGERY Bilateral 8/2/2024    Procedure: stealth assisted left maxillary antrostomy, total ethmoidectomy, sphenoidotomy,  right maxillary antrostomy;  Surgeon: Bhaskar Kingston MD;  Location: [facility] OR    REMOVE CATHETER PERITONEAL N/A 2/21/2023    Procedure: Remove catheter peritoneal;  Surgeon: Talha Weinstein MD;  Location: [facility] OR        Social History     Socioeconomic History    Marital status: Patient Declined     Spouse name: Not on file    Number of children: Not on file    Years of education: Not on file    Highest education level: Not on file   Occupational History    Not on file   Tobacco Use    Smoking status: Never     Passive exposure: Never    Smokeless tobacco: Never   Vaping Use    Vaping status: Never Used   Substance and Sexual Activity    Alcohol use: Not Currently    Drug use: Never    Sexual activity: Not on file   Other Topics Concern    Parent/sibling w/ CABG, MI or angioplasty before 65F 55M? Not Asked   Social History Narrative    Not on file     Social Drivers of Health     Financial Resource Strain: Low Risk  (12/27/2023)    Financial Resource Strain     Within the past 12 months, have you or your family members you live with been unable to get utilities (heat, electricity) when it was really needed?: No   Food Insecurity: Low Risk  (12/27/2023)    Food Insecurity     Within the past 12 months, did you worry that your food would run out before you got money to buy more?: No     Within the past 12 months, did the food you bought just not last and you didn t have money to get more?: No   Transportation Needs: Low Risk  (12/27/2023)    Transportation Needs     Within the past 12 months, has lack of transportation kept you from medical appointments, getting your medicines, non-medical meetings or appointments, work, or from getting things that you need?: No   Physical Activity: Not on file   Stress: Not on file   Social Connections: Not on file   Interpersonal Safety: Low Risk  (7/15/2024)    Interpersonal Safety     Do you feel physically and emotionally safe where you currently live?: Yes     Within the past 12  months, have you been hit, slapped, kicked or otherwise physically hurt by someone?: No     Within the past 12 months, have you been humiliated or emotionally abused in other ways by your partner or ex-partner?: No   Housing Stability: Low Risk  (12/27/2023)    Housing Stability     Do you have housing? : Yes     Are you worried about losing your housing?: No

## 2025-01-15 ENCOUNTER — LAB (OUTPATIENT)
Dept: LAB | Facility: HOSPITAL | Age: 60
End: 2025-01-15
Payer: COMMERCIAL

## 2025-01-15 DIAGNOSIS — Z94.0 HTN, KIDNEY TRANSPLANT RELATED: ICD-10-CM

## 2025-01-15 DIAGNOSIS — Z94.0 KIDNEY TRANSPLANTED: ICD-10-CM

## 2025-01-15 DIAGNOSIS — Z48.298 AFTERCARE FOLLOWING ORGAN TRANSPLANT: ICD-10-CM

## 2025-01-15 DIAGNOSIS — I25.10 CORONARY ARTERY DISEASE INVOLVING NATIVE CORONARY ARTERY OF NATIVE HEART WITHOUT ANGINA PECTORIS: ICD-10-CM

## 2025-01-15 DIAGNOSIS — E11.9 TYPE 2 DIABETES MELLITUS WITHOUT COMPLICATION, WITH LONG-TERM CURRENT USE OF INSULIN (H): ICD-10-CM

## 2025-01-15 DIAGNOSIS — Z94.0 KIDNEY REPLACED BY TRANSPLANT: ICD-10-CM

## 2025-01-15 DIAGNOSIS — N18.31 STAGE 3A CHRONIC KIDNEY DISEASE (H): ICD-10-CM

## 2025-01-15 DIAGNOSIS — I15.1 HTN, KIDNEY TRANSPLANT RELATED: ICD-10-CM

## 2025-01-15 DIAGNOSIS — E78.5 DYSLIPIDEMIA, GOAL LDL BELOW 70: ICD-10-CM

## 2025-01-15 DIAGNOSIS — Z79.899 ENCOUNTER FOR LONG-TERM CURRENT USE OF MEDICATION: ICD-10-CM

## 2025-01-15 DIAGNOSIS — Z98.890 OTHER SPECIFIED POSTPROCEDURAL STATES: ICD-10-CM

## 2025-01-15 DIAGNOSIS — Z79.4 TYPE 2 DIABETES MELLITUS WITHOUT COMPLICATION, WITH LONG-TERM CURRENT USE OF INSULIN (H): ICD-10-CM

## 2025-01-15 LAB
ANION GAP SERPL CALCULATED.3IONS-SCNC: 10 MMOL/L (ref 7–15)
BUN SERPL-MCNC: 20.7 MG/DL (ref 8–23)
CALCIUM SERPL-MCNC: 9.4 MG/DL (ref 8.8–10.4)
CHLORIDE SERPL-SCNC: 104 MMOL/L (ref 98–107)
CHOLEST SERPL-MCNC: 116 MG/DL
CREAT SERPL-MCNC: 1.13 MG/DL (ref 0.67–1.17)
CREAT UR-MCNC: 48.9 MG/DL
CYCLOSPORINE BLD LC/MS/MS-MCNC: 112 UG/L (ref 50–400)
EGFRCR SERPLBLD CKD-EPI 2021: 74 ML/MIN/1.73M2
ERYTHROCYTE [DISTWIDTH] IN BLOOD BY AUTOMATED COUNT: 14.6 % (ref 10–15)
EST. AVERAGE GLUCOSE BLD GHB EST-MCNC: 117 MG/DL
FASTING STATUS PATIENT QL REPORTED: YES
FASTING STATUS PATIENT QL REPORTED: YES
GLUCOSE SERPL-MCNC: 73 MG/DL (ref 70–99)
HBA1C MFR BLD: 5.7 %
HCO3 SERPL-SCNC: 23 MMOL/L (ref 22–29)
HCT VFR BLD AUTO: 34.8 % (ref 40–53)
HDLC SERPL-MCNC: 44 MG/DL
HGB BLD-MCNC: 11.9 G/DL (ref 13.3–17.7)
LDLC SERPL CALC-MCNC: 58 MG/DL
MCH RBC QN AUTO: 26.9 PG (ref 26.5–33)
MCHC RBC AUTO-ENTMCNC: 34.2 G/DL (ref 31.5–36.5)
MCV RBC AUTO: 79 FL (ref 78–100)
MICROALBUMIN UR-MCNC: 33.2 MG/L
MICROALBUMIN/CREAT UR: 67.89 MG/G CR (ref 0–17)
NONHDLC SERPL-MCNC: 72 MG/DL
PLATELET # BLD AUTO: 116 10E3/UL (ref 150–450)
POTASSIUM SERPL-SCNC: 4.6 MMOL/L (ref 3.4–5.3)
RBC # BLD AUTO: 4.42 10E6/UL (ref 4.4–5.9)
SODIUM SERPL-SCNC: 137 MMOL/L (ref 135–145)
TME LAST DOSE: NORMAL H
TME LAST DOSE: NORMAL H
TRIGL SERPL-MCNC: 69 MG/DL
WBC # BLD AUTO: 6.3 10E3/UL (ref 4–11)

## 2025-01-15 PROCEDURE — 82570 ASSAY OF URINE CREATININE: CPT

## 2025-01-15 PROCEDURE — 83718 ASSAY OF LIPOPROTEIN: CPT

## 2025-01-15 PROCEDURE — 83036 HEMOGLOBIN GLYCOSYLATED A1C: CPT

## 2025-01-15 PROCEDURE — 80048 BASIC METABOLIC PNL TOTAL CA: CPT

## 2025-01-15 PROCEDURE — 85027 COMPLETE CBC AUTOMATED: CPT

## 2025-01-15 PROCEDURE — 87799 DETECT AGENT NOS DNA QUANT: CPT

## 2025-01-15 PROCEDURE — 82043 UR ALBUMIN QUANTITATIVE: CPT

## 2025-01-15 PROCEDURE — 36415 COLL VENOUS BLD VENIPUNCTURE: CPT

## 2025-01-15 PROCEDURE — 80158 DRUG ASSAY CYCLOSPORINE: CPT

## 2025-01-16 LAB
BK VIRUS DNA IU/ML, INSTRUMENT (6800): 109 IU/ML
BK VIRUS SPECIMEN TYPE: ABNORMAL
BKV DNA SPEC NAA+PROBE-LOG#: 2 {LOG_COPIES}/ML

## 2025-01-30 ENCOUNTER — TELEPHONE (OUTPATIENT)
Dept: OTOLARYNGOLOGY | Facility: CLINIC | Age: 60
End: 2025-01-30
Payer: COMMERCIAL

## 2025-01-30 NOTE — TELEPHONE ENCOUNTER
Refill requested for budesonide. Approved by provider, 6 refills, and faxed back to [phone].    JOEL HERNÁNDEZ LPN on 1/30/2025 at 4:13 PM

## 2025-02-06 NOTE — TELEPHONE ENCOUNTER
Neurosurgery Focus Note:    Zane Cline is a 89 year old man with history of balance/walking difficulties, cognitive changes, and imaging findings and neuropsych findings consistent with normal pressure hydrocephalus who had previously had significant benefit with large volume lumbar puncture taps and so he elected to undergo right sided  shunt insertion with laparoscopic assistance (Certas Plus set at 6 intraoperatively) on 2/6/25 with Dr. Rodas.  Postop CT head with expected findings.    Seen postoperatively resting in bed.  Complains of headache, neck pain, belly pain.  Denies new numbness/tingling, weakness, nausea/vomiting.  AAOx3.  MAETC with equal strength bilaterally.  Shadowing noted on surgical dressing.      Plan:  CT head tomorrow am   XR shunt series POD #1  SBP <140   Neuro checks q1h  Reinforce surgical dressing as needed.  Abdominal incisions LEANN.  Remove head dressing on POD #1 and leave LEANN.    IV ancef for 2 more doses  Continue holding Eliquis.  Will plan to resume at 72 hours postop.    Ensure adequate pain control  Bowel regimen  PT/OT, mobilize.   CBC, BMP tomorrow am draw  Avoid anticoagulation in immediate post-op period  SCDs for DVT prophylaxis  Follow up on 2/19 with Dr. Rodas for suture removal with CT head just prior  Appreciate medical management per Critical Care    Discussed plan of care with patient.     [facility] [place]     Please call Roxy RN [facility] is asking for a Verbal skilled nursing orders;  Skilled Nursing x2week for 6 weeks and PRN     Roxy's # [phone]

## 2025-02-14 ENCOUNTER — LAB (OUTPATIENT)
Dept: LAB | Facility: HOSPITAL | Age: 60
End: 2025-02-14
Payer: COMMERCIAL

## 2025-02-14 ENCOUNTER — TELEPHONE (OUTPATIENT)
Dept: TRANSPLANT | Facility: CLINIC | Age: 60
End: 2025-02-14

## 2025-02-14 DIAGNOSIS — Z94.0 KIDNEY TRANSPLANTED: ICD-10-CM

## 2025-02-14 DIAGNOSIS — Z94.0 HTN, KIDNEY TRANSPLANT RELATED: ICD-10-CM

## 2025-02-14 DIAGNOSIS — Z48.298 AFTERCARE FOLLOWING ORGAN TRANSPLANT: ICD-10-CM

## 2025-02-14 DIAGNOSIS — Z79.899 ENCOUNTER FOR LONG-TERM CURRENT USE OF MEDICATION: ICD-10-CM

## 2025-02-14 DIAGNOSIS — B34.8 BK VIREMIA: ICD-10-CM

## 2025-02-14 DIAGNOSIS — Z94.0 KIDNEY REPLACED BY TRANSPLANT: ICD-10-CM

## 2025-02-14 DIAGNOSIS — Z98.890 OTHER SPECIFIED POSTPROCEDURAL STATES: ICD-10-CM

## 2025-02-14 DIAGNOSIS — Z94.0 KIDNEY REPLACED BY TRANSPLANT: Primary | ICD-10-CM

## 2025-02-14 DIAGNOSIS — I15.1 HTN, KIDNEY TRANSPLANT RELATED: ICD-10-CM

## 2025-02-14 LAB
ANION GAP SERPL CALCULATED.3IONS-SCNC: 10 MMOL/L (ref 7–15)
BK VIRUS DNA IU/ML, INSTRUMENT (6800): 121 IU/ML
BK VIRUS SPECIMEN TYPE: ABNORMAL
BKV DNA SPEC NAA+PROBE-LOG#: 2.1 {LOG_COPIES}/ML
BUN SERPL-MCNC: 28.9 MG/DL (ref 8–23)
CALCIUM SERPL-MCNC: 9.5 MG/DL (ref 8.8–10.4)
CHLORIDE SERPL-SCNC: 98 MMOL/L (ref 98–107)
CREAT SERPL-MCNC: 1.29 MG/DL (ref 0.67–1.17)
CYCLOSPORINE BLD LC/MS/MS-MCNC: 123 UG/L (ref 50–400)
EGFRCR SERPLBLD CKD-EPI 2021: 63 ML/MIN/1.73M2
ERYTHROCYTE [DISTWIDTH] IN BLOOD BY AUTOMATED COUNT: 14.6 % (ref 10–15)
GLUCOSE SERPL-MCNC: 116 MG/DL (ref 70–99)
HCO3 SERPL-SCNC: 27 MMOL/L (ref 22–29)
HCT VFR BLD AUTO: 36.5 % (ref 40–53)
HGB BLD-MCNC: 12.2 G/DL (ref 13.3–17.7)
MCH RBC QN AUTO: 26.3 PG (ref 26.5–33)
MCHC RBC AUTO-ENTMCNC: 33.4 G/DL (ref 31.5–36.5)
MCV RBC AUTO: 79 FL (ref 78–100)
PLATELET # BLD AUTO: 87 10E3/UL (ref 150–450)
POTASSIUM SERPL-SCNC: 4.3 MMOL/L (ref 3.4–5.3)
RBC # BLD AUTO: 4.64 10E6/UL (ref 4.4–5.9)
SODIUM SERPL-SCNC: 135 MMOL/L (ref 135–145)
TME LAST DOSE: NORMAL H
TME LAST DOSE: NORMAL H
WBC # BLD AUTO: 4.7 10E3/UL (ref 4–11)

## 2025-02-14 PROCEDURE — 80158 DRUG ASSAY CYCLOSPORINE: CPT

## 2025-02-14 PROCEDURE — 87799 DETECT AGENT NOS DNA QUANT: CPT

## 2025-02-14 PROCEDURE — 80048 BASIC METABOLIC PNL TOTAL CA: CPT

## 2025-02-14 PROCEDURE — 36415 COLL VENOUS BLD VENIPUNCTURE: CPT

## 2025-02-14 PROCEDURE — 85041 AUTOMATED RBC COUNT: CPT

## 2025-02-18 ENCOUNTER — PHARMACY VISIT (OUTPATIENT)
Dept: ADMINISTRATIVE | Facility: CLINIC | Age: 60
End: 2025-02-18
Payer: COMMERCIAL

## 2025-02-18 NOTE — PROGRESS NOTES
Nasal Polyps Clinical Follow Up Assessment   Completed on 2025/02/18 22:20:31 [facility], by Imelda Mario        Activity Medications    DUPIXENT        Care Details     Summary Notes  I had the pleasure of speaking to daughter for initial TM.   Current regimen: Dupixent 300mg every other week.   Side effects: None.   Doses: Daughter visits patient weekly and pt notes doing well. PDC= Too soon   NP: Daughter was unable to answer QOL questions. Notes pt feels the same since first started Dupixent. No symptom relief. I discussed the building of effect over time. They are also hoping this will help their breathing problems. Next ENT appt 3/19.   Health, allergy or med changes: None.   Questions or concerns: None.   Follow up: Daughter opted in to TM. Will f/u in 2 months since pt has OV next month.       Imelda MARIO, PharmD, CSP  Therapy Management Pharmacist  [address] [address]   [email or web address]  [email or web address]   Specialty: [phone]  Mail Order: [phone]

## 2025-02-23 NOTE — TELEPHONE ENCOUNTER
Issue  cyclosporine  123 -- cyclosporine  goal 75 to100      PLAN:   Call Patient and confirm this was an accurate 12-hour trough.   Verify Cyclosporine dose 75  mg  in am and 50 mg in PM .   Confirm no new medications or or missed doses.   Confirm no new illness / infection / diarrhea.   If accurate trough and accurate dose, decrease Cyclosporine dose to  50  mg BID     Is this more than a 50% increase or decrease in current IS dose: 0   If YES, justification: no    Repeat labs in 14 days.  *If > 50% change in immunosuppression dose, repeat labs in 1 week      Task 2    2 years post transplant    Please update lab orders in epic Dr Phan  and send copy  to via my chart  -        RNCC spoke to Yanick, she verbalized understanding of plan/dose change 50 mg bid, she will update his pill box. Modesto Barbosa will repeat labs in 1 week. If stable, will reduce labs to e/o month.     Med list and lab orders updated.     Analia Farr RN

## 2025-02-24 RX ORDER — CYCLOSPORINE 25 MG/1
50 CAPSULE, LIQUID FILLED ORAL 2 TIMES DAILY
Qty: 360 CAPSULE | Refills: 3 | Status: SHIPPED | OUTPATIENT
Start: 2025-02-24

## 2025-02-26 ENCOUNTER — TELEPHONE (OUTPATIENT)
Dept: PHARMACY | Facility: CLINIC | Age: 60
End: 2025-02-26
Payer: COMMERCIAL

## 2025-02-26 DIAGNOSIS — E83.42 HYPOMAGNESEMIA: ICD-10-CM

## 2025-02-26 DIAGNOSIS — I15.1 HTN, KIDNEY TRANSPLANT RELATED: ICD-10-CM

## 2025-02-26 DIAGNOSIS — B34.8 BK VIREMIA: ICD-10-CM

## 2025-02-26 DIAGNOSIS — Z94.0 HTN, KIDNEY TRANSPLANT RELATED: ICD-10-CM

## 2025-02-26 RX ORDER — MAGNESIUM OXIDE 400 MG/1
400 TABLET ORAL 2 TIMES DAILY
Qty: 180 TABLET | Refills: 0 | Status: SHIPPED | OUTPATIENT
Start: 2025-02-26

## 2025-02-26 NOTE — TELEPHONE ENCOUNTER
Clinical Pharmacy Consult:                                                      Transplant Specific: 24 month post transplant medication review  Date of Transplant: 02/21/2023  Type of Transplant: kidney  First Transplant: yes  History of rejection: no    Immunosuppression Regimen   CSA 50 mg qAM & 5 mg qPM and  mg qAM & 500 mg qPM  Patient specific goal:   Most recent level: 123 on 2/23/25  Immunosuppressant Levels:  supraTherapeutic, dose decreased  Pt adherent to lab draws: yes  Scr:   Lab Results   Component Value Date    CR 0.84 03/27/2023     Side effects: None    Prophylactic Medications  Antibacterial:  Bactrim 400-80 once daily  Scheduled Discontinue Date: Lifelong    Antifungal: Not needed thus far  Scheduled Discontinue Date: N/A    Antiviral: CrCl >/=60 mL/minute: Valcyte 900mg daily   Scheduled Discontinue Date: 3 months completed    Acid Reducer: Not needed  Scheduled Reviewed Date: N/A    Thrombosis Prevention: Aspirin 81 mg PO daily  Scheduled Discontinue Date:  to be determined    Blood Pressure Management  Frequency of home Blood Pressure checks: daily  Most recent home BP: 130-140's/80's  Patient Blood pressure goal: <140/90  Patient blood pressure at goal:  Yes    Hospitalizations/ER visits since last assessment: 0    Med rec/DUR performed: yes  Med Rec Discrepancies: no    Spoke with [facility]'s dorota Yanick, today via phone. She reports he is doing really well in regards to his kidney transplant. The only thing bothering him currently is his nasal polyps which he recently started taking Dupixent for. Yanick denies any difficulties getting refills or any side effects to his medications. His last cyclosporine level was elevated so they reduced his dose.     Yanick reports that [facility] checks his blood pressure daily. It is typically at goal.    No other questions or concerns today. Follow up in 1 year.    Time spent: 10 minutes    Grant Huerta, PharmD  [phone]

## 2025-02-27 RX ORDER — LOSARTAN POTASSIUM 50 MG/1
50 TABLET ORAL DAILY
Qty: 90 TABLET | Refills: 3 | Status: SHIPPED | OUTPATIENT
Start: 2025-02-27

## 2025-02-28 ENCOUNTER — OFFICE VISIT (OUTPATIENT)
Dept: URGENT CARE | Facility: URGENT CARE | Age: 60
End: 2025-02-28
Payer: COMMERCIAL

## 2025-02-28 VITALS
TEMPERATURE: 98 F | RESPIRATION RATE: 20 BRPM | HEART RATE: 78 BPM | DIASTOLIC BLOOD PRESSURE: 62 MMHG | OXYGEN SATURATION: 98 % | SYSTOLIC BLOOD PRESSURE: 164 MMHG

## 2025-02-28 DIAGNOSIS — H66.92 LEFT ACUTE OTITIS MEDIA: Primary | ICD-10-CM

## 2025-02-28 DIAGNOSIS — I10 HYPERTENSION, UNSPECIFIED TYPE: ICD-10-CM

## 2025-02-28 PROCEDURE — 3078F DIAST BP <80 MM HG: CPT | Performed by: PHYSICIAN ASSISTANT

## 2025-02-28 PROCEDURE — 3077F SYST BP >= 140 MM HG: CPT | Performed by: PHYSICIAN ASSISTANT

## 2025-02-28 PROCEDURE — 99214 OFFICE O/P EST MOD 30 MIN: CPT | Performed by: PHYSICIAN ASSISTANT

## 2025-02-28 RX ORDER — AMOXICILLIN 875 MG/1
875 TABLET, COATED ORAL 2 TIMES DAILY
Qty: 14 TABLET | Refills: 0 | Status: CANCELLED | OUTPATIENT
Start: 2025-02-28 | End: 2025-03-07

## 2025-02-28 RX ORDER — CEFDINIR 300 MG/1
600 CAPSULE ORAL DAILY
Qty: 14 CAPSULE | Refills: 0 | Status: CANCELLED | OUTPATIENT
Start: 2025-02-28 | End: 2025-03-07

## 2025-02-28 RX ORDER — LOSARTAN POTASSIUM 25 MG/1
TABLET ORAL
COMMUNITY
Start: 2024-12-11

## 2025-02-28 RX ORDER — AMOXICILLIN 875 MG/1
875 TABLET, COATED ORAL 2 TIMES DAILY
Qty: 14 TABLET | Refills: 0 | Status: SHIPPED | OUTPATIENT
Start: 2025-02-28 | End: 2025-03-07

## 2025-02-28 NOTE — PROGRESS NOTES
Assessment & Plan     Left acute otitis media  Pain and difficulty hearing out of left ear x 3-4 days. Recently recovered from URI symptoms. Exam reveals L otitis media. Treated with amoxil as ordered.  Discussed possible antibiotic interaction with anti-rejection medications-has follow up with transplant team 3/18 (may decrease concentration of mycophenolate, may need adjustment or close monitoring) review of other medication classes has the same potential.  Warm compresses and tylenol for pain.    Continue nasal saline irrigation, flonase. Follow-up  with pcp for recheck for persistent or worsening sx.      HTN not well controlled: BP elevated today, continue his antihypertensives and recheck BP at home and with his pcp in the next week.    - amoxicillin (AMOXIL) 875 MG tablet  Dispense: 14 tablet; Refill: 0       Piedad Merchant PA-C  [facility]     Nhia is a 59 year old male who presents to clinic today for the following health issues:  Chief Complaint   Patient presents with    Ear Problem     L ear pain X4 days        HPI      Ear Pain  Pt is a 59 year old male  with hx of immune suppression due to kidney transplant, diabetes, chronic nasal congestion, who presents with concerns re: ear pain.     Onset of symptoms was 3-4 day(s) ago.  Course of illness is same.    Severity moderate  Current and Associated symptoms: ear pain left  Treatment measures tried include Tylenol/Ibuprofen.  Predisposing factors include recovering from URI symptoms about a week ago.  Feels well recovered from URI with cough a week ago, ear pain started on Tuesday, cannot hear well out of that ear.    Review of Systems  Constitutional, HEENT, cardiovascular, pulmonary, gi and gu systems are negative, except as otherwise noted.    Patient Active Problem List   Diagnosis    Dyslipidemia, goal LDL below 70    HTN, kidney transplant related    Metabolic acidosis    Coronary artery disease  involving native coronary artery of native heart without angina pectoris    Thrombocytopenia    Immunosuppressed status    Kidney replaced by transplant    Anemia in chronic renal disease    Hypomagnesemia    Hepatitis B core antibody positive    Aftercare following organ transplant    Need for pneumocystis prophylaxis    Vitamin D deficiency    Secondary renal hyperparathyroidism    BK viremia    Peripheral neuropathy    HOCM (hypertrophic obstructive cardiomyopathy) (H)    Stage 3a chronic kidney disease (H)    Nasal polyposis    Chronic nasal congestion    Diabetes mellitus, type 2 (H)    Allergic fungal sinusitis     Current Outpatient Medications   Medication Sig Dispense Refill    acetaminophen (TYLENOL) 325 MG tablet Take 2 tablets (650 mg) by mouth every 4 hours as needed for other (For optimal non-opioid multimodal pain management to improve pain control.) 30 tablet 0    Alcohol Swabs (ALCOHOL PREP) 70 % PADS USE 5X PER  each 3    amitriptyline (ELAVIL) 25 MG tablet Take 1 tablet (25 mg) by mouth at bedtime 90 tablet 1    amLODIPine (NORVASC) 10 MG tablet TAKE ONE TABLET BY MOUTH EVERY NIGHT AT BEDTIME 90 tablet 3    amoxicillin (AMOXIL) 875 MG tablet Take 1 tablet (875 mg) by mouth 2 times daily for 7 days. 14 tablet 0    aspirin 81 MG EC tablet Take 1 tablet (81 mg) by mouth daily. 90 tablet 2    atorvastatin (LIPITOR) 20 MG tablet Take 1 tablet (20 mg) by mouth daily 30 tablet 11    blood glucose (NO BRAND SPECIFIED) test strip Use to test blood sugar 3 times daily or as directed. To accompany: Blood Glucose Monitor Brands: per insurance. 200 strip 3    blood glucose monitoring (ONE TOUCH ULTRA 2) meter device kit USE TO TEST BLOOD SUGAR THREE TIMES DAILY OR AS DIRECTED. 1 kit 0    budesonide (PULMICORT) 0.5 MG/2ML neb solution Empty contents of ampule into 240mL of saline solution and rinse both nasal cavities as instructed twice daily. 120 mL 3    carvedilol (COREG) 25 MG tablet TAKE 0.5 TABLETS  "BY MOUTH 2 TIMES DAILY (WITH MEALS) 30 tablet 11    clotrimazole-betamethasone (LOTRISONE) 1-0.05 % external cream Apply topically 2 times daily. As needed for dry skin on the feet. 45 g 1    cycloSPORINE modified (GENERIC EQUIVALENT) 25 MG capsule Take 2 capsules (50 mg) by mouth 2 times daily. 360 capsule 3    dupilumab (DUPIXENT) 300 MG/2ML prefilled pen Inject 2 mLs (300 mg) subcutaneously every 14 days. 12 mL 1    fluticasone (FLONASE) 50 MCG/ACT nasal spray Spray 2 sprays into both nostrils daily 9.9 mL 3    hydrALAZINE (APRESOLINE) 25 MG tablet TAKE 1 TABLET (25 MG) BY MOUTH 2 TIMES DAILY 180 tablet 2    Insulin Aspart FlexPen 100 UNIT/ML SOPN INJECT 7 UNITS UNDER THE SKIN BEFORE BREAKFAST AND LUNCH. INJECT 9 UNITS BEFORE DINNER 15 mL 3    insulin glargine (LANTUS PEN) 100 UNIT/ML pen Inject 25 Units subcutaneously at bedtime 15 mL 11    insulin pen needle (BD PEN NEEDLE ORESTES 2ND GEN) 32G X 4 MM miscellaneous Inject Subcutaneous 3 times daily 100 each 11    ipratropium (ATROVENT) 0.06 % nasal spray USE 2 SPRAYS IN EACH NOSTRIL FOUR TIMES DAILY AS NEEDED FOR NASAL CONGESTION 15 mL 0    loratadine (CLARITIN) 10 MG tablet Take 1 tablet (10 mg) by mouth daily as needed for allergies (itchy/rash) 90 tablet 3    losartan (COZAAR) 25 MG tablet       losartan (COZAAR) 50 MG tablet Take 1 tablet (50 mg) by mouth daily. 90 tablet 3    magnesium oxide (MAG-OX) 400 MG tablet TAKE ONE TABLET BY MOUTH TWICE A  tablet 0    mycophenolate (GENERIC EQUIVALENT) 250 MG capsule Take 2 capsules (500 mg) by mouth 2 times daily 120 capsule 11    nitroGLYcerin (NITROSTAT) 0.4 MG sublingual tablet One tablet under the tongue every 5 minutes if needed for chest pain. May repeat every 5 minutes for a maximum of 3 doses in 15 minutes\" 25 tablet 3    sodium bicarbonate 650 MG tablet Take 2 tablets (1,300 mg) by mouth 2 times daily 120 tablet 11    sodium chloride (OCEAN) 0.65 % nasal spray Please use in both sides of the nose as " needed. 50 mL 1    thin (NO BRAND SPECIFIED) lancets Tests 3x daily Use with lanceting device. To accompany: Blood Glucose Monitor Brands: per insurance. 200 each 3    Vitamin D3 (CHOLECALCIFEROL) 25 mcg (1000 units) tablet Take 2 tablets (50 mcg) by mouth daily 60 tablet 11     No current facility-administered medications for this visit.           Objective    BP (!) 164/62   Pulse 78   Temp 98  F (36.7  C)   Resp 20   SpO2 98%   Physical Exam   GENERAL: alert and no distress  EYES: Eyes grossly normal to inspection, PERRL and conjunctivae and sclerae normal  HENT: normal cephalic/atraumatic, right ear: normal: no effusions, no erythema, normal landmarks, left ear: erythematous, bulging membrane, and mucopurulent effusion, nose and mouth without ulcers or lesions, oropharynx clear, and oral mucous membranes moist  NECK: no adenopathy, no asymmetry, masses, or scars  RESP: lungs clear to auscultation - no rales, rhonchi or wheezes  CV: regular rate and rhythm, normal S1 S2, no S3 or S4, no murmur, click or rub, no peripheral edema  ABDOMEN: soft, nontender, no hepatosplenomegaly, no masses and bowel sounds normal  MS: no gross musculoskeletal defects noted, no edema  SKIN: no suspicious lesions or rashes  NEURO: Normal strength and tone, mentation intact and speech normal  PSYCH: mentation appears normal, affect normal/bright    Pt evaluated with assistance of NP student Nimo Goetz, MARITZA preformed history and physical exam and medical decision making in face to face evaluation of the patient.         Niom Goetz Student NP

## 2025-02-28 NOTE — PROGRESS NOTES
Assessment & Plan     There are no diagnoses linked to this encounter.   {2021 E&M time (Optional):077255}    {Provider  Link to [facility] Help Grid :481709}    No follow-ups on file.    Piedad Merchant PA-C  [facility] AMBER Salvador is a 59 year old male who presents to clinic today for the following health issues:  Chief Complaint   Patient presents with    Ear Problem     L ear pain X4 days      {(!) Visit Details have not yet been documented.  Please enter Visit Details and then use this list to pull in documentation. (Optional):333461}  HPI    ***  {UC Conditions (Optional):091210}    Review of Systems  {ROS COMP (Optional):648718}      Objective    BP (!) 168/66   Pulse 78   Temp 98  F (36.7  C)   Resp 20   SpO2 98%   Physical Exam   {Exam List (Optional):050651}    {Diagnostic Test Results (Optional):159563}

## 2025-03-05 ENCOUNTER — TELEPHONE (OUTPATIENT)
Dept: FAMILY MEDICINE | Facility: CLINIC | Age: 60
End: 2025-03-05
Payer: COMMERCIAL

## 2025-03-05 NOTE — TELEPHONE ENCOUNTER
Patient Quality Outreach    Patient is due for the following:   Physical Annual Wellness Visit    Action(s) Taken:   Patient has upcoming appointment, these items will be addressed at that time.    Type of outreach:    Chart review performed, no outreach needed.    Questions for provider review:    None           Shannan Arreola MA  Chart routed to N/A.

## 2025-03-07 ENCOUNTER — LAB (OUTPATIENT)
Dept: LAB | Facility: HOSPITAL | Age: 60
End: 2025-03-07
Payer: COMMERCIAL

## 2025-03-07 DIAGNOSIS — Z48.298 AFTERCARE FOLLOWING ORGAN TRANSPLANT: ICD-10-CM

## 2025-03-07 DIAGNOSIS — I15.1 HTN, KIDNEY TRANSPLANT RELATED: ICD-10-CM

## 2025-03-07 DIAGNOSIS — Z94.0 KIDNEY REPLACED BY TRANSPLANT: ICD-10-CM

## 2025-03-07 DIAGNOSIS — B34.8 BK VIREMIA: ICD-10-CM

## 2025-03-07 DIAGNOSIS — Z79.899 ENCOUNTER FOR LONG-TERM CURRENT USE OF MEDICATION: ICD-10-CM

## 2025-03-07 DIAGNOSIS — Z94.0 HTN, KIDNEY TRANSPLANT RELATED: ICD-10-CM

## 2025-03-07 LAB
ALBUMIN MFR UR ELPH: 6.8 MG/DL
ANION GAP SERPL CALCULATED.3IONS-SCNC: 10 MMOL/L (ref 7–15)
BUN SERPL-MCNC: 25.2 MG/DL (ref 8–23)
CALCIUM SERPL-MCNC: 9.2 MG/DL (ref 8.8–10.4)
CHLORIDE SERPL-SCNC: 99 MMOL/L (ref 98–107)
CREAT SERPL-MCNC: 1.21 MG/DL (ref 0.67–1.17)
CREAT UR-MCNC: 47.2 MG/DL
CYCLOSPORINE BLD LC/MS/MS-MCNC: 70 UG/L (ref 50–400)
EGFRCR SERPLBLD CKD-EPI 2021: 69 ML/MIN/1.73M2
ERYTHROCYTE [DISTWIDTH] IN BLOOD BY AUTOMATED COUNT: 14.4 % (ref 10–15)
GLUCOSE SERPL-MCNC: 134 MG/DL (ref 70–99)
HCO3 SERPL-SCNC: 25 MMOL/L (ref 22–29)
HCT VFR BLD AUTO: 30.1 % (ref 40–53)
HGB BLD-MCNC: 10.6 G/DL (ref 13.3–17.7)
MCH RBC QN AUTO: 27.2 PG (ref 26.5–33)
MCHC RBC AUTO-ENTMCNC: 35.2 G/DL (ref 31.5–36.5)
MCV RBC AUTO: 77 FL (ref 78–100)
PLATELET # BLD AUTO: 115 10E3/UL (ref 150–450)
POTASSIUM SERPL-SCNC: 4.2 MMOL/L (ref 3.4–5.3)
PROT/CREAT 24H UR: 0.14 MG/MG CR (ref 0–0.2)
RBC # BLD AUTO: 3.89 10E6/UL (ref 4.4–5.9)
SODIUM SERPL-SCNC: 134 MMOL/L (ref 135–145)
TME LAST DOSE: NORMAL H
TME LAST DOSE: NORMAL H
WBC # BLD AUTO: 6.2 10E3/UL (ref 4–11)

## 2025-03-07 PROCEDURE — 84156 ASSAY OF PROTEIN URINE: CPT

## 2025-03-07 PROCEDURE — 80048 BASIC METABOLIC PNL TOTAL CA: CPT

## 2025-03-07 PROCEDURE — 36415 COLL VENOUS BLD VENIPUNCTURE: CPT

## 2025-03-07 PROCEDURE — 85027 COMPLETE CBC AUTOMATED: CPT

## 2025-03-07 PROCEDURE — 87799 DETECT AGENT NOS DNA QUANT: CPT

## 2025-03-07 PROCEDURE — 80158 DRUG ASSAY CYCLOSPORINE: CPT

## 2025-03-09 LAB
BK VIRUS DNA IU/ML, INSTRUMENT (6800): 49 IU/ML
BK VIRUS SPECIMEN TYPE: ABNORMAL
BKV DNA SPEC NAA+PROBE-LOG#: 1.7 {LOG_COPIES}/ML

## 2025-03-18 ENCOUNTER — OFFICE VISIT (OUTPATIENT)
Dept: TRANSPLANT | Facility: CLINIC | Age: 60
End: 2025-03-18
Attending: INTERNAL MEDICINE
Payer: COMMERCIAL

## 2025-03-18 VITALS
HEIGHT: 64 IN | WEIGHT: 153 LBS | RESPIRATION RATE: 18 BRPM | DIASTOLIC BLOOD PRESSURE: 64 MMHG | SYSTOLIC BLOOD PRESSURE: 168 MMHG | OXYGEN SATURATION: 99 % | BODY MASS INDEX: 26.12 KG/M2 | HEART RATE: 64 BPM

## 2025-03-18 DIAGNOSIS — E55.9 VITAMIN D DEFICIENCY: ICD-10-CM

## 2025-03-18 DIAGNOSIS — E83.42 HYPOMAGNESEMIA: ICD-10-CM

## 2025-03-18 DIAGNOSIS — D84.9 IMMUNOSUPPRESSED STATUS: ICD-10-CM

## 2025-03-18 DIAGNOSIS — N18.2 CKD (CHRONIC KIDNEY DISEASE) STAGE 2, GFR 60-89 ML/MIN: ICD-10-CM

## 2025-03-18 DIAGNOSIS — E87.20 METABOLIC ACIDOSIS: ICD-10-CM

## 2025-03-18 DIAGNOSIS — N18.2 ANEMIA IN STAGE 2 CHRONIC KIDNEY DISEASE: ICD-10-CM

## 2025-03-18 DIAGNOSIS — I15.1 HTN, KIDNEY TRANSPLANT RELATED: Primary | ICD-10-CM

## 2025-03-18 DIAGNOSIS — Z94.0 KIDNEY REPLACED BY TRANSPLANT: ICD-10-CM

## 2025-03-18 DIAGNOSIS — D63.1 ANEMIA IN STAGE 2 CHRONIC KIDNEY DISEASE: ICD-10-CM

## 2025-03-18 DIAGNOSIS — Z48.298 AFTERCARE FOLLOWING ORGAN TRANSPLANT: ICD-10-CM

## 2025-03-18 DIAGNOSIS — Z94.0 HTN, KIDNEY TRANSPLANT RELATED: Primary | ICD-10-CM

## 2025-03-18 PROCEDURE — G0463 HOSPITAL OUTPT CLINIC VISIT: HCPCS | Performed by: INTERNAL MEDICINE

## 2025-03-18 RX ORDER — LOSARTAN POTASSIUM 100 MG/1
100 TABLET ORAL DAILY
Qty: 90 TABLET | Refills: 3 | Status: SHIPPED | OUTPATIENT
Start: 2025-03-18

## 2025-03-18 ASSESSMENT — PAIN SCALES - GENERAL: PAINLEVEL_OUTOF10: NO PAIN (0)

## 2025-03-18 NOTE — PATIENT INSTRUCTIONS
Patient Recommendations:  - Increase losartan 100 mg nightly.  - Stop hydralazine.  - Okay to get labs done every 2 months.    Transplant Patient Information  Your Post Transplant Coordinator is: Shalonda Roy  For non urgent items, we encourage you to contact your coordinator/care team online via [employer]  You and your care team can also contact your transplant coordinator Monday - Friday, 8am - 5pm at [phone] (Option 2 to reach the coordinator or Option 4 to schedule an appointment).  After hours for urgent matters, please call [facility] at [phone].

## 2025-03-18 NOTE — NURSING NOTE
"Chief Complaint   Patient presents with    RECHECK     6 month follow up visit     Reji Pack, CMA CMA at 2:20 PM on 3/18/2025   BP (!) 168/64   Pulse 64   Resp 18   Ht 1.626 m (5' 4\")   Wt 69.4 kg (153 lb)   SpO2 99%   BMI 26.26 kg/m      "

## 2025-03-18 NOTE — LETTER
3/18/2025      Modesto Barbosa  [address] [place] [address]      Dear Colleague,    Thank you for referring your patient, Modesto Barbosa, to the [facility] TRANSPL[facility]. Please see a copy of my visit note below.    TRANSPLANT NEPHROLOGY CLINIC VISIT     Assessment & Plan  # LDKT: CKD Stage 2 - Stable   - Baseline Creatinine: ~ 1.1-1.3   - Proteinuria: Normal (<0.2 grams)   - DSA Hx: No DSA   - Last cPRA: 28%   - BK Viremia: Yes, detectable, but less than quantifiable   - Kidney Tx Biopsy Hx:No history of acute rejection, Severe vascular changes, and Acute tubular injury .     # Immunosuppression: Cyclosporine (goal ) and Mycophenolate mofetil (dose 500 mg every 12 hours)   - Induction with Recent Transplant:  Intermediate Intensity Protocol   - Continue with intensive monitoring of immunosuppression for efficacy and toxicity.   - Historical Changes in Immunosuppression: Decreased immunosuppression and change from tacrolimus to cyclosporine due to BK viremia.    - Changes: No    # Infection Prevention:   Last CD4 Level:  253 (3/2024)  - PJP: None      - CMV IgG Ab High Risk Discordance (D+/R-) at time of transplant: No  Present CMV Serostatus: Positive  - EBV IgG Ab High Risk Discordance (D+/R-) at time of transplant: No  Present EBV Serostatus: Positive    # Hypertension: Controlled;  Goal BP: < 130/80   - Meds: amlodipine 10mg at bedtime, Coreg 12.5mg BID, hydralazine 25mg BID, losartan 50mg daily   - Changes: Yes - STOP hydralazine, INCREASE losartan to 100mg    # Diabetes: Controlled (HbA1c <7%) Last HbA1c: 5.7%    # Anemia in Chronic Renal Disease: Hgb: Stable, low      NERY: No   - Iron studies: Not checked recently    # Mineral Bone Disorder:    - Vitamin D; level: Not checked recently         On supplement: Yes  - Calcium; level: Normal        On supplement: No    # Electrolytes:   - Potassium; level: Normal        On supplement: No  - Magnesium; level: Normal        On supplement: Yes  -  Bicarbonate; level: Normal        On supplement: Yes    # BK Viremia: Trend down, minimal BK PCR at ~ 49 with last check 03/2025.  IgG level is normal.              - Will continue to follow BK PCR every month.    # Other Significant PMH:              - CAD, s/p PCI: Asymptomatic.  - Asymmetric Left Ventricular Hypertrophy (HOCM): Stable. No history of sustained ventricular arrhythmias.  - Hepatitis B Core Ab +: Patient with core Ab positive, but negative Ag and also negative surface Ab.  Hep B DNA was negative at time of transplant, as well as at 4 months post transplant.  - Peripheral Neuropathy: Slightly improved symptoms on amitriptyline.     # Skin Cancer Risk:    - Discussed sun protection and recommend regular follow up with Dermatology.    # Transplant History:  Etiology of Kidney Failure: Diabetes mellitus type 2  Tx: LDKT  Transplant: 2/21/2023 (Kidney)  Significant transplant-related complications: BK Viremia    Transplant Office Phone Number: [phone]    Assessment and plan was discussed with the patient and he voiced his understanding and agreement.    Return visit: Return in about 6 months (around 9/18/2025).    Lalit Lorena Phan MD    The longitudinal plan of care for the diagnosis(es)/condition(s) as documented were addressed during this visit. Due to the added complexity in care, I will continue to support Nhia in the subsequent management and with ongoing continuity of care.    Attestation:  This patient has been seen and evaluated by me, Patrick Phan MD.  I have reviewed the note and agree with plan of care as documented by the fellow.       Chief Complaint  Mr. Barbosa is a 59 year old here for kidney transplant and routine follow up.     History of Present Illness    Mr. Barbosa reports feeling well overall.  Since last clinic visit:   Hospitalizations: No   New Medical Issues: No  Chest pain or shortness of breath: No  Lower extremity swelling: No  Weight change:  No  Nausea and vomiting: No  Diarrhea: No  Heartburn symptoms: No  Fever, sweats or chills: No  Night sweats: No  Urinary complaints: No    Home BP:  most often 120-130/70    Problem List  Patient Active Problem List   Diagnosis     Dyslipidemia, goal LDL below 70     HTN, kidney transplant related     Metabolic acidosis     Coronary artery disease involving native coronary artery of native heart without angina pectoris     Thrombocytopenia     Immunosuppressed status     Kidney replaced by transplant     Anemia in chronic renal disease     Hypomagnesemia     Hepatitis B core antibody positive     Aftercare following organ transplant     Vitamin D deficiency     Secondary renal hyperparathyroidism     BK viremia     Peripheral neuropathy     HOCM (hypertrophic obstructive cardiomyopathy) (H)     CKD (chronic kidney disease) stage 2, GFR 60-89 ml/min     Nasal polyposis     Chronic nasal congestion     Diabetes mellitus, type 2 (H)     Allergic fungal sinusitis       Allergies  No Known Allergies    Medications  Current Outpatient Medications   Medication Sig Dispense Refill     acetaminophen (TYLENOL) 325 MG tablet Take 2 tablets (650 mg) by mouth every 4 hours as needed for other (For optimal non-opioid multimodal pain management to improve pain control.) 30 tablet 0     Alcohol Swabs (ALCOHOL PREP) 70 % PADS USE 5X PER  each 3     amitriptyline (ELAVIL) 25 MG tablet Take 1 tablet (25 mg) by mouth at bedtime 90 tablet 1     amLODIPine (NORVASC) 10 MG tablet TAKE ONE TABLET BY MOUTH EVERY NIGHT AT BEDTIME 90 tablet 3     aspirin 81 MG EC tablet Take 1 tablet (81 mg) by mouth daily. 90 tablet 2     atorvastatin (LIPITOR) 20 MG tablet Take 1 tablet (20 mg) by mouth daily 30 tablet 11     blood glucose (NO BRAND SPECIFIED) test strip Use to test blood sugar 3 times daily or as directed. To accompany: Blood Glucose Monitor Brands: per insurance. 200 strip 3     blood glucose monitoring (ONE TOUCH ULTRA 2) meter  "device kit USE TO TEST BLOOD SUGAR THREE TIMES DAILY OR AS DIRECTED. 1 kit 0     budesonide (PULMICORT) 0.5 MG/2ML neb solution Empty contents of ampule into 240mL of saline solution and rinse both nasal cavities as instructed twice daily. 120 mL 3     carvedilol (COREG) 25 MG tablet TAKE 0.5 TABLETS BY MOUTH 2 TIMES DAILY (WITH MEALS) 30 tablet 11     clotrimazole-betamethasone (LOTRISONE) 1-0.05 % external cream Apply topically 2 times daily. As needed for dry skin on the feet. 45 g 1     cycloSPORINE modified (GENERIC EQUIVALENT) 25 MG capsule Take 2 capsules (50 mg) by mouth 2 times daily. 360 capsule 3     dupilumab (DUPIXENT) 300 MG/2ML prefilled pen Inject 2 mLs (300 mg) subcutaneously every 14 days. 12 mL 1     fluticasone (FLONASE) 50 MCG/ACT nasal spray Spray 2 sprays into both nostrils daily 9.9 mL 3     Insulin Aspart FlexPen 100 UNIT/ML SOPN INJECT 7 UNITS UNDER THE SKIN BEFORE BREAKFAST AND LUNCH. INJECT 9 UNITS BEFORE DINNER 15 mL 3     insulin glargine (LANTUS PEN) 100 UNIT/ML pen Inject 25 Units subcutaneously at bedtime 15 mL 11     insulin pen needle (BD PEN NEEDLE ORESTES 2ND GEN) 32G X 4 MM miscellaneous Inject Subcutaneous 3 times daily 100 each 11     ipratropium (ATROVENT) 0.06 % nasal spray USE 2 SPRAYS IN EACH NOSTRIL FOUR TIMES DAILY AS NEEDED FOR NASAL CONGESTION 15 mL 0     loratadine (CLARITIN) 10 MG tablet Take 1 tablet (10 mg) by mouth daily as needed for allergies (itchy/rash) 90 tablet 3     losartan (COZAAR) 100 MG tablet Take 1 tablet (100 mg) by mouth daily. 90 tablet 3     magnesium oxide (MAG-OX) 400 MG tablet TAKE ONE TABLET BY MOUTH TWICE A  tablet 0     mycophenolate (GENERIC EQUIVALENT) 250 MG capsule Take 2 capsules (500 mg) by mouth 2 times daily 120 capsule 11     nitroGLYcerin (NITROSTAT) 0.4 MG sublingual tablet One tablet under the tongue every 5 minutes if needed for chest pain. May repeat every 5 minutes for a maximum of 3 doses in 15 minutes\" 25 tablet 3     " "sodium bicarbonate 650 MG tablet Take 2 tablets (1,300 mg) by mouth 2 times daily 120 tablet 11     sodium chloride (OCEAN) 0.65 % nasal spray Please use in both sides of the nose as needed. 50 mL 1     thin (NO BRAND SPECIFIED) lancets Tests 3x daily Use with lanceting device. To accompany: Blood Glucose Monitor Brands: per insurance. 200 each 3     Vitamin D3 (CHOLECALCIFEROL) 25 mcg (1000 units) tablet Take 2 tablets (50 mcg) by mouth daily 60 tablet 11     amoxicillin-clavulanate (AUGMENTIN) 875-125 MG tablet Take 1 tablet by mouth every 12 hours for 14 days. 28 tablet 0     No current facility-administered medications for this visit.     Medications Discontinued During This Encounter   Medication Reason     hydrALAZINE (APRESOLINE) 25 MG tablet      losartan (COZAAR) 25 MG tablet      losartan (COZAAR) 50 MG tablet        Physical Exam  Vital Signs: BP (!) 168/64   Pulse 64   Resp 18   Ht 1.626 m (5' 4\")   Wt 69.4 kg (153 lb)   SpO2 99%   BMI 26.26 kg/m      GENERAL APPEARANCE: alert and no distress  HENT: MMM  RESP: lungs clear to auscultation - no rales, rhonchi or wheezes  CV: regular rhythm, normal rate, no rub, no murmur  EDEMA: no LE edema bilaterally  ABDOMEN: soft, nondistended  SKIN: no rash    Data        Latest Ref Rng & Units 3/7/2025     8:48 AM 2/14/2025     9:02 AM 1/15/2025     8:47 AM   Renal   Sodium 135 - 145 mmol/L 134  135  137    K 3.4 - 5.3 mmol/L 4.2  4.3  4.6    Cl 98 - 107 mmol/L 99  98  104    Cl (external) 98 - 107 mmol/L 99  98  104    CO2 22 - 29 mmol/L 25  27  23    Urea Nitrogen 8.0 - 23.0 mg/dL 25.2  28.9  20.7    Creatinine 0.67 - 1.17 mg/dL 1.21  1.29  1.13    Glucose 70 - 99 mg/dL 134  116  73    Calcium 8.8 - 10.4 mg/dL 9.2  9.5  9.4          Latest Ref Rng & Units 10/30/2023     8:55 AM 8/7/2023     8:57 AM 7/31/2023     8:44 AM   Bone Health   Phosphorus 2.5 - 4.5 mg/dL 3.5  3.7  4.1          Latest Ref Rng & Units 3/7/2025     8:48 AM 2/14/2025     9:02 AM 1/15/2025 "     8:47 AM   Heme   WBC 4.0 - 11.0 10e3/uL 6.2  4.7  6.3    Hgb 13.3 - 17.7 g/dL 10.6  12.2  11.9    Plt 150 - 450 10e3/uL 115  87  116          Latest Ref Rng & Units 8/28/2023     9:03 AM 4/7/2023     8:10 AM 4/3/2023     8:10 AM   Liver   AP 40 - 129 U/L 88  74  69    TBili <=1.2 mg/dL 0.5  0.7  0.8    Bilirubin Direct 0.00 - 0.30 mg/dL <0.20   0.22    ALT 0 - 70 U/L 17  10  12    AST 0 - 45 U/L 26  12  14    Tot Protein 6.4 - 8.3 g/dL 6.8  6.3  6.1    Albumin 3.5 - 5.0 g/dL  3.7     Albumin 3.5 - 5.2 g/dL 4.6   3.8          Latest Ref Rng & Units 1/15/2025     8:47 AM 7/15/2024     8:57 AM 12/29/2023    10:41 AM   Pancreas   A1C <5.7 % 5.7  7.1  6.5          Latest Ref Rng & Units 10/16/2024     8:50 AM 8/14/2023     9:12 AM 6/26/2023     8:08 AM   Iron studies   Iron 61 - 157 ug/dL 54  40  47    Iron Sat Index 15 - 46 % 20  22  28    Ferritin 31 - 409 ng/mL 53  412  545          Latest Ref Rng & Units 9/25/2023     9:09 AM 7/5/2023     7:43 AM 5/1/2023     7:15 AM   [facility] Txp Virology   EBV DNA COPIES/ML Not Detected copies/mL <500  <500  <500    EBV DNA LOG OF COPIES  <2.7  <2.7  <2.7      Failed to redirect to the Timeline version of the REVFS SmartLink.  Recent Labs   Lab Test 08/28/23  0903 09/05/23  0912 09/11/23  0905   DOSTAC 8/27/2023 9/4/2023 9/10/2023   TACROL 6.9 6.5 3.8*     Recent Labs   Lab Test 02/05/24  0850 02/20/24  0853 03/04/24  0846   DOSMPA 2/4/2024   8:00 PM  --  3/3/2024   8:00 PM   MPACID 1.63 1.47 1.31   MPAG 57.1 50.1 53.7          Again, thank you for allowing me to participate in the care of your patient.        Sincerely,        Patrick Phan MD    Electronically signed

## 2025-03-18 NOTE — LETTER
3/18/2025      RE: [place] JOE Barbosa  [address] [place] [address]       TRANSPLANT NEPHROLOGY CLINIC VISIT     Assessment & Plan  # LDKT: CKD Stage 2 - Stable   - Baseline Creatinine: ~ 1.1-1.3   - Proteinuria: Normal (<0.2 grams)   - DSA Hx: No DSA   - Last cPRA: 28%   - BK Viremia: Yes, detectable, but less than quantifiable   - Kidney Tx Biopsy Hx:No history of acute rejection, Severe vascular changes, and Acute tubular injury .     # Immunosuppression: Cyclosporine (goal ) and Mycophenolate mofetil (dose 500 mg every 12 hours)   - Induction with Recent Transplant:  Intermediate Intensity Protocol   - Continue with intensive monitoring of immunosuppression for efficacy and toxicity.   - Historical Changes in Immunosuppression: Decreased immunosuppression and change from tacrolimus to cyclosporine due to BK viremia.    - Changes: No    # Infection Prevention:   Last CD4 Level:  253 (3/2024)  - PJP: None      - CMV IgG Ab High Risk Discordance (D+/R-) at time of transplant: No  Present CMV Serostatus: Positive  - EBV IgG Ab High Risk Discordance (D+/R-) at time of transplant: No  Present EBV Serostatus: Positive    # Hypertension: Controlled;  Goal BP: < 130/80   - Meds: amlodipine 10mg at bedtime, Coreg 12.5mg BID, hydralazine 25mg BID, losartan 50mg daily   - Changes: Yes - STOP hydralazine, INCREASE losartan to 100mg    # Diabetes: Controlled (HbA1c <7%) Last HbA1c: 5.7%    # Anemia in Chronic Renal Disease: Hgb: Stable, low      NERY: No   - Iron studies: Not checked recently    # Mineral Bone Disorder:    - Vitamin D; level: Not checked recently         On supplement: Yes  - Calcium; level: Normal        On supplement: No    # Electrolytes:   - Potassium; level: Normal        On supplement: No  - Magnesium; level: Normal        On supplement: Yes  - Bicarbonate; level: Normal        On supplement: Yes    # BK Viremia: Trend down, minimal BK PCR at ~ 49 with last check 03/2025.  IgG level is normal.               - Will continue to follow BK PCR every month.    # Other Significant PMH:              - CAD, s/p PCI: Asymptomatic.  - Asymmetric Left Ventricular Hypertrophy (HOCM): Stable. No history of sustained ventricular arrhythmias.  - Hepatitis B Core Ab +: Patient with core Ab positive, but negative Ag and also negative surface Ab.  Hep B DNA was negative at time of transplant, as well as at 4 months post transplant.  - Peripheral Neuropathy: Slightly improved symptoms on amitriptyline.     # Skin Cancer Risk:    - Discussed sun protection and recommend regular follow up with Dermatology.    # Transplant History:  Etiology of Kidney Failure: Diabetes mellitus type 2  Tx: LDKT  Transplant: 2/21/2023 (Kidney)  Significant transplant-related complications: BK Viremia    Transplant Office Phone Number: [phone]    Assessment and plan was discussed with the patient and he voiced his understanding and agreement.    Return visit: Return in about 6 months (around 9/18/2025).    Lalit Phan MD    The longitudinal plan of care for the diagnosis(es)/condition(s) as documented were addressed during this visit. Due to the added complexity in care, I will continue to support Nhia in the subsequent management and with ongoing continuity of care.    Attestation:  This patient has been seen and evaluated by me, Patrick Phan MD.  I have reviewed the note and agree with plan of care as documented by the fellow.       Chief Complaint  Mr. Barbosa is a 59 year old here for kidney transplant and routine follow up.     History of Present Illness    Mr. Barbosa reports feeling well overall.  Since last clinic visit:   Hospitalizations: No   New Medical Issues: No  Chest pain or shortness of breath: No  Lower extremity swelling: No  Weight change: No  Nausea and vomiting: No  Diarrhea: No  Heartburn symptoms: No  Fever, sweats or chills: No  Night sweats: No  Urinary complaints: No    Home BP:  most often  120-130/70    Problem List  Patient Active Problem List   Diagnosis     Dyslipidemia, goal LDL below 70     HTN, kidney transplant related     Metabolic acidosis     Coronary artery disease involving native coronary artery of native heart without angina pectoris     Thrombocytopenia     Immunosuppressed status     Kidney replaced by transplant     Anemia in chronic renal disease     Hypomagnesemia     Hepatitis B core antibody positive     Aftercare following organ transplant     Vitamin D deficiency     Secondary renal hyperparathyroidism     BK viremia     Peripheral neuropathy     HOCM (hypertrophic obstructive cardiomyopathy) (H)     CKD (chronic kidney disease) stage 2, GFR 60-89 ml/min     Nasal polyposis     Chronic nasal congestion     Diabetes mellitus, type 2 (H)     Allergic fungal sinusitis       Allergies  No Known Allergies    Medications  Current Outpatient Medications   Medication Sig Dispense Refill     acetaminophen (TYLENOL) 325 MG tablet Take 2 tablets (650 mg) by mouth every 4 hours as needed for other (For optimal non-opioid multimodal pain management to improve pain control.) 30 tablet 0     Alcohol Swabs (ALCOHOL PREP) 70 % PADS USE 5X PER  each 3     amitriptyline (ELAVIL) 25 MG tablet Take 1 tablet (25 mg) by mouth at bedtime 90 tablet 1     amLODIPine (NORVASC) 10 MG tablet TAKE ONE TABLET BY MOUTH EVERY NIGHT AT BEDTIME 90 tablet 3     aspirin 81 MG EC tablet Take 1 tablet (81 mg) by mouth daily. 90 tablet 2     atorvastatin (LIPITOR) 20 MG tablet Take 1 tablet (20 mg) by mouth daily 30 tablet 11     blood glucose (NO BRAND SPECIFIED) test strip Use to test blood sugar 3 times daily or as directed. To accompany: Blood Glucose Monitor Brands: per insurance. 200 strip 3     blood glucose monitoring (ONE TOUCH ULTRA 2) meter device kit USE TO TEST BLOOD SUGAR THREE TIMES DAILY OR AS DIRECTED. 1 kit 0     budesonide (PULMICORT) 0.5 MG/2ML neb solution Empty contents of ampule into  "240mL of saline solution and rinse both nasal cavities as instructed twice daily. 120 mL 3     carvedilol (COREG) 25 MG tablet TAKE 0.5 TABLETS BY MOUTH 2 TIMES DAILY (WITH MEALS) 30 tablet 11     clotrimazole-betamethasone (LOTRISONE) 1-0.05 % external cream Apply topically 2 times daily. As needed for dry skin on the feet. 45 g 1     cycloSPORINE modified (GENERIC EQUIVALENT) 25 MG capsule Take 2 capsules (50 mg) by mouth 2 times daily. 360 capsule 3     dupilumab (DUPIXENT) 300 MG/2ML prefilled pen Inject 2 mLs (300 mg) subcutaneously every 14 days. 12 mL 1     fluticasone (FLONASE) 50 MCG/ACT nasal spray Spray 2 sprays into both nostrils daily 9.9 mL 3     Insulin Aspart FlexPen 100 UNIT/ML SOPN INJECT 7 UNITS UNDER THE SKIN BEFORE BREAKFAST AND LUNCH. INJECT 9 UNITS BEFORE DINNER 15 mL 3     insulin glargine (LANTUS PEN) 100 UNIT/ML pen Inject 25 Units subcutaneously at bedtime 15 mL 11     insulin pen needle (BD PEN NEEDLE ORESTES 2ND GEN) 32G X 4 MM miscellaneous Inject Subcutaneous 3 times daily 100 each 11     ipratropium (ATROVENT) 0.06 % nasal spray USE 2 SPRAYS IN EACH NOSTRIL FOUR TIMES DAILY AS NEEDED FOR NASAL CONGESTION 15 mL 0     loratadine (CLARITIN) 10 MG tablet Take 1 tablet (10 mg) by mouth daily as needed for allergies (itchy/rash) 90 tablet 3     losartan (COZAAR) 100 MG tablet Take 1 tablet (100 mg) by mouth daily. 90 tablet 3     magnesium oxide (MAG-OX) 400 MG tablet TAKE ONE TABLET BY MOUTH TWICE A  tablet 0     mycophenolate (GENERIC EQUIVALENT) 250 MG capsule Take 2 capsules (500 mg) by mouth 2 times daily 120 capsule 11     nitroGLYcerin (NITROSTAT) 0.4 MG sublingual tablet One tablet under the tongue every 5 minutes if needed for chest pain. May repeat every 5 minutes for a maximum of 3 doses in 15 minutes\" 25 tablet 3     sodium bicarbonate 650 MG tablet Take 2 tablets (1,300 mg) by mouth 2 times daily 120 tablet 11     sodium chloride (OCEAN) 0.65 % nasal spray Please use in " "both sides of the nose as needed. 50 mL 1     thin (NO BRAND SPECIFIED) lancets Tests 3x daily Use with lanceting device. To accompany: Blood Glucose Monitor Brands: per insurance. 200 each 3     Vitamin D3 (CHOLECALCIFEROL) 25 mcg (1000 units) tablet Take 2 tablets (50 mcg) by mouth daily 60 tablet 11     amoxicillin-clavulanate (AUGMENTIN) 875-125 MG tablet Take 1 tablet by mouth every 12 hours for 14 days. 28 tablet 0     No current facility-administered medications for this visit.     Medications Discontinued During This Encounter   Medication Reason     hydrALAZINE (APRESOLINE) 25 MG tablet      losartan (COZAAR) 25 MG tablet      losartan (COZAAR) 50 MG tablet        Physical Exam  Vital Signs: BP (!) 168/64   Pulse 64   Resp 18   Ht 1.626 m (5' 4\")   Wt 69.4 kg (153 lb)   SpO2 99%   BMI 26.26 kg/m      GENERAL APPEARANCE: alert and no distress  HENT: MMM  RESP: lungs clear to auscultation - no rales, rhonchi or wheezes  CV: regular rhythm, normal rate, no rub, no murmur  EDEMA: no LE edema bilaterally  ABDOMEN: soft, nondistended  SKIN: no rash    Data        Latest Ref Rng & Units 3/7/2025     8:48 AM 2/14/2025     9:02 AM 1/15/2025     8:47 AM   Renal   Sodium 135 - 145 mmol/L 134  135  137    K 3.4 - 5.3 mmol/L 4.2  4.3  4.6    Cl 98 - 107 mmol/L 99  98  104    Cl (external) 98 - 107 mmol/L 99  98  104    CO2 22 - 29 mmol/L 25  27  23    Urea Nitrogen 8.0 - 23.0 mg/dL 25.2  28.9  20.7    Creatinine 0.67 - 1.17 mg/dL 1.21  1.29  1.13    Glucose 70 - 99 mg/dL 134  116  73    Calcium 8.8 - 10.4 mg/dL 9.2  9.5  9.4          Latest Ref Rng & Units 10/30/2023     8:55 AM 8/7/2023     8:57 AM 7/31/2023     8:44 AM   Bone Health   Phosphorus 2.5 - 4.5 mg/dL 3.5  3.7  4.1          Latest Ref Rng & Units 3/7/2025     8:48 AM 2/14/2025     9:02 AM 1/15/2025     8:47 AM   Heme   WBC 4.0 - 11.0 10e3/uL 6.2  4.7  6.3    Hgb 13.3 - 17.7 g/dL 10.6  12.2  11.9    Plt 150 - 450 10e3/uL 115  87  116          Latest Ref " Rng & Units 8/28/2023     9:03 AM 4/7/2023     8:10 AM 4/3/2023     8:10 AM   Liver   AP 40 - 129 U/L 88  74  69    TBili <=1.2 mg/dL 0.5  0.7  0.8    Bilirubin Direct 0.00 - 0.30 mg/dL <0.20   0.22    ALT 0 - 70 U/L 17  10  12    AST 0 - 45 U/L 26  12  14    Tot Protein 6.4 - 8.3 g/dL 6.8  6.3  6.1    Albumin 3.5 - 5.0 g/dL  3.7     Albumin 3.5 - 5.2 g/dL 4.6   3.8          Latest Ref Rng & Units 1/15/2025     8:47 AM 7/15/2024     8:57 AM 12/29/2023    10:41 AM   Pancreas   A1C <5.7 % 5.7  7.1  6.5          Latest Ref Rng & Units 10/16/2024     8:50 AM 8/14/2023     9:12 AM 6/26/2023     8:08 AM   Iron studies   Iron 61 - 157 ug/dL 54  40  47    Iron Sat Index 15 - 46 % 20  22  28    Ferritin 31 - 409 ng/mL 53  412  545          Latest Ref Rng & Units 9/25/2023     9:09 AM 7/5/2023     7:43 AM 5/1/2023     7:15 AM   [facility] Txp Virology   EBV DNA COPIES/ML Not Detected copies/mL <500  <500  <500    EBV DNA LOG OF COPIES  <2.7  <2.7  <2.7      Failed to redirect to the Timeline version of the REVFS SmartLink.  Recent Labs   Lab Test 08/28/23  0903 09/05/23  0912 09/11/23  0905   DOSTAC 8/27/2023 9/4/2023 9/10/2023   TACROL 6.9 6.5 3.8*     Recent Labs   Lab Test 02/05/24  0850 02/20/24  0853 03/04/24  0846   DOSMPA 2/4/2024   8:00 PM  --  3/3/2024   8:00 PM   MPACID 1.63 1.47 1.31   MPAG 57.1 50.1 53.7        Patrick Phan MD

## 2025-03-18 NOTE — PROGRESS NOTES
TRANSPLANT NEPHROLOGY CLINIC VISIT     Assessment & Plan   # LDKT: CKD Stage 2 - Stable   - Baseline Creatinine: ~ 1.1-1.3   - Proteinuria: Normal (<0.2 grams)   - DSA Hx: No DSA   - Last cPRA: 28%   - BK Viremia: Yes, detectable, but less than quantifiable   - Kidney Tx Biopsy Hx:No history of acute rejection, Severe vascular changes, and Acute tubular injury .     # Immunosuppression: Cyclosporine (goal ) and Mycophenolate mofetil (dose 500 mg every 12 hours)   - Induction with Recent Transplant:  Intermediate Intensity Protocol   - Continue with intensive monitoring of immunosuppression for efficacy and toxicity.   - Historical Changes in Immunosuppression: Decreased immunosuppression and change from tacrolimus to cyclosporine due to BK viremia.    - Changes: No    # Infection Prevention:   Last CD4 Level:  253 (3/2024)  - PJP: None      - CMV IgG Ab High Risk Discordance (D+/R-) at time of transplant: No  Present CMV Serostatus: Positive  - EBV IgG Ab High Risk Discordance (D+/R-) at time of transplant: No  Present EBV Serostatus: Positive    # Hypertension: Controlled;  Goal BP: < 130/80   - Meds: amlodipine 10mg at bedtime, Coreg 12.5mg BID, hydralazine 25mg BID, losartan 50mg daily   - Changes: Yes - STOP hydralazine, INCREASE losartan to 100mg    # Diabetes: Controlled (HbA1c <7%) Last HbA1c: 5.7%    # Anemia in Chronic Renal Disease: Hgb: Stable, low      NERY: No   - Iron studies: Not checked recently    # Mineral Bone Disorder:    - Vitamin D; level: Not checked recently         On supplement: Yes  - Calcium; level: Normal        On supplement: No    # Electrolytes:   - Potassium; level: Normal        On supplement: No  - Magnesium; level: Normal        On supplement: Yes  - Bicarbonate; level: Normal        On supplement: Yes    # BK Viremia: Trend down, minimal BK PCR at ~ 49 with last check 03/2025.  IgG level is normal.              - Will continue to follow BK PCR every month.    # Other  Significant PMH:              - CAD, s/p PCI: Asymptomatic.  - Asymmetric Left Ventricular Hypertrophy (HOCM): Stable. No history of sustained ventricular arrhythmias.  - Hepatitis B Core Ab +: Patient with core Ab positive, but negative Ag and also negative surface Ab.  Hep B DNA was negative at time of transplant, as well as at 4 months post transplant.  - Peripheral Neuropathy: Slightly improved symptoms on amitriptyline.     # Skin Cancer Risk:    - Discussed sun protection and recommend regular follow up with Dermatology.    # Transplant History:  Etiology of Kidney Failure: Diabetes mellitus type 2  Tx: LDKT  Transplant: 2/21/2023 (Kidney)  Significant transplant-related complications: BK Viremia    Transplant Office Phone Number: [phone]    Assessment and plan was discussed with the patient and he voiced his understanding and agreement.    Return visit: Return in about 6 months (around 9/18/2025).    Lalit Phan MD    The longitudinal plan of care for the diagnosis(es)/condition(s) as documented were addressed during this visit. Due to the added complexity in care, I will continue to support Nhia in the subsequent management and with ongoing continuity of care.    Attestation:  This patient has been seen and evaluated by me, Patrick Phan MD.  I have reviewed the note and agree with plan of care as documented by the fellow.       Chief Complaint   Mr. Barbosa is a 59 year old here for kidney transplant and routine follow up.     History of Present Illness     Mr. Barbosa reports feeling well overall.  Since last clinic visit:   Hospitalizations: No   New Medical Issues: No  Chest pain or shortness of breath: No  Lower extremity swelling: No  Weight change: No  Nausea and vomiting: No  Diarrhea: No  Heartburn symptoms: No  Fever, sweats or chills: No  Night sweats: No  Urinary complaints: No    Home BP:  most often 120-130/70    Problem List   Patient Active Problem List    Diagnosis    Dyslipidemia, goal LDL below 70    HTN, kidney transplant related    Metabolic acidosis    Coronary artery disease involving native coronary artery of native heart without angina pectoris    Thrombocytopenia    Immunosuppressed status    Kidney replaced by transplant    Anemia in chronic renal disease    Hypomagnesemia    Hepatitis B core antibody positive    Aftercare following organ transplant    Vitamin D deficiency    Secondary renal hyperparathyroidism    BK viremia    Peripheral neuropathy    HOCM (hypertrophic obstructive cardiomyopathy) (H)    CKD (chronic kidney disease) stage 2, GFR 60-89 ml/min    Nasal polyposis    Chronic nasal congestion    Diabetes mellitus, type 2 (H)    Allergic fungal sinusitis       Allergies   No Known Allergies    Medications   Current Outpatient Medications   Medication Sig Dispense Refill    acetaminophen (TYLENOL) 325 MG tablet Take 2 tablets (650 mg) by mouth every 4 hours as needed for other (For optimal non-opioid multimodal pain management to improve pain control.) 30 tablet 0    Alcohol Swabs (ALCOHOL PREP) 70 % PADS USE 5X PER  each 3    amitriptyline (ELAVIL) 25 MG tablet Take 1 tablet (25 mg) by mouth at bedtime 90 tablet 1    amLODIPine (NORVASC) 10 MG tablet TAKE ONE TABLET BY MOUTH EVERY NIGHT AT BEDTIME 90 tablet 3    aspirin 81 MG EC tablet Take 1 tablet (81 mg) by mouth daily. 90 tablet 2    atorvastatin (LIPITOR) 20 MG tablet Take 1 tablet (20 mg) by mouth daily 30 tablet 11    blood glucose (NO BRAND SPECIFIED) test strip Use to test blood sugar 3 times daily or as directed. To accompany: Blood Glucose Monitor Brands: per insurance. 200 strip 3    blood glucose monitoring (ONE TOUCH ULTRA 2) meter device kit USE TO TEST BLOOD SUGAR THREE TIMES DAILY OR AS DIRECTED. 1 kit 0    budesonide (PULMICORT) 0.5 MG/2ML neb solution Empty contents of ampule into 240mL of saline solution and rinse both nasal cavities as instructed twice daily. 120 mL  "3    carvedilol (COREG) 25 MG tablet TAKE 0.5 TABLETS BY MOUTH 2 TIMES DAILY (WITH MEALS) 30 tablet 11    clotrimazole-betamethasone (LOTRISONE) 1-0.05 % external cream Apply topically 2 times daily. As needed for dry skin on the feet. 45 g 1    cycloSPORINE modified (GENERIC EQUIVALENT) 25 MG capsule Take 2 capsules (50 mg) by mouth 2 times daily. 360 capsule 3    dupilumab (DUPIXENT) 300 MG/2ML prefilled pen Inject 2 mLs (300 mg) subcutaneously every 14 days. 12 mL 1    fluticasone (FLONASE) 50 MCG/ACT nasal spray Spray 2 sprays into both nostrils daily 9.9 mL 3    Insulin Aspart FlexPen 100 UNIT/ML SOPN INJECT 7 UNITS UNDER THE SKIN BEFORE BREAKFAST AND LUNCH. INJECT 9 UNITS BEFORE DINNER 15 mL 3    insulin glargine (LANTUS PEN) 100 UNIT/ML pen Inject 25 Units subcutaneously at bedtime 15 mL 11    insulin pen needle (BD PEN NEEDLE ORESTES 2ND GEN) 32G X 4 MM miscellaneous Inject Subcutaneous 3 times daily 100 each 11    ipratropium (ATROVENT) 0.06 % nasal spray USE 2 SPRAYS IN EACH NOSTRIL FOUR TIMES DAILY AS NEEDED FOR NASAL CONGESTION 15 mL 0    loratadine (CLARITIN) 10 MG tablet Take 1 tablet (10 mg) by mouth daily as needed for allergies (itchy/rash) 90 tablet 3    losartan (COZAAR) 100 MG tablet Take 1 tablet (100 mg) by mouth daily. 90 tablet 3    magnesium oxide (MAG-OX) 400 MG tablet TAKE ONE TABLET BY MOUTH TWICE A  tablet 0    mycophenolate (GENERIC EQUIVALENT) 250 MG capsule Take 2 capsules (500 mg) by mouth 2 times daily 120 capsule 11    nitroGLYcerin (NITROSTAT) 0.4 MG sublingual tablet One tablet under the tongue every 5 minutes if needed for chest pain. May repeat every 5 minutes for a maximum of 3 doses in 15 minutes\" 25 tablet 3    sodium bicarbonate 650 MG tablet Take 2 tablets (1,300 mg) by mouth 2 times daily 120 tablet 11    sodium chloride (OCEAN) 0.65 % nasal spray Please use in both sides of the nose as needed. 50 mL 1    thin (NO BRAND SPECIFIED) lancets Tests 3x daily Use with " "lanceting device. To accompany: Blood Glucose Monitor Brands: per insurance. 200 each 3    Vitamin D3 (CHOLECALCIFEROL) 25 mcg (1000 units) tablet Take 2 tablets (50 mcg) by mouth daily 60 tablet 11    amoxicillin-clavulanate (AUGMENTIN) 875-125 MG tablet Take 1 tablet by mouth every 12 hours for 14 days. 28 tablet 0     No current facility-administered medications for this visit.     Medications Discontinued During This Encounter   Medication Reason    hydrALAZINE (APRESOLINE) 25 MG tablet     losartan (COZAAR) 25 MG tablet     losartan (COZAAR) 50 MG tablet        Physical Exam   Vital Signs: BP (!) 168/64   Pulse 64   Resp 18   Ht 1.626 m (5' 4\")   Wt 69.4 kg (153 lb)   SpO2 99%   BMI 26.26 kg/m      GENERAL APPEARANCE: alert and no distress  HENT: MMM  RESP: lungs clear to auscultation - no rales, rhonchi or wheezes  CV: regular rhythm, normal rate, no rub, no murmur  EDEMA: no LE edema bilaterally  ABDOMEN: soft, nondistended  SKIN: no rash    Data         Latest Ref Rng & Units 3/7/2025     8:48 AM 2/14/2025     9:02 AM 1/15/2025     8:47 AM   Renal   Sodium 135 - 145 mmol/L 134  135  137    K 3.4 - 5.3 mmol/L 4.2  4.3  4.6    Cl 98 - 107 mmol/L 99  98  104    Cl (external) 98 - 107 mmol/L 99  98  104    CO2 22 - 29 mmol/L 25  27  23    Urea Nitrogen 8.0 - 23.0 mg/dL 25.2  28.9  20.7    Creatinine 0.67 - 1.17 mg/dL 1.21  1.29  1.13    Glucose 70 - 99 mg/dL 134  116  73    Calcium 8.8 - 10.4 mg/dL 9.2  9.5  9.4          Latest Ref Rng & Units 10/30/2023     8:55 AM 8/7/2023     8:57 AM 7/31/2023     8:44 AM   Bone Health   Phosphorus 2.5 - 4.5 mg/dL 3.5  3.7  4.1          Latest Ref Rng & Units 3/7/2025     8:48 AM 2/14/2025     9:02 AM 1/15/2025     8:47 AM   Heme   WBC 4.0 - 11.0 10e3/uL 6.2  4.7  6.3    Hgb 13.3 - 17.7 g/dL 10.6  12.2  11.9    Plt 150 - 450 10e3/uL 115  87  116          Latest Ref Rng & Units 8/28/2023     9:03 AM 4/7/2023     8:10 AM 4/3/2023     8:10 AM   Liver   AP 40 - 129 U/L 88  " 74  69    TBili <=1.2 mg/dL 0.5  0.7  0.8    Bilirubin Direct 0.00 - 0.30 mg/dL <0.20   0.22    ALT 0 - 70 U/L 17  10  12    AST 0 - 45 U/L 26  12  14    Tot Protein 6.4 - 8.3 g/dL 6.8  6.3  6.1    Albumin 3.5 - 5.0 g/dL  3.7     Albumin 3.5 - 5.2 g/dL 4.6   3.8          Latest Ref Rng & Units 1/15/2025     8:47 AM 7/15/2024     8:57 AM 12/29/2023    10:41 AM   Pancreas   A1C <5.7 % 5.7  7.1  6.5          Latest Ref Rng & Units 10/16/2024     8:50 AM 8/14/2023     9:12 AM 6/26/2023     8:08 AM   Iron studies   Iron 61 - 157 ug/dL 54  40  47    Iron Sat Index 15 - 46 % 20  22  28    Ferritin 31 - 409 ng/mL 53  412  545          Latest Ref Rng & Units 9/25/2023     9:09 AM 7/5/2023     7:43 AM 5/1/2023     7:15 AM   [facility] Txp Virology   EBV DNA COPIES/ML Not Detected copies/mL <500  <500  <500    EBV DNA LOG OF COPIES  <2.7  <2.7  <2.7      Failed to redirect to the Timeline version of the REVFS SmartLink.  Recent Labs   Lab Test 08/28/23  0903 09/05/23  0912 09/11/23  0905   DOSTAC 8/27/2023 9/4/2023 9/10/2023   TACROL 6.9 6.5 3.8*     Recent Labs   Lab Test 02/05/24  0850 02/20/24  0853 03/04/24  0846   DOSMPA 2/4/2024   8:00 PM  --  3/3/2024   8:00 PM   MPACID 1.63 1.47 1.31   MPAG 57.1 50.1 53.7

## 2025-03-19 ENCOUNTER — OFFICE VISIT (OUTPATIENT)
Dept: OTOLARYNGOLOGY | Facility: CLINIC | Age: 60
End: 2025-03-19
Payer: COMMERCIAL

## 2025-03-19 ENCOUNTER — LAB (OUTPATIENT)
Dept: LAB | Facility: CLINIC | Age: 60
End: 2025-03-19
Payer: COMMERCIAL

## 2025-03-19 ENCOUNTER — PREP FOR PROCEDURE (OUTPATIENT)
Dept: OTOLARYNGOLOGY | Facility: CLINIC | Age: 60
End: 2025-03-19

## 2025-03-19 VITALS
HEART RATE: 64 BPM | OXYGEN SATURATION: 97 % | HEIGHT: 64 IN | WEIGHT: 153 LBS | SYSTOLIC BLOOD PRESSURE: 153 MMHG | DIASTOLIC BLOOD PRESSURE: 72 MMHG | BODY MASS INDEX: 26.12 KG/M2

## 2025-03-19 DIAGNOSIS — J33.9 NASAL POLYPOSIS: ICD-10-CM

## 2025-03-19 DIAGNOSIS — J33.9 NASAL POLYP: Primary | ICD-10-CM

## 2025-03-19 DIAGNOSIS — Z98.890 S/P SINUS SURGERY: ICD-10-CM

## 2025-03-19 DIAGNOSIS — H66.90 EAR INFECTION: ICD-10-CM

## 2025-03-19 DIAGNOSIS — J33.9 NASAL POLYPOSIS: Primary | ICD-10-CM

## 2025-03-19 PROBLEM — N18.2 CKD (CHRONIC KIDNEY DISEASE) STAGE 2, GFR 60-89 ML/MIN: Status: ACTIVE | Noted: 2024-01-02

## 2025-03-19 PROBLEM — Z29.89 NEED FOR PNEUMOCYSTIS PROPHYLAXIS: Status: RESOLVED | Noted: 2023-05-18 | Resolved: 2025-03-19

## 2025-03-19 PROCEDURE — 82787 IGG 1 2 3 OR 4 EACH: CPT | Performed by: STUDENT IN AN ORGANIZED HEALTH CARE EDUCATION/TRAINING PROGRAM

## 2025-03-19 PROCEDURE — 82784 ASSAY IGA/IGD/IGG/IGM EACH: CPT | Performed by: STUDENT IN AN ORGANIZED HEALTH CARE EDUCATION/TRAINING PROGRAM

## 2025-03-19 PROCEDURE — 82785 ASSAY OF IGE: CPT | Performed by: STUDENT IN AN ORGANIZED HEALTH CARE EDUCATION/TRAINING PROGRAM

## 2025-03-19 PROCEDURE — 99000 SPECIMEN HANDLING OFFICE-LAB: CPT | Performed by: PATHOLOGY

## 2025-03-19 RX ORDER — DEXAMETHASONE SODIUM PHOSPHATE 4 MG/ML
10 INJECTION, SOLUTION INTRA-ARTICULAR; INTRALESIONAL; INTRAMUSCULAR; INTRAVENOUS; SOFT TISSUE ONCE
OUTPATIENT
Start: 2025-03-19 | End: 2025-03-19

## 2025-03-19 RX ORDER — CEFAZOLIN SODIUM 2 G/50ML
2 SOLUTION INTRAVENOUS
OUTPATIENT
Start: 2025-03-19

## 2025-03-19 RX ORDER — CEFAZOLIN SODIUM 2 G/50ML
2 SOLUTION INTRAVENOUS SEE ADMIN INSTRUCTIONS
OUTPATIENT
Start: 2025-03-19

## 2025-03-19 ASSESSMENT — PAIN SCALES - GENERAL: PAINLEVEL_OUTOF10: SEVERE PAIN (8)

## 2025-03-19 NOTE — PROGRESS NOTES
Teaching Flowsheet - ENT   Relevant Diagnosis: nasal polyps  Teaching Topic:Person(s) involved in teaching: pt with        Motivation Level:  Asks Questions:   Yes  Eager to Learn:   Yes  Cooperative:   Yes  Receptive (willing/able to accept information):   Yes  Comments: Reviewed pre-op H and P,  NPO prior to  surgery,  pre-op scrub (given Hibiclens)  Reviewed post-op  cares , activity and pain.     Patient demonstrates understanding of the following:  Reason for the appointment, diagnosis and treatment plan:   Yes  Knowledge of proper use of medications and conditions for which they are ordered (with special attention to potential side effects or drug interactions):  stop aspirin products 1 week before surgery Yes  Which situations necessitate calling provider and whom to contact:   Yes  Nutritional needs and diet plan:   Yes  Pain management techniques:   Yes  Patient instructed on hand hygiene:  Yes  How and/when to access community resources:   Yes     Infection Prevention:  Patient   demonstrates understanding of the following:  Surgical procedure site care taught Yes  Signs and symptoms of infection taught Yes  Wound care taught Yes  Instructional Materials Used/Given: pre- op booklet,verbal instruction.    Gregoria Quintana RN

## 2025-03-19 NOTE — LETTER
3/19/2025       RE: Modesto Barbosa  [address] [place] [address]     Dear Colleague,    Thank you for referring your patient, Modesto Barbosa, to the [facility] [place] at [facility]. Please see a copy of my visit note below.      [facility]  Return Visit  Encounter date:   March 19, 2025    Chief Complaint:   Follow-up    ID: s/p stealth assisted left maxillary antrostomy, total ethmoidectomy, sphenoidotomy, right maxillary antrostomy 8/2/24     Interval History 8/29/24:   Modesto Barbosa is a 58 year old male accompanied by an in-person  who presents for follow up. He is doing well and does not have any complaints.     Interval History 12/5/24:   Modesto Barbosa is a 58 year old male who presents for follow up. Accompanied by an in-person . He feels much better and denies any issues with his nose. Has been doing budesonide rinses. Does notice that he potentially has some worse nasal obstruction.    Interval History 3/19/25:   Modesto Barbosa is a 59 year old male who presents with an  for follow up s/p stealth assisted left maxillary antrostomy, total ethmoidectomy, sphenoidotomy, right maxillary antrostomy 8/2/24. Today, the patient reports that he has some left otalgia and he cannot breathe through his left nostril due to a polyp.Has not noticed much difference with his breathing since starting the dupixent.     [place] [facility]  s/p stealth assisted left maxillary antrostomy, total ethmoidectomy, sphenoidotomy, right maxillary antrostomy 8/2/24 with findings of significant fungal debris in bilateral maxillary sinuses and left ethmoid/sphenoid sinuses.    Review of systems: A 14-point review of systems has been conducted and is negative for any notable symptoms, except as dictated in the history of present illness.     Physical Exam:  Vital signs: BP (!) 153/72 (BP Location: Left  "arm, Patient Position: Sitting, Cuff Size: Adult Regular)   Pulse 64   Ht 1.626 m (5' 4\")   Wt 69.4 kg (153 lb)   SpO2 97%   BMI 26.26 kg/m     General Appearance: No acute distress, appropriate demeanor, conversant  Eyes: moist conjunctivae; EOMI; pupils symmetric; visual acuity grossly intact; no proptosis  Head: normocephalic; overall symmetric appearance without deformity  Face: overall symmetric without deformity  Ears: Normal appearance of external ear  Nose: No external deformity  Oral Cavity/oropharynx: Normal appearance of mucosa  Neck: no palpable lymphadenopathy; thyroid without palpable nodules  Lungs: symmetric chest rise; no wheezing  CV: Good distal perfusion; normal hear rate  Extremities: No deformity  Neurologic Exam: Cranial nerves II-XII are grossly intact      Procedure Note  Procedure performed: Rigid nasal endoscopy  Indication: To evaluate for sinonasal pathology not visualized on routine anterior rhinoscopy  Anesthesia: 4% topical lidocaine with 0.05 % oxymetazoline  Description of procedure: A 30 degree, 3 mm rigid endoscope was inserted into bilateral nasal cavities and the nasal valves, nasal cavity, middle meatus, sphenoethmoid recess, nasopharynx were evaluated for evidence of obstruction, edema, purulence, polyps and/or mass/lesion.     [place]-Kai Endoscopic Scoring System  Endoscopic observation Right Left   Polyps in middle meatus (0 = absent, 1 = restricted to middle meatus, 2 = Beyond middle meatus) 1 2   Discharge (0 = absent, 1 = thin and clear, 2 = thick, purulent) 0 -   Edema (0 = absent, 1 = mild-moderate, 2 = moderate-severe) 0 -   Crusting (0 = absent, 1 = mild-moderate, 2 = moderate-severe) 0 -   Scarring (0= absent, 1 = mild-moderate, 2 = moderate-severe) 0 -   Total 1 2     Findings  RT: improvement in underlying polyposis but still with grade 1 polyposis around the maxillary antrum  LT: persistent significant polyposis completely obstructing left nasal cavity. " Grade 4    The patient tolerated the procedure well without complication.     Assessment/Medical Decision Making:  Modesto Barbosa is a 59 year old male who presents for follow up s/p stealth assisted left maxillary antrostomy, total ethmoidectomy, sphenoidotomy, right maxillary antrostomy 8/2/24. His endoscopy today showed nasal polyposis in both nostrils, and left otitis media was found during his physical exam. Left side is completely blocked    Plan:  Two week course of Augmentin for left otitis media, will cehck with his nephrologists before prescribing   Labs for investigation into nasal polyp recurrence, Will send IgG with subtypes and IgE  Repeat CT scan will plan on repeat sinus surgery. Hopefully if we remove polyps again the Dupixent will prevent recurrence. Will keep him on the budesonide. Discussed that if we start to see them come back will switch to alternative biologic potentially.   I will message his lung transplant physicians to clear him for surgery  I discussed the risks, benefits, and alternatives to endoscopic sinonasal surgery, including but not limited to: pain, bleeding, infection, CSF leak, orbital injury resulting in vision changes and/or vision loss, septal perforation and/or hematoma, dental hypesthesia, facial numbness, need for continued medical management after surgery, and need for additional procedures, among others. He was allowed to ask questions, which I answered to the best of my ability. After this discussion, surgery was elected.     Scribe Disclosure:   I, Taan Rojas, am serving as a scribe; to document services personally performed by Bhaskar Kingston MD -based on data collection and the provider's statements to me.     Provider Disclosure:  I agree with above History, Review of Systems, Physical exam and Plan.  I have reviewed the content of the documentation and have edited it as needed. I have personally performed the services documented here and the documentation accurately  represents those services and the decisions I have made.      Electronically signed by:    Bhaskar Kingston MD    [facility]  Rhinology, Endoscopic Skull Base Surgery  [facility]  Department of Otolaryngology - Head & Neck Surgery    ~~~~~~~~~~~~~~~~~~~~~~~~~~~~~~~~~~~~~~~~~~~~~~~~~~~~~~~~~~~~~~~~~~~~~~~~~~~~~~~~~~~~~~~~~~~~~~~~~~~~~~~~~~~~~~~~~~~~~~~~~~~~~~~~~~~~~~~    Past Medical History:   Diagnosis Date     Acute kidney injury 6/10/2021     CKD (chronic kidney disease) stage 5, GFR less than 15 ml/min (H)      Dyslipidemia      Metabolic acidosis      Type 2 diabetes mellitus (H)         Past Surgical History:   Procedure Laterality Date     BENCH KIDNEY  2/21/2023    Procedure: Bench kidney;  Surgeon: Talha Weinstein MD;  Location: [facility] OR     CV CORONARY ANGIOGRAM N/A 12/6/2021    Procedure: Coronary Angiogram;  Surgeon: Cristela Banks MD;  Location: [facility] CV     CV LEFT HEART CATH N/A 12/6/2021    Procedure: Left Heart Cath;  Surgeon: Cristela Banks MD;  Location: [facility] CV     CV PCI N/A 12/6/2021    Procedure: Percutaneous Coronary Intervention;  Surgeon: Cristela Banks MD;  Location: [facility] CV     IR FINE NEEDLE ASPIRATION W ULTRASOUND  5/3/2023     IR RENAL BIOPSY RIGHT  5/3/2023     OPTICAL TRACKING SYSTEM ENDOSCOPIC SINUS SURGERY Bilateral 8/2/2024    Procedure: stealth assisted left maxillary antrostomy, total ethmoidectomy, sphenoidotomy, right maxillary antrostomy;  Surgeon: Bhaskar Kingston MD;  Location: [facility] OR     REMOVE CATHETER PERITONEAL N/A 2/21/2023    Procedure: Remove catheter peritoneal;  Surgeon: Talha Weinstein MD;  Location: [facility] OR        Family History   Problem Relation Age of Onset     Diabetes Type 2  Mother      Other - See Comments Daughter         granular cell tumor of thigh     Heart Disease Other      Anesthesia Reaction No family hx of      Deep Vein Thrombosis (DVT) No family hx of       Pulmonary Embolism No family hx of         Social History     Socioeconomic History     Marital status: Patient Declined   Tobacco Use     Smoking status: Never     Passive exposure: Never     Smokeless tobacco: Never   Vaping Use     Vaping status: Never Used   Substance and Sexual Activity     Alcohol use: Not Currently     Drug use: Never     Social Drivers of Health     Financial Resource Strain: Low Risk  (12/27/2023)    Financial Resource Strain      Within the past 12 months, have you or your family members you live with been unable to get utilities (heat, electricity) when it was really needed?: No   Food Insecurity: Low Risk  (12/27/2023)    Food Insecurity      Within the past 12 months, did you worry that your food would run out before you got money to buy more?: No      Within the past 12 months, did the food you bought just not last and you didn t have money to get more?: No   Transportation Needs: Low Risk  (12/27/2023)    Transportation Needs      Within the past 12 months, has lack of transportation kept you from medical appointments, getting your medicines, non-medical meetings or appointments, work, or from getting things that you need?: No   Interpersonal Safety: Low Risk  (7/15/2024)    Interpersonal Safety      Do you feel physically and emotionally safe where you currently live?: Yes      Within the past 12 months, have you been hit, slapped, kicked or otherwise physically hurt by someone?: No      Within the past 12 months, have you been humiliated or emotionally abused in other ways by your partner or ex-partner?: No   Housing Stability: Low Risk  (12/27/2023)    Housing Stability      Do you have housing? : Yes      Are you worried about losing your housing?: No          Teaching Flowsheet - ENT   Relevant Diagnosis: nasal polyps  Teaching Topic:Person(s) involved in teaching: pt with        Motivation Level:  Asks Questions:   Yes  Eager to Learn:   Yes  Cooperative:    Yes  Receptive (willing/able to accept information):   Yes  Comments: Reviewed pre-op H and P,  NPO prior to  surgery,  pre-op scrub (given Hibiclens)  Reviewed post-op  cares , activity and pain.     Patient demonstrates understanding of the following:  Reason for the appointment, diagnosis and treatment plan:   Yes  Knowledge of proper use of medications and conditions for which they are ordered (with special attention to potential side effects or drug interactions):  stop aspirin products 1 week before surgery Yes  Which situations necessitate calling provider and whom to contact:   Yes  Nutritional needs and diet plan:   Yes  Pain management techniques:   Yes  Patient instructed on hand hygiene:  Yes  How and/when to access community resources:   Yes     Infection Prevention:  Patient   demonstrates understanding of the following:  Surgical procedure site care taught Yes  Signs and symptoms of infection taught Yes  Wound care taught Yes  Instructional Materials Used/Given: pre- op booklet,verbal instruction.    Gregoria Quintana RN      Again, thank you for allowing me to participate in the care of your patient.      Sincerely,    Bhaskar Kingston MD

## 2025-03-19 NOTE — PROGRESS NOTES
"  [facility]  Return Visit  Encounter date:   March 19, 2025    Chief Complaint:   Follow-up    ID: s/p stealth assisted left maxillary antrostomy, total ethmoidectomy, sphenoidotomy, right maxillary antrostomy 8/2/24     Interval History 8/29/24:   Modesto Barbosa is a 58 year old male accompanied by an in-person  who presents for follow up. He is doing well and does not have any complaints.     Interval History 12/5/24:   Modesto Barbosa is a 58 year old male who presents for follow up. Accompanied by an in-person . He feels much better and denies any issues with his nose. Has been doing budesonide rinses. Does notice that he potentially has some worse nasal obstruction.    Interval History 3/19/25:   Modesto Barbosa is a 59 year old male who presents with an  for follow up s/p stealth assisted left maxillary antrostomy, total ethmoidectomy, sphenoidotomy, right maxillary antrostomy 8/2/24. Today, the patient reports that he has some left otalgia and he cannot breathe through his left nostril due to a polyp.Has not noticed much difference with his breathing since starting the dupixent.     [place] Operative History  s/p stealth assisted left maxillary antrostomy, total ethmoidectomy, sphenoidotomy, right maxillary antrostomy 8/2/24 with findings of significant fungal debris in bilateral maxillary sinuses and left ethmoid/sphenoid sinuses.    Review of systems: A 14-point review of systems has been conducted and is negative for any notable symptoms, except as dictated in the history of present illness.     Physical Exam:  Vital signs: BP (!) 153/72 (BP Location: Left arm, Patient Position: Sitting, Cuff Size: Adult Regular)   Pulse 64   Ht 1.626 m (5' 4\")   Wt 69.4 kg (153 lb)   SpO2 97%   BMI 26.26 kg/m     General Appearance: No acute distress, appropriate demeanor, conversant  Eyes: moist conjunctivae; EOMI; pupils symmetric; visual acuity grossly intact; no " proptosis  Head: normocephalic; overall symmetric appearance without deformity  Face: overall symmetric without deformity  Ears: Normal appearance of external ear  Nose: No external deformity  Oral Cavity/oropharynx: Normal appearance of mucosa  Neck: no palpable lymphadenopathy; thyroid without palpable nodules  Lungs: symmetric chest rise; no wheezing  CV: Good distal perfusion; normal hear rate  Extremities: No deformity  Neurologic Exam: Cranial nerves II-XII are grossly intact      Procedure Note  Procedure performed: Rigid nasal endoscopy  Indication: To evaluate for sinonasal pathology not visualized on routine anterior rhinoscopy  Anesthesia: 4% topical lidocaine with 0.05 % oxymetazoline  Description of procedure: A 30 degree, 3 mm rigid endoscope was inserted into bilateral nasal cavities and the nasal valves, nasal cavity, middle meatus, sphenoethmoid recess, nasopharynx were evaluated for evidence of obstruction, edema, purulence, polyps and/or mass/lesion.     [place]Kai Endoscopic Scoring System  Endoscopic observation Right Left   Polyps in middle meatus (0 = absent, 1 = restricted to middle meatus, 2 = Beyond middle meatus) 1 2   Discharge (0 = absent, 1 = thin and clear, 2 = thick, purulent) 0 -   Edema (0 = absent, 1 = mild-moderate, 2 = moderate-severe) 0 -   Crusting (0 = absent, 1 = mild-moderate, 2 = moderate-severe) 0 -   Scarring (0= absent, 1 = mild-moderate, 2 = moderate-severe) 0 -   Total 1 2     Findings  RT: improvement in underlying polyposis but still with grade 1 polyposis around the maxillary antrum  LT: persistent significant polyposis completely obstructing left nasal cavity. Grade 4    The patient tolerated the procedure well without complication.     Assessment/Medical Decision Making:  Modesto Barbosa is a 59 year old male who presents for follow up s/p stealth assisted left maxillary antrostomy, total ethmoidectomy, sphenoidotomy, right maxillary antrostomy 8/2/24. His  endoscopy today showed nasal polyposis in both nostrils, and left otitis media was found during his physical exam. Left side is completely blocked    Plan:  Two week course of Augmentin for left otitis media, will cehck with his nephrologists before prescribing   Labs for investigation into nasal polyp recurrence, Will send IgG with subtypes and IgE  Repeat CT scan will plan on repeat sinus surgery. Hopefully if we remove polyps again the Dupixent will prevent recurrence. Will keep him on the budesonide. Discussed that if we start to see them come back will switch to alternative biologic potentially.   I will message his lung transplant physicians to clear him for surgery  I discussed the risks, benefits, and alternatives to endoscopic sinonasal surgery, including but not limited to: pain, bleeding, infection, CSF leak, orbital injury resulting in vision changes and/or vision loss, septal perforation and/or hematoma, dental hypesthesia, facial numbness, need for continued medical management after surgery, and need for additional procedures, among others. He was allowed to ask questions, which I answered to the best of my ability. After this discussion, surgery was elected.     Scribe Disclosure:   I, Tana Rojas, am serving as a scribe; to document services personally performed by Bhaskar Kingston MD -based on data collection and the provider's statements to me.     Provider Disclosure:  I agree with above History, Review of Systems, Physical exam and Plan.  I have reviewed the content of the documentation and have edited it as needed. I have personally performed the services documented here and the documentation accurately represents those services and the decisions I have made.      Electronically signed by:    Bhaskar Kingston MD    [facility]  Rhinology, Endoscopic Skull Base Surgery  [facility]  Department of Otolaryngology - Head & Neck  Surgery    ~~~~~~~~~~~~~~~~~~~~~~~~~~~~~~~~~~~~~~~~~~~~~~~~~~~~~~~~~~~~~~~~~~~~~~~~~~~~~~~~~~~~~~~~~~~~~~~~~~~~~~~~~~~~~~~~~~~~~~~~~~~~~~~~~~~~~~~    Past Medical History:   Diagnosis Date    Acute kidney injury 6/10/2021    CKD (chronic kidney disease) stage 5, GFR less than 15 ml/min (H)     Dyslipidemia     Metabolic acidosis     Type 2 diabetes mellitus (H)         Past Surgical History:   Procedure Laterality Date    BENCH KIDNEY  2/21/2023    Procedure: Bench kidney;  Surgeon: Talha Weinstein MD;  Location: [facility] OR    CV CORONARY ANGIOGRAM N/A 12/6/2021    Procedure: Coronary Angiogram;  Surgeon: Cristela Banks MD;  Location: [facility] LAB CV    CV LEFT HEART CATH N/A 12/6/2021    Procedure: Left Heart Cath;  Surgeon: Cristela Banks MD;  Location: [facility] LAB CV    CV PCI N/A 12/6/2021    Procedure: Percutaneous Coronary Intervention;  Surgeon: Cristela Banks MD;  Location: [facility] CV    IR FINE NEEDLE ASPIRATION W ULTRASOUND  5/3/2023    IR RENAL BIOPSY RIGHT  5/3/2023    OPTICAL TRACKING SYSTEM ENDOSCOPIC SINUS SURGERY Bilateral 8/2/2024    Procedure: stealth assisted left maxillary antrostomy, total ethmoidectomy, sphenoidotomy, right maxillary antrostomy;  Surgeon: Bhaskar Kingston MD;  Location: [facility] OR    REMOVE CATHETER PERITONEAL N/A 2/21/2023    Procedure: Remove catheter peritoneal;  Surgeon: Talha Weinstein MD;  Location: [facility] OR        Family History   Problem Relation Age of Onset    Diabetes Type 2  Mother     Other - See Comments Daughter         granular cell tumor of thigh    Heart Disease Other     Anesthesia Reaction No family hx of     Deep Vein Thrombosis (DVT) No family hx of     Pulmonary Embolism No family hx of         Social History     Socioeconomic History    Marital status: Patient Declined   Tobacco Use    Smoking status: Never     Passive exposure: Never    Smokeless tobacco: Never   Vaping Use    Vaping status: Never Used   Substance and Sexual  Activity    Alcohol use: Not Currently    Drug use: Never     Social Drivers of Health     Financial Resource Strain: Low Risk  (12/27/2023)    Financial Resource Strain     Within the past 12 months, have you or your family members you live with been unable to get utilities (heat, electricity) when it was really needed?: No   Food Insecurity: Low Risk  (12/27/2023)    Food Insecurity     Within the past 12 months, did you worry that your food would run out before you got money to buy more?: No     Within the past 12 months, did the food you bought just not last and you didn t have money to get more?: No   Transportation Needs: Low Risk  (12/27/2023)    Transportation Needs     Within the past 12 months, has lack of transportation kept you from medical appointments, getting your medicines, non-medical meetings or appointments, work, or from getting things that you need?: No   Interpersonal Safety: Low Risk  (7/15/2024)    Interpersonal Safety     Do you feel physically and emotionally safe where you currently live?: Yes     Within the past 12 months, have you been hit, slapped, kicked or otherwise physically hurt by someone?: No     Within the past 12 months, have you been humiliated or emotionally abused in other ways by your partner or ex-partner?: No   Housing Stability: Low Risk  (12/27/2023)    Housing Stability     Do you have housing? : Yes     Are you worried about losing your housing?: No

## 2025-03-19 NOTE — PATIENT INSTRUCTIONS
You were seen in the ENT Clinic today by Dr. Kingston. If you have any questions or concerns after your appointment, please contact us (see below)       2.   The following recommendations have been made based upon your appointment today:   -Please obtain labs today.    -Please obtain CT scan prior to surgery.   -Augmentin: Take one tablet by mouth twice daily for 2 weeks.      3.   Plan to return to the ENT clinic after surgery.           How to Contact Us:  Send a [employer] message to your provider. Our team will respond to you via [employer]. Occasionally, we will need to call you to get further information.  For urgent matters (Monday-Friday), call the ENT Clinic: [phone] and speak with a call center team member - they will route your call appropriately.   If you'd like to speak directly with a nurse, please find our contact information below. We do our best to check voicemail frequently throughout the day, and will work to call you back within 1-2 days. For urgent matters, please use the general clinic phone numbers listed above.     Gregoria WINN RN  Direct: [phone]  Neha THACKER LPN  Direct: [phone]         [facility]  Department of Otolaryngology

## 2025-03-19 NOTE — NURSING NOTE
"Chief Complaint   Patient presents with    RECHECK   Blood pressure (!) 153/72, pulse 64, height 1.626 m (5' 4\"), weight 69.4 kg (153 lb), SpO2 97%. Coleman Hooper, EMT    "

## 2025-03-20 LAB — IGE SERPL-ACNC: 13 KU/L (ref 0–114)

## 2025-03-24 ENCOUNTER — APPOINTMENT (OUTPATIENT)
Dept: INTERPRETER SERVICES | Facility: CLINIC | Age: 60
End: 2025-03-24
Payer: COMMERCIAL

## 2025-03-25 NOTE — TELEPHONE ENCOUNTER
FUTURE VISIT INFORMATION      SURGERY INFORMATION:  Date: 25  Location: [facility] OR  Surgeon:  Bhaskar Kingston MD   Anesthesia Type:  General   Procedure: IMAGE GUIDANCE BILATERAL POLYP REMOVAL, REVISION ETHMOIDECTOMY, REVISION MAXILLARY ANTROSTOMY, POSSIBLE REVISION FRONTAL SINUSOTOMY  Consult: 3/19/25    RECORDS REQUESTED FROM:       Primary Care Provider[facility]     Pertinent Medical History:CKDS5; DM2; Dyslipidemia; Hypomagnesemia; Secondary renal hyperparathyroidism; CAD; Hypertension; HOCM; Anemia; Thrombocytopenia     Most recent EKG+ Tracin/15/24    Most recent ECHO: 23    Most recent Cardiac Stress Test: 23    Most recent Coronary Angiogram: 21

## 2025-04-01 ENCOUNTER — ANESTHESIA EVENT (OUTPATIENT)
Dept: SURGERY | Facility: CLINIC | Age: 60
End: 2025-04-01
Payer: COMMERCIAL

## 2025-04-01 ENCOUNTER — PRE VISIT (OUTPATIENT)
Dept: SURGERY | Facility: CLINIC | Age: 60
End: 2025-04-01

## 2025-04-01 ENCOUNTER — OFFICE VISIT (OUTPATIENT)
Dept: SURGERY | Facility: CLINIC | Age: 60
End: 2025-04-01
Attending: STUDENT IN AN ORGANIZED HEALTH CARE EDUCATION/TRAINING PROGRAM
Payer: COMMERCIAL

## 2025-04-01 ENCOUNTER — LAB (OUTPATIENT)
Dept: LAB | Facility: CLINIC | Age: 60
End: 2025-04-01
Payer: COMMERCIAL

## 2025-04-01 VITALS
WEIGHT: 147.4 LBS | SYSTOLIC BLOOD PRESSURE: 150 MMHG | RESPIRATION RATE: 16 BRPM | HEART RATE: 57 BPM | OXYGEN SATURATION: 98 % | TEMPERATURE: 97.9 F | DIASTOLIC BLOOD PRESSURE: 71 MMHG | BODY MASS INDEX: 25.16 KG/M2 | HEIGHT: 64 IN

## 2025-04-01 DIAGNOSIS — Z79.899 ENCOUNTER FOR LONG-TERM CURRENT USE OF MEDICATION: ICD-10-CM

## 2025-04-01 DIAGNOSIS — Z48.298 AFTERCARE FOLLOWING ORGAN TRANSPLANT: ICD-10-CM

## 2025-04-01 DIAGNOSIS — I15.1 HTN, KIDNEY TRANSPLANT RELATED: ICD-10-CM

## 2025-04-01 DIAGNOSIS — B34.8 BK VIREMIA: ICD-10-CM

## 2025-04-01 DIAGNOSIS — Z01.818 PREOP EXAMINATION: ICD-10-CM

## 2025-04-01 DIAGNOSIS — J33.9 NASAL POLYP: ICD-10-CM

## 2025-04-01 DIAGNOSIS — Z94.0 HTN, KIDNEY TRANSPLANT RELATED: ICD-10-CM

## 2025-04-01 DIAGNOSIS — Z94.0 KIDNEY REPLACED BY TRANSPLANT: ICD-10-CM

## 2025-04-01 DIAGNOSIS — Z01.818 PREOP EXAMINATION: Primary | ICD-10-CM

## 2025-04-01 LAB
ALBUMIN MFR UR ELPH: 16 MG/DL
ANION GAP SERPL CALCULATED.3IONS-SCNC: 9 MMOL/L (ref 7–15)
BUN SERPL-MCNC: 33.7 MG/DL (ref 8–23)
CALCIUM SERPL-MCNC: 9.2 MG/DL (ref 8.8–10.4)
CHLORIDE SERPL-SCNC: 107 MMOL/L (ref 98–107)
CREAT SERPL-MCNC: 1.3 MG/DL (ref 0.67–1.17)
CREAT UR-MCNC: 166 MG/DL
CYCLOSPORINE BLD LC/MS/MS-MCNC: 269 UG/L (ref 50–400)
EGFRCR SERPLBLD CKD-EPI 2021: 63 ML/MIN/1.73M2
ERYTHROCYTE [DISTWIDTH] IN BLOOD BY AUTOMATED COUNT: 14.7 % (ref 10–15)
GLUCOSE SERPL-MCNC: 140 MG/DL (ref 70–99)
HCO3 SERPL-SCNC: 23 MMOL/L (ref 22–29)
HCT VFR BLD AUTO: 31.7 % (ref 40–53)
HGB BLD-MCNC: 10.7 G/DL (ref 13.3–17.7)
MCH RBC QN AUTO: 26.5 PG (ref 26.5–33)
MCHC RBC AUTO-ENTMCNC: 33.8 G/DL (ref 31.5–36.5)
MCV RBC AUTO: 79 FL (ref 78–100)
PLATELET # BLD AUTO: 101 10E3/UL (ref 150–450)
POTASSIUM SERPL-SCNC: 4.8 MMOL/L (ref 3.4–5.3)
PROT/CREAT 24H UR: 0.1 MG/MG CR (ref 0–0.2)
RBC # BLD AUTO: 4.04 10E6/UL (ref 4.4–5.9)
SODIUM SERPL-SCNC: 139 MMOL/L (ref 135–145)
TME LAST DOSE: NORMAL H
TME LAST DOSE: NORMAL H
WBC # BLD AUTO: 5.2 10E3/UL (ref 4–11)

## 2025-04-01 PROCEDURE — 85027 COMPLETE CBC AUTOMATED: CPT | Performed by: PATHOLOGY

## 2025-04-01 PROCEDURE — 99000 SPECIMEN HANDLING OFFICE-LAB: CPT | Performed by: PATHOLOGY

## 2025-04-01 PROCEDURE — 84156 ASSAY OF PROTEIN URINE: CPT | Performed by: PATHOLOGY

## 2025-04-01 PROCEDURE — 87799 DETECT AGENT NOS DNA QUANT: CPT | Performed by: INTERNAL MEDICINE

## 2025-04-01 PROCEDURE — 80158 DRUG ASSAY CYCLOSPORINE: CPT | Performed by: INTERNAL MEDICINE

## 2025-04-01 PROCEDURE — 80048 BASIC METABOLIC PNL TOTAL CA: CPT | Performed by: PATHOLOGY

## 2025-04-01 PROCEDURE — 36415 COLL VENOUS BLD VENIPUNCTURE: CPT | Performed by: PATHOLOGY

## 2025-04-01 PROCEDURE — 99203 OFFICE O/P NEW LOW 30 MIN: CPT | Performed by: CLINICAL NURSE SPECIALIST

## 2025-04-01 ASSESSMENT — PAIN SCALES - GENERAL: PAINLEVEL_OUTOF10: NO PAIN (0)

## 2025-04-01 ASSESSMENT — ENCOUNTER SYMPTOMS
DYSRHYTHMIAS: 0
SEIZURES: 0

## 2025-04-01 ASSESSMENT — LIFESTYLE VARIABLES: TOBACCO_USE: 0

## 2025-04-01 NOTE — PATIENT INSTRUCTIONS
Preparing for Your Surgery      Name:  Modesto Barbosa   MRN:  [id number]   :  1965   Today's Date:  2025     The [place] [employer] [address] Project                                Timeline 2025 -2025    This project will affect travel to the [facility] and [facility], as well as the [facility].      Please check the [facility] [address] project website for the most up to date information and give yourself additional time to reach your destination.      Arriving for surgery:  Surgery date:  25, Monday  Arrival time:  5.30AM    Please come to:     Please come to:       [facility]    [address] [place]  [address]     The [facility] ([facility]) [place] Patient/Visitor Ramp is at [address]. Patients and visitors who self-park will receive the reduced hospital parking rate. If the Patient /Visitor Ramp is full, please follow the signs to the [employer] car park located at the [facility] entrance.      [employer] parking is available (24 hours/ 7 days a week)      Discounted parking pass options are available for patients and visitors. They can be purchased at the [employer] desk at the [facility] entrance.     -    Stop at the security desk and they will direct surgery patients to the Surgery Check in and Family Lounge. [phone]        - If you need directions, a wheelchair or an escort please stop at the Information/security desk in the lobby.     What can I eat or drink?  -  You may eat and drink normally up to 8 hours prior to arrival time. (Until 9.30PM)  -  You may have clear liquids until 2 hours prior to arrival time. (Until 3.30AM)    Examples of clear liquids:  Water  Clear broth  Juices (apple, white grape, white cranberry  and cider) without pulp  Noncarbonated, powder based beverages  (lemonade and  Steven-Aid)  Sodas (Sprite, 7-Up, ginger ale and seltzer)  Coffee or tea (without milk or cream)  Gatorade    -  No Alcohol or cannabis products for at least 24 hours before surgery.     Which medicines can I take?    Hold Aspirin for 7 days before surgery.   Hold Multivitamins for 7 days before surgery.  Hold Supplements for 7 days before surgery.  Hold Ibuprofen (Advil, Motrin) for 1 day(s) before surgery--unless otherwise directed by surgeon.  Hold Naproxen (Aleve) for 4 days before surgery.    Take ONLY 20 UNITS of insulin Glargine (Lantus) at bedtime the night before surgery instead of 25 units.     -  DO NOT take these medications the day of surgery:  Continue to hold Aspirin  External cream  All nasal sprays  Short acting insulin Aspart  Magnesium  Sodium bicarbonate  Vitamin D3    -  PLEASE TAKE these medications the day of surgery:  Tylenol as needed  Augmentin and Cyclosporine antibiotic  Carvedilol  Loratadine  Nitroglycerin as needed  Mycophonelate  Bedtime medications Amlodipine, Atorvastatin and Losartan can continue per usual.       How do I prepare myself?  - Please take 2 showers (one the night prior to surgery and one the morning of surgery) using Scrubcare or Hibiclens soap.    Use this soap only from the neck to your toes. Avoid genital area      Leave the soap on your skin for one minute--then rinse thoroughly.      You may use your own shampoo and conditioner. No other hair products.   - Please remove all jewelry and body piercings.  - No lotions, deodorants or fragrance.  - No makeup or fingernail polish.   - Bring your ID and insurance card.    -For patients being admitted to the [facility]  Family members are to take the patient belongings with them and place them in the lockers provided in the Family Lounge.  Please limit the items you bring to 1 bag as the lockers are small.      -If you use a CPAP machine, please bring the CPAP machine, tubing, and mask to hospital.    -If you have  a Deep Brain Stimulator, Spinal Cord Stimulator, or any Neuro Stimulator device---you must bring the remote control to the hospital.      ALL PATIENTS GOING HOME THE SAME DAY OF SURGERY ARE REQUIRED TO HAVE A RESPONSIBLE ADULT TO DRIVE AND BE IN ATTENDANCE WITH THEM FOR 24 HOURS FOLLOWING SURGERY.    Covid testing policy as of 12/06/2022  Your surgeon will notify and schedule you for a COVID test if one is needed before surgery--please direct any questions or COVID symptoms to your surgeon      Questions or Concerns:    - For any questions regarding the day of surgery or your hospital stay, please contact the Pre Admission Nursing Office at [phone].       - If you have health changes between today and your surgery, please call your surgeon.       - For questions after surgery, please call your surgeons office.           Current Visitor Guidelines    2 adult visitors for adult patients in the pre op area    If additional visitors come (beyond a patient care attendant or a group home caregiver), the additional visitors will be asked to wait in the main lobby of the hospital    Visiting hours: 8 a.m. to 8:30 p.m.    Patients confirmed or suspected to have symptoms of COVID 19 or flu:     No visitors allowed for adult patients.   Children (under age 18) can have 1 named visitor.     People who are sick or showing symptoms of COVID 19 or flu:    Are not allowed to visit patients--we can only make exceptions in special situations.       Please follow these guidelines for your visit:          Please maintain social distance          Masking is optional--however at times you may be asked to wear a mask for the safety of yourself and others     Clean your hands with alcohol hand . Do this when you arrive at and leave the building and patient room,    And again after you touch your mask or anything in the room.     Go directly to and from the room you are visiting.     Stay in the patient s room during your  visit. Limit going to other places in the hospital as much as possible     Leave bags and jackets at home or in the car.     For everyone s health, please don t come and go during your visit. That includes for smoking   during your visit.

## 2025-04-01 NOTE — H&P
Pre-Operative H & P     CC:  Preoperative exam to assess for increased cardiopulmonary risk while undergoing surgery and anesthesia.    Date of Encounter: 4/1/2025  Primary Care Physician:  Pal Cooper     Reason for visit:   Encounter Diagnoses   Name Primary?    Nasal polyp     Preop examination Yes       HPI  Modesto Barbosa is a 59 year old male who presents for pre-operative H & P in preparation for  Procedure Information       Case: [id number] Date/Time: 04/07/25 0730    Procedure: Image guidance bilateral polyp removal, revision ethmoidectomy, revision maxillary antrostomy, possible revision frontal sinusotomy (Bilateral: Head)    Anesthesia type: General    Diagnosis: Nasal polyp [J33.9]    Pre-op diagnosis: Nasal polyp [J33.9]    Location:  OR [address] [place] [address] OR    Providers: Bhaskar Kingston MD          History is obtained from the patient via daughter who is interpreting after waiver signed; and chart review    Patient who has been followed by Dr. Kingston and is status post stealth assisted left maxillary antrostomy, total ethmoidectomy, sphenoidotomy, and right maxillary antrostomy on 8/2/2024.  He returned in follow-up on 3/19/2025.  At that time he reported that was having some left otalgia and difficulty breathing through his left nostril due to a polyp.  Endoscopic exam by Dr. Kingston showed nasal polyposis in both nostrils, and left otitis media was found during his physical exam. Augmentin initiated. He was counseled for above procedure.    The patient's medical history is otherwise significant for dyslipidemia, hypertension, hypertrophic obstructive cardiomyopathy, CAD status post SHAVONNE x 1, end-stage renal disease status post kidney transplant on 2/21/2023, anemia due to chronic kidney disease, diabetes mellitus type 2, secondary renal hyperparathyroidism, and peripheral neuropathy.    Hx of abnormal bleeding or anti-platelet use: ASA 81 mg daily      Past Medical History  Past Medical History:   Diagnosis Date     Acute kidney injury 06/10/2021    Anemia in chronic renal disease     CAD (coronary artery disease)     CKD (chronic kidney disease) stage 5, GFR less than 15 ml/min (H)     Dyslipidemia     HOCM (hypertrophic obstructive cardiomyopathy) (H)     Kidney replaced by transplant     Metabolic acidosis     Nasal polyp     Peripheral neuropathy     Secondary renal hyperparathyroidism     Thrombocytopenia     Type 2 diabetes mellitus (H)        Past Surgical History  Past Surgical History:   Procedure Laterality Date    BENCH KIDNEY  2/21/2023    Procedure: Bench kidney;  Surgeon: Talha Weinstein MD;  Location: [facility] OR    CV CORONARY ANGIOGRAM N/A 12/6/2021    Procedure: Coronary Angiogram;  Surgeon: Cristela Banks MD;  Location: [facility] CATH LAB CV    CV LEFT HEART CATH N/A 12/6/2021    Procedure: Left Heart Cath;  Surgeon: Cristela Banks MD;  Location: [facility] LAB CV    CV PCI N/A 12/6/2021    Procedure: Percutaneous Coronary Intervention;  Surgeon: Cristela Banks MD;  Location: [facility] CATH LAB CV    IR FINE NEEDLE ASPIRATION W ULTRASOUND  5/3/2023    IR RENAL BIOPSY RIGHT  5/3/2023    OPTICAL TRACKING SYSTEM ENDOSCOPIC SINUS SURGERY Bilateral 8/2/2024    Procedure: stealth assisted left maxillary antrostomy, total ethmoidectomy, sphenoidotomy, right maxillary antrostomy;  Surgeon: Bhaskar Kingston MD;  Location: [facility] OR    REMOVE CATHETER PERITONEAL N/A 2/21/2023    Procedure: Remove catheter peritoneal;  Surgeon: Talha Weinstein MD;  Location: [facility] OR       Prior to Admission Medications  Current Outpatient Medications   Medication Sig Dispense Refill    acetaminophen (TYLENOL) 325 MG tablet Take 2 tablets (650 mg) by mouth every 4 hours as needed for other (For optimal non-opioid multimodal pain management to improve pain control.) 30 tablet 0    amLODIPine (NORVASC) 10 MG tablet TAKE ONE TABLET BY MOUTH EVERY NIGHT AT BEDTIME 90 tablet 3    amoxicillin-clavulanate (AUGMENTIN) 875-125 MG tablet  Take 1 tablet by mouth every 12 hours for 14 days. 28 tablet 0    aspirin 81 MG EC tablet Take 1 tablet (81 mg) by mouth daily. (Patient taking differently: Take 81 mg by mouth every morning.) 90 tablet 2    atorvastatin (LIPITOR) 20 MG tablet Take 1 tablet (20 mg) by mouth daily (Patient taking differently: Take 20 mg by mouth at bedtime.) 30 tablet 11    budesonide (PULMICORT) 0.5 MG/2ML neb solution Empty contents of ampule into 240mL of saline solution and rinse both nasal cavities as instructed twice daily. 120 mL 3    carvedilol (COREG) 25 MG tablet TAKE 0.5 TABLETS BY MOUTH 2 TIMES DAILY (WITH MEALS) 30 tablet 11    clotrimazole-betamethasone (LOTRISONE) 1-0.05 % external cream Apply topically 2 times daily. As needed for dry skin on the feet. 45 g 1    cycloSPORINE modified (GENERIC EQUIVALENT) 25 MG capsule Take 2 capsules (50 mg) by mouth 2 times daily. 360 capsule 3    dupilumab (DUPIXENT) 300 MG/2ML prefilled pen Inject 2 mLs (300 mg) subcutaneously every 14 days. 12 mL 1    fluticasone (FLONASE) 50 MCG/ACT nasal spray Spray 2 sprays into both nostrils daily (Patient taking differently: Spray 2 sprays into both nostrils as needed.) 9.9 mL 3    Insulin Aspart FlexPen 100 UNIT/ML SOPN INJECT 7 UNITS UNDER THE SKIN BEFORE BREAKFAST AND LUNCH. INJECT 9 UNITS BEFORE DINNER 15 mL 3    insulin glargine (LANTUS PEN) 100 UNIT/ML pen Inject 25 Units subcutaneously at bedtime 15 mL 11    ipratropium (ATROVENT) 0.06 % nasal spray USE 2 SPRAYS IN EACH NOSTRIL FOUR TIMES DAILY AS NEEDED FOR NASAL CONGESTION 15 mL 0    loratadine (CLARITIN) 10 MG tablet Take 1 tablet (10 mg) by mouth daily as needed for allergies (itchy/rash) 90 tablet 3    losartan (COZAAR) 100 MG tablet Take 1 tablet (100 mg) by mouth daily. (Patient taking differently: Take 100 mg by mouth at bedtime.) 90 tablet 3    magnesium oxide (MAG-OX) 400 MG tablet TAKE ONE TABLET BY MOUTH TWICE A  tablet 0    mycophenolate (GENERIC EQUIVALENT) 250 MG  "capsule Take 2 capsules (500 mg) by mouth 2 times daily 120 capsule 11    nitroGLYcerin (NITROSTAT) 0.4 MG sublingual tablet One tablet under the tongue every 5 minutes if needed for chest pain. May repeat every 5 minutes for a maximum of 3 doses in 15 minutes\" 25 tablet 3    sodium bicarbonate 650 MG tablet Take 2 tablets (1,300 mg) by mouth 2 times daily 120 tablet 11    sodium chloride (OCEAN) 0.65 % nasal spray Please use in both sides of the nose as needed. 50 mL 1    Vitamin D3 (CHOLECALCIFEROL) 25 mcg (1000 units) tablet Take 2 tablets (50 mcg) by mouth daily 60 tablet 11    Alcohol Swabs (ALCOHOL PREP) 70 % PADS USE 5X PER  each 3    blood glucose (NO BRAND SPECIFIED) test strip Use to test blood sugar 3 times daily or as directed. To accompany: Blood Glucose Monitor Brands: per insurance. 200 strip 3    blood glucose monitoring (ONE TOUCH ULTRA 2) meter device kit USE TO TEST BLOOD SUGAR THREE TIMES DAILY OR AS DIRECTED. 1 kit 0    insulin pen needle (BD PEN NEEDLE ORESTES 2ND GEN) 32G X 4 MM miscellaneous Inject Subcutaneous 3 times daily 100 each 11    thin (NO BRAND SPECIFIED) lancets Tests 3x daily Use with lanceting device. To accompany: Blood Glucose Monitor Brands: per insurance. 200 each 3       Allergies  No Known Allergies    Social History  Social History     Socioeconomic History    Marital status: Patient Declined     Spouse name: Not on file    Number of children: Not on file    Years of education: Not on file    Highest education level: Not on file   Occupational History    Not on file   Tobacco Use    Smoking status: Never     Passive exposure: Never    Smokeless tobacco: Never   Vaping Use    Vaping status: Never Used   Substance and Sexual Activity    Alcohol use: Not Currently    Drug use: Never    Sexual activity: Not on file   Other Topics Concern    Parent/sibling w/ CABG, MI or angioplasty before 65F 55M? Not Asked   Social History Narrative    Not on file     Social Drivers of " Health     Financial Resource Strain: Low Risk  (12/27/2023)    Financial Resource Strain     Within the past 12 months, have you or your family members you live with been unable to get utilities (heat, electricity) when it was really needed?: No   Food Insecurity: Low Risk  (12/27/2023)    Food Insecurity     Within the past 12 months, did you worry that your food would run out before you got money to buy more?: No     Within the past 12 months, did the food you bought just not last and you didn t have money to get more?: No   Transportation Needs: Low Risk  (12/27/2023)    Transportation Needs     Within the past 12 months, has lack of transportation kept you from medical appointments, getting your medicines, non-medical meetings or appointments, work, or from getting things that you need?: No   Physical Activity: Not on file   Stress: Not on file   Social Connections: Not on file   Interpersonal Safety: Low Risk  (7/15/2024)    Interpersonal Safety     Do you feel physically and emotionally safe where you currently live?: Yes     Within the past 12 months, have you been hit, slapped, kicked or otherwise physically hurt by someone?: No     Within the past 12 months, have you been humiliated or emotionally abused in other ways by your partner or ex-partner?: No   Housing Stability: Low Risk  (12/27/2023)    Housing Stability     Do you have housing? : Yes     Are you worried about losing your housing?: No       Family History  Family History   Problem Relation Age of Onset    Diabetes Type 2  Mother     Other - See Comments Daughter         granular cell tumor of thigh    Heart Disease Other     Anesthesia Reaction No family hx of     Deep Vein Thrombosis (DVT) No family hx of     Pulmonary Embolism No family hx of        Review of Systems  The complete review of systems is negative other than noted in the HPI or here.   Anesthesia Evaluation   Pt has had prior anesthetic. Type: General and MAC.    No history of  "anesthetic complications       ROS/MED HX  ENT/Pulmonary: Comment: Nasal polyposis    (+)     GENEVIEVE risk factors,  hypertension,                              (-) tobacco use and recent URI   Neurologic:     (+)    peripheral neuropathy, - feet.                        (-) no seizures and no CVA   Cardiovascular:     (+) Dyslipidemia hypertension-range: Average self monitoring 120-/130/80s/ -  CAD -  - stent-2021. 1 Drug Eluting Stent. Taking blood thinners Pt has not received instructions: Instructions Given to patient: Planned 7 day hold for surgery. CHF etiology: HOCM Last EF: 55-60% date: 1/25/23                        Previous cardiac testing   Echo: Date: Results:    Stress Test:  Date: 1/25/23 Results:    ECG Reviewed:  Date: 7/15/24 Results:  Sinus bradycardia  Inferior infarct (cited on or before 16-FEB-2023)  Peaked T-waves in the precordial leads  Abnormal ECG  When compared with ECG of 16-FEB-2023 10:06,  ST elevation now present in Lateral leads    Cath:  Date: Results:   (-) RUCKER and arrhythmias   METS/Exercise Tolerance: >4 METS Comment: Rides stationary bike for 20 minutes twice daily   Hematologic: Comments: Thrombocytopenia    (+)      anemia, history of blood transfusion, no previous transfusion reaction,        Musculoskeletal:       GI/Hepatic:       Renal/Genitourinary:     (+) renal disease, type: ESRD and CRI, Pt does not require dialysis,  Pt has history of transplant,         Endo:     (+)  type II DM,                    Psychiatric/Substance Use:  - neg psychiatric ROS  (-) psychiatric history   Infectious Disease:  - neg infectious disease ROS     Malignancy:  - neg malignancy ROS     Other:  - neg other ROS          BP (!) 150/71 (BP Location: Right arm, Patient Position: Sitting, Cuff Size: Adult Regular)   Pulse 57   Temp 97.9  F (36.6  C) (Oral)   Resp 16   Ht 1.626 m (5' 4\")   Wt 66.9 kg (147 lb 6.4 oz)   SpO2 98%   BMI 25.30 kg/m      Physical Exam   Constitutional: Awake, alert, " cooperative, no apparent distress, and appears stated age. Daughter accompanies  Eyes: Pupils equal, round and reactive to light, extra ocular muscles intact, sclera clear, conjunctiva normal.  HENT: Normocephalic, oral pharynx with moist mucus membranes, good dentition. No goiter appreciated.   Respiratory: Clear to auscultation bilaterally, no crackles or wheezing. No cough or obvious dyspnea.  Cardiovascular: Regular rate and rhythm, normal S1 and S2, and no murmur noted.  Carotids +2, no bruits. No edema. Palpable pulses to radial  DP and PT arteries.   GI: Normal bowel sounds, soft, non-distended, non-tender, no masses palpated.  Lymph/Hematologic: No cervical lymphadenopathy and no supraclavicular lymphadenopathy.  Genitourinary:  Deferred.   Skin: Warm and dry.  No rashes at anticipated surgical site.   Musculoskeletal: Full ROM of neck. There is no redness, warmth, or swelling of the joints. Gross motor strength is normal.    Neurologic: Awake, alert, oriented to name, place and time. Cranial nerves II-XII are grossly intact. Gait is normal.   Neuropsychiatric: Calm, cooperative. Normal affect.     Prior Labs/Diagnostic Studies   All labs and imaging personally reviewed     EK/15/24   Sinus bradycardia  Inferior infarct (cited on or before 2023)  Peaked T-waves in the precordial leads  Abnormal ECG  When compared with ECG of 2023 10:06,  ST elevation now present in Lateral leads    Echocardiogram stress test 23  Interpretation Summary  1. Normal stress echocardiogram without evidence of stress induced ischemia.  2. Normal resting LV systolic performance with an ejection fraction of 55-60%.  There is normal improvement in left ventricular systolic performance with a  peak ejection fraction of 70-75%.  3. No ECG evidence of ischemia.  4. No anginal chest pain reported with exercise.  5. Normal functional capacity for age.  6. Hypertensive response to exercise  7. Baseline EKG shows LVH  with systolic anterior motion of the mitral valve  with acceleration of flow through LVOT     Electrocardiogram: Baseline ECG reveals normal sinus rhythm without evidence  of prior myocardial injury. With exercise, there are no significant ECG  changes to suggest ischemia.     Baseline echocardiogram: Technically adequate images were obtained in the  standard quad-screen format. LV systolic performance is normal with a visually  estimated ejection fraction of 55-60%. There is normal regional wall motion.     Postexercise echocardiogram: Technically adequate images were obtained  immediately post exercise in the standard quad-screen format. LV systolic  performance normally improves with a peak ejection fraction of 70-75%. There  are no stress induced regional wall motion abnormalities.    Cardiac catheterization 2021    Conclusion       Patient with ESRD on peritoneal dialysis, undergoing a transplant work up. He also has diabetes, uncontrolled hypertension and hyperlipidemia.    Normal left main.    The LAD has diffuse mild calcification with a mild proximal disease and moderate mid LAD disease. There is no significant diagonal disease.    The circumflex has a very small OM-2, a small OM-2 and a large branching OM-3 with mild disease proximally and a severe stenosis in the larger, more anterior branch. This was stented with a Synergy SHAVONNE with good angiographic results.    Moderate sized RCA with mild proximal disease and moderate disease in both the proximal PDA and right AV groove branch prior to the PL branches.    LV EDP 13 mmhg. LV-Ao pullback gradient 17 mmHg.     Cardiac MRI 2021    Non-contrast Cardiac MRI     1.  Small left ventricular cavity related to hypertrophy with hyperdynamic left ventricular systolic  function.  There is asymmetric anteroseptal hypertrophy with a maximal diameter of 19 mm with evidence of  left ventricular outflow tract obstruction.  There is moderate chordal MINA. The quantified  left ventricular  ejection fraction is 80.7%.    2.  Normal right ventricular size and systolic function.    3.  Aortic valve sclerosis without stenosis.  Mild aortic regurgitation.    4.  Mild mitral regurgitation.           5. Moderate left atrial enlargement.     CT Sinus 6/13/24                                                                      IMPRESSION:  1.  Opacification of the paranasal sinus drainage pathways bilaterally.     2.  Subtotal opacification of the maxillary sinuses.     3.  Evidence for chronic sinonasal inflammatory changes with osteitis left-sided ethmoid air cells and sphenoid sinus and bilateral maxillary sinuses.     4.  Subtotal opacification left ethmoid air cells and left sphenoid sinus.      The patient's records and results personally reviewed by this provider.     Outside records reviewed from: Care Everywhere    LAB/DIAGNOSTIC STUDIES TODAY:    Lab Results   Component Value Date    WBC 5.2 04/01/2025     Lab Results   Component Value Date    RBC 4.04 04/01/2025     Lab Results   Component Value Date    HGB 10.7 04/01/2025     Lab Results   Component Value Date    HCT 31.7 04/01/2025     Lab Results   Component Value Date    MCV 79 04/01/2025     Lab Results   Component Value Date    MCH 26.5 04/01/2025     Lab Results   Component Value Date    MCHC 33.8 04/01/2025     Lab Results   Component Value Date    RDW 14.7 04/01/2025     Lab Results   Component Value Date     04/01/2025     Last Comprehensive Metabolic Panel:  Lab Results   Component Value Date     04/01/2025    POTASSIUM 4.8 04/01/2025    CHLORIDE 107 04/01/2025    CO2 23 04/01/2025    ANIONGAP 9 04/01/2025     (H) 04/01/2025    BUN 33.7 (H) 04/01/2025    CR 1.30 (H) 04/01/2025    GFRESTIMATED 63 04/01/2025    REYMUNDO 9.2 04/01/2025       Assessment    Modesto Barbosa is a 59 year old male seen as a PAC referral for risk assessment and optimization for anesthesia.    Plan/Recommendations  Pt will be optimized  "for the proposed procedure.  See below for details on the assessment, risk, and preoperative recommendations    NEUROLOGY  - No history of TIA, CVA or seizure  Peripheral neuropathy feet  -Post Op delirium risk factors:  High co-morbid index    ENT  - No current airway concerns.  Will need to be reassessed day of surgery.  Mallampati: II  TM: > 3    Upper dentures  Anesthesia records available    CARDIAC  Was evaluated by cardiology on 1/16/2023 prior to transplant.  From notes  \"Asymmetric left ventricular hypertrophy.  Clinically without symptoms and stable.  Previously discussed the importance of screening all first-degree relatives.  He does not appear to have high risk features for cardiovascular sudden death.  No prior history of sustained ventricular arrhythmias with a Holter monitor 1 year ago approximately.  He will continue to monitor for symptoms.     Coronary artery disease.  Coronary angiogram was pursued as part of a renal transplant work-up November 2021.  He was found to have significant stenosis of a larger obtuse marginal branch with successful stenting with a Synergy stent.  He is now 1 year out.  He had moderate disease in the LAD and moderate disease in the right coronary artery and major branches.  He is on Brilinta and aspirin and we are now at a point where we can consider discontinuation of Brilinta.  Plan for an exercise stress echocardiogram given the hospitalization November 2022 and the upcoming requirement for listing for renal transplantation.\"    Normal stress echocardiogram above without evidence of stress-induced ischemia .    Today patient denies chest pain, irregular heart rate, or dyspnea on exertion.  He is riding his stationary bicycle for 20 minutes twice daily without exertional symptoms.    Patient will take atorvastatin and Coreg on day of surgery.  Amlodipine and losartan taken at night.  Planned 7-day hold for aspirin 81 mg    - METS (Metabolic Equivalents)>4    RCRI: " "0.9% risk of serious cardiac events    PULMONARY  Denies asthma, cough or use of inhaler  Patient is taking Augmentin at bedtime  Intermediate risk for GENEVIEVE  - Tobacco History    History   Smoking Status    Never   Smokeless Tobacco    Never       GI: Denies GERD  PONV Low Risk  Total Score: 1           1 AN PONV: Patient is not a current smoker        /RENAL  - Baseline Creatinine  1.30  History of kidney transplant 2/21/2023.  Is followed by transplant team with recent visit and some adjustment of medication.    Will take cyclosporine and mycophenolate on day of surgery    ENDOCRINE    - BMI: Estimated body mass index is 25.3 kg/m  as calculated from the following:    Height as of this encounter: 1.626 m (5' 4\").    Weight as of this encounter: 66.9 kg (147 lb 6.4 oz).  Overweight (BMI 25.0-29.9)  Diabetes mellitus. type II.  Last A1c 5.7.  Patient self monitors blood glucose with range 101-200.  Patient will take 80% of Lantus insulin on the evening before surgery    HEME  VTE Low Risk 0.26%             Total Score: 0      Denies personal or family history of blood clots  Denies personal or family history of bleeding disorder  Denies history of blood transfusion     Hgb 10.7, stable  Platelet count 101 with range  over last 3 months    Different anesthesia methods/types have been discussed with the patient, but they are aware that the final plan will be decided by the assigned anesthesia provider on the date of service.  Patient was discussed with Dr. Lewis    The patient is optimized for their procedure. AVS with information on surgery time/arrival time, meds and NPO status given by nursing staff. No further diagnostic testing indicated.      On the day of service:     Prep time: 15 minutes  Visit time: 13 minutes  Documentation time: 13 minutes  ------------------------------------------  Total time: 41 minutes      STEPHEN Coates CNS  Preoperative Assessment Center  [facility] " [place]  [facility]  Phone: [phone]  Fax: [phone]

## 2025-04-01 NOTE — H&P (VIEW-ONLY)
Pre-Operative H & P     CC:  Preoperative exam to assess for increased cardiopulmonary risk while undergoing surgery and anesthesia.    Date of Encounter: 4/1/2025  Primary Care Physician:  Pal Cooper     Reason for visit:   Encounter Diagnoses   Name Primary?    Nasal polyp     Preop examination Yes       HPI  Modesto Barbosa is a 59 year old male who presents for pre-operative H & P in preparation for  Procedure Information       Case: [id number] Date/Time: 04/07/25 0730    Procedure: Image guidance bilateral polyp removal, revision ethmoidectomy, revision maxillary antrostomy, possible revision frontal sinusotomy (Bilateral: Head)    Anesthesia type: General    Diagnosis: Nasal polyp [J33.9]    Pre-op diagnosis: Nasal polyp [J33.9]    Location:  OR [address] [place] [address] OR    Providers: Bhaskar Kingston MD          History is obtained from the patient via daughter who is interpreting after waiver signed; and chart review    Patient who has been followed by Dr. Kingston and is status post stealth assisted left maxillary antrostomy, total ethmoidectomy, sphenoidotomy, and right maxillary antrostomy on 8/2/2024.  He returned in follow-up on 3/19/2025.  At that time he reported that was having some left otalgia and difficulty breathing through his left nostril due to a polyp.  Endoscopic exam by Dr. Kingston showed nasal polyposis in both nostrils, and left otitis media was found during his physical exam. Augmentin initiated. He was counseled for above procedure.    The patient's medical history is otherwise significant for dyslipidemia, hypertension, hypertrophic obstructive cardiomyopathy, CAD status post SHAVONNE x 1, end-stage renal disease status post kidney transplant on 2/21/2023, anemia due to chronic kidney disease, diabetes mellitus type 2, secondary renal hyperparathyroidism, and peripheral neuropathy.    Hx of abnormal bleeding or anti-platelet use: ASA 81 mg daily      Past Medical History  Past Medical History:   Diagnosis Date     Acute kidney injury 06/10/2021    Anemia in chronic renal disease     CAD (coronary artery disease)     CKD (chronic kidney disease) stage 5, GFR less than 15 ml/min (H)     Dyslipidemia     HOCM (hypertrophic obstructive cardiomyopathy) (H)     Kidney replaced by transplant     Metabolic acidosis     Nasal polyp     Peripheral neuropathy     Secondary renal hyperparathyroidism     Thrombocytopenia     Type 2 diabetes mellitus (H)        Past Surgical History  Past Surgical History:   Procedure Laterality Date    BENCH KIDNEY  2/21/2023    Procedure: Bench kidney;  Surgeon: Talha Weinstein MD;  Location: [facility] OR    CV CORONARY ANGIOGRAM N/A 12/6/2021    Procedure: Coronary Angiogram;  Surgeon: Cristela Banks MD;  Location: [facility] CATH LAB CV    CV LEFT HEART CATH N/A 12/6/2021    Procedure: Left Heart Cath;  Surgeon: Cristela Banks MD;  Location: [facility] LAB CV    CV PCI N/A 12/6/2021    Procedure: Percutaneous Coronary Intervention;  Surgeon: Cristela Banks MD;  Location: [facility] CATH LAB CV    IR FINE NEEDLE ASPIRATION W ULTRASOUND  5/3/2023    IR RENAL BIOPSY RIGHT  5/3/2023    OPTICAL TRACKING SYSTEM ENDOSCOPIC SINUS SURGERY Bilateral 8/2/2024    Procedure: stealth assisted left maxillary antrostomy, total ethmoidectomy, sphenoidotomy, right maxillary antrostomy;  Surgeon: Bhaskar Kingston MD;  Location: [facility] OR    REMOVE CATHETER PERITONEAL N/A 2/21/2023    Procedure: Remove catheter peritoneal;  Surgeon: Talha Weinstein MD;  Location: [facility] OR       Prior to Admission Medications  Current Outpatient Medications   Medication Sig Dispense Refill    acetaminophen (TYLENOL) 325 MG tablet Take 2 tablets (650 mg) by mouth every 4 hours as needed for other (For optimal non-opioid multimodal pain management to improve pain control.) 30 tablet 0    amLODIPine (NORVASC) 10 MG tablet TAKE ONE TABLET BY MOUTH EVERY NIGHT AT BEDTIME 90 tablet 3    amoxicillin-clavulanate (AUGMENTIN) 875-125 MG tablet  Take 1 tablet by mouth every 12 hours for 14 days. 28 tablet 0    aspirin 81 MG EC tablet Take 1 tablet (81 mg) by mouth daily. (Patient taking differently: Take 81 mg by mouth every morning.) 90 tablet 2    atorvastatin (LIPITOR) 20 MG tablet Take 1 tablet (20 mg) by mouth daily (Patient taking differently: Take 20 mg by mouth at bedtime.) 30 tablet 11    budesonide (PULMICORT) 0.5 MG/2ML neb solution Empty contents of ampule into 240mL of saline solution and rinse both nasal cavities as instructed twice daily. 120 mL 3    carvedilol (COREG) 25 MG tablet TAKE 0.5 TABLETS BY MOUTH 2 TIMES DAILY (WITH MEALS) 30 tablet 11    clotrimazole-betamethasone (LOTRISONE) 1-0.05 % external cream Apply topically 2 times daily. As needed for dry skin on the feet. 45 g 1    cycloSPORINE modified (GENERIC EQUIVALENT) 25 MG capsule Take 2 capsules (50 mg) by mouth 2 times daily. 360 capsule 3    dupilumab (DUPIXENT) 300 MG/2ML prefilled pen Inject 2 mLs (300 mg) subcutaneously every 14 days. 12 mL 1    fluticasone (FLONASE) 50 MCG/ACT nasal spray Spray 2 sprays into both nostrils daily (Patient taking differently: Spray 2 sprays into both nostrils as needed.) 9.9 mL 3    Insulin Aspart FlexPen 100 UNIT/ML SOPN INJECT 7 UNITS UNDER THE SKIN BEFORE BREAKFAST AND LUNCH. INJECT 9 UNITS BEFORE DINNER 15 mL 3    insulin glargine (LANTUS PEN) 100 UNIT/ML pen Inject 25 Units subcutaneously at bedtime 15 mL 11    ipratropium (ATROVENT) 0.06 % nasal spray USE 2 SPRAYS IN EACH NOSTRIL FOUR TIMES DAILY AS NEEDED FOR NASAL CONGESTION 15 mL 0    loratadine (CLARITIN) 10 MG tablet Take 1 tablet (10 mg) by mouth daily as needed for allergies (itchy/rash) 90 tablet 3    losartan (COZAAR) 100 MG tablet Take 1 tablet (100 mg) by mouth daily. (Patient taking differently: Take 100 mg by mouth at bedtime.) 90 tablet 3    magnesium oxide (MAG-OX) 400 MG tablet TAKE ONE TABLET BY MOUTH TWICE A  tablet 0    mycophenolate (GENERIC EQUIVALENT) 250 MG  "capsule Take 2 capsules (500 mg) by mouth 2 times daily 120 capsule 11    nitroGLYcerin (NITROSTAT) 0.4 MG sublingual tablet One tablet under the tongue every 5 minutes if needed for chest pain. May repeat every 5 minutes for a maximum of 3 doses in 15 minutes\" 25 tablet 3    sodium bicarbonate 650 MG tablet Take 2 tablets (1,300 mg) by mouth 2 times daily 120 tablet 11    sodium chloride (OCEAN) 0.65 % nasal spray Please use in both sides of the nose as needed. 50 mL 1    Vitamin D3 (CHOLECALCIFEROL) 25 mcg (1000 units) tablet Take 2 tablets (50 mcg) by mouth daily 60 tablet 11    Alcohol Swabs (ALCOHOL PREP) 70 % PADS USE 5X PER  each 3    blood glucose (NO BRAND SPECIFIED) test strip Use to test blood sugar 3 times daily or as directed. To accompany: Blood Glucose Monitor Brands: per insurance. 200 strip 3    blood glucose monitoring (ONE TOUCH ULTRA 2) meter device kit USE TO TEST BLOOD SUGAR THREE TIMES DAILY OR AS DIRECTED. 1 kit 0    insulin pen needle (BD PEN NEEDLE ORESTES 2ND GEN) 32G X 4 MM miscellaneous Inject Subcutaneous 3 times daily 100 each 11    thin (NO BRAND SPECIFIED) lancets Tests 3x daily Use with lanceting device. To accompany: Blood Glucose Monitor Brands: per insurance. 200 each 3       Allergies  No Known Allergies    Social History  Social History     Socioeconomic History    Marital status: Patient Declined     Spouse name: Not on file    Number of children: Not on file    Years of education: Not on file    Highest education level: Not on file   Occupational History    Not on file   Tobacco Use    Smoking status: Never     Passive exposure: Never    Smokeless tobacco: Never   Vaping Use    Vaping status: Never Used   Substance and Sexual Activity    Alcohol use: Not Currently    Drug use: Never    Sexual activity: Not on file   Other Topics Concern    Parent/sibling w/ CABG, MI or angioplasty before 65F 55M? Not Asked   Social History Narrative    Not on file     Social Drivers of " Health     Financial Resource Strain: Low Risk  (12/27/2023)    Financial Resource Strain     Within the past 12 months, have you or your family members you live with been unable to get utilities (heat, electricity) when it was really needed?: No   Food Insecurity: Low Risk  (12/27/2023)    Food Insecurity     Within the past 12 months, did you worry that your food would run out before you got money to buy more?: No     Within the past 12 months, did the food you bought just not last and you didn t have money to get more?: No   Transportation Needs: Low Risk  (12/27/2023)    Transportation Needs     Within the past 12 months, has lack of transportation kept you from medical appointments, getting your medicines, non-medical meetings or appointments, work, or from getting things that you need?: No   Physical Activity: Not on file   Stress: Not on file   Social Connections: Not on file   Interpersonal Safety: Low Risk  (7/15/2024)    Interpersonal Safety     Do you feel physically and emotionally safe where you currently live?: Yes     Within the past 12 months, have you been hit, slapped, kicked or otherwise physically hurt by someone?: No     Within the past 12 months, have you been humiliated or emotionally abused in other ways by your partner or ex-partner?: No   Housing Stability: Low Risk  (12/27/2023)    Housing Stability     Do you have housing? : Yes     Are you worried about losing your housing?: No       Family History  Family History   Problem Relation Age of Onset    Diabetes Type 2  Mother     Other - See Comments Daughter         granular cell tumor of thigh    Heart Disease Other     Anesthesia Reaction No family hx of     Deep Vein Thrombosis (DVT) No family hx of     Pulmonary Embolism No family hx of        Review of Systems  The complete review of systems is negative other than noted in the HPI or here.   Anesthesia Evaluation   Pt has had prior anesthetic. Type: General and MAC.    No history of  "anesthetic complications       ROS/MED HX  ENT/Pulmonary: Comment: Nasal polyposis    (+)     GENEVIEVE risk factors,  hypertension,                              (-) tobacco use and recent URI   Neurologic:     (+)    peripheral neuropathy, - feet.                        (-) no seizures and no CVA   Cardiovascular:     (+) Dyslipidemia hypertension-range: Average self monitoring 120-/130/80s/ -  CAD -  - stent-2021. 1 Drug Eluting Stent. Taking blood thinners Pt has not received instructions: Instructions Given to patient: Planned 7 day hold for surgery. CHF etiology: HOCM Last EF: 55-60% date: 1/25/23                        Previous cardiac testing   Echo: Date: Results:    Stress Test:  Date: 1/25/23 Results:    ECG Reviewed:  Date: 7/15/24 Results:  Sinus bradycardia  Inferior infarct (cited on or before 16-FEB-2023)  Peaked T-waves in the precordial leads  Abnormal ECG  When compared with ECG of 16-FEB-2023 10:06,  ST elevation now present in Lateral leads    Cath:  Date: Results:   (-) RUCKER and arrhythmias   METS/Exercise Tolerance: >4 METS Comment: Rides stationary bike for 20 minutes twice daily   Hematologic: Comments: Thrombocytopenia    (+)      anemia, history of blood transfusion, no previous transfusion reaction,        Musculoskeletal:       GI/Hepatic:       Renal/Genitourinary:     (+) renal disease, type: ESRD and CRI, Pt does not require dialysis,  Pt has history of transplant,         Endo:     (+)  type II DM,                    Psychiatric/Substance Use:  - neg psychiatric ROS  (-) psychiatric history   Infectious Disease:  - neg infectious disease ROS     Malignancy:  - neg malignancy ROS     Other:  - neg other ROS          BP (!) 150/71 (BP Location: Right arm, Patient Position: Sitting, Cuff Size: Adult Regular)   Pulse 57   Temp 97.9  F (36.6  C) (Oral)   Resp 16   Ht 1.626 m (5' 4\")   Wt 66.9 kg (147 lb 6.4 oz)   SpO2 98%   BMI 25.30 kg/m      Physical Exam   Constitutional: Awake, alert, " cooperative, no apparent distress, and appears stated age. Daughter accompanies  Eyes: Pupils equal, round and reactive to light, extra ocular muscles intact, sclera clear, conjunctiva normal.  HENT: Normocephalic, oral pharynx with moist mucus membranes, good dentition. No goiter appreciated.   Respiratory: Clear to auscultation bilaterally, no crackles or wheezing. No cough or obvious dyspnea.  Cardiovascular: Regular rate and rhythm, normal S1 and S2, and no murmur noted.  Carotids +2, no bruits. No edema. Palpable pulses to radial  DP and PT arteries.   GI: Normal bowel sounds, soft, non-distended, non-tender, no masses palpated.  Lymph/Hematologic: No cervical lymphadenopathy and no supraclavicular lymphadenopathy.  Genitourinary:  Deferred.   Skin: Warm and dry.  No rashes at anticipated surgical site.   Musculoskeletal: Full ROM of neck. There is no redness, warmth, or swelling of the joints. Gross motor strength is normal.    Neurologic: Awake, alert, oriented to name, place and time. Cranial nerves II-XII are grossly intact. Gait is normal.   Neuropsychiatric: Calm, cooperative. Normal affect.     Prior Labs/Diagnostic Studies   All labs and imaging personally reviewed     EK/15/24   Sinus bradycardia  Inferior infarct (cited on or before 2023)  Peaked T-waves in the precordial leads  Abnormal ECG  When compared with ECG of 2023 10:06,  ST elevation now present in Lateral leads    Echocardiogram stress test 23  Interpretation Summary  1. Normal stress echocardiogram without evidence of stress induced ischemia.  2. Normal resting LV systolic performance with an ejection fraction of 55-60%.  There is normal improvement in left ventricular systolic performance with a  peak ejection fraction of 70-75%.  3. No ECG evidence of ischemia.  4. No anginal chest pain reported with exercise.  5. Normal functional capacity for age.  6. Hypertensive response to exercise  7. Baseline EKG shows LVH  with systolic anterior motion of the mitral valve  with acceleration of flow through LVOT     Electrocardiogram: Baseline ECG reveals normal sinus rhythm without evidence  of prior myocardial injury. With exercise, there are no significant ECG  changes to suggest ischemia.     Baseline echocardiogram: Technically adequate images were obtained in the  standard quad-screen format. LV systolic performance is normal with a visually  estimated ejection fraction of 55-60%. There is normal regional wall motion.     Postexercise echocardiogram: Technically adequate images were obtained  immediately post exercise in the standard quad-screen format. LV systolic  performance normally improves with a peak ejection fraction of 70-75%. There  are no stress induced regional wall motion abnormalities.    Cardiac catheterization 2021    Conclusion       Patient with ESRD on peritoneal dialysis, undergoing a transplant work up. He also has diabetes, uncontrolled hypertension and hyperlipidemia.    Normal left main.    The LAD has diffuse mild calcification with a mild proximal disease and moderate mid LAD disease. There is no significant diagonal disease.    The circumflex has a very small OM-2, a small OM-2 and a large branching OM-3 with mild disease proximally and a severe stenosis in the larger, more anterior branch. This was stented with a Synergy SHAVONNE with good angiographic results.    Moderate sized RCA with mild proximal disease and moderate disease in both the proximal PDA and right AV groove branch prior to the PL branches.    LV EDP 13 mmhg. LV-Ao pullback gradient 17 mmHg.     Cardiac MRI 2021    Non-contrast Cardiac MRI     1.  Small left ventricular cavity related to hypertrophy with hyperdynamic left ventricular systolic  function.  There is asymmetric anteroseptal hypertrophy with a maximal diameter of 19 mm with evidence of  left ventricular outflow tract obstruction.  There is moderate chordal MINA. The quantified  left ventricular  ejection fraction is 80.7%.    2.  Normal right ventricular size and systolic function.    3.  Aortic valve sclerosis without stenosis.  Mild aortic regurgitation.    4.  Mild mitral regurgitation.           5. Moderate left atrial enlargement.     CT Sinus 6/13/24                                                                      IMPRESSION:  1.  Opacification of the paranasal sinus drainage pathways bilaterally.     2.  Subtotal opacification of the maxillary sinuses.     3.  Evidence for chronic sinonasal inflammatory changes with osteitis left-sided ethmoid air cells and sphenoid sinus and bilateral maxillary sinuses.     4.  Subtotal opacification left ethmoid air cells and left sphenoid sinus.      The patient's records and results personally reviewed by this provider.     Outside records reviewed from: Care Everywhere    LAB/DIAGNOSTIC STUDIES TODAY:    Lab Results   Component Value Date    WBC 5.2 04/01/2025     Lab Results   Component Value Date    RBC 4.04 04/01/2025     Lab Results   Component Value Date    HGB 10.7 04/01/2025     Lab Results   Component Value Date    HCT 31.7 04/01/2025     Lab Results   Component Value Date    MCV 79 04/01/2025     Lab Results   Component Value Date    MCH 26.5 04/01/2025     Lab Results   Component Value Date    MCHC 33.8 04/01/2025     Lab Results   Component Value Date    RDW 14.7 04/01/2025     Lab Results   Component Value Date     04/01/2025     Last Comprehensive Metabolic Panel:  Lab Results   Component Value Date     04/01/2025    POTASSIUM 4.8 04/01/2025    CHLORIDE 107 04/01/2025    CO2 23 04/01/2025    ANIONGAP 9 04/01/2025     (H) 04/01/2025    BUN 33.7 (H) 04/01/2025    CR 1.30 (H) 04/01/2025    GFRESTIMATED 63 04/01/2025    REYMUNDO 9.2 04/01/2025       Assessment    Modesto Barbosa is a 59 year old male seen as a PAC referral for risk assessment and optimization for anesthesia.    Plan/Recommendations  Pt will be optimized  "for the proposed procedure.  See below for details on the assessment, risk, and preoperative recommendations    NEUROLOGY  - No history of TIA, CVA or seizure  Peripheral neuropathy feet  -Post Op delirium risk factors:  High co-morbid index    ENT  - No current airway concerns.  Will need to be reassessed day of surgery.  Mallampati: II  TM: > 3    Upper dentures  Anesthesia records available    CARDIAC  Was evaluated by cardiology on 1/16/2023 prior to transplant.  From notes  \"Asymmetric left ventricular hypertrophy.  Clinically without symptoms and stable.  Previously discussed the importance of screening all first-degree relatives.  He does not appear to have high risk features for cardiovascular sudden death.  No prior history of sustained ventricular arrhythmias with a Holter monitor 1 year ago approximately.  He will continue to monitor for symptoms.     Coronary artery disease.  Coronary angiogram was pursued as part of a renal transplant work-up November 2021.  He was found to have significant stenosis of a larger obtuse marginal branch with successful stenting with a Synergy stent.  He is now 1 year out.  He had moderate disease in the LAD and moderate disease in the right coronary artery and major branches.  He is on Brilinta and aspirin and we are now at a point where we can consider discontinuation of Brilinta.  Plan for an exercise stress echocardiogram given the hospitalization November 2022 and the upcoming requirement for listing for renal transplantation.\"    Normal stress echocardiogram above without evidence of stress-induced ischemia .    Today patient denies chest pain, irregular heart rate, or dyspnea on exertion.  He is riding his stationary bicycle for 20 minutes twice daily without exertional symptoms.    Patient will take atorvastatin and Coreg on day of surgery.  Amlodipine and losartan taken at night.  Planned 7-day hold for aspirin 81 mg    - METS (Metabolic Equivalents)>4    RCRI: " "0.9% risk of serious cardiac events    PULMONARY  Denies asthma, cough or use of inhaler  Patient is taking Augmentin at bedtime  Intermediate risk for GENEVIEVE  - Tobacco History    History   Smoking Status    Never   Smokeless Tobacco    Never       GI: Denies GERD  PONV Low Risk  Total Score: 1           1 AN PONV: Patient is not a current smoker        /RENAL  - Baseline Creatinine  1.30  History of kidney transplant 2/21/2023.  Is followed by transplant team with recent visit and some adjustment of medication.    Will take cyclosporine and mycophenolate on day of surgery    ENDOCRINE    - BMI: Estimated body mass index is 25.3 kg/m  as calculated from the following:    Height as of this encounter: 1.626 m (5' 4\").    Weight as of this encounter: 66.9 kg (147 lb 6.4 oz).  Overweight (BMI 25.0-29.9)  Diabetes mellitus. type II.  Last A1c 5.7.  Patient self monitors blood glucose with range 101-200.  Patient will take 80% of Lantus insulin on the evening before surgery    HEME  VTE Low Risk 0.26%             Total Score: 0      Denies personal or family history of blood clots  Denies personal or family history of bleeding disorder  Denies history of blood transfusion     Hgb 10.7, stable  Platelet count 101 with range  over last 3 months    Different anesthesia methods/types have been discussed with the patient, but they are aware that the final plan will be decided by the assigned anesthesia provider on the date of service.  Patient was discussed with Dr. Lewis    The patient is optimized for their procedure. AVS with information on surgery time/arrival time, meds and NPO status given by nursing staff. No further diagnostic testing indicated.      On the day of service:     Prep time: 15 minutes  Visit time: 13 minutes  Documentation time: 13 minutes  ------------------------------------------  Total time: 41 minutes      STEPHEN Coates CNS  Preoperative Assessment Center  [facility]  Phone: [phone]  Fax: [phone]

## 2025-04-02 LAB
BK VIRUS DNA IU/ML, INSTRUMENT (6800): 33 IU/ML
BK VIRUS SPECIMEN TYPE: ABNORMAL
BKV DNA SPEC NAA+PROBE-LOG#: 1.5 {LOG_COPIES}/ML

## 2025-04-07 ENCOUNTER — OFFICE VISIT (OUTPATIENT)
Dept: INTERPRETER SERVICES | Facility: CLINIC | Age: 60
End: 2025-04-07

## 2025-04-07 ENCOUNTER — ANESTHESIA (OUTPATIENT)
Dept: SURGERY | Facility: CLINIC | Age: 60
End: 2025-04-07
Payer: COMMERCIAL

## 2025-04-07 ENCOUNTER — HOSPITAL ENCOUNTER (OUTPATIENT)
Facility: CLINIC | Age: 60
Discharge: HOME | End: 2025-04-07
Attending: STUDENT IN AN ORGANIZED HEALTH CARE EDUCATION/TRAINING PROGRAM | Admitting: STUDENT IN AN ORGANIZED HEALTH CARE EDUCATION/TRAINING PROGRAM
Payer: COMMERCIAL

## 2025-04-07 ENCOUNTER — HOSPITAL ENCOUNTER (OUTPATIENT)
Dept: CT IMAGING | Facility: CLINIC | Age: 60
Discharge: HOME | End: 2025-04-07
Attending: STUDENT IN AN ORGANIZED HEALTH CARE EDUCATION/TRAINING PROGRAM | Admitting: STUDENT IN AN ORGANIZED HEALTH CARE EDUCATION/TRAINING PROGRAM
Payer: COMMERCIAL

## 2025-04-07 VITALS
RESPIRATION RATE: 16 BRPM | BODY MASS INDEX: 25.52 KG/M2 | HEIGHT: 64 IN | SYSTOLIC BLOOD PRESSURE: 149 MMHG | TEMPERATURE: 97.6 F | OXYGEN SATURATION: 96 % | DIASTOLIC BLOOD PRESSURE: 69 MMHG | HEART RATE: 62 BPM | WEIGHT: 149.47 LBS

## 2025-04-07 DIAGNOSIS — Z98.890 S/P SINUS SURGERY: ICD-10-CM

## 2025-04-07 DIAGNOSIS — J33.9 NASAL POLYPOSIS: ICD-10-CM

## 2025-04-07 LAB — GLUCOSE BLDC GLUCOMTR-MCNC: 113 MG/DL (ref 70–99)

## 2025-04-07 PROCEDURE — 250N000009 HC RX 250: Performed by: NURSE ANESTHETIST, CERTIFIED REGISTERED

## 2025-04-07 PROCEDURE — 82962 GLUCOSE BLOOD TEST: CPT

## 2025-04-07 PROCEDURE — 61782 SCAN PROC CRANIAL EXTRA: CPT | Performed by: STUDENT IN AN ORGANIZED HEALTH CARE EDUCATION/TRAINING PROGRAM

## 2025-04-07 PROCEDURE — 250N000011 HC RX IP 250 OP 636: Performed by: STUDENT IN AN ORGANIZED HEALTH CARE EDUCATION/TRAINING PROGRAM

## 2025-04-07 PROCEDURE — 87102 FUNGUS ISOLATION CULTURE: CPT | Performed by: STUDENT IN AN ORGANIZED HEALTH CARE EDUCATION/TRAINING PROGRAM

## 2025-04-07 PROCEDURE — 31259 NSL/SINS NDSC SPHN TISS RMVL: CPT | Mod: 50 | Performed by: STUDENT IN AN ORGANIZED HEALTH CARE EDUCATION/TRAINING PROGRAM

## 2025-04-07 PROCEDURE — 250N000011 HC RX IP 250 OP 636: Mod: JZ | Performed by: NURSE ANESTHETIST, CERTIFIED REGISTERED

## 2025-04-07 PROCEDURE — 360N000079 HC SURGERY LEVEL 6, PER MIN: Performed by: STUDENT IN AN ORGANIZED HEALTH CARE EDUCATION/TRAINING PROGRAM

## 2025-04-07 PROCEDURE — 710N000012 HC RECOVERY PHASE 2, PER MINUTE: Performed by: STUDENT IN AN ORGANIZED HEALTH CARE EDUCATION/TRAINING PROGRAM

## 2025-04-07 PROCEDURE — 31267 ENDOSCOPY MAXILLARY SINUS: CPT | Mod: 50 | Performed by: STUDENT IN AN ORGANIZED HEALTH CARE EDUCATION/TRAINING PROGRAM

## 2025-04-07 PROCEDURE — 250N000009 HC RX 250: Performed by: STUDENT IN AN ORGANIZED HEALTH CARE EDUCATION/TRAINING PROGRAM

## 2025-04-07 PROCEDURE — 258N000003 HC RX IP 258 OP 636: Performed by: NURSE ANESTHETIST, CERTIFIED REGISTERED

## 2025-04-07 PROCEDURE — 272N000001 HC OR GENERAL SUPPLY STERILE: Performed by: STUDENT IN AN ORGANIZED HEALTH CARE EDUCATION/TRAINING PROGRAM

## 2025-04-07 PROCEDURE — 70486 CT MAXILLOFACIAL W/O DYE: CPT

## 2025-04-07 PROCEDURE — T1013 SIGN LANG/ORAL INTERPRETER: HCPCS | Performed by: INTERPRETER

## 2025-04-07 PROCEDURE — 87075 CULTR BACTERIA EXCEPT BLOOD: CPT | Performed by: STUDENT IN AN ORGANIZED HEALTH CARE EDUCATION/TRAINING PROGRAM

## 2025-04-07 PROCEDURE — 999N000141 HC STATISTIC PRE-PROCEDURE NURSING ASSESSMENT: Performed by: STUDENT IN AN ORGANIZED HEALTH CARE EDUCATION/TRAINING PROGRAM

## 2025-04-07 PROCEDURE — 87070 CULTURE OTHR SPECIMN AEROBIC: CPT | Performed by: STUDENT IN AN ORGANIZED HEALTH CARE EDUCATION/TRAINING PROGRAM

## 2025-04-07 PROCEDURE — 710N000010 HC RECOVERY PHASE 1, LEVEL 2, PER MIN: Performed by: STUDENT IN AN ORGANIZED HEALTH CARE EDUCATION/TRAINING PROGRAM

## 2025-04-07 PROCEDURE — 250N000011 HC RX IP 250 OP 636: Performed by: NURSE ANESTHETIST, CERTIFIED REGISTERED

## 2025-04-07 PROCEDURE — 31276 NSL/SINS NDSC FRNT TISS RMVL: CPT | Mod: 50 | Performed by: STUDENT IN AN ORGANIZED HEALTH CARE EDUCATION/TRAINING PROGRAM

## 2025-04-07 PROCEDURE — 70486 CT MAXILLOFACIAL W/O DYE: CPT | Mod: 26 | Performed by: RADIOLOGY

## 2025-04-07 PROCEDURE — 250N000011 HC RX IP 250 OP 636: Mod: JZ | Performed by: ANESTHESIOLOGY

## 2025-04-07 PROCEDURE — 250N000025 HC SEVOFLURANE, PER MIN: Performed by: STUDENT IN AN ORGANIZED HEALTH CARE EDUCATION/TRAINING PROGRAM

## 2025-04-07 PROCEDURE — 370N000017 HC ANESTHESIA TECHNICAL FEE, PER MIN: Performed by: STUDENT IN AN ORGANIZED HEALTH CARE EDUCATION/TRAINING PROGRAM

## 2025-04-07 RX ORDER — PROPOFOL 10 MG/ML
INJECTION, EMULSION INTRAVENOUS PRN
Status: DISCONTINUED | OUTPATIENT
Start: 2025-04-07 | End: 2025-04-07

## 2025-04-07 RX ORDER — CEFAZOLIN SODIUM/WATER 2 G/20 ML
2 SYRINGE (ML) INTRAVENOUS SEE ADMIN INSTRUCTIONS
Status: DISCONTINUED | OUTPATIENT
Start: 2025-04-07 | End: 2025-04-07 | Stop reason: HOSPADM

## 2025-04-07 RX ORDER — FENTANYL CITRATE 50 UG/ML
25 INJECTION, SOLUTION INTRAMUSCULAR; INTRAVENOUS EVERY 5 MIN PRN
Status: DISCONTINUED | OUTPATIENT
Start: 2025-04-07 | End: 2025-04-07 | Stop reason: HOSPADM

## 2025-04-07 RX ORDER — ONDANSETRON 2 MG/ML
4 INJECTION INTRAMUSCULAR; INTRAVENOUS EVERY 30 MIN PRN
Status: DISCONTINUED | OUTPATIENT
Start: 2025-04-07 | End: 2025-04-07 | Stop reason: HOSPADM

## 2025-04-07 RX ORDER — ONDANSETRON 4 MG/1
4 TABLET, ORALLY DISINTEGRATING ORAL EVERY 30 MIN PRN
Status: CANCELLED | OUTPATIENT
Start: 2025-04-07

## 2025-04-07 RX ORDER — OXYCODONE HYDROCHLORIDE 5 MG/1
5 TABLET ORAL
Status: CANCELLED | OUTPATIENT
Start: 2025-04-07

## 2025-04-07 RX ORDER — FENTANYL CITRATE 50 UG/ML
INJECTION, SOLUTION INTRAMUSCULAR; INTRAVENOUS PRN
Status: DISCONTINUED | OUTPATIENT
Start: 2025-04-07 | End: 2025-04-07

## 2025-04-07 RX ORDER — HYDROMORPHONE HCL IN WATER/PF 6 MG/30 ML
0.2 PATIENT CONTROLLED ANALGESIA SYRINGE INTRAVENOUS EVERY 5 MIN PRN
Status: DISCONTINUED | OUTPATIENT
Start: 2025-04-07 | End: 2025-04-07 | Stop reason: HOSPADM

## 2025-04-07 RX ORDER — SODIUM CHLORIDE, SODIUM LACTATE, POTASSIUM CHLORIDE, CALCIUM CHLORIDE 600; 310; 30; 20 MG/100ML; MG/100ML; MG/100ML; MG/100ML
INJECTION, SOLUTION INTRAVENOUS CONTINUOUS PRN
Status: DISCONTINUED | OUTPATIENT
Start: 2025-04-07 | End: 2025-04-07

## 2025-04-07 RX ORDER — ONDANSETRON 4 MG/1
4 TABLET, ORALLY DISINTEGRATING ORAL EVERY 30 MIN PRN
Status: DISCONTINUED | OUTPATIENT
Start: 2025-04-07 | End: 2025-04-07 | Stop reason: HOSPADM

## 2025-04-07 RX ORDER — LIDOCAINE HYDROCHLORIDE 20 MG/ML
INJECTION, SOLUTION INFILTRATION; PERINEURAL PRN
Status: DISCONTINUED | OUTPATIENT
Start: 2025-04-07 | End: 2025-04-07

## 2025-04-07 RX ORDER — ONDANSETRON 2 MG/ML
INJECTION INTRAMUSCULAR; INTRAVENOUS PRN
Status: DISCONTINUED | OUTPATIENT
Start: 2025-04-07 | End: 2025-04-07

## 2025-04-07 RX ORDER — EPINEPHRINE 1 MG/ML
INJECTION INTRAMUSCULAR; INTRAVENOUS; SUBCUTANEOUS PRN
Status: DISCONTINUED | OUTPATIENT
Start: 2025-04-07 | End: 2025-04-07 | Stop reason: HOSPADM

## 2025-04-07 RX ORDER — NALOXONE HYDROCHLORIDE 0.4 MG/ML
0.1 INJECTION, SOLUTION INTRAMUSCULAR; INTRAVENOUS; SUBCUTANEOUS
Status: DISCONTINUED | OUTPATIENT
Start: 2025-04-07 | End: 2025-04-07 | Stop reason: HOSPADM

## 2025-04-07 RX ORDER — ACETAMINOPHEN 325 MG/1
650 TABLET ORAL
Status: CANCELLED | OUTPATIENT
Start: 2025-04-07

## 2025-04-07 RX ORDER — CEFAZOLIN SODIUM/WATER 2 G/20 ML
2 SYRINGE (ML) INTRAVENOUS
Status: COMPLETED | OUTPATIENT
Start: 2025-04-07 | End: 2025-04-07

## 2025-04-07 RX ORDER — NALOXONE HYDROCHLORIDE 0.4 MG/ML
0.1 INJECTION, SOLUTION INTRAMUSCULAR; INTRAVENOUS; SUBCUTANEOUS
Status: CANCELLED | OUTPATIENT
Start: 2025-04-07

## 2025-04-07 RX ORDER — OXYMETAZOLINE HYDROCHLORIDE 0.05 G/100ML
SPRAY NASAL PRN
Status: DISCONTINUED | OUTPATIENT
Start: 2025-04-07 | End: 2025-04-07 | Stop reason: HOSPADM

## 2025-04-07 RX ORDER — HYDRALAZINE HYDROCHLORIDE 20 MG/ML
2.5-5 INJECTION INTRAMUSCULAR; INTRAVENOUS EVERY 10 MIN PRN
Status: DISCONTINUED | OUTPATIENT
Start: 2025-04-07 | End: 2025-04-07 | Stop reason: HOSPADM

## 2025-04-07 RX ORDER — SODIUM CHLORIDE, SODIUM LACTATE, POTASSIUM CHLORIDE, CALCIUM CHLORIDE 600; 310; 30; 20 MG/100ML; MG/100ML; MG/100ML; MG/100ML
INJECTION, SOLUTION INTRAVENOUS CONTINUOUS
Status: DISCONTINUED | OUTPATIENT
Start: 2025-04-07 | End: 2025-04-07 | Stop reason: HOSPADM

## 2025-04-07 RX ORDER — LABETALOL HYDROCHLORIDE 5 MG/ML
10 INJECTION, SOLUTION INTRAVENOUS
Status: DISCONTINUED | OUTPATIENT
Start: 2025-04-07 | End: 2025-04-07 | Stop reason: HOSPADM

## 2025-04-07 RX ORDER — DEXAMETHASONE SODIUM PHOSPHATE 4 MG/ML
INJECTION, SOLUTION INTRA-ARTICULAR; INTRALESIONAL; INTRAMUSCULAR; INTRAVENOUS; SOFT TISSUE PRN
Status: DISCONTINUED | OUTPATIENT
Start: 2025-04-07 | End: 2025-04-07

## 2025-04-07 RX ORDER — ONDANSETRON 2 MG/ML
4 INJECTION INTRAMUSCULAR; INTRAVENOUS EVERY 30 MIN PRN
Status: CANCELLED | OUTPATIENT
Start: 2025-04-07

## 2025-04-07 RX ORDER — DEXAMETHASONE SODIUM PHOSPHATE 10 MG/ML
10 INJECTION, SOLUTION INTRAMUSCULAR; INTRAVENOUS ONCE
Status: DISCONTINUED | OUTPATIENT
Start: 2025-04-07 | End: 2025-04-07 | Stop reason: HOSPADM

## 2025-04-07 RX ADMIN — FENTANYL CITRATE 100 MCG: 50 INJECTION INTRAMUSCULAR; INTRAVENOUS at 07:39

## 2025-04-07 RX ADMIN — FENTANYL CITRATE 25 MCG: 50 INJECTION, SOLUTION INTRAMUSCULAR; INTRAVENOUS at 11:20

## 2025-04-07 RX ADMIN — LIDOCAINE HYDROCHLORIDE 100 MG: 20 INJECTION, SOLUTION INFILTRATION; PERINEURAL at 07:39

## 2025-04-07 RX ADMIN — FENTANYL CITRATE 50 MCG: 50 INJECTION INTRAMUSCULAR; INTRAVENOUS at 08:37

## 2025-04-07 RX ADMIN — Medication 200 MG: at 10:11

## 2025-04-07 RX ADMIN — Medication 20 MG: at 08:16

## 2025-04-07 RX ADMIN — Medication 2 G: at 07:50

## 2025-04-07 RX ADMIN — FENTANYL CITRATE 25 MCG: 50 INJECTION, SOLUTION INTRAMUSCULAR; INTRAVENOUS at 11:40

## 2025-04-07 RX ADMIN — ONDANSETRON 4 MG: 2 INJECTION INTRAMUSCULAR; INTRAVENOUS at 10:00

## 2025-04-07 RX ADMIN — DEXAMETHASONE SODIUM PHOSPHATE 10 MG: 4 INJECTION, SOLUTION INTRA-ARTICULAR; INTRALESIONAL; INTRAMUSCULAR; INTRAVENOUS; SOFT TISSUE at 07:54

## 2025-04-07 RX ADMIN — Medication 50 MG: at 07:39

## 2025-04-07 RX ADMIN — PROPOFOL 80 MG: 10 INJECTION, EMULSION INTRAVENOUS at 07:39

## 2025-04-07 RX ADMIN — SODIUM CHLORIDE, SODIUM LACTATE, POTASSIUM CHLORIDE, AND CALCIUM CHLORIDE: .6; .31; .03; .02 INJECTION, SOLUTION INTRAVENOUS at 07:54

## 2025-04-07 RX ADMIN — MIDAZOLAM 2 MG: 1 INJECTION INTRAMUSCULAR; INTRAVENOUS at 07:24

## 2025-04-07 RX ADMIN — Medication 20 MG: at 09:09

## 2025-04-07 ASSESSMENT — ACTIVITIES OF DAILY LIVING (ADL)
ADLS_ACUITY_SCORE: 49

## 2025-04-07 ASSESSMENT — LIFESTYLE VARIABLES: TOBACCO_USE: 0

## 2025-04-07 ASSESSMENT — ENCOUNTER SYMPTOMS
SEIZURES: 0
DYSRHYTHMIAS: 0

## 2025-04-07 NOTE — DISCHARGE INSTRUCTIONS
"Dr. Kingston's Surgical Discharge Instructions    1. Start rinsing the nasal cavities with ocean saline spray as needed for congestion and nasal saline rinse 2 or more times daily the day after surgery. It is very important to use the sinus rinses (Neilmed or NettyPot) after surgery to keep the nose clean.   2. Take medications as prescribed.    3. You have possibly been prescribed a narcotic pain medication (oxycodone) to take as needed for pain not controlled with tylenol and a nasal saline spray. An antibiotic will be prescribed if there was evidence of infection during surgery and cultures were obtained at the time of surgery.  If needed we will also send you home on anti-nausea medication  4. If you don't nichol or want to take the narcotic you can alternate tylenol and ibuprofen. Can do these alternating. Take one then then three hours later can take another. Do not take more than 4000 mg of tylenol or 1800 mg of ibuprofen in a day.   5. Do not drive or operate machinery while taking narcotic pain medication.   6. For nasal bleeding that is bothersome or excessive, spray afrin (oxymetazoline) nasal spray (four sprays into each nostril) and pinch the tip of the nose closed for 10 minutes. This can be bought over the counter. If you find you have done this four times in one hour and are still having bothersome bleeding, please call your doctor. Sometimes you will be given a bottle of afrin at the time of discharge.  This was used at the end of your surgery in your own nose, so you will find that the bottle is already opened, and may have some small streaks of blood on the tip of the bottle. Please do not be alarmed, as this was used on you, and you alone!  7. There are no activity restrictions after surgery, but please \"listen to your body\". If you feel like you are doing too much, please reduce your activity level. The one restriction I have is that you avoid excessive nose-blowing, as this can lead to nasal " bleeding.   8. Please call your doctor for any headache not controlled with pain medication, severe nausea or vomiting, fevers >100.4, changes in mental status, or any other concerning symptoms you may identify.      Bhaskar Kingston MD    [facility]  Rhinology  Endoscopic Skull Base Surgery  [facility]  Department of Otolaryngology - Head & Neck Surgery    Contacting your Doctor -   To contact a doctor, call Dr. Kingston [phone]  or:  [phone] and ask for the resident on call for Otolaryngology (answered 24 hours a day)   Emergency Department:  [place]: [phone]  [place]: [phone] 911 if you are in need of immediate or emergent help

## 2025-04-07 NOTE — INTERVAL H&P NOTE
"I have reviewed the surgical (or preoperative) H&P that is linked to this encounter, and examined the patient. There are no significant changes    Clinical Conditions Present on Arrival:  Clinically Significant Risk Factors Present on Admission                  # Drug Induced Platelet Defect: home medication list includes an antiplatelet medication      # Overweight: Estimated body mass index is 25.66 kg/m  as calculated from the following:    Height as of this encounter: 1.626 m (5' 4\").    Weight as of this encounter: 67.8 kg (149 lb 7.6 oz).       "

## 2025-04-07 NOTE — ANESTHESIA PREPROCEDURE EVALUATION
Anesthesia Pre-Procedure Evaluation    Patient: Modesto Barbosa   MRN: [id number] : 1965        Procedure : Procedure(s):  Image guidance bilateral polyp removal, revision ethmoidectomy, revision maxillary antrostomy, possible revision frontal sinusotomy          Past Medical History:   Diagnosis Date     Acute kidney injury 06/10/2021     Anemia in chronic renal disease      CAD (coronary artery disease)      CKD (chronic kidney disease) stage 5, GFR less than 15 ml/min (H)      Dyslipidemia      HOCM (hypertrophic obstructive cardiomyopathy) (H)      Kidney replaced by transplant      Metabolic acidosis      Nasal polyp      Peripheral neuropathy      Secondary renal hyperparathyroidism      Thrombocytopenia      Type 2 diabetes mellitus (H)       Past Surgical History:   Procedure Laterality Date     BENCH KIDNEY  2023    Procedure: Bench kidney;  Surgeon: Talha Weinstein MD;  Location: [facility] OR     CV CORONARY ANGIOGRAM N/A 2021    Procedure: Coronary Angiogram;  Surgeon: Cristela Banks MD;  Location: [facility] CATH LAB CV     CV LEFT HEART CATH N/A 2021    Procedure: Left Heart Cath;  Surgeon: Cristela Banks MD;  Location: [facility] LAB CV     CV PCI N/A 2021    Procedure: Percutaneous Coronary Intervention;  Surgeon: Cristela Banks MD;  Location: [facility] CATH LAB CV     IR FINE NEEDLE ASPIRATION W ULTRASOUND  5/3/2023     IR RENAL BIOPSY RIGHT  5/3/2023     OPTICAL TRACKING SYSTEM ENDOSCOPIC SINUS SURGERY Bilateral 2024    Procedure: stealth assisted left maxillary antrostomy, total ethmoidectomy, sphenoidotomy, right maxillary antrostomy;  Surgeon: Bhaskar Kingston MD;  Location: [facility] OR     REMOVE CATHETER PERITONEAL N/A 2023    Procedure: Remove catheter peritoneal;  Surgeon: Talha Weinstein MD;  Location: [facility] OR      No Known Allergies   Social History     Tobacco Use     Smoking status: Never     Passive exposure: Never     Smokeless tobacco: Never    Substance Use Topics     Alcohol use: Not Currently      Wt Readings from Last 1 Encounters:   04/07/25 67.8 kg (149 lb 7.6 oz)        Anesthesia Evaluation   Pt has had prior anesthetic. Type: General and MAC.    No history of anesthetic complications       ROS/MED HX  ENT/Pulmonary: Comment: Nasal polyposis    (+)     GENEVIEVE risk factors,  hypertension,                              (-) tobacco use and recent URI   Neurologic:     (+)    peripheral neuropathy, - feet.                        (-) no seizures and no CVA   Cardiovascular:     (+) Dyslipidemia hypertension-range: Average self monitoring 120-/130/80s/ -  CAD -  - stent-2021. 1 Drug Eluting Stent. Taking blood thinners Pt has not received instructions: Instructions Given to patient: Planned 7 day hold for surgery. CHF etiology: HOCM Last EF: 55-60% date: 1/25/23                        Previous cardiac testing   Echo: Date: Results:    Stress Test:  Date: 1/25/23 Results:    ECG Reviewed:  Date: 7/15/24 Results:  Sinus bradycardia  Inferior infarct (cited on or before 16-FEB-2023)  Peaked T-waves in the precordial leads  Abnormal ECG  When compared with ECG of 16-FEB-2023 10:06,  ST elevation now present in Lateral leads    Cath:  Date: Results:   (-) RUCKER and arrhythmias   METS/Exercise Tolerance: >4 METS Comment: Rides stationary bike for 20 minutes twice daily   Hematologic: Comments: Thrombocytopenia    (+)      anemia, history of blood transfusion, no previous transfusion reaction,        Musculoskeletal:       GI/Hepatic:     (+) GERD,                   Renal/Genitourinary:     (+) renal disease, type: ESRD and CRI, Pt does not require dialysis,  Pt has history of transplant, date: 2/21/2023,        Endo:     (+) type I DM, type II DM, Last HgA1c: 7.1, date: 7/15/24, Using insulin,                 Psychiatric/Substance Use:  - neg psychiatric ROS  (-) psychiatric history   Infectious Disease:  - neg infectious disease ROS     Malignancy:  - neg  "malignancy ROS     Other:  - neg other ROS          Physical Exam    Airway        Mallampati: I   TM distance: > 3 FB   Neck ROM: full   Mouth opening: > 3 cm    Respiratory Devices and Support         Dental       (+) Modest Abnormalities - crowns, retainers, 1 or 2 missing teeth    B=Bridge, C=Chipped, L=Loose, M=Missing    Cardiovascular   cardiovascular exam normal       Rhythm and rate: regular and normal     Pulmonary   pulmonary exam normal        breath sounds clear to auscultation       OUTSIDE LABS:  CBC:   Lab Results   Component Value Date    WBC 5.2 04/01/2025    WBC 6.2 03/07/2025    HGB 10.7 (L) 04/01/2025    HGB 10.6 (L) 03/07/2025    HCT 31.7 (L) 04/01/2025    HCT 30.1 (L) 03/07/2025     (L) 04/01/2025     (L) 03/07/2025     BMP:   Lab Results   Component Value Date     04/01/2025     (L) 03/07/2025    POTASSIUM 4.8 04/01/2025    POTASSIUM 4.2 03/07/2025    CHLORIDE 107 04/01/2025    CHLORIDE 99 03/07/2025    CO2 23 04/01/2025    CO2 25 03/07/2025    BUN 33.7 (H) 04/01/2025    BUN 25.2 (H) 03/07/2025    CR 1.30 (H) 04/01/2025    CR 1.21 (H) 03/07/2025     (H) 04/07/2025     (H) 04/01/2025     COAGS:   Lab Results   Component Value Date    PTT 42 (H) 11/17/2022    INR 1.16 (H) 05/03/2023     POC: No results found for: \"BGM\", \"HCG\", \"HCGS\"  HEPATIC:   Lab Results   Component Value Date    ALBUMIN 4.6 08/28/2023    PROTTOTAL 6.8 08/28/2023    ALT 17 08/28/2023    AST 26 08/28/2023    ALKPHOS 88 08/28/2023    BILITOTAL 0.5 08/28/2023     OTHER:   Lab Results   Component Value Date    PH 7.41 02/21/2023    LACT 3.8 (H) 02/21/2023    A1C 5.7 (H) 01/15/2025    REYMUNDO 9.2 04/01/2025    PHOS 3.5 10/30/2023    MAG 1.8 10/16/2024    LIPASE 124 (H) 06/10/2021       Anesthesia Plan    ASA Status:  3    NPO Status:  NPO Appropriate    Anesthesia Type: General.     - Airway: ETT   Induction: Intravenous, Propofol.   Maintenance: Balanced.   Techniques and Equipment:     - " "Airway: Video-Laryngoscope     - Lines/Monitors: BIS, 2nd IV (BP/CO monitoring with Clear Sight)     Consents    Anesthesia Plan(s) and associated risks, benefits, and realistic alternatives discussed. Questions answered and patient/representative(s) expressed understanding.     - Discussed: Risks, Benefits and Alternatives for the PROCEDURE were discussed     - Discussed with:  Patient,       - Extended Intubation/Ventilatory Support Discussed: No.      - Patient is DNR/DNI Status: No     Use of blood products discussed: No .     Postoperative Care    Pain management: IV analgesics, Oral pain medications, Multi-modal analgesia.   PONV prophylaxis: Ondansetron (or other 5HT-3), Dexamethasone or Solumedrol, Background Propofol Infusion     Comments:               Jake Kiran MD    Clinically Significant Risk Factors Present on Admission                 # Drug Induced Platelet Defect: home medication list includes an antiplatelet medication   # Hypertension: Noted on problem list           # Overweight: Estimated body mass index is 25.66 kg/m  as calculated from the following:    Height as of this encounter: 1.626 m (5' 4\").    Weight as of this encounter: 67.8 kg (149 lb 7.6 oz).                "

## 2025-04-07 NOTE — ANESTHESIA CARE TRANSFER NOTE
Patient: Modesto Barbosa    Procedure: Procedure(s):  Image guidance bilateral polyp removal, revision ethmoidectomy, revision maxillary antrostomy, revision frontal sinusotomy       Diagnosis: Nasal polyp [J33.9]  Diagnosis Additional Information: No value filed.    Anesthesia Type:   General     Note:    Oropharynx: oropharynx clear of all foreign objects  Level of Consciousness: drowsy  Oxygen Supplementation: face mask    Independent Airway: airway patency satisfactory and stable  Dentition: dentition unchanged  Vital Signs Stable: post-procedure vital signs reviewed and stable  Report to RN Given: handoff report given  Patient transferred to: PACU    Handoff Report: Identifed the Patient, Identified the Reponsible Provider, Reviewed the pertinent medical history, Discussed the surgical course, Reviewed Intra-OP anesthesia mangement and issues during anesthesia, Set expectations for post-procedure period and Allowed opportunity for questions and acknowledgement of understanding  Vitals:  Vitals Value Taken Time   BP     Temp     Pulse     Resp     SpO2 100 % 04/07/25 1033   Vitals shown include unfiled device data.    Electronically Signed By: STEPHEN Valdez CRNA  April 7, 2025  10:33 AM

## 2025-04-07 NOTE — BRIEF OP NOTE
[facility]    Brief Operative Note    Pre-operative diagnosis: Nasal polyp [J33.9]  Post-operative diagnosis Same as pre-operative diagnosis    Procedure: Image guidance bilateral polyp removal, revision ethmoidectomy, revision maxillary antrostomy, revision frontal sinusotomy, Bilateral - Head    Surgeon: Surgeons and Role:     * Bhaskar Kingston MD - Primary  Anesthesia: General   Estimated Blood Loss: 30 mL from 4/7/2025  7:30 AM to 4/7/2025 10:24 AM      Drains: None  Specimens:   ID Type Source Tests Collected by Time Destination   A : Left nasal cavity Wound Nose ANAEROBIC BACTERIAL CULTURE ROUTINE, FUNGAL OR YEAST CULTURE ROUTINE, AEROBIC BACTERIAL CULTURE ROUTINE Bhaskar Kingston MD 4/7/2025  8:14 AM      Findings:   Significant return of polyposis with fungal debris  Complications: None.  Implants: * No implants in log *

## 2025-04-07 NOTE — OP NOTE
DATE OF PROCEDURE:  April 7, 2025   ATTENDING SURGEON: Bhaskar Kingston MD  ASSISTANT SURGEON: none     PREOPERATIVE DIAGNOSES:  1.  Nasal polyposis with findings of polyposis in maxillary, ethmoid and sphenoid sinuses bilaterally.  Evidence of disease in the frontal sinuses.        POSTOPERATIVE DIAGNOSES:  1. As above     PROCEDURES PERFORMED:  1. Bilateral revision endoscopic maxillary antrostomy with tissue removal  2. Bilateral revision endoscopic total sphenoethmoidectomy with tissue removal  3. Bilateral endoscopic frontal sinusotomy  4. Stereotactic image-guided surgery, CPT 23368.     FINDINGS: Significant polyposis in bilateral maxillary, ethmoid, sphenoid cavities.  Scarring of the bilateral frontal outflow tract     ANESTHESIA: General.  EBL: 30 cc.  SPECIMEN: aerobic/anaerobic/fungal culture  COMPLICATIONS: None     INDICATIONS: The patient is a 59 year old male with long-standing nasal polyposis.  Previously underwent bilateral fess for polyp removal but had return of polyps even on steroid rinses and Dupixent.  Decision was made to return to the OR for complete cleanout and additional takedown of bony partitions. The risks and expectations of endoscopic sinus surgery were reviewed with the patient who agreed to proceed.     JUSTIFICATION FOR CPT 82232: Sphenoid & posterior ethmoid surgery, frontal sinus surgery and skull base disease     PROCEDURE:  After informed consent was given, the patient was placed under satisfactory anesthesia by the anesthesiologist. The nose was topically decongested with pledgets moistened with 1:1,000 epinephrine. The bed was rotated, and the patient was prepped and draped in the usual fashion. The patient was identified and a surgical time out was performed.      STEREOTACTIC IMAGE-GUIDED SURGERY: The Fusion navigation device was used to perform stereotactic navigation. The tracking instruments were calibrated appropriately with the headgear, and topical landmarks were  confirmed to assure accuracy. During the case, the navigation probes were used to confirm our location along the skull base.        ENDOSCOPIC SINUS SURGERY: We first began with the 0  nasal endoscope on the left.  The entire nasal cavity was blocked by nasal polyposis.  The straight microdebrider was utilized to carefully remove all of the polyposis present in the middle meatus and into the nasopharynx.  Once this was taken back to the antrum of the maxillary sinus, a curved ball-tipped probe was used to find the previously made opening in the maxillary sinus.  This had been completely obliterated with polyposis.  A straight through cut was used to widen the maxillary antrum to the posterior fontanelle.  And a backbiter was used to remove additional scar and bone along the anterior wall of the maxillary sinus.  Rad 40 and red 60 microdebrider results were then used to completely remove polyposis from within the maxillary sinus.  There was significant fungal elements encountered at this point and meticulous attention was turned towards ensuring complete removal of this.  Curved instrumentation was used to ensure all polyposis was removed from this area and multiple rounds of irrigation was done to ensure all fungal elements were removed.  This was done we turned our attention to the sphenoethmoidectomy portion of the procedure residual ethmoid partitions were visualized and found to have polyposis.  All of this was removed with the curved microdebrider's and bony partitions were carefully removed with up through cuts and Kerrisons.  The previously performed sphenoidotomy had been completely obliterated due to polyposis.  This was carefully opened with the microdebrider and up to Kerrison.  Once the sphenoidotomy had been further opened we encountered more fungal elements.  A down to Kerrison was used to widen the sphenoidotomy inferiorly.  The sphenoid was found to have a deep lateral recess in which there was  significant fungal elements.  The up to Kerrison was used to carefully remove the anterior face of this recess.  This was done while also protecting the vidian canal.  The curved microdebrider was then introduced into this recess and all polyposis was carefully removed.  All fungal elements were then also removed and it was irrigated multiple times.    Residual posterior ethmoid cells were then taken down using a 45  through-cutting forcep and the microdebrider.  Once the posterior ethmoid skull base was identified we switched to the 30  endoscope and curved instrumentation.  We then performed a completion ethmoidectomy from posterior to anterior taking down residual ethmoid septations with a 45  through-cutting forcep and the curved microdebrider.  Areas of significant marshall-osteogenesis were taken down using the Cobra forcep.  Care was taken to avoid injury to the anterior and posterior ethmoid arteries.  Residual agger nasi cells were taken down using the 45  through cut to expose the frontal recess. The natural ostia of the frontal sinus was then identified and intervening frontal sinus cells were taken down with combination of angled giraffe forceps and the Cobra.  Scarring was visualized in the area of the frontal sinus.  The frontal beak was taken down and the frontal sinusotomy was widened to its maximum extent using a frontal mushroom punch and the Hosemann.      The nose was then copiously irrigated and all debris and bone chips were removed.  We then turned our attention to the opposing side where a similar procedure was performed for the maxillary antrostomy with tissue removal, total sphenoethmoidectomy with tissue removal, and frontal sinusotomy.  There was significant polyposis in all sinuses on the right as well along with fungal elements in the maxillary and sphenoid.  Similar to the left this was completely removed and carefully irrigated multiple times.       At the termination of the case,  hemostasis was assured.  PosiSep was placed into bilateral middle meatus and inflated.  All counts were correct times two. An orogastric tube was used to suction gastric and pharyngeal contents. The patient was then returned to the anesthesiologist for awakening.       Bhaskar Kingston MD    [facility]  Rhinology  Endoscopic Skull Base Surgery  [facility]  Department of Otolaryngology - Head & Neck Surgery

## 2025-04-07 NOTE — ANESTHESIA POSTPROCEDURE EVALUATION
Patient: Modesto Barbosa    Procedure: Procedure(s):  Image guidance bilateral polyp removal, revision ethmoidectomy, revision maxillary antrostomy, revision frontal sinusotomy       Anesthesia Type:  General    Note:  Disposition: Outpatient   Postop Pain Control: Uneventful            Sign Out: Well controlled pain   PONV: No   Neuro/Psych: Uneventful            Sign Out: Acceptable/Baseline neuro status   Airway/Respiratory: Uneventful            Sign Out: Acceptable/Baseline resp. status   CV/Hemodynamics: Uneventful            Sign Out: Acceptable CV status; No obvious hypovolemia; No obvious fluid overload   Other NRE: NONE   DID A NON-ROUTINE EVENT OCCUR? No       Last vitals:  Vitals Value Taken Time   /65 04/07/25 1215   Temp 36.6  C (97.8  F) 04/07/25 1030   Pulse 63 04/07/25 1224   Resp 12 04/07/25 1225   SpO2 97 % 04/07/25 1224   Vitals shown include unfiled device data.    Electronically Signed By: Jake Kiran MD  April 7, 2025  12:26 PM

## 2025-04-07 NOTE — ANESTHESIA PROCEDURE NOTES
Airway       Patient location during procedure: OR       Procedure Start/Stop Times: 4/7/2025 7:41 AM  Staff -        CRNA: Terri Pedersen APRN CRNA       Performed By: CRNAIndications and Patient Condition       Indications for airway management: edwige-procedural         Mask difficulty assessment: 1 - vent by mask    Final Airway Details       Final airway type: endotracheal airway       Successful airway: ETT - single  Endotracheal Airway Details        ETT size (mm): 7.5       Cuffed: yes       Successful intubation technique: video laryngoscopy       VL Blade Size: Glidescope 3       Grade View of Cords: 3       Adjucts: stylet       Bite block used: None    Post intubation assessment        Placement verified by: capnometry, equal breath sounds and chest rise        Number of attempts at approach: 1       Number of other approaches attempted: 0       Secured with: tape       Ease of procedure: easy       Dentition: Intact and Unchanged    Medication(s) Administered   Medication Administration Time: 4/7/2025 7:41 AM

## 2025-04-09 ENCOUNTER — TELEPHONE (OUTPATIENT)
Dept: FAMILY MEDICINE | Facility: CLINIC | Age: 60
End: 2025-04-09
Payer: COMMERCIAL

## 2025-04-09 NOTE — TELEPHONE ENCOUNTER
Writer called pt to reschedule 5/12/25 appt. However, pt is requesting to cancel appt at this. Pt will reschedule once Dr. Cooper gives recommendation of which provider to establish with. Please advise?

## 2025-04-10 LAB
BACTERIA WND CULT: ABNORMAL
BACTERIA WND CULT: NORMAL
BACTERIA WND CULT: NORMAL

## 2025-04-11 LAB — BACTERIA WND CULT: NORMAL

## 2025-04-11 NOTE — TELEPHONE ENCOUNTER
Please return call:     -Dr. Cooper states that it has been a pleasure to be a part of your care team.  Thank you for trusting me with your health care. Dr. Cooper recommends that you schedule an appointment to establish care with a new doctor at the clinic. Dr. Cooper would recommend you schedule with Dr. Ford or Dr. Aiken at the [facility]. If you would prefer a [facility] [place], Dr. Cooper would recommend you schedule with Dr. Fay Thompson at [facility].     Pal Cooper MD  [facility] [place] [address]  Phone: [phone]  Fax: [phone]    4/11/2025  6:56 AM

## 2025-04-14 DIAGNOSIS — Z94.0 KIDNEY REPLACED BY TRANSPLANT: ICD-10-CM

## 2025-04-14 DIAGNOSIS — I15.1 HTN, KIDNEY TRANSPLANT RELATED: Primary | ICD-10-CM

## 2025-04-14 DIAGNOSIS — Z94.0 HTN, KIDNEY TRANSPLANT RELATED: Primary | ICD-10-CM

## 2025-04-14 RX ORDER — ATORVASTATIN CALCIUM 20 MG/1
20 TABLET, FILM COATED ORAL AT BEDTIME
Qty: 90 TABLET | Refills: 1 | Status: SHIPPED | OUTPATIENT
Start: 2025-04-14

## 2025-04-14 RX ORDER — VITAMIN B COMPLEX
50 TABLET ORAL DAILY
Qty: 60 TABLET | Refills: 1 | Status: SHIPPED | OUTPATIENT
Start: 2025-04-14

## 2025-04-14 NOTE — PROGRESS NOTES
[facility]  Return Visit  Encounter date:   April 16, 2025    Chief Complaint:   Follow-up    ID: s/p bilateral revision endoscopic maxillary antrostomy with tissue removal, bilateral revision endoscopic total sphenoethmoidectomy with tissue removal, bilateral endoscopic frontal sinusotomy, and stereotactic image-guided surgery 4/7/25    Interval History 8/29/24:   Modesto Barbosa is a 58 year old male accompanied by an in-person  who presents for follow up. He is doing well and does not have any complaints.     Interval History 12/5/24:   Modesto Barbosa is a 58 year old male who presents for follow up. Accompanied by an in-person . He feels much better and denies any issues with his nose. Has been doing budesonide rinses. Does notice that he potentially has some worse nasal obstruction.    Interval History 3/19/25:   Modesto Barbosa is a 59 year old male who presents with an  for follow up s/p stealth assisted left maxillary antrostomy, total ethmoidectomy, sphenoidotomy, right maxillary antrostomy 8/2/24. Today, the patient reports that he has some left otalgia and he cannot breathe through his left nostril due to a polyp.Has not noticed much difference with his breathing since starting the dupixent.     Interval History 4/16/25:   He reports today with an  for a follow-up after surgery.     [facility]  s/p stealth assisted left maxillary antrostomy, total ethmoidectomy, sphenoidotomy, right maxillary antrostomy 8/2/24 with findings of significant fungal debris in bilateral maxillary sinuses and left ethmoid/sphenoid sinuses.    DATE OF PROCEDURE:  April 7, 2025   ATTENDING SURGEON: Bhaskar Kingston MD  ASSISTANT SURGEON: none     PREOPERATIVE DIAGNOSES:  1.  Nasal polyposis with findings of polyposis in maxillary, ethmoid and sphenoid sinuses bilaterally.  Evidence of disease in the frontal sinuses.        POSTOPERATIVE DIAGNOSES:  1. As above      PROCEDURES PERFORMED:  1. Bilateral revision endoscopic maxillary antrostomy with tissue removal  2. Bilateral revision endoscopic total sphenoethmoidectomy with tissue removal  3. Bilateral endoscopic frontal sinusotomy  4. Stereotactic image-guided surgery, CPT 99560.     FINDINGS: Significant polyposis in bilateral maxillary, ethmoid, sphenoid cavities.  Scarring of the bilateral frontal outflow tract    Review of systems: A 14-point review of systems has been conducted and is negative for any notable symptoms, except as dictated in the history of present illness.     Physical Exam:  Vital signs: There were no vitals taken for this visit.   General Appearance: No acute distress, appropriate demeanor, conversant  Eyes: moist conjunctivae; EOMI; pupils symmetric; visual acuity grossly intact; no proptosis  Head: normocephalic; overall symmetric appearance without deformity  Face: overall symmetric without deformity  Ears: Normal appearance of external ear  Nose: No external deformity  Oral Cavity/oropharynx: Normal appearance of mucosa  Neck: no palpable lymphadenopathy; thyroid without palpable nodules  Lungs: symmetric chest rise; no wheezing  CV: Good distal perfusion; normal hear rate  Extremities: No deformity  Neurologic Exam: Cranial nerves II-XII are grossly intact      Procedure Note  Procedure performed: Rigid nasal endoscopy  Indication: To evaluate for sinonasal pathology not visualized on routine anterior rhinoscopy  Anesthesia: 4% topical lidocaine with 0.05 % oxymetazoline  Description of procedure: A 30 degree, 3 mm rigid endoscope was inserted into bilateral nasal cavities and the nasal valves, nasal cavity, middle meatus, sphenoethmoid recess, nasopharynx were evaluated for evidence of obstruction, edema, purulence, polyps and/or mass/lesion.     Findings  RT and LT: significant clot and crusting debrided from bilateral nasal cavities, otherwise healing well without evidence of infection    The  patient tolerated the procedure well without complication.     Imaging Review:  Impression: In comparison to 6/13/2024:     -Bilateral maxillary antrostomy and left sphenoid antrostomy changes. Left sphenoid antrostomy is new.  -Progressed, now complete opacification of the maxillary sinuses.   -Increased polypoid densities in bilateral nasal cavity is larger on the left, new on the right.   -Persistent complete opacification of the left sphenoid locule with polypoid density extending to the left posterior ethmoid air cells, progressed.  -New left complete mastoid ar cell opacification and partial middle ear cavity opacification.    Laboratory Review:  Component      Latest Ref Rng 4/7/2025  6:14 AM   GLUCOSE BY METER POCT      70 - 99 mg/dL 113 (H)       Anaerobic Bacterial Culture Routine:  1+ Normal sawyer    Fungal or Yeast Culture Routine:  No growth after 7 days     Wound Aerobic Bacterial Culture Routine:  2+ Pseudomonas aeruginosa, mucoid strain Abnormal   1+ Pseudomonas aeruginosa, mucoid strain Abnormal   1+ Pseudomonas aeruginosa Abnormal   1+ Proteus mirabilis Abnormal     Assessment/Medical Decision Making:  Modesto Barbosa is a 59 year old male who presents for follow up s/p bilateral revision endoscopic maxillary antrostomy with tissue removal, bilateral revision endoscopic total sphenoethmoidectomy with tissue removal, bilateral endoscopic frontal sinusotomy, and stereotactic image-guided surgery 4/7/25. He is healing well with no signs of infection.    Plan:  Continue sinus rinses BID  Start Prednisone 30 MG for 10 days  Follow-up with me in 2-3 weeks    Scribe Disclosure:   I, Tana Rojas, am serving as a scribe; to document services personally performed by Bhaskar Kingston MD -based on data collection and the provider's statements to me.     Provider Disclosure:  I agree with above History, Review of Systems, Physical exam and Plan.  I have reviewed the content of the documentation and have edited it as  needed. I have personally performed the services documented here and the documentation accurately represents those services and the decisions I have made.      Electronically signed by:    Bhaskar Kingston MD    [facility]  Rhinology, Endoscopic Skull Base Surgery  [facility]  Department of Otolaryngology - Head & Neck Surgery    ~~~~~~~~~~~~~~~~~~~~~~~~~~~~~~~~~~~~~~~~~~~~~~~~~~~~~~~~~~~~~~~~~~~~~~~~~~~~~~~~~~~~~~~~~~~~~~~~~~~~~~~~~~~~~~~~~~~~~~~~~~~~~~~~~~~~~~~    Past Medical History:   Diagnosis Date    Acute kidney injury 06/10/2021    Anemia in chronic renal disease     CAD (coronary artery disease)     CKD (chronic kidney disease) stage 5, GFR less than 15 ml/min (H)     Dyslipidemia     HOCM (hypertrophic obstructive cardiomyopathy) (H)     Kidney replaced by transplant     Metabolic acidosis     Nasal polyp     Peripheral neuropathy     Secondary renal hyperparathyroidism     Thrombocytopenia     Type 2 diabetes mellitus (H)         Past Surgical History:   Procedure Laterality Date    BENCH KIDNEY  2/21/2023    Procedure: Bench kidney;  Surgeon: Talha Weinstein MD;  Location:  OR    CV CORONARY ANGIOGRAM N/A 12/6/2021    Procedure: Coronary Angiogram;  Surgeon: Cristela Banks MD;  Location: [facility] LAB CV    CV LEFT HEART CATH N/A 12/6/2021    Procedure: Left Heart Cath;  Surgeon: Cristela Banks MD;  Location: [facility] LAB CV    CV PCI N/A 12/6/2021    Procedure: Percutaneous Coronary Intervention;  Surgeon: Cristela Banks MD;  Location: [facility] CV    IR FINE NEEDLE ASPIRATION W ULTRASOUND  5/3/2023    IR RENAL BIOPSY RIGHT  5/3/2023    OPTICAL TRACKING SYSTEM ENDOSCOPIC SINUS SURGERY Bilateral 8/2/2024    Procedure: stealth assisted left maxillary antrostomy, total ethmoidectomy, sphenoidotomy, right maxillary antrostomy;  Surgeon: Bhaskar Kingston MD;  Location:  OR    OPTICAL TRACKING SYSTEM ENDOSCOPIC SINUS SURGERY Bilateral  4/7/2025    Procedure: Image guidance bilateral polyp removal, revision ethmoidectomy, revision maxillary antrostomy, revision frontal sinusotomy;  Surgeon: Bhaskar Kingston MD;  Location: [facility] OR    REMOVE CATHETER PERITONEAL N/A 2/21/2023    Procedure: Remove catheter peritoneal;  Surgeon: Talha Weinstein MD;  Location: [facility] OR        Family History   Problem Relation Age of Onset    Diabetes Type 2  Mother     Other - See Comments Daughter         granular cell tumor of thigh    Heart Disease Other     Anesthesia Reaction No family hx of     Deep Vein Thrombosis (DVT) No family hx of     Pulmonary Embolism No family hx of         Social History     Socioeconomic History    Marital status: Patient Declined   Tobacco Use    Smoking status: Never     Passive exposure: Never    Smokeless tobacco: Never   Vaping Use    Vaping status: Never Used   Substance and Sexual Activity    Alcohol use: Not Currently    Drug use: Never     Social Drivers of Health     Financial Resource Strain: Low Risk  (12/27/2023)    Financial Resource Strain     Within the past 12 months, have you or your family members you live with been unable to get utilities (heat, electricity) when it was really needed?: No   Food Insecurity: Low Risk  (12/27/2023)    Food Insecurity     Within the past 12 months, did you worry that your food would run out before you got money to buy more?: No     Within the past 12 months, did the food you bought just not last and you didn t have money to get more?: No   Transportation Needs: Low Risk  (12/27/2023)    Transportation Needs     Within the past 12 months, has lack of transportation kept you from medical appointments, getting your medicines, non-medical meetings or appointments, work, or from getting things that you need?: No   Interpersonal Safety: Low Risk  (4/7/2025)    Interpersonal Safety     Do you feel physically and emotionally safe where you currently live?: Yes     Within the past 12 months, have  you been hit, slapped, kicked or otherwise physically hurt by someone?: No     Within the past 12 months, have you been humiliated or emotionally abused in other ways by your partner or ex-partner?: No   Housing Stability: Low Risk  (12/27/2023)    Housing Stability     Do you have housing? : Yes     Are you worried about losing your housing?: No

## 2025-04-15 ENCOUNTER — LAB (OUTPATIENT)
Dept: LAB | Facility: HOSPITAL | Age: 60
End: 2025-04-15
Payer: COMMERCIAL

## 2025-04-15 DIAGNOSIS — B34.8 BK VIREMIA: ICD-10-CM

## 2025-04-15 DIAGNOSIS — Z94.0 HTN, KIDNEY TRANSPLANT RELATED: ICD-10-CM

## 2025-04-15 DIAGNOSIS — Z48.298 AFTERCARE FOLLOWING ORGAN TRANSPLANT: ICD-10-CM

## 2025-04-15 DIAGNOSIS — Z94.0 KIDNEY REPLACED BY TRANSPLANT: ICD-10-CM

## 2025-04-15 DIAGNOSIS — I15.1 HTN, KIDNEY TRANSPLANT RELATED: ICD-10-CM

## 2025-04-15 DIAGNOSIS — Z79.899 ENCOUNTER FOR LONG-TERM CURRENT USE OF MEDICATION: ICD-10-CM

## 2025-04-15 LAB
ANION GAP SERPL CALCULATED.3IONS-SCNC: 8 MMOL/L (ref 7–15)
BUN SERPL-MCNC: 29.1 MG/DL (ref 8–23)
CALCIUM SERPL-MCNC: 9.4 MG/DL (ref 8.8–10.4)
CHLORIDE SERPL-SCNC: 103 MMOL/L (ref 98–107)
CREAT SERPL-MCNC: 1.55 MG/DL (ref 0.67–1.17)
CYCLOSPORINE BLD LC/MS/MS-MCNC: 75 UG/L (ref 50–400)
EGFRCR SERPLBLD CKD-EPI 2021: 51 ML/MIN/1.73M2
ERYTHROCYTE [DISTWIDTH] IN BLOOD BY AUTOMATED COUNT: 15 % (ref 10–15)
GLUCOSE SERPL-MCNC: 97 MG/DL (ref 70–99)
HCO3 SERPL-SCNC: 29 MMOL/L (ref 22–29)
HCT VFR BLD AUTO: 28.3 % (ref 40–53)
HGB BLD-MCNC: 9.4 G/DL (ref 13.3–17.7)
MCH RBC QN AUTO: 26.6 PG (ref 26.5–33)
MCHC RBC AUTO-ENTMCNC: 33.2 G/DL (ref 31.5–36.5)
MCV RBC AUTO: 80 FL (ref 78–100)
PLATELET # BLD AUTO: 115 10E3/UL (ref 150–450)
POTASSIUM SERPL-SCNC: 4.3 MMOL/L (ref 3.4–5.3)
RBC # BLD AUTO: 3.54 10E6/UL (ref 4.4–5.9)
SODIUM SERPL-SCNC: 140 MMOL/L (ref 135–145)
TME LAST DOSE: NORMAL H
TME LAST DOSE: NORMAL H
WBC # BLD AUTO: 6.6 10E3/UL (ref 4–11)

## 2025-04-15 PROCEDURE — 85014 HEMATOCRIT: CPT

## 2025-04-15 PROCEDURE — 80158 DRUG ASSAY CYCLOSPORINE: CPT

## 2025-04-15 PROCEDURE — 82310 ASSAY OF CALCIUM: CPT

## 2025-04-15 PROCEDURE — 80048 BASIC METABOLIC PNL TOTAL CA: CPT

## 2025-04-15 PROCEDURE — 36415 COLL VENOUS BLD VENIPUNCTURE: CPT

## 2025-04-15 PROCEDURE — 87799 DETECT AGENT NOS DNA QUANT: CPT

## 2025-04-16 ENCOUNTER — OFFICE VISIT (OUTPATIENT)
Dept: OTOLARYNGOLOGY | Facility: CLINIC | Age: 60
End: 2025-04-16
Payer: COMMERCIAL

## 2025-04-16 VITALS
HEIGHT: 64 IN | SYSTOLIC BLOOD PRESSURE: 127 MMHG | OXYGEN SATURATION: 99 % | DIASTOLIC BLOOD PRESSURE: 66 MMHG | BODY MASS INDEX: 25.66 KG/M2 | HEART RATE: 62 BPM

## 2025-04-16 DIAGNOSIS — J30.89 ALLERGIC FUNGAL SINUSITIS: Primary | ICD-10-CM

## 2025-04-16 DIAGNOSIS — J33.9 NASAL POLYP: ICD-10-CM

## 2025-04-16 DIAGNOSIS — B49 ALLERGIC FUNGAL SINUSITIS: Primary | ICD-10-CM

## 2025-04-16 LAB
BK VIRUS DNA IU/ML, INSTRUMENT (6800): 58 IU/ML
BK VIRUS SPECIMEN TYPE: ABNORMAL
BKV DNA SPEC NAA+PROBE-LOG#: 1.8 {LOG_COPIES}/ML

## 2025-04-16 RX ORDER — PREDNISONE 10 MG/1
30 TABLET ORAL DAILY
Qty: 30 TABLET | Refills: 0 | Status: SHIPPED | OUTPATIENT
Start: 2025-04-16 | End: 2025-04-26

## 2025-04-16 ASSESSMENT — PAIN SCALES - GENERAL: PAINLEVEL_OUTOF10: NO PAIN (0)

## 2025-04-16 NOTE — PATIENT INSTRUCTIONS
You were seen in the ENT Clinic today by Dr. Kingston. If you have any questions or concerns after your appointment, please contact us (see below)       2.   The following recommendations have been made based upon your appointment today:  Take prednisone as directed.      3.   Plan to return to the ENT clinic on April 30th at 2:20 pm           How to Contact Us:  Send a OnePIN message to your provider. Our team will respond to you via OnePIN. Occasionally, we will need to call you to get further information.  For urgent matters (Monday-Friday), call the ENT Clinic: [phone] and speak with a call center team member - they will route your call appropriately.   If you'd like to speak directly with a nurse, please find our contact information below. We do our best to check voicemail frequently throughout the day, and will work to call you back within 1-2 days. For urgent matters, please use the general clinic phone numbers listed above.     Gregoria WINN RN  Direct: [phone]  Neha THACKER LPN  Direct: [phone]         [facility]  Department of Otolaryngology

## 2025-04-16 NOTE — LETTER
4/16/2025       RE: Modesto Barbosa  [address] [place] [address]     Dear Colleague,    Thank you for referring your patient, Modesto Barbosa, to the [facility] [place] [facility]. Please see a copy of my visit note below.      [facility]  Return Visit  Encounter date:   April 16, 2025    Chief Complaint:   Follow-up    ID: s/p bilateral revision endoscopic maxillary antrostomy with tissue removal, bilateral revision endoscopic total sphenoethmoidectomy with tissue removal, bilateral endoscopic frontal sinusotomy, and stereotactic image-guided surgery 4/7/25    Interval History 8/29/24:   Modesto Barbosa is a 58 year old male accompanied by an in-person  who presents for follow up. He is doing well and does not have any complaints.     Interval History 12/5/24:   Modesto Barbosa is a 58 year old male who presents for follow up. Accompanied by an in-person . He feels much better and denies any issues with his nose. Has been doing budesonide rinses. Does notice that he potentially has some worse nasal obstruction.    Interval History 3/19/25:   Modesto Barbosa is a 59 year old male who presents with an  for follow up s/p stealth assisted left maxillary antrostomy, total ethmoidectomy, sphenoidotomy, right maxillary antrostomy 8/2/24. Today, the patient reports that he has some left otalgia and he cannot breathe through his left nostril due to a polyp.Has not noticed much difference with his breathing since starting the dupixent.     Interval History 4/16/25:   He reports today with an  for a follow-up after surgery.     [facility]  s/p stealth assisted left maxillary antrostomy, total ethmoidectomy, sphenoidotomy, right maxillary antrostomy 8/2/24 with findings of significant fungal debris in bilateral maxillary sinuses and left ethmoid/sphenoid sinuses.    DATE OF  PROCEDURE:  April 7, 2025   ATTENDING SURGEON: Bhaskar Kingston MD  ASSISTANT SURGEON: none     PREOPERATIVE DIAGNOSES:  1.  Nasal polyposis with findings of polyposis in maxillary, ethmoid and sphenoid sinuses bilaterally.  Evidence of disease in the frontal sinuses.        POSTOPERATIVE DIAGNOSES:  1. As above     PROCEDURES PERFORMED:  1. Bilateral revision endoscopic maxillary antrostomy with tissue removal  2. Bilateral revision endoscopic total sphenoethmoidectomy with tissue removal  3. Bilateral endoscopic frontal sinusotomy  4. Stereotactic image-guided surgery, CPT 92547.     FINDINGS: Significant polyposis in bilateral maxillary, ethmoid, sphenoid cavities.  Scarring of the bilateral frontal outflow tract    Review of systems: A 14-point review of systems has been conducted and is negative for any notable symptoms, except as dictated in the history of present illness.     Physical Exam:  Vital signs: There were no vitals taken for this visit.   General Appearance: No acute distress, appropriate demeanor, conversant  Eyes: moist conjunctivae; EOMI; pupils symmetric; visual acuity grossly intact; no proptosis  Head: normocephalic; overall symmetric appearance without deformity  Face: overall symmetric without deformity  Ears: Normal appearance of external ear  Nose: No external deformity  Oral Cavity/oropharynx: Normal appearance of mucosa  Neck: no palpable lymphadenopathy; thyroid without palpable nodules  Lungs: symmetric chest rise; no wheezing  CV: Good distal perfusion; normal hear rate  Extremities: No deformity  Neurologic Exam: Cranial nerves II-XII are grossly intact      Procedure Note  Procedure performed: Rigid nasal endoscopy  Indication: To evaluate for sinonasal pathology not visualized on routine anterior rhinoscopy  Anesthesia: 4% topical lidocaine with 0.05 % oxymetazoline  Description of procedure: A 30 degree, 3 mm rigid endoscope was inserted into bilateral nasal cavities and the nasal  valves, nasal cavity, middle meatus, sphenoethmoid recess, nasopharynx were evaluated for evidence of obstruction, edema, purulence, polyps and/or mass/lesion.     Findings  RT and LT: significant clot and crusting debrided from bilateral nasal cavities, otherwise healing well without evidence of infection    The patient tolerated the procedure well without complication.     Imaging Review:  Impression: In comparison to 6/13/2024:     -Bilateral maxillary antrostomy and left sphenoid antrostomy changes. Left sphenoid antrostomy is new.  -Progressed, now complete opacification of the maxillary sinuses.   -Increased polypoid densities in bilateral nasal cavity is larger on the left, new on the right.   -Persistent complete opacification of the left sphenoid locule with polypoid density extending to the left posterior ethmoid air cells, progressed.  -New left complete mastoid ar cell opacification and partial middle ear cavity opacification.    Laboratory Review:  Component      Latest Ref Rng 4/7/2025  6:14 AM   GLUCOSE BY METER POCT      70 - 99 mg/dL 113 (H)       Anaerobic Bacterial Culture Routine:  1+ Normal sawyer    Fungal or Yeast Culture Routine:  No growth after 7 days     Wound Aerobic Bacterial Culture Routine:  2+ Pseudomonas aeruginosa, mucoid strain Abnormal   1+ Pseudomonas aeruginosa, mucoid strain Abnormal   1+ Pseudomonas aeruginosa Abnormal   1+ Proteus mirabilis Abnormal     Assessment/Medical Decision Making:  Modesto Barbosa is a 59 year old male who presents for follow up s/p bilateral revision endoscopic maxillary antrostomy with tissue removal, bilateral revision endoscopic total sphenoethmoidectomy with tissue removal, bilateral endoscopic frontal sinusotomy, and stereotactic image-guided surgery 4/7/25. He is healing well with no signs of infection.    Plan:  Continue sinus rinses BID  Start Prednisone 30 MG for 10 days  Follow-up with me in 2-3 weeks    Scribe Disclosure:   Tana SALAS,  am serving as a scribe; to document services personally performed by Bhaskar Kingston MD -based on data collection and the provider's statements to me.     Provider Disclosure:  I agree with above History, Review of Systems, Physical exam and Plan.  I have reviewed the content of the documentation and have edited it as needed. I have personally performed the services documented here and the documentation accurately represents those services and the decisions I have made.      Electronically signed by:    Bhaskar Kingston MD    [facility]  Rhinology, Endoscopic Skull Base Surgery  [facility]  Department of Otolaryngology - Head & Neck Surgery    ~~~~~~~~~~~~~~~~~~~~~~~~~~~~~~~~~~~~~~~~~~~~~~~~~~~~~~~~~~~~~~~~~~~~~~~~~~~~~~~~~~~~~~~~~~~~~~~~~~~~~~~~~~~~~~~~~~~~~~~~~~~~~~~~~~~~~~~    Past Medical History:   Diagnosis Date     Acute kidney injury 06/10/2021     Anemia in chronic renal disease      CAD (coronary artery disease)      CKD (chronic kidney disease) stage 5, GFR less than 15 ml/min (H)      Dyslipidemia      HOCM (hypertrophic obstructive cardiomyopathy) (H)      Kidney replaced by transplant      Metabolic acidosis      Nasal polyp      Peripheral neuropathy      Secondary renal hyperparathyroidism      Thrombocytopenia      Type 2 diabetes mellitus (H)         Past Surgical History:   Procedure Laterality Date     BENCH KIDNEY  2/21/2023    Procedure: Bench kidney;  Surgeon: Talha Weinstein MD;  Location: [facility] OR     CV CORONARY ANGIOGRAM N/A 12/6/2021    Procedure: Coronary Angiogram;  Surgeon: Cristela Banks MD;  Location: [facility] LAB CV     CV LEFT HEART CATH N/A 12/6/2021    Procedure: Left Heart Cath;  Surgeon: Cristela Banks MD;  Location: [facility] CV     CV PCI N/A 12/6/2021    Procedure: Percutaneous Coronary Intervention;  Surgeon: Cristela Banks MD;  Location: [facility] CV     IR FINE NEEDLE ASPIRATION W ULTRASOUND  5/3/2023      IR RENAL BIOPSY RIGHT  5/3/2023     OPTICAL TRACKING SYSTEM ENDOSCOPIC SINUS SURGERY Bilateral 8/2/2024    Procedure: stealth assisted left maxillary antrostomy, total ethmoidectomy, sphenoidotomy, right maxillary antrostomy;  Surgeon: Bhaskar Kingston MD;  Location: [facility] OR     OPTICAL TRACKING SYSTEM ENDOSCOPIC SINUS SURGERY Bilateral 4/7/2025    Procedure: Image guidance bilateral polyp removal, revision ethmoidectomy, revision maxillary antrostomy, revision frontal sinusotomy;  Surgeon: Bhaskar Kingston MD;  Location: [facility] OR     REMOVE CATHETER PERITONEAL N/A 2/21/2023    Procedure: Remove catheter peritoneal;  Surgeon: Talha Weinstein MD;  Location: [facility] OR        Family History   Problem Relation Age of Onset     Diabetes Type 2  Mother      Other - See Comments Daughter         granular cell tumor of thigh     Heart Disease Other      Anesthesia Reaction No family hx of      Deep Vein Thrombosis (DVT) No family hx of      Pulmonary Embolism No family hx of         Social History     Socioeconomic History     Marital status: Patient Declined   Tobacco Use     Smoking status: Never     Passive exposure: Never     Smokeless tobacco: Never   Vaping Use     Vaping status: Never Used   Substance and Sexual Activity     Alcohol use: Not Currently     Drug use: Never     Social Drivers of Health     Financial Resource Strain: Low Risk  (12/27/2023)    Financial Resource Strain      Within the past 12 months, have you or your family members you live with been unable to get utilities (heat, electricity) when it was really needed?: No   Food Insecurity: Low Risk  (12/27/2023)    Food Insecurity      Within the past 12 months, did you worry that your food would run out before you got money to buy more?: No      Within the past 12 months, did the food you bought just not last and you didn t have money to get more?: No   Transportation Needs: Low Risk  (12/27/2023)    Transportation Needs      Within the past 12 months, has  lack of transportation kept you from medical appointments, getting your medicines, non-medical meetings or appointments, work, or from getting things that you need?: No   Interpersonal Safety: Low Risk  (4/7/2025)    Interpersonal Safety      Do you feel physically and emotionally safe where you currently live?: Yes      Within the past 12 months, have you been hit, slapped, kicked or otherwise physically hurt by someone?: No      Within the past 12 months, have you been humiliated or emotionally abused in other ways by your partner or ex-partner?: No   Housing Stability: Low Risk  (12/27/2023)    Housing Stability      Do you have housing? : Yes      Are you worried about losing your housing?: No          Again, thank you for allowing me to participate in the care of your patient.      Sincerely,    Bhaskar Kingston MD

## 2025-04-16 NOTE — NURSING NOTE
"Chief Complaint   Patient presents with    RECHECK   Blood pressure 127/66, pulse 62, height 1.626 m (5' 4\"), SpO2 99%. Coleman Hooper, EMT    "

## 2025-04-17 LAB — BACTERIA WND CULT: NORMAL

## 2025-04-24 LAB — BACTERIA WND CULT: NORMAL

## 2025-04-25 ENCOUNTER — LAB (OUTPATIENT)
Dept: LAB | Facility: HOSPITAL | Age: 60
End: 2025-04-25
Payer: COMMERCIAL

## 2025-04-25 DIAGNOSIS — B34.8 BK VIREMIA: ICD-10-CM

## 2025-04-25 DIAGNOSIS — Z48.298 AFTERCARE FOLLOWING ORGAN TRANSPLANT: ICD-10-CM

## 2025-04-25 DIAGNOSIS — Z94.0 HTN, KIDNEY TRANSPLANT RELATED: ICD-10-CM

## 2025-04-25 DIAGNOSIS — I15.1 HTN, KIDNEY TRANSPLANT RELATED: ICD-10-CM

## 2025-04-25 DIAGNOSIS — Z94.0 KIDNEY REPLACED BY TRANSPLANT: ICD-10-CM

## 2025-04-25 DIAGNOSIS — Z79.899 ENCOUNTER FOR LONG-TERM CURRENT USE OF MEDICATION: ICD-10-CM

## 2025-04-25 LAB
ANION GAP SERPL CALCULATED.3IONS-SCNC: 7 MMOL/L (ref 7–15)
BUN SERPL-MCNC: 37.6 MG/DL (ref 8–23)
CALCIUM SERPL-MCNC: 9.1 MG/DL (ref 8.8–10.4)
CHLORIDE SERPL-SCNC: 103 MMOL/L (ref 98–107)
CREAT SERPL-MCNC: 1.32 MG/DL (ref 0.67–1.17)
CYCLOSPORINE BLD LC/MS/MS-MCNC: 71 UG/L (ref 50–400)
EGFRCR SERPLBLD CKD-EPI 2021: 62 ML/MIN/1.73M2
ERYTHROCYTE [DISTWIDTH] IN BLOOD BY AUTOMATED COUNT: 14.8 % (ref 10–15)
GLUCOSE SERPL-MCNC: 154 MG/DL (ref 70–99)
HCO3 SERPL-SCNC: 25 MMOL/L (ref 22–29)
HCT VFR BLD AUTO: 31.2 % (ref 40–53)
HGB BLD-MCNC: 10.1 G/DL (ref 13.3–17.7)
MCH RBC QN AUTO: 25.8 PG (ref 26.5–33)
MCHC RBC AUTO-ENTMCNC: 32.4 G/DL (ref 31.5–36.5)
MCV RBC AUTO: 80 FL (ref 78–100)
PLATELET # BLD AUTO: 117 10E3/UL (ref 150–450)
POTASSIUM SERPL-SCNC: 4.6 MMOL/L (ref 3.4–5.3)
RBC # BLD AUTO: 3.92 10E6/UL (ref 4.4–5.9)
SODIUM SERPL-SCNC: 135 MMOL/L (ref 135–145)
TME LAST DOSE: NORMAL H
TME LAST DOSE: NORMAL H
WBC # BLD AUTO: 8.9 10E3/UL (ref 4–11)

## 2025-04-25 PROCEDURE — 36415 COLL VENOUS BLD VENIPUNCTURE: CPT

## 2025-04-25 PROCEDURE — 87799 DETECT AGENT NOS DNA QUANT: CPT

## 2025-04-25 PROCEDURE — 80158 DRUG ASSAY CYCLOSPORINE: CPT

## 2025-04-25 PROCEDURE — 80048 BASIC METABOLIC PNL TOTAL CA: CPT

## 2025-04-25 PROCEDURE — 85014 HEMATOCRIT: CPT

## 2025-04-26 LAB
BK VIRUS DNA IU/ML, INSTRUMENT (6800): 42 IU/ML
BK VIRUS SPECIMEN TYPE: ABNORMAL
BKV DNA SPEC NAA+PROBE-LOG#: 1.6 {LOG_COPIES}/ML

## 2025-04-27 ENCOUNTER — HEALTH MAINTENANCE LETTER (OUTPATIENT)
Age: 60
End: 2025-04-27

## 2025-04-29 NOTE — PROGRESS NOTES
[facility]  Return Visit  Encounter date:   April 30, 2025    Chief Complaint:   Follow up     ID:  s/p bilateral revision endoscopic maxillary antrostomy with tissue removal, bilateral revision endoscopic total sphenoethmoidectomy with tissue removal, bilateral endoscopic frontal sinusotomy, and stereotactic image-guided surgery 4/7/25     Interval History 3/19/25:   Modesto Barbosa is a 59 year old male who presents with an  for follow up s/p stealth assisted left maxillary antrostomy, total ethmoidectomy, sphenoidotomy, right maxillary antrostomy 8/2/24. Today, the patient reports that he has some left otalgia and he cannot breathe through his left nostril due to a polyp.Has not noticed much difference with his breathing since starting the dupixent.      Interval History 4/16/25:   He reports today with an  for a follow-up after surgery.    Interval History 4/30/25:   Modesto Barbosa is a 59 year old male who presents for follow up  s/p bilateral revision endoscopic maxillary antrostomy with tissue removal, bilateral revision endoscopic total sphenoethmoidectomy with tissue removal, bilateral endoscopic frontal sinusotomy, and stereotactic image-guided surgery 4/7/25. Today, he reports he is doing well. He denies any problems since his last visit. Obstruction is much improved.     [place] [facility]   s/p bilateral revision endoscopic maxillary antrostomy with tissue removal, bilateral revision endoscopic total sphenoethmoidectomy with tissue removal, bilateral endoscopic frontal sinusotomy, and stereotactic image-guided surgery 4/7/25     Review of systems: A 14-point review of systems has been conducted and is negative for any notable symptoms, except as dictated in the history of present illness.     Physical Exam:  Vital signs: There were no vitals taken for this visit.   General Appearance: No acute distress, appropriate demeanor, conversant  Eyes: moist conjunctivae;  EOMI; pupils symmetric; visual acuity grossly intact; no proptosis  Head: normocephalic; overall symmetric appearance without deformity  Face: overall symmetric without deformity  Ears: Normal appearance of external ear  Nose: No external deformity  Oral Cavity/oropharynx: Normal appearance of mucosa  Neck: no palpable lymphadenopathy; thyroid without palpable nodules  Lungs: symmetric chest rise; no wheezing  CV: Good distal perfusion; normal hear rate  Extremities: No deformity  Neurologic Exam: Cranial nerves II-XII are grossly intact      Procedure Note  Procedure performed: Rigid nasal endoscopy  Indication: To evaluate for sinonasal pathology not visualized on routine anterior rhinoscopy  Anesthesia: 4% topical lidocaine with 0.05 % oxymetazoline  Description of procedure: A 30 degree, 3 mm rigid endoscope was inserted into bilateral nasal cavities and the nasal valves, nasal cavity, middle meatus, sphenoethmoid recess, nasopharynx were evaluated for evidence of obstruction, edema, purulence, polyps and/or mass/lesion.     Findings  RT/LT: Significant improvement and underlying inflammation. Able to visualize into all sinuses without polyps or evidence of infection.     The patient tolerated the procedure well without complication.     Assessment/Medical Decision Making:  Modesto Barbosa is a 59 year old male who presents for follow up  s/p bilateral revision endoscopic maxillary antrostomy with tissue removal, bilateral revision endoscopic total sphenoethmoidectomy with tissue removal, bilateral endoscopic frontal sinusotomy, and stereotactic image-guided surgery 4/7/25. Some crusting was cleaned from the right nasal cavity. Overall, he is significantly improved from his last visit. There is no evidence of polyps and everything is healing well. I will have him continue to do the sinus rinses and Dupixent. I will see him again in 2 months.     Plan:  Continue Dupixent  Continue sinus rinses with steroid  Follow  up with me in 2 months    Scribe Disclosure:   By signing my name below, I, Candice Ortiz, attest that this documentation has been prepared under the direction and in the presence of Bhaskar Kingston MD.  - Electronically Signed: Candice Angel 04/30/25     Provider Disclosure:  I agree with above History, Review of Systems, Physical exam and Plan.  I have reviewed the content of the documentation and have edited it as needed. I have personally performed the services documented here and the documentation accurately represents those services and the decisions I have made.      Electronically signed by:    Bhaskar Kingston MD    [facility]  Rhinology, Endoscopic Skull Base Surgery  [facility]  Department of Otolaryngology - Head & Neck Surgery    ~~~~~~~~~~~~~~~~~~~~~~~~~~~~~~~~~~~~~~~~~~~~~~~~~~~~~~~~~~~~~~~~~~~~~~~~~~~~~~~~~~~~~~~~~~~~~~~~~~~~~~~~~~~~~~~~~~~~~~~~~~~~~~~~~~~~~~~    Past Medical History:   Diagnosis Date    Acute kidney injury 06/10/2021    Anemia in chronic renal disease     CAD (coronary artery disease)     CKD (chronic kidney disease) stage 5, GFR less than 15 ml/min (H)     Dyslipidemia     HOCM (hypertrophic obstructive cardiomyopathy) (H)     Kidney replaced by transplant     Metabolic acidosis     Nasal polyp     Peripheral neuropathy     Secondary renal hyperparathyroidism     Thrombocytopenia     Type 2 diabetes mellitus (H)         Past Surgical History:   Procedure Laterality Date    BENCH KIDNEY  2/21/2023    Procedure: Bench kidney;  Surgeon: Talha Weinstein MD;  Location: [facility] OR    CV CORONARY ANGIOGRAM N/A 12/6/2021    Procedure: Coronary Angiogram;  Surgeon: Cristela Banks MD;  Location: [facility] LAB CV    CV LEFT HEART CATH N/A 12/6/2021    Procedure: Left Heart Cath;  Surgeon: Cristela Banks MD;  Location: [facility] LAB CV    CV PCI N/A 12/6/2021    Procedure: Percutaneous Coronary Intervention;  Surgeon: Cristela Banks MD;   Location: [facility] LAB CV    IR FINE NEEDLE ASPIRATION W ULTRASOUND  5/3/2023    IR RENAL BIOPSY RIGHT  5/3/2023    OPTICAL TRACKING SYSTEM ENDOSCOPIC SINUS SURGERY Bilateral 8/2/2024    Procedure: stealth assisted left maxillary antrostomy, total ethmoidectomy, sphenoidotomy, right maxillary antrostomy;  Surgeon: Bhaskar Kingston MD;  Location: [facility] OR    OPTICAL TRACKING SYSTEM ENDOSCOPIC SINUS SURGERY Bilateral 4/7/2025    Procedure: Image guidance bilateral polyp removal, revision ethmoidectomy, revision maxillary antrostomy, revision frontal sinusotomy;  Surgeon: Bhaskar Kingston MD;  Location: [facility] OR    REMOVE CATHETER PERITONEAL N/A 2/21/2023    Procedure: Remove catheter peritoneal;  Surgeon: Talha Weinstein MD;  Location: [facility] OR        Family History   Problem Relation Age of Onset    Diabetes Type 2  Mother     Other - See Comments Daughter         granular cell tumor of thigh    Heart Disease Other     Anesthesia Reaction No family hx of     Deep Vein Thrombosis (DVT) No family hx of     Pulmonary Embolism No family hx of         Social History     Socioeconomic History    Marital status: Patient Declined   Tobacco Use    Smoking status: Never     Passive exposure: Never    Smokeless tobacco: Never   Vaping Use    Vaping status: Never Used   Substance and Sexual Activity    Alcohol use: Not Currently    Drug use: Never     Social Drivers of Health     Financial Resource Strain: Low Risk  (12/27/2023)    Financial Resource Strain     Within the past 12 months, have you or your family members you live with been unable to get utilities (heat, electricity) when it was really needed?: No   Food Insecurity: Low Risk  (12/27/2023)    Food Insecurity     Within the past 12 months, did you worry that your food would run out before you got money to buy more?: No     Within the past 12 months, did the food you bought just not last and you didn t have money to get more?: No   Transportation Needs: Low Risk   (12/27/2023)    Transportation Needs     Within the past 12 months, has lack of transportation kept you from medical appointments, getting your medicines, non-medical meetings or appointments, work, or from getting things that you need?: No   Interpersonal Safety: Low Risk  (4/7/2025)    Interpersonal Safety     Do you feel physically and emotionally safe where you currently live?: Yes     Within the past 12 months, have you been hit, slapped, kicked or otherwise physically hurt by someone?: No     Within the past 12 months, have you been humiliated or emotionally abused in other ways by your partner or ex-partner?: No   Housing Stability: Low Risk  (12/27/2023)    Housing Stability     Do you have housing? : Yes     Are you worried about losing your housing?: No

## 2025-04-30 ENCOUNTER — OFFICE VISIT (OUTPATIENT)
Dept: OTOLARYNGOLOGY | Facility: CLINIC | Age: 60
End: 2025-04-30
Payer: COMMERCIAL

## 2025-04-30 VITALS
DIASTOLIC BLOOD PRESSURE: 49 MMHG | HEIGHT: 64 IN | WEIGHT: 148.1 LBS | SYSTOLIC BLOOD PRESSURE: 124 MMHG | HEART RATE: 65 BPM | BODY MASS INDEX: 25.29 KG/M2 | OXYGEN SATURATION: 100 %

## 2025-04-30 DIAGNOSIS — Z98.890 S/P FESS (FUNCTIONAL ENDOSCOPIC SINUS SURGERY): ICD-10-CM

## 2025-04-30 DIAGNOSIS — J33.9 NASAL POLYPOSIS: Primary | ICD-10-CM

## 2025-04-30 ASSESSMENT — PAIN SCALES - GENERAL: PAINLEVEL_OUTOF10: NO PAIN (0)

## 2025-04-30 NOTE — LETTER
4/30/2025       RE: Modesto Barbosa  [address] [place] [address]     Dear Colleague,    Thank you for referring your patient, Modesto Barbosa, to the [facility] [place] at [facility]. Please see a copy of my visit note below.      [facility]  Return Visit  Encounter date:   April 30, 2025    Chief Complaint:   Follow up     ID:  s/p bilateral revision endoscopic maxillary antrostomy with tissue removal, bilateral revision endoscopic total sphenoethmoidectomy with tissue removal, bilateral endoscopic frontal sinusotomy, and stereotactic image-guided surgery 4/7/25     Interval History 3/19/25:   Modesto Barbosa is a 59 year old male who presents with an  for follow up s/p stealth assisted left maxillary antrostomy, total ethmoidectomy, sphenoidotomy, right maxillary antrostomy 8/2/24. Today, the patient reports that he has some left otalgia and he cannot breathe through his left nostril due to a polyp.Has not noticed much difference with his breathing since starting the dupixent.      Interval History 4/16/25:   He reports today with an  for a follow-up after surgery.    Interval History 4/30/25:   Modesto Barbosa is a 59 year old male who presents for follow up  s/p bilateral revision endoscopic maxillary antrostomy with tissue removal, bilateral revision endoscopic total sphenoethmoidectomy with tissue removal, bilateral endoscopic frontal sinusotomy, and stereotactic image-guided surgery 4/7/25. Today, he reports he is doing well. He denies any problems since his last visit. Obstruction is much improved.     [place] Oper[facility]   s/p bilateral revision endoscopic maxillary antrostomy with tissue removal, bilateral revision endoscopic total sphenoethmoidectomy with tissue removal, bilateral endoscopic frontal sinusotomy, and stereotactic image-guided surgery 4/7/25     Review of systems: A  14-point review of systems has been conducted and is negative for any notable symptoms, except as dictated in the history of present illness.     Physical Exam:  Vital signs: There were no vitals taken for this visit.   General Appearance: No acute distress, appropriate demeanor, conversant  Eyes: moist conjunctivae; EOMI; pupils symmetric; visual acuity grossly intact; no proptosis  Head: normocephalic; overall symmetric appearance without deformity  Face: overall symmetric without deformity  Ears: Normal appearance of external ear  Nose: No external deformity  Oral Cavity/oropharynx: Normal appearance of mucosa  Neck: no palpable lymphadenopathy; thyroid without palpable nodules  Lungs: symmetric chest rise; no wheezing  CV: Good distal perfusion; normal hear rate  Extremities: No deformity  Neurologic Exam: Cranial nerves II-XII are grossly intact      Procedure Note  Procedure performed: Rigid nasal endoscopy  Indication: To evaluate for sinonasal pathology not visualized on routine anterior rhinoscopy  Anesthesia: 4% topical lidocaine with 0.05 % oxymetazoline  Description of procedure: A 30 degree, 3 mm rigid endoscope was inserted into bilateral nasal cavities and the nasal valves, nasal cavity, middle meatus, sphenoethmoid recess, nasopharynx were evaluated for evidence of obstruction, edema, purulence, polyps and/or mass/lesion.     Findings  RT/LT: Significant improvement and underlying inflammation. Able to visualize into all sinuses without polyps or evidence of infection.     The patient tolerated the procedure well without complication.     Assessment/Medical Decision Making:  Modesto Barbosa is a 59 year old male who presents for follow up  s/p bilateral revision endoscopic maxillary antrostomy with tissue removal, bilateral revision endoscopic total sphenoethmoidectomy with tissue removal, bilateral endoscopic frontal sinusotomy, and stereotactic image-guided surgery 4/7/25. Some crusting was cleaned  from the right nasal cavity. Overall, he is significantly improved from his last visit. There is no evidence of polyps and everything is healing well. I will have him continue to do the sinus rinses and Dupixent. I will see him again in 2 months.     Plan:  Continue Dupixent  Continue sinus rinses with steroid  Follow up with me in 2 months    Scribe Disclosure:   By signing my name below, I, Candice Angel, attest that this documentation has been prepared under the direction and in the presence of Bhaskar Kingston MD.  - Electronically Signed: Candice Ortiz 04/30/25     Provider Disclosure:  I agree with above History, Review of Systems, Physical exam and Plan.  I have reviewed the content of the documentation and have edited it as needed. I have personally performed the services documented here and the documentation accurately represents those services and the decisions I have made.      Electronically signed by:    Bhaskar Kingston MD    [facility]  Rhinology, Endoscopic Skull Base Surgery  [facility]  Department of Otolaryngology - Head & Neck Surgery    ~~~~~~~~~~~~~~~~~~~~~~~~~~~~~~~~~~~~~~~~~~~~~~~~~~~~~~~~~~~~~~~~~~~~~~~~~~~~~~~~~~~~~~~~~~~~~~~~~~~~~~~~~~~~~~~~~~~~~~~~~~~~~~~~~~~~~~~    Past Medical History:   Diagnosis Date     Acute kidney injury 06/10/2021     Anemia in chronic renal disease      CAD (coronary artery disease)      CKD (chronic kidney disease) stage 5, GFR less than 15 ml/min (H)      Dyslipidemia      HOCM (hypertrophic obstructive cardiomyopathy) (H)      Kidney replaced by transplant      Metabolic acidosis      Nasal polyp      Peripheral neuropathy      Secondary renal hyperparathyroidism      Thrombocytopenia      Type 2 diabetes mellitus (H)         Past Surgical History:   Procedure Laterality Date     BENCH KIDNEY  2/21/2023    Procedure: Bench kidney;  Surgeon: Talha Weinstein MD;  Location: [facility] OR     CV CORONARY ANGIOGRAM N/A  12/6/2021    Procedure: Coronary Angiogram;  Surgeon: Cristela Banks MD;  Location: [facility] CATH LAB CV     CV LEFT HEART CATH N/A 12/6/2021    Procedure: Left Heart Cath;  Surgeon: Cristela Banks MD;  Location: [facility] CATH LAB CV     CV PCI N/A 12/6/2021    Procedure: Percutaneous Coronary Intervention;  Surgeon: Cristela Banks MD;  Location: [facility] CATH LAB CV     IR FINE NEEDLE ASPIRATION W ULTRASOUND  5/3/2023     IR RENAL BIOPSY RIGHT  5/3/2023     OPTICAL TRACKING SYSTEM ENDOSCOPIC SINUS SURGERY Bilateral 8/2/2024    Procedure: stealth assisted left maxillary antrostomy, total ethmoidectomy, sphenoidotomy, right maxillary antrostomy;  Surgeon: Bhaskar Kingston MD;  Location: [facility] OR     OPTICAL TRACKING SYSTEM ENDOSCOPIC SINUS SURGERY Bilateral 4/7/2025    Procedure: Image guidance bilateral polyp removal, revision ethmoidectomy, revision maxillary antrostomy, revision frontal sinusotomy;  Surgeon: Bhaskar Kingston MD;  Location: [facility] OR     REMOVE CATHETER PERITONEAL N/A 2/21/2023    Procedure: Remove catheter peritoneal;  Surgeon: Talha Weinstein MD;  Location: [facility] OR        Family History   Problem Relation Age of Onset     Diabetes Type 2  Mother      Other - See Comments Daughter         granular cell tumor of thigh     Heart Disease Other      Anesthesia Reaction No family hx of      Deep Vein Thrombosis (DVT) No family hx of      Pulmonary Embolism No family hx of         Social History     Socioeconomic History     Marital status: Patient Declined   Tobacco Use     Smoking status: Never     Passive exposure: Never     Smokeless tobacco: Never   Vaping Use     Vaping status: Never Used   Substance and Sexual Activity     Alcohol use: Not Currently     Drug use: Never     Social Drivers of Health     Financial Resource Strain: Low Risk  (12/27/2023)    Financial Resource Strain      Within the past 12 months, have you or your family members you live with been unable to get utilities (heat,  electricity) when it was really needed?: No   Food Insecurity: Low Risk  (12/27/2023)    Food Insecurity      Within the past 12 months, did you worry that your food would run out before you got money to buy more?: No      Within the past 12 months, did the food you bought just not last and you didn t have money to get more?: No   Transportation Needs: Low Risk  (12/27/2023)    Transportation Needs      Within the past 12 months, has lack of transportation kept you from medical appointments, getting your medicines, non-medical meetings or appointments, work, or from getting things that you need?: No   Interpersonal Safety: Low Risk  (4/7/2025)    Interpersonal Safety      Do you feel physically and emotionally safe where you currently live?: Yes      Within the past 12 months, have you been hit, slapped, kicked or otherwise physically hurt by someone?: No      Within the past 12 months, have you been humiliated or emotionally abused in other ways by your partner or ex-partner?: No   Housing Stability: Low Risk  (12/27/2023)    Housing Stability      Do you have housing? : Yes      Are you worried about losing your housing?: No          Again, thank you for allowing me to participate in the care of your patient.      Sincerely,    Bhaskar Kingston MD

## 2025-04-30 NOTE — PATIENT INSTRUCTIONS
You were seen in the ENT Clinic today by Dr. Kingston. If you have any questions or concerns after your appointment, please contact us (see below)       2.   The following recommendations have been made based upon your appointment today:  Continue budesonide rinses and Dupixent injections.      3.   Plan to return to the ENT clinic in 2 months.           How to Contact Us:  Send a [employer] message to your provider. Our team will respond to you via [employer]. Occasionally, we will need to call you to get further information.  For urgent matters (Monday-Friday), call the ENT Clinic: [phone] and speak with a call center team member - they will route your call appropriately.   If you'd like to speak directly with a nurse, please find our contact information below. We do our best to check voicemail frequently throughout the day, and will work to call you back within 1-2 days. For urgent matters, please use the general clinic phone numbers listed above.     Gregoria WINN RN  Direct: [phone]  Neha THACKER LPN  Direct: [phone]         [facility]  Department of Otolaryngology

## 2025-04-30 NOTE — NURSING NOTE
"Chief Complaint   Patient presents with    RECHECK   Blood pressure 124/49, pulse 65, height 1.626 m (5' 4\"), weight 67.2 kg (148 lb 1.6 oz), SpO2 100%. Coleman Hooper, EMT    "

## 2025-05-01 LAB — BACTERIA WND CULT: NORMAL

## 2025-05-08 LAB — BACTERIA WND CULT: NO GROWTH

## 2025-05-19 NOTE — PROGRESS NOTES
Chief Complaint/Presenting Concern: Glaucoma Evaluation    History of Present Illness:   Modesto Barbosa is a 59 year old patient who presents for a glaucoma evaluation. Patient was last seen in 0/01/2019 by [facility] by Dr. Gustafson for Diabetic Retinopathy/ Cataract Surgery. Also see by [facility] for RD repair on the left eye. DAMIEN was a year ago.    Today, 05/20/2025, No new visual or ocular concerns. States that things feel stable from DAIMEN.    Current Eye Medications:  None    Relevant Past Medical/Family/Social History:  Type 2 Diabetes Mellitus  Hypertension  Kidney transplant    Relevant Review of Systems:None Applicable     Diagnosis: Glaucoma suspect due to abnormal disc or VF findings with low IOP  Year diagnosis  Previous glaucoma surgery/laser: none  Maximum intraocular pressure 15/19  Currently Meds: none  Family history: positive, Mother  CCT: 5/20/2025:   Gonio: 5/20/2025: open each eye   Refractive status: myopia right eye, hyperopia left eye  Trauma history: negative  Steroid exposure: negative  Vasospastic disease: Migrane/Raynaud phenomenon: negative  A past hemodynamic crisis or Low BP:: negative  Meds AEs/intolerance: None  PMHx:   Asthma and respiratory problems: None  Cardiac: Stent in Heart after kidney transplant.  Renal: Chronic Kidney Disease, Kidney Transplant.   Kidney stones: None  Sulfa Allergy: None  Anticoagulants: Yes, Current Medications.    Today's testing:  Visual field 05/20/2025:   Right eye - Likely Full Field, Poor reliability due to false positive and false negatives, baseline testing.   Left eye - Likely Full Field, Poor reliability due to false positive and false negatives, baseline testing.   OCT Optic Nerve RNFL Spectralis 05/20/2025  right eye: Normal RNFL Thickness, Good Tracing, Baseline Testing  left eye: Normal RNFL Thickness, Good Tracing, Baseline Testing    Additional Ocular History:   Pseudophakia, right eye (05/29/2012 Dr. Franco), left eye  (03/11/2019 Dr. Gustafson)  Retinal detachment left eye with repair (2018)  Proliferative Diabetic Retinopathy    Plan/Recommendations:  Discussed findings with patient.  No evidence of glaucoma at this point, will continue to monitor.     RTC 6 months Vf, OCT RNFL     Physician Attestation     Attending Physician Attestation:  Complete documentation of historical and exam elements from today's encounter can be found in the full encounter summary report (not reduplicated in this progress note). I personally obtained the chief complaint(s) and history of present illness. I confirmed and edited as necessary the review of systems, past medical/surgical history, family history, social history, and examination findings as documented by others; and I examined the patient myself. I personally reviewed the relevant tests, images, and reports as documented above. I personally reviewed the ophthalmic test(s) associated with this encounter. I formulated and edited as necessary the assessment and plan and discussed the findings and management plan with the patient and any family members present at the time of the visit.  Radha Tipton M.D., Glaucoma, May 20, 2025

## 2025-05-20 ENCOUNTER — OFFICE VISIT (OUTPATIENT)
Dept: OPHTHALMOLOGY | Facility: CLINIC | Age: 60
End: 2025-05-20
Attending: OPHTHALMOLOGY
Payer: COMMERCIAL

## 2025-05-20 DIAGNOSIS — Z96.1 PSEUDOPHAKIA OF BOTH EYES: ICD-10-CM

## 2025-05-20 DIAGNOSIS — H40.003 GLAUCOMA SUSPECT OF BOTH EYES: Primary | ICD-10-CM

## 2025-05-20 PROCEDURE — 92083 EXTENDED VISUAL FIELD XM: CPT | Performed by: OPHTHALMOLOGY

## 2025-05-20 PROCEDURE — 76514 ECHO EXAM OF EYE THICKNESS: CPT | Performed by: OPHTHALMOLOGY

## 2025-05-20 PROCEDURE — 92133 CPTRZD OPH DX IMG PST SGM ON: CPT | Performed by: OPHTHALMOLOGY

## 2025-05-20 PROCEDURE — G0463 HOSPITAL OUTPT CLINIC VISIT: HCPCS | Performed by: OPHTHALMOLOGY

## 2025-05-20 ASSESSMENT — SLIT LAMP EXAM - LIDS
COMMENTS: NORMAL
COMMENTS: NORMAL

## 2025-05-20 ASSESSMENT — TONOMETRY
OS_IOP_MMHG: 19
IOP_METHOD: APPLANATION
OS_IOP_MMHG: 12
OD_IOP_MMHG: 18
IOP_METHOD: TONOPEN
OD_IOP_MMHG: 15

## 2025-05-20 ASSESSMENT — CONF VISUAL FIELD
OD_SUPERIOR_NASAL_RESTRICTION: 3
OS_SUPERIOR_TEMPORAL_RESTRICTION: 3
OS_SUPERIOR_NASAL_RESTRICTION: 3
OD_SUPERIOR_TEMPORAL_RESTRICTION: 3

## 2025-05-20 ASSESSMENT — GONIOSCOPY
METHOD: ZEISS, FOUR MIRROR
OS_SUPERIOR: CB
OS_INFERIOR: CB
OS_TEMPORAL: CB
OD_TEMPORAL: CB
OD_NASAL: CB
OD_INFERIOR: CB
OS_NASAL: CB
OD_SUPERIOR: CB

## 2025-05-20 ASSESSMENT — PACHYMETRY
OS_CT(UM): 547
OD_CT(UM): 538

## 2025-05-20 ASSESSMENT — VISUAL ACUITY
OD_SC: 20/125
OS_SC: 20/70
OD_PH_SC: 20/100
METHOD: SNELLEN - LINEAR
OS_SC+: -2
CORRECTION_TYPE: GLASSES

## 2025-05-20 ASSESSMENT — EXTERNAL EXAM - RIGHT EYE: OD_EXAM: NORMAL

## 2025-05-20 ASSESSMENT — EXTERNAL EXAM - LEFT EYE: OS_EXAM: NORMAL

## 2025-05-20 ASSESSMENT — CUP TO DISC RATIO
OD_RATIO: 0.6
OS_RATIO: 0.6

## 2025-05-20 NOTE — NURSING NOTE
Chief Complaints and History of Present Illnesses   Patient presents with    Glaucoma     Chief Complaint(s) and History of Present Illness(es)       Glaucoma              Laterality: both eyes              Comments    Pt. States that he was referred for a glaucoma evaluation. Not having any problems with vision. No pain BE. No flashes or floaters BE. No family history of glaucoma.  Cecile Montes COT 10:24 AM May 20, 2025

## 2025-05-29 DIAGNOSIS — Z94.0 KIDNEY REPLACED BY TRANSPLANT: ICD-10-CM

## 2025-05-29 RX ORDER — MYCOPHENOLATE MOFETIL 250 MG/1
500 CAPSULE ORAL 2 TIMES DAILY
Qty: 120 CAPSULE | Refills: 11 | Status: SHIPPED | OUTPATIENT
Start: 2025-05-29

## 2025-06-02 DIAGNOSIS — J33.9 NASAL POLYPOSIS: ICD-10-CM

## 2025-06-02 DIAGNOSIS — B49 ALLERGIC FUNGAL SINUSITIS: ICD-10-CM

## 2025-06-02 DIAGNOSIS — J30.89 ALLERGIC FUNGAL SINUSITIS: ICD-10-CM

## 2025-06-04 ENCOUNTER — OFFICE VISIT (OUTPATIENT)
Dept: FAMILY MEDICINE | Facility: CLINIC | Age: 60
End: 2025-06-04
Payer: COMMERCIAL

## 2025-06-04 VITALS
OXYGEN SATURATION: 98 % | WEIGHT: 153.4 LBS | RESPIRATION RATE: 18 BRPM | DIASTOLIC BLOOD PRESSURE: 60 MMHG | SYSTOLIC BLOOD PRESSURE: 136 MMHG | BODY MASS INDEX: 26.19 KG/M2 | TEMPERATURE: 98.8 F | HEART RATE: 59 BPM | HEIGHT: 64 IN

## 2025-06-04 DIAGNOSIS — I15.1 HTN, KIDNEY TRANSPLANT RELATED: ICD-10-CM

## 2025-06-04 DIAGNOSIS — Z94.0 HTN, KIDNEY TRANSPLANT RELATED: ICD-10-CM

## 2025-06-04 DIAGNOSIS — B34.8 BK VIREMIA: ICD-10-CM

## 2025-06-04 DIAGNOSIS — I25.10 CORONARY ARTERY DISEASE INVOLVING NATIVE CORONARY ARTERY OF NATIVE HEART WITHOUT ANGINA PECTORIS: Primary | ICD-10-CM

## 2025-06-04 DIAGNOSIS — Z79.4 TYPE 2 DIABETES MELLITUS WITH DIABETIC NEPHROPATHY, WITH LONG-TERM CURRENT USE OF INSULIN (H): ICD-10-CM

## 2025-06-04 DIAGNOSIS — Z23 NEED FOR VACCINATION: ICD-10-CM

## 2025-06-04 DIAGNOSIS — Z79.899 ENCOUNTER FOR LONG-TERM CURRENT USE OF MEDICATION: ICD-10-CM

## 2025-06-04 DIAGNOSIS — H40.003 GLAUCOMA SUSPECT, BILATERAL: ICD-10-CM

## 2025-06-04 DIAGNOSIS — Z94.0 KIDNEY REPLACED BY TRANSPLANT: ICD-10-CM

## 2025-06-04 DIAGNOSIS — E11.21 TYPE 2 DIABETES MELLITUS WITH DIABETIC NEPHROPATHY, WITH LONG-TERM CURRENT USE OF INSULIN (H): ICD-10-CM

## 2025-06-04 DIAGNOSIS — Z48.298 AFTERCARE FOLLOWING ORGAN TRANSPLANT: ICD-10-CM

## 2025-06-04 LAB
ERYTHROCYTE [DISTWIDTH] IN BLOOD BY AUTOMATED COUNT: 14.6 % (ref 10–15)
EST. AVERAGE GLUCOSE BLD GHB EST-MCNC: 128 MG/DL
HBA1C MFR BLD: 6.1 % (ref 0–5.6)
HCT VFR BLD AUTO: 31.2 % (ref 40–53)
HGB BLD-MCNC: 10.6 G/DL (ref 13.3–17.7)
MCH RBC QN AUTO: 26.8 PG (ref 26.5–33)
MCHC RBC AUTO-ENTMCNC: 34 G/DL (ref 31.5–36.5)
MCV RBC AUTO: 79 FL (ref 78–100)
PLATELET # BLD AUTO: 108 10E3/UL (ref 150–450)
RBC # BLD AUTO: 3.95 10E6/UL (ref 4.4–5.9)
WBC # BLD AUTO: 5.1 10E3/UL (ref 4–11)

## 2025-06-04 PROCEDURE — 83036 HEMOGLOBIN GLYCOSYLATED A1C: CPT | Performed by: FAMILY MEDICINE

## 2025-06-04 PROCEDURE — 80158 DRUG ASSAY CYCLOSPORINE: CPT | Performed by: FAMILY MEDICINE

## 2025-06-04 PROCEDURE — 85027 COMPLETE CBC AUTOMATED: CPT | Performed by: FAMILY MEDICINE

## 2025-06-04 PROCEDURE — 36415 COLL VENOUS BLD VENIPUNCTURE: CPT | Performed by: FAMILY MEDICINE

## 2025-06-04 PROCEDURE — 3075F SYST BP GE 130 - 139MM HG: CPT | Performed by: FAMILY MEDICINE

## 2025-06-04 PROCEDURE — 87799 DETECT AGENT NOS DNA QUANT: CPT | Performed by: FAMILY MEDICINE

## 2025-06-04 PROCEDURE — 3044F HG A1C LEVEL LT 7.0%: CPT | Performed by: FAMILY MEDICINE

## 2025-06-04 PROCEDURE — 3078F DIAST BP <80 MM HG: CPT | Performed by: FAMILY MEDICINE

## 2025-06-04 PROCEDURE — 91320 SARSCV2 VAC 30MCG TRS-SUC IM: CPT | Performed by: FAMILY MEDICINE

## 2025-06-04 PROCEDURE — G2211 COMPLEX E/M VISIT ADD ON: HCPCS | Performed by: FAMILY MEDICINE

## 2025-06-04 PROCEDURE — 80048 BASIC METABOLIC PNL TOTAL CA: CPT | Performed by: FAMILY MEDICINE

## 2025-06-04 PROCEDURE — T1013 SIGN LANG/ORAL INTERPRETER: HCPCS | Performed by: INTERPRETER

## 2025-06-04 PROCEDURE — 99214 OFFICE O/P EST MOD 30 MIN: CPT | Performed by: FAMILY MEDICINE

## 2025-06-04 PROCEDURE — 90480 ADMN SARSCOV2 VAC 1/ONLY CMP: CPT | Performed by: FAMILY MEDICINE

## 2025-06-04 NOTE — PROGRESS NOTES
ASSESMENT AND PLAN:  Diagnoses and all orders for this visit:  Coronary artery disease involving native coronary artery of native heart without angina pectoris  Stable.  Continue medical management with aspirin, beta-blocker, statin.  Type 2 diabetes mellitus with diabetic nephropathy, with long-term current use of insulin (H)  -     HEMOGLOBIN A1C done today shows excellent control of his diabetes.  Continue current plan and he is switching over follow-up to me and will follow-up with me regularly here in the clinic given his current physician has left the clinic practice.  HTN, kidney transplant related  Labs ordered by his specialist:  -     CBC with platelets  -     Basic metabolic panel  -     Cyclosporine by [employer] Mass [employer]rometry  -     BK virus PCR quantitative  Glaucoma suspect, bilateral  Reviewed the most recent ophthalmology consultation-no glaucoma, will continue follow-up with ophthalmology.  Need for vaccination  -     COVID-19 12+ (PFIZER)  BK viremia  -     CBC with platelets  -     Basic metabolic panel  -     Cyclosporine by [employer]etry  -     BK virus PCR quantitative  Kidney replaced by transplant  -     CBC with platelets  -     Basic metabolic panel  -     Cyclosporine by [employer]rometry  -     BK virus PCR quantitative  Aftercare following organ transplant  -     CBC with platelets  -     Basic metabolic panel  -     Cyclosporine by [employer] Mass Spectrometry  -     BK virus PCR quantitative  Encounter for long-term current use of medication  -     CBC with platelets  -     Basic metabolic panel  -     Cyclosporine by [employer] Mass Spectrometry  -     BK virus PCR quantitative      Reviewed the risks and benefits of the treatment plan with the patient and/or caregiver and we discussed indications for routine and emergent follow-up.  The longitudinal plan of care for the diagnosis(es)/condition(s) as documented were addressed during this visit. Due to the added complexity  in care, I will continue to support Modesto in the subsequent management and with ongoing continuity of care.        SUBJECTIVE: Patient had previously been cared for by my partner Dr. Cooper that his left the practice recently.  Patient would like to establish care with myself to follow-up on his multiple chronic conditions.  He has a known history of coronary artery disease.  Has not been having any recent problems with chest pain.  Known history of type 2 diabetes.  His diabetes has been under excellent control.  In the past month he only had 1 episode of relative hypoglycemia-he felt some mild shakiness and weakness and checked his blood sugar and it was in the 60s.  Most of his blood sugar readings have been in the range of excellent control.  Patient is also status post kidney transplant and follows up regularly with his nephrology/transplant clinic.    Past Medical History:   Diagnosis Date    Acute kidney injury 06/10/2021    Anemia in chronic renal disease     CAD (coronary artery disease)     CKD (chronic kidney disease) stage 5, GFR less than 15 ml/min (H)     Dyslipidemia     HOCM (hypertrophic obstructive cardiomyopathy) (H)     Kidney replaced by transplant     Metabolic acidosis     Nasal polyp     Peripheral neuropathy     Secondary renal hyperparathyroidism     Thrombocytopenia     Type 2 diabetes mellitus (H)      Patient Active Problem List   Diagnosis    Dyslipidemia, goal LDL below 70    HTN, kidney transplant related    Metabolic acidosis    Coronary artery disease involving native coronary artery of native heart without angina pectoris    Thrombocytopenia    Immunosuppressed status    Kidney replaced by transplant    Anemia in chronic renal disease    Hypomagnesemia    Hepatitis B core antibody positive    Aftercare following organ transplant    Vitamin D deficiency    Secondary renal hyperparathyroidism    BK viremia    Peripheral neuropathy    HOCM (hypertrophic obstructive cardiomyopathy) (H)     CKD (chronic kidney disease) stage 2, GFR 60-89 ml/min    Nasal polyposis    Chronic nasal congestion    Diabetes mellitus, type 2 (H)    Allergic fungal sinusitis    S/P FESS (functional endoscopic sinus surgery)    Glaucoma suspect, bilateral     Current Outpatient Medications   Medication Sig Dispense Refill    acetaminophen (TYLENOL) 325 MG tablet Take 2 tablets (650 mg) by mouth every 4 hours as needed for other (For optimal non-opioid multimodal pain management to improve pain control.) 30 tablet 0    Alcohol Swabs (ALCOHOL PREP) 70 % PADS USE 5X PER  each 3    amLODIPine (NORVASC) 10 MG tablet TAKE ONE TABLET BY MOUTH EVERY NIGHT AT BEDTIME 90 tablet 3    aspirin 81 MG EC tablet Take 1 tablet (81 mg) by mouth daily. 90 tablet 2    atorvastatin (LIPITOR) 20 MG tablet Take 1 tablet (20 mg) by mouth at bedtime. 90 tablet 1    atorvastatin (LIPITOR) 20 MG tablet Take 1 tablet (20 mg) by mouth at bedtime. 90 tablet 1    blood glucose (ONETOUCH ULTRA) test strip USE TO TEST BLOOD SUGAR THREE TIMES DAILY 200 strip 1    blood glucose monitoring (ONE TOUCH ULTRA 2) meter device kit USE TO TEST BLOOD SUGAR THREE TIMES DAILY OR AS DIRECTED. 1 kit 0    carvedilol (COREG) 25 MG tablet TAKE 0.5 TABLETS BY MOUTH 2 TIMES DAILY (WITH MEALS) 30 tablet 11    cycloSPORINE modified (GENERIC EQUIVALENT) 25 MG capsule Take 2 capsules (50 mg) by mouth 2 times daily. 360 capsule 3    dupilumab (DUPIXENT) 300 MG/2ML prefilled pen Inject 2 mLs (300 mg) subcutaneously every 14 days. 12 mL 1    Insulin Aspart FlexPen 100 UNIT/ML SOPN INJECT 7 UNITS UNDER THE SKIN BEFORE BREAKFAST AND LUNCH. INJECT 9 UNITS BEFORE DINNER 15 mL 3    insulin glargine (LANTUS PEN) 100 UNIT/ML pen Inject 25 Units subcutaneously at bedtime 15 mL 11    insulin pen needle (BD PEN NEEDLE ORESTES 2ND GEN) 32G X 4 MM miscellaneous Inject Subcutaneous 3 times daily 100 each 11    losartan (COZAAR) 100 MG tablet Take 1 tablet (100 mg) by mouth daily. 90 tablet 3  "   magnesium oxide (MAG-OX) 400 MG tablet TAKE ONE TABLET BY MOUTH TWICE A  tablet 0    mycophenolate (GENERIC EQUIVALENT) 250 MG capsule TAKE TWO CAPSULES BY MOUTH TWICE A  capsule 11    sodium bicarbonate 650 MG tablet Take 2 tablets (1,300 mg) by mouth 2 times daily 120 tablet 11    thin (NO BRAND SPECIFIED) lancets Tests 3x daily Use with lanceting device. To accompany: Blood Glucose Monitor Brands: per insurance. 200 each 3    Vitamin D3 (CHOLECALCIFEROL) 25 mcg (1000 units) tablet Take 2 tablets (50 mcg) by mouth daily. 60 tablet 1    budesonide (PULMICORT) 0.5 MG/2ML neb solution Empty contents of ampule into 240mL of saline solution and rinse both nasal cavities as instructed twice daily. 120 mL 3    clotrimazole-betamethasone (LOTRISONE) 1-0.05 % external cream Apply topically 2 times daily. As needed for dry skin on the feet. 45 g 1    fluticasone (FLONASE) 50 MCG/ACT nasal spray Spray 2 sprays into both nostrils daily (Patient taking differently: Spray 2 sprays into both nostrils as needed.) 9.9 mL 3    ipratropium (ATROVENT) 0.06 % nasal spray USE 2 SPRAYS IN EACH NOSTRIL FOUR TIMES DAILY AS NEEDED FOR NASAL CONGESTION 15 mL 0    loratadine (CLARITIN) 10 MG tablet Take 1 tablet (10 mg) by mouth daily as needed for allergies (itchy/rash) 90 tablet 3    nitroGLYcerin (NITROSTAT) 0.4 MG sublingual tablet One tablet under the tongue every 5 minutes if needed for chest pain. May repeat every 5 minutes for a maximum of 3 doses in 15 minutes\" 25 tablet 3    sodium chloride (OCEAN) 0.65 % nasal spray Please use in both sides of the nose as needed. 50 mL 1     History   Smoking Status    Never   Smokeless Tobacco    Never       OBJECTICE: /60   Pulse 59   Temp 98.8  F (37.1  C) (Oral)   Resp 18   Ht 1.614 m (5' 3.54\")   Wt 69.6 kg (153 lb 6.4 oz)   SpO2 98%   BMI 26.71 kg/m       Recent Results (from the past 24 hours)   HEMOGLOBIN A1C    Collection Time: 06/04/25  2:46 PM   Result " Value Ref Range    Estimated Average Glucose 128 (H) <117 mg/dL    Hemoglobin A1C 6.1 (H) 0.0 - 5.6 %        CV-regular rate and rhythm   RESP-lungs clear to auscultation   EXTREM-no pitting edema of the ankles   Foot exam-normal.  Palpable pedal pulses bilaterally.  No ulcers.      Signed Electronically by: Harvey Ford MD

## 2025-06-05 ENCOUNTER — TELEPHONE (OUTPATIENT)
Dept: TRANSPLANT | Facility: CLINIC | Age: 60
End: 2025-06-05
Payer: COMMERCIAL

## 2025-06-05 ENCOUNTER — PHARMACY VISIT (OUTPATIENT)
Dept: ADMINISTRATIVE | Facility: CLINIC | Age: 60
End: 2025-06-05
Payer: COMMERCIAL

## 2025-06-05 DIAGNOSIS — Z94.0 HTN, KIDNEY TRANSPLANT RELATED: ICD-10-CM

## 2025-06-05 DIAGNOSIS — I15.1 HTN, KIDNEY TRANSPLANT RELATED: ICD-10-CM

## 2025-06-05 LAB
ANION GAP SERPL CALCULATED.3IONS-SCNC: 8 MMOL/L (ref 7–15)
BK VIRUS DNA IU/ML, INSTRUMENT (6800): 38 IU/ML (ref ?–1)
BK VIRUS SPECIMEN TYPE: ABNORMAL
BKV DNA SPEC NAA+PROBE-LOG#: 1.6 {LOG_COPIES}/ML
BUN SERPL-MCNC: 37.4 MG/DL (ref 8–23)
CALCIUM SERPL-MCNC: 9.4 MG/DL (ref 8.8–10.4)
CHLORIDE SERPL-SCNC: 104 MMOL/L (ref 98–107)
CREAT SERPL-MCNC: 1.43 MG/DL (ref 0.67–1.17)
CYCLOSPORINE BLD LC/MS/MS-MCNC: 124 UG/L (ref 50–400)
EGFRCR SERPLBLD CKD-EPI 2021: 56 ML/MIN/1.73M2
GLUCOSE SERPL-MCNC: 117 MG/DL (ref 70–99)
HCO3 SERPL-SCNC: 24 MMOL/L (ref 22–29)
POTASSIUM SERPL-SCNC: 5.1 MMOL/L (ref 3.4–5.3)
SODIUM SERPL-SCNC: 136 MMOL/L (ref 135–145)
TME LAST DOSE: NORMAL H
TME LAST DOSE: NORMAL H

## 2025-06-05 NOTE — TELEPHONE ENCOUNTER
Baseline Creatinine: ~ 1.1-1.3     In accurate 12 hour CYCLOSPORINE  level  drawn at clinic appointment

## 2025-06-05 NOTE — PROGRESS NOTES
Nasal Polyps Clinical Follow Up Assessment   Completed on 2025/06/05 09:39:34, by Imelda Mario     Activity Medications    DUPIXENT 300MG/2ML SOAJ   Care Details   Is it appropriate to collect an MPR or PDC at this time? (An MPR or PDC would not be appropriate for cycled medications or if the patient is on therapy < 84 days.)    Yes     Please enter patient's PDC    0.89   Based on the patient's report or refill record over the last 6-12 months, does the patient appear to be taking less than 80% of their medication?    No   Please select CURRENT side effect(s) for monitoring:    None   Summary Notes  I had the pleasure of speaking to daughter for TM.   Current regimen: Dupixent 300mg every other week.   Side effects: None.   Doses: Pt able to do injections themselves. Keeps track of doses on calendar. PDC= 0.89.   NP: Had NP surgery in March. Daughter notes pt has been good since then.   Health, allergy or med changes: None.   Questions or concerns: None.   Follow up: 3 months.       Imelda MARIO, PharmD, CSP  Therapy Management Pharmacist  [facility]   [address] [place] [id number]   [email or web address]  [email or web address]   Specialty: [phone]  Mail Order: [phone]

## 2025-06-09 ENCOUNTER — RESULTS FOLLOW-UP (OUTPATIENT)
Dept: TRANSPLANT | Facility: CLINIC | Age: 60
End: 2025-06-09

## 2025-06-09 DIAGNOSIS — I15.1 HTN, KIDNEY TRANSPLANT RELATED: ICD-10-CM

## 2025-06-09 DIAGNOSIS — Z94.0 HTN, KIDNEY TRANSPLANT RELATED: ICD-10-CM

## 2025-06-09 NOTE — TELEPHONE ENCOUNTER
With the assistance of a [facility] , voicemail message left for patient to return my call.   Unintended weight gain

## 2025-07-02 ENCOUNTER — OFFICE VISIT (OUTPATIENT)
Dept: OTOLARYNGOLOGY | Facility: CLINIC | Age: 60
End: 2025-07-02
Payer: COMMERCIAL

## 2025-07-02 VITALS
DIASTOLIC BLOOD PRESSURE: 63 MMHG | WEIGHT: 151.6 LBS | SYSTOLIC BLOOD PRESSURE: 160 MMHG | HEART RATE: 61 BPM | BODY MASS INDEX: 25.88 KG/M2 | HEIGHT: 64 IN | OXYGEN SATURATION: 96 %

## 2025-07-02 DIAGNOSIS — B49 ALLERGIC FUNGAL SINUSITIS: ICD-10-CM

## 2025-07-02 DIAGNOSIS — J33.9 NASAL POLYPOSIS: Primary | ICD-10-CM

## 2025-07-02 DIAGNOSIS — J30.89 ALLERGIC FUNGAL SINUSITIS: ICD-10-CM

## 2025-07-02 DIAGNOSIS — Z98.890 S/P FESS (FUNCTIONAL ENDOSCOPIC SINUS SURGERY): ICD-10-CM

## 2025-07-02 ASSESSMENT — PAIN SCALES - GENERAL: PAINLEVEL_OUTOF10: NO PAIN (0)

## 2025-07-02 NOTE — LETTER
7/2/2025       RE: Modesto Barbosa  [address] [place] [address]     Dear Colleague,    Thank you for referring your patient, Modesto Barbosa, to the [facility] [place] [facility]. Please see a copy of my visit note below.      [facility]  Return Visit  Encounter date:   July 2, 2025    Chief Complaint:   Follow Up    ID: s/p bilateral revision endoscopic maxillary antrostomy with tissue removal, bilateral revision endoscopic total sphenoethmoidectomy with tissue removal, bilateral endoscopic frontal sinusotomy, and stereotactic image-guided surgery 4/7/25     Interval History 3/19/25:   Modesto Barbosa is a 59 year old male who presents with an  for follow up s/p stealth assisted left maxillary antrostomy, total ethmoidectomy, sphenoidotomy, right maxillary antrostomy 8/2/24. Today, the patient reports that he has some left otalgia and he cannot breathe through his left nostril due to a polyp.Has not noticed much difference with his breathing since starting the dupixent.      Interval History 4/16/25:   He reports today with an  for a follow-up after surgery.     Interval History 4/30/25:   Modesto Barbosa is a 59 year old male who presents for follow up  s/p bilateral revision endoscopic maxillary antrostomy with tissue removal, bilateral revision endoscopic total sphenoethmoidectomy with tissue removal, bilateral endoscopic frontal sinusotomy, and stereotactic image-guided surgery 4/7/25. Today, he reports he is doing well. He denies any problems since his last visit. Obstruction is much improved.     Interval History: 7/2/25:    Today, he states that he is feeling better, no concerns      [place] [facility]   s/p bilateral revision endoscopic maxillary antrostomy with tissue removal, bilateral revision endoscopic total sphenoethmoidectomy with tissue removal, bilateral endoscopic frontal  "sinusotomy, and stereotactic image-guided surgery 4/7/25     Review of systems: A 14-point review of systems has been conducted and is negative for any notable symptoms, except as dictated in the history of present illness.     Physical Exam:  Vital signs: BP (!) 160/63 (BP Location: Left arm, Patient Position: Sitting, Cuff Size: Adult Regular)   Pulse 61   Ht 1.626 m (5' 4\")   Wt 68.8 kg (151 lb 9.6 oz)   SpO2 96%   BMI 26.02 kg/m     General Appearance: No acute distress, appropriate demeanor, conversant  Eyes: moist conjunctivae; EOMI; pupils symmetric; visual acuity grossly intact; no proptosis  Head: normocephalic; overall symmetric appearance without deformity  Face: overall symmetric without deformity  Ears: Normal appearance of external ear  Nose: No external deformity  Oral Cavity/oropharynx: Normal appearance of mucosa  Neck: no palpable lymphadenopathy; thyroid without palpable nodules  Lungs: symmetric chest rise; no wheezing  CV: Good distal perfusion; normal hear rate  Extremities: No deformity  Neurologic Exam: Cranial nerves II-XII are grossly intact      Procedure Note  Procedure performed: Rigid nasal endoscopy  Indication: To evaluate for sinonasal pathology not visualized on routine anterior rhinoscopy  Anesthesia: 4% topical lidocaine with 0.05 % oxymetazoline  Description of procedure: A 30 degree, 3 mm rigid endoscope was inserted into bilateral nasal cavities and the nasal valves, nasal cavity, middle meatus, sphenoethmoid recess, nasopharynx were evaluated for evidence of obstruction, edema, purulence, polyps and/or mass/lesion.     Leslie-Kai Endoscopic Scoring System  Endoscopic observation Right Left   Polyps in middle meatus (0 = absent, 1 = restricted to middle meatus, 2 = Beyond middle meatus) 0 0   Discharge (0 = absent, 1 = thin and clear, 2 = thick, purulent) 0 0   Edema (0 = absent, 1 = mild-moderate, 2 = moderate-severe) 1 0   Crusting (0 = absent, 1 = mild-moderate, 2 = " moderate-severe) 0. 0   Scarring (0= absent, 1 = mild-moderate, 2 = moderate-severe) 0 0   Total 1 0     Findings  RT: no evidence of return of polyps or infection       The patient tolerated the procedure well without complication.           Assessment/Medical Decision Making:  Modesto Barbosa is a 59 year old male who presents for follow up  s/p bilateral revision endoscopic maxillary antrostomy with tissue removal, bilateral revision endoscopic total sphenoethmoidectomy with tissue removal, bilateral endoscopic frontal sinusotomy, and stereotactic image-guided surgery 4/7/25.     Upon exam, no evidence of polyps.  I advised patient will schedule for 6 months follow up.         Plan:  Continue Dupixent  Continue nasal sinus rinse  3.   Follow up in 6 months with me    Scribe Disclosure:   By signing my name below, I, Fara Thomas, attest that this document has been prepared under the direction and in the presence of Bhaskar Kingston MD      Electronically signed by: Fara Thomas 7/2/25    Bhaskar Kingston MD    [facility]  Rhinology, Endoscopic Skull Base Surgery  [facility]  Department of Otolaryngology - Head & Neck Surgery    ~~~~~~~~~~~~~~~~~~~~~~~~~~~~~~~~~~~~~~~~~~~~~~~~~~~~~~~~~~~~~~~~~~~~~~~~~~~~~~~~~~~~~~~~~~~~~~~~~~~~~~~~~~~~~~~~~~~~~~~~~~~~~~~~~~~~~~~    Past Medical History:   Diagnosis Date     Acute kidney injury 06/10/2021     Anemia in chronic renal disease      CAD (coronary artery disease)      CKD (chronic kidney disease) stage 5, GFR less than 15 ml/min (H)      Dyslipidemia      HOCM (hypertrophic obstructive cardiomyopathy) (H)      Kidney replaced by transplant      Metabolic acidosis      Nasal polyp      Peripheral neuropathy      Secondary renal hyperparathyroidism      Thrombocytopenia      Type 2 diabetes mellitus (H)         Past Surgical History:   Procedure Laterality Date     BENCH KIDNEY  02/21/2023    Procedure: Bench kidney;  Surgeon: Corinna  Talha Alvarado MD;  Location: [facility] OR     CATARACT IOL, RT/LT Bilateral      CV CORONARY ANGIOGRAM N/A 12/06/2021    Procedure: Coronary Angiogram;  Surgeon: Cristela Banks MD;  Location: [facility] CATH LAB CV     CV LEFT HEART CATH N/A 12/06/2021    Procedure: Left Heart Cath;  Surgeon: Cristela Banks MD;  Location: [facility] CATH LAB CV     CV PCI N/A 12/06/2021    Procedure: Percutaneous Coronary Intervention;  Surgeon: Cristela Banks MD;  Location: [facility] LAB CV     IR FINE NEEDLE ASPIRATION W ULTRASOUND  05/03/2023     IR RENAL BIOPSY RIGHT  05/03/2023     OPTICAL TRACKING SYSTEM ENDOSCOPIC SINUS SURGERY Bilateral 08/02/2024    Procedure: stealth assisted left maxillary antrostomy, total ethmoidectomy, sphenoidotomy, right maxillary antrostomy;  Surgeon: Bhaskar Kingston MD;  Location: [facility] OR     OPTICAL TRACKING SYSTEM ENDOSCOPIC SINUS SURGERY Bilateral 04/07/2025    Procedure: Image guidance bilateral polyp removal, revision ethmoidectomy, revision maxillary antrostomy, revision frontal sinusotomy;  Surgeon: Bhaskar Kingston MD;  Location: [facility] OR     REMOVE CATHETER PERITONEAL N/A 02/21/2023    Procedure: Remove catheter peritoneal;  Surgeon: Talha Weinstein MD;  Location: [facility] OR        Family History   Problem Relation Age of Onset     Diabetes Type 2  Mother      Other - See Comments Daughter         granular cell tumor of thigh     Heart Disease Other      Anesthesia Reaction No family hx of      Deep Vein Thrombosis (DVT) No family hx of      Pulmonary Embolism No family hx of      Glaucoma No family hx of      Macular Degeneration No family hx of      Retinal detachment No family hx of         Social History     Socioeconomic History     Marital status: Patient Declined   Tobacco Use     Smoking status: Never     Passive exposure: Never     Smokeless tobacco: Never   Vaping Use     Vaping status: Never Used   Substance and Sexual Activity     Alcohol use: Not Currently     Drug use: Never     Social  Drivers of Health     Financial Resource Strain: Low Risk  (12/27/2023)    Financial Resource Strain      Within the past 12 months, have you or your family members you live with been unable to get utilities (heat, electricity) when it was really needed?: No   Food Insecurity: Low Risk  (12/27/2023)    Food Insecurity      Within the past 12 months, did you worry that your food would run out before you got money to buy more?: No      Within the past 12 months, did the food you bought just not last and you didn t have money to get more?: No   Transportation Needs: Low Risk  (12/27/2023)    Transportation Needs      Within the past 12 months, has lack of transportation kept you from medical appointments, getting your medicines, non-medical meetings or appointments, work, or from getting things that you need?: No   Interpersonal Safety: Low Risk  (4/7/2025)    Interpersonal Safety      Do you feel physically and emotionally safe where you currently live?: Yes      Within the past 12 months, have you been hit, slapped, kicked or otherwise physically hurt by someone?: No      Within the past 12 months, have you been humiliated or emotionally abused in other ways by your partner or ex-partner?: No   Housing Stability: Low Risk  (12/27/2023)    Housing Stability      Do you have housing? : Yes      Are you worried about losing your housing?: No         Again, thank you for allowing me to participate in the care of your patient.      Sincerely,    Bhaskar Kingston MD

## 2025-07-02 NOTE — PROGRESS NOTES
[facility]  Return Visit  Encounter date:   July 2, 2025    Chief Complaint:   Follow Up    ID: s/p bilateral revision endoscopic maxillary antrostomy with tissue removal, bilateral revision endoscopic total sphenoethmoidectomy with tissue removal, bilateral endoscopic frontal sinusotomy, and stereotactic image-guided surgery 4/7/25     Interval History 3/19/25:   Modesto Barbosa is a 59 year old male who presents with an  for follow up s/p stealth assisted left maxillary antrostomy, total ethmoidectomy, sphenoidotomy, right maxillary antrostomy 8/2/24. Today, the patient reports that he has some left otalgia and he cannot breathe through his left nostril due to a polyp.Has not noticed much difference with his breathing since starting the dupixent.      Interval History 4/16/25:   He reports today with an  for a follow-up after surgery.     Interval History 4/30/25:   Modesto Barbosa is a 59 year old male who presents for follow up  s/p bilateral revision endoscopic maxillary antrostomy with tissue removal, bilateral revision endoscopic total sphenoethmoidectomy with tissue removal, bilateral endoscopic frontal sinusotomy, and stereotactic image-guided surgery 4/7/25. Today, he reports he is doing well. He denies any problems since his last visit. Obstruction is much improved.     Interval History: 7/2/25:    Today, he states that he is feeling better, no concerns      [place] [facility]   s/p bilateral revision endoscopic maxillary antrostomy with tissue removal, bilateral revision endoscopic total sphenoethmoidectomy with tissue removal, bilateral endoscopic frontal sinusotomy, and stereotactic image-guided surgery 4/7/25     Review of systems: A 14-point review of systems has been conducted and is negative for any notable symptoms, except as dictated in the history of present illness.     Physical Exam:  Vital signs: BP (!) 160/63 (BP Location: Left arm, Patient Position:  "Sitting, Cuff Size: Adult Regular)   Pulse 61   Ht 1.626 m (5' 4\")   Wt 68.8 kg (151 lb 9.6 oz)   SpO2 96%   BMI 26.02 kg/m     General Appearance: No acute distress, appropriate demeanor, conversant  Eyes: moist conjunctivae; EOMI; pupils symmetric; visual acuity grossly intact; no proptosis  Head: normocephalic; overall symmetric appearance without deformity  Face: overall symmetric without deformity  Ears: Normal appearance of external ear  Nose: No external deformity  Oral Cavity/oropharynx: Normal appearance of mucosa  Neck: no palpable lymphadenopathy; thyroid without palpable nodules  Lungs: symmetric chest rise; no wheezing  CV: Good distal perfusion; normal hear rate  Extremities: No deformity  Neurologic Exam: Cranial nerves II-XII are grossly intact      Procedure Note  Procedure performed: Rigid nasal endoscopy  Indication: To evaluate for sinonasal pathology not visualized on routine anterior rhinoscopy  Anesthesia: 4% topical lidocaine with 0.05 % oxymetazoline  Description of procedure: A 30 degree, 3 mm rigid endoscope was inserted into bilateral nasal cavities and the nasal valves, nasal cavity, middle meatus, sphenoethmoid recess, nasopharynx were evaluated for evidence of obstruction, edema, purulence, polyps and/or mass/lesion.     [place] Endoscopic Scoring System  Endoscopic observation Right Left   Polyps in middle meatus (0 = absent, 1 = restricted to middle meatus, 2 = Beyond middle meatus) 0 0   Discharge (0 = absent, 1 = thin and clear, 2 = thick, purulent) 0 0   Edema (0 = absent, 1 = mild-moderate, 2 = moderate-severe) 1 0   Crusting (0 = absent, 1 = mild-moderate, 2 = moderate-severe) 0. 0   Scarring (0= absent, 1 = mild-moderate, 2 = moderate-severe) 0 0   Total 1 0     Findings  RT: no evidence of return of polyps or infection       The patient tolerated the procedure well without complication.           Assessment/Medical Decision Making:  Modesto Barbosa is a 59 year old male " who presents for follow up  s/p bilateral revision endoscopic maxillary antrostomy with tissue removal, bilateral revision endoscopic total sphenoethmoidectomy with tissue removal, bilateral endoscopic frontal sinusotomy, and stereotactic image-guided surgery 4/7/25.     Upon exam, no evidence of polyps.  I advised patient will schedule for 6 months follow up.         Plan:  Continue Dupixent  Continue nasal sinus rinse  3.   Follow up in 6 months with me    Scribe Disclosure:   By signing my name below, I, Fara Thomas, attest that this document has been prepared under the direction and in the presence of Bhaskar Kingston MD      Electronically signed by: Fara Thomas 7/2/25    Bhaskar Kingston MD    [facility]  Rhinology, Endoscopic Skull Base Surgery  [facility]  Department of Otolaryngology - Head & Neck Surgery    ~~~~~~~~~~~~~~~~~~~~~~~~~~~~~~~~~~~~~~~~~~~~~~~~~~~~~~~~~~~~~~~~~~~~~~~~~~~~~~~~~~~~~~~~~~~~~~~~~~~~~~~~~~~~~~~~~~~~~~~~~~~~~~~~~~~~~~~    Past Medical History:   Diagnosis Date    Acute kidney injury 06/10/2021    Anemia in chronic renal disease     CAD (coronary artery disease)     CKD (chronic kidney disease) stage 5, GFR less than 15 ml/min (H)     Dyslipidemia     HOCM (hypertrophic obstructive cardiomyopathy) (H)     Kidney replaced by transplant     Metabolic acidosis     Nasal polyp     Peripheral neuropathy     Secondary renal hyperparathyroidism     Thrombocytopenia     Type 2 diabetes mellitus (H)         Past Surgical History:   Procedure Laterality Date    BENCH KIDNEY  02/21/2023    Procedure: Bench kidney;  Surgeon: Talha Weinstein MD;  Location: [facility] OR    CATARACT IOL, RT/LT Bilateral     CV CORONARY ANGIOGRAM N/A 12/06/2021    Procedure: Coronary Angiogram;  Surgeon: Cristela Banks MD;  Location: [facility] LAB CV    CV LEFT HEART CATH N/A 12/06/2021    Procedure: Left Heart Cath;  Surgeon: Cristela Banks MD;  Location: [facility]  LAB CV    CV PCI N/A 12/06/2021    Procedure: Percutaneous Coronary Intervention;  Surgeon: Cristela Banks MD;  Location: [facility] LAB CV    IR FINE NEEDLE ASPIRATION W ULTRASOUND  05/03/2023    IR RENAL BIOPSY RIGHT  05/03/2023    OPTICAL TRACKING SYSTEM ENDOSCOPIC SINUS SURGERY Bilateral 08/02/2024    Procedure: stealth assisted left maxillary antrostomy, total ethmoidectomy, sphenoidotomy, right maxillary antrostomy;  Surgeon: Bhaskar Kingston MD;  Location: [facility] OR    OPTICAL TRACKING SYSTEM ENDOSCOPIC SINUS SURGERY Bilateral 04/07/2025    Procedure: Image guidance bilateral polyp removal, revision ethmoidectomy, revision maxillary antrostomy, revision frontal sinusotomy;  Surgeon: Bhaskar Kingston MD;  Location: [facility] OR    REMOVE CATHETER PERITONEAL N/A 02/21/2023    Procedure: Remove catheter peritoneal;  Surgeon: Talha Weinstein MD;  Location: [facility] OR        Family History   Problem Relation Age of Onset    Diabetes Type 2  Mother     Other - See Comments Daughter         granular cell tumor of thigh    Heart Disease Other     Anesthesia Reaction No family hx of     Deep Vein Thrombosis (DVT) No family hx of     Pulmonary Embolism No family hx of     Glaucoma No family hx of     Macular Degeneration No family hx of     Retinal detachment No family hx of         Social History     Socioeconomic History    Marital status: Patient Declined   Tobacco Use    Smoking status: Never     Passive exposure: Never    Smokeless tobacco: Never   Vaping Use    Vaping status: Never Used   Substance and Sexual Activity    Alcohol use: Not Currently    Drug use: Never     Social Drivers of Health     Financial Resource Strain: Low Risk  (12/27/2023)    Financial Resource Strain     Within the past 12 months, have you or your family members you live with been unable to get utilities (heat, electricity) when it was really needed?: No   Food Insecurity: Low Risk  (12/27/2023)    Food Insecurity     Within the past 12 months, did  you worry that your food would run out before you got money to buy more?: No     Within the past 12 months, did the food you bought just not last and you didn t have money to get more?: No   Transportation Needs: Low Risk  (12/27/2023)    Transportation Needs     Within the past 12 months, has lack of transportation kept you from medical appointments, getting your medicines, non-medical meetings or appointments, work, or from getting things that you need?: No   Interpersonal Safety: Low Risk  (4/7/2025)    Interpersonal Safety     Do you feel physically and emotionally safe where you currently live?: Yes     Within the past 12 months, have you been hit, slapped, kicked or otherwise physically hurt by someone?: No     Within the past 12 months, have you been humiliated or emotionally abused in other ways by your partner or ex-partner?: No   Housing Stability: Low Risk  (12/27/2023)    Housing Stability     Do you have housing? : Yes     Are you worried about losing your housing?: No

## 2025-07-02 NOTE — PATIENT INSTRUCTIONS
You were seen in the ENT Clinic today by Dr. Kingston. If you have any questions or concerns after your appointment, please contact us (see below)       2.   The following recommendations have been made based upon your appointment today:  Continue Dupixent.  Continue sinus rinses.      3.   Plan to return to the ENT clinic in 6 months.           How to Contact Us:  Send a [employer] message to your provider. Our team will respond to you via [employer]. Occasionally, we will need to call you to get further information.  For urgent matters (Monday-Friday), call the ENT Clinic: [phone] and speak with a call center team member - they will route your call appropriately.   If you'd like to speak directly with a nurse, please find our contact information below. We do our best to check voicemail frequently throughout the day, and will work to call you back within 1-2 days. For urgent matters, please use the general clinic phone numbers listed above.     Gregoria WINN RN  Direct: [phone]  Neha THACKER LPN  Direct: [phone]         [facility]  Department of Otolaryngology

## 2025-07-02 NOTE — NURSING NOTE
"Chief Complaint   Patient presents with    RECHECK   Blood pressure (!) 160/63, pulse 61, height 1.626 m (5' 4\"), weight 68.8 kg (151 lb 9.6 oz), SpO2 96%. Coleman Hooper, EMT    "

## 2025-07-05 DIAGNOSIS — Z94.0 KIDNEY REPLACED BY TRANSPLANT: ICD-10-CM

## 2025-07-07 DIAGNOSIS — I15.1 HTN, KIDNEY TRANSPLANT RELATED: ICD-10-CM

## 2025-07-07 DIAGNOSIS — Z94.0 KIDNEY REPLACED BY TRANSPLANT: ICD-10-CM

## 2025-07-07 DIAGNOSIS — Z94.0 HTN, KIDNEY TRANSPLANT RELATED: ICD-10-CM

## 2025-07-07 RX ORDER — SODIUM BICARBONATE 650 MG/1
1300 TABLET ORAL 2 TIMES DAILY
Qty: 120 TABLET | Refills: 11 | Status: SHIPPED | OUTPATIENT
Start: 2025-07-07

## 2025-07-07 RX ORDER — CHOLECALCIFEROL (VITAMIN D3) 25 MCG
2 TABLET ORAL DAILY
Qty: 60 TABLET | Refills: 1 | Status: SHIPPED | OUTPATIENT
Start: 2025-07-07

## 2025-07-17 DIAGNOSIS — E11.22 TYPE 2 DIABETES MELLITUS WITH DIABETIC CHRONIC KIDNEY DISEASE, UNSPECIFIED CKD STAGE, UNSPECIFIED WHETHER LONG TERM INSULIN USE (H): ICD-10-CM

## 2025-07-17 RX ORDER — PEN NEEDLE, DIABETIC 32GX 5/32"
NEEDLE, DISPOSABLE MISCELLANEOUS
Qty: 300 EACH | Refills: 0 | Status: SHIPPED | OUTPATIENT
Start: 2025-07-17

## 2025-07-25 ENCOUNTER — MYC MEDICAL ADVICE (OUTPATIENT)
Dept: TRANSPLANT | Facility: CLINIC | Age: 60
End: 2025-07-25

## 2025-07-25 DIAGNOSIS — Z94.0 KIDNEY REPLACED BY TRANSPLANT: Primary | ICD-10-CM

## 2025-07-27 NOTE — TELEPHONE ENCOUNTER
Ok sounds good Shalonda, I will have him hold Losartan, avoid outdoor  and recheck labs next week.      Thanks  Modesto Barbosa to Me        7/25/25  4:08 PM  Hi Shalonda  I just talked to my dad. He hasn t had any low BPs, recent illnesses or new medications. He says he s been outside this week mowing the lawn and pulling weeds and probably not drinking enough but he doesn t feel dehydrated. He says he s feels fine and has not noticed any changes to his health either. Let me know what you recommend.     Thanks      Yaakovg  Me to Modesto Barbosa        7/25/25  4:07 PM  Yanick      Hold the losartan   since it  will increase creatinine with dehydration   Plan to avoid  prolonged time outside with high heat index -   Plan to repeat transplant  labs  early next week     May consider IV fluids after oral hydration      Please respond to this message      Thank you   Shalonda Kidney Transplant Coordinator         Last read by Modesto Barbosa at 4:08PM on 7/25/2025.  Modesto Barbosa to Me        7/25/25  3:56 PM  Hi Shalonda  I just talked to my dad. He hasn t had any low BPs, recent illnesses or new medications. He says he s been outside this week mowing the lawn and pulling weeds and probably not drinking enough but he doesn t feel dehydrated. He says he s feels fine and has not noticed any changes to his health either. Let me know what you recommend.     Thanks      Yaakovg  Me to Modesto Barbosa        7/25/25  3:07 PM  Good afternoon       Your father's creatinine is elevated today   Any recent illness ,fevers, low BP , new medications   -  or dehydration    I am waiting for his cyclosporine  level      Thank you  Shalonda Kidney Transplant Coordinator      Last read by Modesto Barbosa at 4:08PM on 7/25/2025.

## 2025-07-27 NOTE — TELEPHONE ENCOUNTER
Issue Creatinine  2.0   cyclosporine  level within  baseline   High heat Index week -  Contacted daughter plan to review plan

## 2025-07-29 ENCOUNTER — LAB (OUTPATIENT)
Dept: LAB | Facility: HOSPITAL | Age: 60
End: 2025-07-29
Payer: COMMERCIAL

## 2025-07-29 ENCOUNTER — MYC MEDICAL ADVICE (OUTPATIENT)
Dept: TRANSPLANT | Facility: CLINIC | Age: 60
End: 2025-07-29

## 2025-07-29 ENCOUNTER — TELEPHONE (OUTPATIENT)
Dept: TRANSPLANT | Facility: CLINIC | Age: 60
End: 2025-07-29

## 2025-07-29 DIAGNOSIS — Z94.0 KIDNEY REPLACED BY TRANSPLANT: Primary | ICD-10-CM

## 2025-07-29 DIAGNOSIS — Z79.899 ENCOUNTER FOR LONG-TERM CURRENT USE OF MEDICATION: ICD-10-CM

## 2025-07-29 DIAGNOSIS — I15.1 HTN, KIDNEY TRANSPLANT RELATED: ICD-10-CM

## 2025-07-29 DIAGNOSIS — Z94.0 KIDNEY REPLACED BY TRANSPLANT: ICD-10-CM

## 2025-07-29 DIAGNOSIS — Z94.0 HTN, KIDNEY TRANSPLANT RELATED: ICD-10-CM

## 2025-07-29 DIAGNOSIS — Z48.298 AFTERCARE FOLLOWING ORGAN TRANSPLANT: ICD-10-CM

## 2025-07-29 DIAGNOSIS — B34.8 BK VIREMIA: ICD-10-CM

## 2025-07-29 LAB
ANION GAP SERPL CALCULATED.3IONS-SCNC: 9 MMOL/L (ref 7–15)
BK VIRUS SPECIMEN TYPE: ABNORMAL
BKV DNA # SPEC NAA+PROBE: <22 IU/ML
BKV DNA SPEC NAA+PROBE-LOG#: <1.3 {LOG_COPIES}/ML
BUN SERPL-MCNC: 18.3 MG/DL (ref 8–23)
CALCIUM SERPL-MCNC: 9 MG/DL (ref 8.8–10.4)
CHLORIDE SERPL-SCNC: 103 MMOL/L (ref 98–107)
CREAT SERPL-MCNC: 1.26 MG/DL (ref 0.67–1.17)
CYCLOSPORINE BLD LC/MS/MS-MCNC: 68 UG/L (ref 50–400)
EGFRCR SERPLBLD CKD-EPI 2021: 65 ML/MIN/1.73M2
ERYTHROCYTE [DISTWIDTH] IN BLOOD BY AUTOMATED COUNT: 14.6 % (ref 10–15)
GLUCOSE SERPL-MCNC: 131 MG/DL (ref 70–99)
HCO3 SERPL-SCNC: 24 MMOL/L (ref 22–29)
HCT VFR BLD AUTO: 33.6 % (ref 40–53)
HGB BLD-MCNC: 11.2 G/DL (ref 13.3–17.7)
MCH RBC QN AUTO: 26 PG (ref 26.5–33)
MCHC RBC AUTO-ENTMCNC: 33.3 G/DL (ref 31.5–36.5)
MCV RBC AUTO: 78 FL (ref 78–100)
PLATELET # BLD AUTO: 103 10E3/UL (ref 150–450)
POTASSIUM SERPL-SCNC: 4.5 MMOL/L (ref 3.4–5.3)
RBC # BLD AUTO: 4.31 10E6/UL (ref 4.4–5.9)
SODIUM SERPL-SCNC: 136 MMOL/L (ref 135–145)
TME LAST DOSE: NORMAL H
TME LAST DOSE: NORMAL H
WBC # BLD AUTO: 5.3 10E3/UL (ref 4–11)

## 2025-07-29 PROCEDURE — 87799 DETECT AGENT NOS DNA QUANT: CPT

## 2025-07-29 PROCEDURE — 36415 COLL VENOUS BLD VENIPUNCTURE: CPT

## 2025-07-29 PROCEDURE — 80158 DRUG ASSAY CYCLOSPORINE: CPT

## 2025-07-29 PROCEDURE — 82310 ASSAY OF CALCIUM: CPT

## 2025-07-29 PROCEDURE — 80048 BASIC METABOLIC PNL TOTAL CA: CPT

## 2025-07-29 PROCEDURE — 85027 COMPLETE CBC AUTOMATED: CPT

## 2025-07-29 NOTE — TELEPHONE ENCOUNTER
----- Message from Patrick Phan sent at 7/29/2025  1:14 PM CDT -----    Let's restart losartan at 50 mg daily and go from there.  ----- Message -----  From: Shalonda Roy RN  Sent: 7/29/2025  11:42 AM CDT  To: Patrick Phan MD  Subject: need a little help -  non non urgent -           Question resume losartan 100 mg  if creatinine back to baseline     Mr Barbosa  outside in the garden last week - increased creatinine -   Called daughter  increased his oral hydration limited him to one hour in the morning on high heat index days     Creatinine back to baseline -  I held his losartan  over the weekend    BP  back to 130/80

## 2025-07-31 NOTE — TELEPHONE ENCOUNTER
i Shalonda  This sounds good, I will start Losartan at 50mg for 3 weeks then up it to his normal dose of 100mg at bedtime. I will have him repeat labs in September.     Thank you!  Me to Modesto Barbosa        7/29/25  2:28 PM     Patrick Phan MD   Let's restart losartan at 50 mg daily  slowly increase to      Zong -      Good News your father's creatinine  is almost back  to baseline    Per our records your father dose losartan 100 mg once per day   Like to have him  restart losartan  at lower dose  50 mg once per day for the 2 to 3 weeks   Then increase to losartan 100 mg repeat transplant  labs  first week of Sept    Losartan is beneficial bp medication and lowers protein  in the urine - problem is summer dehydration and this medication can cause the increase creatinine      Please respond to this message -  or indicate a phone call           Shalonda Kidney Transplant Coordinator

## 2025-07-31 NOTE — TELEPHONE ENCOUNTER
Contacted daughter  Yanick BRENNER for plan restarting  the Losartan     Mr Barbosa  has  a history of occasional  dehydration  faheem with high heat index when he is working outside in garden

## 2025-08-19 ENCOUNTER — APPOINTMENT (OUTPATIENT)
Dept: INTERPRETER SERVICES | Facility: CLINIC | Age: 60
End: 2025-08-19
Payer: COMMERCIAL

## 2025-08-22 ENCOUNTER — MYC MEDICAL ADVICE (OUTPATIENT)
Dept: FAMILY MEDICINE | Facility: CLINIC | Age: 60
End: 2025-08-22
Payer: COMMERCIAL

## 2025-08-22 DIAGNOSIS — Z79.4 TYPE 2 DIABETES MELLITUS WITH CHRONIC KIDNEY DISEASE, WITH LONG-TERM CURRENT USE OF INSULIN, UNSPECIFIED CKD STAGE (H): ICD-10-CM

## 2025-08-22 DIAGNOSIS — E11.22 TYPE 2 DIABETES MELLITUS WITH CHRONIC KIDNEY DISEASE, WITH LONG-TERM CURRENT USE OF INSULIN, UNSPECIFIED CKD STAGE (H): ICD-10-CM

## (undated) DEVICE — ENDO SHEATH STORZ SHARPSITE ENDOSCRUB 30DEG 4MM 1912010

## (undated) DEVICE — SU SILK 1 TIE 6X30" A307H

## (undated) DEVICE — CATH FOLEY 3WAY 18FR 30ML LATEX 0167SI18

## (undated) DEVICE — GLOVE BIOGEL PI MICRO SZ 7.5 48575

## (undated) DEVICE — TUBING SUCTION MEDI-VAC SOFT 3/16"X20' N520A

## (undated) DEVICE — SU SILK 2-0 TIE 12X30" A305H

## (undated) DEVICE — TRACKER ENT OTS INSTRUMENT FUSION 9733533

## (undated) DEVICE — ESU GROUND PAD ADULT W/CORD E7507

## (undated) DEVICE — TUBING IRRIG CYSTO/BLADDER SET 81" LF 2C4040

## (undated) DEVICE — DRAPE IOBAN INCISE 23X17" 6650EZ

## (undated) DEVICE — GUIDEWIRE FORTE FLOPPY J TOP 34949-05J

## (undated) DEVICE — SYR 03ML LL W/O NDL 309657

## (undated) DEVICE — DRAPE POUCH INSTRUMENT 1018

## (undated) DEVICE — SLEEVE TR BAND RADIAL COMPRESSION DEVICE 24CM TRB24-REG

## (undated) DEVICE — Device

## (undated) DEVICE — SU SILK 3-0 SH CR 8X18" C013D

## (undated) DEVICE — SU PROLENE 5-0 RB-1DA 36"  8556H

## (undated) DEVICE — SU PDS II 6-0 RB-2DA 30" Z149H

## (undated) DEVICE — CLIP HORIZON LG ORANGE 004200

## (undated) DEVICE — BLADE SHAVER SINUS 4.0MM RAD 40 ROTATE  1884006HR

## (undated) DEVICE — LINEN TOWEL PACK X5 5464

## (undated) DEVICE — EYE FLUORESCEIN OPHTHALMIC STRIP FLO-GLO 1272111

## (undated) DEVICE — DEVICE MULTI TORQUE TD01

## (undated) DEVICE — SHEATH GLIDE RADIAL 4FR 25CM 0.021

## (undated) DEVICE — SU PROLENE 7-0 BV-1DA 30" 8703H

## (undated) DEVICE — SOL NACL 0.9% IRRIG 1000ML BOTTLE 2F7124

## (undated) DEVICE — SUCTION MANIFOLD NEPTUNE 2 SYS 4 PORT 0702-020-000

## (undated) DEVICE — DRAPE SHEET REV FOLD 3/4 9349

## (undated) DEVICE — TEST TUBE W/SCREW CAP 17361

## (undated) DEVICE — BLADE CLIPPER SGL USE 9680

## (undated) DEVICE — CATH BALLOON EMERGE 2.0X8MM H7493918908200

## (undated) DEVICE — DRAPE SHEET MED 44X70" 9355

## (undated) DEVICE — SU PDS II 5-0 RB-1 27" Z303H

## (undated) DEVICE — INTRO MICRO MINI STICK 4FR STD NITINOL

## (undated) DEVICE — SOL NACL 0.9% INJ 1000ML BAG 2B1324X

## (undated) DEVICE — TRACKER PATIENT NON-INVASIVE AXIEM 9734887

## (undated) DEVICE — SU PDS II 4-0 SH 27" Z315H

## (undated) DEVICE — BLADE SHAVER SINUS 3.5MM RAD 60 ROTATE 1883516HRE

## (undated) DEVICE — SU SILK 0 TIE 6X30" A306H

## (undated) DEVICE — DRAPE FLUID WARMING 52"X66" ORS-301

## (undated) DEVICE — SU SILK 4-0 TIE 12X30" A303H

## (undated) DEVICE — SYR 30ML LL W/O NDL 302832

## (undated) DEVICE — PACK NEURO MINOR UMMC SNE32MNMU4

## (undated) DEVICE — SPLINT INTRANASAL POSISEPX GEL SPONGE 9210584

## (undated) DEVICE — ESU SUCTION CAUTERY 10FR FOOT CONTROL E2505-10FR

## (undated) DEVICE — SYR ANGIOGRAPHY MULTIUSE KIT ACIST 014612

## (undated) DEVICE — SOL WATER IRRIG 1000ML BOTTLE 2F7114

## (undated) DEVICE — GUIDEWIRE VASCULAR 0.035IN DIA 145CML 15CML/6CML STAINLESS

## (undated) DEVICE — BLADE SHAVER SERRATED 4MM ROTATE 1884002HRE

## (undated) DEVICE — SPONGE COTTONOID 1/2X3" 80-1407

## (undated) DEVICE — DRAPE FLUID WARMING 52 X 60" ORS-321

## (undated) DEVICE — PREP CHLORAPREP 26ML TINTED HI-LITE ORANGE 930815

## (undated) DEVICE — ANTIFOG SOLUTION W/FOAM PAD CF-1002

## (undated) DEVICE — SOL NACL 0.9% INJ 500ML BAG 2B1323Q

## (undated) DEVICE — CATH DIAG 4FR JR 5.0 538423

## (undated) DEVICE — SU SILK 3-0 TIE 12X30" A304H

## (undated) DEVICE — SU PDS II 0 TP-1 60" Z991G

## (undated) DEVICE — LINEN TOWEL PACK X6 WHITE 5487

## (undated) DEVICE — DRSG PRIMAPORE 02X3" 7133

## (undated) DEVICE — DEVICE INFLATION W/KIT & 6IN TUBING

## (undated) DEVICE — INSERT FOGARTY 33MM TRACTION HYDRAJAW HYDRA33

## (undated) DEVICE — KIT HAND CONTROL ACIST 014644 AR-P54

## (undated) DEVICE — ENDO SHEATH STORZ SHARPSITE ENDOSCRUB 0DEG 4MM 1912000

## (undated) DEVICE — STRAP UNIVERSAL POSITIONING 2-PIECE 4X47X76" 91-287

## (undated) DEVICE — EXCHANGE WIRE .035 260 STAR/JFC/035/260/ M001491681

## (undated) DEVICE — DRSG GAUZE 4X4" TRAY 6939

## (undated) DEVICE — ELECTRODE ADULT PACING MULTI P-211-M1

## (undated) DEVICE — SU VICRYL 0 UR-6 27" J603H

## (undated) DEVICE — ANTIFOG SOLUTION W/FOAM PAD 31142527

## (undated) DEVICE — SURGICEL ABSORBABLE HEMOSTAT SNOW 4"X4" 2083

## (undated) DEVICE — NDL COUNTER 20CT 31142493

## (undated) DEVICE — PUNCH AORTIC 5MM SINGLE USE 1001-626

## (undated) DEVICE — BASIN SET SINGLE STERILE 13752-624

## (undated) DEVICE — DRSG MEDIPORE 3 1/2X13 3/4" 3573

## (undated) DEVICE — CATH ANGIO INFINITI JL3.5 4FRX100CM 538418

## (undated) DEVICE — SU PROLENE 6-0 RB-2DA 30" 8711H

## (undated) DEVICE — SHEATH GLIDE RADIAL 6FR 25CM .035

## (undated) DEVICE — RX SURGIFLO HEMOSTATIC MATRIX W/THROMBIN 8ML 2994

## (undated) DEVICE — LINEN TOWEL PACK X30 5481

## (undated) DEVICE — SU VICRYL 3-0 SH 27" UND J416H

## (undated) DEVICE — MANIFOLD KIT ANGIO AUTOMATED 014613

## (undated) DEVICE — CATH LAUNCHER 6FR EBU 3.5 LA6EBU35

## (undated) DEVICE — PACKING IODOFORM STRIP 1/2" 7832

## (undated) DEVICE — CUSTOM PACK CORONARY SAN5BCRHEA

## (undated) DEVICE — SU MONOCRYL 4-0 PS-2 27" UND Y426H

## (undated) RX ORDER — SODIUM CHLORIDE 9 MG/ML
INJECTION, SOLUTION INTRAVENOUS
Status: DISPENSED
Start: 2023-05-03

## (undated) RX ORDER — HEPARIN SODIUM 1000 [USP'U]/ML
INJECTION, SOLUTION INTRAVENOUS; SUBCUTANEOUS
Status: DISPENSED
Start: 2023-02-21

## (undated) RX ORDER — FENTANYL CITRATE 50 UG/ML
INJECTION, SOLUTION INTRAMUSCULAR; INTRAVENOUS
Status: DISPENSED
Start: 2024-08-02

## (undated) RX ORDER — PROPOFOL 10 MG/ML
INJECTION, EMULSION INTRAVENOUS
Status: DISPENSED
Start: 2025-04-07

## (undated) RX ORDER — OXYMETAZOLINE HYDROCHLORIDE 0.05 G/100ML
SPRAY NASAL
Status: DISPENSED
Start: 2025-04-07

## (undated) RX ORDER — HYDROMORPHONE HYDROCHLORIDE 1 MG/ML
INJECTION, SOLUTION INTRAMUSCULAR; INTRAVENOUS; SUBCUTANEOUS
Status: DISPENSED
Start: 2023-02-21

## (undated) RX ORDER — FENTANYL CITRATE-0.9 % NACL/PF 10 MCG/ML
PLASTIC BAG, INJECTION (ML) INTRAVENOUS
Status: DISPENSED
Start: 2023-02-21

## (undated) RX ORDER — FENTANYL CITRATE 50 UG/ML
INJECTION, SOLUTION INTRAMUSCULAR; INTRAVENOUS
Status: DISPENSED
Start: 2025-04-07

## (undated) RX ORDER — ASPIRIN 81 MG/1
TABLET, CHEWABLE ORAL
Status: DISPENSED
Start: 2021-12-06

## (undated) RX ORDER — FENTANYL CITRATE 50 UG/ML
INJECTION, SOLUTION INTRAMUSCULAR; INTRAVENOUS
Status: DISPENSED
Start: 2021-12-06

## (undated) RX ORDER — SODIUM CHLORIDE 450 MG/100ML
INJECTION, SOLUTION INTRAVENOUS
Status: DISPENSED
Start: 2023-02-21

## (undated) RX ORDER — CEFAZOLIN SODIUM/WATER 2 G/20 ML
SYRINGE (ML) INTRAVENOUS
Status: DISPENSED
Start: 2024-08-02

## (undated) RX ORDER — LIDOCAINE HYDROCHLORIDE 10 MG/ML
INJECTION, SOLUTION EPIDURAL; INFILTRATION; INTRACAUDAL; PERINEURAL
Status: DISPENSED
Start: 2023-05-03

## (undated) RX ORDER — SODIUM CHLORIDE 9 MG/ML
INJECTION, SOLUTION INTRAVENOUS
Status: DISPENSED
Start: 2023-02-21

## (undated) RX ORDER — PAPAVERINE HYDROCHLORIDE 30 MG/ML
INJECTION INTRAMUSCULAR; INTRAVENOUS
Status: DISPENSED
Start: 2023-02-21

## (undated) RX ORDER — LIDOCAINE HYDROCHLORIDE AND EPINEPHRINE 10; 10 MG/ML; UG/ML
INJECTION, SOLUTION INFILTRATION; PERINEURAL
Status: DISPENSED
Start: 2025-04-07

## (undated) RX ORDER — DEXAMETHASONE SODIUM PHOSPHATE 4 MG/ML
INJECTION, SOLUTION INTRA-ARTICULAR; INTRALESIONAL; INTRAMUSCULAR; INTRAVENOUS; SOFT TISSUE
Status: DISPENSED
Start: 2024-08-02

## (undated) RX ORDER — DEXTROSE, SODIUM CHLORIDE, SODIUM LACTATE, POTASSIUM CHLORIDE, AND CALCIUM CHLORIDE 5; .6; .31; .03; .02 G/100ML; G/100ML; G/100ML; G/100ML; G/100ML
INJECTION, SOLUTION INTRAVENOUS
Status: DISPENSED
Start: 2023-02-21

## (undated) RX ORDER — EPINEPHRINE 1 MG/ML
INJECTION INTRAMUSCULAR; INTRAVENOUS; SUBCUTANEOUS
Status: DISPENSED
Start: 2025-04-07

## (undated) RX ORDER — EPINEPHRINE NASAL SOLUTION 1 MG/ML
SOLUTION NASAL
Status: DISPENSED
Start: 2024-08-02

## (undated) RX ORDER — FENTANYL CITRATE 50 UG/ML
INJECTION, SOLUTION INTRAMUSCULAR; INTRAVENOUS
Status: DISPENSED
Start: 2023-02-21

## (undated) RX ORDER — FENTANYL CITRATE 50 UG/ML
INJECTION, SOLUTION INTRAMUSCULAR; INTRAVENOUS
Status: DISPENSED
Start: 2023-05-03

## (undated) RX ORDER — ONDANSETRON 2 MG/ML
INJECTION INTRAMUSCULAR; INTRAVENOUS
Status: DISPENSED
Start: 2024-08-02

## (undated) RX ORDER — CEFUROXIME SODIUM 1.5 G/16ML
INJECTION, POWDER, FOR SOLUTION INTRAVENOUS
Status: DISPENSED
Start: 2023-02-21

## (undated) RX ORDER — FENTANYL CITRATE-0.9 % NACL/PF 10 MCG/ML
PLASTIC BAG, INJECTION (ML) INTRAVENOUS
Status: DISPENSED
Start: 2024-08-02

## (undated) RX ORDER — DIAZEPAM 5 MG
TABLET ORAL
Status: DISPENSED
Start: 2021-12-06

## (undated) RX ORDER — FUROSEMIDE 10 MG/ML
INJECTION INTRAMUSCULAR; INTRAVENOUS
Status: DISPENSED
Start: 2023-02-21

## (undated) RX ORDER — ONDANSETRON 2 MG/ML
INJECTION INTRAMUSCULAR; INTRAVENOUS
Status: DISPENSED
Start: 2025-04-07

## (undated) RX ORDER — VERAPAMIL HYDROCHLORIDE 2.5 MG/ML
INJECTION, SOLUTION INTRAVENOUS
Status: DISPENSED
Start: 2023-02-21

## (undated) RX ORDER — PROPOFOL 10 MG/ML
INJECTION, EMULSION INTRAVENOUS
Status: DISPENSED
Start: 2024-08-02

## (undated) RX ORDER — EPINEPHRINE NASAL SOLUTION 1 MG/ML
SOLUTION NASAL
Status: DISPENSED
Start: 2025-04-07

## (undated) RX ORDER — ACETAMINOPHEN 325 MG/1
TABLET ORAL
Status: DISPENSED
Start: 2023-02-21